# Patient Record
Sex: MALE | Race: WHITE | NOT HISPANIC OR LATINO | Employment: OTHER | URBAN - METROPOLITAN AREA
[De-identification: names, ages, dates, MRNs, and addresses within clinical notes are randomized per-mention and may not be internally consistent; named-entity substitution may affect disease eponyms.]

---

## 2019-03-29 ENCOUNTER — LAB REQUISITION (OUTPATIENT)
Dept: LAB | Facility: HOSPITAL | Age: 74
End: 2019-03-29

## 2019-03-29 DIAGNOSIS — Z00.00 ENCOUNTER FOR GENERAL ADULT MEDICAL EXAMINATION WITHOUT ABNORMAL FINDINGS: ICD-10-CM

## 2019-03-29 PROCEDURE — 88305 TISSUE EXAM BY PATHOLOGIST: CPT | Performed by: OTOLARYNGOLOGY

## 2019-04-01 LAB
CYTO UR: NORMAL
LAB AP CASE REPORT: NORMAL
LAB AP CLINICAL INFORMATION: NORMAL
PATH REPORT.FINAL DX SPEC: NORMAL
PATH REPORT.GROSS SPEC: NORMAL

## 2019-04-09 ENCOUNTER — TELEPHONE (OUTPATIENT)
Dept: NEUROSURGERY | Facility: CLINIC | Age: 74
End: 2019-04-09

## 2019-04-09 NOTE — TELEPHONE ENCOUNTER
Patient was last seen in 2016 by Dr Washington, he had neck surgery.  He saw his PCP today who told him that he has a hole in his lumbar spine where he had prior surgery in the 1990'st by Dr Penaloza. He states that he had a large hematoma after surgery and this is where this hole has opened up, he states that you can stick a qtip in it.  He is wanting to know if Dr Washington will see him for this.    He would like a return call on his cell.

## 2019-04-09 NOTE — TELEPHONE ENCOUNTER
Patient was advised of the message from Dr. Washington. He will contact his PCP office about some place to go. He does not want to waste our time or his own.

## 2019-04-09 NOTE — TELEPHONE ENCOUNTER
This would not be something I would normally take care of.  I will be glad to see him but will likely send him onto either a general surgeon or a plastic surgeon.  As long as he knows that you can make him an appointment if he still wants to come in.

## 2019-04-17 ENCOUNTER — LAB REQUISITION (OUTPATIENT)
Dept: LAB | Facility: HOSPITAL | Age: 74
End: 2019-04-17

## 2019-04-17 DIAGNOSIS — D14.0 BENIGN NEOPLASM OF MIDDLE EAR, NASAL CAVITY AND ACCESSORY SINUSES: ICD-10-CM

## 2019-04-17 PROCEDURE — 88305 TISSUE EXAM BY PATHOLOGIST: CPT | Performed by: OTOLARYNGOLOGY

## 2019-04-18 LAB
CYTO UR: NORMAL
LAB AP CASE REPORT: NORMAL
LAB AP CLINICAL INFORMATION: NORMAL
LAB AP DIAGNOSIS COMMENT: NORMAL
PATH REPORT.FINAL DX SPEC: NORMAL
PATH REPORT.GROSS SPEC: NORMAL

## 2019-07-29 ENCOUNTER — OFFICE VISIT (OUTPATIENT)
Dept: FAMILY MEDICINE CLINIC | Facility: CLINIC | Age: 74
End: 2019-07-29

## 2019-07-29 VITALS
SYSTOLIC BLOOD PRESSURE: 140 MMHG | HEART RATE: 69 BPM | TEMPERATURE: 97.3 F | OXYGEN SATURATION: 97 % | WEIGHT: 257.5 LBS | HEIGHT: 73 IN | BODY MASS INDEX: 34.13 KG/M2 | DIASTOLIC BLOOD PRESSURE: 88 MMHG

## 2019-07-29 DIAGNOSIS — R73.03 PREDIABETES: ICD-10-CM

## 2019-07-29 DIAGNOSIS — I10 ESSENTIAL HYPERTENSION: Primary | ICD-10-CM

## 2019-07-29 DIAGNOSIS — R60.9 EDEMA, UNSPECIFIED TYPE: ICD-10-CM

## 2019-07-29 DIAGNOSIS — K62.89 RECTAL PAIN: ICD-10-CM

## 2019-07-29 DIAGNOSIS — J42 CHRONIC BRONCHITIS, UNSPECIFIED CHRONIC BRONCHITIS TYPE (HCC): ICD-10-CM

## 2019-07-29 DIAGNOSIS — E78.5 HYPERLIPIDEMIA, UNSPECIFIED HYPERLIPIDEMIA TYPE: ICD-10-CM

## 2019-07-29 DIAGNOSIS — I25.118 CORONARY ARTERY DISEASE INVOLVING NATIVE HEART WITH OTHER FORM OF ANGINA PECTORIS, UNSPECIFIED VESSEL OR LESION TYPE (HCC): ICD-10-CM

## 2019-07-29 PROBLEM — I25.10 CORONARY ARTERY DISEASE INVOLVING NATIVE HEART: Status: ACTIVE | Noted: 2019-07-29

## 2019-07-29 PROCEDURE — 99214 OFFICE O/P EST MOD 30 MIN: CPT | Performed by: FAMILY MEDICINE

## 2019-07-29 RX ORDER — ASPIRIN 325 MG
325 TABLET, DELAYED RELEASE (ENTERIC COATED) ORAL EVERY 6 HOURS PRN
COMMUNITY
End: 2019-10-02

## 2019-07-29 RX ORDER — BUDESONIDE AND FORMOTEROL FUMARATE DIHYDRATE 160; 4.5 UG/1; UG/1
2 AEROSOL RESPIRATORY (INHALATION)
COMMUNITY
End: 2019-08-28

## 2019-07-29 RX ORDER — FUROSEMIDE 40 MG/1
40 TABLET ORAL 2 TIMES DAILY
Qty: 60 TABLET | Refills: 6 | Status: SHIPPED | OUTPATIENT
Start: 2019-07-29 | End: 2020-04-10 | Stop reason: HOSPADM

## 2019-07-29 RX ORDER — POTASSIUM CHLORIDE 1500 MG/1
20 TABLET, FILM COATED, EXTENDED RELEASE ORAL 2 TIMES DAILY
COMMUNITY
End: 2019-07-29 | Stop reason: SDUPTHER

## 2019-07-29 RX ORDER — ALLOPURINOL 300 MG/1
300 TABLET ORAL DAILY
COMMUNITY
End: 2019-09-22 | Stop reason: SDUPTHER

## 2019-07-29 RX ORDER — POTASSIUM CHLORIDE 1500 MG/1
20 TABLET, FILM COATED, EXTENDED RELEASE ORAL 2 TIMES DAILY
Qty: 60 TABLET | Refills: 6 | Status: SHIPPED | OUTPATIENT
Start: 2019-07-29 | End: 2020-04-10 | Stop reason: HOSPADM

## 2019-07-29 RX ORDER — ISOSORBIDE MONONITRATE 30 MG/1
30 TABLET, EXTENDED RELEASE ORAL DAILY
COMMUNITY
End: 2020-04-10 | Stop reason: HOSPADM

## 2019-07-29 NOTE — PROGRESS NOTES
Subjective   Juan James is a 73 y.o. male.     Chief Complaint   Patient presents with   • Hypertension   • Pain     colonoscopy ( pain when having a BM)   • Foot Swelling     ankle   • Coronary Artery Disease   • COPD   • Hyperlipidemia   • Rectal Pain       Hypertension   This is a chronic problem. The current episode started more than 1 year ago. The problem is unchanged. The problem is controlled. Pertinent negatives include no anxiety, blurred vision, chest pain, headaches, palpitations or shortness of breath. There are no associated agents to hypertension. Risk factors for coronary artery disease include dyslipidemia, diabetes mellitus, obesity and male gender. Past treatments include diuretics and ACE inhibitors. Current antihypertension treatment includes diuretics and ACE inhibitors. The current treatment provides moderate improvement. There are no compliance problems.  Hypertensive end-organ damage includes CAD/MI. There is no history of angina or kidney disease. There is no history of chronic renal disease, coarctation of the aorta, hyperaldosteronism, hypercortisolism or hyperparathyroidism.   Pain   Pertinent negatives include no abdominal pain, anorexia, arthralgias, change in bowel habit, chest pain, chills, congestion, coughing, fatigue or headaches.   Coronary Artery Disease   Presents for follow-up (stable) visit. Symptoms include leg swelling. Pertinent negatives include no chest pain, chest tightness, dizziness, palpitations or shortness of breath. Risk factors include hyperlipidemia.   COPD   This is a chronic (stable on meds) problem. The current episode started more than 1 year ago. The problem occurs constantly. Pertinent negatives include no abdominal pain, anorexia, arthralgias, change in bowel habit, chest pain, chills, congestion, coughing, fatigue or headaches.   Hyperlipidemia   This is a chronic (stable on meds) problem. The problem is controlled. He has no history of chronic renal  disease. Pertinent negatives include no chest pain or shortness of breath.   Rectal Pain   This is a new (during passing stool only) problem. The current episode started 1 to 4 weeks ago. The problem occurs constantly. The problem has been gradually worsening. Pertinent negatives include no abdominal pain, anorexia, arthralgias, change in bowel habit, chest pain, chills, congestion, coughing, fatigue or headaches. Exacerbated by: passing stool. He has tried nothing for the symptoms.   Leg Swelling   This is a chronic problem. The current episode started more than 1 year ago. The problem occurs constantly. The problem has been gradually worsening. Pertinent negatives include no abdominal pain, anorexia, arthralgias, change in bowel habit, chest pain, chills, congestion, coughing, fatigue or headaches. The symptoms are aggravated by walking. Treatments tried: lasix. The treatment provided mild relief.          The following portions of the patient's history were reviewed and updated as appropriate: allergies, current medications, past family history, past medical history, past social history, past surgical history and problem list.    Past Medical History:   Diagnosis Date   • Arthritis    • Atrial fibrillation (CMS/HCC)    • BPH (benign prostatic hyperplasia)    • Coronary artery disease     STENT X 3   • Hypertension    • Lower back pain    • Neck pain    • Tinnitus        Past Surgical History:   Procedure Laterality Date   • ANTERIOR CERVICAL DISCECTOMY W/ FUSION N/A 3/25/2016    Procedure: C3- C5 CERVICAL DISCECTOMY ANTERIOR FUSION WITH INSTRUMENTATION;  Surgeon: Bill Washington MD;  Location: [facility];  Service:    • BACK SURGERY      X4   • CAROTID STENT     • CORONARY ANGIOPLASTY WITH STENT PLACEMENT  2009    X 3   • KNEE ARTHROSCOPY Left        Family History   Family history unknown: Yes       Social History     Socioeconomic History   • Marital status:      Spouse name: Not on file   • Number  of children: Not on file   • Years of education: Not on file   • Highest education level: Not on file   Tobacco Use   • Smoking status: Former Smoker   • Smokeless tobacco: Never Used   Substance and Sexual Activity   • Alcohol use: Yes     Alcohol/week: 1.8 oz     Types: 3 Shots of liquor per week     Comment: Daily   • Drug use: No       Review of Systems   Constitutional: Negative for chills and fatigue.   HENT: Negative for congestion.    Eyes: Negative for blurred vision.   Respiratory: Negative for cough, chest tightness and shortness of breath.    Cardiovascular: Positive for leg swelling. Negative for chest pain and palpitations.   Gastrointestinal: Positive for rectal pain. Negative for abdominal pain, anorexia and change in bowel habit.   Musculoskeletal: Negative for arthralgias.   Neurological: Negative for dizziness, light-headedness and headache.       Objective   Vitals:    07/29/19 1015   BP: 140/88   Pulse: 69   Temp: 97.3 °F (36.3 °C)   SpO2: 97%     Body mass index is 33.97 kg/m².  Physical Exam   Constitutional: He appears well-developed and well-nourished. No distress.   Cardiovascular: Normal rate and regular rhythm. Exam reveals no friction rub.   No murmur heard.  Stable a.fib  Lt LE edema>RT lE   Pulmonary/Chest: Effort normal and breath sounds normal. No stridor. No respiratory distress.   Skin: He is not diaphoretic.   Vitals reviewed.        Assessment/Plan   Juan was seen today for hypertension, pain, foot swelling, coronary artery disease, copd, hyperlipidemia and rectal pain.    Diagnoses and all orders for this visit:    Essential hypertension  -     Comprehensive Metabolic Panel  -     Lipid Panel With LDL / HDL Ratio    Coronary artery disease involving native heart with other form of angina pectoris, unspecified vessel or lesion type (CMS/HCC)    Chronic bronchitis, unspecified chronic bronchitis type (CMS/HCC)    Hyperlipidemia, unspecified hyperlipidemia type  -      Comprehensive Metabolic Panel  -     Lipid Panel With LDL / HDL Ratio    Prediabetes  -     Comprehensive Metabolic Panel  -     Lipid Panel With LDL / HDL Ratio  -     Hemoglobin A1c  -     Microalbumin / Creatinine Urine Ratio - Urine, Clean Catch    Edema, unspecified type  -     furosemide (LASIX) 40 MG tablet; Take 1 tablet by mouth 2 (Two) Times a Day.  -     potassium chloride ER (K-TAB) 20 MEQ tablet controlled-release ER tablet; Take 1 tablet by mouth 2 (Two) Times a Day.  -     Ambulatory Referral to Vascular Surgery    Rectal pain  -     Ambulatory Referral to Colorectal Surgery

## 2019-07-30 LAB
ALBUMIN SERPL-MCNC: 3.7 G/DL (ref 3.5–5.2)
ALBUMIN/CREAT UR: 1101.3 MG/G CREAT (ref 0–30)
ALBUMIN/GLOB SERPL: 1.2 G/DL
ALP SERPL-CCNC: 99 U/L (ref 39–117)
ALT SERPL-CCNC: 79 U/L (ref 1–41)
AST SERPL-CCNC: 115 U/L (ref 1–40)
BILIRUB SERPL-MCNC: 1 MG/DL (ref 0.2–1.2)
BUN SERPL-MCNC: 10 MG/DL (ref 8–23)
BUN/CREAT SERPL: 12 (ref 7–25)
CALCIUM SERPL-MCNC: 9.3 MG/DL (ref 8.6–10.5)
CHLORIDE SERPL-SCNC: 100 MMOL/L (ref 98–107)
CHOLEST SERPL-MCNC: 182 MG/DL (ref 0–200)
CO2 SERPL-SCNC: 31.2 MMOL/L (ref 22–29)
CREAT SERPL-MCNC: 0.83 MG/DL (ref 0.76–1.27)
CREAT UR-MCNC: 23.8 MG/DL
GLOBULIN SER CALC-MCNC: 3.2 GM/DL
GLUCOSE SERPL-MCNC: 144 MG/DL (ref 65–99)
HBA1C MFR BLD: 7.2 % (ref 4.8–5.6)
HDLC SERPL-MCNC: 37 MG/DL (ref 40–60)
LDLC SERPL CALC-MCNC: 117 MG/DL (ref 0–100)
LDLC/HDLC SERPL: 3.16 {RATIO}
MICROALBUMIN UR-MCNC: 262.1 UG/ML
POTASSIUM SERPL-SCNC: 4.1 MMOL/L (ref 3.5–5.2)
PROT SERPL-MCNC: 6.9 G/DL (ref 6–8.5)
SODIUM SERPL-SCNC: 143 MMOL/L (ref 136–145)
TRIGL SERPL-MCNC: 140 MG/DL (ref 0–150)
VLDLC SERPL CALC-MCNC: 28 MG/DL

## 2019-08-26 RX ORDER — ATORVASTATIN CALCIUM 80 MG/1
80 TABLET, FILM COATED ORAL DAILY
COMMUNITY
End: 2019-08-28

## 2019-08-26 RX ORDER — MOMETASONE FUROATE AND FORMOTEROL FUMARATE DIHYDRATE 200; 5 UG/1; UG/1
2 AEROSOL RESPIRATORY (INHALATION) 2 TIMES DAILY
Refills: 5 | COMMUNITY
Start: 2019-06-13 | End: 2019-11-26

## 2019-08-26 RX ORDER — TRAZODONE HYDROCHLORIDE 100 MG/1
TABLET ORAL
Refills: 3 | COMMUNITY
Start: 2019-07-19 | End: 2019-08-28

## 2019-08-26 RX ORDER — ALBUTEROL SULFATE 90 UG/1
AEROSOL, METERED RESPIRATORY (INHALATION)
Refills: 6 | COMMUNITY
Start: 2019-06-12 | End: 2019-08-28

## 2019-08-26 RX ORDER — METOPROLOL SUCCINATE 100 MG/1
100 TABLET, EXTENDED RELEASE ORAL DAILY
Refills: 1 | COMMUNITY
Start: 2019-07-08 | End: 2019-08-28

## 2019-08-26 RX ORDER — AZATHIOPRINE 50 MG/1
125 TABLET ORAL DAILY
COMMUNITY
Start: 2015-02-18 | End: 2019-08-28

## 2019-08-26 RX ORDER — RANITIDINE 150 MG/1
150 TABLET ORAL
COMMUNITY
End: 2019-08-28

## 2019-08-26 RX ORDER — MAGNESIUM CHLORIDE 64 MG
TABLET, DELAYED RELEASE (ENTERIC COATED) ORAL
COMMUNITY
End: 2019-08-28

## 2019-08-26 RX ORDER — EZETIMIBE 10 MG/1
10 TABLET ORAL
Refills: 6 | COMMUNITY
Start: 2019-07-14 | End: 2019-10-21 | Stop reason: SDUPTHER

## 2019-08-26 RX ORDER — CLOPIDOGREL BISULFATE 75 MG/1
75 TABLET ORAL DAILY
COMMUNITY
End: 2019-08-28

## 2019-08-26 RX ORDER — OMEPRAZOLE 40 MG/1
40 CAPSULE, DELAYED RELEASE ORAL DAILY
Refills: 2 | COMMUNITY
Start: 2019-05-28 | End: 2019-08-28

## 2019-08-28 ENCOUNTER — OFFICE VISIT (OUTPATIENT)
Dept: SURGERY | Facility: CLINIC | Age: 74
End: 2019-08-28

## 2019-08-28 VITALS
TEMPERATURE: 98.1 F | WEIGHT: 254.3 LBS | DIASTOLIC BLOOD PRESSURE: 76 MMHG | OXYGEN SATURATION: 96 % | HEIGHT: 73 IN | HEART RATE: 84 BPM | SYSTOLIC BLOOD PRESSURE: 138 MMHG | BODY MASS INDEX: 33.7 KG/M2

## 2019-08-28 DIAGNOSIS — K62.89 ANAL OR RECTAL PAIN: Primary | ICD-10-CM

## 2019-08-28 DIAGNOSIS — Z86.010 HISTORY OF COLON POLYPS: ICD-10-CM

## 2019-08-28 PROBLEM — Z86.0100 HISTORY OF COLON POLYPS: Status: ACTIVE | Noted: 2019-08-28

## 2019-08-28 PROCEDURE — 46600 DIAGNOSTIC ANOSCOPY SPX: CPT | Performed by: COLON & RECTAL SURGERY

## 2019-08-28 PROCEDURE — 99204 OFFICE O/P NEW MOD 45 MIN: CPT | Performed by: COLON & RECTAL SURGERY

## 2019-08-28 RX ORDER — SODIUM PHOSPHATE,MONO-DIBASIC 19G-7G/118
ENEMA (ML) RECTAL 2 TIMES DAILY WITH MEALS
COMMUNITY
End: 2020-04-10 | Stop reason: HOSPADM

## 2019-08-28 RX ORDER — SODIUM CHLORIDE, SODIUM LACTATE, POTASSIUM CHLORIDE, CALCIUM CHLORIDE 600; 310; 30; 20 MG/100ML; MG/100ML; MG/100ML; MG/100ML
30 INJECTION, SOLUTION INTRAVENOUS CONTINUOUS
Status: CANCELLED | OUTPATIENT
Start: 2019-10-03

## 2019-08-28 NOTE — PROGRESS NOTES
Juan James is a 73 y.o. male who is seen as a consult at the request of Luda Kidd MD for anorectal pain    HPI:    Pt c/o anorectal pain only with BMs beginning about 3 months ago.  Feels like a hot poker.  As soon as he finishes BM, the pain quits  No blood per rectum    He has 1-2 BMs per day.  No change in stool size or shape    He is not applying any creams to his bottom  No prior history of anal pain like this    Last colonoscopy 2007:  +polyps    If he feels like he is getting stopped up, he takes MOM.  Less than once per month  He tries to eat a high-fiber diet.  He does not take a fiber supplement    No anticoags  Seeing vascular surgery for AAA surveillance    Past Medical History:   Diagnosis Date   • AAA (abdominal aortic aneurysm) (CMS/HCC)    • Arthritis    • Atrial fibrillation (CMS/HCC)    • BPH (benign prostatic hyperplasia)    • Cataract    • Cervical disc disorder    • Cervical radiculitis 09/2016   • Cervical spondylosis 11/2012   • CHF (congestive heart failure) (CMS/HCC)    • Chronic bronchitis (CMS/HCC)    • Colon polyps    • COPD (chronic obstructive pulmonary disease) (CMS/HCC)    • Coronary artery disease     STENT X 3   • DDD (degenerative disc disease), cervical    • DDD (degenerative disc disease), lumbar    • Edema    • GERD (gastroesophageal reflux disease)    • Hearing loss    • History of blood transfusion    • Hyperglycemia    • Hyperglycemia    • Hyperlipidemia    • Hypertension    • Lower back pain    • Lumbar radiculopathy 05/2016   • Neck pain    • OA (osteoarthritis)    • 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[facility])    • Peptic ulceration     PUD   • Prediabetes    • Pulmonary embolism (CMS/[facility])    • Rectal pain 07/2019   • Shortness of breath    • Tinnitus        Past Surgical History:   Procedure Laterality Date   • ANTERIOR CERVICAL DISCECTOMY W/ FUSION N/A 3/25/2016    Procedure: C3- C5 CERVICAL DISCECTOMY ANTERIOR FUSION WITH INSTRUMENTATION;  Surgeon: Bill Washington,  MD;  Location: [facility] OR;  Service:    • ARTHRODESIS N/A 07/1983    LUMBAR   • ARTHRODESIS N/A 1987    LUMBAR   • CARDIAC CATHETERIZATION  07/2009   • CARDIAC CATHETERIZATION  03/18/2003    STENT TO RCA AND PCA, DR. PRIYA LUGO   • CATARACT EXTRACTION Bilateral    • CERVICAL ARTHRODESIS N/A 07/1990   • CERVICAL ARTHRODESIS N/A 1988   • CORONARY ANGIOPLASTY WITH STENT PLACEMENT  02/2009   • ENDOSCOPY N/A 06/24/2012    NON BLEEDING EROSIVE GASTROPATHY, 1 DUODENAL ULCER WITH CLEAN BASE, DR. BRANDY WREN AT [facility]   • ENDOSCOPY AND COLONOSCOPY N/A 11/20/2007    EGD WNL, 8 ADENOMATOUS POLYPS IN RECTO-SIGMOID, RESCOPE IN 3 YRS, DR. CRISTINA RODRIGUEZ AT [facility]   • FOOT SURGERY Left    • HAND SURGERY Left    • HAND SURGERY Right    • KNEE ARTHROSCOPY Left    • VASECTOMY Bilateral        Social History:   reports that he has quit smoking. He has never used smokeless tobacco. He reports that he drinks about 1.8 oz of alcohol per week. He reports that he does not use drugs.      Marriage status:     Family History   Problem Relation Age of Onset   • Heart attack Mother          Current Outpatient Medications:   •  allopurinol (ZYLOPRIM) 300 MG tablet, Take 300 mg by mouth Daily., Disp: , Rfl:   •  aspirin  MG tablet, Take 325 mg by mouth Every 6 (Six) Hours As Needed., Disp: , Rfl:   •  digoxin (LANOXIN) 125 MCG tablet, Take 125 mcg by mouth every morning., Disp: , Rfl:   •  DULERA 200-5 MCG/ACT inhaler, Inhale 2 puffs 2 (Two) Times a Day., Disp: , Rfl: 5  •  ezetimibe (ZETIA) 10 MG tablet, Take 10 mg by mouth every night at bedtime., Disp: , Rfl: 6  •  furosemide (LASIX) 40 MG tablet, Take 1 tablet by mouth 2 (Two) Times a Day., Disp: 60 tablet, Rfl: 6  •  glucosamine-chondroitin 500-400 MG capsule capsule, Take  by mouth 3 (Three) Times a Day With Meals., Disp: , Rfl:   •  isosorbide mononitrate (IMDUR) 30 MG 24 hr tablet, Take 30 mg by mouth Daily., Disp: , Rfl:   •  metoprolol succinate XL (TOPROL-XL) 50 MG 24 hr  tablet, Take 50 mg by mouth every morning., Disp: , Rfl:   •  Multiple Vitamins-Minerals (MULTIVITAMIN PO), Take 1 tablet by mouth daily., Disp: , Rfl:   •  potassium chloride ER (K-TAB) 20 MEQ tablet controlled-release ER tablet, Take 1 tablet by mouth 2 (Two) Times a Day., Disp: 60 tablet, Rfl: 6    Allergy  Ranexa [ranolazine er]    Review of Systems   Constitution: Negative for decreased appetite, weakness and weight gain.   HENT: Negative for congestion, hearing loss and hoarse voice.    Eyes: Negative for blurred vision, discharge and visual disturbance.   Cardiovascular: Positive for leg swelling. Negative for chest pain and cyanosis.   Respiratory: Positive for shortness of breath. Negative for cough, sleep disturbances due to breathing and snoring.    Endocrine: Negative for cold intolerance and heat intolerance.   Hematologic/Lymphatic: Does not bruise/bleed easily.   Skin: Negative for itching, poor wound healing and skin cancer.   Musculoskeletal: Positive for arthritis, back pain, joint pain and joint swelling.   Gastrointestinal: Negative for abdominal pain, change in bowel habit, bowel incontinence and constipation.   Genitourinary: Negative for bladder incontinence, dysuria and hematuria.   Neurological: Negative for brief paralysis, excessive daytime sleepiness, dizziness, focal weakness, headaches and light-headedness.   Psychiatric/Behavioral: Negative for altered mental status and hallucinations. The patient does not have insomnia.    Allergic/Immunologic: Negative for HIV exposure and persistent infections.   All other systems reviewed and are negative.      Vitals:    08/28/19 1349   BP: 138/76   Pulse: 84   Temp: 98.1 °F (36.7 °C)   SpO2: 96%     Body mass index is 33.55 kg/m².    Physical Exam   Constitutional: He is oriented to person, place, and time. He appears well-developed and well-nourished. No distress.   HENT:   Head: Normocephalic and atraumatic.   Nose: Nose normal.   Mouth/Throat:  Oropharynx is clear and moist.   Eyes: Conjunctivae and EOM are normal. Pupils are equal, round, and reactive to light.   Neck: Normal range of motion. No tracheal deviation present.   Pulmonary/Chest: Effort normal and breath sounds normal. No respiratory distress.   Abdominal: Soft. Bowel sounds are normal. He exhibits no distension.   Genitourinary:   Genitourinary Comments: Perianal exam: external hem - wnl.  No fissure visualized  CARO- good tone, no masses  Anoscopy performed:  Grade wnl internal hem   Musculoskeletal: Normal range of motion. He exhibits no edema or deformity.   Neurological: He is alert and oriented to person, place, and time. No cranial nerve deficit. Coordination and gait normal.   Skin: Skin is warm and dry.   Psychiatric: He has a normal mood and affect. His behavior is normal. Judgment normal.       Review of Medical Record:  I reviewed PCP Dr. Dasilva records: pt with anorectal pain    I reviewed records colonoscopy 2007:  +polyps    Assessment:  1. Anal or rectal pain    2. History of colon polyps        Plan:    Discussion with patient that while fissure not seen on exam, his symptoms are consistent with fissure, and will treat as such.  I wrote for diltiazem and lidocaine cream to be placed on the anus 3 times a day.      For the history of colon polyps, I recommend doing a colonoscopy.  I described the patient risks, benefits, and alternatives, and he wishes to proceed.      Scribed for Naldo Alves MD by Ita Arroyo PA-C  8/28/2019   This patient was evaluated by me, recommendations made, documentation reviewed, edited, and revised by me, Naldo Alves MD

## 2019-09-23 RX ORDER — ALLOPURINOL 300 MG/1
TABLET ORAL
Qty: 30 TABLET | Refills: 0 | Status: SHIPPED | OUTPATIENT
Start: 2019-09-23 | End: 2019-11-26

## 2019-10-02 RX ORDER — ASPIRIN 81 MG/1
81 TABLET, CHEWABLE ORAL DAILY
COMMUNITY
End: 2020-04-10 | Stop reason: HOSPADM

## 2019-10-03 ENCOUNTER — ANESTHESIA EVENT (OUTPATIENT)
Dept: GASTROENTEROLOGY | Facility: HOSPITAL | Age: 74
End: 2019-10-03

## 2019-10-03 ENCOUNTER — HOSPITAL ENCOUNTER (OUTPATIENT)
Facility: HOSPITAL | Age: 74
Setting detail: HOSPITAL OUTPATIENT SURGERY
Discharge: HOME | End: 2019-10-03
Attending: COLON & RECTAL SURGERY | Admitting: COLON & RECTAL SURGERY

## 2019-10-03 ENCOUNTER — ANESTHESIA (OUTPATIENT)
Dept: GASTROENTEROLOGY | Facility: HOSPITAL | Age: 74
End: 2019-10-03

## 2019-10-03 VITALS
BODY MASS INDEX: 33.03 KG/M2 | HEIGHT: 73 IN | SYSTOLIC BLOOD PRESSURE: 127 MMHG | HEART RATE: 73 BPM | TEMPERATURE: 98.1 F | DIASTOLIC BLOOD PRESSURE: 85 MMHG | OXYGEN SATURATION: 96 % | RESPIRATION RATE: 18 BRPM | WEIGHT: 249.25 LBS

## 2019-10-03 DIAGNOSIS — Z86.010 HISTORY OF COLON POLYPS: ICD-10-CM

## 2019-10-03 PROCEDURE — 45380 COLONOSCOPY AND BIOPSY: CPT | Performed by: COLON & RECTAL SURGERY

## 2019-10-03 PROCEDURE — 88305 TISSUE EXAM BY PATHOLOGIST: CPT | Performed by: COLON & RECTAL SURGERY

## 2019-10-03 PROCEDURE — 45385 COLONOSCOPY W/LESION REMOVAL: CPT | Performed by: COLON & RECTAL SURGERY

## 2019-10-03 PROCEDURE — 45381 COLONOSCOPY SUBMUCOUS NJX: CPT | Performed by: COLON & RECTAL SURGERY

## 2019-10-03 PROCEDURE — 25010000002 PROPOFOL 10 MG/ML EMULSION: Performed by: NURSE ANESTHETIST, CERTIFIED REGISTERED

## 2019-10-03 RX ORDER — LIDOCAINE HYDROCHLORIDE 20 MG/ML
INJECTION, SOLUTION INFILTRATION; PERINEURAL AS NEEDED
Status: DISCONTINUED | OUTPATIENT
Start: 2019-10-03 | End: 2019-10-03 | Stop reason: SURG

## 2019-10-03 RX ORDER — PROPOFOL 10 MG/ML
VIAL (ML) INTRAVENOUS CONTINUOUS PRN
Status: DISCONTINUED | OUTPATIENT
Start: 2019-10-03 | End: 2019-10-03 | Stop reason: SURG

## 2019-10-03 RX ORDER — SODIUM CHLORIDE, SODIUM LACTATE, POTASSIUM CHLORIDE, CALCIUM CHLORIDE 600; 310; 30; 20 MG/100ML; MG/100ML; MG/100ML; MG/100ML
30 INJECTION, SOLUTION INTRAVENOUS CONTINUOUS
Status: DISCONTINUED | OUTPATIENT
Start: 2019-10-03 | End: 2019-10-03 | Stop reason: HOSPADM

## 2019-10-03 RX ORDER — PROPOFOL 10 MG/ML
VIAL (ML) INTRAVENOUS AS NEEDED
Status: DISCONTINUED | OUTPATIENT
Start: 2019-10-03 | End: 2019-10-03 | Stop reason: SURG

## 2019-10-03 RX ADMIN — PROPOFOL 200 MCG/KG/MIN: 10 INJECTION, EMULSION INTRAVENOUS at 13:45

## 2019-10-03 RX ADMIN — PROPOFOL 50 MG: 10 INJECTION, EMULSION INTRAVENOUS at 13:45

## 2019-10-03 RX ADMIN — LIDOCAINE HYDROCHLORIDE 60 MG: 20 INJECTION, SOLUTION INFILTRATION; PERINEURAL at 13:45

## 2019-10-03 RX ADMIN — SODIUM CHLORIDE, POTASSIUM CHLORIDE, SODIUM LACTATE AND CALCIUM CHLORIDE 30 ML/HR: 600; 310; 30; 20 INJECTION, SOLUTION INTRAVENOUS at 13:13

## 2019-10-03 RX ADMIN — PROPOFOL 30 MG: 10 INJECTION, EMULSION INTRAVENOUS at 13:47

## 2019-10-03 NOTE — H&P
Pt c/o anorectal pain only with BMs beginning about 3 months ago. Feels like a hot poker. As soon as he finishes BM, the pain quits   No blood per rectum   He has 1-2 BMs per day. No change in stool size or shape   He is not applying any creams to his bottom   No prior history of anal pain like this   Last colonoscopy 2007: +polyps   If he feels like he is getting stopped up, he takes MOM. Less than once per month   He tries to eat a high-fiber diet. He does not take a fiber supplement   No anticoags   Seeing vascular surgery for AAA surveillance   Medical History                                                                                                                                                                                                                                          Surgical History                                                                                                                                                                    Social History:   reports that he has quit smoking. He has never used smokeless tobacco. He reports that he drinks about 1.8 oz of alcohol per week. He reports that he does not use drugs.   Marriage status:          Family History   Problem Relation Age of Onset   • Heart attack Mother        Current Outpatient Medications:   • allopurinol (ZYLOPRIM) 300 MG tablet, Take 300 mg by mouth Daily., Disp: , Rfl:   • aspirin  MG tablet, Take 325 mg by mouth Every 6 (Six) Hours As Needed., Disp: , Rfl:   • digoxin (LANOXIN) 125 MCG tablet, Take 125 mcg by mouth every morning., Disp: , Rfl:   • DULERA 200-5 MCG/ACT inhaler, Inhale 2 puffs 2 (Two) Times a Day., Disp: , Rfl: 5   • ezetimibe (ZETIA) 10 MG tablet, Take 10 mg by mouth every night at bedtime., Disp: , Rfl: 6   • furosemide (LASIX) 40 MG tablet, Take 1 tablet by mouth 2 (Two) Times a Day., Disp: 60 tablet, Rfl: 6   • glucosamine-chondroitin 500-400 MG capsule capsule, Take by mouth 3  "(Three) Times a Day With Meals., Disp: , Rfl:   • isosorbide mononitrate (IMDUR) 30 MG 24 hr tablet, Take 30 mg by mouth Daily., Disp: , Rfl:   • metoprolol succinate XL (TOPROL-XL) 50 MG 24 hr tablet, Take 50 mg by mouth every morning., Disp: , Rfl:   • Multiple Vitamins-Minerals (MULTIVITAMIN PO), Take 1 tablet by mouth daily., Disp: , Rfl:   • potassium chloride ER (K-TAB) 20 MEQ tablet controlled-release ER tablet, Take 1 tablet by mouth 2 (Two) Times a Day., Disp: 60 tablet, Rfl: 6   Allergy   Ranexa [ranolazine er]   Review of Systems   Constitution: Negative for decreased appetite, weakness and weight gain.   HENT: Negative for congestion, hearing loss and hoarse voice.   Eyes: Negative for blurred vision, discharge and visual disturbance.   Cardiovascular: Positive for leg swelling. Negative for chest pain and cyanosis.   Respiratory: Positive for shortness of breath. Negative for cough, sleep disturbances due to breathing and snoring.   Endocrine: Negative for cold intolerance and heat intolerance.   Hematologic/Lymphatic: Does not bruise/bleed easily.   Skin: Negative for itching, poor wound healing and skin cancer.   Musculoskeletal: Positive for arthritis, back pain, joint pain and joint swelling.   Gastrointestinal: Negative for abdominal pain, change in bowel habit, bowel incontinence and constipation.   Genitourinary: Negative for bladder incontinence, dysuria and hematuria.   Neurological: Negative for brief paralysis, excessive daytime sleepiness, dizziness, focal weakness, headaches and light-headedness.   Psychiatric/Behavioral: Negative for altered mental status and hallucinations. The patient does not have insomnia.   Allergic/Immunologic: Negative for HIV exposure and persistent infections.   All other systems reviewed and are negative.     /84 (BP Location: Left arm, Patient Position: Sitting)   Pulse 75   Temp 98.1 °F (36.7 °C) (Oral)   Resp 19   Ht 185.4 cm (73\")   Wt 113 kg (249 " lb 4 oz)   SpO2 96%   BMI 32.88 kg/m²     Physical Exam   Constitutional: He is oriented to person, place, and time. He appears well-developed and well-nourished. No distress.   HENT:   Head: Normocephalic and atraumatic.   Nose: Nose normal.   Mouth/Throat: Oropharynx is clear and moist.   Eyes: Conjunctivae and EOM are normal. Pupils are equal, round, and reactive to light.   Neck: Normal range of motion. No tracheal deviation present.   Pulmonary/Chest: Effort normal and breath sounds normal. No respiratory distress.   Abdominal: Soft. Bowel sounds are normal. He exhibits no distension.   Genitourinary:   Genitourinary Comments: Perianal exam: external hem - wnl. No fissure visualized  CARO- good tone, no masses  Anoscopy performed: Grade wnl internal hem   Musculoskeletal: Normal range of motion. He exhibits no edema or deformity.   Neurological: He is alert and oriented to person, place, and time. No cranial nerve deficit. Coordination and gait normal.   Skin: Skin is warm and dry.   Psychiatric: He has a normal mood and affect. His behavior is normal. Judgment normal.     Review of Medical Record: I reviewed PCP Dr. Dasilva records: pt with anorectal pain   I reviewed records colonoscopy 2007: +polyps   Assessment:   1. Anal or rectal pain    2. History of colon polyps      Plan:   Discussion with patient that while fissure not seen on exam, his symptoms are consistent with fissure, and will treat as such. I wrote for diltiazem and lidocaine cream to be placed on the anus 3 times a day.   For the history of colon polyps, I recommend doing a colonoscopy. I described the patient risks, benefits, and alternatives, and he wishes to proceed.

## 2019-10-03 NOTE — ANESTHESIA PREPROCEDURE EVALUATION
Anesthesia Evaluation     Patient summary reviewed and Nursing notes reviewed                Airway   Mallampati: I  TM distance: >3 FB  Neck ROM: limited  No difficulty expected and Large neck circumference  Dental - normal exam     Pulmonary - normal exam   (+) pulmonary embolism, a smoker Former, COPD, shortness of breath,   Cardiovascular     ECG reviewed  Rhythm: irregular    (+) hypertension, CAD, cardiac stents dysrhythmias Atrial Fib, CHF, hyperlipidemia,       Neuro/Psych  (+) numbness,     GI/Hepatic/Renal/Endo    (+) obesity,  GERD, PUD,      Musculoskeletal     (+) neck pain,   Abdominal  - normal exam    Bowel sounds: normal.   Substance History - negative use     OB/GYN negative ob/gyn ROS         Other   (+) arthritis                   Anesthesia Plan    ASA 3     MAC     Anesthetic plan, all risks, benefits, and alternatives have been provided, discussed and informed consent has been obtained with: patient.

## 2019-10-03 NOTE — DISCHARGE INSTRUCTIONS

## 2019-10-04 LAB
CYTO UR: NORMAL
LAB AP CASE REPORT: NORMAL
PATH REPORT.FINAL DX SPEC: NORMAL
PATH REPORT.GROSS SPEC: NORMAL

## 2019-10-21 RX ORDER — EZETIMIBE 10 MG/1
10 TABLET ORAL
Qty: 30 TABLET | Refills: 6 | Status: SHIPPED | OUTPATIENT
Start: 2019-10-21 | End: 2020-04-10 | Stop reason: HOSPADM

## 2019-11-26 ENCOUNTER — OFFICE VISIT (OUTPATIENT)
Dept: FAMILY MEDICINE CLINIC | Facility: CLINIC | Age: 74
End: 2019-11-26

## 2019-11-26 VITALS
BODY MASS INDEX: 32.93 KG/M2 | TEMPERATURE: 97.7 F | HEIGHT: 73 IN | HEART RATE: 77 BPM | OXYGEN SATURATION: 96 % | WEIGHT: 248.5 LBS | DIASTOLIC BLOOD PRESSURE: 84 MMHG | SYSTOLIC BLOOD PRESSURE: 136 MMHG

## 2019-11-26 DIAGNOSIS — R10.13 EPIGASTRIC PAIN: ICD-10-CM

## 2019-11-26 DIAGNOSIS — Z00.00 MEDICARE ANNUAL WELLNESS VISIT, SUBSEQUENT: Primary | ICD-10-CM

## 2019-11-26 DIAGNOSIS — I10 ESSENTIAL HYPERTENSION: ICD-10-CM

## 2019-11-26 DIAGNOSIS — E11.69 TYPE 2 DIABETES MELLITUS WITH OTHER SPECIFIED COMPLICATION, WITHOUT LONG-TERM CURRENT USE OF INSULIN (HCC): ICD-10-CM

## 2019-11-26 PROCEDURE — 99214 OFFICE O/P EST MOD 30 MIN: CPT | Performed by: FAMILY MEDICINE

## 2019-11-26 PROCEDURE — G0439 PPPS, SUBSEQ VISIT: HCPCS | Performed by: FAMILY MEDICINE

## 2019-11-26 RX ORDER — LISINOPRIL 10 MG/1
10 TABLET ORAL DAILY
Qty: 90 TABLET | Refills: 3 | Status: SHIPPED | OUTPATIENT
Start: 2019-11-26 | End: 2019-12-02

## 2019-11-26 NOTE — PROGRESS NOTES
The ABCs of the Annual Wellness Visit  Subsequent Medicare Wellness Visit    Chief Complaint   Patient presents with   • Medicare Wellness-subsequent   • Diabetes   • Abdominal Pain   • Hypertension       Subjective   History of Present Illness:  Juan James is a 73 y.o. male who presents for a Subsequent Medicare Wellness Visit.    HEALTH RISK ASSESSMENT    Recent Hospitalizations:  No hospitalization(s) within the last year.    Current Medical Providers:  Patient Care Team:  Luda Kidd MD as PCP - General (Family Medicine)  Luda Kidd MD as PCP - Family Medicine  Saul Fang MD as Consulting Physician (Cardiology)  Bill Washington MD as Surgeon (Neurosurgery)  Mohan, Zafar Fraire MD as Consulting Physician (Vascular Surgery)    Smoking Status:  Social History     Tobacco Use   Smoking Status Former Smoker   • Types: Cigarettes   Smokeless Tobacco Never Used       Alcohol Consumption:  Social History     Substance and Sexual Activity   Alcohol Use Yes    Comment: 3 bourbons daily       Depression Screen:   PHQ-2/PHQ-9 Depression Screening 11/26/2019   Little interest or pleasure in doing things 0   Feeling down, depressed, or hopeless 0   Trouble falling or staying asleep, or sleeping too much 0   Feeling tired or having little energy 0   Poor appetite or overeating 0   Feeling bad about yourself - or that you are a failure or have let yourself or your family down 0   Trouble concentrating on things, such as reading the newspaper or watching television 0   Moving or speaking so slowly that other people could have noticed. Or the opposite - being so fidgety or restless that you have been moving around a lot more than usual 0   Thoughts that you would be better off dead, or of hurting yourself in some way 0   Total Score 0   If you checked off any problems, how difficult have these problems made it for you to do your work, take care of things at home, or get along with other people? Not  difficult at all       Fall Risk Screen:  IRIS Fall Risk Assessment was completed, and patient is at LOW risk for falls.Assessment completed on:11/26/2019    Health Habits and Functional and Cognitive Screening:  Functional & Cognitive Status 11/26/2019   Do you have difficulty preparing food and eating? No   Do you have difficulty bathing yourself, getting dressed or grooming yourself? No   Do you have difficulty using the toilet? No   Do you have difficulty moving around from place to place? No   Do you have trouble with steps or getting out of a bed or a chair? No   Current Diet Well Balanced Diet   Dental Exam Not up to date   Eye Exam Up to date   Exercise (times per week) 0 times per week   Current Exercise Activities Include None   Do you need help using the phone?  No   Are you deaf or do you have serious difficulty hearing?  Yes   Do you need help with transportation? No   Do you need help shopping? No   Do you need help preparing meals?  No   Do you need help with housework?  No   Do you need help with laundry? No   Do you need help taking your medications? No   Do you need help managing money? No   Do you ever drive or ride in a car without wearing a seat belt? No   Have you felt unusual stress, anger or loneliness in the last month? No   Who do you live with? Spouse   If you need help, do you have trouble finding someone available to you? No   Have you been bothered in the last four weeks by sexual problems? No   Do you have difficulty concentrating, remembering or making decisions? No         Does the patient have evidence of cognitive impairment? No    Asprin use counseling:Taking ASA appropriately as indicated    Age-appropriate Screening Schedule:  Refer to the list below for future screening recommendations based on patient's age, sex and/or medical conditions. Orders for these recommended tests are listed in the plan section. The patient has been provided with a written plan.    Health  Maintenance   Topic Date Due   • TDAP/TD VACCINES (1 - Tdap) 12/10/1966   • ZOSTER VACCINE (1 of 2) 12/10/1997   • DIABETIC FOOT EXAM  04/09/2019   • DIABETIC EYE EXAM  04/09/2019   • HEMOGLOBIN A1C  01/29/2020   • LIPID PANEL  07/29/2020   • URINE MICROALBUMIN  07/29/2020   • COLONOSCOPY  10/03/2029   • INFLUENZA VACCINE  Completed   • PNEUMOCOCCAL VACCINES (65+ LOW/MEDIUM RISK)  Completed          The following portions of the patient's history were reviewed and updated as appropriate: allergies, current medications, past family history, past medical history, past social history, past surgical history and problem list.    Outpatient Medications Prior to Visit   Medication Sig Dispense Refill   • aspirin 81 MG chewable tablet Chew 81 mg Daily.     • digoxin (LANOXIN) 125 MCG tablet Take 125 mcg by mouth every morning.     • ezetimibe (ZETIA) 10 MG tablet Take 1 tablet by mouth every night at bedtime. 30 tablet 6   • furosemide (LASIX) 40 MG tablet Take 1 tablet by mouth 2 (Two) Times a Day. 60 tablet 6   • glucosamine-chondroitin 500-400 MG capsule capsule Take  by mouth 2 (Two) Times a Day With Meals.     • isosorbide mononitrate (IMDUR) 30 MG 24 hr tablet Take 30 mg by mouth Daily.     • metoprolol succinate XL (TOPROL-XL) 100 MG 24 hr tablet Take 100 mg by mouth Every Morning.     • Multiple Vitamins-Minerals (MULTIVITAMIN PO) Take 1 tablet by mouth daily.     • potassium chloride ER (K-TAB) 20 MEQ tablet controlled-release ER tablet Take 1 tablet by mouth 2 (Two) Times a Day. 60 tablet 6   • allopurinol (ZYLOPRIM) 300 MG tablet TAKE 1 TABLET BY MOUTH ONCE DAILY AS DIRECTED, PLEASE SCHEDULE AN APPOINTMENT FOR REFILLS 30 tablet 0   • Aluminum Acetate solution Apply  topically.     • DULERA 200-5 MCG/ACT inhaler Inhale 2 puffs 2 (Two) Times a Day.  5     No facility-administered medications prior to visit.        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)   • Cervical spondylosis with radiculopathy   • History of colon polyps   • Tinnitus   • Shortness of breath   • Pulmonary embolism (CMS/HCC)   • Peptic ulceration   • OA (osteoarthritis)   • Neck pain   • Lower back pain   • Hypertension   • History of blood transfusion   • Hearing loss   • GERD (gastroesophageal reflux disease)   • DDD (degenerative disc disease), lumbar   • DDD (degenerative disc disease), cervical   • Coronary artery disease   • COPD (chronic obstructive pulmonary disease) (CMS/HCC)   • Colon polyps   • CHF (congestive heart failure) (CMS/[facility])   • Cervical spondylosis   • Cervical disc disorder   • Cataract   • BPH (benign prostatic hyperplasia)   • Atrial fibrillation (CMS/[facility])   • Arthritis   • AAA (abdominal aortic aneurysm) (CMS/[facility])   • Medicare annual wellness visit, subsequent       Advanced Care Planning:  Patient does not have an advance directive - information provided to the patient today    Review of Systems    Compared to one year ago, the patient feels his physical health is the same.  Compared to one year ago, the patient feels his mental health is the same.    Reviewed chart for potential of high risk medication in the elderly: yes  Reviewed chart for potential of harmful drug interactions in the elderly:yes    Objective         Vitals:    11/26/19 0804   BP: 136/84   Pulse: 77   Temp: 97.7 °F (36.5 °C)   SpO2: 96%   Weight: 113 kg (248 lb 8 oz)   Height: 185.4 cm (72.99\")       Body mass index is 32.79 kg/m².  Discussed the patient's BMI with him. The BMI is above average; BMI management plan is completed.    Physical Exam   Constitutional: He appears well-developed and well-nourished. No distress.   Skin: He is not diaphoretic. "   Nursing note and vitals reviewed.            Assessment/Plan   Medicare Risks and Personalized Health Plan  CMS Preventative Services Quick Reference  Fall Risk    The above risks/problems have been discussed with the patient.  Pertinent information has been shared with the patient in the After Visit Summary.  Follow up plans and orders are seen below in the Assessment/Plan Section.    Diagnoses and all orders for this visit:    1. Medicare annual wellness visit, subsequent (Primary)    2. Type 2 diabetes mellitus with other specified complication, without long-term current use of insulin (CMS/[facility])  -     Hemoglobin A1c  -     Comprehensive Metabolic Panel  -     Lipid Panel With LDL / HDL Ratio  -     metFORMIN (GLUCOPHAGE) 500 MG tablet; Take 1 tablet by mouth 2 (Two) Times a Day With Meals.  Dispense: 60 tablet; Refill: 11  -     lisinopril (PRINIVIL,ZESTRIL) 10 MG tablet; Take 1 tablet by mouth Daily.  Dispense: 90 tablet; Refill: 3    3. Essential hypertension  -     Hemoglobin A1c  -     Comprehensive Metabolic Panel  -     Lipid Panel With LDL / HDL Ratio  -     metFORMIN (GLUCOPHAGE) 500 MG tablet; Take 1 tablet by mouth 2 (Two) Times a Day With Meals.  Dispense: 60 tablet; Refill: 11  -     lisinopril (PRINIVIL,ZESTRIL) 10 MG tablet; Take 1 tablet by mouth Daily.  Dispense: 90 tablet; Refill: 3    4. Epigastric pain  -     NM Gastric Emptying; Future      Follow Up:  Return in about 2 months (around 1/26/2020) for DIABETES.     An After Visit Summary and PPPS were given to the patient.

## 2019-11-26 NOTE — PROGRESS NOTES
Subjective   Juan James is a 73 y.o. male.     Chief Complaint   Patient presents with   • Medicare Wellness-subsequent   • Diabetes   • Abdominal Pain   • Hypertension       Diabetes   He presents for his initial diabetic visit. He has type 2 diabetes mellitus. His disease course has been worsening. There are no hypoglycemic associated symptoms. Pertinent negatives for hypoglycemia include no dizziness. There are no diabetic associated symptoms. Pertinent negatives for diabetes include no blurred vision, no chest pain, no fatigue, no polydipsia and no polyuria. He is following a diabetic diet. An ACE inhibitor/angiotensin II receptor blocker is not being taken. He does not see a podiatrist.Eye exam is current.   Abdominal Pain   This is a new problem. The current episode started more than 1 month ago. The onset quality is gradual. The pain is located in the generalized abdominal region. The pain is moderate. Associated symptoms include constipation. Pertinent negatives include no frequency, nausea or vomiting. He has tried nothing for the symptoms.   Hypertension   This is a chronic problem. The current episode started today. The problem is unchanged. The problem is controlled. Pertinent negatives include no blurred vision, chest pain, palpitations or shortness of breath.          The following portions of the patient's history were reviewed and updated as appropriate: allergies, current medications, past family history, past medical history, past social history, past surgical history and problem list.    Past Medical History:   Diagnosis Date   • AAA (abdominal aortic aneurysm) (CMS/[facility])    • Arthritis    • Atrial fibrillation (CMS/[facility])    • BPH (benign prostatic hyperplasia)    • Cataract    • Cervical disc disorder    • Cervical radiculitis 09/2016   • Cervical spondylosis 11/2012   • CHF (congestive heart failure) (CMS/[facility])    • Colon polyps     FOLLOWED BY DR. TAYA CRUZ   • COPD (chronic obstructive  pulmonary disease) (CMS/HCC)    • Coronary artery disease     STENT X 3   • DDD (degenerative disc disease), cervical    • DDD (degenerative disc disease), lumbar    • Edema    • GERD (gastroesophageal reflux disease)    • Hearing loss    • History of blood transfusion    • Hyperlipidemia    • Hypertension    • Lower back pain    • Lumbar radiculopathy 05/2016   • Neck pain    • OA (osteoarthritis)    • 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HCC)    • Peptic ulceration     PUD   • Prediabetes    • Pulmonary embolism (CMS/HCC)     PT DENIES HAVING THIS   • Rectal pain 07/2019   • Shortness of breath    • Tinnitus        Past Surgical History:   Procedure Laterality Date   • ANTERIOR CERVICAL DISCECTOMY W/ FUSION N/A 3/25/2016    Procedure: C3- C5 CERVICAL DISCECTOMY ANTERIOR FUSION WITH INSTRUMENTATION;  Surgeon: Bill Washington MD;  Location: [facility];  Service:    • ARTHRODESIS N/A 07/1983    LUMBAR   • ARTHRODESIS N/A 1987    LUMBAR   • CARDIAC CATHETERIZATION  07/2009   • CARDIAC CATHETERIZATION  03/18/2003    STENT TO RCA AND PCA, DR. PRIYA LUGO   • CATARACT EXTRACTION Bilateral    • CERVICAL ARTHRODESIS N/A 07/1990   • CERVICAL ARTHRODESIS N/A 1988   • COLONOSCOPY N/A 10/3/2019    4 MM HYPERPLASTIC POLYP IN ILEOCECAL VALVE, 4 MM SESSILE SERRATED ADENOMA POLYP IN DESCENDING, 5 TUBULOVILLOUS ADENOMA POLYPS IN RECTUM, 3 TUBULAR ADENOMA POLYPS IN DISTAL RECTUM, 26 MM TUBULAR ADENOMA POLYP IN RECTUM, PIECEMEAL POLYPECTOMY, AREA TATTOOED, RESCOPE IN 6 MONTHS, DR. TAYA CRUZ AT [facility]   • CORONARY ANGIOPLASTY WITH STENT PLACEMENT  02/2009   • ENDOSCOPY N/A 06/24/2012    NON BLEEDING EROSIVE GASTROPATHY, 1 DUODENAL ULCER WITH CLEAN BASE, DR. BRANDY WREN AT [facility]   • ENDOSCOPY AND COLONOSCOPY N/A 11/20/2007    EGD WNL, 8 ADENOMATOUS POLYPS IN RECTO-SIGMOID, RESCOPE IN 3 YRS, DR. CRISTINA RODRIGUEZ AT [facility]   • FOOT SURGERY Left    • HAND SURGERY Left    • HAND SURGERY Right    • KNEE ARTHROSCOPY Left    • VASECTOMY Bilateral         Family History   Adopted: Yes   Problem Relation Age of Onset   • Heart attack Mother        Social History     Socioeconomic History   • Marital status:      Spouse name: Not on file   • Number of children: Not on file   • Years of education: Not on file   • Highest education level: Not on file   Tobacco Use   • Smoking status: Former Smoker     Types: Cigarettes   • Smokeless tobacco: Never Used   Substance and Sexual Activity   • Alcohol use: Yes     Comment: 3 bourbons daily   • Drug use: No   • Sexual activity: Defer       Current Outpatient Medications on File Prior to Visit   Medication Sig Dispense Refill   • aspirin 81 MG chewable tablet Chew 81 mg Daily.     • digoxin (LANOXIN) 125 MCG tablet Take 125 mcg by mouth every morning.     • ezetimibe (ZETIA) 10 MG tablet Take 1 tablet by mouth every night at bedtime. 30 tablet 6   • furosemide (LASIX) 40 MG tablet Take 1 tablet by mouth 2 (Two) Times a Day. 60 tablet 6   • glucosamine-chondroitin 500-400 MG capsule capsule Take  by mouth 2 (Two) Times a Day With Meals.     • isosorbide mononitrate (IMDUR) 30 MG 24 hr tablet Take 30 mg by mouth Daily.     • metoprolol succinate XL (TOPROL-XL) 100 MG 24 hr tablet Take 100 mg by mouth Every Morning.     • Multiple Vitamins-Minerals (MULTIVITAMIN PO) Take 1 tablet by mouth daily.     • potassium chloride ER (K-TAB) 20 MEQ tablet controlled-release ER tablet Take 1 tablet by mouth 2 (Two) Times a Day. 60 tablet 6   • [DISCONTINUED] allopurinol (ZYLOPRIM) 300 MG tablet TAKE 1 TABLET BY MOUTH ONCE DAILY AS DIRECTED, PLEASE SCHEDULE AN APPOINTMENT FOR REFILLS 30 tablet 0   • [DISCONTINUED] Aluminum Acetate solution Apply  topically.     • [DISCONTINUED] DULERA 200-5 MCG/ACT inhaler Inhale 2 puffs 2 (Two) Times a Day.  5     No current facility-administered medications on file prior to visit.        Review of Systems   Constitutional: Negative for fatigue and unexpected weight gain.   Eyes: Negative for  blurred vision.   Respiratory: Negative for cough, chest tightness and shortness of breath.    Cardiovascular: Negative for chest pain, palpitations and leg swelling.   Gastrointestinal: Positive for abdominal pain and constipation. Negative for nausea and vomiting.   Endocrine: Negative for polydipsia and polyuria.   Genitourinary: Negative for frequency.   Skin: Negative for skin lesions.   Neurological: Negative for dizziness, light-headedness, numbness and headache.       Recent Results (from the past 4704 hour(s))   Comprehensive Metabolic Panel    Collection Time: 07/29/19 11:08 AM   Result Value Ref Range    Glucose 144 (H) 65 - 99 mg/dL    BUN 10 8 - 23 mg/dL    Creatinine 0.83 0.76 - 1.27 mg/dL    eGFR Non African Am 91 >60 mL/min/1.73    eGFR African Am 111 >60 mL/min/1.73    BUN/Creatinine Ratio 12.0 7.0 - 25.0    Sodium 143 136 - 145 mmol/L    Potassium 4.1 3.5 - 5.2 mmol/L    Chloride 100 98 - 107 mmol/L    Total CO2 31.2 (H) 22.0 - 29.0 mmol/L    Calcium 9.3 8.6 - 10.5 mg/dL    Total Protein 6.9 6.0 - 8.5 g/dL    Albumin 3.70 3.50 - 5.20 g/dL    Globulin 3.2 gm/dL    A/G Ratio 1.2 g/dL    Total Bilirubin 1.0 0.2 - 1.2 mg/dL    Alkaline Phosphatase 99 39 - 117 U/L    AST (SGOT) 115 (H) 1 - 40 U/L    ALT (SGPT) 79 (H) 1 - 41 U/L   Lipid Panel With LDL / HDL Ratio    Collection Time: 07/29/19 11:08 AM   Result Value Ref Range    Total Cholesterol 182 0 - 200 mg/dL    Triglycerides 140 0 - 150 mg/dL    HDL Cholesterol 37 (L) 40 - 60 mg/dL    VLDL Cholesterol 28 mg/dL    LDL Cholesterol  117 (H) 0 - 100 mg/dL    LDL/HDL Ratio 3.16    Hemoglobin A1c    Collection Time: 07/29/19 11:08 AM   Result Value Ref Range    Hemoglobin A1C 7.20 (H) 4.80 - 5.60 %   Microalbumin / Creatinine Urine Ratio - Urine, Clean Catch    Collection Time: 07/29/19 11:08 AM   Result Value Ref Range    Creatinine, Urine 23.8 Not Estab. mg/dL    Microalbumin, Urine 262.1 Not Estab. ug/mL    Microalbumin/Creatinine Ratio 1,101.3 (H) 0.0  - 30.0 mg/g creat   Tissue Pathology Exam    Collection Time: 10/03/19  1:53 PM   Result Value Ref Range    Case Report       Surgical Pathology Report                         Case: [id number]                                  Authorizing Provider:  Naldo Alves MD        Collected:           10/03/2019 01:53 PM          Ordering Location:     [facility]  Received:            10/03/2019 03:11 PM                                 ENDO SUITES                                                                  Pathologist:           Neftaly Kumari MD                                                          Specimens:   1) - Large Intestine, ileocecal valve polyp                                                         2) - Large Intestine, Left / Descending Colon, descending colon polyp                               3) - Large Intestine, Rectum, rectal polyps                                                         4) - Large Intestine, distal rectum polyp                                                           5) - Large Intestine, Rectum, proximal rectal polyp                                        Final Diagnosis       1. Colon, Ileocecal Valve, Biopsy: Colonic mucosa with   A. Mild superficial hyperplastic epithelial change.   B. No tubulovillous change or malignancy.    2. Colon, Descending, Biopsy: Colonic mucosa with   A. Sessile serrated adenoma.    3. Colon, Rectum, Biopsy: Colonic mucosa with   A. Fragments of tubular and tubulovillous adenomas.    4. Colon, Distal Rectum, Biopsy: Colonic mucosa with   A. Tubular adenoma.    5. Colon, Proximal Rectum, Biopsy: Colonic mucosa with   A. Fragments of tubulovillous adenoma.    [facility]/pk       Gross Description       1. The specimen is received in formalin labeled with the patient's name and further designated 'large intestine ileocecal valve biopsy' are multiple small fragments of gray-tan tissue. The specimen is submitted for embedding as  "received.    2. The specimen is received in formalin labeled with the patient's name and further designated 'Large Intestine Left Descending Colon biopsy' are multiple small fragments of gray-tan tissue. The specimen is submitted for embedding as received.    3. The specimen is received in formalin labeled with the patient's name and further designated 'Large Intestine Rectum biopsy' are multiple small fragments of gray-tan tissue. The specimen is submitted for embedding as received.    4. The specimen is received in formalin labeled with the patient's name and further designated \"distal rectum polyp\".  The specimen consists of a smooth pink-tan polypoid mass measuring 1.3 x 1.0 x 0.7 cm. No obvious stalk is identified.  The margin of excision is inked in black.  The polyp is trisected and submitted en toto in cassette 4.    5. The specimen is received in formalin labeled with the patient's name and further designated \"proximal rectal polyp\".  The specimen consists of three irregular lobulated pink-tan polypoid soft tissue pieces measuring from 0.9 cm up to 1.3 x 1.3 x 0.9 cm.  The two largest pieces are bisected.  All tissue is filtered and submitted en toto in cassette 5.    hbt/uso/alphonse/kds      Microscopic Description       Performed, incorporated in diagnosis.       Objective   Vitals:    11/26/19 0804   BP: 136/84   Pulse: 77   Temp: 97.7 °F (36.5 °C)   SpO2: 96%   Weight: 113 kg (248 lb 8 oz)   Height: 185.4 cm (72.99\")     Body mass index is 32.79 kg/m².  Physical Exam   Constitutional: He appears well-developed and well-nourished.   Cardiovascular:   STABLE   Pulmonary/Chest: Effort normal. No respiratory distress.   Abdominal: Soft. He exhibits distension.   DIFFUSE DISCOMFORT.   Skin: He is not diaphoretic.   Nursing note and vitals reviewed.        Assessment/Plan   Juan was seen today for medicare wellness-subsequent, diabetes, abdominal pain and hypertension.    Diagnoses and all orders for this " visit:    Medicare annual wellness visit, subsequent    Type 2 diabetes mellitus with other specified complication, without long-term current use of insulin (CMS/[facility])  -     Hemoglobin A1c  -     Comprehensive Metabolic Panel  -     Lipid Panel With LDL / HDL Ratio  -     metFORMIN (GLUCOPHAGE) 500 MG tablet; Take 1 tablet by mouth 2 (Two) Times a Day With Meals.  -     lisinopril (PRINIVIL,ZESTRIL) 10 MG tablet; Take 1 tablet by mouth Daily.    Essential hypertension  -     lisinopril (PRINIVIL,ZESTRIL) 10 MG tablet; Take 1 tablet by mouth Daily.    Epigastric pain  -     NM Gastric Emptying; Future    Return in about 2 months (around 1/26/2020) for DIABETES.

## 2019-11-26 NOTE — PATIENT INSTRUCTIONS
Medicare Wellness  Personal Prevention Plan of Service     Date of Office Visit:  2019  Encounter Provider:  Luda Kidd MD  Place of Service:  [facility]  Patient Name: Juan James  :  1945    As part of the Medicare Wellness portion of your visit today, we are providing you with this personalized preventive plan of services (PPPS). This plan is based upon recommendations of the [place] Preventive Services Task Force (USPSTF) and the Advisory Committee on Immunization Practices (ACIP).    This lists the preventive care services that should be considered, and provides dates of when you are due. Items listed as completed are up-to-date and do not require any further intervention.    Health Maintenance   Topic Date Due   • TDAP/TD VACCINES (1 - Tdap) 12/10/1966   • ZOSTER VACCINE (1 of 2) 12/10/1997   • HEPATITIS C SCREENING  2019   • MEDICARE ANNUAL WELLNESS  2019   • DIABETIC FOOT EXAM  2019   • DIABETIC EYE EXAM  2019   • HEMOGLOBIN A1C  2020   • LIPID PANEL  2020   • URINE MICROALBUMIN  2020   • COLONOSCOPY  10/03/2029   • INFLUENZA VACCINE  Completed   • PNEUMOCOCCAL VACCINES (65+ LOW/MEDIUM RISK)  Completed   • AAA SCREEN (ONE-TIME)  Completed       Orders Placed This Encounter   Procedures   • NM Gastric Emptying     Standing Status:   Future     Standing Expiration Date:   2020     Order Specific Question:   Reason for Exam:     Answer:   abdominal pain   • Hemoglobin A1c   • Comprehensive Metabolic Panel   • Lipid Panel With LDL / HDL Ratio       Return in about 2 months (around 2020) for DIABETES.

## 2019-11-27 LAB
ALBUMIN SERPL-MCNC: 3.8 G/DL (ref 3.5–5.2)
ALBUMIN/GLOB SERPL: 1.1 G/DL
ALP SERPL-CCNC: 98 U/L (ref 39–117)
ALT SERPL-CCNC: 100 U/L (ref 1–41)
AST SERPL-CCNC: 121 U/L (ref 1–40)
BILIRUB SERPL-MCNC: 1.9 MG/DL (ref 0.2–1.2)
BUN SERPL-MCNC: 23 MG/DL (ref 8–23)
BUN/CREAT SERPL: 21.7 (ref 7–25)
CALCIUM SERPL-MCNC: 9.1 MG/DL (ref 8.6–10.5)
CHLORIDE SERPL-SCNC: 93 MMOL/L (ref 98–107)
CHOLEST SERPL-MCNC: 174 MG/DL (ref 0–200)
CO2 SERPL-SCNC: 32.6 MMOL/L (ref 22–29)
CREAT SERPL-MCNC: 1.06 MG/DL (ref 0.76–1.27)
GLOBULIN SER CALC-MCNC: 3.5 GM/DL
GLUCOSE SERPL-MCNC: 162 MG/DL (ref 65–99)
HBA1C MFR BLD: 8.1 % (ref 4.8–5.6)
HDLC SERPL-MCNC: 38 MG/DL (ref 40–60)
LDLC SERPL CALC-MCNC: 101 MG/DL (ref 0–100)
LDLC/HDLC SERPL: 2.66 {RATIO}
POTASSIUM SERPL-SCNC: 3.5 MMOL/L (ref 3.5–5.2)
PROT SERPL-MCNC: 7.3 G/DL (ref 6–8.5)
SODIUM SERPL-SCNC: 139 MMOL/L (ref 136–145)
TRIGL SERPL-MCNC: 175 MG/DL (ref 0–150)
VLDLC SERPL CALC-MCNC: 35 MG/DL

## 2019-12-02 ENCOUNTER — TELEPHONE (OUTPATIENT)
Dept: FAMILY MEDICINE CLINIC | Facility: CLINIC | Age: 74
End: 2019-12-02

## 2019-12-02 DIAGNOSIS — R79.89 ELEVATED LIVER FUNCTION TESTS: ICD-10-CM

## 2019-12-02 DIAGNOSIS — I10 ESSENTIAL HYPERTENSION: Primary | ICD-10-CM

## 2019-12-02 RX ORDER — VALSARTAN 40 MG/1
40 TABLET ORAL DAILY
Qty: 30 TABLET | Refills: 6 | Status: SHIPPED | OUTPATIENT
Start: 2019-12-02 | End: 2020-04-10 | Stop reason: HOSPADM

## 2019-12-16 ENCOUNTER — HOSPITAL ENCOUNTER (OUTPATIENT)
Dept: NUCLEAR MEDICINE | Facility: HOSPITAL | Age: 74
Discharge: HOME | End: 2019-12-16

## 2019-12-16 ENCOUNTER — HOSPITAL ENCOUNTER (OUTPATIENT)
Dept: ULTRASOUND IMAGING | Facility: HOSPITAL | Age: 74
Discharge: HOME | End: 2019-12-16
Admitting: FAMILY MEDICINE

## 2019-12-16 DIAGNOSIS — R10.13 EPIGASTRIC PAIN: ICD-10-CM

## 2019-12-16 DIAGNOSIS — R79.89 ELEVATED LIVER FUNCTION TESTS: ICD-10-CM

## 2019-12-16 PROCEDURE — 76700 US EXAM ABDOM COMPLETE: CPT

## 2019-12-17 DIAGNOSIS — I71.40 ABDOMINAL AORTIC ANEURYSM (AAA) WITHOUT RUPTURE (HCC): Primary | ICD-10-CM

## 2019-12-26 ENCOUNTER — OFFICE VISIT (OUTPATIENT)
Dept: FAMILY MEDICINE CLINIC | Facility: CLINIC | Age: 74
End: 2019-12-26

## 2019-12-26 VITALS
WEIGHT: 245.2 LBS | RESPIRATION RATE: 18 BRPM | HEART RATE: 70 BPM | HEIGHT: 73 IN | SYSTOLIC BLOOD PRESSURE: 128 MMHG | BODY MASS INDEX: 32.5 KG/M2 | DIASTOLIC BLOOD PRESSURE: 72 MMHG | TEMPERATURE: 97.8 F | OXYGEN SATURATION: 94 %

## 2019-12-26 DIAGNOSIS — E11.69 TYPE 2 DIABETES MELLITUS WITH OTHER SPECIFIED COMPLICATION, WITHOUT LONG-TERM CURRENT USE OF INSULIN (HCC): Primary | ICD-10-CM

## 2019-12-26 DIAGNOSIS — Z87.891 SMOKING HISTORY: ICD-10-CM

## 2019-12-26 PROCEDURE — 99213 OFFICE O/P EST LOW 20 MIN: CPT | Performed by: FAMILY MEDICINE

## 2019-12-26 NOTE — PROGRESS NOTES
Subjective   Juan James is a 74 y.o. male.     Chief Complaint   Patient presents with   • Diabetes       Diabetes   He presents for his follow-up diabetic visit. He has type 2 diabetes mellitus. His disease course has been improving. Pertinent negatives for hypoglycemia include no dizziness. There are no diabetic associated symptoms. Pertinent negatives for diabetes include no blurred vision, no chest pain, no fatigue, no polydipsia and no polyuria. Current diabetic treatment includes oral agent (monotherapy). He is compliant with treatment all of the time.          The following portions of the patient's history were reviewed and updated as appropriate: allergies, current medications, past family history, past medical history, past social history, past surgical history and problem list.    Past Medical History:   Diagnosis Date   • AAA (abdominal aortic aneurysm) (CMS/HCC)    • Ankle edema    • Aortic aneurysm (CMS/HCC)    • Arthritis    • Atherosclerotic heart disease of native coronary artery without angina pectoris    • Atrial fibrillation (CMS/HCC)    • Benign essential hypertension    • BPH (benign prostatic hyperplasia)    • Cataract    • Cervical disc disorder    • Cervical radiculitis 09/2016   • Cervical spondylosis 11/2012   • CHF (congestive heart failure) (CMS/HCC)    • Colon polyps     FOLLOWED BY DR. TAYA CRUZ   • COPD (chronic obstructive pulmonary disease) (CMS/HCC)    • Coronary artery disease     STENT X 3   • DDD (degenerative disc disease), cervical    • DDD (degenerative disc disease), lumbar    • Decreased pedal pulses    • Dyspnea    • Edema    • Edema of extremities    • Elevated transaminase level    • Emphysema/COPD (CMS/HCC)    • Enlarged prostate without lower urinary tract symptoms (luts)    • Essential hypertriglyceridemia    • GERD (gastroesophageal reflux disease)    • GERD without esophagitis    • Gout    • Hearing loss    • Hepatic fibrosis    • High blood pressure    •  High risk medication use    • History of blood transfusion    • History of cardiac disorder    • History of cataract    • History of COPD    • History of esophagitis    • History of gastrointestinal hemorrhage    • History of hypertension    • History of nicotine dependence    • History of peptic ulcer    • History of sleep apnea    • Hoarseness, persistent    • Hyperglycemia    • Hyperlipidemia    • Hypertension    • Iliac artery aneurysm (CMS/HCC)    • Insomnia    • Lower back pain    • Lumbar radiculopathy 05/2016   • Mastodynia of left breast    • Myasthenia gravis (CMS/HCC)    • Neck pain    • Nonalcoholic hepatosteatosis    • OA (osteoarthritis)    • 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HCC)    • PAF (paroxysmal atrial fibrillation) (CMS/HCC)    • Peptic ulceration     PUD   • Prediabetes    • Prediabetes    • Prostate disease    • Pulmonary embolism (CMS/HCC)     PT DENIES HAVING THIS   • Rectal pain 07/2019   • Shortness of breath    • Status post angioplasty    • Tinnitus        Past Surgical History:   Procedure Laterality Date   • ANTERIOR CERVICAL DISCECTOMY W/ FUSION N/A 3/25/2016    Procedure: C3- C5 CERVICAL DISCECTOMY ANTERIOR FUSION WITH INSTRUMENTATION;  Surgeon: Bill Washington MD;  Location: [facility];  Service:    • ARTHRODESIS N/A 07/1983    LUMBAR   • ARTHRODESIS N/A 1987    LUMBAR   • ARTHRODESIS      Spinal Arthrodesis-lower spine-7/1983, 1987--upper spine-1988, 7/1990-Abstracted from [employer]   • BACK SURGERY      Lower Back Surgery   • CARDIAC CATHETERIZATION  07/2009   • CARDIAC CATHETERIZATION  03/18/2003    STENT TO RCA AND PCA, DR. PRIYA LUGO   • CATARACT EXTRACTION Bilateral    • CERVICAL ARTHRODESIS N/A 07/1990   • CERVICAL ARTHRODESIS N/A 1988   • COLONOSCOPY N/A 10/3/2019    4 MM HYPERPLASTIC POLYP IN ILEOCECAL VALVE, 4 MM SESSILE SERRATED ADENOMA POLYP IN DESCENDING, 5 TUBULOVILLOUS ADENOMA POLYPS IN RECTUM, 3 TUBULAR ADENOMA POLYPS IN DISTAL RECTUM, 26 MM TUBULAR ADENOMA POLYP  IN RECTUM, PIECEMEAL POLYPECTOMY, AREA TATTOOED, RESCOPE IN 6 MONTHS, DR. TAYA CRUZ AT [facility]   • CORONARY ANGIOPLASTY WITH STENT PLACEMENT  02/2009    and 7/2009   • ENDOSCOPY N/A 06/24/2012    NON BLEEDING EROSIVE GASTROPATHY, 1 DUODENAL ULCER WITH CLEAN BASE, DR. BRANDY WREN AT [facility]   • ENDOSCOPY AND COLONOSCOPY N/A 11/20/2007    EGD WNL, 8 ADENOMATOUS POLYPS IN RECTO-SIGMOID, RESCOPE IN 3 YRS, DR. CRISTINA RODRIGUEZ AT [facility]   • FOOT SURGERY Left    • HAND SURGERY Left    • HAND SURGERY Right    • KNEE ARTHROSCOPY Left    • LAPAROSCOPIC CHOLECYSTECTOMY     • NECK SURGERY     • VASECTOMY Bilateral        Family History   Adopted: Yes   Problem Relation Age of Onset   • Heart attack Mother    • Alcohol abuse Mother    • Heart disease Mother    • No Known Problems Father    • Heart disease Other         FH in females before the age of 65       Social History     Socioeconomic History   • Marital status:      Spouse name: Not on file   • Number of children: Not on file   • Years of education: Not on file   • Highest education level: Not on file   Tobacco Use   • Smoking status: Former Smoker     Types: Cigarettes   • Smokeless tobacco: Never Used   • Tobacco comment: smoked 1 to 2 packs per day for 40 or more years   Substance and Sexual Activity   • Alcohol use: Yes     Comment: 3 bourbons daily--Caffeine use-3 cups of coffee daily   • Drug use: No   • Sexual activity: Defer       Current Outpatient Medications on File Prior to Visit   Medication Sig Dispense Refill   • aspirin 81 MG chewable tablet Chew 81 mg Daily.     • digoxin (LANOXIN) 125 MCG tablet Take 125 mcg by mouth every morning.     • ezetimibe (ZETIA) 10 MG tablet Take 1 tablet by mouth every night at bedtime. 30 tablet 6   • furosemide (LASIX) 40 MG tablet Take 1 tablet by mouth 2 (Two) Times a Day. 60 tablet 6   • glucosamine-chondroitin 500-400 MG capsule capsule Take  by mouth 2 (Two) Times a Day With Meals.     • isosorbide mononitrate (IMDUR)  30 MG 24 hr tablet Take 30 mg by mouth Daily.     • metoprolol succinate XL (TOPROL-XL) 100 MG 24 hr tablet Take 100 mg by mouth Every Morning.     • Multiple Vitamins-Minerals (MULTIVITAMIN PO) Take 1 tablet by mouth daily.     • potassium chloride ER (K-TAB) 20 MEQ tablet controlled-release ER tablet Take 1 tablet by mouth 2 (Two) Times a Day. 60 tablet 6   • valsartan (DIOVAN) 40 MG tablet Take 1 tablet by mouth Daily. 30 tablet 6   • [DISCONTINUED] metFORMIN (GLUCOPHAGE) 500 MG tablet Take 1 tablet by mouth 2 (Two) Times a Day With Meals. 60 tablet 11     No current facility-administered medications on file prior to visit.        Review of Systems   Constitutional: Negative for fatigue and unexpected weight gain.   Eyes: Negative for blurred vision.   Respiratory: Negative for cough, chest tightness and shortness of breath.    Cardiovascular: Negative for chest pain, palpitations and leg swelling.   Gastrointestinal: Negative for nausea and vomiting.   Endocrine: Negative for polydipsia and polyuria.   Genitourinary: Negative for frequency.   Skin: Negative for skin lesions.   Neurological: Negative for dizziness, light-headedness, numbness and headache.       Recent Results (from the past 4704 hour(s))   Comprehensive Metabolic Panel    Collection Time: 07/29/19 11:08 AM   Result Value Ref Range    Glucose 144 (H) 65 - 99 mg/dL    BUN 10 8 - 23 mg/dL    Creatinine 0.83 0.76 - 1.27 mg/dL    eGFR Non African Am 91 >60 mL/min/1.73    eGFR African Am 111 >60 mL/min/1.73    BUN/Creatinine Ratio 12.0 7.0 - 25.0    Sodium 143 136 - 145 mmol/L    Potassium 4.1 3.5 - 5.2 mmol/L    Chloride 100 98 - 107 mmol/L    Total CO2 31.2 (H) 22.0 - 29.0 mmol/L    Calcium 9.3 8.6 - 10.5 mg/dL    Total Protein 6.9 6.0 - 8.5 g/dL    Albumin 3.70 3.50 - 5.20 g/dL    Globulin 3.2 gm/dL    A/G Ratio 1.2 g/dL    Total Bilirubin 1.0 0.2 - 1.2 mg/dL    Alkaline Phosphatase 99 39 - 117 U/L    AST (SGOT) 115 (H) 1 - 40 U/L    ALT (SGPT) 79  (H) 1 - 41 U/L   Lipid Panel With LDL / HDL Ratio    Collection Time: 07/29/19 11:08 AM   Result Value Ref Range    Total Cholesterol 182 0 - 200 mg/dL    Triglycerides 140 0 - 150 mg/dL    HDL Cholesterol 37 (L) 40 - 60 mg/dL    VLDL Cholesterol 28 mg/dL    LDL Cholesterol  117 (H) 0 - 100 mg/dL    LDL/HDL Ratio 3.16    Hemoglobin A1c    Collection Time: 07/29/19 11:08 AM   Result Value Ref Range    Hemoglobin A1C 7.20 (H) 4.80 - 5.60 %   Microalbumin / Creatinine Urine Ratio - Urine, Clean Catch    Collection Time: 07/29/19 11:08 AM   Result Value Ref Range    Creatinine, Urine 23.8 Not Estab. mg/dL    Microalbumin, Urine 262.1 Not Estab. ug/mL    Microalbumin/Creatinine Ratio 1,101.3 (H) 0.0 - 30.0 mg/g creat   Tissue Pathology Exam    Collection Time: 10/03/19  1:53 PM   Result Value Ref Range    Case Report       Surgical Pathology Report                         Case: [id number]                                  Authorizing Provider:  Naldo Alves MD        Collected:           10/03/2019 01:53 PM          Ordering Location:     [facility]  Received:            10/03/2019 03:11 PM                                 ENDO SUITES                                                                  Pathologist:           Neftaly Kumari MD                                                          Specimens:   1) - Large Intestine, ileocecal valve polyp                                                         2) - Large Intestine, Left / Descending Colon, descending colon polyp                               3) - Large Intestine, Rectum, rectal polyps                                                         4) - Large Intestine, distal rectum polyp                                                           5) - Large Intestine, Rectum, proximal rectal polyp                                        Final Diagnosis       1. Colon, Ileocecal Valve, Biopsy: Colonic mucosa with   A. Mild superficial hyperplastic  "epithelial change.   B. No tubulovillous change or malignancy.    2. Colon, Descending, Biopsy: Colonic mucosa with   A. Sessile serrated adenoma.    3. Colon, Rectum, Biopsy: Colonic mucosa with   A. Fragments of tubular and tubulovillous adenomas.    4. Colon, Distal Rectum, Biopsy: Colonic mucosa with   A. Tubular adenoma.    5. Colon, Proximal Rectum, Biopsy: Colonic mucosa with   A. Fragments of tubulovillous adenoma.    [facility]       Gross Description       1. The specimen is received in formalin labeled with the patient's name and further designated 'large intestine ileocecal valve biopsy' are multiple small fragments of gray-tan tissue. The specimen is submitted for embedding as received.    2. The specimen is received in formalin labeled with the patient's name and further designated 'Large Intestine Left Descending Colon biopsy' are multiple small fragments of gray-tan tissue. The specimen is submitted for embedding as received.    3. The specimen is received in formalin labeled with the patient's name and further designated 'Large Intestine Rectum biopsy' are multiple small fragments of gray-tan tissue. The specimen is submitted for embedding as received.    4. The specimen is received in formalin labeled with the patient's name and further designated \"distal rectum polyp\".  The specimen consists of a smooth pink-tan polypoid mass measuring 1.3 x 1.0 x 0.7 cm. No obvious stalk is identified.  The margin of excision is inked in black.  The polyp is trisected and submitted en toto in cassette 4.    5. The specimen is received in formalin labeled with the patient's name and further designated \"proximal rectal polyp\".  The specimen consists of three irregular lobulated pink-tan polypoid soft tissue pieces measuring from 0.9 cm up to 1.3 x 1.3 x 0.9 cm.  The two largest pieces are bisected.  All tissue is filtered and submitted en toto in cassette 5.    [id number]/[id number]/[id number]      Microscopic Description       " "Performed, incorporated in diagnosis.     Hemoglobin A1c    Collection Time: 11/26/19  9:18 AM   Result Value Ref Range    Hemoglobin A1C 8.10 (H) 4.80 - 5.60 %   Comprehensive Metabolic Panel    Collection Time: 11/26/19  9:18 AM   Result Value Ref Range    Glucose 162 (H) 65 - 99 mg/dL    BUN 23 8 - 23 mg/dL    Creatinine 1.06 0.76 - 1.27 mg/dL    eGFR Non African Am 69 >60 mL/min/1.73    eGFR African Am 83 >60 mL/min/1.73    BUN/Creatinine Ratio 21.7 7.0 - 25.0    Sodium 139 136 - 145 mmol/L    Potassium 3.5 3.5 - 5.2 mmol/L    Chloride 93 (L) 98 - 107 mmol/L    Total CO2 32.6 (H) 22.0 - 29.0 mmol/L    Calcium 9.1 8.6 - 10.5 mg/dL    Total Protein 7.3 6.0 - 8.5 g/dL    Albumin 3.80 3.50 - 5.20 g/dL    Globulin 3.5 gm/dL    A/G Ratio 1.1 g/dL    Total Bilirubin 1.9 (H) 0.2 - 1.2 mg/dL    Alkaline Phosphatase 98 39 - 117 U/L    AST (SGOT) 121 (H) 1 - 40 U/L    ALT (SGPT) 100 (H) 1 - 41 U/L   Lipid Panel With LDL / HDL Ratio    Collection Time: 11/26/19  9:18 AM   Result Value Ref Range    Total Cholesterol 174 0 - 200 mg/dL    Triglycerides 175 (H) 0 - 150 mg/dL    HDL Cholesterol 38 (L) 40 - 60 mg/dL    VLDL Cholesterol 35 mg/dL    LDL Cholesterol  101 (H) 0 - 100 mg/dL    LDL/HDL Ratio 2.66      Objective   Vitals:    12/26/19 0825   BP: 128/72   BP Location: Left arm   Patient Position: Sitting   Cuff Size: Adult   Pulse: 70   Resp: 18   Temp: 97.8 °F (36.6 °C)   TempSrc: Oral   SpO2: 94%   Weight: 111 kg (245 lb 3.2 oz)   Height: 185.4 cm (72.99\")     Body mass index is 32.36 kg/m².  Physical Exam   Constitutional: He appears well-developed and well-nourished. No distress.   Cardiovascular: Normal rate and regular rhythm.   Pulmonary/Chest: Effort normal and breath sounds normal. No respiratory distress. He has no wheezes.   Skin: He is not diaphoretic.   Nursing note and vitals reviewed.        Assessment/Plan   Juan was seen today for diabetes.    Diagnoses and all orders for this visit:    Type 2 diabetes " mellitus with other specified complication, without long-term current use of insulin (CMS/HCC)  -     metFORMIN (GLUCOPHAGE) 1000 MG tablet; Take 1 and 1/2 po qam and 1 po qhs    Smoking history  -     CT Chest Low Dose Wo; Future    Return in about 4 weeks (around 1/23/2020) for DIABETES.      Hold Zetia until next visit.

## 2019-12-31 ENCOUNTER — HOSPITAL ENCOUNTER (OUTPATIENT)
Dept: NUCLEAR MEDICINE | Facility: HOSPITAL | Age: 74
Discharge: HOME | End: 2019-12-31

## 2019-12-31 PROCEDURE — 0 TECHNETIUM SULFUR COLLOID: Performed by: FAMILY MEDICINE

## 2019-12-31 PROCEDURE — 78264 GASTRIC EMPTYING IMG STUDY: CPT

## 2019-12-31 PROCEDURE — A9541 TC99M SULFUR COLLOID: HCPCS | Performed by: FAMILY MEDICINE

## 2019-12-31 RX ADMIN — TECHNETIUM TC 99M SULFUR COLLOID 1 DOSE: KIT at 06:52

## 2020-01-09 ENCOUNTER — HOSPITAL ENCOUNTER (OUTPATIENT)
Dept: CT IMAGING | Facility: HOSPITAL | Age: 75
Discharge: HOME | End: 2020-01-09
Admitting: FAMILY MEDICINE

## 2020-01-09 DIAGNOSIS — Z87.891 SMOKING HISTORY: ICD-10-CM

## 2020-01-09 PROCEDURE — G0297 LDCT FOR LUNG CA SCREEN: HCPCS

## 2020-01-20 ENCOUNTER — TRANSCRIBE ORDERS (OUTPATIENT)
Dept: WOUND CARE | Facility: HOSPITAL | Age: 75
End: 2020-01-20

## 2020-01-20 DIAGNOSIS — I73.9 PAD (PERIPHERAL ARTERY DISEASE) (HCC): Primary | ICD-10-CM

## 2020-01-22 ENCOUNTER — TELEPHONE (OUTPATIENT)
Dept: FAMILY MEDICINE CLINIC | Facility: CLINIC | Age: 75
End: 2020-01-22

## 2020-01-22 NOTE — TELEPHONE ENCOUNTER
Pharmacy called and stated everything was on the script sent in for pt test strips was good the only thing need is a actual signature medicare D will not except an electronic signature.

## 2020-01-27 ENCOUNTER — HOSPITAL ENCOUNTER (OUTPATIENT)
Dept: CARDIOLOGY | Facility: HOSPITAL | Age: 75
Discharge: HOME | End: 2020-01-27
Admitting: SURGERY

## 2020-01-27 DIAGNOSIS — I73.9 PAD (PERIPHERAL ARTERY DISEASE) (HCC): ICD-10-CM

## 2020-01-27 LAB
BH CV LOWER ARTERIAL LEFT ABI RATIO: 0.83
BH CV LOWER ARTERIAL LEFT DORSALIS PEDIS SYS MAX: 95 MMHG
BH CV LOWER ARTERIAL LEFT GREAT TOE SYS MAX: 66 MMHG
BH CV LOWER ARTERIAL LEFT HIGH THIGH SYS MAX: 109 MMHG
BH CV LOWER ARTERIAL LEFT LOW THIGH SYS MAX: 114 MMHG
BH CV LOWER ARTERIAL LEFT POPLITEAL SYS MAX: 95 MMHG
BH CV LOWER ARTERIAL LEFT POST EX ABI RATIO: 0.67
BH CV LOWER ARTERIAL LEFT POST TIBIAL SYS MAX: 110 MMHG
BH CV LOWER ARTERIAL LEFT TBI RATIO: 0.5
BH CV LOWER ARTERIAL RIGHT ABI RATIO: 0.75
BH CV LOWER ARTERIAL RIGHT DORSALIS PEDIS SYS MAX: 99 MMHG
BH CV LOWER ARTERIAL RIGHT GREAT TOE SYS MAX: 55 MMHG
BH CV LOWER ARTERIAL RIGHT HIGH THIGH SYS MAX: 147 MMHG
BH CV LOWER ARTERIAL RIGHT LOW THIGH SYS MAX: 109 MMHG
BH CV LOWER ARTERIAL RIGHT POPLITEAL SYS MAX: 100 MMHG
BH CV LOWER ARTERIAL RIGHT POST EX ABI RATIO: 0.63
BH CV LOWER ARTERIAL RIGHT POST TIBIAL SYS MAX: 92 MMHG
BH CV LOWER ARTERIAL RIGHT TBI RATIO: 0.42
UPPER ARTERIAL LEFT ARM BRACHIAL SYS MAX: 126 MMHG
UPPER ARTERIAL RIGHT ARM BRACHIAL SYS MAX: 132 MMHG

## 2020-01-27 PROCEDURE — 93924 LWR XTR VASC STDY BILAT: CPT

## 2020-03-30 DIAGNOSIS — M10.9 GOUT, UNSPECIFIED CAUSE, UNSPECIFIED CHRONICITY, UNSPECIFIED SITE: Primary | ICD-10-CM

## 2020-03-30 RX ORDER — INDOMETHACIN 75 MG/1
75 CAPSULE, EXTENDED RELEASE ORAL 2 TIMES DAILY WITH MEALS
Qty: 60 CAPSULE | Refills: 3 | Status: SHIPPED | OUTPATIENT
Start: 2020-03-30 | End: 2020-04-10 | Stop reason: HOSPADM

## 2020-03-30 RX ORDER — ALLOPURINOL 300 MG/1
300 TABLET ORAL DAILY
Qty: 30 TABLET | Refills: 6 | Status: SHIPPED | OUTPATIENT
Start: 2020-03-30 | End: 2020-04-10 | Stop reason: HOSPADM

## 2020-04-04 ENCOUNTER — APPOINTMENT (OUTPATIENT)
Dept: GENERAL RADIOLOGY | Facility: HOSPITAL | Age: 75
End: 2020-04-04

## 2020-04-04 ENCOUNTER — HOSPITAL ENCOUNTER (INPATIENT)
Facility: HOSPITAL | Age: 75
LOS: 6 days | Discharge: HOME HEALTH | End: 2020-04-10
Attending: EMERGENCY MEDICINE | Admitting: INTERNAL MEDICINE

## 2020-04-04 DIAGNOSIS — N17.9 AKI (ACUTE KIDNEY INJURY) (HCC): ICD-10-CM

## 2020-04-04 DIAGNOSIS — I48.0 PAROXYSMAL ATRIAL FIBRILLATION (HCC): ICD-10-CM

## 2020-04-04 DIAGNOSIS — D64.9 ANEMIA, UNSPECIFIED TYPE: ICD-10-CM

## 2020-04-04 DIAGNOSIS — E87.5 HYPERKALEMIA: ICD-10-CM

## 2020-04-04 DIAGNOSIS — I44.2 HEART BLOCK AV COMPLETE (HCC): Primary | ICD-10-CM

## 2020-04-04 DIAGNOSIS — G47.34 NOCTURNAL HYPOXIA: ICD-10-CM

## 2020-04-04 DIAGNOSIS — R77.8 ELEVATED TROPONIN: ICD-10-CM

## 2020-04-04 DIAGNOSIS — E83.41 HYPERMAGNESEMIA: ICD-10-CM

## 2020-04-04 PROBLEM — I73.9 PVD (PERIPHERAL VASCULAR DISEASE) WITH CLAUDICATION (HCC): Status: ACTIVE | Noted: 2020-04-04

## 2020-04-04 LAB
ALBUMIN SERPL-MCNC: 4.6 G/DL (ref 3.5–5.2)
ALBUMIN/GLOB SERPL: 1.2 G/DL
ALP SERPL-CCNC: 64 U/L (ref 39–117)
ALT SERPL W P-5'-P-CCNC: 39 U/L (ref 1–41)
ANION GAP SERPL CALCULATED.3IONS-SCNC: 15.3 MMOL/L (ref 5–15)
ANION GAP SERPL CALCULATED.3IONS-SCNC: 16.1 MMOL/L (ref 5–15)
APTT PPP: 30.7 SECONDS (ref 22.7–35.4)
AST SERPL-CCNC: 24 U/L (ref 1–40)
ATMOSPHERIC PRESS: 747.5 MMHG
BASE EXCESS BLDV CALC-SCNC: -2.2 MMOL/L
BASOPHILS # BLD AUTO: 0.04 10*3/MM3 (ref 0–0.2)
BASOPHILS NFR BLD AUTO: 0.4 % (ref 0–1.5)
BDY SITE: ABNORMAL
BILIRUB SERPL-MCNC: 0.5 MG/DL (ref 0.2–1.2)
BUN BLD-MCNC: 79 MG/DL (ref 8–23)
BUN BLD-MCNC: 80 MG/DL (ref 8–23)
BUN/CREAT SERPL: 15.6 (ref 7–25)
BUN/CREAT SERPL: 16 (ref 7–25)
CALCIUM SPEC-SCNC: 9.6 MG/DL (ref 8.6–10.5)
CALCIUM SPEC-SCNC: 9.7 MG/DL (ref 8.6–10.5)
CHLORIDE SERPL-SCNC: 96 MMOL/L (ref 98–107)
CHLORIDE SERPL-SCNC: 99 MMOL/L (ref 98–107)
CO2 SERPL-SCNC: 19.9 MMOL/L (ref 22–29)
CO2 SERPL-SCNC: 20.7 MMOL/L (ref 22–29)
CREAT BLD-MCNC: 4.95 MG/DL (ref 0.76–1.27)
CREAT BLD-MCNC: 5.14 MG/DL (ref 0.76–1.27)
DEPRECATED RDW RBC AUTO: 45.7 FL (ref 37–54)
DIGOXIN SERPL-MCNC: 1 NG/ML (ref 0.6–1.2)
EOSINOPHIL # BLD AUTO: 0.01 10*3/MM3 (ref 0–0.4)
EOSINOPHIL NFR BLD AUTO: 0.1 % (ref 0.3–6.2)
ERYTHROCYTE [DISTWIDTH] IN BLOOD BY AUTOMATED COUNT: 11.7 % (ref 12.3–15.4)
GFR SERPL CREATININE-BSD FRML MDRD: 11 ML/MIN/1.73
GFR SERPL CREATININE-BSD FRML MDRD: 12 ML/MIN/1.73
GFR SERPL CREATININE-BSD FRML MDRD: ABNORMAL ML/MIN/{1.73_M2}
GFR SERPL CREATININE-BSD FRML MDRD: ABNORMAL ML/MIN/{1.73_M2}
GLOBULIN UR ELPH-MCNC: 3.8 GM/DL
GLUCOSE BLD-MCNC: 123 MG/DL (ref 65–99)
GLUCOSE BLD-MCNC: 146 MG/DL (ref 65–99)
GLUCOSE BLDC GLUCOMTR-MCNC: 106 MG/DL (ref 70–130)
GLUCOSE BLDC GLUCOMTR-MCNC: 110 MG/DL (ref 70–130)
GLUCOSE BLDC GLUCOMTR-MCNC: 181 MG/DL (ref 70–130)
GLUCOSE BLDC GLUCOMTR-MCNC: 214 MG/DL (ref 70–130)
HBV SURFACE AG SERPL QL IA: NORMAL
HCO3 BLDV-SCNC: 23.8 MMOL/L (ref 22–28)
HCT VFR BLD AUTO: 37 % (ref 37.5–51)
HGB BLD-MCNC: 12.1 G/DL (ref 13–17.7)
HOROWITZ INDEX BLD+IHG-RTO: 21 %
IMM GRANULOCYTES # BLD AUTO: 0.06 10*3/MM3 (ref 0–0.05)
IMM GRANULOCYTES NFR BLD AUTO: 0.6 % (ref 0–0.5)
INR PPP: 1.23 (ref 0.9–1.1)
LYMPHOCYTES # BLD AUTO: 1.16 10*3/MM3 (ref 0.7–3.1)
LYMPHOCYTES NFR BLD AUTO: 11.8 % (ref 19.6–45.3)
MAGNESIUM SERPL-MCNC: 3.7 MG/DL (ref 1.6–2.4)
MCH RBC QN AUTO: 34.3 PG (ref 26.6–33)
MCHC RBC AUTO-ENTMCNC: 32.7 G/DL (ref 31.5–35.7)
MCV RBC AUTO: 104.8 FL (ref 79–97)
MODALITY: ABNORMAL
MONOCYTES # BLD AUTO: 0.86 10*3/MM3 (ref 0.1–0.9)
MONOCYTES NFR BLD AUTO: 8.8 % (ref 5–12)
NEUTROPHILS # BLD AUTO: 7.69 10*3/MM3 (ref 1.7–7)
NEUTROPHILS NFR BLD AUTO: 78.3 % (ref 42.7–76)
NRBC BLD AUTO-RTO: 0 /100 WBC (ref 0–0.2)
NT-PROBNP SERPL-MCNC: 4422 PG/ML (ref 5–900)
PCO2 BLDV: 43.7 MM HG (ref 41–51)
PH BLDV: 7.34 PH UNITS (ref 7.31–7.41)
PLATELET # BLD AUTO: 227 10*3/MM3 (ref 140–450)
PMV BLD AUTO: 9.2 FL (ref 6–12)
PO2 BLDV: 29.5 MM HG (ref 35–45)
POTASSIUM BLD-SCNC: 7.8 MMOL/L (ref 3.5–5.2)
POTASSIUM BLD-SCNC: 8.3 MMOL/L (ref 3.5–5.2)
PROT SERPL-MCNC: 8.4 G/DL (ref 6–8.5)
PROTHROMBIN TIME: 15.2 SECONDS (ref 11.7–14.2)
RBC # BLD AUTO: 3.53 10*6/MM3 (ref 4.14–5.8)
SAO2 % BLDCOA: 52.5 % (ref 92–99)
SODIUM BLD-SCNC: 132 MMOL/L (ref 136–145)
SODIUM BLD-SCNC: 135 MMOL/L (ref 136–145)
T4 FREE SERPL-MCNC: 1.31 NG/DL (ref 0.93–1.7)
TOTAL RATE: 16 BREATHS/MINUTE
TROPONIN T SERPL-MCNC: 0.06 NG/ML (ref 0–0.03)
TSH SERPL DL<=0.05 MIU/L-ACNC: 3.61 UIU/ML (ref 0.27–4.2)
WBC NRBC COR # BLD: 9.82 10*3/MM3 (ref 3.4–10.8)

## 2020-04-04 PROCEDURE — 99284 EMERGENCY DEPT VISIT MOD MDM: CPT

## 2020-04-04 PROCEDURE — 25010000002 CALCIUM GLUCONATE PER 10 ML: Performed by: EMERGENCY MEDICINE

## 2020-04-04 PROCEDURE — 84484 ASSAY OF TROPONIN QUANT: CPT | Performed by: EMERGENCY MEDICINE

## 2020-04-04 PROCEDURE — 25010000002 FUROSEMIDE PER 20 MG: Performed by: EMERGENCY MEDICINE

## 2020-04-04 PROCEDURE — 25010000002 HEPARIN (PORCINE) PER 1000 UNITS: Performed by: SURGERY

## 2020-04-04 PROCEDURE — 63710000001 INSULIN REGULAR HUMAN PER 5 UNITS: Performed by: EMERGENCY MEDICINE

## 2020-04-04 PROCEDURE — 25010000002 FENTANYL CITRATE (PF) 100 MCG/2ML SOLUTION: Performed by: INTERNAL MEDICINE

## 2020-04-04 PROCEDURE — 06HY33Z INSERTION OF INFUSION DEVICE INTO LOWER VEIN, PERCUTANEOUS APPROACH: ICD-10-PCS | Performed by: SURGERY

## 2020-04-04 PROCEDURE — 25010000002 CALCIUM GLUCONATE PER 10 ML: Performed by: INTERNAL MEDICINE

## 2020-04-04 PROCEDURE — 82962 GLUCOSE BLOOD TEST: CPT

## 2020-04-04 PROCEDURE — 84443 ASSAY THYROID STIM HORMONE: CPT | Performed by: EMERGENCY MEDICINE

## 2020-04-04 PROCEDURE — 87340 HEPATITIS B SURFACE AG IA: CPT | Performed by: INTERNAL MEDICINE

## 2020-04-04 PROCEDURE — 93005 ELECTROCARDIOGRAM TRACING: CPT | Performed by: EMERGENCY MEDICINE

## 2020-04-04 PROCEDURE — 85610 PROTHROMBIN TIME: CPT | Performed by: EMERGENCY MEDICINE

## 2020-04-04 PROCEDURE — 83735 ASSAY OF MAGNESIUM: CPT | Performed by: EMERGENCY MEDICINE

## 2020-04-04 PROCEDURE — 85025 COMPLETE CBC W/AUTO DIFF WBC: CPT | Performed by: EMERGENCY MEDICINE

## 2020-04-04 PROCEDURE — 85730 THROMBOPLASTIN TIME PARTIAL: CPT | Performed by: EMERGENCY MEDICINE

## 2020-04-04 PROCEDURE — 94640 AIRWAY INHALATION TREATMENT: CPT

## 2020-04-04 PROCEDURE — 93005 ELECTROCARDIOGRAM TRACING: CPT | Performed by: INTERNAL MEDICINE

## 2020-04-04 PROCEDURE — 93010 ELECTROCARDIOGRAM REPORT: CPT | Performed by: INTERNAL MEDICINE

## 2020-04-04 PROCEDURE — 71045 X-RAY EXAM CHEST 1 VIEW: CPT

## 2020-04-04 PROCEDURE — 82803 BLOOD GASES ANY COMBINATION: CPT

## 2020-04-04 PROCEDURE — 63710000001 INSULIN REGULAR HUMAN PER 5 UNITS: Performed by: INTERNAL MEDICINE

## 2020-04-04 PROCEDURE — 80162 ASSAY OF DIGOXIN TOTAL: CPT | Performed by: EMERGENCY MEDICINE

## 2020-04-04 PROCEDURE — 84439 ASSAY OF FREE THYROXINE: CPT | Performed by: EMERGENCY MEDICINE

## 2020-04-04 PROCEDURE — 83880 ASSAY OF NATRIURETIC PEPTIDE: CPT | Performed by: EMERGENCY MEDICINE

## 2020-04-04 PROCEDURE — 80053 COMPREHEN METABOLIC PANEL: CPT | Performed by: EMERGENCY MEDICINE

## 2020-04-04 RX ORDER — HEPARIN SODIUM 1000 [USP'U]/ML
1000 INJECTION, SOLUTION INTRAVENOUS; SUBCUTANEOUS AS NEEDED
Status: DISCONTINUED | OUTPATIENT
Start: 2020-04-04 | End: 2020-04-10 | Stop reason: HOSPADM

## 2020-04-04 RX ORDER — SODIUM CHLORIDE 0.9 % (FLUSH) 0.9 %
10 SYRINGE (ML) INJECTION AS NEEDED
Status: DISCONTINUED | OUTPATIENT
Start: 2020-04-04 | End: 2020-04-10 | Stop reason: HOSPADM

## 2020-04-04 RX ORDER — ALPRAZOLAM 0.5 MG/1
0.5 TABLET ORAL ONCE
Status: COMPLETED | OUTPATIENT
Start: 2020-04-04 | End: 2020-04-04

## 2020-04-04 RX ORDER — FUROSEMIDE 10 MG/ML
40 INJECTION INTRAMUSCULAR; INTRAVENOUS ONCE
Status: COMPLETED | OUTPATIENT
Start: 2020-04-04 | End: 2020-04-04

## 2020-04-04 RX ORDER — CHLORDIAZEPOXIDE HYDROCHLORIDE 25 MG/1
25 CAPSULE, GELATIN COATED ORAL EVERY 6 HOURS SCHEDULED
Status: DISCONTINUED | OUTPATIENT
Start: 2020-04-04 | End: 2020-04-06

## 2020-04-04 RX ORDER — DEXTROSE MONOHYDRATE 25 G/50ML
100 INJECTION, SOLUTION INTRAVENOUS ONCE
Status: COMPLETED | OUTPATIENT
Start: 2020-04-04 | End: 2020-04-04

## 2020-04-04 RX ORDER — METOLAZONE 2.5 MG/1
2.5 TABLET ORAL DAILY
COMMUNITY
End: 2020-04-10 | Stop reason: HOSPADM

## 2020-04-04 RX ORDER — CALCIUM GLUCONATE 94 MG/ML
1 INJECTION, SOLUTION INTRAVENOUS ONCE
Status: COMPLETED | OUTPATIENT
Start: 2020-04-04 | End: 2020-04-04

## 2020-04-04 RX ORDER — SODIUM POLYSTYRENE SULFONATE 15 G/60ML
15 SUSPENSION ORAL; RECTAL ONCE
Status: COMPLETED | OUTPATIENT
Start: 2020-04-04 | End: 2020-04-04

## 2020-04-04 RX ORDER — HEPARIN SODIUM 1000 [USP'U]/ML
5000 INJECTION, SOLUTION INTRAVENOUS; SUBCUTANEOUS ONCE
Status: DISCONTINUED | OUTPATIENT
Start: 2020-04-04 | End: 2020-04-04

## 2020-04-04 RX ORDER — FENTANYL CITRATE 50 UG/ML
25 INJECTION, SOLUTION INTRAMUSCULAR; INTRAVENOUS
Status: DISCONTINUED | OUTPATIENT
Start: 2020-04-04 | End: 2020-04-10 | Stop reason: HOSPADM

## 2020-04-04 RX ORDER — HEPARIN SODIUM 5000 [USP'U]/ML
5000 INJECTION, SOLUTION INTRAVENOUS; SUBCUTANEOUS EVERY 8 HOURS SCHEDULED
Status: DISCONTINUED | OUTPATIENT
Start: 2020-04-04 | End: 2020-04-09

## 2020-04-04 RX ORDER — DEXTROSE MONOHYDRATE 25 G/50ML
50 INJECTION, SOLUTION INTRAVENOUS
Status: DISCONTINUED | OUTPATIENT
Start: 2020-04-04 | End: 2020-04-10 | Stop reason: HOSPADM

## 2020-04-04 RX ADMIN — DEXTROSE MONOHYDRATE 50 ML: 25 INJECTION, SOLUTION INTRAVENOUS at 16:43

## 2020-04-04 RX ADMIN — SODIUM POLYSTYRENE SULFONATE 15 G: 15 SUSPENSION ORAL; RECTAL at 18:00

## 2020-04-04 RX ADMIN — SODIUM CHLORIDE 1000 ML: 9 INJECTION, SOLUTION INTRAVENOUS at 12:17

## 2020-04-04 RX ADMIN — INSULIN HUMAN 10 UNITS: 100 INJECTION, SOLUTION PARENTERAL at 16:43

## 2020-04-04 RX ADMIN — CALCIUM GLUCONATE 2 G: 98 INJECTION, SOLUTION INTRAVENOUS at 12:27

## 2020-04-04 RX ADMIN — FUROSEMIDE 40 MG: 20 INJECTION, SOLUTION INTRAMUSCULAR; INTRAVENOUS at 12:22

## 2020-04-04 RX ADMIN — HEPARIN SODIUM 1000 UNITS: 1000 INJECTION INTRAVENOUS; SUBCUTANEOUS at 15:35

## 2020-04-04 RX ADMIN — DEXTROSE MONOHYDRATE 100 ML: 25 INJECTION, SOLUTION INTRAVENOUS at 12:20

## 2020-04-04 RX ADMIN — ALPRAZOLAM 0.5 MG: 0.5 TABLET ORAL at 16:42

## 2020-04-04 RX ADMIN — SODIUM BICARBONATE 50 MEQ: 84 INJECTION, SOLUTION INTRAVENOUS at 12:19

## 2020-04-04 RX ADMIN — ALBUTEROL SULFATE 10 MG: 2.5 SOLUTION RESPIRATORY (INHALATION) at 12:28

## 2020-04-04 RX ADMIN — CALCIUM GLUCONATE 1 G: 98 INJECTION, SOLUTION INTRAVENOUS at 16:43

## 2020-04-04 RX ADMIN — INSULIN HUMAN 10 UNITS: 100 INJECTION, SOLUTION PARENTERAL at 12:28

## 2020-04-04 RX ADMIN — FENTANYL CITRATE 25 MCG: 0.05 INJECTION, SOLUTION INTRAMUSCULAR; INTRAVENOUS at 21:33

## 2020-04-04 RX ADMIN — CHLORDIAZEPOXIDE HYDROCHLORIDE 25 MG: 25 CAPSULE ORAL at 21:14

## 2020-04-04 NOTE — ED PROVIDER NOTES
EMERGENCY DEPARTMENT ENCOUNTER    Room Number:  38/38  Date of encounter:  2020  PCP: Luda Kidd MD    HPI:  Context: Juan James is a 74 y.o. male who presents to the ED c/o chief complaint of dizziness and generalized weakness.  History provided by patient.  Patient states his wife  in this hospital approximately a week ago.  Patient states he was subsequently started on gout medicine which review of records shows to be allopurinol and indomethacin.  Patient began to have issues with dizziness and so since ceased both medications but continues to have dizziness.  Patient states dizziness is worse with standing but does occur when sitting or even laying.  Patient also complains of generalized weakness, no focal weakness.  Patient complains of dyspnea on exertion, no dyspnea at rest.  Patient denies any chest pain.  Patient did have nausea the other night, no recent vomiting or diarrhea.    Patient denies any recent fever shakes chills or night sweats.  Patient has had no loss of taste or smell.  No cough. No recent travel.  No exposure to known or suspected CoVID-19 infections.  Patient reports he is followed by Dr. Liang, [facility] cardiology.  Patient reports he has a history of atrial fibrillation in the past, not on blood thinner.    MEDICAL HISTORY REVIEW  Reviewed in EPIC    PAST MEDICAL HISTORY  Active Ambulatory Problems     Diagnosis Date Noted   • Follow-up examination, following other surgery 2016   • Lumbar radiculopathy 05/10/2016   • Cervical radiculitis 2016   • Essential hypertension 2019   • Coronary artery disease involving native heart 2019   • Chronic bronchitis (CMS/HCC) 2019   • Hyperlipidemia 2019   • Prediabetes 2019   • Edema 2019   • Rectal pain 2019   • 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HCC) 2012   • Cervical spondylosis with radiculopathy 2012   • History of colon polyps 2019   • Tinnitus    •  Shortness of breath    • Pulmonary embolism (CMS/HCC)    • Peptic ulceration    • OA (osteoarthritis)    • Neck pain    • Lower back pain    • Hypertension    • History of blood transfusion    • Hearing loss    • GERD (gastroesophageal reflux disease)    • DDD (degenerative disc disease), lumbar    • DDD (degenerative disc disease), cervical    • Coronary artery disease    • COPD (chronic obstructive pulmonary disease) (CMS/HCC)    • Colon polyps    • CHF (congestive heart failure) (CMS/HCC)    • Cervical spondylosis 11/01/2012   • Cervical disc disorder    • Cataract    • BPH (benign prostatic hyperplasia)    • Atrial fibrillation (CMS/HCC)    • Arthritis    • AAA (abdominal aortic aneurysm) (CMS/HCC)    • Medicare annual wellness visit, subsequent 11/26/2019     Resolved Ambulatory Problems     Diagnosis Date Noted   • Cervical disc disorder with radiculopathy of mid-cervical region 02/18/2016     Past Medical History:   Diagnosis Date   • Ankle edema    • Aortic aneurysm (CMS/HCC)    • Atherosclerotic heart disease of native coronary artery without angina pectoris    • Benign essential hypertension    • Decreased pedal pulses    • Dyspnea    • Edema of extremities    • Elevated transaminase level    • Emphysema/COPD (CMS/HCC)    • Enlarged prostate without lower urinary tract symptoms (luts)    • Essential hypertriglyceridemia    • GERD without esophagitis    • Gout    • Hepatic fibrosis    • High blood pressure    • High risk medication use    • History of cardiac disorder    • History of cataract    • History of COPD    • History of esophagitis    • History of gastrointestinal hemorrhage    • History of hypertension    • History of nicotine dependence    • History of peptic ulcer    • History of sleep apnea    • Hoarseness, persistent    • Hyperglycemia    • Iliac artery aneurysm (CMS/HCC)    • Insomnia    • Mastodynia of left breast    • Myasthenia gravis (CMS/HCC)    • Nonalcoholic hepatosteatosis    • PAF  (paroxysmal atrial fibrillation) (CMS/HCC)    • Prostate disease    • Status post angioplasty        PAST SURGICAL HISTORY  Past Surgical History:   Procedure Laterality Date   • ANTERIOR CERVICAL DISCECTOMY W/ FUSION N/A 3/25/2016    Procedure: C3- C5 CERVICAL DISCECTOMY ANTERIOR FUSION WITH INSTRUMENTATION;  Surgeon: Bill Washington MD;  Location: [facility];  Service:    • ARTHRODESIS N/A 07/1983    LUMBAR   • ARTHRODESIS N/A 1987    LUMBAR   • ARTHRODESIS      Spinal Arthrodesis-lower spine-7/1983, 1987--upper spine-1988, 7/1990-Abstracted from [facility]   • BACK SURGERY      Lower Back Surgery   • CARDIAC CATHETERIZATION  07/2009   • CARDIAC CATHETERIZATION  03/18/2003    STENT TO RCA AND PCA, DR. PRIYA LUGO   • CATARACT EXTRACTION Bilateral    • CERVICAL ARTHRODESIS N/A 07/1990   • CERVICAL ARTHRODESIS N/A 1988   • COLONOSCOPY N/A 10/3/2019    4 MM HYPERPLASTIC POLYP IN ILEOCECAL VALVE, 4 MM SESSILE SERRATED ADENOMA POLYP IN DESCENDING, 5 TUBULOVILLOUS ADENOMA POLYPS IN RECTUM, 3 TUBULAR ADENOMA POLYPS IN DISTAL RECTUM, 26 MM TUBULAR ADENOMA POLYP IN RECTUM, PIECEMEAL POLYPECTOMY, AREA TATTOOED, RESCOPE IN 6 MONTHS, DR. TAYA CRUZ AT [facility]   • CORONARY ANGIOPLASTY WITH STENT PLACEMENT  02/2009    and 7/2009   • ENDOSCOPY N/A 06/24/2012    NON BLEEDING EROSIVE GASTROPATHY, 1 DUODENAL ULCER WITH CLEAN BASE, DR. BRANDY WREN AT [facility]   • ENDOSCOPY AND COLONOSCOPY N/A 11/20/2007    EGD WNL, 8 ADENOMATOUS POLYPS IN RECTO-SIGMOID, RESCOPE IN 3 YRS, DR. CRISTINA RODRIGUEZ AT [facility]   • FOOT SURGERY Left    • HAND SURGERY Left    • HAND SURGERY Right    • KNEE ARTHROSCOPY Left    • LAPAROSCOPIC CHOLECYSTECTOMY     • NECK SURGERY     • VASECTOMY Bilateral        FAMILY HISTORY  Family History   Adopted: Yes   Problem Relation Age of Onset   • Heart attack Mother    • Alcohol abuse Mother    • Heart disease Mother    • No Known Problems Father    • Heart disease Other         FH in females before the age of 65        SOCIAL HISTORY  Social History     Socioeconomic History   • Marital status:      Spouse name: Not on file   • Number of children: Not on file   • Years of education: Not on file   • Highest education level: Not on file   Tobacco Use   • Smoking status: Former Smoker     Types: Cigarettes   • Smokeless tobacco: Never Used   • Tobacco comment: smoked 1 to 2 packs per day for 40 or more years   Substance and Sexual Activity   • Alcohol use: Yes     Comment: 3 bourbons daily--Caffeine use-3 cups of coffee daily   • Drug use: No   • Sexual activity: Defer       ALLERGIES  Ranexa [ranolazine er]    The patient's allergies have been reviewed    REVIEW OF SYSTEMS  All systems reviewed and negative except for those discussed in HPI.     PHYSICAL EXAM  I have reviewed the triage vital signs and nursing notes.  ED Triage Vitals [04/04/20 1011]   Temp Heart Rate Resp BP SpO2   -- (!) 37 16 -- 100 %      Temp src Heart Rate Source Patient Position BP Location FiO2 (%)   -- Monitor -- -- --     GENERAL: No acute distress  HENT: NCAT, PERRL, Nares patent  EYES: no scleral icterus  NECK: trachea midline, no ROM limitations  CV: regular rhythm, bradycardic rate  RESPIRATORY: normal effort  ABDOMEN: soft  : deferred  MUSCULOSKELETAL: no deformity  NEURO: Alert and oriented x3, extraocular motion intact, pupils are equal and round reactive to light, cranial nerves II through XII are grossly intact, normal speech, moves all extremities well, 5 out of 5 strength all 4 extremities, sensation intact light touch all 4 extremities, no ataxia  SKIN: warm, dry    LAB RESULTS  Recent Results (from the past 24 hour(s))   Protime-INR    Collection Time: 04/04/20 10:27 AM   Result Value Ref Range    Protime 15.2 (H) 11.7 - 14.2 Seconds    INR 1.23 (H) 0.90 - 1.10   aPTT    Collection Time: 04/04/20 10:27 AM   Result Value Ref Range    PTT 30.7 22.7 - 35.4 seconds   BNP    Collection Time: 04/04/20 10:27 AM   Result Value Ref Range     proBNP 4,422.0 (H) 5.0 - 900.0 pg/mL   T4, Free    Collection Time: 04/04/20 10:27 AM   Result Value Ref Range    Free T4 1.31 0.93 - 1.70 ng/dL   TSH    Collection Time: 04/04/20 10:27 AM   Result Value Ref Range    TSH 3.610 0.270 - 4.200 uIU/mL   Magnesium    Collection Time: 04/04/20 10:27 AM   Result Value Ref Range    Magnesium 3.7 (H) 1.6 - 2.4 mg/dL   CBC Auto Differential    Collection Time: 04/04/20 10:27 AM   Result Value Ref Range    WBC 9.82 3.40 - 10.80 10*3/mm3    RBC 3.53 (L) 4.14 - 5.80 10*6/mm3    Hemoglobin 12.1 (L) 13.0 - 17.7 g/dL    Hematocrit 37.0 (L) 37.5 - 51.0 %    .8 (H) 79.0 - 97.0 fL    MCH 34.3 (H) 26.6 - 33.0 pg    MCHC 32.7 31.5 - 35.7 g/dL    RDW 11.7 (L) 12.3 - 15.4 %    RDW-SD 45.7 37.0 - 54.0 fl    MPV 9.2 6.0 - 12.0 fL    Platelets 227 140 - 450 10*3/mm3    Neutrophil % 78.3 (H) 42.7 - 76.0 %    Lymphocyte % 11.8 (L) 19.6 - 45.3 %    Monocyte % 8.8 5.0 - 12.0 %    Eosinophil % 0.1 (L) 0.3 - 6.2 %    Basophil % 0.4 0.0 - 1.5 %    Immature Grans % 0.6 (H) 0.0 - 0.5 %    Neutrophils, Absolute 7.69 (H) 1.70 - 7.00 10*3/mm3    Lymphocytes, Absolute 1.16 0.70 - 3.10 10*3/mm3    Monocytes, Absolute 0.86 0.10 - 0.90 10*3/mm3    Eosinophils, Absolute 0.01 0.00 - 0.40 10*3/mm3    Basophils, Absolute 0.04 0.00 - 0.20 10*3/mm3    Immature Grans, Absolute 0.06 (H) 0.00 - 0.05 10*3/mm3    nRBC 0.0 0.0 - 0.2 /100 WBC   Digoxin Level    Collection Time: 04/04/20 10:27 AM   Result Value Ref Range    Digoxin 1.00 0.60 - 1.20 ng/mL   Comprehensive Metabolic Panel    Collection Time: 04/04/20 11:24 AM   Result Value Ref Range    Glucose 146 (H) 65 - 99 mg/dL    BUN 80 (H) 8 - 23 mg/dL    Creatinine 5.14 (H) 0.76 - 1.27 mg/dL    Sodium 132 (L) 136 - 145 mmol/L    Potassium 8.3 (C) 3.5 - 5.2 mmol/L    Chloride 96 (L) 98 - 107 mmol/L    CO2 20.7 (L) 22.0 - 29.0 mmol/L    Calcium 9.6 8.6 - 10.5 mg/dL    Total Protein 8.4 6.0 - 8.5 g/dL    Albumin 4.60 3.50 - 5.20 g/dL    ALT (SGPT) 39 1 - 41  U/L    AST (SGOT) 24 1 - 40 U/L    Alkaline Phosphatase 64 39 - 117 U/L    Total Bilirubin 0.5 0.2 - 1.2 mg/dL    eGFR Non African Amer 11 (L) >60 mL/min/1.73    eGFR  African Amer      Globulin 3.8 gm/dL    A/G Ratio 1.2 g/dL    BUN/Creatinine Ratio 15.6 7.0 - 25.0    Anion Gap 15.3 (H) 5.0 - 15.0 mmol/L   Troponin    Collection Time: 04/04/20 11:24 AM   Result Value Ref Range    Troponin T 0.059 (C) 0.000 - 0.030 ng/mL       I ordered the above labs and reviewed the results.    RADIOLOGY  Xr Chest 1 View    Result Date: 4/4/2020  ONE VIEW PORTABLE CHEST  HISTORY: Generalized weakness. Bradycardia.  FINDINGS: The lungs are well-expanded and grossly clear. There is mild to moderate cardiomegaly unchanged from the CT scan dated 01/09/2020. No acute abnormality is seen.        I ordered the above noted radiological studies. I reviewed the images and results. I agree with the radiologist interpretation.    PROCEDURES  Procedures    MEDICATIONS GIVEN IN ER  Medications   sodium chloride 0.9 % flush 10 mL (has no administration in time range)   sodium chloride 0.9 % bolus 1,000 mL (1,000 mL Intravenous New Bag 4/4/20 1217)   dextrose (D50W) 25 g/ 50mL Intravenous Solution 100 mL (100 mL Intravenous Given 4/4/20 1220)   insulin regular (humuLIN R,novoLIN R) injection 10 Units (10 Units Intravenous Given 4/4/20 1228)   sodium bicarbonate injection 8.4% 50 mEq (50 mEq Intravenous Given 4/4/20 1219)   calcium gluconate 2 g in sodium chloride 0.9 % 100 mL IVPB (2 g Intravenous New Bag 4/4/20 1227)   albuterol (PROVENTIL) nebulizer solution 0.5% 2.5 mg/0.5mL (10 mg Nebulization Given 4/4/20 1228)   furosemide (LASIX) injection 40 mg (40 mg Intravenous Given 4/4/20 1222)       PROGRESS, DATA ANALYSIS, CONSULTS, AND MEDICAL DECISION MAKING  A complete history and physical exam have been performed.  All available laboratory and imaging results have been reviewed by myself prior to disposition.  Face mask and gloves were worn  throughout the patient encounter, unless additional PPE was worn and specified below. Hand hygiene was performed before entering and after leaving the patient room.  [facility]    ED Course as of Apr 04 1304   Sat Apr 04, 2020   1019 Discussed pertinent information from history and physical exam with patient.  Discussed differential diagnosis.  Discussed plan for ED evaluation/work-up/treatment.  All questions answered.  Patient is agreeable with plan.        [JG]   1028 EKG showed complete heart block.  Informed nursing staff, pads placed on patient, 2 IVs obtained.  Patient being closely monitored.    [JG]   1028 EKG independently viewed and contemporaneously interpreted by ED physician. Time: 10:20 AM.  Rate 34.  Interpretation: Junctional rhythm.  Left axis deviation.  Right bundle branch block morphology, nonspecific ST changes.    [JG]   1202 Severe hyperkalemia.  Treating with hyperkalemia cocktail.    [JG]   1232 Phone call with Dr Bruno.  Discussed the patient, relevant history, exam, diagnostics, ED findings/progress, and concerns. They agree to admit the patient to CCU. Care assumed by the admitting physician at this time.        [JG]   1304 EKG independently viewed and contemporaneously interpreted by ED physician. Time: 1302.  Rate 39.  Interpretation: Junctional rhythm, left axis deviation, nonspecific intraventricular conduction delay, nonspecific ST changes    [JG]      ED Course User Index  [JG] Sky Kern MD       AS OF 13:04 VITALS:    BP - 123/78  HR - (!) 34  TEMP - 97.8 °F (36.6 °C) (Oral)  O2 SATS - 98%    DIAGNOSIS  Final diagnoses:   Heart block AV complete (CMS/HCC)   Hyperkalemia   SHANNAN (acute kidney injury) (CMS/HCC)   Elevated troponin   Hypermagnesemia   Anemia, unspecified type     Critical care:  Total critical care time of 35 minutes is exclusive of any other billable procedures and includes time spent with direct patient care and observation, retrospective chart review, management  of acute condition, and consultation with other physicians.    DISPOSITION  ADMISSION    Discussed treatment plan and reason for admission with pt/family and admitting physician.  Pt/family voiced understanding of the plan for admission for further testing/treatment as needed.          Sky Kern MD  04/04/20 [id number]

## 2020-04-04 NOTE — OP NOTE
Operative Note  Date of Admission:  4/4/2020  OR Date: 4/4/2020    Pre-op Diagnosis:   Acute renal failure with hyperkalemia and junctional bradycardia    Post-Op Diagnosis Codes:  Same    Procedure:   1) duplex directed access with the right femoral venous non-tunneled (Shiley) hemodialysis catheter placement    Surgeon: Johan Domingo MD    Assistant: None    Anesthesia: Local    Staff:   Nursing    Estimated Blood Loss: Minimal    Specimens: None    Complications: None    Findings: See dictation    Indications:    The patient is an 74 y.o. male seen for evaluation acute renal failure with malignant heart rhythm due to hyperkalemia.  Patient is in need of emergent catheter placement and dialysis.  Femoral access will be used to avoid irritation of the heart with a wire.  Patient understands and agrees with plan and the risks thereof.       Procedure:    The patient was prepped and draped sterilely.  Using access to the right common femoral vein with duplex direction photodocumentation we passed a wire centrally and incised the tract and placed a 24 cm Shiley non-tunneled hemodialysis catheter to its full length in the groin.  We then  sewed it in place with 3-0 nylon.  Procedure was performed with local anesthetic.  Patient tolerated well and no complications were evident at completion.    Radiographic Findings:  Duplex direction shows access to the femoral vein without complication in good position within the vein itself of the wire.  Femoral arteries have plaquing but no aneurysms or stenoses      Active Hospital Problems    Diagnosis  POA   • Heart block AV complete (CMS/HCC) [I44.2]  Yes   • PVD (peripheral vascular disease) with claudication (CMS/HCC) [I73.9]  Unknown   • Acute renal failure (ARF) (CMS/HCC) [N17.9]  Unknown   • AAA (abdominal aortic aneurysm) (CMS/HCC) [I71.4]  Yes      Resolved Hospital Problems   No resolved problems to display.      John Domingo MD     Date: 4/4/2020  Time:  16:09

## 2020-04-04 NOTE — CONSULTS
Patient Name: Juan James Account #: [id number]    MRN: [id number] Admission Date: 4/4/2020      Consulting Service: Vascular Surgery Date of Evaluation: April 4, 2020    Requesting Provider: Dr. Juan Arroyo MD    CHIEF COMPLAINT: Acute renal failure with severe hyperkalemia  HPI: Juan James is a 74 y.o. male is being seen for a consultation and evaluation/management of acute renal failure with severe hyperkalemia and junctional bradycardia.  Patient has a potassium of 8 and is in emergent need of hemodialysis to prevent cardiac arrest.  The patient understands the need for hemodialysis ASAP.  He is not really had prior documented renal problems and this may be medication related.  He has known history of aortic aneurysm disease that will be assessed separately from this current event.  His most recent studies show aneurysmal disease remaining at under 4 cm.  He has known lower extremity occlusive disease moderate in nature as well.    PAST MEDICAL HISTORY:   Past Medical History:   Diagnosis Date   • AAA (abdominal aortic aneurysm) (CMS/[facility])    • Ankle edema    • Aortic aneurysm (CMS/HCC)    • Arthritis    • Atherosclerotic heart disease of native coronary artery without angina pectoris    • Atrial fibrillation (CMS/HCC)    • Benign essential hypertension    • BPH (benign prostatic hyperplasia)    • Cataract    • Cervical disc disorder    • Cervical radiculitis 09/2016   • Cervical spondylosis 11/2012   • CHF (congestive heart failure) (CMS/HCC)    • Colon polyps     FOLLOWED BY DR. TAYA CRUZ   • COPD (chronic obstructive pulmonary disease) (CMS/HCC)    • Coronary artery disease     STENT X 3   • DDD (degenerative disc disease), cervical    • DDD (degenerative disc disease), lumbar    • Decreased pedal pulses    • Dyspnea    • Edema    • Edema of extremities    • Elevated transaminase level    • Emphysema/COPD (CMS/HCC)    • Enlarged prostate without lower urinary tract symptoms (luts)    •  Essential hypertriglyceridemia    • GERD (gastroesophageal reflux disease)    • GERD without esophagitis    • Gout    • Hearing loss    • Hepatic fibrosis    • High blood pressure    • High risk medication use    • History of blood transfusion    • History of cardiac disorder    • History of cataract    • History of COPD    • History of esophagitis    • History of gastrointestinal hemorrhage    • History of hypertension    • History of nicotine dependence    • History of peptic ulcer    • History of sleep apnea    • Hoarseness, persistent    • Hyperglycemia    • Hyperlipidemia    • Hypertension    • Iliac artery aneurysm (CMS/HCC)    • Insomnia    • Lower back pain    • Lumbar radiculopathy 05/2016   • Mastodynia of left breast    • Myasthenia gravis (CMS/HCC)    • Neck pain    • Nonalcoholic hepatosteatosis    • OA (osteoarthritis)    • 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HCC)    • PAF (paroxysmal atrial fibrillation) (CMS/HCC)    • Peptic ulceration     PUD   • Prediabetes    • Prediabetes    • Prostate disease    • Pulmonary embolism (CMS/HCC)     PT DENIES HAVING THIS   • Rectal pain 07/2019   • Shortness of breath    • Status post angioplasty    • Tinnitus       PAST SURGICAL HISTORY:   Past Surgical History:   Procedure Laterality Date   • ANTERIOR CERVICAL DISCECTOMY W/ FUSION N/A 3/25/2016    Procedure: C3- C5 CERVICAL DISCECTOMY ANTERIOR FUSION WITH INSTRUMENTATION;  Surgeon: Bill Washington MD;  Location: [facility];  Service:    • ARTHRODESIS N/A 07/1983    LUMBAR   • ARTHRODESIS N/A 1987    LUMBAR   • ARTHRODESIS      Spinal Arthrodesis-lower spine-7/1983, 1987--upper spine-1988, 7/1990-Abstracted from [employer]   • BACK SURGERY      Lower Back Surgery   • CARDIAC CATHETERIZATION  07/2009   • CARDIAC CATHETERIZATION  03/18/2003    STENT TO RCA AND PCA, DR. PRIYA LUGO   • CATARACT EXTRACTION Bilateral    • CERVICAL ARTHRODESIS N/A 07/1990   • CERVICAL ARTHRODESIS N/A 1988   • COLONOSCOPY N/A 10/3/2019     4 MM HYPERPLASTIC POLYP IN ILEOCECAL VALVE, 4 MM SESSILE SERRATED ADENOMA POLYP IN DESCENDING, 5 TUBULOVILLOUS ADENOMA POLYPS IN RECTUM, 3 TUBULAR ADENOMA POLYPS IN DISTAL RECTUM, 26 MM TUBULAR ADENOMA POLYP IN RECTUM, PIECEMEAL POLYPECTOMY, AREA TATTOOED, RESCOPE IN 6 MONTHS, DR. TAYA CRUZ AT [facility]   • CORONARY ANGIOPLASTY WITH STENT PLACEMENT  02/2009    and 7/2009   • ENDOSCOPY N/A 06/24/2012    NON BLEEDING EROSIVE GASTROPATHY, 1 DUODENAL ULCER WITH CLEAN BASE, DR. BRANDY WREN AT [facility]   • ENDOSCOPY AND COLONOSCOPY N/A 11/20/2007    EGD WNL, 8 ADENOMATOUS POLYPS IN RECTO-SIGMOID, RESCOPE IN 3 YRS, DR. CRISTINA RODRIGUEZ AT [facility]   • FOOT SURGERY Left    • HAND SURGERY Left    • HAND SURGERY Right    • KNEE ARTHROSCOPY Left    • LAPAROSCOPIC CHOLECYSTECTOMY     • NECK SURGERY     • VASECTOMY Bilateral       FAMILY HISTORY:   Family History   Adopted: Yes   Problem Relation Age of Onset   • Heart attack Mother    • Alcohol abuse Mother    • Heart disease Mother    • No Known Problems Father    • Heart disease Other         FH in females before the age of 65      SOCIAL HISTORY:   Social History     Tobacco Use   • Smoking status: Former Smoker     Types: Cigarettes   • Smokeless tobacco: Never Used   • Tobacco comment: smoked 1 to 2 packs per day for 40 or more years   Substance Use Topics   • Alcohol use: Yes     Comment: 3 bourbons daily--Caffeine use-3 cups of coffee daily   • Drug use: No      MEDICATIONS:   No current facility-administered medications on file prior to encounter.      Current Outpatient Medications on File Prior to Encounter   Medication Sig Dispense Refill   • aspirin 81 MG chewable tablet Chew 81 mg Daily.     • digoxin (LANOXIN) 125 MCG tablet Take 125 mcg by mouth every morning.     • furosemide (LASIX) 40 MG tablet Take 1 tablet by mouth 2 (Two) Times a Day. 60 tablet 6   • glucosamine-chondroitin 500-400 MG capsule capsule Take  by mouth 2 (Two) Times a Day With Meals.     • glucose blood  "test strip USE 1 STRIP TO CHECK GLUCOSE ONCE DAILY 100 each 0   • isosorbide mononitrate (IMDUR) 30 MG 24 hr tablet Take 30 mg by mouth Daily.     • metFORMIN (GLUCOPHAGE) 1000 MG tablet Take 1 and 1/2 po qam and 1 po qhs 225 tablet 3   • metOLazone (ZAROXOLYN) 2.5 MG tablet Take 2.5 mg by mouth Daily.     • metoprolol succinate XL (TOPROL-XL) 100 MG 24 hr tablet Take 100 mg by mouth Every Morning.     • Multiple Vitamins-Minerals (MULTIVITAMIN PO) Take 1 tablet by mouth daily.     • potassium chloride ER (K-TAB) 20 MEQ tablet controlled-release ER tablet Take 1 tablet by mouth 2 (Two) Times a Day. 60 tablet 6   • valsartan (DIOVAN) 40 MG tablet Take 1 tablet by mouth Daily. 30 tablet 6   • allopurinol (ZYLOPRIM) 300 MG tablet Take 1 tablet by mouth Daily. 30 tablet 6   • ezetimibe (ZETIA) 10 MG tablet Take 1 tablet by mouth every night at bedtime. 30 tablet 6   • indomethacin SR (INDOCIN SR) 75 MG CR capsule Take 1 capsule by mouth 2 (Two) Times a Day With Meals. 60 capsule 3             ALLERGIES: Ranexa [ranolazine er]   COMPLETE REVIEW OF SYSTEMS:     ENT ROS: negative  Cardiovascular ROS: no chest pain or dyspnea on exertion  Respiratory ROS: no cough, positive for shortness of breath  Gastrointestinal ROS: no abdominal pain, change in bowel habits, or black or bloody stools  Neurological ROS: no TIA or stroke symptoms  Genito-Urinary ROS: no dysuria, trouble voiding, or hematuria  Musculoskeletal ROS: Positive for lower extremity claudication  Dermatological ROS: negative  Psychological ROS: negative      PHYSICAL EXAM:   Patient Vitals for the past 24 hrs:   BP Temp Temp src Pulse Resp SpO2 Height Weight   04/04/20 1255 111/99 -- -- (!) 42 -- 96 % -- --   04/04/20 1222 123/78 -- -- (!) 34 -- -- -- --   04/04/20 1124 138/90 -- -- (!) 33 -- 98 % -- --   04/04/20 1043 -- 97.8 °F (36.6 °C) Oral -- -- -- -- --   04/04/20 1026 160/89 -- -- -- -- 97 % 188 cm (74\") 109 kg (240 lb)   04/04/20 1011 -- -- -- (!) 37 16 " 100 % -- --        General appearance: oriented to person, place, and time, in mild to moderate distress and ill-appearing.  Neurological exam reveals alert, oriented, normal speech, no focal findings or movement disorder noted.  ENT exam reveals - ENT exam normal, no neck nodes or sinus tenderness.  CVS exam: normal rate, regular rhythm, normal S1, S2, no murmurs, rubs, clicks or gallops.  Chest: Mild tachypnea, no retractions or cyanosis.  Abdominal exam: soft, nontender, nondistended, no masses or organomegaly.  Examination of the feet reveals warm, good capillary refill.        LABS:      Results Review:       I reviewed the patient's new clinical results.  Results from last 7 days   Lab Units 04/04/20  1027   WBC 10*3/mm3 9.82   HEMOGLOBIN g/dL 12.1*   PLATELETS 10*3/mm3 227     Results from last 7 days   Lab Units 04/04/20  1359 04/04/20  1124   SODIUM mmol/L 135* 132*   POTASSIUM mmol/L 7.8* 8.3*   CHLORIDE mmol/L 99 96*   CO2 mmol/L 19.9* 20.7*   BUN mg/dL 79* 80*   CREATININE mg/dL 4.95* 5.14*   GLUCOSE mg/dL 123* 146*   Estimated Creatinine Clearance: 17.7 mL/min (A) (by C-G formula based on SCr of 4.95 mg/dL (H)).  Results from last 7 days   Lab Units 04/04/20  1359 04/04/20  1124 04/04/20  1027   CALCIUM mg/dL 9.7 9.6  --    ALBUMIN g/dL  --  4.60  --    MAGNESIUM mg/dL  --   --  3.7*     Results from last 7 days   Lab Units 04/04/20  1027   PROTIME Seconds 15.2*   INR  1.23*       The following radiologic or non-invasive studies have been reviewed by me: Prior lower extremity arterial testing and abdominal duplex scan.    Active Hospital Problems    Diagnosis  POA   • Heart block AV complete (CMS/HCC) [I44.2]  Yes   • PVD (peripheral vascular disease) with claudication (CMS/HCC) [I73.9]  Unknown   • Acute renal failure (ARF) (CMS/HCC) [N17.9]  Unknown   • AAA (abdominal aortic aneurysm) (CMS/HCC) [I71.4]  Yes      Resolved Hospital Problems   No resolved problems to display.         ASSESSMENT/PLAN: 74  y.o. male with acute renal failure causing heart block.  He has hyperkalemia and needs emergent treatment and we will place a femoral Shiley to avoid causing a malignant arrhythmia during passage of wire near the heart.  He understands risk benefits complications and agreed to the procedure.  We will discuss his aneurysmal disease and arterial occlusive disease of the legs once he is recovered from his kidney dysfunction or once his bili stabilized.  He is aware that this is a temporary measure and he may need a tunneled catheter or even permanent dialysis depending on how things turn out for him.      I discussed the plan with the patient and he is agreeable to the plan of care at this point. Thank you for this consult.     John Domingo MD   04/04/20

## 2020-04-04 NOTE — CONSULTS
Referring Provider: Dr Bruno   Reason for Consultation: hyperkalemia, SHANNAN     Subjective     Chief complaint   Chief Complaint   Patient presents with   • Tingling   • Numbness       History of present illness:  75 yo WM with h/o HTN, CAD, dCHF, AAA, AFIB w/o AC, gout who presented to ER c/o dizziness & gen weakness.  Found to have SHANNAN, severe hyperkalemia with assoc junctional bradycardia, HR in 30s.  Not hypotensive.  BUN/Cr elevated to 80/5.1, with K 8.3, HCo3 20, AG 16.  Denies prior kidney problems, had slightly elevated Cr 1.3 in Jan 2018, but 0.8 in July 2019 and 1.06 in Nov 2019.  He takes valsartan, KCL, toprol XL, as well as lasix & metolazone.  Recently given indomethacin for gout flair.  Reports nausea last night, no emesis, no diarrhea (was constipated and took some miralax).  C/o dyspnea but denies swelling.  Urine output has been diminished.  Random bladder scan just now showed only 60cc.  He received calcium, dextrose, insulin, kayexalate, amp of bicarb, and repeat K not much better (7/8).  Cardiologist is Dr Aviles.  Echocardiogram from May 2017 showed EF 50 - 60%.  CXR shows clear lung fields.      Past Medical History:   Diagnosis Date   • AAA (abdominal aortic aneurysm) (CMS/Formerly [facility])    • Ankle edema    • Aortic aneurysm (CMS/HCC)    • Arthritis    • Atherosclerotic heart disease of native coronary artery without angina pectoris    • Atrial fibrillation (CMS/Formerly [facility])    • Benign essential hypertension    • BPH (benign prostatic hyperplasia)    • Cataract    • Cervical disc disorder    • Cervical radiculitis 09/2016   • Cervical spondylosis 11/2012   • CHF (congestive heart failure) (CMS/HCC)    • Colon polyps     FOLLOWED BY DR. TAYA CRUZ   • COPD (chronic obstructive pulmonary disease) (CMS/Formerly [facility])    • Coronary artery disease     STENT X 3   • DDD (degenerative disc disease), cervical    • DDD (degenerative disc disease), lumbar    • Decreased pedal pulses    • Dyspnea    • Edema    • Edema of  extremities    • Elevated transaminase level    • Emphysema/COPD (CMS/HCC)    • Enlarged prostate without lower urinary tract symptoms (luts)    • Essential hypertriglyceridemia    • GERD (gastroesophageal reflux disease)    • GERD without esophagitis    • Gout    • Hearing loss    • Hepatic fibrosis    • High blood pressure    • High risk medication use    • History of blood transfusion    • History of cardiac disorder    • History of cataract    • History of COPD    • History of esophagitis    • History of gastrointestinal hemorrhage    • History of hypertension    • History of nicotine dependence    • History of peptic ulcer    • History of sleep apnea    • Hoarseness, persistent    • Hyperglycemia    • Hyperlipidemia    • Hypertension    • Iliac artery aneurysm (CMS/HCC)    • Insomnia    • Lower back pain    • Lumbar radiculopathy 05/2016   • Mastodynia of left breast    • Myasthenia gravis (CMS/HCC)    • Neck pain    • Nonalcoholic hepatosteatosis    • OA (osteoarthritis)    • 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HCC)    • PAF (paroxysmal atrial fibrillation) (CMS/HCC)    • Peptic ulceration     PUD   • Prediabetes    • Prediabetes    • Prostate disease    • Pulmonary embolism (CMS/HCC)     PT DENIES HAVING THIS   • Rectal pain 07/2019   • Shortness of breath    • Status post angioplasty    • Tinnitus      Past Surgical History:   Procedure Laterality Date   • ANTERIOR CERVICAL DISCECTOMY W/ FUSION N/A 3/25/2016    Procedure: C3- C5 CERVICAL DISCECTOMY ANTERIOR FUSION WITH INSTRUMENTATION;  Surgeon: Bill Washington MD;  Location: [facility];  Service:    • ARTHRODESIS N/A 07/1983    LUMBAR   • ARTHRODESIS N/A 1987    LUMBAR   • ARTHRODESIS      Spinal Arthrodesis-lower spine-7/1983, 1987--upper spine-1988, 7/1990-Abstracted from [employer]   • BACK SURGERY      Lower Back Surgery   • CARDIAC CATHETERIZATION  07/2009   • CARDIAC CATHETERIZATION  03/18/2003    STENT TO RCA AND PCA, DR. PRIYA LUGO   • CATARACT  EXTRACTION Bilateral    • CERVICAL ARTHRODESIS N/A 07/1990   • CERVICAL ARTHRODESIS N/A 1988   • COLONOSCOPY N/A 10/3/2019    4 MM HYPERPLASTIC POLYP IN ILEOCECAL VALVE, 4 MM SESSILE SERRATED ADENOMA POLYP IN DESCENDING, 5 TUBULOVILLOUS ADENOMA POLYPS IN RECTUM, 3 TUBULAR ADENOMA POLYPS IN DISTAL RECTUM, 26 MM TUBULAR ADENOMA POLYP IN RECTUM, PIECEMEAL POLYPECTOMY, AREA TATTOOED, RESCOPE IN 6 MONTHS, DR. TAYA CRUZ AT [facility]   • CORONARY ANGIOPLASTY WITH STENT PLACEMENT  02/2009    and 7/2009   • ENDOSCOPY N/A 06/24/2012    NON BLEEDING EROSIVE GASTROPATHY, 1 DUODENAL ULCER WITH CLEAN BASE, DR. BRANDY WREN AT [facility]   • ENDOSCOPY AND COLONOSCOPY N/A 11/20/2007    EGD WNL, 8 ADENOMATOUS POLYPS IN RECTO-SIGMOID, RESCOPE IN 3 YRS, DR. CRISTINA RODRIGUEZ AT [facility]   • FOOT SURGERY Left    • HAND SURGERY Left    • HAND SURGERY Right    • KNEE ARTHROSCOPY Left    • LAPAROSCOPIC CHOLECYSTECTOMY     • NECK SURGERY     • VASECTOMY Bilateral      Family History   Adopted: Yes   Problem Relation Age of Onset   • Heart attack Mother    • Alcohol abuse Mother    • Heart disease Mother    • No Known Problems Father    • Heart disease Other         FH in females before the age of 65       Social History     Tobacco Use   • Smoking status: Former Smoker     Types: Cigarettes   • Smokeless tobacco: Never Used   • Tobacco comment: smoked 1 to 2 packs per day for 40 or more years   Substance Use Topics   • Alcohol use: Yes     Comment: 3 bourbons daily--Caffeine use-3 cups of coffee daily   • Drug use: No     Medications Prior to Admission   Medication Sig Dispense Refill Last Dose   • aspirin 81 MG chewable tablet Chew 81 mg Daily.   Taking   • digoxin (LANOXIN) 125 MCG tablet Take 125 mcg by mouth every morning.   Taking   • furosemide (LASIX) 40 MG tablet Take 1 tablet by mouth 2 (Two) Times a Day. 60 tablet 6 Taking   • glucosamine-chondroitin 500-400 MG capsule capsule Take  by mouth 2 (Two) Times a Day With Meals.   Taking   • glucose  "blood test strip USE 1 STRIP TO CHECK GLUCOSE ONCE DAILY 100 each 0    • isosorbide mononitrate (IMDUR) 30 MG 24 hr tablet Take 30 mg by mouth Daily.   Taking   • metFORMIN (GLUCOPHAGE) 1000 MG tablet Take 1 and 1/2 po qam and 1 po qhs 225 tablet 3    • metOLazone (ZAROXOLYN) 2.5 MG tablet Take 2.5 mg by mouth Daily.      • metoprolol succinate XL (TOPROL-XL) 100 MG 24 hr tablet Take 100 mg by mouth Every Morning.   Patient Taking Differently   • Multiple Vitamins-Minerals (MULTIVITAMIN PO) Take 1 tablet by mouth daily.   Taking   • potassium chloride ER (K-TAB) 20 MEQ tablet controlled-release ER tablet Take 1 tablet by mouth 2 (Two) Times a Day. 60 tablet 6 Taking   • valsartan (DIOVAN) 40 MG tablet Take 1 tablet by mouth Daily. 30 tablet 6    • allopurinol (ZYLOPRIM) 300 MG tablet Take 1 tablet by mouth Daily. 30 tablet 6    • ezetimibe (ZETIA) 10 MG tablet Take 1 tablet by mouth every night at bedtime. 30 tablet 6 Taking   • indomethacin SR (INDOCIN SR) 75 MG CR capsule Take 1 capsule by mouth 2 (Two) Times a Day With Meals. 60 capsule 3      Allergies:  Ranexa [ranolazine er]    Review of Systems  Pertinent items are noted in HPI.    Objective     Vital Signs  Temp:  [97.8 °F (36.6 °C)] 97.8 °F (36.6 °C)  Heart Rate:  [33-42] 42  Resp:  [16] 16  BP: (111-160)/(78-99) 111/99    Flowsheet Rows      First Filed Value   Admission Height  188 cm (74\") Documented at 04/04/2020 1026   Admission Weight  109 kg (240 lb) Documented at 04/04/2020 1026           I/O this shift:  In: 100 [IV Piggyback:100]  Out: -   No intake/output data recorded.    Intake/Output Summary (Last 24 hours) at 4/4/2020 1513  Last data filed at 4/4/2020 1305  Gross per 24 hour   Intake 100 ml   Output --   Net 100 ml       Physical Exam:  GEN obese pleasant wm who is uncomfortable but not in acute distress, conversant, setting upright  HEENT NC/AT OP clear  Neck supple no JVD  Lungs CTA bilat no rales  CV bradycardic, no M/G  abd soft NT/ND " +BS  vasc no pedal/ankle edema, 2+ radial pulses   MS no joint warmth or erythema  Derm normal turgor, no rash  Neuro oriented x3, speech intact       Results Review:  Results for orders placed or performed during the hospital encounter of 04/04/20   Comprehensive Metabolic Panel   Result Value Ref Range    Glucose 146 (H) 65 - 99 mg/dL    BUN 80 (H) 8 - 23 mg/dL    Creatinine 5.14 (H) 0.76 - 1.27 mg/dL    Sodium 132 (L) 136 - 145 mmol/L    Potassium 8.3 (C) 3.5 - 5.2 mmol/L    Chloride 96 (L) 98 - 107 mmol/L    CO2 20.7 (L) 22.0 - 29.0 mmol/L    Calcium 9.6 8.6 - 10.5 mg/dL    Total Protein 8.4 6.0 - 8.5 g/dL    Albumin 4.60 3.50 - 5.20 g/dL    ALT (SGPT) 39 1 - 41 U/L    AST (SGOT) 24 1 - 40 U/L    Alkaline Phosphatase 64 39 - 117 U/L    Total Bilirubin 0.5 0.2 - 1.2 mg/dL    eGFR Non African Amer 11 (L) >60 mL/min/1.73    eGFR  African Amer      Globulin 3.8 gm/dL    A/G Ratio 1.2 g/dL    BUN/Creatinine Ratio 15.6 7.0 - 25.0    Anion Gap 15.3 (H) 5.0 - 15.0 mmol/L   Protime-INR   Result Value Ref Range    Protime 15.2 (H) 11.7 - 14.2 Seconds    INR 1.23 (H) 0.90 - 1.10   aPTT   Result Value Ref Range    PTT 30.7 22.7 - 35.4 seconds   BNP   Result Value Ref Range    proBNP 4,422.0 (H) 5.0 - 900.0 pg/mL   Troponin   Result Value Ref Range    Troponin T 0.059 (C) 0.000 - 0.030 ng/mL   T4, Free   Result Value Ref Range    Free T4 1.31 0.93 - 1.70 ng/dL   TSH   Result Value Ref Range    TSH 3.610 0.270 - 4.200 uIU/mL   Magnesium   Result Value Ref Range    Magnesium 3.7 (H) 1.6 - 2.4 mg/dL   CBC Auto Differential   Result Value Ref Range    WBC 9.82 3.40 - 10.80 10*3/mm3    RBC 3.53 (L) 4.14 - 5.80 10*6/mm3    Hemoglobin 12.1 (L) 13.0 - 17.7 g/dL    Hematocrit 37.0 (L) 37.5 - 51.0 %    .8 (H) 79.0 - 97.0 fL    MCH 34.3 (H) 26.6 - 33.0 pg    MCHC 32.7 31.5 - 35.7 g/dL    RDW 11.7 (L) 12.3 - 15.4 %    RDW-SD 45.7 37.0 - 54.0 fl    MPV 9.2 6.0 - 12.0 fL    Platelets 227 140 - 450 10*3/mm3    Neutrophil % 78.3 (H)  42.7 - 76.0 %    Lymphocyte % 11.8 (L) 19.6 - 45.3 %    Monocyte % 8.8 5.0 - 12.0 %    Eosinophil % 0.1 (L) 0.3 - 6.2 %    Basophil % 0.4 0.0 - 1.5 %    Immature Grans % 0.6 (H) 0.0 - 0.5 %    Neutrophils, Absolute 7.69 (H) 1.70 - 7.00 10*3/mm3    Lymphocytes, Absolute 1.16 0.70 - 3.10 10*3/mm3    Monocytes, Absolute 0.86 0.10 - 0.90 10*3/mm3    Eosinophils, Absolute 0.01 0.00 - 0.40 10*3/mm3    Basophils, Absolute 0.04 0.00 - 0.20 10*3/mm3    Immature Grans, Absolute 0.06 (H) 0.00 - 0.05 10*3/mm3    nRBC 0.0 0.0 - 0.2 /100 WBC   Digoxin Level   Result Value Ref Range    Digoxin 1.00 0.60 - 1.20 ng/mL   Basic Metabolic Panel   Result Value Ref Range    Glucose 123 (H) 65 - 99 mg/dL    BUN 79 (H) 8 - 23 mg/dL    Creatinine 4.95 (H) 0.76 - 1.27 mg/dL    Sodium 135 (L) 136 - 145 mmol/L    Potassium 7.8 (C) 3.5 - 5.2 mmol/L    Chloride 99 98 - 107 mmol/L    CO2 19.9 (L) 22.0 - 29.0 mmol/L    Calcium 9.7 8.6 - 10.5 mg/dL    eGFR  African Amer      eGFR Non African Amer 12 (L) >60 mL/min/1.73    BUN/Creatinine Ratio 16.0 7.0 - 25.0    Anion Gap 16.1 (H) 5.0 - 15.0 mmol/L   POC Glucose Once   Result Value Ref Range    Glucose 181 (H) 70 - 130 mg/dL     Imaging Results (Last 72 Hours)     Procedure Component Value Units Date/Time    XR Chest 1 View [[id number]] Collected:  04/04/20 1102     Updated:  04/04/20 1313    Narrative:       ONE VIEW PORTABLE CHEST     HISTORY: Generalized weakness. Bradycardia.     FINDINGS: The lungs are well-expanded and grossly clear. There is mild  to moderate cardiomegaly unchanged from the CT scan dated 01/09/2020. No  acute abnormality is seen.     This report was finalized on 4/4/2020 1:10 PM by Dr. Cheikh Torrez M.D.                 heparin (porcine) 5,000 Units Intracatheter Once   sodium polystyrene 15 g Oral Once          Assessment/Plan   Olig SHANNAN - prerenal azotemia vs ATN, in setting of severe bradycardia, combination diuretic use, impaired hemodynamic compensation from ARB +  NSAIDs.  Not hypotensive but suspect latter scenario given severity, Cr 5.0 (vs 1.0 in Nov 2019).  No e/o retention, bladder scan only 60 cc.  Need UA to assess sediment when able, r/o GN given nsaid use.  Needs emergent HD for hyperkalemia.  Volume stable but h/o CHF and anuric so will remove some fluid on dialysis     Hyperkalemia - due to SHANNAN + meds (ARB, KCL, b-blocker, nsaids); life-threatening; K 8.2 -> 7.8 after Tx  AG metabolic acidosis, mild, due to SHANNAN/uremia; HCo3 20, AG 16  Junctional bradycardia, due to hyperkalemia, HR 30s  HTN - BP initially high 160/89, now 130s/70s; certainly will hold b-blocker, ARB  H/o diastolic CHF - EF 50-60% on scanned echo report from 2017; takes lasix + metolazone  H/o CAD, PVD/AAA  Gout with recent acute flair treated with indomethacin    Plan  - consult vascular surg for kaylah to allow for stat HD, appreciate Dr Domingo assistance  - HD x4h, 2K bath, remove 2L   - hold ARB, b-blocker, nsaids, diuretics   - send UA when able  - expect HR to improve with correction of hyperkalemia, may need HD again tomorrow    Thank you Dr Bruno for involving me in pt's care.         Heart block AV complete (CMS/HCC)        I discussed the patients findings and my recommendations with patient & RN     Juan Arroyo MD  04/04/20  15:13      Much of this encounter note is an electronic transcription/translation of spoken language to printed text. The electronic translation of spoken language may permit erroneous, or at times, nonsensical words or phrases to be inadvertently transcribed; Although I have reviewed the note for such errors, some may still exist

## 2020-04-04 NOTE — PLAN OF CARE
Problem: Patient Care Overview  Goal: Plan of Care Review  Outcome: Ongoing (interventions implemented as appropriate)  Flowsheets (Taken 4/4/2020 1805)  Plan of Care Reviewed With: patient  Outcome Summary: Pt admitted to CCU with Hyperkalemia and bradycardia, HR 30-50, Junctional. Pt AAO X 4, potassium trending down after IV medication treatment. Temporary HD catheter placed and dialysis treatment being started now (1800). BP stable, pt denies pain. C/O anxiety and occasional SOA. Pt's son Juan updated over the phone and given access code. No urine output this shift, bladder scan 66mL this afternoon.  Meaghan CHERY

## 2020-04-04 NOTE — H&P
Patient Care Team:  Luda Kidd MD as PCP - General (Family Medicine)  Luda Kidd MD as PCP - Family Medicine  Saul Fang MD as Consulting Physician (Cardiology)  Bill Washington MD as Surgeon (Neurosurgery)  Mohan, Zafar Fraire MD as Consulting Physician (Vascular Surgery)    Chief complaint:  Dizziness and weakness    History of present illness:  Subjective   This is a 74-year-old male patient with no prior history of kidney disease.    He has history of CAD, A. fib, gout and CHF and takes digoxin, Diovan, Lasix, metolazone.    He reported left mid distal foot pain last Saturday, the same day his wife passed away in this hospital.  The pain was similar to prior gout attack.  He was prescribed allopurinol and indomethacin per his primary care physician.  He took the indomethacin first for 3 days but then started to get dizzy the next day after he started the indomethacin and his symptoms got worse gradually until he started 3 days later.  He even was not able to stand up during his wife  last Wednesday.    Associated symptoms include nausea but no vomiting.  He had dry heaves.  He did not have decreased p.o. intake or changes in bowel movements or abdominal pain.  He did have generalized weakness though.     Today, the dizziness became worse and he also started having trouble breathing and generalized weakness.  In ED, he was found to be bradycardic and labs were mostly striking for significant renal failure and hyperkalemia.      He has already received albuterol 10 mg, calcium gluconate 2 g, regular insulin 10 units, 1 amp of sodium bicarb, 1 amp of D50 and 1 L IV fluid in addition to 40 mg of Lasix.  He has not had urine yet.    Labs:  Troponin 0.05; glucose 146; sodium 132; potassium 8.3; bicarb 20; anion gap 15; creatinine 5; BUN 80; hemoglobin 12;    Review of Systems:  Constitutional: No fever or chills.  Generalized weakness.  ENMT: No sinus congestion or  postnasal drip  Cardiovascular: No chest pain, palpitation or legs swelling.    Respiratory: Shortness of breath worse with activities.  Gastrointestinal: No constipation, diarrhea or abdominal pain.  Nausea but no vomiting.  Neurology: No headache, numbness but reported generalized weakness and dizziness  Musculoskeletal: No joints pain, stiffness or swelling.   Psychiatry: No depression.  Genitourinary: No dysuria or frequent urination  Endo: No weight changes. No cold or warm intolerance.  Lymphatic: No swollen glands.  Integumentary: No rash.    History  Past Medical History:   Diagnosis Date   • AAA (abdominal aortic aneurysm) (CMS/HCC)    • Ankle edema    • Aortic aneurysm (CMS/HCC)    • Arthritis    • Atherosclerotic heart disease of native coronary artery without angina pectoris    • Atrial fibrillation (CMS/HCC)    • Benign essential hypertension    • BPH (benign prostatic hyperplasia)    • Cataract    • Cervical disc disorder    • Cervical radiculitis 09/2016   • Cervical spondylosis 11/2012   • CHF (congestive heart failure) (CMS/HCC)    • Colon polyps     FOLLOWED BY DR. TAYA CRUZ   • COPD (chronic obstructive pulmonary disease) (CMS/[facility])    • Coronary artery disease     STENT X 3   • DDD (degenerative disc disease), cervical    • DDD (degenerative disc disease), lumbar    • Decreased pedal pulses    • Dyspnea    • Edema    • Edema of extremities    • Elevated transaminase level    • Emphysema/COPD (CMS/HCC)    • Enlarged prostate without lower urinary tract symptoms (luts)    • Essential hypertriglyceridemia    • GERD (gastroesophageal reflux disease)    • GERD without esophagitis    • Gout    • Hearing loss    • Hepatic fibrosis    • High blood pressure    • High risk medication use    • History of blood transfusion    • History of cardiac disorder    • History of cataract    • History of COPD    • History of esophagitis    • History of gastrointestinal hemorrhage    • History of hypertension    •  History of nicotine dependence    • History of peptic ulcer    • History of sleep apnea    • Hoarseness, persistent    • Hyperglycemia    • Hyperlipidemia    • Hypertension    • Iliac artery aneurysm (CMS/HCC)    • Insomnia    • Lower back pain    • Lumbar radiculopathy 05/2016   • Mastodynia of left breast    • Myasthenia gravis (CMS/HCC)    • Neck pain    • Nonalcoholic hepatosteatosis    • OA (osteoarthritis)    • 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HCC)    • PAF (paroxysmal atrial fibrillation) (CMS/HCC)    • Peptic ulceration     PUD   • Prediabetes    • Prediabetes    • Prostate disease    • Pulmonary embolism (CMS/HCC)     PT DENIES HAVING THIS   • Rectal pain 07/2019   • Shortness of breath    • Status post angioplasty    • Tinnitus      Past Surgical History:   Procedure Laterality Date   • ANTERIOR CERVICAL DISCECTOMY W/ FUSION N/A 3/25/2016    Procedure: C3- C5 CERVICAL DISCECTOMY ANTERIOR FUSION WITH INSTRUMENTATION;  Surgeon: Bill Washington MD;  Location: [facility];  Service:    • ARTHRODESIS N/A 07/1983    LUMBAR   • ARTHRODESIS N/A 1987    LUMBAR   • ARTHRODESIS      Spinal Arthrodesis-lower spine-7/1983, 1987--upper spine-1988, 7/1990-Abstracted from [employer]   • BACK SURGERY      Lower Back Surgery   • CARDIAC CATHETERIZATION  07/2009   • CARDIAC CATHETERIZATION  03/18/2003    STENT TO RCA AND PCA, DR. PRIYA LUGO   • CATARACT EXTRACTION Bilateral    • CERVICAL ARTHRODESIS N/A 07/1990   • CERVICAL ARTHRODESIS N/A 1988   • COLONOSCOPY N/A 10/3/2019    4 MM HYPERPLASTIC POLYP IN ILEOCECAL VALVE, 4 MM SESSILE SERRATED ADENOMA POLYP IN DESCENDING, 5 TUBULOVILLOUS ADENOMA POLYPS IN RECTUM, 3 TUBULAR ADENOMA POLYPS IN DISTAL RECTUM, 26 MM TUBULAR ADENOMA POLYP IN RECTUM, PIECEMEAL POLYPECTOMY, AREA TATTOOED, RESCOPE IN 6 MONTHS, DR. TAYA CRUZ AT [facility]   • CORONARY ANGIOPLASTY WITH STENT PLACEMENT  02/2009    and 7/2009   • ENDOSCOPY N/A 06/24/2012    NON BLEEDING EROSIVE GASTROPATHY, 1 DUODENAL ULCER  WITH CLEAN BASE, DR. BRANDY WREN AT [facility]   • ENDOSCOPY AND COLONOSCOPY N/A 11/20/2007    EGD WNL, 8 ADENOMATOUS POLYPS IN RECTO-SIGMOID, RESCOPE IN 3 YRS, DR. CRISTINA RODRIGUEZ AT [facility]   • FOOT SURGERY Left    • HAND SURGERY Left    • HAND SURGERY Right    • KNEE ARTHROSCOPY Left    • LAPAROSCOPIC CHOLECYSTECTOMY     • NECK SURGERY     • VASECTOMY Bilateral      Family History   Adopted: Yes   Problem Relation Age of Onset   • Heart attack Mother    • Alcohol abuse Mother    • Heart disease Mother    • No Known Problems Father    • Heart disease Other         FH in females before the age of 65     Social History     Tobacco Use   • Smoking status: Former Smoker     Types: Cigarettes   • Smokeless tobacco: Never Used   • Tobacco comment: smoked 1 to 2 packs per day for 40 or more years   Substance Use Topics   • Alcohol use: Yes     Comment: 3 bourbons daily--Caffeine use-3 cups of coffee daily   • Drug use: No     Medications Prior to Admission   Medication Sig Dispense Refill Last Dose   • aspirin 81 MG chewable tablet Chew 81 mg Daily.   Taking   • digoxin (LANOXIN) 125 MCG tablet Take 125 mcg by mouth every morning.   Taking   • furosemide (LASIX) 40 MG tablet Take 1 tablet by mouth 2 (Two) Times a Day. 60 tablet 6 Taking   • glucosamine-chondroitin 500-400 MG capsule capsule Take  by mouth 2 (Two) Times a Day With Meals.   Taking   • glucose blood test strip USE 1 STRIP TO CHECK GLUCOSE ONCE DAILY 100 each 0    • isosorbide mononitrate (IMDUR) 30 MG 24 hr tablet Take 30 mg by mouth Daily.   Taking   • metFORMIN (GLUCOPHAGE) 1000 MG tablet Take 1 and 1/2 po qam and 1 po qhs 225 tablet 3    • metOLazone (ZAROXOLYN) 2.5 MG tablet Take 2.5 mg by mouth Daily.      • metoprolol succinate XL (TOPROL-XL) 100 MG 24 hr tablet Take 100 mg by mouth Every Morning.   Patient Taking Differently   • Multiple Vitamins-Minerals (MULTIVITAMIN PO) Take 1 tablet by mouth daily.   Taking   • potassium chloride ER (K-TAB) 20 MEQ tablet  controlled-release ER tablet Take 1 tablet by mouth 2 (Two) Times a Day. 60 tablet 6 Taking   • valsartan (DIOVAN) 40 MG tablet Take 1 tablet by mouth Daily. 30 tablet 6    • allopurinol (ZYLOPRIM) 300 MG tablet Take 1 tablet by mouth Daily. 30 tablet 6    • ezetimibe (ZETIA) 10 MG tablet Take 1 tablet by mouth every night at bedtime. 30 tablet 6 Taking   • indomethacin SR (INDOCIN SR) 75 MG CR capsule Take 1 capsule by mouth 2 (Two) Times a Day With Meals. 60 capsule 3      Allergies:  Ranexa [ranolazine er]    Objective   Vital Signs  Temp:  [97.8 °F (36.6 °C)] 97.8 °F (36.6 °C)  Heart Rate:  [33-42] 42  Resp:  [16] 16  BP: (111-160)/(78-99) 111/99    Physical Exam:  Constitutional: Not in acute distress.  Eyes: Injected conjunctivae, EOMI. Pupils equal and reactive to light.   ENMT: [place] and Mallampati 2 . No oral thrush. Tonsils grade 1.  Large tongue.  Neck: No thyromegaly.  Trachea midline.  Large neck  Heart: Bradycardia but regular rhythm.  No audible murmur.  Lungs: Diminished but equal air entry bilaterally.  Nonlabored breathing.  No wheezing.  Bilateral basal crackles.  Abdomen: Obese. Soft. No tenderness or dullness.  Positive bowel sounds  Extremities: No cyanosis, clubbing. Moves all extremities.  Warm extremities and well-perfused  Neuro: Conscious, alert, oriented x3.  Strength 5/5 overall.  No sensory deficit  Psych: Appropriate mood and affect.    Integumentary: No rash or ecchymosis  Lymphatic: No palpable cervical or supraclavicular lymph nodes.      Diagnostic imaging:  I personally and independently reviewed the following images:   CXR 4/4/2020: Clear lung parenchyma.        Assessment     1. Junctional bradycardia: Secondary to hyperkalemia in addition to beta-blocker and digoxin  2. Dizziness, secondary to above  3. Acute severe hyperkalemia  4. Metabolic acidosis, secondary to SHANNAN  5. Acute renal failure: Secondary to NSAID on top of his blood pressure medications and perhaps lack of  intake  6. Chronic diastolic CHF  7. Essential hypertension  8. Mild hyponatremia  9. Mild anemia  10. Daily drinker: 2 ounces of alcohol  11. COPD/chronic bronchitis, no exacerbation      Plan:  · Repeated potassium stat and it was 7.4.  Administer 1 more round of calcium gluconate, insulin 10 units, 1 amp of D50 and 1 amp of bicarb.  · Discussed with nephrology (Dr. Arroyo) and will proceed with stat dialysis.  Line inserted by vascular surgery.  · Keep external pacing pads in case patient needs pacing.  Atropine on board.  If no improvement in heart rate after dialysis and correction of potassium, will consider administration of glucagon to reverse the beta-blocker and may be Dig DOT and cardiology consult for temporary pacemaker  · Hold beta-blocker, dig, Diovan  · Gave 1 tablets of Xanax 0.5 due to symptoms of anxiety.  May require more benzo and may need to be placed on alcohol withdrawal protocol          Jayy Bruno MD  04/04/20  14:06    Time: Critical care 49 min      This note was dictated utilizing [employer]* dictation

## 2020-04-05 ENCOUNTER — APPOINTMENT (OUTPATIENT)
Dept: CARDIOLOGY | Facility: HOSPITAL | Age: 75
End: 2020-04-05

## 2020-04-05 LAB
ALBUMIN SERPL-MCNC: 3.8 G/DL (ref 3.5–5.2)
ANION GAP SERPL CALCULATED.3IONS-SCNC: 14.3 MMOL/L (ref 5–15)
BACTERIA UR QL AUTO: ABNORMAL /HPF
BASOPHILS # BLD AUTO: 0.04 10*3/MM3 (ref 0–0.2)
BASOPHILS NFR BLD AUTO: 0.4 % (ref 0–1.5)
BILIRUB UR QL STRIP: NEGATIVE
BUN BLD-MCNC: 42 MG/DL (ref 8–23)
BUN/CREAT SERPL: 11.7 (ref 7–25)
CALCIUM SPEC-SCNC: 8.8 MG/DL (ref 8.6–10.5)
CHLORIDE SERPL-SCNC: 93 MMOL/L (ref 98–107)
CLARITY UR: CLEAR
CO2 SERPL-SCNC: 25.7 MMOL/L (ref 22–29)
COLOR UR: YELLOW
CREAT BLD-MCNC: 3.58 MG/DL (ref 0.76–1.27)
DEPRECATED RDW RBC AUTO: 45.7 FL (ref 37–54)
EOSINOPHIL # BLD AUTO: 0.1 10*3/MM3 (ref 0–0.4)
EOSINOPHIL NFR BLD AUTO: 1.1 % (ref 0.3–6.2)
ERYTHROCYTE [DISTWIDTH] IN BLOOD BY AUTOMATED COUNT: 11.9 % (ref 12.3–15.4)
GFR SERPL CREATININE-BSD FRML MDRD: 17 ML/MIN/1.73
GLUCOSE BLD-MCNC: 116 MG/DL (ref 65–99)
GLUCOSE BLDC GLUCOMTR-MCNC: 120 MG/DL (ref 70–130)
GLUCOSE BLDC GLUCOMTR-MCNC: 128 MG/DL (ref 70–130)
GLUCOSE BLDC GLUCOMTR-MCNC: 138 MG/DL (ref 70–130)
GLUCOSE BLDC GLUCOMTR-MCNC: 140 MG/DL (ref 70–130)
GLUCOSE BLDC GLUCOMTR-MCNC: 265 MG/DL (ref 70–130)
GLUCOSE BLDC GLUCOMTR-MCNC: 98 MG/DL (ref 70–130)
GLUCOSE UR STRIP-MCNC: NEGATIVE MG/DL
HCT VFR BLD AUTO: 30.5 % (ref 37.5–51)
HGB BLD-MCNC: 10.4 G/DL (ref 13–17.7)
HGB UR QL STRIP.AUTO: ABNORMAL
HOLD SPECIMEN: NORMAL
HYALINE CASTS UR QL AUTO: ABNORMAL /LPF
IMM GRANULOCYTES # BLD AUTO: 0.04 10*3/MM3 (ref 0–0.05)
IMM GRANULOCYTES NFR BLD AUTO: 0.4 % (ref 0–0.5)
KETONES UR QL STRIP: ABNORMAL
LEUKOCYTE ESTERASE UR QL STRIP.AUTO: ABNORMAL
LYMPHOCYTES # BLD AUTO: 1.62 10*3/MM3 (ref 0.7–3.1)
LYMPHOCYTES NFR BLD AUTO: 17.6 % (ref 19.6–45.3)
MCH RBC QN AUTO: 35.5 PG (ref 26.6–33)
MCHC RBC AUTO-ENTMCNC: 34.1 G/DL (ref 31.5–35.7)
MCV RBC AUTO: 104.1 FL (ref 79–97)
MONOCYTES # BLD AUTO: 1.22 10*3/MM3 (ref 0.1–0.9)
MONOCYTES NFR BLD AUTO: 13.2 % (ref 5–12)
NEUTROPHILS # BLD AUTO: 6.19 10*3/MM3 (ref 1.7–7)
NEUTROPHILS NFR BLD AUTO: 67.3 % (ref 42.7–76)
NITRITE UR QL STRIP: NEGATIVE
NRBC BLD AUTO-RTO: 0 /100 WBC (ref 0–0.2)
PH UR STRIP.AUTO: <=5 [PH] (ref 5–8)
PHOSPHATE SERPL-MCNC: 3.5 MG/DL (ref 2.5–4.5)
PLATELET # BLD AUTO: 159 10*3/MM3 (ref 140–450)
PMV BLD AUTO: 9.4 FL (ref 6–12)
POTASSIUM BLD-SCNC: 5.3 MMOL/L (ref 3.5–5.2)
PROT UR QL STRIP: ABNORMAL
RBC # BLD AUTO: 2.93 10*6/MM3 (ref 4.14–5.8)
RBC # UR: ABNORMAL /HPF
REF LAB TEST METHOD: ABNORMAL
SODIUM BLD-SCNC: 133 MMOL/L (ref 136–145)
SP GR UR STRIP: 1.02 (ref 1–1.03)
SQUAMOUS #/AREA URNS HPF: ABNORMAL /HPF
URATE SERPL-MCNC: 6.8 MG/DL (ref 3.4–7)
UROBILINOGEN UR QL STRIP: ABNORMAL
WBC NRBC COR # BLD: 9.21 10*3/MM3 (ref 3.4–10.8)
WBC UR QL AUTO: ABNORMAL /HPF

## 2020-04-05 PROCEDURE — 93010 ELECTROCARDIOGRAM REPORT: CPT | Performed by: INTERNAL MEDICINE

## 2020-04-05 PROCEDURE — P9047 ALBUMIN (HUMAN), 25%, 50ML: HCPCS | Performed by: INTERNAL MEDICINE

## 2020-04-05 PROCEDURE — 80069 RENAL FUNCTION PANEL: CPT | Performed by: INTERNAL MEDICINE

## 2020-04-05 PROCEDURE — 93005 ELECTROCARDIOGRAM TRACING: CPT | Performed by: INTERNAL MEDICINE

## 2020-04-05 PROCEDURE — 25010000002 HEPARIN (PORCINE) PER 1000 UNITS: Performed by: SURGERY

## 2020-04-05 PROCEDURE — 5A1D70Z PERFORMANCE OF URINARY FILTRATION, INTERMITTENT, LESS THAN 6 HOURS PER DAY: ICD-10-PCS | Performed by: INTERNAL MEDICINE

## 2020-04-05 PROCEDURE — 25010000002 ALBUMIN HUMAN 25% PER 50 ML: Performed by: INTERNAL MEDICINE

## 2020-04-05 PROCEDURE — 25010000002 DIGOXIN IMMUNE FAB 40 MG RECONSTITUTED SOLUTION 1 EACH VIAL: Performed by: INTERNAL MEDICINE

## 2020-04-05 PROCEDURE — 82962 GLUCOSE BLOOD TEST: CPT

## 2020-04-05 PROCEDURE — 25010000002 HEPARIN (PORCINE) PER 1000 UNITS: Performed by: INTERNAL MEDICINE

## 2020-04-05 PROCEDURE — 84550 ASSAY OF BLOOD/URIC ACID: CPT | Performed by: INTERNAL MEDICINE

## 2020-04-05 PROCEDURE — 85025 COMPLETE CBC W/AUTO DIFF WBC: CPT | Performed by: INTERNAL MEDICINE

## 2020-04-05 PROCEDURE — 63710000001 INSULIN REGULAR HUMAN PER 5 UNITS: Performed by: INTERNAL MEDICINE

## 2020-04-05 PROCEDURE — 81001 URINALYSIS AUTO W/SCOPE: CPT | Performed by: INTERNAL MEDICINE

## 2020-04-05 RX ORDER — DEXTROSE MONOHYDRATE 25 G/50ML
50 INJECTION, SOLUTION INTRAVENOUS ONCE
Status: DISCONTINUED | OUTPATIENT
Start: 2020-04-05 | End: 2020-04-10 | Stop reason: HOSPADM

## 2020-04-05 RX ORDER — ALBUMIN (HUMAN) 12.5 G/50ML
25 SOLUTION INTRAVENOUS ONCE
Status: COMPLETED | OUTPATIENT
Start: 2020-04-05 | End: 2020-04-05

## 2020-04-05 RX ORDER — ALBUMIN (HUMAN) 12.5 G/50ML
25 SOLUTION INTRAVENOUS
Status: COMPLETED | OUTPATIENT
Start: 2020-04-05 | End: 2020-04-05

## 2020-04-05 RX ORDER — NOREPINEPHRINE BIT/0.9 % NACL 8 MG/250ML
.02-.3 INFUSION BOTTLE (ML) INTRAVENOUS
Status: DISCONTINUED | OUTPATIENT
Start: 2020-04-05 | End: 2020-04-06

## 2020-04-05 RX ADMIN — HEPARIN SODIUM 5000 UNITS: 5000 INJECTION INTRAVENOUS; SUBCUTANEOUS at 17:26

## 2020-04-05 RX ADMIN — HEPARIN SODIUM 4000 UNITS: 1000 INJECTION INTRAVENOUS; SUBCUTANEOUS at 14:36

## 2020-04-05 RX ADMIN — ALBUMIN HUMAN 25 G: 0.25 SOLUTION INTRAVENOUS at 11:59

## 2020-04-05 RX ADMIN — CHLORDIAZEPOXIDE HYDROCHLORIDE 25 MG: 25 CAPSULE ORAL at 17:26

## 2020-04-05 RX ADMIN — CHLORDIAZEPOXIDE HYDROCHLORIDE 25 MG: 25 CAPSULE ORAL at 12:27

## 2020-04-05 RX ADMIN — CHLORDIAZEPOXIDE HYDROCHLORIDE 25 MG: 25 CAPSULE ORAL at 05:50

## 2020-04-05 RX ADMIN — ALBUMIN HUMAN 25 G: 0.25 SOLUTION INTRAVENOUS at 13:42

## 2020-04-05 RX ADMIN — INSULIN HUMAN 10 UNITS: 100 INJECTION, SOLUTION PARENTERAL at 10:21

## 2020-04-05 RX ADMIN — CHLORDIAZEPOXIDE HYDROCHLORIDE 25 MG: 25 CAPSULE ORAL at 23:29

## 2020-04-05 RX ADMIN — OVINE DIGOXIN IMMUNE FAB 200 MG: 40 INJECTION, POWDER, FOR SOLUTION INTRAVENOUS at 12:27

## 2020-04-05 RX ADMIN — HEPARIN SODIUM 5000 UNITS: 5000 INJECTION INTRAVENOUS; SUBCUTANEOUS at 02:02

## 2020-04-05 RX ADMIN — DEXTROSE MONOHYDRATE 50 ML: 25 INJECTION, SOLUTION INTRAVENOUS at 10:20

## 2020-04-05 NOTE — NURSING NOTE
Awaken for assessment pt attempts to use the urinal no success very tender to  Palpation bladder scan shows 171 cc of urine. Pt very uncomfortable straight cath obtained 200 cc or urine UA sent previous order. Pt still reports urge to void,

## 2020-04-05 NOTE — NURSING NOTE
Labs now noted pt had low rate of 27 with  Transient  Drop in blood pressure 44/24  Pacer pads placed atropine at bedside

## 2020-04-05 NOTE — NURSING NOTE
"Call to lab concerning no renal function results report \"had to transfer blood to another machine because a machine went down. Look for results in 10-15 minutes.   "

## 2020-04-05 NOTE — NURSING NOTE
Dialysis completed as ordered.  2 liters removed per md order.  Max bfr 400 achieve.  No complications.  131/55.  Hr 44.

## 2020-04-05 NOTE — PROGRESS NOTES
"                                              LOS: 1 day   Patient Care Team:  Luda Kidd MD as PCP - General (Family Medicine)  Luda Kidd MD as PCP - Family Medicine  Saul Fang MD as Consulting Physician (Cardiology)  Bill Washington MD as Surgeon (Neurosurgery)  Self, Zafar Fraire MD as Consulting Physician (Vascular Surgery)    Chief Complaint:  F/up acute hyperkalemia, SHANNAN, arrhythmia, bradycardia, new hypertension    Subjective   Interval History  Had episodes of hypotension overnight.  Heart rate improved.  It is now around 40-50 instead of 30.  However, he goes into episodes of arrhythmia events with very short tachycardia then followed by bradycardia..  he is not dizzy in bed but he has not got out of the bed.  He denies shortness of breath but he is needing couple of liters oxygen.  He became very anxious last night.    REVIEW OF SYSTEMS:   CARDIOVASCULAR: No chest pain, chest pressure or chest discomfort. No palpitations or edema.   RESPIRATORY: No shortness of breath, cough or sputum.   GASTROINTESTINAL: No anorexia, nausea, vomiting or diarrhea. No abdominal pain or blood.   HEMATOLOGIC: No bleeding or bruising.     Ventilator/Non-Invasive Ventilation Settings (From admission, onward)    None                Physical Exam:     Vital Signs  Temp:  [97.4 °F (36.3 °C)-98.2 °F (36.8 °C)] 97.5 °F (36.4 °C)  Heart Rate:  [33-95] 54  Resp:  [16] 16  BP: ()/(24-99) 112/63    Intake/Output Summary (Last 24 hours) at 4/5/2020 0833  Last data filed at 4/5/2020 0000  Gross per 24 hour   Intake 870 ml   Output 202 ml   Net 668 ml     Flowsheet Rows      First Filed Value   Admission Height  188 cm (74\") Documented at 04/04/2020 1026   Admission Weight  109 kg (240 lb) Documented at 04/04/2020 1026          General Appearance:   Alert, cooperative, in no acute distress   ENMT:  Mallampati score 4, moist mucous membrane   Eyes:  Pupils equals and reactive to light. EOMI   Neck:   " Trachea midline. No thyromegaly.   Lungs:    Bibasilar crackles.  No wheezing.  Nonlabored breathing    Heart:   Regular rhythm and normal rate, normal S1 and S2, no         murmur   Skin:   No rash   Abdomen:    Obese. Soft. No tenderness. No HSM.  Positive bowel sounds   Neuro:  Conscious, alert, oriented x3. Strength 5/5 in upper an lower  ext   Extremities:  Moves all extremities well, no edema, no cyanosis, no       redness          Results Review:        Results from last 7 days   Lab Units 04/05/20  0159 04/04/20  1359 04/04/20  1124   SODIUM mmol/L 133* 135* 132*   POTASSIUM mmol/L 5.3* 7.8* 8.3*   CHLORIDE mmol/L 93* 99 96*   CO2 mmol/L 25.7 19.9* 20.7*   BUN mg/dL 42* 79* 80*   CREATININE mg/dL 3.58* 4.95* 5.14*   GLUCOSE mg/dL 116* 123* 146*   CALCIUM mg/dL 8.8 9.7 9.6     Results from last 7 days   Lab Units 04/04/20  1124   TROPONIN T ng/mL 0.059*     Results from last 7 days   Lab Units 04/05/20  0159 04/04/20  1027   WBC 10*3/mm3 9.21 9.82   HEMOGLOBIN g/dL 10.4* 12.1*   HEMATOCRIT % 30.5* 37.0*   PLATELETS 10*3/mm3 159 227     Results from last 7 days   Lab Units 04/04/20  1027   INR  1.23*   APTT seconds 30.7     Results from last 7 days   Lab Units 04/04/20  1027   PROBNP pg/mL 4,422.0*       I reviewed the patient's new clinical results.  I personally viewed and interpreted the patient's CXR        Medication Review:     chlordiazePOXIDE 25 mg Oral Q6H   heparin (porcine) 5,000 Units Subcutaneous Q8H           Assessment   1. Junctional bradycardia: Secondary to hyperkalemia in addition to beta-blocker and digoxin  2. A flutter, intermittent  3. Hypotension, intermittent  4. Dizziness, secondary to above  5. Acute severe hyperkalemia  6. Metabolic acidosis, secondary to SHANNAN  7. Acute renal failure: Secondary to NSAID on top of his blood pressure medications and perhaps lack of intake  8. Chronic diastolic CHF  9. Essential hypertension  10. Mild hyponatremia  11. Mild anemia, worse  12. Alcohol  withdrawal/mild  13. Daily drinker: 2 ounces of alcohol  14. COPD/chronic bronchitis, no exacerbation        Plan     · Librium 25 mg every 6 h.  Watch for alcohol withdrawal  · HD again today.  · Regular insulin 10 units and 1 amp D50 due to persistent hyperkalemia with arrhythmia.  · EKG obtained today and reviewed.  Consistent with junctional bradycardia and episodes of a flutter with 5:1 conduction on the EKG performed earlier this morning  · Repeat potassium  · PRN bronchodilators  · Levophed as needed for hypotension  · Can consider glucagon for beta-blocker reversal but will hold off for now since it may worsen hyperkalemia  · Consult cardiology      Jayy Bruno MD  04/05/20  08:33      Time: Critical care 35 min      This note was dictated utilizing Dragon dictation

## 2020-04-05 NOTE — CONSULTS
Juan James   74 y.o.  male    LOS: 1 day   Patient Care Team:  Luda Kidd MD as PCP - General (Family Medicine)  Luda Kidd MD as PCP - Family Medicine  Saul Fang MD as Consulting Physician (Cardiology)  Bill Washington MD as Surgeon (Neurosurgery)  Self, Zafar Fraire MD as Consulting Physician (Vascular Surgery)      Subjective     Patient Complaints: Dizziness and dyspnea.    History of Present Illness:   Patient has a history of coronary artery disease, status post stenting times 3/2009, atrial fibrillation, on appropriate meds.  Somewhat disoriented today during the interview, likely multifactorial.  History of hypertension, dyslipidemia, chronic diastolic congestive heart failure, abdominal aortic aneurysm, COPD.    Wife  recently.  Gout afterwards, indomethacin taken, now in acute renal failure.  While I saw him this morning, was about to be started on a second dialysis in 2 days.    We are consulted because of significant bradycardia, down into the 26 range overnight, into the 30 range yesterday, now 30-40.  Patient does recall dizziness, and nurse states that there has been on and off shortness of breath.      Patient denies any chest pain, syncope or presyncope, edema.  History of significant peripheral vascular disease, with bilateral digital ischemia, femoropopliteal disease on the right, aorta iliac and femoropopliteal disease on the left.    Troponin increased yesterday 0.059, likely related to the renal failure.    ECG showed  ms, atrial flutter, with slow ventricular response, in the 30s.  ST depression consistent with LVH and strain.     Review of Systems:   All systems were reviewed and negative except for:  Respiratory: positive for  shortness of air    Medication Review: done      Family History   Adopted: Yes   Problem Relation Age of Onset   • Heart attack Mother    • Alcohol abuse Mother    • Heart disease Mother    • No Known Problems Father    •  Heart disease Other         FH in females before the age of 65     Social History     Socioeconomic History   • Marital status:      Spouse name: Not on file   • Number of children: Not on file   • Years of education: Not on file   • Highest education level: Not on file   Tobacco Use   • Smoking status: Former Smoker     Types: Cigarettes   • Smokeless tobacco: Never Used   • Tobacco comment: smoked 1 to 2 packs per day for 40 or more years   Substance and Sexual Activity   • Alcohol use: Yes     Comment: 3 bourbons daily--Caffeine use-3 cups of coffee daily   • Drug use: No   • Sexual activity: Defer     Objective   Past Surgical History:   Procedure Laterality Date   • ANTERIOR CERVICAL DISCECTOMY W/ FUSION N/A 3/25/2016    Procedure: C3- C5 CERVICAL DISCECTOMY ANTERIOR FUSION WITH INSTRUMENTATION;  Surgeon: Bill Washington MD;  Location: [facility];  Service:    • ARTHRODESIS N/A 07/1983    LUMBAR   • ARTHRODESIS N/A 1987    LUMBAR   • ARTHRODESIS      Spinal Arthrodesis-lower spine-7/1983, 1987--upper spine-1988, 7/1990-Abstracted from [facility]   • BACK SURGERY      Lower Back Surgery   • CARDIAC CATHETERIZATION  07/2009   • CARDIAC CATHETERIZATION  03/18/2003    STENT TO RCA AND PCA, DR. PRIYA LUGO   • CATARACT EXTRACTION Bilateral    • CERVICAL ARTHRODESIS N/A 07/1990   • CERVICAL ARTHRODESIS N/A 1988   • COLONOSCOPY N/A 10/3/2019    4 MM HYPERPLASTIC POLYP IN ILEOCECAL VALVE, 4 MM SESSILE SERRATED ADENOMA POLYP IN DESCENDING, 5 TUBULOVILLOUS ADENOMA POLYPS IN RECTUM, 3 TUBULAR ADENOMA POLYPS IN DISTAL RECTUM, 26 MM TUBULAR ADENOMA POLYP IN RECTUM, PIECEMEAL POLYPECTOMY, AREA TATTOOED, RESCOPE IN 6 MONTHS, DR. TAYA CRUZ AT [facility]   • CORONARY ANGIOPLASTY WITH STENT PLACEMENT  02/2009    and 7/2009   • ENDOSCOPY N/A 06/24/2012    NON BLEEDING EROSIVE GASTROPATHY, 1 DUODENAL ULCER WITH CLEAN BASE, DR. BRANDY WREN AT [facility]   • ENDOSCOPY AND COLONOSCOPY N/A 11/20/2007    EGD WNL, 8 ADENOMATOUS  POLYPS IN RECTO-SIGMOID, RESCOPE IN 3 YRS, DR. CRISTINA RODRIGUEZ AT [facility]   • FOOT SURGERY Left    • HAND SURGERY Left    • HAND SURGERY Right    • KNEE ARTHROSCOPY Left    • LAPAROSCOPIC CHOLECYSTECTOMY     • NECK SURGERY     • VASECTOMY Bilateral      Past Medical History:   Diagnosis Date   • AAA (abdominal aortic aneurysm) (CMS/HCC)    • Ankle edema    • Aortic aneurysm (CMS/HCC)    • Arthritis    • Atherosclerotic heart disease of native coronary artery without angina pectoris    • Atrial fibrillation (CMS/HCC)    • Benign essential hypertension    • BPH (benign prostatic hyperplasia)    • Cataract    • Cervical disc disorder    • Cervical radiculitis 09/2016   • Cervical spondylosis 11/2012   • CHF (congestive heart failure) (CMS/HCC)    • Colon polyps     FOLLOWED BY DR. TAYA CRUZ   • COPD (chronic obstructive pulmonary disease) (CMS/HCC)    • Coronary artery disease     STENT X 3   • DDD (degenerative disc disease), cervical    • DDD (degenerative disc disease), lumbar    • Decreased pedal pulses    • Dyspnea    • Edema    • Edema of extremities    • Elevated transaminase level    • Emphysema/COPD (CMS/HCC)    • Enlarged prostate without lower urinary tract symptoms (luts)    • Essential hypertriglyceridemia    • GERD (gastroesophageal reflux disease)    • GERD without esophagitis    • Gout    • Hearing loss    • Hepatic fibrosis    • High blood pressure    • High risk medication use    • History of blood transfusion    • History of cardiac disorder    • History of cataract    • History of COPD    • History of esophagitis    • History of gastrointestinal hemorrhage    • History of hypertension    • History of nicotine dependence    • History of peptic ulcer    • History of sleep apnea    • Hoarseness, persistent    • Hyperglycemia    • Hyperlipidemia    • Hypertension    • Iliac artery aneurysm (CMS/HCC)    • Insomnia    • Lower back pain    • Lumbar radiculopathy 05/2016   • Mastodynia of left breast    •  Myasthenia gravis (CMS/HCC)    • Neck pain    • Nonalcoholic hepatosteatosis    • OA (osteoarthritis)    • 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HCC)    • PAF (paroxysmal atrial fibrillation) (CMS/HCC)    • Peptic ulceration     PUD   • Prediabetes    • Prediabetes    • Prostate disease    • Pulmonary embolism (CMS/HCC)     PT DENIES HAVING THIS   • Rectal pain 07/2019   • Shortness of breath    • Status post angioplasty    • Tinnitus        Vital Sign Min/Max for last 24 hours  Temp  Min: 97.4 °F (36.3 °C)  Max: 98.2 °F (36.8 °C)   BP  Min: 44/24  Max: 160/89    Pulse  Min: 33  Max: 95     Wt Readings from Last 3 Encounters:   04/05/20 107 kg (234 lb 12.6 oz)   12/26/19 111 kg (245 lb 3.2 oz)   11/26/19 113 kg (248 lb 8 oz)        Physical Exam:      General Appearance:    Alert, cooperative, in no acute distress   Head:    Normocephalic, without obvious abnormality, atraumatic   Eyes:            Conjunctivae normal, no   icterus   Neck:   No adenopathy, supple, trachea midline, no thyromegaly, no   carotid bruit, no JVD   Lungs:     Clear to auscultation,respirations regular, even and                  unlabored    Heart:    irregular rhythm and bradycardic rate, normal S1 and S2,            2/6 murmur, no gallop, no rub, no click   Chest Wall:    No abnormalities observed   Abdomen:     Normal bowel sounds, no masses, no organomegaly, soft        non-tender, non-distended, no guarding, no rebound                tenderness   Rectal:     Deferred   Extremities:   No edema. Moves all extremities well, no cyanosis, no erythema   Pulses:   Pulses palpable and equal bilaterally   Skin:   No bleeding, bruising or rash   Neurologic:   Cranial nerves 2 - 12 grossly intact, sensation intact, DTR       present and equal bilaterally        Results Review:     I reviewed the patient's new clinical results.  Potassium   Date Value Ref Range Status   04/05/2020 5.3 (H) 3.5 - 5.2 mmol/L Final     Creatinine   Date Value Ref Range Status    04/05/2020 3.58 (H) 0.76 - 1.27 mg/dL Final     Troponin T   Date Value Ref Range Status   04/04/2020 0.059 (C) 0.000 - 0.030 ng/mL Final        Echo EF Estimated  No results found for: ECHOEFEST      Assessment/Plan     1. Atrial flutter, with slow ventricular response, likely related to digoxin.  2.  Acute renal failure with hyperkalemia -dialysis #2 today  3. Coronary artery disease status post stenting x3  4. History of alcohol use  5. COPD  6. Chronic diastolic CHF  7. History of abdominal aortic aneurysm, hypertension, dyslipidemia  8.  Peripheral vascular disease, with bilateral significant distal digital ischemia, left aortic iliac and femoropopliteal disease, right femoropopliteal disease both moderate.      Acute renal failure likely related to nonsteroidal anti-inflammatory use related to the gout, as well as dehydration, after significant social situation with wife dying recently.  Undergoing second dialysis today.  Reviewed medications from office: Aspirin 325, digoxin 125, Lasix 40, Imdur 30, metoprolol 100, potassium 40, Symbicort, Ventolin.    Likely digoxin toxic, despite level of 1.0.  Discussed with Dr. Arroyo.  Okay to give Digibind.  Dialysis will not take out digoxin, and apparently will tolerate the bound digoxin.  Digoxin level will still measure bound digoxin, even after Digibind, therefore not rechecking tomorrow.      Atropine at bedside, discussed with nurse.  Patient does not need temporary pacemaker at this time.    Patient states prior sleep apnea, but lost 20 pounds and states it is now not a problem.  However heart rates decreased into the 20s last evening, which is frequent with sleep disordered breathing.    ECG consistent with atrial flutter, slow ventricular response, and ST changes of LVH.  Will check echo.    Thank you for the courtesy of the consult and for allowing us to participate in this patient's care.     Oli Miles MD  04/05/20  10:12    Time: 55  min    EMR Dragon/Transcription disclaimer:   Much of this encounter note is an electronic transcription/translation of spoken language to printed text. The electronic translation of spoken language may permit erroneous, or at times, nonsensical words or phrases to be inadvertently transcribed.  Although I have reviewed the note for such errors, some may still exist. Please contact me if needed for clarification or correction.

## 2020-04-05 NOTE — PROGRESS NOTES
LOS: 1 day   Patient Care Team:  Luda Kidd MD as PCP - General (Family Medicine)  Luda Kidd MD as PCP - Family Medicine  Salu Fang MD as Consulting Physician (Cardiology)  Bill Washington MD as Surgeon (Neurosurgery)  Self, Zafar Fraire MD as Consulting Physician (Vascular Surgery)    Chief Complaint: Acute renal failure     Subjective     74 y.o. male in acute renal failure from undefined source.  His cardiac status is still very concerning.  His femoral Shiley catheter worked well.  Will await decisions on whether a tunneled catheter will be needed.  He is getting dialyzed again today.  He does have significant peripheral vascular disease and aneurysmal disease.  He is unclear on who is really following this and I discussed it with him and he wishes to follow-up in our office.    Review of Systems  Review of Systems - Negative except shortness of air now.  He does have a history of  half block claudication      Objective     Vital Signs  Temp:  [97.4 °F (36.3 °C)-98.2 °F (36.8 °C)] 97.5 °F (36.4 °C)  Heart Rate:  [33-95] 37  BP: ()/(24-99) 93/49    Physical Exam  General: No acute distress. Alert and oriented x 4  HEENT: No jugular venous distension, trachea is midline  CV: RRR, S1S2  Resp: Clear unlabored breathing on both sides  Abd: Abdomen is soft, nontender, nondistended  Extremities: Viable with mild edema.  Nonpalpable pedal pulses noted.    Results Review:       Recent Results (from the past 24 hour(s))   Protime-INR    Collection Time: 04/04/20 10:27 AM   Result Value Ref Range    Protime 15.2 (H) 11.7 - 14.2 Seconds    INR 1.23 (H) 0.90 - 1.10   aPTT    Collection Time: 04/04/20 10:27 AM   Result Value Ref Range    PTT 30.7 22.7 - 35.4 seconds   BNP    Collection Time: 04/04/20 10:27 AM   Result Value Ref Range    proBNP 4,422.0 (H) 5.0 - 900.0 pg/mL   T4, Free    Collection Time: 04/04/20 10:27 AM   Result Value Ref Range    Free T4 1.31 0.93 - 1.70 ng/dL   TSH     Collection Time: 04/04/20 10:27 AM   Result Value Ref Range    TSH 3.610 0.270 - 4.200 uIU/mL   Magnesium    Collection Time: 04/04/20 10:27 AM   Result Value Ref Range    Magnesium 3.7 (H) 1.6 - 2.4 mg/dL   CBC Auto Differential    Collection Time: 04/04/20 10:27 AM   Result Value Ref Range    WBC 9.82 3.40 - 10.80 10*3/mm3    RBC 3.53 (L) 4.14 - 5.80 10*6/mm3    Hemoglobin 12.1 (L) 13.0 - 17.7 g/dL    Hematocrit 37.0 (L) 37.5 - 51.0 %    .8 (H) 79.0 - 97.0 fL    MCH 34.3 (H) 26.6 - 33.0 pg    MCHC 32.7 31.5 - 35.7 g/dL    RDW 11.7 (L) 12.3 - 15.4 %    RDW-SD 45.7 37.0 - 54.0 fl    MPV 9.2 6.0 - 12.0 fL    Platelets 227 140 - 450 10*3/mm3    Neutrophil % 78.3 (H) 42.7 - 76.0 %    Lymphocyte % 11.8 (L) 19.6 - 45.3 %    Monocyte % 8.8 5.0 - 12.0 %    Eosinophil % 0.1 (L) 0.3 - 6.2 %    Basophil % 0.4 0.0 - 1.5 %    Immature Grans % 0.6 (H) 0.0 - 0.5 %    Neutrophils, Absolute 7.69 (H) 1.70 - 7.00 10*3/mm3    Lymphocytes, Absolute 1.16 0.70 - 3.10 10*3/mm3    Monocytes, Absolute 0.86 0.10 - 0.90 10*3/mm3    Eosinophils, Absolute 0.01 0.00 - 0.40 10*3/mm3    Basophils, Absolute 0.04 0.00 - 0.20 10*3/mm3    Immature Grans, Absolute 0.06 (H) 0.00 - 0.05 10*3/mm3    nRBC 0.0 0.0 - 0.2 /100 WBC   Digoxin Level    Collection Time: 04/04/20 10:27 AM   Result Value Ref Range    Digoxin 1.00 0.60 - 1.20 ng/mL   Comprehensive Metabolic Panel    Collection Time: 04/04/20 11:24 AM   Result Value Ref Range    Glucose 146 (H) 65 - 99 mg/dL    BUN 80 (H) 8 - 23 mg/dL    Creatinine 5.14 (H) 0.76 - 1.27 mg/dL    Sodium 132 (L) 136 - 145 mmol/L    Potassium 8.3 (C) 3.5 - 5.2 mmol/L    Chloride 96 (L) 98 - 107 mmol/L    CO2 20.7 (L) 22.0 - 29.0 mmol/L    Calcium 9.6 8.6 - 10.5 mg/dL    Total Protein 8.4 6.0 - 8.5 g/dL    Albumin 4.60 3.50 - 5.20 g/dL    ALT (SGPT) 39 1 - 41 U/L    AST (SGOT) 24 1 - 40 U/L    Alkaline Phosphatase 64 39 - 117 U/L    Total Bilirubin 0.5 0.2 - 1.2 mg/dL    eGFR Non  Amer 11 (L) >60  mL/min/1.73    eGFR  African Amer      Globulin 3.8 gm/dL    A/G Ratio 1.2 g/dL    BUN/Creatinine Ratio 15.6 7.0 - 25.0    Anion Gap 15.3 (H) 5.0 - 15.0 mmol/L   Troponin    Collection Time: 04/04/20 11:24 AM   Result Value Ref Range    Troponin T 0.059 (C) 0.000 - 0.030 ng/mL   POC Glucose Once    Collection Time: 04/04/20  1:29 PM   Result Value Ref Range    Glucose 181 (H) 70 - 130 mg/dL   Basic Metabolic Panel    Collection Time: 04/04/20  1:59 PM   Result Value Ref Range    Glucose 123 (H) 65 - 99 mg/dL    BUN 79 (H) 8 - 23 mg/dL    Creatinine 4.95 (H) 0.76 - 1.27 mg/dL    Sodium 135 (L) 136 - 145 mmol/L    Potassium 7.8 (C) 3.5 - 5.2 mmol/L    Chloride 99 98 - 107 mmol/L    CO2 19.9 (L) 22.0 - 29.0 mmol/L    Calcium 9.7 8.6 - 10.5 mg/dL    eGFR  African Amer      eGFR Non African Amer 12 (L) >60 mL/min/1.73    BUN/Creatinine Ratio 16.0 7.0 - 25.0    Anion Gap 16.1 (H) 5.0 - 15.0 mmol/L   Blood Gas, Venous    Collection Time: 04/04/20  3:55 PM   Result Value Ref Range    Site N/A     pH, Venous 7.344 7.310 - 7.410 pH Units    pCO2, Venous 43.7 41.0 - 51.0 mm Hg    pO2, Venous 29.5 (L) 35.0 - 45.0 mm Hg    HCO3, Venous 23.8 22.0 - 28.0 mmol/L    Base Excess, Venous -2.2 mmol/L    O2 Saturation Calculated 52.5 (L) 92.0 - 99.0 %    Barometric Pressure for Blood Gas 747.5 mmHg    Modality Room Air     FIO2 21 %    Rate 16 Breaths/minute   POC Glucose Once    Collection Time: 04/04/20  4:55 PM   Result Value Ref Range    Glucose 214 (H) 70 - 130 mg/dL   Hepatitis B Surface Antigen    Collection Time: 04/04/20  7:42 PM   Result Value Ref Range    Hepatitis B Surface Ag Non-Reactive Non-Reactive   Hep B Confirmation Tube    Collection Time: 04/04/20  7:42 PM   Result Value Ref Range    Extra Tube Hold for add-ons.    POC Glucose Once    Collection Time: 04/04/20  8:02 PM   Result Value Ref Range    Glucose 110 70 - 130 mg/dL   POC Glucose Once    Collection Time: 04/04/20 11:26 PM   Result Value Ref Range    Glucose  106 70 - 130 mg/dL   Urinalysis With Microscopic If Indicated (No Culture) - Urine, Clean Catch    Collection Time: 04/05/20 12:59 AM   Result Value Ref Range    Color, UA Yellow Yellow, Straw    Appearance, UA Clear Clear    pH, UA <=5.0 5.0 - 8.0    Specific Gravity, UA 1.022 1.005 - 1.030    Glucose, UA Negative Negative    Ketones, UA Trace (A) Negative    Bilirubin, UA Negative Negative    Blood, UA Small (1+) (A) Negative    Protein,  mg/dL (2+) (A) Negative    Leuk Esterase, UA Small (1+) (A) Negative    Nitrite, UA Negative Negative    Urobilinogen, UA 0.2 E.U./dL 0.2 - 1.0 E.U./dL   Urinalysis, Microscopic Only - Urine, Clean Catch    Collection Time: 04/05/20 12:59 AM   Result Value Ref Range    RBC, UA 6-12 (A) None Seen, 0-2 /HPF    WBC, UA 3-5 (A) None Seen, 0-2 /HPF    Bacteria, UA None Seen None Seen /HPF    Squamous Epithelial Cells, UA 3-6 (A) None Seen, 0-2 /HPF    Hyaline Casts, UA 3-6 None Seen /LPF    Methodology Manual Light Microscopy    Uric Acid    Collection Time: 04/05/20  1:59 AM   Result Value Ref Range    Uric Acid 6.8 3.4 - 7.0 mg/dL   Renal Function Panel    Collection Time: 04/05/20  1:59 AM   Result Value Ref Range    Glucose 116 (H) 65 - 99 mg/dL    BUN 42 (H) 8 - 23 mg/dL    Creatinine 3.58 (H) 0.76 - 1.27 mg/dL    Sodium 133 (L) 136 - 145 mmol/L    Potassium 5.3 (H) 3.5 - 5.2 mmol/L    Chloride 93 (L) 98 - 107 mmol/L    CO2 25.7 22.0 - 29.0 mmol/L    Calcium 8.8 8.6 - 10.5 mg/dL    Albumin 3.80 3.50 - 5.20 g/dL    Phosphorus 3.5 2.5 - 4.5 mg/dL    Anion Gap 14.3 5.0 - 15.0 mmol/L    BUN/Creatinine Ratio 11.7 7.0 - 25.0    eGFR Non African Amer 17 (L) >60 mL/min/1.73   CBC Auto Differential    Collection Time: 04/05/20  1:59 AM   Result Value Ref Range    WBC 9.21 3.40 - 10.80 10*3/mm3    RBC 2.93 (L) 4.14 - 5.80 10*6/mm3    Hemoglobin 10.4 (L) 13.0 - 17.7 g/dL    Hematocrit 30.5 (L) 37.5 - 51.0 %    .1 (H) 79.0 - 97.0 fL    MCH 35.5 (H) 26.6 - 33.0 pg    MCHC 34.1  31.5 - 35.7 g/dL    RDW 11.9 (L) 12.3 - 15.4 %    RDW-SD 45.7 37.0 - 54.0 fl    MPV 9.4 6.0 - 12.0 fL    Platelets 159 140 - 450 10*3/mm3    Neutrophil % 67.3 42.7 - 76.0 %    Lymphocyte % 17.6 (L) 19.6 - 45.3 %    Monocyte % 13.2 (H) 5.0 - 12.0 %    Eosinophil % 1.1 0.3 - 6.2 %    Basophil % 0.4 0.0 - 1.5 %    Immature Grans % 0.4 0.0 - 0.5 %    Neutrophils, Absolute 6.19 1.70 - 7.00 10*3/mm3    Lymphocytes, Absolute 1.62 0.70 - 3.10 10*3/mm3    Monocytes, Absolute 1.22 (H) 0.10 - 0.90 10*3/mm3    Eosinophils, Absolute 0.10 0.00 - 0.40 10*3/mm3    Basophils, Absolute 0.04 0.00 - 0.20 10*3/mm3    Immature Grans, Absolute 0.04 0.00 - 0.05 10*3/mm3    nRBC 0.0 0.0 - 0.2 /100 WBC   POC Glucose Once    Collection Time: 04/05/20  3:43 AM   Result Value Ref Range    Glucose 138 (H) 70 - 130 mg/dL   POC Glucose Once    Collection Time: 04/05/20  7:51 AM   Result Value Ref Range    Glucose 128 70 - 130 mg/dL   ]      Assessment/Plan             AAA (abdominal aortic aneurysm) (CMS/[facility])    Heart block AV complete (CMS/[facility])    PVD (peripheral vascular disease) with claudication (CMS/[facility])    Acute renal failure (ARF) (CMS/[facility])      Assessment & Plan  74 y.o. male with peripheral vascular disease, a known abdominal aortic aneurysm measuring 3.8 cm last check, and now acute renal failure due to hopefully reversible source.  Tolerating dialysis through femoral Shiley.  Awaiting decision from renal to see if tunneled dialysis catheter will be needed.  We will await renal decision.  Please call if tunneled dialysis catheter will be needed.  We will also follow-up his aneurysm and peripheral vascular disease as an outpatient.    We will follow peripherally at this time.  Please call if tunneled dialysis catheter will be needed.  2-month follow-up scans will be arranged prior to discharge for his PVD and aneurysm.    John Domingo MD  04/05/20  10:23

## 2020-04-05 NOTE — NURSING NOTE
Dr Patino called concerning heart rhythm changes orders collect am lab now. Tech reports pt having up to 3 second pause. Pt awaken denies chest pain soa bp good atropine placed at the bedside . Blood collected am labs results pending

## 2020-04-05 NOTE — PROGRESS NOTES
"   LOS: 1 day    Patient Care Team:  Luda Kidd MD as PCP - General (Family Medicine)  Luda Kidd MD as PCP - Family Medicine  Saul Fang MD as Consulting Physician (Cardiology)  Bill Washington MD as Surgeon (Neurosurgery)  Self, Zafar Fraire MD as Consulting Physician (Vascular Surgery)    Chief Complaint:    Chief Complaint   Patient presents with   • Tingling   • Numbness     Follow UP SHANNAN hyperkalemia  Subjective     Interval History:   I/O 870/2200 (UF 2L, UOP 200cc via straight cath).  S/p emergent HD yesterday.  K 5.3 post dialysis and HR still ~ 40 now.  BP marginal 88/50s; pt feels terrible, c/o vague pain, no dyspnea; given librium for poss withdrawal    Objective     Vital Signs  Temp:  [97.4 °F (36.3 °C)-98.2 °F (36.8 °C)] 97.5 °F (36.4 °C)  Heart Rate:  [33-95] 54  Resp:  [16] 16  BP: ()/(24-99) 112/63    Flowsheet Rows      First Filed Value   Admission Height  188 cm (74\") Documented at 04/04/2020 1026   Admission Weight  109 kg (240 lb) Documented at 04/04/2020 1026          No intake/output data recorded.  I/O last 3 completed shifts:  In: 870 [P.O.:720; I.V.:50; IV Piggyback:100]  Out: 202 [Urine:200; Stool:2]    Intake/Output Summary (Last 24 hours) at 4/5/2020 0825  Last data filed at 4/5/2020 0000  Gross per 24 hour   Intake 870 ml   Output 202 ml   Net 668 ml       Physical Exam:  GEN uncomfortable/agitated but no acute distress  Neck supple no JVD  Lungs CTA bilat no rales  CV bradycardic, no m/g  abd soft NT/ND  vasc no pedal/ankle edema, right femoral shiley      Results Review:    Results from last 7 days   Lab Units 04/05/20  0159 04/04/20  1359 04/04/20  1124   SODIUM mmol/L 133* 135* 132*   POTASSIUM mmol/L 5.3* 7.8* 8.3*   CHLORIDE mmol/L 93* 99 96*   CO2 mmol/L 25.7 19.9* 20.7*   BUN mg/dL 42* 79* 80*   CREATININE mg/dL 3.58* 4.95* 5.14*   CALCIUM mg/dL 8.8 9.7 9.6   BILIRUBIN mg/dL  --   --  0.5   ALK PHOS U/L  --   --  64   ALT (SGPT) U/L  --   -- "  39   AST (SGOT) U/L  --   --  24   GLUCOSE mg/dL 116* 123* 146*       Estimated Creatinine Clearance: 23.9 mL/min (A) (by C-G formula based on SCr of 3.58 mg/dL (H)).    Results from last 7 days   Lab Units 04/05/20  0159 04/04/20  1027   MAGNESIUM mg/dL  --  3.7*   PHOSPHORUS mg/dL 3.5  --        Results from last 7 days   Lab Units 04/05/20  0159   URIC ACID mg/dL 6.8       Results from last 7 days   Lab Units 04/05/20  0159 04/04/20  1027   WBC 10*3/mm3 9.21 9.82   HEMOGLOBIN g/dL 10.4* 12.1*   PLATELETS 10*3/mm3 159 227       Results from last 7 days   Lab Units 04/04/20  1027   INR  1.23*         Imaging Results (Last 24 Hours)     Procedure Component Value Units Date/Time    XR Chest 1 View [[id number]] Collected:  04/04/20 1102     Updated:  04/04/20 1313    Narrative:       ONE VIEW PORTABLE CHEST     HISTORY: Generalized weakness. Bradycardia.     FINDINGS: The lungs are well-expanded and grossly clear. There is mild  to moderate cardiomegaly unchanged from the CT scan dated 01/09/2020. No  acute abnormality is seen.     This report was finalized on 4/4/2020 1:10 PM by Dr. Cheikh Torrez M.D.             chlordiazePOXIDE 25 mg Oral Q6H   heparin (porcine) 5,000 Units Subcutaneous Q8H          Medication Review:   Current Facility-Administered Medications   Medication Dose Route Frequency Provider Last Rate Last Dose   • chlordiazePOXIDE (LIBRIUM) capsule 25 mg  25 mg Oral Q6H Donnie Patino MD   25 mg at 04/05/20 0550   • dextrose (D50W) 25 g/ 50mL Intravenous Solution 50 mL  50 mL Intravenous Q1H PRN Jayy Bruno MD   50 mL at 04/04/20 1643   • fentaNYL citrate (PF) (SUBLIMAZE) injection 25 mcg  25 mcg Intravenous Q2H PRN Donnie Patino MD   25 mcg at 04/04/20 2133   • heparin (porcine) 5000 UNIT/ML injection 5,000 Units  5,000 Units Subcutaneous Q8H Jayy Bruno MD   5,000 Units at 04/05/20 0202   • heparin (porcine) injection 1,000 Units  1,000 Units Intracatheter PRN John Domingo MD   1,000 Units at  04/04/20 1535   • sodium chloride 0.9 % flush 10 mL  10 mL Intravenous PRN Sky Kern MD           Assessment/Plan   Olig SHANNAN - prerenal azotemia vs ATN, likely latter, in setting of severe bradycardia, combination diuretic use, impaired hemodynamic compensation from ARB + NSAIDs.  Not hypotensive initially but now BP marginal.  Initial Cr 5.0 (vs 1.0 in Nov 2019).  No e/o retention, bladder scan only 60 cc.  UA via straight cath: small blood, 100mg/dL protein, doubt acute GN, hematuria could be explained by cath.   -- s/p emergent HD yesterday via right fem shiley for hyperkalemia  -- needs HD again today as K 5.3 and still bradycardic     Vol overload - removed 2L on HD yest, now on room air, no dyspnea or periph edema   Hyperkalemia - due to SHANNAN + meds (ARB, KCL, b-blocker, nsaids); life-threatening: K 8.2 -> 5.3 post HD  AG metabolic acidosis, mild, due to SHANNAN/uremia; corrected via HD  Junctional bradycardia, due to hyperkalemia (& on b-blocker, DIG) HR still 40s  Hypotension - initial BP elevated ~ 160/90, now down to 88 systolic   H/o diastolic CHF - EF 50-60% on scanned echo report from 2017; takes lasix + metolazone @ home  H/o CAD, PVD/AAA  Gout with recent acute flair treated with indomethacin    Plan  - HD again this AM, 4h, 2K bath, no UF, albumin at start to support BP  - may need pressor should BP drop further   - d/w Dr Bruno, RN      AAA (abdominal aortic aneurysm) (CMS/[facility])    Heart block AV complete (CMS/[facility])    PVD (peripheral vascular disease) with claudication (CMS/[facility])    Acute renal failure (ARF) (CMS/[facility])              uJan Arroyo MD  04/05/20  08:25

## 2020-04-05 NOTE — PLAN OF CARE
Problem: Patient Care Overview  Goal: Plan of Care Review  Outcome: Ongoing (interventions implemented as appropriate)  Flowsheets (Taken 4/5/2020 5891)  Progress: improving  Plan of Care Reviewed With: patient  Outcome Summary: Pt remains in CCU, second treatment of dialysis completed. Cardiology consulted today and digifab given. HR now 60-70 Afib. Vitals stable. Pt denies pain but c/o mild headache and tremors. Librium continued and diet advanced to regular. Son updated over the phone

## 2020-04-05 NOTE — PLAN OF CARE
Pt electrolytes improving but continues to have significant arrythmias and pauses zoll pads replaced and monitor in the room along with Atropine. Had to straight cath pt due to persistent urge to void 200 cc obtain ua sent for previous order . Had CIWA score of 20 at start of shift started on scheduled librium . Last check ciwa was a 10 pt feels less anxious no co chest pain O2 at 2 liters for comfort. Is frustrated with sleep disturbances due to arrthymias but is cooperative . 25 mc of IV fentanyl given once for co severe leg cramps seems to be resolved at present. Rt groin dialysis site looks good and HD was ran without difficulty . WCM

## 2020-04-06 ENCOUNTER — APPOINTMENT (OUTPATIENT)
Dept: CARDIOLOGY | Facility: HOSPITAL | Age: 75
End: 2020-04-06

## 2020-04-06 LAB
ALBUMIN SERPL-MCNC: 3.9 G/DL (ref 3.5–5.2)
ANION GAP SERPL CALCULATED.3IONS-SCNC: 11.5 MMOL/L (ref 5–15)
BASOPHILS # BLD AUTO: 0.02 10*3/MM3 (ref 0–0.2)
BASOPHILS NFR BLD AUTO: 0.2 % (ref 0–1.5)
BH CV ECHO MEAS - AO MAX PG (FULL): 0.11 MMHG
BH CV ECHO MEAS - AO MAX PG: 8.2 MMHG
BH CV ECHO MEAS - AO MEAN PG (FULL): 0 MMHG
BH CV ECHO MEAS - AO MEAN PG: 5 MMHG
BH CV ECHO MEAS - AO V2 MAX: 143 CM/SEC
BH CV ECHO MEAS - AO V2 MEAN: 100 CM/SEC
BH CV ECHO MEAS - AO V2 VTI: 25.3 CM
BH CV ECHO MEAS - ASC AORTA: 4.2 CM
BH CV ECHO MEAS - AVA(I,A): 4.6 CM^2
BH CV ECHO MEAS - AVA(I,D): 4.6 CM^2
BH CV ECHO MEAS - AVA(V,A): 4.1 CM^2
BH CV ECHO MEAS - AVA(V,D): 4.1 CM^2
BH CV ECHO MEAS - BSA(HAYCOCK): 2.4 M^2
BH CV ECHO MEAS - BSA: 2.3 M^2
BH CV ECHO MEAS - BZI_BMI: 30.9 KILOGRAMS/M^2
BH CV ECHO MEAS - BZI_METRIC_HEIGHT: 185.4 CM
BH CV ECHO MEAS - BZI_METRIC_WEIGHT: 106.1 KG
BH CV ECHO MEAS - EDV(CUBED): 132.7 ML
BH CV ECHO MEAS - EDV(MOD-SP2): 105 ML
BH CV ECHO MEAS - EDV(MOD-SP4): 93 ML
BH CV ECHO MEAS - EDV(TEICH): 123.8 ML
BH CV ECHO MEAS - EF(CUBED): 72.9 %
BH CV ECHO MEAS - EF(MOD-BP): 75 %
BH CV ECHO MEAS - EF(MOD-SP2): 75.2 %
BH CV ECHO MEAS - EF(MOD-SP4): 73.1 %
BH CV ECHO MEAS - EF(TEICH): 64.4 %
BH CV ECHO MEAS - ESV(CUBED): 35.9 ML
BH CV ECHO MEAS - ESV(MOD-SP2): 26 ML
BH CV ECHO MEAS - ESV(MOD-SP4): 25 ML
BH CV ECHO MEAS - ESV(TEICH): 44.1 ML
BH CV ECHO MEAS - FS: 35.3 %
BH CV ECHO MEAS - IVS/LVPW: 0.87
BH CV ECHO MEAS - IVSD: 1.3 CM
BH CV ECHO MEAS - LAT PEAK E' VEL: 14 CM/SEC
BH CV ECHO MEAS - LV DIASTOLIC VOL/BSA (35-75): 40.4 ML/M^2
BH CV ECHO MEAS - LV MASS(C)D: 300.4 GRAMS
BH CV ECHO MEAS - LV MASS(C)DI: 130.6 GRAMS/M^2
BH CV ECHO MEAS - LV MAX PG: 8.1 MMHG
BH CV ECHO MEAS - LV MEAN PG: 5 MMHG
BH CV ECHO MEAS - LV SYSTOLIC VOL/BSA (12-30): 10.9 ML/M^2
BH CV ECHO MEAS - LV V1 MAX: 142 CM/SEC
BH CV ECHO MEAS - LV V1 MEAN: 98.8 CM/SEC
BH CV ECHO MEAS - LV V1 VTI: 27.8 CM
BH CV ECHO MEAS - LVIDD: 5.1 CM
BH CV ECHO MEAS - LVIDS: 3.3 CM
BH CV ECHO MEAS - LVLD AP2: 8.4 CM
BH CV ECHO MEAS - LVLD AP4: 7.6 CM
BH CV ECHO MEAS - LVLS AP2: 6.4 CM
BH CV ECHO MEAS - LVLS AP4: 6.2 CM
BH CV ECHO MEAS - LVOT AREA (M): 4.2 CM^2
BH CV ECHO MEAS - LVOT AREA: 4.2 CM^2
BH CV ECHO MEAS - LVOT DIAM: 2.3 CM
BH CV ECHO MEAS - LVPWD: 1.5 CM
BH CV ECHO MEAS - MED PEAK E' VEL: 12.1 CM/SEC
BH CV ECHO MEAS - MV A DUR: 0.16 SEC
BH CV ECHO MEAS - MV A MAX VEL: 22.8 CM/SEC
BH CV ECHO MEAS - MV DEC SLOPE: 636 CM/SEC^2
BH CV ECHO MEAS - MV DEC TIME: 0.2 SEC
BH CV ECHO MEAS - MV E MAX VEL: 131 CM/SEC
BH CV ECHO MEAS - MV E/A: 5.7
BH CV ECHO MEAS - MV MAX PG: 14 MMHG
BH CV ECHO MEAS - MV MEAN PG: 4 MMHG
BH CV ECHO MEAS - MV P1/2T MAX VEL: 183 CM/SEC
BH CV ECHO MEAS - MV P1/2T: 84.3 MSEC
BH CV ECHO MEAS - MV V2 MAX: 187 CM/SEC
BH CV ECHO MEAS - MV V2 MEAN: 85.4 CM/SEC
BH CV ECHO MEAS - MV V2 VTI: 43.4 CM
BH CV ECHO MEAS - MVA P1/2T LCG: 1.2 CM^2
BH CV ECHO MEAS - MVA(P1/2T): 2.6 CM^2
BH CV ECHO MEAS - MVA(VTI): 2.7 CM^2
BH CV ECHO MEAS - PA ACC TIME: 0.09 SEC
BH CV ECHO MEAS - PA MAX PG (FULL): 5 MMHG
BH CV ECHO MEAS - PA MAX PG: 7.8 MMHG
BH CV ECHO MEAS - PA PR(ACCEL): 39.4 MMHG
BH CV ECHO MEAS - PA V2 MAX: 140 CM/SEC
BH CV ECHO MEAS - PULM A REVS DUR: 0.19 SEC
BH CV ECHO MEAS - PULM A REVS VEL: 19.3 CM/SEC
BH CV ECHO MEAS - PULM DIAS VEL: 73.7 CM/SEC
BH CV ECHO MEAS - PULM S/D: 0.5
BH CV ECHO MEAS - PULM SYS VEL: 36.7 CM/SEC
BH CV ECHO MEAS - PVA(V,A): 3 CM^2
BH CV ECHO MEAS - PVA(V,D): 3 CM^2
BH CV ECHO MEAS - QP/QS: 0.74
BH CV ECHO MEAS - RAP SYSTOLE: 15 MMHG
BH CV ECHO MEAS - RV MAX PG: 2.9 MMHG
BH CV ECHO MEAS - RV MEAN PG: 2 MMHG
BH CV ECHO MEAS - RV V1 MAX: 85 CM/SEC
BH CV ECHO MEAS - RV V1 MEAN: 58.1 CM/SEC
BH CV ECHO MEAS - RV V1 VTI: 17.5 CM
BH CV ECHO MEAS - RVOT AREA: 4.9 CM^2
BH CV ECHO MEAS - RVOT DIAM: 2.5 CM
BH CV ECHO MEAS - RVSP: 93.1 MMHG
BH CV ECHO MEAS - SI(CUBED): 42 ML/M^2
BH CV ECHO MEAS - SI(LVOT): 50.2 ML/M^2
BH CV ECHO MEAS - SI(MOD-SP2): 34.3 ML/M^2
BH CV ECHO MEAS - SI(MOD-SP4): 29.6 ML/M^2
BH CV ECHO MEAS - SI(TEICH): 34.6 ML/M^2
BH CV ECHO MEAS - SV(CUBED): 96.7 ML
BH CV ECHO MEAS - SV(LVOT): 115.5 ML
BH CV ECHO MEAS - SV(MOD-SP2): 79 ML
BH CV ECHO MEAS - SV(MOD-SP4): 68 ML
BH CV ECHO MEAS - SV(RVOT): 85.9 ML
BH CV ECHO MEAS - SV(TEICH): 79.7 ML
BH CV ECHO MEAS - TR MAX VEL: 442 CM/SEC
BH CV ECHO MEASUREMENTS AVERAGE E/E' RATIO: 10.04
BH CV XLRA - RV BASE: 4.2 CM
BH CV XLRA - RV LENGTH: 9.1 CM
BH CV XLRA - RV MID: 4.4 CM
BH CV XLRA - TDI S': 15.1 CM/SEC
BUN BLD-MCNC: 24 MG/DL (ref 8–23)
BUN/CREAT SERPL: 8.7 (ref 7–25)
CALCIUM SPEC-SCNC: 8.4 MG/DL (ref 8.6–10.5)
CHLORIDE SERPL-SCNC: 98 MMOL/L (ref 98–107)
CO2 SERPL-SCNC: 25.5 MMOL/L (ref 22–29)
CREAT BLD-MCNC: 2.76 MG/DL (ref 0.76–1.27)
DEPRECATED RDW RBC AUTO: 45.5 FL (ref 37–54)
EOSINOPHIL # BLD AUTO: 0.1 10*3/MM3 (ref 0–0.4)
EOSINOPHIL NFR BLD AUTO: 1.2 % (ref 0.3–6.2)
ERYTHROCYTE [DISTWIDTH] IN BLOOD BY AUTOMATED COUNT: 12 % (ref 12.3–15.4)
GFR SERPL CREATININE-BSD FRML MDRD: 23 ML/MIN/1.73
GLUCOSE BLD-MCNC: 106 MG/DL (ref 65–99)
GLUCOSE BLDC GLUCOMTR-MCNC: 109 MG/DL (ref 70–130)
GLUCOSE BLDC GLUCOMTR-MCNC: 116 MG/DL (ref 70–130)
GLUCOSE BLDC GLUCOMTR-MCNC: 117 MG/DL (ref 70–130)
GLUCOSE BLDC GLUCOMTR-MCNC: 141 MG/DL (ref 70–130)
GLUCOSE BLDC GLUCOMTR-MCNC: 97 MG/DL (ref 70–130)
HCT VFR BLD AUTO: 29.1 % (ref 37.5–51)
HGB BLD-MCNC: 10.1 G/DL (ref 13–17.7)
IMM GRANULOCYTES # BLD AUTO: 0.02 10*3/MM3 (ref 0–0.05)
IMM GRANULOCYTES NFR BLD AUTO: 0.2 % (ref 0–0.5)
LEFT ATRIUM VOLUME INDEX: 38 ML/M2
LYMPHOCYTES # BLD AUTO: 1.26 10*3/MM3 (ref 0.7–3.1)
LYMPHOCYTES NFR BLD AUTO: 15.4 % (ref 19.6–45.3)
MAXIMAL PREDICTED HEART RATE: 148 BPM
MCH RBC QN AUTO: 35.7 PG (ref 26.6–33)
MCHC RBC AUTO-ENTMCNC: 34.7 G/DL (ref 31.5–35.7)
MCV RBC AUTO: 102.8 FL (ref 79–97)
MONOCYTES # BLD AUTO: 1.07 10*3/MM3 (ref 0.1–0.9)
MONOCYTES NFR BLD AUTO: 13.1 % (ref 5–12)
NEUTROPHILS # BLD AUTO: 5.69 10*3/MM3 (ref 1.7–7)
NEUTROPHILS NFR BLD AUTO: 69.9 % (ref 42.7–76)
NRBC BLD AUTO-RTO: 0.1 /100 WBC (ref 0–0.2)
PHOSPHATE SERPL-MCNC: 3.2 MG/DL (ref 2.5–4.5)
PLATELET # BLD AUTO: 131 10*3/MM3 (ref 140–450)
PMV BLD AUTO: 9.2 FL (ref 6–12)
POTASSIUM BLD-SCNC: 4.5 MMOL/L (ref 3.5–5.2)
RBC # BLD AUTO: 2.83 10*6/MM3 (ref 4.14–5.8)
SODIUM BLD-SCNC: 135 MMOL/L (ref 136–145)
STRESS TARGET HR: 126 BPM
WBC NRBC COR # BLD: 8.16 10*3/MM3 (ref 3.4–10.8)

## 2020-04-06 PROCEDURE — 25010000002 FENTANYL CITRATE (PF) 100 MCG/2ML SOLUTION: Performed by: INTERNAL MEDICINE

## 2020-04-06 PROCEDURE — 82962 GLUCOSE BLOOD TEST: CPT

## 2020-04-06 PROCEDURE — 25010000002 HEPARIN (PORCINE) PER 1000 UNITS: Performed by: INTERNAL MEDICINE

## 2020-04-06 PROCEDURE — 80069 RENAL FUNCTION PANEL: CPT | Performed by: INTERNAL MEDICINE

## 2020-04-06 PROCEDURE — 93306 TTE W/DOPPLER COMPLETE: CPT

## 2020-04-06 PROCEDURE — 85025 COMPLETE CBC W/AUTO DIFF WBC: CPT | Performed by: INTERNAL MEDICINE

## 2020-04-06 RX ORDER — ALBUMIN (HUMAN) 12.5 G/50ML
12.5 SOLUTION INTRAVENOUS AS NEEDED
Status: ACTIVE | OUTPATIENT
Start: 2020-04-06 | End: 2020-04-07

## 2020-04-06 RX ORDER — COLCHICINE 0.6 MG/1
0.6 TABLET ORAL ONCE
Status: COMPLETED | OUTPATIENT
Start: 2020-04-06 | End: 2020-04-06

## 2020-04-06 RX ORDER — THIAMINE MONONITRATE (VIT B1) 100 MG
100 TABLET ORAL DAILY
Status: DISCONTINUED | OUTPATIENT
Start: 2020-04-06 | End: 2020-04-10 | Stop reason: HOSPADM

## 2020-04-06 RX ORDER — HYDROCODONE BITARTRATE AND ACETAMINOPHEN 5; 325 MG/1; MG/1
1 TABLET ORAL EVERY 6 HOURS PRN
Status: DISCONTINUED | OUTPATIENT
Start: 2020-04-06 | End: 2020-04-10 | Stop reason: HOSPADM

## 2020-04-06 RX ORDER — CHLORDIAZEPOXIDE HYDROCHLORIDE 25 MG/1
25 CAPSULE, GELATIN COATED ORAL EVERY 8 HOURS SCHEDULED
Status: DISCONTINUED | OUTPATIENT
Start: 2020-04-06 | End: 2020-04-09

## 2020-04-06 RX ORDER — COLCHICINE 0.6 MG/1
0.3 TABLET ORAL EVERY 4 HOURS
Status: COMPLETED | OUTPATIENT
Start: 2020-04-07 | End: 2020-04-07

## 2020-04-06 RX ADMIN — FENTANYL CITRATE 25 MCG: 0.05 INJECTION, SOLUTION INTRAMUSCULAR; INTRAVENOUS at 06:59

## 2020-04-06 RX ADMIN — CHLORDIAZEPOXIDE HYDROCHLORIDE 25 MG: 25 CAPSULE ORAL at 06:36

## 2020-04-06 RX ADMIN — Medication 100 MG: at 15:48

## 2020-04-06 RX ADMIN — CHLORDIAZEPOXIDE HYDROCHLORIDE 25 MG: 25 CAPSULE ORAL at 12:02

## 2020-04-06 RX ADMIN — HEPARIN SODIUM 5000 UNITS: 5000 INJECTION INTRAVENOUS; SUBCUTANEOUS at 03:45

## 2020-04-06 RX ADMIN — COLCHICINE 0.6 MG: 0.6 TABLET, FILM COATED ORAL at 21:53

## 2020-04-06 RX ADMIN — HEPARIN SODIUM 5000 UNITS: 5000 INJECTION INTRAVENOUS; SUBCUTANEOUS at 17:57

## 2020-04-06 RX ADMIN — HEPARIN SODIUM 5000 UNITS: 5000 INJECTION INTRAVENOUS; SUBCUTANEOUS at 09:20

## 2020-04-06 RX ADMIN — HYDROCODONE BITARTRATE AND ACETAMINOPHEN 1 TABLET: 5; 325 TABLET ORAL at 22:34

## 2020-04-06 RX ADMIN — CHLORDIAZEPOXIDE HYDROCHLORIDE 25 MG: 25 CAPSULE ORAL at 22:34

## 2020-04-06 RX ADMIN — HYDROCODONE BITARTRATE AND ACETAMINOPHEN 1 TABLET: 5; 325 TABLET ORAL at 15:48

## 2020-04-06 NOTE — PROGRESS NOTES
Juan James   74 y.o.  male    LOS: 2 days   Patient Care Team:  Luda Kidd MD as PCP - General (Family Medicine)  Luda Kidd MD as PCP - Family Medicine  Saul Fang MD as Consulting Physician (Cardiology)  Bill Washington MD as Surgeon (Neurosurgery)  Self, Zafar Fraire MD as Consulting Physician (Vascular Surgery)      Subjective     Review of Systems:   No shortness of breath.  Complaint of joint pain.  Patient wanting to get out of bed.    Medication Review:   Current Facility-Administered Medications:   •  chlordiazePOXIDE (LIBRIUM) capsule 25 mg, 25 mg, Oral, Q6H, Donnie Patino MD, 25 mg at 04/06/20 0636  •  dextrose (D50W) 25 g/ 50mL Intravenous Solution 50 mL, 50 mL, Intravenous, Q1H PRN, Jayy Bruno MD, 50 mL at 04/05/20 1020  •  dextrose (D50W) 25 g/ 50mL Intravenous Solution 50 mL, 50 mL, Intravenous, Once, Jayy Bruno MD  •  fentaNYL citrate (PF) (SUBLIMAZE) injection 25 mcg, 25 mcg, Intravenous, Q2H PRN, Donnie Patino MD, 25 mcg at 04/06/20 0659  •  heparin (porcine) 5000 UNIT/ML injection 5,000 Units, 5,000 Units, Subcutaneous, Q8H, Jayy Bruno MD, 5,000 Units at 04/06/20 0345  •  heparin (porcine) injection 1,000 Units, 1,000 Units, Intracatheter, PRN, John Domingo MD, 4,000 Units at 04/05/20 1436  •  norepinephrine (LEVOPHED) 8 mg/250 mL (32 mcg/mL) in sodium chloride 0.9% infusion (premix), 0.02-0.3 mcg/kg/min, Intravenous, Titrated, Jayy Bruno MD  •  [COMPLETED] Insert peripheral IV, , , Once **AND** sodium chloride 0.9 % flush 10 mL, 10 mL, Intravenous, PRN, Sky Kern MD      Objective     Vital Sign Min/Max for last 24 hours  Temp  Min: 97.4 °F (36.3 °C)  Max: 99.2 °F (37.3 °C)   BP  Min: 90/61  Max: 129/108    Pulse  Min: 37  Max: 79     Wt Readings from Last 3 Encounters:   04/05/20 107 kg (234 lb 12.6 oz)   12/26/19 111 kg (245 lb 3.2 oz)   11/26/19 113 kg (248 lb 8 oz)        Intake/Output Summary (Last 24 hours) at 4/6/2020  0708  Last data filed at 4/5/2020 2300  Gross per 24 hour   Intake 1150 ml   Output 300 ml   Net 850 ml     Physical Exam:      General Appearance:    Well developed and well nourished in no acute distress   Neck:   no JVD   Lungs:     Clear to auscultation bilaterally. No wheezes, rhonchi or rales. No accessory muscle use.     Heart:    Irregular rate and rhythm.  Normal S1 and S2. No murmur, no gallop, rub or lift.    Abdomen:     Normal bowel sounds, , soft , round      non-tender, non-distended   Extremities:   No edema.     Skin:  Warm and dry   Neurologic:   awake alert and oriented, speech clear and approp, no facial drooping      Monitor: A. fib  Results Review:     I reviewed the patient's new clinical results.    Sodium Sodium   Date Value Ref Range Status   04/06/2020 135 (L) 136 - 145 mmol/L Final   04/05/2020 133 (L) 136 - 145 mmol/L Final   04/04/2020 135 (L) 136 - 145 mmol/L Final   04/04/2020 132 (L) 136 - 145 mmol/L Final      Potassium Potassium   Date Value Ref Range Status   04/06/2020 4.5 3.5 - 5.2 mmol/L Final   04/05/2020 5.3 (H) 3.5 - 5.2 mmol/L Final   04/04/2020 7.8 (C) 3.5 - 5.2 mmol/L Final   04/04/2020 8.3 (C) 3.5 - 5.2 mmol/L Final      Chloride Chloride   Date Value Ref Range Status   04/06/2020 98 98 - 107 mmol/L Final   04/05/2020 93 (L) 98 - 107 mmol/L Final   04/04/2020 99 98 - 107 mmol/L Final   04/04/2020 96 (L) 98 - 107 mmol/L Final      Bicarbonate No results found for: PLASMABICARB   BUN BUN   Date Value Ref Range Status   04/06/2020 24 (H) 8 - 23 mg/dL Final   04/05/2020 42 (H) 8 - 23 mg/dL Final   04/04/2020 79 (H) 8 - 23 mg/dL Final   04/04/2020 80 (H) 8 - 23 mg/dL Final      Creatinine Creatinine   Date Value Ref Range Status   04/06/2020 2.76 (H) 0.76 - 1.27 mg/dL Final   04/05/2020 3.58 (H) 0.76 - 1.27 mg/dL Final   04/04/2020 4.95 (H) 0.76 - 1.27 mg/dL Final   04/04/2020 5.14 (H) 0.76 - 1.27 mg/dL Final      Calcium Calcium   Date Value Ref Range Status   04/06/2020 8.4  (L) 8.6 - 10.5 mg/dL Final   04/05/2020 8.8 8.6 - 10.5 mg/dL Final   04/04/2020 9.7 8.6 - 10.5 mg/dL Final   04/04/2020 9.6 8.6 - 10.5 mg/dL Final      Magnesium Magnesium   Date Value Ref Range Status   04/04/2020 3.7 (H) 1.6 - 2.4 mg/dL Final        Results from last 7 days   Lab Units 04/06/20  0404   WBC 10*3/mm3 8.16   HEMOGLOBIN g/dL 10.1*   HEMATOCRIT % 29.1*   PLATELETS 10*3/mm3 131*     Lab Results   Lab Value Date/Time    TROPONINT 0.059 (C) 04/04/2020 1124     No results found for: CHOL  Lab Results   Component Value Date    HDL 38 (L) 11/26/2019    HDL 37 (L) 07/29/2019     Lab Results   Component Value Date     (H) 11/26/2019     (H) 07/29/2019     Lab Results   Component Value Date    TRIG 175 (H) 11/26/2019    TRIG 140 07/29/2019     Results from last 7 days   Lab Units 04/04/20  1027   INR  1.23*     Assessment/Plan   Assessment/ Plan  1. Atrial flutter, with slow ventricular response, likely related to digoxin.          -Digibind given 4/5/20          -Per Dr. Mercado's office note patient not on anticoagulation due to large GI bleed  2.  Acute renal failure with hyperkalemia           -s/p emergent HD  3. Coronary artery disease status post stenting x3  4. History of alcohol use  5. COPD  6. Chronic diastolic CHF  7. History of abdominal aortic aneurysm, hypertension, dyslipidemia  8.  Peripheral vascular disease, with bilateral significant distal digital ischemia, left aortic iliac and femoropopliteal disease, right femoropopliteal disease both moderate  9.  Severe pulmonary hypertension - ? STEFANO    Plan:  HR now in the 60-70s, BP stable. K and Cr improved.  2D echo pending.  Will have to determine when safe for patient to resume his beta-blocker.  Discussed patient with RN.       AARON Dobbins  04/06/20  07:08      Time: 17 mins    Oli Miles M.D.   Reviewed our cardiology group Nurse Practitioner's note.    Pt interviewed and examined.   Findings verified.   Reviewed and  agree with corrections or modifications of history, physical, and plans as indicated:     Troponin elevation on 4/4/2020 not rechecked, as it is likely related to the renal failure.  Heart rates improved.  Will document with ECG tomorrow to document rhythm and QT.    Digoxin level not rechecked, since he will read digoxin bound to Digibind, thus not effective.    Echo shows EF 60%, with grade 2 diastolic dysfunction, and severe pulmonary hypertension, RV systolic pressure approximately 90 mmHg..  No dyspnea, thus unlikely acute pulmonary embolus.  There is only mild to moderate TR, and trivial MR.  Pulmonary hypertension and atrial flutter often seen with untreated sleep apnea.  Could be worked up as an outpatient.        EMR Dragon/Transcription disclaimer:   Much of this encounter note is an electronic transcription/translation of spoken language to printed text. The electronic translation of spoken language may permit erroneous, or at times, nonsensical words or phrases to be inadvertently transcribed.  Although I have reviewed the note for such errors, some may still exist. Please contact me if needed for clarification or correction.  Oli Miles M.D.

## 2020-04-06 NOTE — PROGRESS NOTES
"                                              LOS: 2 days   Patient Care Team:  Luda Kidd MD as PCP - General (Family Medicine)  Luda Kidd MD as PCP - Family Medicine  Saul Fang MD as Consulting Physician (Cardiology)  Bill Washington MD as Surgeon (Neurosurgery)  Self, Zafar Fraire MD as Consulting Physician (Vascular Surgery)    Chief Complaint:  F/up acute hyperkalemia, SHANNAN, arrhythmia, bradycardia, new hypertension    Subjective   Interval History  Heart rate much better.  Up to 80 bpm.  No symptoms of dizziness, chest pain or shortness of breath.  On 2 L oxygen but does not use oxygen at home.  Reported diffuse pain in his joints and his back.  This is chronic but it is worse than usual.  It is attributed to arthritis.  No associated symptoms other than mild stiffness    REVIEW OF SYSTEMS:   CARDIOVASCULAR: No chest pain, chest pressure or chest discomfort. No palpitations or edema.   RESPIRATORY: No shortness of breath, cough or sputum.   GASTROINTESTINAL: No anorexia, nausea, vomiting or diarrhea. No abdominal pain or blood.   HEMATOLOGIC: No bleeding or bruising.     Ventilator/Non-Invasive Ventilation Settings (From admission, onward)    None                Physical Exam:     Vital Signs  Temp:  [97.4 °F (36.3 °C)-99.2 °F (37.3 °C)] 99.2 °F (37.3 °C)  Heart Rate:  [60-79] 66  BP: ()/() 130/66    Intake/Output Summary (Last 24 hours) at 4/6/2020 1130  Last data filed at 4/6/2020 0926  Gross per 24 hour   Intake 1060 ml   Output 600 ml   Net 460 ml     Flowsheet Rows      First Filed Value   Admission Height  188 cm (74\") Documented at 04/04/2020 1026   Admission Weight  109 kg (240 lb) Documented at 04/04/2020 1026          General Appearance:   Alert, cooperative, in no acute distress   ENMT:  Mallampati score 4, moist mucous membrane   Eyes:  Pupils equals and reactive to light. EOMI   Neck:   Trachea midline. No thyromegaly.   Lungs:    Very faint bibasilar " crackles.  No wheezing.  Nonlabored breathing    Heart:   Regular rhythm and normal rate, normal S1 and S2, no         murmur   Skin:   No rash   Abdomen:    Obese. Soft. No tenderness. No HSM.  Positive bowel sounds   Neuro:  Conscious, alert, oriented x3. Strength 5/5 in upper an lower  ext   Extremities:  Moves all extremities well, no edema, no cyanosis, no       redness          Results Review:        Results from last 7 days   Lab Units 04/06/20  0404 04/05/20  0159 04/04/20  1359   SODIUM mmol/L 135* 133* 135*   POTASSIUM mmol/L 4.5 5.3* 7.8*   CHLORIDE mmol/L 98 93* 99   CO2 mmol/L 25.5 25.7 19.9*   BUN mg/dL 24* 42* 79*   CREATININE mg/dL 2.76* 3.58* 4.95*   GLUCOSE mg/dL 106* 116* 123*   CALCIUM mg/dL 8.4* 8.8 9.7     Results from last 7 days   Lab Units 04/04/20  1124   TROPONIN T ng/mL 0.059*     Results from last 7 days   Lab Units 04/06/20  0404 04/05/20  0159 04/04/20  1027   WBC 10*3/mm3 8.16 9.21 9.82   HEMOGLOBIN g/dL 10.1* 10.4* 12.1*   HEMATOCRIT % 29.1* 30.5* 37.0*   PLATELETS 10*3/mm3 131* 159 227     Results from last 7 days   Lab Units 04/04/20  1027   INR  1.23*   APTT seconds 30.7     Results from last 7 days   Lab Units 04/04/20  1027   PROBNP pg/mL 4,422.0*       I reviewed the patient's new clinical results.  I personally viewed and interpreted the patient's CXR        Medication Review:     chlordiazePOXIDE 25 mg Oral Q6H   dextrose 50 mL Intravenous Once   heparin (porcine) 5,000 Units Subcutaneous Q8H           Assessment   1. Atrial flutter with slow ventricular response related to hyperkalemia, digoxin and beta-blocker  2. Hypotension, intermittent, resolved  3. Dizziness, secondary to above  4. Acute severe hyperkalemia, corrected  5. Metabolic acidosis, secondary to SHANNAN  6. Acute renal failure: Secondary to NSAID on top of his blood pressure medications and perhaps lack of intake  7. Chronic diastolic CHF  8. Severe pulmonary hypertension, RVSP 93: Likely combination of CHF and  probable sleep apnea.  9. Essential hypertension  10. Mild hyponatremia  11. Mild anemia, stable  12. Alcohol withdrawal/mild  13. Daily drinker: 2 ounces of alcohol  14. COPD/chronic bronchitis, no exacerbation  15. Snoring/probable sleep apnea  16. Joints pain and stiffness        Plan     · Decrease Librium 25 mg every 8 h.  Watch for alcohol withdrawal  · Check RA due to joints pain  · Repeat renal panel in AM and decide regarding HD  · PRN bronchodilators  · DC Fentanyl. Start Lortab 6 Q 6 PRN  · PO thiamine  · Outpatient home sleep study for suspected sleep apnea.  We will arrange for the    Dispo: transfer to floor  Discussed with CCU team    Jayy Bruno MD  04/06/20  11:30          This note was dictated utilizing Dragon dictation

## 2020-04-06 NOTE — PLAN OF CARE
Problem: Patient Care Overview  Goal: Plan of Care Review  Outcome: Ongoing (interventions implemented as appropriate)  Flowsheets (Taken 4/6/2020 0434)  Progress: improving  Plan of Care Reviewed With: patient     Problem: Patient Care Overview  Goal: Plan of Care Review  Outcome: Ongoing (interventions implemented as appropriate)  Flowsheets (Taken 4/6/2020 0434)  Progress: improving  Plan of Care Reviewed With: patient     Problem: Fall Risk (Adult)  Goal: Identify Related Risk Factors and Signs and Symptoms  Outcome: Ongoing (interventions implemented as appropriate)  Flowsheets (Taken 4/5/2020 0347)  Related Risk Factors (Fall Risk): homeostatic imbalance;sleep pattern alteration;slippery/uneven surfaces;environment unfamiliar     Problem: Skin Injury Risk (Adult)  Goal: Identify Related Risk Factors and Signs and Symptoms  Outcome: Ongoing (interventions implemented as appropriate)  Flowsheets (Taken 4/5/2020 0347)  Related Risk Factors (Skin Injury Risk): critical care admission;medical devices;mobility impaired;tissue perfusion altered     Problem: Renal Failure/Kidney Injury, Acute (Adult)  Goal: Signs and Symptoms of Listed Potential Problems Will be Absent, Minimized or Managed (Renal Failure/Kidney Injury, Acute)  Outcome: Ongoing (interventions implemented as appropriate)  Flowsheets (Taken 4/5/2020 0347)  Problems Assessed (Acute Renal Failure/Kidney Injury): all  Problems Present (ARF/Kidney Injury): acid-base imbalance;electrolyte imbalance;uremic syndrome     Problem: Arrhythmia/Dysrhythmia (Symptomatic) (Adult)  Goal: Signs and Symptoms of Listed Potential Problems Will be Absent, Minimized or Managed (Arrhythmia/Dysrhythmia)  Flowsheets (Taken 4/5/2020 0667)  Problems Assessed (Arrhythmia/Dysrhythmia): electrophysiologic conduction defect;hemodynamic instability  Problems Present (Dysrhythmia): electrophysiologic conduction defect;hemodynamic instability;situational response     Pt reports  feeling much better . Education information concerning SHANNAN and HD . No co chest pain reports able to take deep breaths than he was unable to do before . Able to void 175 cc of dark urine. Heart rate better with less ectopy slow afib. Will need to get oob today if HD access will allow . Will ask doctor to evaluate. AM labs pending . BP good HIghest CIWA was 14 this shift Librium po tolerated well.

## 2020-04-06 NOTE — PROGRESS NOTES
"   LOS: 2 days    Patient Care Team:  Luda Kidd MD as PCP - General (Family Medicine)  Luda Kidd MD as PCP - Family Medicine  Saul Fang MD as Consulting Physician (Cardiology)  Bill Washington MD as Surgeon (Neurosurgery)  Self, Zafar Fraire MD as Consulting Physician (Vascular Surgery)    Chief Complaint:    Chief Complaint   Patient presents with   • Tingling   • Numbness     Follow-up acute kidney injury and hyperkalemia  Subjective     Interval History:   The patient had dialysis yesterday without any difficulties, is feeling better, he has temporary dialysis catheter in the right femoral vein.  Denies any chest pain or shortness of air, no nausea or vomiting, no abdominal pain.  Urine output in the past 24 hours was 300 cc.    Objective     Vital Signs  Temp:  [97.4 °F (36.3 °C)-99.2 °F (37.3 °C)] 99.2 °F (37.3 °C)  Heart Rate:  [37-79] 77  BP: ()/() 140/73    Flowsheet Rows      First Filed Value   Admission Height  188 cm (74\") Documented at 04/04/2020 1026   Admission Weight  109 kg (240 lb) Documented at 04/04/2020 1026          No intake/output data recorded.  I/O last 3 completed shifts:  In: 1150 [P.O.:960; I.V.:140; IV Piggyback:50]  Out: 502 [Urine:500; Stool:2]    Intake/Output Summary (Last 24 hours) at 4/6/2020 0856  Last data filed at 4/5/2020 2300  Gross per 24 hour   Intake 1150 ml   Output 300 ml   Net 850 ml       Physical Exam:  General Appearance: alert, oriented x 3, no acute distress, obese, appears to be chronically ill  Skin: warm and dry  HEENT: Nonicteric sclerae, oral mucosa is dry  Neck: supple, no JVD, trachea midline  Lungs: CTA, unlabored breathing effort  Heart: RRR, normal S1 and S2, no S3, no rub  Abdomen: soft, non-tender,  present bowel sounds to auscultation  : no palpable bladder,  Extremities: no edema, cyanosis or clubbing, he has temporary dialysis catheter in the right femoral vein.  Neuro: normal speech and mental " status          Results Review:    Results from last 7 days   Lab Units 04/06/20  0404 04/05/20  0159 04/04/20  1359 04/04/20  1124   SODIUM mmol/L 135* 133* 135* 132*   POTASSIUM mmol/L 4.5 5.3* 7.8* 8.3*   CHLORIDE mmol/L 98 93* 99 96*   CO2 mmol/L 25.5 25.7 19.9* 20.7*   BUN mg/dL 24* 42* 79* 80*   CREATININE mg/dL 2.76* 3.58* 4.95* 5.14*   CALCIUM mg/dL 8.4* 8.8 9.7 9.6   BILIRUBIN mg/dL  --   --   --  0.5   ALK PHOS U/L  --   --   --  64   ALT (SGPT) U/L  --   --   --  39   AST (SGOT) U/L  --   --   --  24   GLUCOSE mg/dL 106* 116* 123* 146*       Estimated Creatinine Clearance: 30.9 mL/min (A) (by C-G formula based on SCr of 2.76 mg/dL (H)).    Results from last 7 days   Lab Units 04/06/20  0404 04/05/20  0159 04/04/20  1027   MAGNESIUM mg/dL  --   --  3.7*   PHOSPHORUS mg/dL 3.2 3.5  --        Results from last 7 days   Lab Units 04/05/20  0159   URIC ACID mg/dL 6.8       Results from last 7 days   Lab Units 04/06/20  0404 04/05/20  0159 04/04/20  1027   WBC 10*3/mm3 8.16 9.21 9.82   HEMOGLOBIN g/dL 10.1* 10.4* 12.1*   PLATELETS 10*3/mm3 131* 159 227       Results from last 7 days   Lab Units 04/04/20  1027   INR  1.23*         Imaging Results (Last 24 Hours)     ** No results found for the last 24 hours. **          chlordiazePOXIDE 25 mg Oral Q6H   dextrose 50 mL Intravenous Once   heparin (porcine) 5,000 Units Subcutaneous Q8H       norepinephrine 0.02-0.3 mcg/kg/min       Medication Review:   Current Facility-Administered Medications   Medication Dose Route Frequency Provider Last Rate Last Dose   • chlordiazePOXIDE (LIBRIUM) capsule 25 mg  25 mg Oral Q6H Donnie Patino MD   25 mg at 04/06/20 0636   • dextrose (D50W) 25 g/ 50mL Intravenous Solution 50 mL  50 mL Intravenous Q1H PRN Jayy Bruno MD   50 mL at 04/05/20 1020   • dextrose (D50W) 25 g/ 50mL Intravenous Solution 50 mL  50 mL Intravenous Once Jayy Bruno MD       • fentaNYL citrate (PF) (SUBLIMAZE) injection 25 mcg  25 mcg Intravenous Q2H PRN  Donnie Patino MD   25 mcg at 04/06/20 0659   • heparin (porcine) 5000 UNIT/ML injection 5,000 Units  5,000 Units Subcutaneous Q8H Jayy Bruno MD   5,000 Units at 04/06/20 0345   • heparin (porcine) injection 1,000 Units  1,000 Units Intracatheter PRN John Domingo MD   4,000 Units at 04/05/20 1436   • norepinephrine (LEVOPHED) 8 mg/250 mL (32 mcg/mL) in sodium chloride 0.9% infusion (premix)  0.02-0.3 mcg/kg/min Intravenous Titrated Jayy Bruno MD       • sodium chloride 0.9 % flush 10 mL  10 mL Intravenous PRN Sky Kern MD           Assessment/Plan    1.  Uric acute kidney injury probable ATN and setting of severe bradycardia with the diuretics and impaired hemodynamic on consultation from [facility] and NSAIDs.  Patient was dialyzed yesterday without any difficulties potassium down to 4.5, creatinine is 2.76, phosphorus 3.2.  He has no evidence of urinary retention  2.  Volume excess, improved  3.  Hyperkalemia, resolved  4.  Bolick acidosis, resolved  5.  Hypotension, resolved  6.  3 of diastolic congestive heart failure ejection fraction 50 to 60% on reported echo in 2017.  7.  History of coronary artery disease  8.  3 of peripheral vascular disease and AAA  9.  History of gout with recent flare was treated with indomethacin prior to admission.      Plan:  1.  No dialysis planned for today  2.  Check labs tomorrow and decide if dialysis is needed  3.  She has femoral dialysis catheter he needs to have complete bedrest  4.  Surveillance labs      I discussed the case with the patient and with the patient's nurse        Salvatore Grigsby MD  04/06/20  08:56

## 2020-04-06 NOTE — PROGRESS NOTES
Discharge Planning Assessment  [facility]     Patient Name: Juan James  MRN: [id number]  Today's Date: 4/6/2020    Admit Date: 4/4/2020    Discharge Needs Assessment     Row Name 04/06/20 1113       Living Environment    Lives With  alone    Current Living Arrangements  home/apartment/condo    Primary Care Provided by  self    Provides Primary Care For  no one    Family Caregiver if Needed  child(taylor), adult    Quality of Family Relationships  unable to assess    Able to Return to Prior Arrangements  yes       Resource/Environmental Concerns    Resource/Environmental Concerns  none    Transportation Concerns  car, none       Transition Planning    Patient/Family Anticipates Transition to  home    Patient/Family Anticipated Services at Transition  none    Transportation Anticipated  car, drives self       Discharge Needs Assessment    Concerns to be Addressed  discharge planning    Equipment Currently Used at Home  none    Equipment Needed After Discharge  cane, straight;walker, standard        Discharge Plan     Row Name 04/06/20 1128       Plan    Plan  Home  He may agree to Home Health  Would like a follow up.      Plan Comments  IMM noted.  CCP spoke to patient to discuss discharge planning. Face sheet verified. CCP role explained.  Pt' s PCP is Dr. Luda Kidd.  Pt emergency contact is his son Juan Aleman [phone].   Pt lives in a house alone.  His wwife passed away last week.  He uses a cane or a walker to ambulate.  Pt is independent with ADL's.  He has no past history with Home Health.    Pt has no prior rehab stays.  Pt obtains his medications from [facility] in [facility].   Pt plan is Home.  He would like follow up for possible [facility].  CCP following           Destination      Coordination has not been started for this encounter.      Durable Medical Equipment      Coordination has not been started for this encounter.      Dialysis/Infusion      Coordination has not been  started for this encounter.      Home Medical Care      Coordination has not been started for this encounter.      Therapy      Coordination has not been started for this encounter.      Community Resources      Coordination has not been started for this encounter.          Demographic Summary    No documentation.       Functional Status    No documentation.       Psychosocial    No documentation.       Abuse/Neglect    No documentation.       Legal    No documentation.       Substance Abuse    No documentation.       Patient Forms    No documentation.           Charity Zepeda RN

## 2020-04-06 NOTE — PLAN OF CARE
Problem: Patient Care Overview  Goal: Plan of Care Review  Outcome: Ongoing (interventions implemented as appropriate)  Flowsheets  Taken 4/6/2020 3470  Progress: improving  Outcome Summary: Patient remains in CCU most of day. VSS. Afib/flutter on monitor. C/O joint pain-see MAR.  Right groin shiley remains in place, dressing changed today.  Evaluation for HD tomorrow.  Patient to move to tele unit when bed available.  Will continue to monitor.  Taken 4/6/2020 1245  Plan of Care Reviewed With: patient

## 2020-04-07 ENCOUNTER — APPOINTMENT (OUTPATIENT)
Dept: GENERAL RADIOLOGY | Facility: HOSPITAL | Age: 75
End: 2020-04-07

## 2020-04-07 LAB
ALBUMIN SERPL-MCNC: 4 G/DL (ref 3.5–5.2)
ANION GAP SERPL CALCULATED.3IONS-SCNC: 13.2 MMOL/L (ref 5–15)
APPEARANCE FLD: ABNORMAL
BASOPHILS # BLD AUTO: 0.03 10*3/MM3 (ref 0–0.2)
BASOPHILS NFR BLD AUTO: 0.3 % (ref 0–1.5)
BUN BLD-MCNC: 30 MG/DL (ref 8–23)
BUN/CREAT SERPL: 11.1 (ref 7–25)
CALCIUM SPEC-SCNC: 8.5 MG/DL (ref 8.6–10.5)
CHLORIDE SERPL-SCNC: 93 MMOL/L (ref 98–107)
CHROMATIN AB SERPL-ACNC: 14.1 IU/ML (ref 0–14)
CO2 SERPL-SCNC: 23.8 MMOL/L (ref 22–29)
COLOR FLD: YELLOW
CREAT BLD-MCNC: 2.71 MG/DL (ref 0.76–1.27)
CRYSTALS FLD MICRO: NORMAL
DEPRECATED RDW RBC AUTO: 43.7 FL (ref 37–54)
EOSINOPHIL # BLD AUTO: 0.03 10*3/MM3 (ref 0–0.4)
EOSINOPHIL NFR BLD AUTO: 0.3 % (ref 0.3–6.2)
ERYTHROCYTE [DISTWIDTH] IN BLOOD BY AUTOMATED COUNT: 11.9 % (ref 12.3–15.4)
GFR SERPL CREATININE-BSD FRML MDRD: 23 ML/MIN/1.73
GLUCOSE BLD-MCNC: 139 MG/DL (ref 65–99)
GLUCOSE BLDC GLUCOMTR-MCNC: 193 MG/DL (ref 70–130)
HCT VFR BLD AUTO: 31.2 % (ref 37.5–51)
HGB BLD-MCNC: 10.8 G/DL (ref 13–17.7)
IMM GRANULOCYTES # BLD AUTO: 0.05 10*3/MM3 (ref 0–0.05)
IMM GRANULOCYTES NFR BLD AUTO: 0.5 % (ref 0–0.5)
LYMPHOCYTES # BLD AUTO: 1.01 10*3/MM3 (ref 0.7–3.1)
LYMPHOCYTES NFR BLD AUTO: 9.7 % (ref 19.6–45.3)
LYMPHOCYTES NFR FLD MANUAL: 2 %
MAGNESIUM SERPL-MCNC: 2.1 MG/DL (ref 1.6–2.4)
MCH RBC QN AUTO: 34.8 PG (ref 26.6–33)
MCHC RBC AUTO-ENTMCNC: 34.6 G/DL (ref 31.5–35.7)
MCV RBC AUTO: 100.6 FL (ref 79–97)
MONOCYTES # BLD AUTO: 1.4 10*3/MM3 (ref 0.1–0.9)
MONOCYTES NFR BLD AUTO: 13.4 % (ref 5–12)
NEUTROPHILS # BLD AUTO: 7.94 10*3/MM3 (ref 1.7–7)
NEUTROPHILS NFR BLD AUTO: 75.8 % (ref 42.7–76)
NEUTROPHILS NFR FLD MANUAL: 98 %
NRBC BLD AUTO-RTO: 0.1 /100 WBC (ref 0–0.2)
PHOSPHATE SERPL-MCNC: 4 MG/DL (ref 2.5–4.5)
PLATELET # BLD AUTO: 109 10*3/MM3 (ref 140–450)
PLATELET # BLD AUTO: 160 10*3/MM3 (ref 140–450)
PLATELETS.RETICULATED NFR BLD AUTO: 1.5 % (ref 0.9–6.5)
PMV BLD AUTO: 9.2 FL (ref 6–12)
POTASSIUM BLD-SCNC: 4.8 MMOL/L (ref 3.5–5.2)
RBC # BLD AUTO: 3.1 10*6/MM3 (ref 4.14–5.8)
RBC # FLD AUTO: 55 /MM3
SODIUM BLD-SCNC: 130 MMOL/L (ref 136–145)
WBC # FLD AUTO: ABNORMAL /MM3
WBC NRBC COR # BLD: 10.46 10*3/MM3 (ref 3.4–10.8)

## 2020-04-07 PROCEDURE — 89051 BODY FLUID CELL COUNT: CPT | Performed by: PHYSICIAN ASSISTANT

## 2020-04-07 PROCEDURE — 86022 PLATELET ANTIBODIES: CPT | Performed by: INTERNAL MEDICINE

## 2020-04-07 PROCEDURE — 86431 RHEUMATOID FACTOR QUANT: CPT | Performed by: INTERNAL MEDICINE

## 2020-04-07 PROCEDURE — 82962 GLUCOSE BLOOD TEST: CPT

## 2020-04-07 PROCEDURE — 87205 SMEAR GRAM STAIN: CPT | Performed by: PHYSICIAN ASSISTANT

## 2020-04-07 PROCEDURE — 87070 CULTURE OTHR SPECIMN AEROBIC: CPT | Performed by: PHYSICIAN ASSISTANT

## 2020-04-07 PROCEDURE — 85055 RETICULATED PLATELET ASSAY: CPT | Performed by: INTERNAL MEDICINE

## 2020-04-07 PROCEDURE — 89060 EXAM SYNOVIAL FLUID CRYSTALS: CPT | Performed by: PHYSICIAN ASSISTANT

## 2020-04-07 PROCEDURE — 25010000002 HEPARIN (PORCINE) PER 1000 UNITS: Performed by: INTERNAL MEDICINE

## 2020-04-07 PROCEDURE — 73562 X-RAY EXAM OF KNEE 3: CPT

## 2020-04-07 PROCEDURE — 0S9C3ZX DRAINAGE OF RIGHT KNEE JOINT, PERCUTANEOUS APPROACH, DIAGNOSTIC: ICD-10-PCS | Performed by: ORTHOPAEDIC SURGERY

## 2020-04-07 PROCEDURE — 93010 ELECTROCARDIOGRAM REPORT: CPT | Performed by: INTERNAL MEDICINE

## 2020-04-07 PROCEDURE — 85025 COMPLETE CBC W/AUTO DIFF WBC: CPT | Performed by: INTERNAL MEDICINE

## 2020-04-07 PROCEDURE — 83735 ASSAY OF MAGNESIUM: CPT | Performed by: INTERNAL MEDICINE

## 2020-04-07 PROCEDURE — 80069 RENAL FUNCTION PANEL: CPT | Performed by: INTERNAL MEDICINE

## 2020-04-07 PROCEDURE — 87075 CULTR BACTERIA EXCEPT BLOOD: CPT | Performed by: PHYSICIAN ASSISTANT

## 2020-04-07 PROCEDURE — 93005 ELECTROCARDIOGRAM TRACING: CPT | Performed by: INTERNAL MEDICINE

## 2020-04-07 RX ORDER — LACTULOSE 10 G/15ML
10 SOLUTION ORAL DAILY
Status: DISCONTINUED | OUTPATIENT
Start: 2020-04-07 | End: 2020-04-10 | Stop reason: HOSPADM

## 2020-04-07 RX ORDER — LIDOCAINE HYDROCHLORIDE 10 MG/ML
20 INJECTION, SOLUTION INFILTRATION; PERINEURAL ONCE
Status: DISCONTINUED | OUTPATIENT
Start: 2020-04-07 | End: 2020-04-10 | Stop reason: HOSPADM

## 2020-04-07 RX ORDER — LIDOCAINE HYDROCHLORIDE 10 MG/ML
2 INJECTION, SOLUTION EPIDURAL; INFILTRATION; INTRACAUDAL; PERINEURAL ONCE
Status: DISCONTINUED | OUTPATIENT
Start: 2020-04-07 | End: 2020-04-10 | Stop reason: HOSPADM

## 2020-04-07 RX ORDER — FUROSEMIDE 80 MG
80 TABLET ORAL
Status: DISCONTINUED | OUTPATIENT
Start: 2020-04-07 | End: 2020-04-08

## 2020-04-07 RX ADMIN — FUROSEMIDE 80 MG: 80 TABLET ORAL at 20:41

## 2020-04-07 RX ADMIN — HYDROCODONE BITARTRATE AND ACETAMINOPHEN 1 TABLET: 5; 325 TABLET ORAL at 09:43

## 2020-04-07 RX ADMIN — HEPARIN SODIUM 5000 UNITS: 5000 INJECTION INTRAVENOUS; SUBCUTANEOUS at 20:41

## 2020-04-07 RX ADMIN — LACTULOSE 10 G: 10 SOLUTION ORAL at 12:57

## 2020-04-07 RX ADMIN — CHLORDIAZEPOXIDE HYDROCHLORIDE 25 MG: 25 CAPSULE ORAL at 22:04

## 2020-04-07 RX ADMIN — FUROSEMIDE 80 MG: 80 TABLET ORAL at 12:56

## 2020-04-07 RX ADMIN — CHLORDIAZEPOXIDE HYDROCHLORIDE 25 MG: 25 CAPSULE ORAL at 14:26

## 2020-04-07 RX ADMIN — HEPARIN SODIUM 5000 UNITS: 5000 INJECTION INTRAVENOUS; SUBCUTANEOUS at 01:01

## 2020-04-07 RX ADMIN — HYDROCODONE BITARTRATE AND ACETAMINOPHEN 1 TABLET: 5; 325 TABLET ORAL at 04:49

## 2020-04-07 RX ADMIN — HEPARIN SODIUM 5000 UNITS: 5000 INJECTION INTRAVENOUS; SUBCUTANEOUS at 12:56

## 2020-04-07 RX ADMIN — Medication 100 MG: at 12:56

## 2020-04-07 RX ADMIN — CHLORDIAZEPOXIDE HYDROCHLORIDE 25 MG: 25 CAPSULE ORAL at 06:09

## 2020-04-07 RX ADMIN — COLCHICINE 0.3 MG: 0.6 TABLET, FILM COATED ORAL at 01:00

## 2020-04-07 NOTE — CONSULTS
Orthopaedic Consultation      Patient: Juan James    Date of Admission: 4/4/2020 10:11 AM    YOB: 1945    Medical Record Number: [id number]    Attending Physician:  Jayy Bruno MD    Consulting Physician:  Cali Stevens PA-C        Chief Complaints: Right knee pain    History of Present Illness:   The patient is a pleasant 74-year-old gentleman.  He is currently admitted to [facility] with atrial flutter with slow ventricular response likely related to digoxin, acute renal failure with hyperkalemia who underwent dialysis, coronary artery disease, history of alcohol use, COPD, history abdominal aortic aneurysm, peripheral vascular disease, chronic diastolic congestive heart failure, and a previous history of gout.  However the patient is not able to take any anti-inflammatories.  He was previously on allopurinol.  However was not able to take these due to his kidneys.  They did give him 1 dose of colchicine in the hospital.  He has complaints of right knee pain.  He has been getting some pain medications.  The patient states that since getting the pain medications he feels like the knee is feeling a little bit better.  However he still complains of right knee pain and swelling.  Orthopedics has been consulted for the evaluation of this.        Allergies:   Allergies   Allergen Reactions   • Ranexa [Ranolazine Er] Unknown (See Comments)     Headaches and falling asleep.       Medications:   Home Medications:  Medications Prior to Admission   Medication Sig Dispense Refill Last Dose   • aspirin 81 MG chewable tablet Chew 81 mg Daily.   Taking   • digoxin (LANOXIN) 125 MCG tablet Take 125 mcg by mouth every morning.   Taking   • furosemide (LASIX) 40 MG tablet Take 1 tablet by mouth 2 (Two) Times a Day. 60 tablet 6 Taking   • glucosamine-chondroitin 500-400 MG capsule capsule Take  by mouth 2 (Two) Times a Day With Meals.   Taking   • glucose blood test strip USE 1 STRIP  TO CHECK GLUCOSE ONCE DAILY 100 each 0    • isosorbide mononitrate (IMDUR) 30 MG 24 hr tablet Take 30 mg by mouth Daily.   Taking   • metFORMIN (GLUCOPHAGE) 1000 MG tablet Take 1 and 1/2 po qam and 1 po qhs 225 tablet 3    • metOLazone (ZAROXOLYN) 2.5 MG tablet Take 2.5 mg by mouth Daily.      • metoprolol succinate XL (TOPROL-XL) 100 MG 24 hr tablet Take 100 mg by mouth Every Morning.   Patient Taking Differently   • Multiple Vitamins-Minerals (MULTIVITAMIN PO) Take 1 tablet by mouth daily.   Taking   • potassium chloride ER (K-TAB) 20 MEQ tablet controlled-release ER tablet Take 1 tablet by mouth 2 (Two) Times a Day. 60 tablet 6 Taking   • valsartan (DIOVAN) 40 MG tablet Take 1 tablet by mouth Daily. 30 tablet 6    • allopurinol (ZYLOPRIM) 300 MG tablet Take 1 tablet by mouth Daily. 30 tablet 6    • ezetimibe (ZETIA) 10 MG tablet Take 1 tablet by mouth every night at bedtime. 30 tablet 6 Taking   • indomethacin SR (INDOCIN SR) 75 MG CR capsule Take 1 capsule by mouth 2 (Two) Times a Day With Meals. 60 capsule 3        Current Medications:  Scheduled Meds:  chlordiazePOXIDE 25 mg Oral Q8H   dextrose 50 mL Intravenous Once   furosemide 80 mg Oral BID   heparin (porcine) 5,000 Units Subcutaneous Q8H   lactulose 10 g Oral Daily   lidocaine 20 mL Infiltration Once   thiamine 100 mg Oral Daily     Continuous Infusions:   PRN Meds:.•  albumin human  •  dextrose  •  fentaNYL citrate (PF)  •  heparin (porcine)  •  HYDROcodone-acetaminophen  •  [COMPLETED] Insert peripheral IV **AND** sodium chloride    Past Medical History:   Diagnosis Date   • AAA (abdominal aortic aneurysm) (CMS/HCC)    • Ankle edema    • Aortic aneurysm (CMS/HCC)    • Arthritis    • Atherosclerotic heart disease of native coronary artery without angina pectoris    • Atrial fibrillation (CMS/HCC)    • Benign essential hypertension    • BPH (benign prostatic hyperplasia)    • Cataract    • Cervical disc disorder    • Cervical radiculitis 09/2016   •  Cervical spondylosis 11/2012   • CHF (congestive heart failure) (CMS/HCC)    • Colon polyps     FOLLOWED BY DR. TAYA CRUZ   • COPD (chronic obstructive pulmonary disease) (CMS/HCC)    • Coronary artery disease     STENT X 3   • DDD (degenerative disc disease), cervical    • DDD (degenerative disc disease), lumbar    • Decreased pedal pulses    • Dyspnea    • Edema    • Edema of extremities    • Elevated transaminase level    • Emphysema/COPD (CMS/HCC)    • Enlarged prostate without lower urinary tract symptoms (luts)    • Essential hypertriglyceridemia    • GERD (gastroesophageal reflux disease)    • GERD without esophagitis    • Gout    • Hearing loss    • Hepatic fibrosis    • High blood pressure    • High risk medication use    • History of blood transfusion    • History of cardiac disorder    • History of cataract    • History of COPD    • History of esophagitis    • History of gastrointestinal hemorrhage    • History of hypertension    • History of nicotine dependence    • History of peptic ulcer    • History of sleep apnea    • Hoarseness, persistent    • Hyperglycemia    • Hyperlipidemia    • Hypertension    • Iliac artery aneurysm (CMS/HCC)    • Insomnia    • Lower back pain    • Lumbar radiculopathy 05/2016   • Mastodynia of left breast    • Myasthenia gravis (CMS/HCC)    • Neck pain    • Nonalcoholic hepatosteatosis    • OA (osteoarthritis)    • 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HCC)    • PAF (paroxysmal atrial fibrillation) (CMS/HCC)    • Peptic ulceration     PUD   • Prediabetes    • Prediabetes    • Prostate disease    • Pulmonary embolism (CMS/HCC)     PT DENIES HAVING THIS   • Rectal pain 07/2019   • Shortness of breath    • Status post angioplasty    • Tinnitus      Past Surgical History:   Procedure Laterality Date   • ANTERIOR CERVICAL DISCECTOMY W/ FUSION N/A 3/25/2016    Procedure: C3- C5 CERVICAL DISCECTOMY ANTERIOR FUSION WITH INSTRUMENTATION;  Surgeon: Bill Washington MD;  Location: [facility]  MAIN OR;  Service:    • ARTHRODESIS N/A 07/1983    LUMBAR   • ARTHRODESIS N/A 1987    LUMBAR   • ARTHRODESIS      Spinal Arthrodesis-lower spine-7/1983, 1987--upper spine-1988, 7/1990-Abstracted from [employer]   • BACK SURGERY      Lower Back Surgery   • CARDIAC CATHETERIZATION  07/2009   • CARDIAC CATHETERIZATION  03/18/2003    STENT TO RCA AND PCA, DR. PRIYA LUGO   • CATARACT EXTRACTION Bilateral    • CERVICAL ARTHRODESIS N/A 07/1990   • CERVICAL ARTHRODESIS N/A 1988   • COLONOSCOPY N/A 10/3/2019    4 MM HYPERPLASTIC POLYP IN ILEOCECAL VALVE, 4 MM SESSILE SERRATED ADENOMA POLYP IN DESCENDING, 5 TUBULOVILLOUS ADENOMA POLYPS IN RECTUM, 3 TUBULAR ADENOMA POLYPS IN DISTAL RECTUM, 26 MM TUBULAR ADENOMA POLYP IN RECTUM, PIECEMEAL POLYPECTOMY, AREA TATTOOED, RESCOPE IN 6 MONTHS, DR. TAYA CRUZ AT [facility]   • CORONARY ANGIOPLASTY WITH STENT PLACEMENT  02/2009    and 7/2009   • ENDOSCOPY N/A 06/24/2012    NON BLEEDING EROSIVE GASTROPATHY, 1 DUODENAL ULCER WITH CLEAN BASE, DR. BRANDY WREN AT [facility]   • ENDOSCOPY AND COLONOSCOPY N/A 11/20/2007    EGD WNL, 8 ADENOMATOUS POLYPS IN RECTO-SIGMOID, RESCOPE IN 3 YRS, DR. CRISTINA RODRIGUEZ AT [facility]   • FOOT SURGERY Left    • HAND SURGERY Left    • HAND SURGERY Right    • KNEE ARTHROSCOPY Left    • LAPAROSCOPIC CHOLECYSTECTOMY     • NECK SURGERY     • VASECTOMY Bilateral      Social History     Occupational History   • Not on file   Tobacco Use   • Smoking status: Former Smoker     Types: Cigarettes   • Smokeless tobacco: Never Used   • Tobacco comment: smoked 1 to 2 packs per day for 40 or more years   Substance and Sexual Activity   • Alcohol use: Yes     Comment: 3 bourbons daily--Caffeine use-3 cups of coffee daily   • Drug use: No   • Sexual activity: Defer    Social History     Social History Narrative   • Not on file     Family History   Adopted: Yes   Problem Relation Age of Onset   • Heart attack Mother    • Alcohol abuse Mother    • Heart disease Mother    • No Known Problems  Father    • Heart disease Other         FH in females before the age of 65         Review of Systems:   No other pertinent positives or negatives other than what is mentioned in the HPI and below.  Constitutional: Negative for fatigue, fever, or weight loss  HEENT: No active headache.  Pulmonary: Patient denies SOA.  Cardiovascular: Patient denies any chest pain.  Gastrointestinal:  Patient denies active vomiting or diarrhea.  Musculoskeletal: Positive for right knee pain and swelling.  Neurological: Patient denies active dizziness or loss of consciousness.  Skin: Patient denies any active bleeding.    Vital signs in last 24 hours:  Temp:  [97.3 °F (36.3 °C)-99 °F (37.2 °C)] 97.3 °F (36.3 °C)  Heart Rate:  [66-78] 77  Resp:  [16-18] 18  BP: (109-168)/(65-94) 168/89  Vitals:    04/07/20 0357 04/07/20 0508 04/07/20 0736 04/07/20 1040   BP: 160/94  139/82 168/89   BP Location: Right arm  Left arm Left arm   Patient Position: Lying  Lying Lying   Pulse: 71  78 77   Resp: 16  18    Temp: 98.4 °F (36.9 °C)  98.2 °F (36.8 °C) 97.3 °F (36.3 °C)   TempSrc: Oral  Oral Oral   SpO2: 98%  94% 91%   Weight:  107 kg (236 lb 5.3 oz)     Height:              Physical Exam: 74 y.o. male         General Appearance:  Alert, cooperative, in no acute distress    HEENT:    Atraumatic, Pupils are equal   Neck:   Cervical spine midline, no appreciable JVD   Lungs:     Breathing non-labored and chest rise symmetric    Heart:   Abdomen:     Rectal:       Extremities:   Pulses  Neurovascular:   Skin:   Musculoskeletal:      Pulse regular    Soft, Non-tender or distended    Deferred        No clubbing, cyanosis, or edema    Intact    Cranial nerves 2 - 12 grossly intact, sensation intact    No skin lesions  Focused physical examination of the right knee was performed in the hospital today.  There is a moderate knee effusion.  No erythema.  No induration.  No fluctuance noted.  He does tolerate some range of motion of the knee.  However it is  with discomfort.  Gets about 90 degrees of flexion active assisted.  10 degrees of extension.  Foot is warm and well-perfused.  Ligamentous examination is stable.  Pulses intact distally.  Compartments are soft.  Tender to palpation diffusely about the knee.     Diagnostic Tests:    Results from last 7 days   Lab Units 04/07/20  1023 04/07/20  0318   WBC 10*3/mm3  --  10.46   HEMOGLOBIN g/dL  --  10.8*   HEMATOCRIT %  --  31.2*   PLATELETS 10*3/mm3 160 109*     Results from last 7 days   Lab Units 04/07/20  0318   SODIUM mmol/L 130*   POTASSIUM mmol/L 4.8   CHLORIDE mmol/L 93*   CO2 mmol/L 23.8   BUN mg/dL 30*   CREATININE mg/dL 2.71*   GLUCOSE mg/dL 139*   CALCIUM mg/dL 8.5*     Results from last 7 days   Lab Units 04/04/20  1027   INR  1.23*   APTT seconds 30.7         Assessment:  Patient Active Problem List   Diagnosis   • Follow-up examination, following other surgery   • Lumbar radiculopathy   • Cervical radiculitis   • Essential hypertension   • Coronary artery disease involving native heart   • Chronic bronchitis (CMS/HCC)   • Hyperlipidemia   • Prediabetes   • Edema   • Rectal pain   • 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[facility])   • Cervical spondylosis with radiculopathy   • History of colon polyps   • Tinnitus   • Shortness of breath   • Pulmonary embolism (CMS/HCC)   • Peptic ulceration   • OA (osteoarthritis)   • Neck pain   • Lower back pain   • Hypertension   • History of blood transfusion   • Hearing loss   • GERD (gastroesophageal reflux disease)   • DDD (degenerative disc disease), lumbar   • DDD (degenerative disc disease), cervical   • Coronary artery disease   • COPD (chronic obstructive pulmonary disease) (CMS/HCC)   • Colon polyps   • CHF (congestive heart failure) (CMS/HCC)   • Cervical spondylosis   • Cervical disc disorder   • Cataract   • BPH (benign prostatic hyperplasia)   • Atrial fibrillation (CMS/HCC)   • Arthritis   • AAA (abdominal aortic aneurysm) (CMS/HCC)   • Medicare annual wellness  visit, subsequent   • Heart block AV complete (CMS/HCC)   • PVD (peripheral vascular disease) with claudication (CMS/HCC)   • Acute renal failure (ARF) (CMS/HCC)         Plan:     I did have an extensive discussion with the patient in regards to his situation in the hospital today.  Orthopedics has been consulted for right knee pain and swelling.  We will go ahead and order 3 view x-ray of the right knee.  He does have a previous history of gout.  However due to his acute kidney injury they have been holding his allopurinol.  He did get 1 dose of colchicine.  He has noted some improvement in the pain since starting the narcotic medication.  However he still continues with pain and swelling.  Therefore options were discussed.  We will go ahead and aspirate the knee.  I will send this off to the lab to confirm that it is gout.  If findings are consistent with gout in the knee.  We could possibly consider corticosteroid injection at that time.  However I would hold off on the cortisone injection until we get the results of the aspiration to make sure that there are no signs of infection.  All questions answered.  He tolerated the procedure well.  I will discuss the above findings with my supervising physician Dr. Jona Pink.  Further recommendations to follow throughout his hospital course.  Thank you very much for this consultation.    Date: 4/7/2020  Cali Stevens PA-C

## 2020-04-07 NOTE — PROGRESS NOTES
Adult Nutrition  Assessment/PES    Patient Name:  Juan James  YOB: 1945  MRN: [id number]  Admit Date:  4/4/2020    Assessment Date:  4/7/2020    Comments:  Nutrition assessment triggered by MST 2 score, indication of reduced oral intake and weight loss on nurse admission screen.     Pt out of CCU now, eating HH/renal diet. Intake % x 4 meals. No significant weight loss based on wt history. BMI 31. Multiple medical issues -heart/lungs/kidneys. Any weight fluctuation likely relative to this. Will continue to monitor.     Reason for Assessment     Row Name 04/07/20 1137          Reason for Assessment    Reason For Assessment  identified at risk by screening criteria     Diagnosis  cardiac disease;renal disease;metabolic state Heart block, hyperkalemic, ARF, ETOH use     Identified At Risk by Screening Criteria  MST SCORE 2+         Nutrition/Diet History     Row Name 04/07/20 1138          Nutrition/Diet History    Typical Food/Fluid Intake  Pt is eating fine, %. Moved out of CCU.     Factors Affecting Nutritional Intake  pain;altered gastrointestinal function gouty knee; constipation (no BM since 4/4)         Anthropometrics     Row Name 04/07/20 1138 04/07/20 0508       Anthropometrics    Weight  --  107 kg (236 lb 5.3 oz)       Admit Weight    Admit Weight Method  stated  --    Admit Weight  109 kg (240 lb)  --       Body Mass Index (BMI)    BMI Assessment  BMI 30-34.9: obesity grade I  --        Labs/Tests/Procedures/Meds     Row Name 04/07/20 1139          Labs/Procedures/Meds    Lab Results Reviewed  reviewed     Lab Results Comments  Na 130, Cl 93, Glu 139, BUN/Cr ^^        Diagnostic Tests/Procedures    Diagnostic Test/Procedure Reviewed  reviewed     Diagnostic Test/Procedures Comments   emergent Shiley catheter placement for dialysis 4/4        Medications    Pertinent Medications Reviewed  reviewed     Pertinent Medications Comments  lactulose, thiamine, diuretic, librium          Physical Findings     Row Name 04/07/20 1140          Physical Findings    Overall Physical Appearance  other (see comments) tremors     Gastrointestinal  constipation     Skin  other (see comments) no skin impairment           Nutrition Prescription Ordered     Row Name 04/07/20 1147          Nutrition Prescription PO    Common Modifiers  Cardiac;Renal         Evaluation of Received Nutrient/Fluid Intake     Row Name 04/07/20 1148          PO Evaluation    Number of Meals  4     % PO Intake  %               Problem/Interventions:  Problem 1     Row Name 04/07/20 1153          Nutrition Diagnoses Problem 1    Problem 1  Nutrition Appropriate for Condition at this Time     Etiology (related to)  Medical Diagnosis     Cardiac  CAD;CHF     Renal  ARF               Intervention Goal     Row Name 04/07/20 1153          Intervention Goal    General  Disease management/therapy     PO  Maintain intake;PO intake (%)     PO Intake %  75 %         Nutrition Intervention     Row Name 04/07/20 1154          Nutrition Intervention    RD/Tech Action  Follow Tx progress;Care plan reviewd;Encourage intake;Interview for preference           Education/Evaluation     Row Name 04/07/20 1154          Education    Education  Will Instruct as appropriate        Monitor/Evaluation    Monitor  Per protocol           Electronically signed by:  Hyacinth Newsome RD  04/07/20 11:54

## 2020-04-07 NOTE — PROGRESS NOTES
"                                              LOS: 3 days   Patient Care Team:  Luda Kidd MD as PCP - General (Family Medicine)  Luda Kidd MD as PCP - Family Medicine  Saul Fang MD as Consulting Physician (Cardiology)  Bill Washington MD as Surgeon (Neurosurgery)  Self, Zafar Fraire MD as Consulting Physician (Vascular Surgery)    Chief Complaint:  F/up acute hyperkalemia, SHANNAN, arrhythmia, bradycardia, new hypertension    Subjective   Interval History  Diffuse joint pain mainly in the right knee.  Eager to go home.  Stated that cardiology and nephrology has cleared him but it does not appear to be the case.  Has not been able to ambulate on his own.    REVIEW OF SYSTEMS:   CARDIOVASCULAR: No chest pain, chest pressure or chest discomfort. No palpitations or edema.   RESPIRATORY: No shortness of breath, cough or sputum.   GASTROINTESTINAL: No anorexia, nausea, vomiting or diarrhea. No abdominal pain or blood.   HEMATOLOGIC: No bleeding or bruising.     Ventilator/Non-Invasive Ventilation Settings (From admission, onward)    None                Physical Exam:     Vital Signs  Temp:  [97.3 °F (36.3 °C)-99 °F (37.2 °C)] 97.3 °F (36.3 °C)  Heart Rate:  [66-78] 77  Resp:  [16-18] 18  BP: (119-168)/(66-94) 168/89    Intake/Output Summary (Last 24 hours) at 4/7/2020 1453  Last data filed at 4/7/2020 0904  Gross per 24 hour   Intake 240 ml   Output 825 ml   Net -585 ml     Flowsheet Rows      First Filed Value   Admission Height  188 cm (74\") Documented at 04/04/2020 1026   Admission Weight  109 kg (240 lb) Documented at 04/04/2020 1026          General Appearance:   Alert, cooperative, in no acute distress   ENMT:  Mallampati score 4, moist mucous membrane   Eyes:  Pupils equals and reactive to light. EOMI   Neck:   Trachea midline. No thyromegaly.   Lungs:    Very faint bibasilar crackles.  No wheezing.  Nonlabored breathing.    Heart:   Regular rhythm and normal rate, normal S1 and S2, no  "        murmur   Skin:   No rash   Abdomen:    Obese. Soft. No tenderness. No HSM.  Positive bowel sounds   Neuro:  Conscious, alert, oriented x3. Strength 5/5 in upper an lower  ext   Extremities:  Moves all extremities well, trace edema in legs, no cyanosis, no redness          Results Review:        Results from last 7 days   Lab Units 04/07/20  0318 04/06/20  0404 04/05/20  0159   SODIUM mmol/L 130* 135* 133*   POTASSIUM mmol/L 4.8 4.5 5.3*   CHLORIDE mmol/L 93* 98 93*   CO2 mmol/L 23.8 25.5 25.7   BUN mg/dL 30* 24* 42*   CREATININE mg/dL 2.71* 2.76* 3.58*   GLUCOSE mg/dL 139* 106* 116*   CALCIUM mg/dL 8.5* 8.4* 8.8     Results from last 7 days   Lab Units 04/04/20  1124   TROPONIN T ng/mL 0.059*     Results from last 7 days   Lab Units 04/07/20  1023 04/07/20  0318 04/06/20  0404 04/05/20  0159   WBC 10*3/mm3  --  10.46 8.16 9.21   HEMOGLOBIN g/dL  --  10.8* 10.1* 10.4*   HEMATOCRIT %  --  31.2* 29.1* 30.5*   PLATELETS 10*3/mm3 160 109* 131* 159     Results from last 7 days   Lab Units 04/04/20  1027   INR  1.23*   APTT seconds 30.7     Results from last 7 days   Lab Units 04/04/20  1027   PROBNP pg/mL 4,422.0*       I reviewed the patient's new clinical results.  I personally viewed and interpreted the patient's CXR        Medication Review:     chlordiazePOXIDE 25 mg Oral Q8H   dextrose 50 mL Intravenous Once   furosemide 80 mg Oral BID   heparin (porcine) 5,000 Units Subcutaneous Q8H   lactulose 10 g Oral Daily   lidocaine 20 mL Infiltration Once   lidocaine PF 1% 2 mL Injection Once   thiamine 100 mg Oral Daily       Diagnostic imaging:  I personally and Independently reviewed and interpreted the following images:  Knee x-ray 4/7/2020: Agree with radiologist interpretation.    Assessment   1. Atrial flutter with slow ventricular response related to hyperkalemia, digoxin and beta-blocker  2. Hypotension, intermittent, resolved  3. Dizziness, secondary to above  4. Acute severe hyperkalemia,  corrected  5. Metabolic acidosis, secondary to SHANNAN  6. Acute renal failure: Secondary to NSAID on top of his blood pressure medications and perhaps lack of intake  7. Chronic diastolic CHF  8. Severe pulmonary hypertension, RVSP 93: Likely combination of CHF and probable sleep apnea.  9. Essential hypertension  10. Mild hyponatremia  11. Mild anemia, stable  12. Alcohol withdrawal/mild  13. Daily drinker: 2 ounces of alcohol  14. COPD/chronic bronchitis, no exacerbation  15. Snoring/probable sleep apnea  16. Joints pain and stiffness  17. Elevated rheumatoid factor, very mild.  Doubt represent active disease  18. Right knee effusion, s/p aspiration 4/7/2020        Plan     · Decrease Librium to 10 mg every 8 h.  Watch for alcohol withdrawal  · Consult PT  · Consulted Ortho for joints pain and aspiration of the right knee performed.  Studies consistent with gout.  · Colchicine started.  Will dose cautiously due to renal failure.  · Diuretics.  Agree with nephrology.  · PRN bronchodilators  · Lortab 6 Q 6 PRN  · PO thiamine  · Outpatient home sleep study for suspected sleep apnea.  We will arrange for the      Jayy Bruno MD  04/07/20  14:53          This note was dictated utilizing Dragon dictation

## 2020-04-07 NOTE — PROCEDURES
Orthopedic Procedure Note      Patient: Juan James    Date of Admission: 4/4/2020 10:11 AM    YOB: 1945    Medical Record Number: [id number]    Attending Physician: Jayy Bruno MD  Surgeon: Dr. Jona Pink    Pre-procedure Diagnosis: right Knee pain/effusion  Post-procedure Diagnosis: same  Procedure: right Knee Aspiration       Informed Consent: 74 y.o. male presents with knee pain/effusion. The risks, benefits, and alternative forms of treatment were discussed with the patient. Patient voiced understanding of the information given and consents to undergo a knee aspiration.    Time Out was taken to identify the correct patient, extremity, and procedure. The knee was then prepped with betadine and alcohol in a sterile manner and allowed to dry.    Aspiration: The knee was prepped in the usual sterile fashion.  First with alcohol pads.  Followed by Betadine swabs.  The knee was anesthetized using a 25-1/2 gauge needle with 2 cc of 1% lidocaine.  The knee was then once again sterilized. The knee was then aspirated using a lateral retinacular approach utilizing an 18g 1 1/2 inch needle. Approximately 60 cc of cloudy yellow synovial fluid was obtained and sent to the lab in a sterile container for analysis.  The patient tolerated the procedure well and a sterile band aid was applied.      Date:4/7/2020    Cali Stevens PA-C

## 2020-04-07 NOTE — PROGRESS NOTES
Juan James   74 y.o.  male    LOS: 3 days   Patient Care Team:  Luda Kidd MD as PCP - General (Family Medicine)  Luda Kidd MD as PCP - Family Medicine  Saul Fang MD as Consulting Physician (Cardiology)  Bill Washington MD as Surgeon (Neurosurgery)  Self, Zafar Fraire MD as Consulting Physician (Vascular Surgery)      Subjective       Review of Systems:   No SOA. c/o right knee pain     Medication Review:   Current Facility-Administered Medications:   •  albumin human 25 % IV SOLN 12.5 g, 12.5 g, Intravenous, PRN, Juan Arroyo MD  •  chlordiazePOXIDE (LIBRIUM) capsule 25 mg, 25 mg, Oral, Q8H, Jayy Bruno MD, 25 mg at 04/07/20 0609  •  dextrose (D50W) 25 g/ 50mL Intravenous Solution 50 mL, 50 mL, Intravenous, Q1H PRN, Jayy Bruno MD, 50 mL at 04/05/20 1020  •  dextrose (D50W) 25 g/ 50mL Intravenous Solution 50 mL, 50 mL, Intravenous, Once, Jayy Bruno MD  •  fentaNYL citrate (PF) (SUBLIMAZE) injection 25 mcg, 25 mcg, Intravenous, Q2H PRN, Jayy Bruno MD, 25 mcg at 04/06/20 0659  •  furosemide (LASIX) tablet 80 mg, 80 mg, Oral, BID, Kristen Bruce MD  •  heparin (porcine) 5000 UNIT/ML injection 5,000 Units, 5,000 Units, Subcutaneous, Q8H, Jayy Bruno MD, 5,000 Units at 04/07/20 0101  •  heparin (porcine) injection 1,000 Units, 1,000 Units, Intracatheter, PRN, Jayy Bruno MD, 4,000 Units at 04/05/20 1436  •  HYDROcodone-acetaminophen (NORCO) 5-325 MG per tablet 1 tablet, 1 tablet, Oral, Q6H PRN, Jayy Bruno MD, 1 tablet at 04/07/20 0449  •  lactulose (CHRONULAC) 10 GM/15ML solution 10 g, 10 g, Oral, Daily, Kristen Bruce MD  •  [COMPLETED] Insert peripheral IV, , , Once **AND** sodium chloride 0.9 % flush 10 mL, 10 mL, Intravenous, PRN, Jayy Bruno MD  •  thiamine (VITAMIN B-1) tablet 100 mg, 100 mg, Oral, Daily, Jayy Bruno MD, 100 mg at 04/06/20 1548      Objective     Vital Sign Min/Max for last 24 hours  Temp  Min: 98 °F (36.7  °C)  Max: 99 °F (37.2 °C)   BP  Min: 109/65  Max: 160/94    Pulse  Min: 63  Max: 80     Wt Readings from Last 3 Encounters:   04/07/20 107 kg (236 lb 5.3 oz)   12/26/19 111 kg (245 lb 3.2 oz)   11/26/19 113 kg (248 lb 8 oz)        Intake/Output Summary (Last 24 hours) at 4/7/2020 0941  Last data filed at 4/7/2020 0904  Gross per 24 hour   Intake 480 ml   Output 825 ml   Net -345 ml     Physical Exam:      General Appearance:    Well developed and well nourished in no acute distress   Neck:    no JVD   Lungs:     Clear to auscultation bilaterally. No wheezes, rhonchi or rales. No accessory muscle use.     Heart:    Regular rate and rhythm.  Normal S1 and S2. No murmur, no gallop, rub or lift.    Abdomen:     Normal bowel sounds, , soft        non-tender, non-distended,    Extremities:   No edema.     Skin:   Warm and dry   Neurologic:   awake alert and oriented x3, speech clear and approp, no facial drooping      Monitor:  nsr  Results Review:     I reviewed the patient's new clinical results.    Sodium Sodium   Date Value Ref Range Status   04/07/2020 130 (L) 136 - 145 mmol/L Final   04/06/2020 135 (L) 136 - 145 mmol/L Final   04/05/2020 133 (L) 136 - 145 mmol/L Final   04/04/2020 135 (L) 136 - 145 mmol/L Final   04/04/2020 132 (L) 136 - 145 mmol/L Final      Potassium Potassium   Date Value Ref Range Status   04/07/2020 4.8 3.5 - 5.2 mmol/L Final   04/06/2020 4.5 3.5 - 5.2 mmol/L Final   04/05/2020 5.3 (H) 3.5 - 5.2 mmol/L Final   04/04/2020 7.8 (C) 3.5 - 5.2 mmol/L Final   04/04/2020 8.3 (C) 3.5 - 5.2 mmol/L Final      Chloride Chloride   Date Value Ref Range Status   04/07/2020 93 (L) 98 - 107 mmol/L Final   04/06/2020 98 98 - 107 mmol/L Final   04/05/2020 93 (L) 98 - 107 mmol/L Final   04/04/2020 99 98 - 107 mmol/L Final   04/04/2020 96 (L) 98 - 107 mmol/L Final      Bicarbonate No results found for: PLASMABICARB   BUN BUN   Date Value Ref Range Status   04/07/2020 30 (H) 8 - 23 mg/dL Final   04/06/2020 24 (H) 8  - 23 mg/dL Final   04/05/2020 42 (H) 8 - 23 mg/dL Final   04/04/2020 79 (H) 8 - 23 mg/dL Final   04/04/2020 80 (H) 8 - 23 mg/dL Final      Creatinine Creatinine   Date Value Ref Range Status   04/07/2020 2.71 (H) 0.76 - 1.27 mg/dL Final   04/06/2020 2.76 (H) 0.76 - 1.27 mg/dL Final   04/05/2020 3.58 (H) 0.76 - 1.27 mg/dL Final   04/04/2020 4.95 (H) 0.76 - 1.27 mg/dL Final   04/04/2020 5.14 (H) 0.76 - 1.27 mg/dL Final      Calcium Calcium   Date Value Ref Range Status   04/07/2020 8.5 (L) 8.6 - 10.5 mg/dL Final   04/06/2020 8.4 (L) 8.6 - 10.5 mg/dL Final   04/05/2020 8.8 8.6 - 10.5 mg/dL Final   04/04/2020 9.7 8.6 - 10.5 mg/dL Final   04/04/2020 9.6 8.6 - 10.5 mg/dL Final      Magnesium Magnesium   Date Value Ref Range Status   04/07/2020 2.1 1.6 - 2.4 mg/dL Final   04/04/2020 3.7 (H) 1.6 - 2.4 mg/dL Final        Results from last 7 days   Lab Units 04/07/20  0318   WBC 10*3/mm3 10.46   HEMOGLOBIN g/dL 10.8*   HEMATOCRIT % 31.2*   PLATELETS 10*3/mm3 109*     Lab Results   Lab Value Date/Time    TROPONINT 0.059 (C) 04/04/2020 1124     No results found for: CHOL  Lab Results   Component Value Date    HDL 38 (L) 11/26/2019    HDL 37 (L) 07/29/2019     Lab Results   Component Value Date     (H) 11/26/2019     (H) 07/29/2019     Lab Results   Component Value Date    TRIG 175 (H) 11/26/2019    TRIG 140 07/29/2019     No components found for: CHOLHDL  [unfilled]  Results from last 7 days   Lab Units 04/04/20  1027   INR  1.23*       Echo 4/6/20  Left Ventricle Calculated EF = 75%. Estimated EF was in agreement with the calculated EF. Estimated EF appears to be in the range of 61 - 65%. Normal left ventricular cavity size noted. All left ventricular wall segments contract normally. Left ventricular wall thickness is consistent with mild-to-moderate concentric hypertrophy. Left ventricular diastolic dysfunction is noted (grade II w/high LAP) consistent with pseudonormalization.   Right Ventricle Normal  right ventricular systolic function noted. Right ventricular cavity is mildly dilated.   Left Atrium Left atrial cavity size is moderately dilated.   Right Atrium Right atrial cavity size is moderately dilated.   Aortic Valve The aortic valve is structurally normal. The valve appears trileaflet. No aortic valve regurgitation is present. No aortic valve stenosis is present.   Mitral Valve The mitral valve is normal in structure. Trace mitral valve regurgitation is present with a centrally-directed jet noted. No significant mitral valve stenosis is present.   Tricuspid Valve The tricuspid valve is normal. No evidence of tricuspid valve stenosis is present. Mild to moderate tricuspid valve regurgitation is present. Estimated right ventricular systolic pressure from tricuspid regurgitation is markedly elevated (>55 mmHg). Calculated right ventricular systolic pressure from tricuspid regurgitation is 93.1 mmHg. Moderate to severe pulmonary hypertension is present.   Pulmonic Valve The pulmonic valve is structurally normal. There is no significant pulmonic valve stenosis present. There is no pulmonic valve regurgitation present.   Greater Vessels No dilation of the aortic root is present. No dilation of the sinuses of Valsalva is present.   Pericardium The pericardium is normal. There is no evidence of pericardial effusion.           Assessment/Plan   Assessment/ Plan  1. Atrial flutter, with slow ventricular response, likely related to digoxin.          -Digibind given 4/5/20          -Per Dr. Aviles's office note patient not on anticoagulation due to large GI bleed  2.  Acute renal failure with hyperkalemia           -s/p emergent HD  3. Coronary artery disease status post stenting x3  4. History of alcohol use  5. COPD  6. Chronic diastolic CHF         -2D echo 4/6/20: EF 61 to 65% with grade 2 diastolic dysfunction.  7. History of abdominal aortic aneurysm, hypertension, dyslipidemia  8.  Peripheral vascular disease,  with bilateral significant distal digital ischemia, left aortic iliac and femoropopliteal disease, right femoropopliteal disease both moderate  9.  Severe pulmonary hypertension - ? STEFANO           -RVSP 93 mmHg per 2D echo 4/6/2020    Plan:  Blood pressure and heart rate stable today.  Patient has moved out of CCU.  No plans for dialysis today.  Nephrology recommending not to resume beta-blocker due to his hyperkalemia.  Discussed with Dr. Bruce. Diuretics started today.       AARON Dobbins  04/07/20  09:41      Time: 17 mins    Oli Miles M.D.   Reviewed our cardiology group Nurse Practitioner's note.    Pt interviewed and examined.   Findings verified.   Reviewed and agree with corrections or modifications of history, physical, and plans as indicated:    Appreciate Dr. Bruce's comments regarding no beta-blockade, because of pharmacodynamics, preventing potassium from moving into the cells affecting normal depolarization.  Very good response to discontinuation of nonsteroidals.  Because the renal response has been good, potassium has improved, and heart rates have improved.    Orthopedics saw him today, with through right knee fluid, may place steroids in the future.  Colchicine cautiously given.  We will continue to watch blood pressure.  Has varied between 119/66 and 168/89.  Options limited at this point.  Await renal consideration in the future regarding calcium channel blockers or ACE inhibitors.    Advised evaluation for sleep disordered breathing as an outpatient.  Advised total discontinuation of alcohol.    EMR Dragon/Transcription disclaimer:   Much of this encounter note is an electronic transcription/translation of spoken language to printed text. The electronic translation of spoken language may permit erroneous, or at times, nonsensical words or phrases to be inadvertently transcribed.  Although I have reviewed the note for such errors, some may still exist. Please contact me if needed  for clarification or correction.  Oli Miles M.D.

## 2020-04-07 NOTE — PROGRESS NOTES
"   LOS: 3 days    Patient Care Team:  Luda Kidd MD as PCP - General (Family Medicine)  Luda Kidd MD as PCP - Family Medicine  Saul Fang MD as Consulting Physician (Cardiology)  Bill Washington MD as Surgeon (Neurosurgery)  Self, Zafar Fraire MD as Consulting Physician (Vascular Surgery)    Chief Complaint:    Chief Complaint   Patient presents with   • Tingling   • Numbness     Follow UP Lyndsey and hyperkalemia .  Subjective     Interval History:     Complains of severe right knee pain gout.  Breathing better. Urinating. No bm since 4/4.  Eating. Shaky due to pain in knee.      Review of Systems:   See above .    Objective     Vital Signs  Temp:  [98 °F (36.7 °C)-99 °F (37.2 °C)] 98.2 °F (36.8 °C)  Heart Rate:  [63-80] 78  Resp:  [16-18] 18  BP: (109-160)/(65-94) 139/82    Flowsheet Rows      First Filed Value   Admission Height  188 cm (74\") Documented at 04/04/2020 1026   Admission Weight  109 kg (240 lb) Documented at 04/04/2020 1026          I/O this shift:  In: 120 [P.O.:120]  Out: 175 [Urine:175]  I/O last 3 completed shifts:  In: 840 [P.O.:600; I.V.:240]  Out: 1100 [Urine:1100]    Intake/Output Summary (Last 24 hours) at 4/7/2020 0909  Last data filed at 4/7/2020 0904  Gross per 24 hour   Intake 480 ml   Output 1125 ml   Net -645 ml       Physical Exam:  Chronically ill.  Looks stated age.  Oral mucosa dry.  A little tremulous.   Conversant. Oriented.  Heart RRR no s3 or rub  Lungs clear to auscultation. No wheezing  Abd + bs, soft, nontender  Ext no edema. Right femoral shiley with some oozing at site. Right knee edema, tender, warm.    Skin no rash.       Results Review:    Results from last 7 days   Lab Units 04/07/20  0318 04/06/20  0404 04/05/20  0159  04/04/20  1124   SODIUM mmol/L 130* 135* 133*   < > 132*   POTASSIUM mmol/L 4.8 4.5 5.3*   < > 8.3*   CHLORIDE mmol/L 93* 98 93*   < > 96*   CO2 mmol/L 23.8 25.5 25.7   < > 20.7*   BUN mg/dL 30* 24* 42*   < > 80*   CREATININE " mg/dL 2.71* 2.76* 3.58*   < > 5.14*   CALCIUM mg/dL 8.5* 8.4* 8.8   < > 9.6   BILIRUBIN mg/dL  --   --   --   --  0.5   ALK PHOS U/L  --   --   --   --  64   ALT (SGPT) U/L  --   --   --   --  39   AST (SGOT) U/L  --   --   --   --  24   GLUCOSE mg/dL 139* 106* 116*   < > 146*    < > = values in this interval not displayed.       Estimated Creatinine Clearance: 31.6 mL/min (A) (by C-G formula based on SCr of 2.71 mg/dL (H)).    Results from last 7 days   Lab Units 04/07/20 0318 04/06/20  0404 04/05/20 0159 04/04/20  1027   MAGNESIUM mg/dL 2.1  --   --  3.7*   PHOSPHORUS mg/dL 4.0 3.2 3.5  --        Results from last 7 days   Lab Units 04/05/20  0159   URIC ACID mg/dL 6.8       Results from last 7 days   Lab Units 04/07/20  0318 04/06/20  0404 04/05/20  0159 04/04/20  1027   WBC 10*3/mm3 10.46 8.16 9.21 9.82   HEMOGLOBIN g/dL 10.8* 10.1* 10.4* 12.1*   PLATELETS 10*3/mm3 109* 131* 159 227       Results from last 7 days   Lab Units 04/04/20  1027   INR  1.23*         Imaging Results (Last 24 Hours)     ** No results found for the last 24 hours. **          chlordiazePOXIDE 25 mg Oral Q8H   dextrose 50 mL Intravenous Once   heparin (porcine) 5,000 Units Subcutaneous Q8H   thiamine 100 mg Oral Daily          Medication Review:   Current Facility-Administered Medications   Medication Dose Route Frequency Provider Last Rate Last Dose   • albumin human 25 % IV SOLN 12.5 g  12.5 g Intravenous PRN Juan Arroyo MD       • chlordiazePOXIDE (LIBRIUM) capsule 25 mg  25 mg Oral Q8H Jayy Bruno MD   25 mg at 04/07/20 0609   • dextrose (D50W) 25 g/ 50mL Intravenous Solution 50 mL  50 mL Intravenous Q1H PRN Jayy Bruno MD   50 mL at 04/05/20 1020   • dextrose (D50W) 25 g/ 50mL Intravenous Solution 50 mL  50 mL Intravenous Once Jayy Bruno MD       • fentaNYL citrate (PF) (SUBLIMAZE) injection 25 mcg  25 mcg Intravenous Q2H PRN Jayy Bruno MD   25 mcg at 04/06/20 0659   • heparin (porcine) 5000 UNIT/ML  injection 5,000 Units  5,000 Units Subcutaneous Q8H Jayy Bruno MD   5,000 Units at 04/07/20 0101   • heparin (porcine) injection 1,000 Units  1,000 Units Intracatheter PRN Jayy Bruno MD   4,000 Units at 04/05/20 1436   • HYDROcodone-acetaminophen (NORCO) 5-325 MG per tablet 1 tablet  1 tablet Oral Q6H PRN Jayy Bruno MD   1 tablet at 04/07/20 0449   • sodium chloride 0.9 % flush 10 mL  10 mL Intravenous PRN Jayy Bruno MD       • thiamine (VITAMIN B-1) tablet 100 mg  100 mg Oral Daily Jayy Bruno MD   100 mg at 04/06/20 1548       Assessment/Plan   1. SHANNAN, likely ATN due to bradycardia, ARB + NSAID.  SP HD for hyperkalemia 4/4  And 4/5.  Non-oliguric.  Creatinine unchanged , K 4.8.  No dialysis today. Would not resume beta blocker with his propensity to hyperkalemia as BB keeps K from moving inside cells.   Diuretic today.   2. Atrial flutter with slow response due to Digoxin.  SP digibind 4/5.    3. CAD sp stent x 3.  Chronic diastolic dysfunction .  4. ETOH abuse. On scheduled Librium.   5. AAA  6.PAD  7. Severe Pulm HTN, TR mild to moderate.   Suspected STEFANO.  Sleep study as outpt.   8. Gout right knee.  Colchicine cautiously.  Po norco.  Ask ortho if they can do steroid injection.  He cannot take NSAIDS.   9. TCP.  Platelets falling. On subq heparin.  Check IPF and Heparin antibody .    Kristen Brcue MD  04/07/20  09:09

## 2020-04-07 NOTE — PLAN OF CARE
Problem: Patient Care Overview  Goal: Plan of Care Review  Outcome: Ongoing (interventions implemented as appropriate)  Flowsheets (Taken 4/7/2020 0510)  Progress: improving  Plan of Care Reviewed With: patient  Outcome Summary: Afib rate controlled. 2L HS. Rt femoral shiley still in place. Pt complaining of rt knee pain. Knee is swollen and tender to touch. Dr. Bean made aware and colchicine given along with Norco. Will continue to monitor.  Goal: Individualization and Mutuality  Outcome: Ongoing (interventions implemented as appropriate)  Goal: Discharge Needs Assessment  Outcome: Ongoing (interventions implemented as appropriate)  Goal: Interprofessional Rounds/Family Conf  Outcome: Ongoing (interventions implemented as appropriate)     Problem: Fall Risk (Adult)  Goal: Identify Related Risk Factors and Signs and Symptoms  Outcome: Ongoing (interventions implemented as appropriate)  Flowsheets (Taken 4/7/2020 0510)  Related Risk Factors (Fall Risk): gait/mobility problems  Signs and Symptoms (Fall Risk): presence of risk factors  Goal: Absence of Fall  Outcome: Ongoing (interventions implemented as appropriate)  Flowsheets (Taken 4/7/2020 0510)  Absence of Fall: making progress toward outcome     Problem: Skin Injury Risk (Adult)  Goal: Identify Related Risk Factors and Signs and Symptoms  Outcome: Ongoing (interventions implemented as appropriate)  Flowsheets (Taken 4/7/2020 0510)  Related Risk Factors (Skin Injury Risk): edema; mobility impaired  Goal: Skin Health and Integrity  Outcome: Ongoing (interventions implemented as appropriate)  Flowsheets (Taken 4/7/2020 0510)  Skin Health and Integrity: making progress toward outcome     Problem: Renal Failure/Kidney Injury, Acute (Adult)  Goal: Signs and Symptoms of Listed Potential Problems Will be Absent, Minimized or Managed (Renal Failure/Kidney Injury, Acute)  Outcome: Ongoing (interventions implemented as appropriate)  Flowsheets (Taken 4/7/2020  0510)  Problems Assessed (Acute Renal Failure/Kidney Injury): all  Problems Present (ARF/Kidney Injury): hypertension; situational response     Problem: Arrhythmia/Dysrhythmia (Symptomatic) (Adult)  Goal: Signs and Symptoms of Listed Potential Problems Will be Absent, Minimized or Managed (Arrhythmia/Dysrhythmia)  Outcome: Ongoing (interventions implemented as appropriate)  Flowsheets (Taken 4/7/2020 0510)  Problems Assessed (Arrhythmia/Dysrhythmia): all  Problems Present (Dysrhythmia): electrophysiologic conduction defect; situational response     Problem: Pain, Acute (Adult)  Goal: Identify Related Risk Factors and Signs and Symptoms  Outcome: Ongoing (interventions implemented as appropriate)  Flowsheets (Taken 4/7/2020 0510)  Related Risk Factors (Acute Pain): disease process  Signs and Symptoms (Acute Pain): verbalization of pain descriptors  Goal: Acceptable Pain Control/Comfort Level  Outcome: Ongoing (interventions implemented as appropriate)  Flowsheets (Taken 4/7/2020 0510)  Acceptable Pain Control/Comfort Level: making progress toward outcome     Problem: Cardiac: Heart Failure (Adult)  Goal: Signs and Symptoms of Listed Potential Problems Will be Absent, Minimized or Managed (Cardiac: Heart Failure)  Outcome: Ongoing (interventions implemented as appropriate)  Flowsheets (Taken 4/7/2020 0510)  Problems Assessed (Heart Failure): all  Problems Present (Heart Failure): dysrhythmia/arrhythmia; fluid/electrolyte imbalance; situational response; sleep-disordered breathing

## 2020-04-07 NOTE — PROGRESS NOTES
Aspiration results reviewed, consistent with gouty arthropathy.  No concern for septic arthritis.  Continue treatment with colchicine.  If his improvement ceases, could consider cortisone injection for symptomatic relief.    Jona Pink MD

## 2020-04-08 LAB
ALBUMIN SERPL-MCNC: 3.9 G/DL (ref 3.5–5.2)
ALBUMIN/GLOB SERPL: 1.2 G/DL
ALP SERPL-CCNC: 53 U/L (ref 39–117)
ALT SERPL W P-5'-P-CCNC: 20 U/L (ref 1–41)
ANION GAP SERPL CALCULATED.3IONS-SCNC: 11.5 MMOL/L (ref 5–15)
AST SERPL-CCNC: 18 U/L (ref 1–40)
BILIRUB SERPL-MCNC: 1.2 MG/DL (ref 0.2–1.2)
BUN BLD-MCNC: 31 MG/DL (ref 8–23)
BUN/CREAT SERPL: 12.8 (ref 7–25)
CALCIUM SPEC-SCNC: 8.8 MG/DL (ref 8.6–10.5)
CHLORIDE SERPL-SCNC: 94 MMOL/L (ref 98–107)
CO2 SERPL-SCNC: 25.5 MMOL/L (ref 22–29)
CREAT BLD-MCNC: 2.43 MG/DL (ref 0.76–1.27)
DEPRECATED RDW RBC AUTO: 42.3 FL (ref 37–54)
ERYTHROCYTE [DISTWIDTH] IN BLOOD BY AUTOMATED COUNT: 11.3 % (ref 12.3–15.4)
GFR SERPL CREATININE-BSD FRML MDRD: 26 ML/MIN/1.73
GLOBULIN UR ELPH-MCNC: 3.2 GM/DL
GLUCOSE BLD-MCNC: 124 MG/DL (ref 65–99)
HCT VFR BLD AUTO: 31.8 % (ref 37.5–51)
HGB BLD-MCNC: 11.3 G/DL (ref 13–17.7)
MCH RBC QN AUTO: 35.8 PG (ref 26.6–33)
MCHC RBC AUTO-ENTMCNC: 35.5 G/DL (ref 31.5–35.7)
MCV RBC AUTO: 100.6 FL (ref 79–97)
PF4 HEPARIN CMPLX AB SER-ACNC: 0.1 OD (ref 0–0.4)
PHOSPHATE SERPL-MCNC: 4.4 MG/DL (ref 2.5–4.5)
PLATELET # BLD AUTO: 120 10*3/MM3 (ref 140–450)
PMV BLD AUTO: 8.7 FL (ref 6–12)
POTASSIUM BLD-SCNC: 3.6 MMOL/L (ref 3.5–5.2)
PROT SERPL-MCNC: 7.1 G/DL (ref 6–8.5)
RBC # BLD AUTO: 3.16 10*6/MM3 (ref 4.14–5.8)
SODIUM BLD-SCNC: 131 MMOL/L (ref 136–145)
URATE SERPL-MCNC: 7.7 MG/DL (ref 3.4–7)
WBC NRBC COR # BLD: 9.54 10*3/MM3 (ref 3.4–10.8)

## 2020-04-08 PROCEDURE — 85027 COMPLETE CBC AUTOMATED: CPT | Performed by: INTERNAL MEDICINE

## 2020-04-08 PROCEDURE — 97162 PT EVAL MOD COMPLEX 30 MIN: CPT

## 2020-04-08 PROCEDURE — 80053 COMPREHEN METABOLIC PANEL: CPT | Performed by: INTERNAL MEDICINE

## 2020-04-08 PROCEDURE — 97110 THERAPEUTIC EXERCISES: CPT

## 2020-04-08 PROCEDURE — 84550 ASSAY OF BLOOD/URIC ACID: CPT | Performed by: INTERNAL MEDICINE

## 2020-04-08 PROCEDURE — 84100 ASSAY OF PHOSPHORUS: CPT | Performed by: INTERNAL MEDICINE

## 2020-04-08 PROCEDURE — 25010000002 HEPARIN (PORCINE) PER 1000 UNITS: Performed by: INTERNAL MEDICINE

## 2020-04-08 PROCEDURE — 3E0U3BZ INTRODUCTION OF ANESTHETIC AGENT INTO JOINTS, PERCUTANEOUS APPROACH: ICD-10-PCS | Performed by: ORTHOPAEDIC SURGERY

## 2020-04-08 PROCEDURE — 0S9C3ZZ DRAINAGE OF RIGHT KNEE JOINT, PERCUTANEOUS APPROACH: ICD-10-PCS | Performed by: ORTHOPAEDIC SURGERY

## 2020-04-08 PROCEDURE — 3E0U33Z INTRODUCTION OF ANTI-INFLAMMATORY INTO JOINTS, PERCUTANEOUS APPROACH: ICD-10-PCS | Performed by: ORTHOPAEDIC SURGERY

## 2020-04-08 RX ORDER — AMLODIPINE BESYLATE 10 MG/1
10 TABLET ORAL
Status: DISCONTINUED | OUTPATIENT
Start: 2020-04-08 | End: 2020-04-10 | Stop reason: HOSPADM

## 2020-04-08 RX ORDER — NEBIVOLOL 10 MG/1
10 TABLET ORAL
Status: DISCONTINUED | OUTPATIENT
Start: 2020-04-08 | End: 2020-04-09

## 2020-04-08 RX ADMIN — HYDROCODONE BITARTRATE AND ACETAMINOPHEN 1 TABLET: 5; 325 TABLET ORAL at 02:53

## 2020-04-08 RX ADMIN — HYDROCODONE BITARTRATE AND ACETAMINOPHEN 1 TABLET: 5; 325 TABLET ORAL at 08:52

## 2020-04-08 RX ADMIN — NEBIVOLOL HYDROCHLORIDE 10 MG: 10 TABLET ORAL at 12:21

## 2020-04-08 RX ADMIN — CHLORDIAZEPOXIDE HYDROCHLORIDE 25 MG: 25 CAPSULE ORAL at 16:01

## 2020-04-08 RX ADMIN — HEPARIN SODIUM 5000 UNITS: 5000 INJECTION INTRAVENOUS; SUBCUTANEOUS at 21:06

## 2020-04-08 RX ADMIN — Medication 100 MG: at 08:52

## 2020-04-08 RX ADMIN — HEPARIN SODIUM 5000 UNITS: 5000 INJECTION INTRAVENOUS; SUBCUTANEOUS at 05:54

## 2020-04-08 RX ADMIN — CHLORDIAZEPOXIDE HYDROCHLORIDE 25 MG: 25 CAPSULE ORAL at 05:54

## 2020-04-08 RX ADMIN — HYDROCODONE BITARTRATE AND ACETAMINOPHEN 1 TABLET: 5; 325 TABLET ORAL at 21:07

## 2020-04-08 RX ADMIN — LACTULOSE 10 G: 10 SOLUTION ORAL at 08:53

## 2020-04-08 RX ADMIN — HEPARIN SODIUM 5000 UNITS: 5000 INJECTION INTRAVENOUS; SUBCUTANEOUS at 16:01

## 2020-04-08 NOTE — PROGRESS NOTES
"                                              LOS: 4 days   Patient Care Team:  Luda Kidd MD as PCP - General (Family Medicine)  Luda Kidd MD as PCP - Family Medicine  Saul Fang MD as Consulting Physician (Cardiology)  Bill Washington MD as Surgeon (Neurosurgery)  Self, Zafar Fraire MD as Consulting Physician (Vascular Surgery)    Chief Complaint:  F/up acute hyperkalemia, SHANNAN, arrhythmia, bradycardia, new hypertension    Subjective   Interval History  Reported right knee pain.  Worked with physical therapy today.  He feels like his exercise capacity is limited due to pain.  Blood pressure intermittently elevated.    REVIEW OF SYSTEMS:   CARDIOVASCULAR: No chest pain, chest pressure or chest discomfort. No palpitations or edema.   RESPIRATORY: No shortness of breath, cough or sputum.       Ventilator/Non-Invasive Ventilation Settings (From admission, onward)    None                Physical Exam:     Vital Signs  Temp:  [97.6 °F (36.4 °C)-98.5 °F (36.9 °C)] 98 °F (36.7 °C)  Heart Rate:  [68-84] 70  Resp:  [16-18] 18  BP: (111-178)/(64-96) 111/64    Intake/Output Summary (Last 24 hours) at 4/8/2020 1835  Last data filed at 4/8/2020 1700  Gross per 24 hour   Intake 650 ml   Output 3400 ml   Net -2750 ml     Flowsheet Rows      First Filed Value   Admission Height  188 cm (74\") Documented at 04/04/2020 1026   Admission Weight  109 kg (240 lb) Documented at 04/04/2020 1026          General Appearance:   Alert, cooperative, in no acute distress   ENMT:  Mallampati score 4, moist mucous membrane   Eyes:  Pupils equals and reactive to light. EOMI   Neck:   Trachea midline. No thyromegaly.   Lungs:    diminished breath sounds overall but no crackles or wheezing.  Nonlabored breathing    Heart:   Regular rhythm and normal rate, normal S1 and S2, no         murmur   Skin:   No rash   Abdomen:    Obese. Soft. No tenderness. No HSM.  Positive bowel sounds   Neuro:  Conscious, alert, oriented x3. " Strength 5/5 in upper an lower  ext   Extremities:  Moves all extremities well, trace edema in legs, no cyanosis, no redness          Results Review:        Results from last 7 days   Lab Units 04/08/20  0339 04/07/20  0318 04/06/20  0404   SODIUM mmol/L 131* 130* 135*   POTASSIUM mmol/L 3.6 4.8 4.5   CHLORIDE mmol/L 94* 93* 98   CO2 mmol/L 25.5 23.8 25.5   BUN mg/dL 31* 30* 24*   CREATININE mg/dL 2.43* 2.71* 2.76*   GLUCOSE mg/dL 124* 139* 106*   CALCIUM mg/dL 8.8 8.5* 8.4*     Results from last 7 days   Lab Units 04/04/20  1124   TROPONIN T ng/mL 0.059*     Results from last 7 days   Lab Units 04/08/20  0339 04/07/20  1023 04/07/20  0318 04/06/20  0404   WBC 10*3/mm3 9.54  --  10.46 8.16   HEMOGLOBIN g/dL 11.3*  --  10.8* 10.1*   HEMATOCRIT % 31.8*  --  31.2* 29.1*   PLATELETS 10*3/mm3 120* 160 109* 131*     Results from last 7 days   Lab Units 04/04/20  1027   INR  1.23*   APTT seconds 30.7     Results from last 7 days   Lab Units 04/04/20  1027   PROBNP pg/mL 4,422.0*       I reviewed the patient's new clinical results.  I personally viewed and interpreted the patient's CXR        Medication Review:     amLODIPine 10 mg Oral Q24H   chlordiazePOXIDE 25 mg Oral Q8H   dextrose 50 mL Intravenous Once   heparin (porcine) 5,000 Units Subcutaneous Q8H   lactulose 10 g Oral Daily   lidocaine 20 mL Infiltration Once   lidocaine PF 1% 2 mL Injection Once   nebivolol 10 mg Oral Q24H   thiamine 100 mg Oral Daily       Diagnostic imaging:  I personally and Independently reviewed and interpreted the following images:  Knee x-ray 4/7/2020: Agree with radiologist interpretation.    Assessment   1. Atrial flutter with slow ventricular response related to hyperkalemia, digoxin and beta-blocker  2. Hypotension, intermittent, resolved  3. Dizziness, secondary to above  4. Acute severe hyperkalemia, corrected  5. Metabolic acidosis, secondary to SHANNAN  6. Acute renal failure: Secondary to NSAID on top of his blood pressure medications  and perhaps lack of intake  7. Chronic diastolic CHF  8. Severe pulmonary hypertension, RVSP 93: Likely combination of CHF and probable sleep apnea.  9. Essential hypertension  10. Mild hyponatremia  11. Mild anemia, stable  12. Alcohol withdrawal/mild  13. Daily drinker: 2 ounces of alcohol  14. COPD/chronic bronchitis, no exacerbation  15. Snoring/probable sleep apnea  16. Joints pain and stiffness  17. Elevated rheumatoid factor, very mild.  Doubt represent active disease  18. Right knee effusion, s/p aspiration 4/7/2020        Plan     · Stop Librium.  · PT OT  · BMP in a.m.  Avoid nephrotoxic drugs, definitely no NSAID.  Caution with colchicine if needed.  · Appreciate Ortho assistance with steroid injection the right knee.  · Hold off diuretics today.  · PRN bronchodilators  · Consider ARB per cardiology/nephrology  · Lortab 6 Q 6 PRN  · PO thiamine  · Outpatient home sleep study for suspected sleep apnea.  We will arrange for the    Disposition: Skilled nursing facility when okay with all    Jayy Bruno MD  04/08/20  18:35          This note was dictated utilizing Dragon dictation

## 2020-04-08 NOTE — PLAN OF CARE
Problem: Patient Care Overview  Goal: Plan of Care Review  Outcome: Ongoing (interventions implemented as appropriate)  Flowsheets (Taken 4/8/2020 0415)  Progress: improving  Plan of Care Reviewed With: patient  Outcome Summary: Afib 60-70. BP rising to the 150-160/90s. 3L HS. Pain treated with Norco. Will continue to monitor.  Goal: Individualization and Mutuality  Outcome: Ongoing (interventions implemented as appropriate)  Goal: Discharge Needs Assessment  Outcome: Ongoing (interventions implemented as appropriate)  Goal: Interprofessional Rounds/Family Conf  Outcome: Ongoing (interventions implemented as appropriate)     Problem: Fall Risk (Adult)  Goal: Identify Related Risk Factors and Signs and Symptoms  Outcome: Ongoing (interventions implemented as appropriate)  Flowsheets (Taken 4/8/2020 0419)  Related Risk Factors (Fall Risk): gait/mobility problems; fear of falling; polypharmacy; environment unfamiliar  Signs and Symptoms (Fall Risk): presence of risk factors  Goal: Absence of Fall  Outcome: Ongoing (interventions implemented as appropriate)  Flowsheets (Taken 4/8/2020 2852)  Absence of Fall: making progress toward outcome     Problem: Skin Injury Risk (Adult)  Goal: Identify Related Risk Factors and Signs and Symptoms  Outcome: Ongoing (interventions implemented as appropriate)  Flowsheets (Taken 4/8/2020 4820)  Related Risk Factors (Skin Injury Risk): edema; mobility impaired  Goal: Skin Health and Integrity  Outcome: Ongoing (interventions implemented as appropriate)  Flowsheets (Taken 4/8/2020 4043)  Skin Health and Integrity: making progress toward outcome     Problem: Renal Failure/Kidney Injury, Acute (Adult)  Goal: Signs and Symptoms of Listed Potential Problems Will be Absent, Minimized or Managed (Renal Failure/Kidney Injury, Acute)  Outcome: Ongoing (interventions implemented as appropriate)  Flowsheets (Taken 4/8/2020 5238)  Problems Assessed (Acute Renal Failure/Kidney Injury):  all  Problems Present (ARF/Kidney Injury): situational response     Problem: Arrhythmia/Dysrhythmia (Symptomatic) (Adult)  Goal: Signs and Symptoms of Listed Potential Problems Will be Absent, Minimized or Managed (Arrhythmia/Dysrhythmia)  Outcome: Ongoing (interventions implemented as appropriate)  Flowsheets (Taken 4/8/2020 9484)  Problems Assessed (Arrhythmia/Dysrhythmia): all  Problems Present (Dysrhythmia): electrophysiologic conduction defect; situational response     Problem: Pain, Acute (Adult)  Goal: Identify Related Risk Factors and Signs and Symptoms  Outcome: Ongoing (interventions implemented as appropriate)  Flowsheets (Taken 4/8/2020 6104)  Related Risk Factors (Acute Pain): disease process  Signs and Symptoms (Acute Pain): verbalization of pain descriptors  Goal: Acceptable Pain Control/Comfort Level  Outcome: Ongoing (interventions implemented as appropriate)  Flowsheets (Taken 4/8/2020 5821)  Acceptable Pain Control/Comfort Level: making progress toward outcome     Problem: Cardiac: Heart Failure (Adult)  Goal: Signs and Symptoms of Listed Potential Problems Will be Absent, Minimized or Managed (Cardiac: Heart Failure)  Outcome: Ongoing (interventions implemented as appropriate)  Flowsheets (Taken 4/8/2020 3226)  Problems Assessed (Heart Failure): all  Problems Present (Heart Failure): dysrhythmia/arrhythmia; situational response; sleep-disordered breathing

## 2020-04-08 NOTE — PROGRESS NOTES
Jona Pink MD     Orthopedic Progress Note    Subjective :   Patient with some improvement in knee pain since aspiration yesterday however still has considerable swelling and pain in the knee.  Does have a history of gouty arthropathy though usually involves his right foot.  Aspiration was performed yesterday where 60 cc of fluid was withdrawn.  Aspiration results consistent with crystalline arthropathy.  No evidence of septic arthritis.  Patient is currently in renal failure and thus we are somewhat limited with oral and IV medications for treatment of the crystalline arthropathy    Objective :    Vital signs in last 24 hours:  Temp:  [97.3 °F (36.3 °C)-98.5 °F (36.9 °C)] 98 °F (36.7 °C)  Heart Rate:  [68-90] 82  Resp:  [16-18] 18  BP: (141-178)/() 144/95  Vitals:    04/07/20 2041 04/08/20 0257 04/08/20 0605 04/08/20 0730   BP: 161/85 159/92  144/95   BP Location:  Right arm  Right arm   Patient Position:  Lying  Lying   Pulse: 75 68  82   Resp:  18  18   Temp:  98.3 °F (36.8 °C)  98 °F (36.7 °C)   TempSrc:  Oral  Oral   SpO2:  91%  95%   Weight:   102 kg (225 lb 1.6 oz)    Height:           PHYSICAL EXAM:  Patient is calm, in no acute distress, awake and oriented x 3.  There is tenderness about the right knee.  There is a large effusion.  The patella is a ballotable.  His knee is resting in about 20 degrees of flexion.  He actively tolerates about 80 degrees of flexion.  No skin lesions are appreciated.  Distally extremities neurovascular intact    LABS:  Results from last 7 days   Lab Units 04/08/20  0339   WBC 10*3/mm3 9.54   HEMOGLOBIN g/dL 11.3*   HEMATOCRIT % 31.8*   PLATELETS 10*3/mm3 120*     Results from last 7 days   Lab Units 04/08/20  0339   SODIUM mmol/L 131*   POTASSIUM mmol/L 3.6   CHLORIDE mmol/L 94*   CO2 mmol/L 25.5   BUN mg/dL 31*   CREATININE mg/dL 2.43*   GLUCOSE mg/dL 124*   CALCIUM mg/dL 8.8     Results from last 7 days   Lab Units 04/04/20  1027   INR  1.23*   APTT seconds 30.7        ASSESSMENT:  Right knee crystalline arthropathy, gout    Plan:  Continue Physical Therapy, weightbearing as tolerated and range of motion as tolerated to the right knee.  Continue colchicine as able per renal recommendations  I discussed with the patient that we could perform a repeat aspiration to withdraw more fluid for symptomatic relief and at the same time perform a cortisone injection.  This would likely be our best option at providing some relief of his pain given her limitation secondary to his renal function.  We will return later today to perform the aspiration and injection.    Jona Pink MD    Date: 4/8/2020  Time: 08:05    Jona Pink MD  Orthopaedic Surgeon    [facility]  [phone]

## 2020-04-08 NOTE — THERAPY EVALUATION
Patient Name: Juan James  : 1945    MRN: [id number]                              Today's Date: 2020       Admit Date: 2020    Visit Dx:     ICD-10-CM ICD-9-CM   1. Heart block AV complete (CMS/HCC) I44.2 426.0   2. Hyperkalemia E87.5 276.7   3. SHANNAN (acute kidney injury) (CMS/HCC) N17.9 584.9   4. Elevated troponin R79.89 790.6   5. Hypermagnesemia E83.41 275.2   6. Anemia, unspecified type D64.9 285.9     Patient Active Problem List   Diagnosis   • Follow-up examination, following other surgery   • Lumbar radiculopathy   • Cervical radiculitis   • Essential hypertension   • Coronary artery disease involving native heart   • Chronic bronchitis (CMS/HCC)   • Hyperlipidemia   • Prediabetes   • Edema   • Rectal pain   • 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HCC)   • Cervical spondylosis with radiculopathy   • History of colon polyps   • Tinnitus   • Shortness of breath   • Pulmonary embolism (CMS/HCC)   • Peptic ulceration   • OA (osteoarthritis)   • Neck pain   • Lower back pain   • Hypertension   • History of blood transfusion   • Hearing loss   • GERD (gastroesophageal reflux disease)   • DDD (degenerative disc disease), lumbar   • DDD (degenerative disc disease), cervical   • Coronary artery disease   • COPD (chronic obstructive pulmonary disease) (CMS/HCC)   • Colon polyps   • CHF (congestive heart failure) (CMS/HCC)   • Cervical spondylosis   • Cervical disc disorder   • Cataract   • BPH (benign prostatic hyperplasia)   • Atrial fibrillation (CMS/HCC)   • Arthritis   • AAA (abdominal aortic aneurysm) (CMS/HCC)   • Medicare annual wellness visit, subsequent   • Heart block AV complete (CMS/HCC)   • PVD (peripheral vascular disease) with claudication (CMS/HCC)   • Acute renal failure (ARF) (CMS/HCC)     Past Medical History:   Diagnosis Date   • AAA (abdominal aortic aneurysm) (CMS/HCC)    • Ankle edema    • Aortic aneurysm (CMS/HCC)    • Arthritis    • Atherosclerotic heart disease of native coronary  artery without angina pectoris    • Atrial fibrillation (CMS/HCC)    • Benign essential hypertension    • BPH (benign prostatic hyperplasia)    • Cataract    • Cervical disc disorder    • Cervical radiculitis 09/2016   • Cervical spondylosis 11/2012   • CHF (congestive heart failure) (CMS/HCC)    • Colon polyps     FOLLOWED BY DR. TAYA CRUZ   • COPD (chronic obstructive pulmonary disease) (CMS/HCC)    • Coronary artery disease     STENT X 3   • DDD (degenerative disc disease), cervical    • DDD (degenerative disc disease), lumbar    • Decreased pedal pulses    • Dyspnea    • Edema    • Edema of extremities    • Elevated transaminase level    • Emphysema/COPD (CMS/HCC)    • Enlarged prostate without lower urinary tract symptoms (luts)    • Essential hypertriglyceridemia    • GERD (gastroesophageal reflux disease)    • GERD without esophagitis    • Gout    • Hearing loss    • Hepatic fibrosis    • High blood pressure    • High risk medication use    • History of blood transfusion    • History of cardiac disorder    • History of cataract    • History of COPD    • History of esophagitis    • History of gastrointestinal hemorrhage    • History of hypertension    • History of nicotine dependence    • History of peptic ulcer    • History of sleep apnea    • Hoarseness, persistent    • Hyperglycemia    • Hyperlipidemia    • Hypertension    • Iliac artery aneurysm (CMS/HCC)    • Insomnia    • Lower back pain    • Lumbar radiculopathy 05/2016   • Mastodynia of left breast    • Myasthenia gravis (CMS/HCC)    • Neck pain    • Nonalcoholic hepatosteatosis    • OA (osteoarthritis)    • 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HCC)    • PAF (paroxysmal atrial fibrillation) (CMS/HCC)    • Peptic ulceration     PUD   • Prediabetes    • Prediabetes    • Prostate disease    • Pulmonary embolism (CMS/HCC)     PT DENIES HAVING THIS   • Rectal pain 07/2019   • Shortness of breath    • Status post angioplasty    • Tinnitus      Past Surgical  History:   Procedure Laterality Date   • ANTERIOR CERVICAL DISCECTOMY W/ FUSION N/A 3/25/2016    Procedure: C3- C5 CERVICAL DISCECTOMY ANTERIOR FUSION WITH INSTRUMENTATION;  Surgeon: Bill Washington MD;  Location: [facility];  Service:    • ARTHRODESIS N/A 07/1983    LUMBAR   • ARTHRODESIS N/A 1987    LUMBAR   • ARTHRODESIS      Spinal Arthrodesis-lower spine-7/1983, 1987--upper spine-1988, 7/1990-Abstracted from [employer]   • BACK SURGERY      Lower Back Surgery   • CARDIAC CATHETERIZATION  07/2009   • CARDIAC CATHETERIZATION  03/18/2003    STENT TO RCA AND PCA, DR. PRIYA LUGO   • CATARACT EXTRACTION Bilateral    • CERVICAL ARTHRODESIS N/A 07/1990   • CERVICAL ARTHRODESIS N/A 1988   • COLONOSCOPY N/A 10/3/2019    4 MM HYPERPLASTIC POLYP IN ILEOCECAL VALVE, 4 MM SESSILE SERRATED ADENOMA POLYP IN DESCENDING, 5 TUBULOVILLOUS ADENOMA POLYPS IN RECTUM, 3 TUBULAR ADENOMA POLYPS IN DISTAL RECTUM, 26 MM TUBULAR ADENOMA POLYP IN RECTUM, PIECEMEAL POLYPECTOMY, AREA TATTOOED, RESCOPE IN 6 MONTHS, DR. TAYA CRUZ AT [facility]   • CORONARY ANGIOPLASTY WITH STENT PLACEMENT  02/2009    and 7/2009   • ENDOSCOPY N/A 06/24/2012    NON BLEEDING EROSIVE GASTROPATHY, 1 DUODENAL ULCER WITH CLEAN BASE, DR. BRANDY WREN AT [facility]   • ENDOSCOPY AND COLONOSCOPY N/A 11/20/2007    EGD WNL, 8 ADENOMATOUS POLYPS IN RECTO-SIGMOID, RESCOPE IN 3 YRS, DR. CRISTINA RODRIGUEZ AT [facility]   • FOOT SURGERY Left    • HAND SURGERY Left    • HAND SURGERY Right    • KNEE ARTHROSCOPY Left    • LAPAROSCOPIC CHOLECYSTECTOMY     • NECK SURGERY     • VASECTOMY Bilateral      General Information     Row Name 04/08/20 1002          PT Evaluation Time/Intention    Document Type  evaluation  -     Mode of Treatment  individual therapy;physical therapy  -     Row Name 04/08/20 1002          General Information    Prior Level of Function  independent:;gait;transfer;bed mobility no AD at baseline  -     Existing Precautions/Restrictions  fall  -     Barriers to Rehab   medically complex  -     Row Name 04/08/20 1002          Relationship/Environment    Lives With  alone wife passed away recently  -[facility] Name 04/08/20 1002          Resource/Environmental Concerns    Current Living Arrangements  home/apartment/condo  -[facility] Name 04/08/20 1002          Cognitive Assessment/Intervention- PT/OT    Orientation Status (Cognition)  oriented x 3  -     Row Name 04/08/20 1002          Safety Issues, Functional Mobility    Impairments Affecting Function (Mobility)  balance;coordination;endurance/activity tolerance;pain;range of motion (ROM);strength  -       User Key  (r) = Recorded By, (t) = Taken By, (c) = Cosigned By    Initials Name Provider Type     Swetha Oviedo, PT Physical Therapist        Mobility     Row Name 04/08/20 1003          Bed Mobility Assessment/Treatment    Bed Mobility Assessment/Treatment  supine-sit;sit-supine  -     Supine-Sit [place] (Bed Mobility)  verbal cues;nonverbal cues (demo/gesture);moderate assist (50% patient effort);2 person assist  -     Sit-Supine [place] (Bed Mobility)  not tested sitting in chair  -[facility] Name 04/08/20 1003          Transfer Assessment/Treatment    Comment (Transfers)  Pt able to take a few steps bed to chair, pt required cuing for sequencing and weight shifting, B LE shaking and pt having some difficulty weight bearing on R LE  -[facility] Name 04/08/20 1003          Bed-Chair Transfer    Bed-Chair [place] (Transfers)  moderate assist (50% patient effort);2 person assist;verbal cues;nonverbal cues (demo/gesture)  -     Assistive Device (Bed-Chair Transfers)  walker, front-wheeled  -[facility] Name 04/08/20 1003          Sit-Stand Transfer    Sit-Stand [place] (Transfers)  verbal cues;nonverbal cues (demo/gesture);moderate assist (50% patient effort);2 person assist  -     Assistive Device (Sit-Stand Transfers)  walker, front-wheeled  -       User Key  (r) = Recorded By, (t) =  Taken By, (c) = Cosigned By    Initials Name Provider Type    Swetha Combs PT Physical Therapist        Obj/Interventions     [facility] Name 04/08/20 1012          General ROM    GENERAL ROM COMMENTS  R knee ROM limited secondary to pain and swelling, all other ROM WFL for age  -CH     Row Name 04/08/20 1012          MMT (Manual Muscle Testing)    General MMT Comments  generalized weakness noted with functional mobility  -CH     Row Name 04/08/20 1012          Static Standing Balance    Level of [place] (Supported Standing, Static Balance)  minimal assist, 75% patient effort;2 person assist  -     Assistive Device Utilized (Supported Standing, Static Balance)  walker, rolling  -CH     Row Name 04/08/20 1012          Dynamic Standing Balance    Level of [place], Reaches Outside Midline (Standing, Dynamic Balance)  moderate assist, 50 to 74% patient effort;2 person assist  -     Assistive Device Utilized (Supported Standing, Dynamic Balance)  walker, rolling  -       User Key  (r) = Recorded By, (t) = Taken By, (c) = Cosigned By    Initials Name Provider Type    Swetha Combs PT Physical Therapist        Goals/Plan     Row Name 04/08/20 1016          Bed Mobility Goal 1 (PT)    Activity/Assistive Device (Bed Mobility Goal 1, PT)  bed mobility activities, all  -     [place] Level/Cues Needed (Bed Mobility Goal 1, PT)  supervision required  -     Time Frame (Bed Mobility Goal 1, PT)  1 week  -CH     Row Name 04/08/20 1016          Transfer Goal 1 (PT)    Activity/Assistive Device (Transfer Goal 1, PT)  transfers, all;walker, rolling  -     [place] Level/Cues Needed (Transfer Goal 1, PT)  supervision required  -     Time Frame (Transfer Goal 1, PT)  1 week  -CH     Row Name 04/08/20 1016          Gait Training Goal 1 (PT)    Activity/Assistive Device (Gait Training Goal 1, PT)  gait (walking locomotion);walker, rolling  -     [place] Level (Gait Training Goal 1, PT)   supervision required  -CH     Distance (Gait Goal 1, PT)  150  -CH     Time Frame (Gait Training Goal 1, PT)  1 week  -       User Key  (r) = Recorded By, (t) = Taken By, (c) = Cosigned By    Initials Name Provider Type     Swetha Oviedo, PT Physical Therapist        Clinical Impression     Row Name 04/08/20 1013          Pain Assessment    Additional Documentation  Pain Scale: FACES Pre/Post-Treatment (Group)  -     Row Name 04/08/20 1013          Pain Scale: Numbers Pre/Post-Treatment    Pain Location - Side  Right  -     Pain Location  knee  -     Pain Intervention(s)  Repositioned  -     Row Name 04/08/20 1013          Pain Scale: FACES Pre/Post-Treatment    Pain: FACES Scale, Pretreatment  4-->hurts little more  -CH     Pain: FACES Scale, Post-Treatment  6-->hurts even more  -     Row Name 04/08/20 1013          Plan of Care Review    Plan of Care Reviewed With  patient  -CH     Outcome Summary  Pt is a 75 yo M who was admitted with weakness and dizziness. Pt is post-op dialysis catheter placement and R knee aspiration. Pt presents to PT with impaired functional mobility and gait secondary to generalized weakness, impaired balance, pain, and decreased activity tolerance. Pt may benefit from skilled PT to address strength, mobility, and gait.  -     Row Name 04/08/20 1013          Physical Therapy Clinical Impression    Patient/Family Goals Statement (PT Clinical Impression)  to return to [facility]  -     Criteria for Skilled Interventions Met (PT Clinical Impression)  treatment indicated  -     Rehab Potential (PT Clinical Summary)  good, to achieve stated therapy goals  -     Row Name 04/08/20 1013          Positioning and Restraints    Pre-Treatment Position  in bed  -CH     Post Treatment Position  chair  -     In Chair  reclined;call light within reach;encouraged to call for assist;notified nsg  -       User Key  (r) = Recorded By, (t) = Taken By, (c) = Cosigned By    Initials Name  Provider Type    Swetha Combs, PT Physical Therapist        Outcome Measures     Row Name 04/08/20 1017          How much help from another person do you currently need...    Turning from your back to your side while in flat bed without using bedrails?  2  -CH     Moving from lying on back to sitting on the side of a flat bed without bedrails?  2  -CH     Moving to and from a bed to a chair (including a wheelchair)?  2  -CH     Standing up from a chair using your arms (e.g., wheelchair, bedside chair)?  2  -CH     Climbing 3-5 steps with a railing?  1  -CH     To walk in hospital room?  1  -CH     AM-PAC 6 Clicks Score (PT)  10  -     Row Name 04/08/20 1017          Functional Assessment    Outcome Measure Options  AM-PAC 6 Clicks Basic Mobility (PT)  -       User Key  (r) = Recorded By, (t) = Taken By, (c) = Cosigned By    Initials Name Provider Type    Swetha Combs, PT Physical Therapist          PT Recommendation and Plan  Planned Therapy Interventions (PT Eval): balance training, bed mobility training, gait training, home exercise program, patient/family education, strengthening, transfer training  Outcome Summary/Treatment Plan (PT)  Anticipated Discharge Disposition (PT): skilled nursing facility  Plan of Care Reviewed With: patient  Outcome Summary: Pt is a 73 yo M who was admitted with weakness and dizziness. Pt is post-op dialysis catheter placement and R knee aspiration. Pt presents to PT with impaired functional mobility and gait secondary to generalized weakness, impaired balance, pain, and decreased activity tolerance. Pt may benefit from skilled PT to address strength, mobility, and gait.     Time Calculation:   PT Charges     Row Name 04/08/20 1020             Time Calculation    Start Time  0930  -      Stop Time  0942  -      Time Calculation (min)  12 min  -      PT Received On  04/08/20  -      PT - Next Appointment  04/09/20  -      PT Goal Re-Cert Due Date   04/15/20  -         Time Calculation- PT    Total Timed Code Minutes- PT  8 minute(s)  -        User Key  (r) = Recorded By, (t) = Taken By, (c) = Cosigned By    Initials Name Provider Type     Swetha Oviedo, PT Physical Therapist        Therapy Charges for Today     Code Description Service Date Service Provider Modifiers Qty    82108991651  PT EVAL MOD COMPLEXITY 2 4/8/2020 Swetha Oviedo, PT GP 1    04090830711 HC PT THER PROC EA 15 MIN 4/8/2020 Swetha Oviedo, PT GP 1    [id number]  PT THER SUPP EA 15 MIN 4/8/2020 Swetha Oviedo, PT GP 1          PT G-Codes  Outcome Measure Options: AM-PAC 6 Clicks Basic Mobility (PT)  AM-PAC 6 Clicks Score (PT): 10    Swetha Oviedo, PT  4/8/2020

## 2020-04-08 NOTE — PROGRESS NOTES
"   LOS: 4 days    Patient Care Team:  Luda Kidd MD as PCP - General (Family Medicine)  Luda Kidd MD as PCP - Family Medicine  Saul Fang MD as Consulting Physician (Cardiology)  Bill Washington MD as Surgeon (Neurosurgery)  Self, Zafar Fraire MD as Consulting Physician (Vascular Surgery)    Chief Complaint:    Chief Complaint   Patient presents with   • Tingling   • Numbness     Follow UP Lyndsey and hyperkalemia .  Subjective     Interval History:   Right knee aspirate consistent with gout. Ortho plans to return to inject with steroid today.  No bm yet.  Eating well.  Weight down.  Shiley removed . Excellent uop . Not soa.  Weak standing .    Review of Systems:   See above .    Objective     Vital Signs  Temp:  [97.3 °F (36.3 °C)-98.5 °F (36.9 °C)] 98 °F (36.7 °C)  Heart Rate:  [68-90] 82  Resp:  [16-18] 18  BP: (141-178)/() 144/95    Flowsheet Rows      First Filed Value   Admission Height  188 cm (74\") Documented at 04/04/2020 1026   Admission Weight  109 kg (240 lb) Documented at 04/04/2020 1026          I/O this shift:  In: 240 [P.O.:240]  Out: -   I/O last 3 completed shifts:  In: 410 [P.O.:410]  Out: 4425 [Urine:4425]    Intake/Output Summary (Last 24 hours) at 4/8/2020 0917  Last data filed at 4/8/2020 0800  Gross per 24 hour   Intake 530 ml   Output 3800 ml   Net -3270 ml       Physical Exam:  Chronically ill.  Looks stated age.  Oral mucosa dry.   Conversant. Oriented.  Heart RRR no s3 or rub  Lungs clear to auscultation. No wheezing  Abd + bs, soft, nontender, slightly distended.   Ext no edema.  Right knee edema, tender, warm.    Skin no rash.       Results Review:    Results from last 7 days   Lab Units 04/08/20  0339 04/07/20  0318 04/06/20  0404  04/04/20  1124   SODIUM mmol/L 131* 130* 135*   < > 132*   POTASSIUM mmol/L 3.6 4.8 4.5   < > 8.3*   CHLORIDE mmol/L 94* 93* 98   < > 96*   CO2 mmol/L 25.5 23.8 25.5   < > 20.7*   BUN mg/dL 31* 30* 24*   < > 80*   CREATININE " mg/dL 2.43* 2.71* 2.76*   < > 5.14*   CALCIUM mg/dL 8.8 8.5* 8.4*   < > 9.6   BILIRUBIN mg/dL 1.2  --   --   --  0.5   ALK PHOS U/L 53  --   --   --  64   ALT (SGPT) U/L 20  --   --   --  39   AST (SGOT) U/L 18  --   --   --  24   GLUCOSE mg/dL 124* 139* 106*   < > 146*    < > = values in this interval not displayed.       Estimated Creatinine Clearance: 34.5 mL/min (A) (by C-G formula based on SCr of 2.43 mg/dL (H)).    Results from last 7 days   Lab Units 04/08/20  0339 04/07/20 0318 04/06/20  0404 04/04/20  1027   MAGNESIUM mg/dL  --  2.1  --   --  3.7*   PHOSPHORUS mg/dL 4.4 4.0 3.2   < >  --     < > = values in this interval not displayed.       Results from last 7 days   Lab Units 04/08/20  0339 04/05/20  0159   URIC ACID mg/dL 7.7* 6.8       Results from last 7 days   Lab Units 04/08/20  0339 04/07/20  1023 04/07/20  0318 04/06/20  0404 04/05/20  0159 04/04/20  1027   WBC 10*3/mm3 9.54  --  10.46 8.16 9.21 9.82   HEMOGLOBIN g/dL 11.3*  --  10.8* 10.1* 10.4* 12.1*   PLATELETS 10*3/mm3 120* 160 109* 131* 159 227       Results from last 7 days   Lab Units 04/04/20  1027   INR  1.23*         Imaging Results (Last 24 Hours)     Procedure Component Value Units Date/Time    XR Knee 3 View Right [[id number]] Collected:  04/07/20 1344     Updated:  04/07/20 1350    Narrative:       XR KNEE 3 VW RIGHT-     INDICATIONS: Right knee pain and swelling     TECHNIQUE: 3 views of the right knee     COMPARISON: None available     FINDINGS:     Moderate to large right knee effusion is present. Arterial  calcifications are conspicuous. No acute fracture, erosion, dislocation  is noted. Minimal patellar spurring is seen. Mild medial tibiofemoral  joint space narrowing is apparent. Follow-up/further evaluation can be  obtained as indicated.       Impression:          As described.     This report was finalized on 4/7/2020 1:47 PM by Dr. Branden Bond M.D.             amLODIPine 10 mg Oral Q24H   chlordiazePOXIDE 25 mg Oral  Q8H   dextrose 50 mL Intravenous Once   heparin (porcine) 5,000 Units Subcutaneous Q8H   lactulose 10 g Oral Daily   lidocaine 20 mL Infiltration Once   lidocaine PF 1% 2 mL Injection Once   thiamine 100 mg Oral Daily          Medication Review:   Current Facility-Administered Medications   Medication Dose Route Frequency Provider Last Rate Last Dose   • amLODIPine (NORVASC) tablet 10 mg  10 mg Oral Q24H Kristen Bruce MD       • chlordiazePOXIDE (LIBRIUM) capsule 25 mg  25 mg Oral Q8H Jayy Bruno MD   25 mg at 04/08/20 0554   • dextrose (D50W) 25 g/ 50mL Intravenous Solution 50 mL  50 mL Intravenous Q1H PRN Jayy Bruno MD   50 mL at 04/05/20 1020   • dextrose (D50W) 25 g/ 50mL Intravenous Solution 50 mL  50 mL Intravenous Once Jayy Bruno MD       • fentaNYL citrate (PF) (SUBLIMAZE) injection 25 mcg  25 mcg Intravenous Q2H PRN Jayy Bruno MD   25 mcg at 04/06/20 0659   • heparin (porcine) 5000 UNIT/ML injection 5,000 Units  5,000 Units Subcutaneous Q8H Jayy Bruno MD   5,000 Units at 04/08/20 0554   • heparin (porcine) injection 1,000 Units  1,000 Units Intracatheter PRN Jayy Bruno MD   4,000 Units at 04/05/20 1436   • HYDROcodone-acetaminophen (NORCO) 5-325 MG per tablet 1 tablet  1 tablet Oral Q6H PRN Jayy Bruno MD   1 tablet at 04/08/20 0852   • lactulose (CHRONULAC) 10 GM/15ML solution 10 g  10 g Oral Daily Kristen Bruce MD   10 g at 04/08/20 0853   • lidocaine (XYLOCAINE) 1 % injection 20 mL  20 mL Infiltration Once Jona Pink MD       • lidocaine PF 1% (XYLOCAINE) injection 2 mL  2 mL Injection Once Cali Stevens PA-C       • sodium chloride 0.9 % flush 10 mL  10 mL Intravenous PRN Jayy Bruno MD       • thiamine (VITAMIN B-1) tablet 100 mg  100 mg Oral Daily Jayy Bruno MD   100 mg at 04/08/20 0852       Assessment/Plan   1. SHANNAN, likely ATN due to bradycardia, ARB + NSAID.  SP HD for hyperkalemia 4/4  And 4/5.  Non-oliguric.  Creatinine improved. Volume status  better. K better. No diuretic today .  2. Atrial flutter with slow response due to Digoxin.  SP digibind 4/5.    3. CAD sp stent x 3.  Chronic diastolic dysfunction .  4. ETOH abuse. On scheduled Librium.   5. AAA  6.PAD  7. Severe Pulm HTN, TR mild to moderate.   Suspected STEFANO.  Sleep study as outpt.   8. Gout right knee.  Colchicine cautiously.  Po norco.  Appreciate ortho eval.  Steroid injection today . He cannot take NSAIDS. No allopurinol until platelets normal.   9. TCP.  Platelets better.  On subq heparin. IPF normal. Not sure platelet count 160 yesterday was accurate when IPF done .  Today 120.  Heparin antibody pending from 4/7.   10. HTN.  Add norvasc .    Kristen Bruce MD  04/08/20  09:17

## 2020-04-08 NOTE — PROGRESS NOTES
Juan James   74 y.o.  male    LOS: 4 days   Patient Care Team:  Luda Kidd MD as PCP - General (Family Medicine)  Luda Kidd MD as PCP - Family Medicine  Saul Fang MD as Consulting Physician (Cardiology)  Bill Washington MD as Surgeon (Neurosurgery)  Self, Zafar Fraire MD as Consulting Physician (Vascular Surgery)      Subjective     Interval History:     Patient Complaints: No chest pain, shortness of breath, or lightheadedness.  Still weak.    Review of Systems:   The following systems were reviewed and negative;  respiratory and cardiovascular    Objective     Vital Sign Min/Max for last 24 hours  Temp  Min: 97.4 °F (36.3 °C)  Max: 98.5 °F (36.9 °C)   BP  Min: 141/104  Max: 178/96    Pulse  Min: 68  Max: 90       Intake/Output Summary (Last 24 hours) at 4/8/2020 1056  Last data filed at 4/8/2020 0930  Gross per 24 hour   Intake 530 ml   Output 4500 ml   Net -3970 ml       Daily Weights:  Wt Readings from Last 4 Encounters:   04/08/20 0605 102 kg (225 lb 1.6 oz)   04/07/20 0508 107 kg (236 lb 5.3 oz)   04/06/20 0738 106 kg (234 lb)   04/05/20 0204 107 kg (234 lb 12.6 oz)   04/04/20 1737 108 kg (238 lb 15.7 oz)   04/04/20 1026 109 kg (240 lb)   12/26/19 0825 111 kg (245 lb 3.2 oz)   11/26/19 0804 113 kg (248 lb 8 oz)   10/03/19 1238 113 kg (249 lb 4 oz)       Physical Exam:      General Appearance:    Well developed and well nourished in no acute distress   Head:    Normocephalic, atraumatic   Neck:   supple, trachea midline, no thyromegaly, no   carotid bruit, no JVD   Lungs:    Clear to auscultation,respirations regular, even and                  unlabored    Heart:    Regular rhythm and normal rate, normal S1 and S2,                         1/6 murmur, no gallop, no rub, no click   Chest Wall:    No abnormalities observed   Abdomen:     Normal bowel sounds, no masses, no organomegaly, soft        non-tender, non-distended, no guarding, no rebound                tenderness    Rectal:     Deferred   Extremities:   No edema. Moves all extremities well, no cyanosis, no erythema   Pulses:   Pulses palpable and equal bilaterally   Skin:   No bleeding, bruising or rash   Neurologic:   awake alert and oriented x3, speech clear and approp, no facial drooping      Results Review:     I reviewed the patient's new clinical results.  Potassium Potassium   Date Value Ref Range Status   04/08/2020 3.6 3.5 - 5.2 mmol/L Final   04/07/2020 4.8 3.5 - 5.2 mmol/L Final   04/06/2020 4.5 3.5 - 5.2 mmol/L Final          Creatinine Creatinine   Date Value Ref Range Status   04/08/2020 2.43 (H) 0.76 - 1.27 mg/dL Final   04/07/2020 2.71 (H) 0.76 - 1.27 mg/dL Final   04/06/2020 2.76 (H) 0.76 - 1.27 mg/dL Final      Magnesium Magnesium   Date Value Ref Range Status   04/07/2020 2.1 1.6 - 2.4 mg/dL Final        Results from last 7 days   Lab Units 04/08/20  0339 04/07/20  0318 04/06/20  0404  04/04/20  1124 04/04/20  1027   POTASSIUM mmol/L 3.6 4.8 4.5   < > 8.3*  --    CREATININE mg/dL 2.43* 2.71* 2.76*   < > 5.14*  --    BILIRUBIN mg/dL 1.2  --   --   --  0.5  --    MAGNESIUM mg/dL  --  2.1  --   --   --  3.7*    < > = values in this interval not displayed.       Results from last 7 days   Lab Units 04/08/20  0339   WBC 10*3/mm3 9.54   HEMOGLOBIN g/dL 11.3*   HEMATOCRIT % 31.8*   PLATELETS 10*3/mm3 120*     Lab Results   Lab Value Date/Time    TROPONINT 0.059 (C) 04/04/2020 1124     No results found for: CHOL  Lab Results   Component Value Date    HDL 38 (L) 11/26/2019    HDL 37 (L) 07/29/2019     Lab Results   Component Value Date     (H) 11/26/2019     (H) 07/29/2019     Lab Results   Component Value Date    TRIG 175 (H) 11/26/2019    TRIG 140 07/29/2019     No components found for: CHOLHDL  Lab Results   Component Value Date    TSH 3.610 04/04/2020     Lab Results   Component Value Date    INR 1.23 (H) 04/04/2020    PTT 30.7 04/04/2020        Echo EF Estimated  No results found for:  ECHOEFEST        Assessment/Plan     1. Atrial flutter, with slow ventricular response, likely related to digoxin and hyperkalemia.          -Digibind given 4/5/20; heart rates show controlled ventricular response since 4/6/2020          -Per Dr. Aviles's office note patient not on anticoagulation due to large GI bleed 2012  2.  Acute renal failure with hyperkalemia           -s/p emergent HD, last dialysis 4/5/20.  3. Coronary artery disease status post stenting x3  4. History of alcohol use  5. COPD  6. Chronic diastolic CHF         -2D echo 4/6/20: EF 61 to 65% with grade 2 diastolic dysfunction.  7. History of abdominal aortic aneurysm, hypertension, dyslipidemia  8.  Peripheral vascular disease, with bilateral significant distal digital ischemia, left aortic iliac and femoropopliteal disease, right femoropopliteal disease both moderate  9.  Severe pulmonary hypertension - ? STEFANO           -RVSP 93 mmHg per 2D echo 4/6/2020    Renal function has improved off nonsteroidal anti-inflammatories.  Discussed with Dr. Bruce.      Blood pressures have been ranging from 139/82 up to 178/96.  Creatinines have decreased after dialysis, and now off dialysis for 2 days 2.43.  Will wait with initiation of losartan, based on creatinine improvement.  Will start by systolic.  Concerned regarding the abdominal aortic aneurysm, with a higher blood pressures.    Also concerned regarding persistent atrial fibrillation, with no anticoagulation.  Apparently large GI bleed in approximately 2012.  Encouraged him to obtain GI consultation as an outpatient, for reevaluation of GI bleeding risk, so that we may restart anticoagulation.  From a social perspective, patient has been through quite a bit, with wife dying recently, severe gout, renal failure, hospital admission.  Would like to avoid CVA.  We would be okay with work-up here, if appropriate.      Oli Miles MD  04/08/20  10:56      Time: 24    EMR Dragon/Transcription  disclaimer:   Much of this encounter note is an electronic transcription/translation of spoken language to printed text. The electronic translation of spoken language may permit erroneous, or at times, nonsensical words or phrases to be inadvertently transcribed.  Although I have reviewed the note for such errors, some may still exist. Please contact me if needed for clarification or correction.

## 2020-04-08 NOTE — PROCEDURES
Orthopedic Procedure Note      Patient: Juan James    Date of Admission: 4/4/2020 10:11 AM    YOB: 1945    Medical Record Number: [id number]    Attending Physician: Jayy Bruno MD  Surgeon: Dr. Jona Pink    Pre-procedure Diagnosis: right Knee pain/effusion, crystalline arthropathy  Post-procedure Diagnosis: same  Procedure:   right Knee Aspiration CPT 76950  Right knee injection with cortisone      Informed Consent: 74 y.o. male presents with knee pain/effusion.  The patient did receive an arthrocentesis yesterday results of the aspiration were consistent with crystalline arthropathy no concern for septic arthritis.  Patient has a baseline history of gout.  He has been unable to take his oral medication secondary to acute renal failure.  We did want to rule out septic arthritis prior to cortisone injection.  His effusion has reaccumulated today and as such the recommendation for repeat aspiration for therapeutic purposes as well as cortisone injection was recommended    Time Out was taken to identify the correct patient, extremity, and procedure. The knee was then prepped with betadine and alcohol in a sterile manner and allowed to dry.    Local anesthetic was provided in the form of a subcutaneous wheal of 1% lidocaine plain without epinephrine injected via a 25-gauge needle    Aspiration: The knee was then aspirated using a lateral peripatellar approach utilizing an 18g 1 1/2 inch needle. Approximately 90 cc of cloudy yellow synovial fluid was obtained.  At this point the syringe was exchanged and a syringe with 1 mL of 40 mg/mL of Kenalog was introduced into the knee via the lateral approach.  The patient tolerated the procedure well and a sterile band aid was applied.      Date:4/8/2020    Jona Pink MD

## 2020-04-08 NOTE — PLAN OF CARE
Problem: Patient Care Overview  Goal: Plan of Care Review  Flowsheets (Taken 4/8/2020 1013)  Plan of Care Reviewed With: patient  Outcome Summary: Pt is a 75 yo M who was admitted with weakness and dizziness. Pt is post-op dialysis catheter placement and R knee aspiration. Pt presents to PT with impaired functional mobility and gait secondary to generalized weakness, impaired balance, pain, and decreased activity tolerance. Pt may benefit from skilled PT to address strength, mobility, and gait.

## 2020-04-09 LAB
ALBUMIN SERPL-MCNC: 4 G/DL (ref 3.5–5.2)
ANION GAP SERPL CALCULATED.3IONS-SCNC: 11.7 MMOL/L (ref 5–15)
BUN BLD-MCNC: 41 MG/DL (ref 8–23)
BUN/CREAT SERPL: 18.5 (ref 7–25)
CALCIUM SPEC-SCNC: 9 MG/DL (ref 8.6–10.5)
CHLORIDE SERPL-SCNC: 92 MMOL/L (ref 98–107)
CO2 SERPL-SCNC: 27.3 MMOL/L (ref 22–29)
CREAT BLD-MCNC: 2.22 MG/DL (ref 0.76–1.27)
DEPRECATED RDW RBC AUTO: 42.3 FL (ref 37–54)
ERYTHROCYTE [DISTWIDTH] IN BLOOD BY AUTOMATED COUNT: 11.6 % (ref 12.3–15.4)
GFR SERPL CREATININE-BSD FRML MDRD: 29 ML/MIN/1.73
GLUCOSE BLD-MCNC: 175 MG/DL (ref 65–99)
HCT VFR BLD AUTO: 33.4 % (ref 37.5–51)
HGB BLD-MCNC: 11.6 G/DL (ref 13–17.7)
MCH RBC QN AUTO: 34.7 PG (ref 26.6–33)
MCHC RBC AUTO-ENTMCNC: 34.7 G/DL (ref 31.5–35.7)
MCV RBC AUTO: 100 FL (ref 79–97)
PHOSPHATE SERPL-MCNC: 5.2 MG/DL (ref 2.5–4.5)
PLATELET # BLD AUTO: 161 10*3/MM3 (ref 140–450)
PMV BLD AUTO: 9.1 FL (ref 6–12)
POTASSIUM BLD-SCNC: 4.1 MMOL/L (ref 3.5–5.2)
RBC # BLD AUTO: 3.34 10*6/MM3 (ref 4.14–5.8)
SODIUM BLD-SCNC: 131 MMOL/L (ref 136–145)
URATE SERPL-MCNC: 9.2 MG/DL (ref 3.4–7)
WBC NRBC COR # BLD: 7.27 10*3/MM3 (ref 3.4–10.8)

## 2020-04-09 PROCEDURE — 99222 1ST HOSP IP/OBS MODERATE 55: CPT | Performed by: INTERNAL MEDICINE

## 2020-04-09 PROCEDURE — 93010 ELECTROCARDIOGRAM REPORT: CPT | Performed by: INTERNAL MEDICINE

## 2020-04-09 PROCEDURE — 80069 RENAL FUNCTION PANEL: CPT | Performed by: INTERNAL MEDICINE

## 2020-04-09 PROCEDURE — 87338 HPYLORI STOOL AG IA: CPT | Performed by: INTERNAL MEDICINE

## 2020-04-09 PROCEDURE — 85027 COMPLETE CBC AUTOMATED: CPT | Performed by: INTERNAL MEDICINE

## 2020-04-09 PROCEDURE — 25010000002 HEPARIN (PORCINE) PER 1000 UNITS: Performed by: INTERNAL MEDICINE

## 2020-04-09 PROCEDURE — 84550 ASSAY OF BLOOD/URIC ACID: CPT | Performed by: INTERNAL MEDICINE

## 2020-04-09 PROCEDURE — 93005 ELECTROCARDIOGRAM TRACING: CPT | Performed by: NURSE PRACTITIONER

## 2020-04-09 RX ORDER — AMLODIPINE BESYLATE 10 MG/1
10 TABLET ORAL
Qty: 30 TABLET | Refills: 2 | Status: SHIPPED | OUTPATIENT
Start: 2020-04-10 | End: 2020-06-08

## 2020-04-09 RX ADMIN — HYDROCODONE BITARTRATE AND ACETAMINOPHEN 1 TABLET: 5; 325 TABLET ORAL at 18:47

## 2020-04-09 RX ADMIN — CHLORDIAZEPOXIDE HYDROCHLORIDE 25 MG: 25 CAPSULE ORAL at 13:11

## 2020-04-09 RX ADMIN — HEPARIN SODIUM 5000 UNITS: 5000 INJECTION INTRAVENOUS; SUBCUTANEOUS at 05:04

## 2020-04-09 RX ADMIN — HYDROCODONE BITARTRATE AND ACETAMINOPHEN 1 TABLET: 5; 325 TABLET ORAL at 05:04

## 2020-04-09 RX ADMIN — Medication 100 MG: at 08:45

## 2020-04-09 RX ADMIN — LACTULOSE 10 G: 10 SOLUTION ORAL at 08:45

## 2020-04-09 RX ADMIN — AMLODIPINE BESYLATE 10 MG: 10 TABLET ORAL at 08:45

## 2020-04-09 RX ADMIN — APIXABAN 5 MG: 5 TABLET, FILM COATED ORAL at 18:43

## 2020-04-09 NOTE — PLAN OF CARE
PT vss, stopped librium today per MD Damion notes. PT still very weak when standing, x2 assist. Complains of right knee pain due to gout. Knee swollen +2 edema. Right femoral sight has gauze/tegaderm no drainage noted. Cont ongoing care.

## 2020-04-09 NOTE — PROGRESS NOTES
"                                              LOS: 5 days   Patient Care Team:  Luda Kidd MD as PCP - General (Family Medicine)  Luda Kidd MD as PCP - Family Medicine  Saul Fang MD as Consulting Physician (Cardiology)  Bill Washington MD as Surgeon (Neurosurgery)  Self, Zafar Fraire MD as Consulting Physician (Vascular Surgery)    Chief Complaint:  F/up acute hyperkalemia, SHANNAN, arrhythmia, bradycardia, new hypertension    Subjective   Interval History  Complaining of joints pain.  Weak.  Cannot ambulate.  Needs assistance to walk to the bathroom.  Otherwise feeling well    REVIEW OF SYSTEMS:   CARDIOVASCULAR: No chest pain, chest pressure or chest discomfort. No palpitations or edema.   RESPIRATORY: No shortness of breath, cough or sputum.       Ventilator/Non-Invasive Ventilation Settings (From admission, onward)    None                Physical Exam:     Vital Signs  Temp:  [97.3 °F (36.3 °C)-98.6 °F (37 °C)] 97.3 °F (36.3 °C)  Heart Rate:  [57-70] 57  Resp:  [18] 18  BP: ()/(63-90) 102/90    Intake/Output Summary (Last 24 hours) at 4/9/2020 1816  Last data filed at 4/9/2020 1700  Gross per 24 hour   Intake 1410 ml   Output 600 ml   Net 810 ml     Flowsheet Rows      First Filed Value   Admission Height  188 cm (74\") Documented at 04/04/2020 1026   Admission Weight  109 kg (240 lb) Documented at 04/04/2020 1026          General Appearance:   Alert, cooperative, in no acute distress   ENMT:  Mallampati score 4, moist mucous membrane   Eyes:  Pupils equals and reactive to light. EOMI   Neck:   Trachea midline. No thyromegaly.   Lungs:    diminished breath sounds overall but no crackles or wheezing.  Nonlabored breathing    Heart:   Regular rhythm and normal rate, normal S1 and S2, no         murmur   Skin:   No rash   Abdomen:    Obese. Soft. No tenderness. No HSM.  Positive bowel sounds   Neuro:  Conscious, alert, oriented x3. Strength 5/5 in upper an lower  ext   Extremities:  " Moves all extremities well, trace edema in legs, no cyanosis, no redness          Results Review:        Results from last 7 days   Lab Units 04/09/20  0342 04/08/20  0339 04/07/20  0318   SODIUM mmol/L 131* 131* 130*   POTASSIUM mmol/L 4.1 3.6 4.8   CHLORIDE mmol/L 92* 94* 93*   CO2 mmol/L 27.3 25.5 23.8   BUN mg/dL 41* 31* 30*   CREATININE mg/dL 2.22* 2.43* 2.71*   GLUCOSE mg/dL 175* 124* 139*   CALCIUM mg/dL 9.0 8.8 8.5*     Results from last 7 days   Lab Units 04/04/20  1124   TROPONIN T ng/mL 0.059*     Results from last 7 days   Lab Units 04/09/20  0342 04/08/20  0339 04/07/20  1023 04/07/20  0318   WBC 10*3/mm3 7.27 9.54  --  10.46   HEMOGLOBIN g/dL 11.6* 11.3*  --  10.8*   HEMATOCRIT % 33.4* 31.8*  --  31.2*   PLATELETS 10*3/mm3 161 120* 160 109*     Results from last 7 days   Lab Units 04/04/20  1027   INR  1.23*   APTT seconds 30.7     Results from last 7 days   Lab Units 04/04/20  1027   PROBNP pg/mL 4,422.0*       I reviewed the patient's new clinical results.  I personally viewed and interpreted the patient's CXR        Medication Review:     amLODIPine 10 mg Oral Q24H   apixaban 5 mg Oral Q12H   chlordiazePOXIDE 25 mg Oral Q8H   dextrose 50 mL Intravenous Once   lactulose 10 g Oral Daily   lidocaine 20 mL Infiltration Once   lidocaine PF 1% 2 mL Injection Once   thiamine 100 mg Oral Daily       Diagnostic imaging:  I personally and Independently reviewed and interpreted the following images:  Knee x-ray 4/7/2020: Agree with radiologist interpretation.    Assessment   1. Atrial flutter with slow ventricular response related to hyperkalemia, digoxin and beta-blocker  2. Hypotension, intermittent, resolved  3. Dizziness, secondary to above  4. Acute severe hyperkalemia, corrected  5. Metabolic acidosis, secondary to SHANNAN  6. Acute renal failure: Secondary to NSAID on top of his blood pressure medications and perhaps lack of intake  7. Chronic diastolic CHF  8. Severe pulmonary hypertension, RVSP 93: Likely  combination of CHF and probable sleep apnea.  9. Essential hypertension  10. Mild hyponatremia  11. Mild anemia, stable  12. Alcohol withdrawal/mild  13. Daily drinker: 2 ounces of alcohol  14. COPD/chronic bronchitis, no exacerbation  15. Snoring/probable sleep apnea  16. Joints pain and stiffness  17. Elevated rheumatoid factor, very mild.  Doubt represent active disease  18. Right knee effusion, s/p aspiration 4/7/2020  19.  History of multiple adenomatous polyps-plans for repeat colonoscopy with Dr. Alves at some point to revisit large rectal polyp that was removed piecemeal in October 2019  20.  History of PUD with prior GI bleed-no current symptoms  21. Deconditioning        Plan     · Stop Librium.  · PT OT  · BMP in a.m.  Avoid nephrotoxic drugs, definitely no NSAID.  Caution with colchicine if needed.  · Tylenol for joint pain  · Hold off diuretics today.  · PRN bronchodilators  · Eliquis twice daily for A. fib.  · Lortab 6 Q 6 PRN  · PO thiamine  · Outpatient home sleep study for suspected sleep apnea.  We will arrange for that    Disposition: We will arrange for skilled nursing tomorrow.  Patient is weak and unable to ambulate without assistance.    Jayy Bruno MD  04/09/20  18:16          This note was dictated utilizing Dragon dictation

## 2020-04-09 NOTE — DISCHARGE PLACEMENT REQUEST
"Juan Hung (74 y.o. Male)     Date of Birth Social Security Number Address Home Phone MRN    1945  [id number] [place] [id number] [phone] [id number]    [employer] Marital Status          None        Admission Date Admission Type Admitting Provider Attending Provider Department, Room/Bed    4/4/20 Emergency Jayy Bruno MD Jambeih, Rami, MD [facility] CARDIOVASC UNIT, 2227/1    Discharge Date Discharge Disposition Discharge Destination                       Attending Provider:  Jayy Bruno MD    Allergies:  Ranexa [Ranolazine Er]    Isolation:  None   Infection:  None   Code Status:  CPR    Ht:  185.4 cm (73\")   Wt:  102 kg (224 lb 6.4 oz)    Admission Cmt:  None   Principal Problem:  None                Active Insurance as of 4/4/2020     Primary Coverage     Payor Plan Insurance Group Employer/Plan Group    MEDICARE MEDICARE A & B      Payor Plan Address Payor Plan Phone Number Payor Plan Fax Number Effective Dates    [address] [phone]  6/1/2006 - None Entered    [facility] [address]       Subscriber Name Subscriber Birth Date Member ID       JUAN HUNG 1945 [id number]           Secondary Coverage     Payor Plan Insurance Group Employer/Plan Group    [facility] SUP [employer] PLAN F     Payor Plan Address Payor Plan Phone Number Payor Plan Fax Number Effective Dates    [facility] [phone]  12/1/2012 - None Entered    [address]       [facility] [address]       Subscriber Name Subscriber Birth Date Member ID       JUAN HUNG 1945 [id number]                 Emergency Contacts      (Rel.) Home Phone Work Phone Mobile Phone    LambertotabithaJuan lujan Jr -- -- [phone]    Erika Ganga (Son) -- -- [phone]    Mel Hung (Spouse) [phone] -- [phone]              "

## 2020-04-09 NOTE — NURSING NOTE
Patient 2012 report obtained and on chart,  to be contacted regarding upload to epic, pt/ot eod on all patients related to covid, patient up to chair, asks to be cleaned up, aid notified, will continue to monitor.

## 2020-04-09 NOTE — PROGRESS NOTES
"   LOS: 5 days    Patient Care Team:  Luda Kidd MD as PCP - General (Family Medicine)  Luda Kidd MD as PCP - Family Medicine  Saul Fang MD as Consulting Physician (Cardiology)  Bill Washington MD as Surgeon (Neurosurgery)  Self, Zafar Fraire MD as Consulting Physician (Vascular Surgery)    Chief Complaint:    Chief Complaint   Patient presents with   • Tingling   • Numbness     Follow UP Lyndsey and hyperkalemia .  Subjective     Interval History:   Right knee aspirated and injected with steroid yesterday afternoon. Feels better. Knee still swollen, but able to stand to weigh easier this am.  Eating. Small BM, passing a lot of gas. /63.  Not soa.   Very talkative.  Not tremulous .    Review of Systems:   See above .    Objective     Vital Signs  Temp:  [97.6 °F (36.4 °C)-98.1 °F (36.7 °C)] 97.6 °F (36.4 °C)  Heart Rate:  [66-82] 70  Resp:  [18] 18  BP: (111-144)/(63-95) 113/63    Flowsheet Rows      First Filed Value   Admission Height  188 cm (74\") Documented at 04/04/2020 1026   Admission Weight  109 kg (240 lb) Documented at 04/04/2020 1026          I/O this shift:  In: 720 [P.O.:720]  Out: 600 [Urine:600]  I/O last 3 completed shifts:  In: 770 [P.O.:770]  Out: 4675 [Urine:4675]    Intake/Output Summary (Last 24 hours) at 4/9/2020 0644  Last data filed at 4/9/2020 0515  Gross per 24 hour   Intake 1080 ml   Output 1300 ml   Net -220 ml       Physical Exam:  Chronically ill.  Looks stated age.  Oral mucosa dry.   Conversant. Oriented.  Heart RRR no s3 or rub  Lungs clear to auscultation. No wheezing  Abd + active bs, soft, nontender, less distended.   Ext no pre-tibial edema.  Right knee edema, much less tender, less warmth.    Skin no rash.       Results Review:    Results from last 7 days   Lab Units 04/09/20  0342 04/08/20  0339 04/07/20  0318  04/04/20  1124   SODIUM mmol/L 131* 131* 130*   < > 132*   POTASSIUM mmol/L 4.1 3.6 4.8   < > 8.3*   CHLORIDE mmol/L 92* 94* 93*   < > " 96*   CO2 mmol/L 27.3 25.5 23.8   < > 20.7*   BUN mg/dL 41* 31* 30*   < > 80*   CREATININE mg/dL 2.22* 2.43* 2.71*   < > 5.14*   CALCIUM mg/dL 9.0 8.8 8.5*   < > 9.6   BILIRUBIN mg/dL  --  1.2  --   --  0.5   ALK PHOS U/L  --  53  --   --  64   ALT (SGPT) U/L  --  20  --   --  39   AST (SGOT) U/L  --  18  --   --  24   GLUCOSE mg/dL 175* 124* 139*   < > 146*    < > = values in this interval not displayed.       Estimated Creatinine Clearance: 37.7 mL/min (A) (by C-G formula based on SCr of 2.22 mg/dL (H)).    Results from last 7 days   Lab Units 04/09/20 0342 04/08/20  0339 04/07/20 0318  04/04/20  1027   MAGNESIUM mg/dL  --   --  2.1  --  3.7*   PHOSPHORUS mg/dL 5.2* 4.4 4.0   < >  --     < > = values in this interval not displayed.       Results from last 7 days   Lab Units 04/09/20 0342 04/08/20  0339 04/05/20  0159   URIC ACID mg/dL 9.2* 7.7* 6.8       Results from last 7 days   Lab Units 04/09/20  0342 04/08/20  0339 04/07/20  1023 04/07/20  0318 04/06/20  0404 04/05/20  0159   WBC 10*3/mm3 7.27 9.54  --  10.46 8.16 9.21   HEMOGLOBIN g/dL 11.6* 11.3*  --  10.8* 10.1* 10.4*   PLATELETS 10*3/mm3 161 120* 160 109* 131* 159       Results from last 7 days   Lab Units 04/04/20  1027   INR  1.23*         Imaging Results (Last 24 Hours)     ** No results found for the last 24 hours. **          amLODIPine 10 mg Oral Q24H   chlordiazePOXIDE 25 mg Oral Q8H   dextrose 50 mL Intravenous Once   heparin (porcine) 5,000 Units Subcutaneous Q8H   lactulose 10 g Oral Daily   lidocaine 20 mL Infiltration Once   lidocaine PF 1% 2 mL Injection Once   nebivolol 10 mg Oral Q24H   thiamine 100 mg Oral Daily          Medication Review:   Current Facility-Administered Medications   Medication Dose Route Frequency Provider Last Rate Last Dose   • amLODIPine (NORVASC) tablet 10 mg  10 mg Oral Q24H Kristen Bruce MD       • chlordiazePOXIDE (LIBRIUM) capsule 25 mg  25 mg Oral Q8H Jayy Bruno MD   25 mg at 04/08/20 1601   •  dextrose (D50W) 25 g/ 50mL Intravenous Solution 50 mL  50 mL Intravenous Q1H PRN Jayy Bruno MD   50 mL at 04/05/20 1020   • dextrose (D50W) 25 g/ 50mL Intravenous Solution 50 mL  50 mL Intravenous Once Jayy Bruno MD       • fentaNYL citrate (PF) (SUBLIMAZE) injection 25 mcg  25 mcg Intravenous Q2H PRN Jayy Bruno MD   25 mcg at 04/06/20 0659   • heparin (porcine) 5000 UNIT/ML injection 5,000 Units  5,000 Units Subcutaneous Q8H Jayy Bruno MD   5,000 Units at 04/09/20 0504   • heparin (porcine) injection 1,000 Units  1,000 Units Intracatheter PRN Jayy Bruno MD   4,000 Units at 04/05/20 1436   • HYDROcodone-acetaminophen (NORCO) 5-325 MG per tablet 1 tablet  1 tablet Oral Q6H PRN Jayy rBuno MD   1 tablet at 04/09/20 0504   • lactulose (CHRONULAC) 10 GM/15ML solution 10 g  10 g Oral Daily Kristen Bruce MD   10 g at 04/08/20 0853   • lidocaine (XYLOCAINE) 1 % injection 20 mL  20 mL Infiltration Once Jona Pink MD       • lidocaine PF 1% (XYLOCAINE) injection 2 mL  2 mL Injection Once Cali Stevens PA-C       • nebivolol (BYSTOLIC) tablet 10 mg  10 mg Oral Q24H Oli Miles MD   10 mg at 04/08/20 1221   • sodium chloride 0.9 % flush 10 mL  10 mL Intravenous PRN Jayy Bruno MD       • thiamine (VITAMIN B-1) tablet 100 mg  100 mg Oral Daily Jayy Bruno MD   100 mg at 04/08/20 0852       Assessment/Plan   1. SHANNAN, likely ATN due to bradycardia, ARB + NSAID.  SP HD for hyperkalemia 4/4  And 4/5.  Non-oliguric.  Creatinine improved.  K controlled.   2. Atrial flutter with slow response due to Digoxin.  SP digibind 4/5.    3. CAD sp stent x 3.  Chronic diastolic dysfunction .  4. ETOH abuse. On scheduled Librium.   5. AAA  6.PAD  7. Severe Pulm HTN, TR mild to moderate.   Suspected STEFANO.  Sleep study as outpt.   8. Gout right knee.  Colchicine cautiously.  Po norco.  Appreciate ortho eval.  Steroid injection yesterday.  He cannot take NSAIDS.   9. TCP.  Platelets improved to 160K  today .  10. HTN.  Added norvasc yesterday.  DW Dr. Miles regarding holding ARB until renal function further improves.  He added bystolic yesterday .  BP much better.  Watch for over-control .    OK with renal for dc.  Has follow up with DR. Arroyo May 29 at 11:45 am. Needs lab Monday April 13 and April 20 Renal panel.  Fax result to [phone] ATTN DR. Arroyo.   Kristen Bruce MD  04/09/20  06:44

## 2020-04-09 NOTE — PROGRESS NOTES
Adult Nutrition  Assessment/PES    Patient Name:  Juan James  YOB: 1945  MRN: [id number]  Admit Date:  4/4/2020    Assessment Date:  4/9/2020    Comments:  Eating 100%. +BM. Labs reviewed, still abnormal r/t renal issues. Following.     Reason for Assessment     Row Name 04/09/20 1050          Reason for Assessment    Reason For Assessment  follow-up protocol         Nutrition/Diet History     Row Name 04/09/20 1050          Nutrition/Diet History    Typical Food/Fluid Intake  Intake good. May be ready for DC today or soon thereafter.          Anthropometrics     Row Name 04/09/20 0533          Anthropometrics    Weight  102 kg (224 lb 6.4 oz)         Labs/Tests/Procedures/Meds     Row Name 04/09/20 1051          Labs/Procedures/Meds    Lab Results Reviewed  reviewed     Lab Results Comments  Na, Cl, Glu, BUN/Cr, PHos        Diagnostic Tests/Procedures    Diagnostic Test/Procedure Reviewed  reviewed     Diagnostic Test/Procedures Comments  4/8: cortisone injection into knee        Medications    Pertinent Medications Reviewed  reviewed         Physical Findings     Row Name 04/09/20 1051          Physical Findings    Skin  edema           Nutrition Prescription Ordered     Row Name 04/09/20 1053          Nutrition Prescription PO    Common Modifiers  Cardiac;Low Potassium         Evaluation of Received Nutrient/Fluid Intake     Row Name 04/09/20 1053          PO Evaluation    % PO Intake  100% + snacks               Problem/Interventions:          Intervention Goal     Row Name 04/09/20 1053          Intervention Goal    General  Disease management/therapy     PO  Maintain intake         Nutrition Intervention     Row Name 04/09/20 1053          Nutrition Intervention    RD/Tech Action  Follow Tx progress           Education/Evaluation     Row Name 04/09/20 1053          Education    Education  Will Instruct as appropriate        Monitor/Evaluation    Monitor  Per protocol            Electronically signed by:  Hyacinth Newsome RD  04/09/20 10:54

## 2020-04-09 NOTE — DISCHARGE SUMMARY
DISCHARGE SUMMARY    Patient Name: Juan James  Age/Sex: 74 y.o. male  : 1945  MRN: [id number]  Patient Care Team:  Luda Kidd MD as PCP - General (Family Medicine)  Luda Kidd MD as PCP - Family Medicine  Saul Fang MD as Consulting Physician (Cardiology)  Bill Washington MD as Surgeon (Neurosurgery)  Self, Zafar Fraire MD as Consulting Physician (Vascular Surgery)       Date of Admit: 2020  Date of Discharge:  20  Discharge Condition: Good    Discharge Diagnoses:  1. Atrial flutter with slow ventricular response related to hyperkalemia, digoxin and beta-blocker  2. Hypotension, intermittent, resolved  3. Dizziness, secondary to above  4. Acute severe hyperkalemia, corrected  5. Metabolic acidosis, secondary to SHANNAN  6. Acute renal failure: Secondary to NSAID on top of his blood pressure medications and perhaps lack of intake  7. Chronic diastolic CHF  8. Severe pulmonary hypertension, RVSP 93: Likely combination of CHF and probable sleep apnea.  9. Essential hypertension  10. Mild hyponatremia  11. Mild anemia, stable  12. Alcohol withdrawal/mild  13. Daily drinker: 2 ounces of alcohol  14. COPD/chronic bronchitis, no exacerbation  15. Snoring/probable sleep apnea  16. Joints pain and stiffness  17. Elevated rheumatoid factor, very mild.  Doubt represent active disease  18. Right knee effusion, s/p aspiration 2020  19. Nocturnal hypoxemia    History of present illness from H&P from 2020:   This is a 74-year-old male patient with no prior history of kidney disease.     He has history of CAD, A. fib, gout and CHF and takes digoxin, Diovan, Lasix, metolazone.     He reported left mid distal foot pain last Saturday, the same day his wife passed away in this hospital.  The pain was similar to prior gout attack.  He was prescribed allopurinol and indomethacin per his primary care physician.  He took the indomethacin first for 3 days but then started to get  dizzy the next day after he started the indomethacin and his symptoms got worse gradually until he started 3 days later.  He even was not able to stand up during his wife  last Wednesday.     Associated symptoms include nausea but no vomiting.  He had dry heaves.  He did not have decreased p.o. intake or changes in bowel movements or abdominal pain.  He did have generalized weakness though.      Today, the dizziness became worse and he also started having trouble breathing and generalized weakness.  In ED, he was found to be bradycardic and labs were mostly striking for significant renal failure and hyperkalemia.        He has already received albuterol 10 mg, calcium gluconate 2 g, regular insulin 10 units, 1 amp of sodium bicarb, 1 amp of D50 and 1 L IV fluid in addition to 40 mg of Lasix.  He has not had urine yet.     Labs:  Troponin 0.05; glucose 146; sodium 132; potassium 8.3; bicarb 20; anion gap 15; creatinine 5; BUN 80; hemoglobin 12;    Hospital Course:   Patient was admitted to the intensive care unit.  He received hyperkalemia treatment but his potassium remained elevated and he was very bradycardic and therefore it was decided to proceed with dialysis.  He had several sessions of dialysis which corrected his potassium.  Ultimately his creatinine improved.  He was diuresed as well.      He experienced significant pain in his right knee and had aspiration.  Fluid was consistent with gout.  He then underwent injection of steroids in his right knee which alleviated his pain.    He was counseled against taking NSAID, colchicine and allopurinol due to renal failure.  Will have follow-up BMP on 2020 and 2020 and the results will be called to Dr. Arroyo.     He was also seen by cardiology, Dr. Miles.  He was started on Eliquis for A. fib.  He will have a CBC on 2020 to make sure his hemoglobin is stable.  He had a prior history of GI bleeding and underwent an colonoscopy with Dr. Alves  and had polypectomy.  He would require another colonoscopy down the road which will be addressed later on.    In addition his work-up revealed severe pulmonary hypertension.  He is at risk for sleep apnea.  He has snoring and obesity and upper airway anatomy very suggestive of the disease.  He will have a home sleep study as outpatient.    I will schedule him for a transitional care visit in 1 week to ensure his laboratory tests are in normal range and make sure he is taking his medications appropriately.  We will also arrange a sleep study for him.    Oxygen 2 L/min prescribed at night.  Overnight oximetry was not performed but he was witnessed to drop his SPO2 to the 60%.  Again we will arrange for outpatient sleep study.    Consults:   IP CONSULT TO PULMONOLOGY  IP CONSULT TO NEPHROLOGY  IP CONSULT TO VASCULAR SURGERY  IP CONSULT TO CARDIOLOGY  IP CONSULT TO CARDIOLOGY  IP CONSULT TO SLEEP MEDICINE  IP CONSULT TO ORTHOPEDIC SURGERY  IP CONSULT TO GASTROENTEROLOGY    Significant Discharge Diagnostics   Procedures Performed:    04/08 1325 Note By: Jona Pink MD  04/07 1217 JOINT ASPIRATION/INJECTION    Pertinent Lab Results:  Results from last 7 days   Lab Units 04/09/20  0342 04/08/20  0339 04/07/20  0318 04/06/20  0404 04/05/20  0159 04/04/20  1359 04/04/20  1124   SODIUM mmol/L 131* 131* 130* 135* 133* 135* 132*   POTASSIUM mmol/L 4.1 3.6 4.8 4.5 5.3* 7.8* 8.3*   CHLORIDE mmol/L 92* 94* 93* 98 93* 99 96*   CO2 mmol/L 27.3 25.5 23.8 25.5 25.7 19.9* 20.7*   BUN mg/dL 41* 31* 30* 24* 42* 79* 80*   CREATININE mg/dL 2.22* 2.43* 2.71* 2.76* 3.58* 4.95* 5.14*   GLUCOSE mg/dL 175* 124* 139* 106* 116* 123* 146*   CALCIUM mg/dL 9.0 8.8 8.5* 8.4* 8.8 9.7 9.6   AST (SGOT) U/L  --  18  --   --   --   --  24   ALT (SGPT) U/L  --  20  --   --   --   --  39     Results from last 7 days   Lab Units 04/04/20  1124   TROPONIN T ng/mL 0.059*     Results from last 7 days   Lab Units 04/09/20  0342 04/08/20  0339 04/07/20  1023  04/07/20  0318 04/06/20  0404 04/05/20  0159 04/04/20  1027   WBC 10*3/mm3 7.27 9.54  --  10.46 8.16 9.21 9.82   HEMOGLOBIN g/dL 11.6* 11.3*  --  10.8* 10.1* 10.4* 12.1*   HEMATOCRIT % 33.4* 31.8*  --  31.2* 29.1* 30.5* 37.0*   PLATELETS 10*3/mm3 161 120* 160 109* 131* 159 227   MCV fL 100.0* 100.6*  --  100.6* 102.8* 104.1* 104.8*   MCH pg 34.7* 35.8*  --  34.8* 35.7* 35.5* 34.3*   MCHC g/dL 34.7 35.5  --  34.6 34.7 34.1 32.7   RDW % 11.6* 11.3*  --  11.9* 12.0* 11.9* 11.7*   RDW-SD fl 42.3 42.3  --  43.7 45.5 45.7 45.7   MPV fL 9.1 8.7  --  9.2 9.2 9.4 9.2   NEUTROPHIL % %  --   --   --  75.8 69.9 67.3 78.3*   LYMPHOCYTE % %  --   --   --  9.7* 15.4* 17.6* 11.8*   MONOCYTES % %  --   --   --  13.4* 13.1* 13.2* 8.8   EOSINOPHIL % %  --   --   --  0.3 1.2 1.1 0.1*   BASOPHIL % %  --   --   --  0.3 0.2 0.4 0.4   IMM GRAN % %  --   --   --  0.5 0.2 0.4 0.6*   NEUTROS ABS 10*3/mm3  --   --   --  7.94* 5.69 6.19 7.69*   LYMPHS ABS 10*3/mm3  --   --   --  1.01 1.26 1.62 1.16   MONOS ABS 10*3/mm3  --   --   --  1.40* 1.07* 1.22* 0.86   EOS ABS 10*3/mm3  --   --   --  0.03 0.10 0.10 0.01   BASOS ABS 10*3/mm3  --   --   --  0.03 0.02 0.04 0.04   IMMATURE GRANS (ABS) 10*3/mm3  --   --   --  0.05 0.02 0.04 0.06*   NRBC /100 WBC  --   --   --  0.1 0.1 0.0 0.0     Results from last 7 days   Lab Units 04/04/20  1027   INR  1.23*   APTT seconds 30.7     Results from last 7 days   Lab Units 04/07/20  0318 04/04/20  1027   MAGNESIUM mg/dL 2.1 3.7*         Results from last 7 days   Lab Units 04/04/20  1027   PROBNP pg/mL 4,422.0*     Results from last 7 days   Lab Units 04/04/20  1027   TSH uIU/mL 3.610                     Results from last 7 days   Lab Units 04/07/20  1250   BODYFLDCX  No growth at 2 days     Results from last 7 days   Lab Units 04/05/20  0059   NITRITE UA  Negative   WBC UA /HPF 3-5*   BACTERIA UA /HPF None Seen   SQUAM EPITHEL UA /HPF 3-6*         Results from last 7 days   Lab Units 04/09/20  0342   URIC ACID  mg/dL 9.2*       Imaging Results:  Imaging Results (All)     Procedure Component Value Units Date/Time    XR Knee 3 View Right [[id number]] Collected:  04/07/20 1344     Updated:  04/07/20 1350    Narrative:       XR KNEE 3 VW RIGHT-     INDICATIONS: Right knee pain and swelling     TECHNIQUE: 3 views of the right knee     COMPARISON: None available     FINDINGS:     Moderate to large right knee effusion is present. Arterial  calcifications are conspicuous. No acute fracture, erosion, dislocation  is noted. Minimal patellar spurring is seen. Mild medial tibiofemoral  joint space narrowing is apparent. Follow-up/further evaluation can be  obtained as indicated.       Impression:          As described.     This report was finalized on 4/7/2020 1:47 PM by Dr. Branden Bond M.D.       XR Chest 1 View [[id number]] Collected:  04/04/20 1102     Updated:  04/04/20 1313    Narrative:       ONE VIEW PORTABLE CHEST     HISTORY: Generalized weakness. Bradycardia.     FINDINGS: The lungs are well-expanded and grossly clear. There is mild  to moderate cardiomegaly unchanged from the CT scan dated 01/09/2020. No  acute abnormality is seen.     This report was finalized on 4/4/2020 1:10 PM by Dr. Cheikh Torrez M.D.             Objective:   Temp:  [97.6 °F (36.4 °C)-98.6 °F (37 °C)] 97.7 °F (36.5 °C)  Heart Rate:  [57-70] 57  Resp:  [18] 18  BP: ()/(63-90) 102/90   SpO2:  [88 %-98 %] 96 %  on  Flow (L/min):  [2-5] 5 Device (Oxygen Therapy): room air    Intake/Output Summary (Last 24 hours) at 4/9/2020 1634  Last data filed at 4/9/2020 0822  Gross per 24 hour   Intake 1290 ml   Output 600 ml   Net 690 ml     Body mass index is 29.61 kg/m².      04/07/20  0508 04/08/20  0605 04/09/20  0533   Weight: 107 kg (236 lb 5.3 oz) 102 kg (225 lb 1.6 oz) 102 kg (224 lb 6.4 oz)     Weight change: -0.318 kg (-11.2 oz)    Physical Exam:  General Appearance:   Alert, cooperative, in no acute distress   ENMT:  Mallampati score 4, moist  mucous membrane   Eyes:  Pupils equals and reactive to light. EOMI   Neck:   Trachea midline. No thyromegaly.   Lungs:    diminished breath sounds overall but no crackles or wheezing.  Nonlabored breathing    Heart:   Regular rhythm and normal rate, normal S1 and S2, no         murmur   Skin:   No rash   Abdomen:    Obese. Soft. No tenderness. No HSM.  Positive bowel sounds   Neuro:  Conscious, alert, oriented x3. Strength 5/5 in upper an lower  ext   Extremities:  Moves all extremities well, trace edema in legs, no cyanosis, no redness         Discharge Medications and Instructions:     Discharge Medications     Discharge Medications      New Medications      Instructions Start Date   amLODIPine 10 MG tablet  Commonly known as:  NORVASC   10 mg, Oral, Every 24 Hours Scheduled   Start Date:  April 10, 2020     apixaban 5 MG tablet tablet  Commonly known as:  ELIQUIS   5 mg, Oral, Every 12 Hours Scheduled         Stop These Medications    allopurinol 300 MG tablet  Commonly known as:  ZYLOPRIM     aspirin 81 MG chewable tablet     digoxin 125 MCG tablet  Commonly known as:  LANOXIN     ezetimibe 10 MG tablet  Commonly known as:  ZETIA     furosemide 40 MG tablet  Commonly known as:  Lasix     glucosamine-chondroitin 500-400 MG capsule capsule     glucose blood test strip     indomethacin SR 75 MG CR capsule  Commonly known as:  INDOCIN SR     isosorbide mononitrate 30 MG 24 hr tablet  Commonly known as:  IMDUR     metFORMIN 1000 MG tablet  Commonly known as:  Glucophage     metOLazone 2.5 MG tablet  Commonly known as:  ZAROXOLYN     metoprolol succinate  MG 24 hr tablet  Commonly known as:  TOPROL-XL     MULTIVITAMIN PO     potassium chloride ER 20 MEQ tablet controlled-release ER tablet  Commonly known as:  K-TAB     valsartan 40 MG tablet  Commonly known as:  Diovan            Discharge Diet:    Dietary Orders (From admission, onward)     Start     Ordered    04/07/20 0910  Diet Regular; Cardiac, Low  Potassium; 2 gm (50 mEq)  Diet Effective Now     Question Answer Comment   Diet Texture / Consistency Regular    Common Modifiers Cardiac    Common Modifiers Low Potassium    Potassium Restriction: 2 gm (50 mEq)        04/07/20 0909                Activity at Discharge:   as tolerated    Discharge disposition: Home    Discharge Instructions and Follow ups:    Additional Instructions for the Follow-ups that You Need to Schedule     CBC & Differential    Apr 13, 2020 (Approximate)      Manual Differential:  No         Renal Function Panel    Apr 13, 2020 (Approximate)      Basic Metabolic Panel    Apr 20, 2020 (Approximate)      Fax result to [phone] ATTN DR. Arroyo.    Order Comments:  Fax result to [phone] ATTN DR. Arroyo.            Follow-up Information     John Domingo MD Follow up in 2 month(s).    Specialty:  Vascular Surgery  Why:  ABIs and abdominal aortic aneurysm duplex in office on follow-up  Contact information:  [address] [facility] [address]  [place] [id number]  [phone]             Juan Arroyo MD. Go in 50 day(s).    Specialty:  Nephrology  Why:  Has follow up May 29 at 11:45 am.   Contact information:  [address] [facility] [address]  [place] [address]  [phone]             Jayy Bruno MD. Schedule an appointment as soon as possible for a visit in 1 week(s).    Specialties:  Pulmonary Disease, Sleep Medicine  Why:  Telehealth  Contact information:  [address] [facility] [address]  [facility] [id number] [phone]             Luda Kidd MD .    Specialty:  Family Medicine  Contact information:  [address] [facility] [address]  [facility] [address]  [phone]                 No future appointments.     Medication Reconciliation: Please see electronically completed Med Rec.    Total time spent discharging patient including evaluation, medication reconciliation, arranging follow up, and post hospitalization instructions and education total time exceeds 30 minutes.      Jayy Bruno MD  04/09/20  16:34      Dictated utilizing Dragon dictation

## 2020-04-09 NOTE — PROGRESS NOTES
Juan James   74 y.o.  male    LOS: 5 days   Patient Care Team:  Luda Kidd MD as PCP - General (Family Medicine)  Luda Kidd MD as PCP - Family Medicine  Saul Fang MD as Consulting Physician (Cardiology)  Bill Washington MD as Surgeon (Neurosurgery)  Self, Zafar Fraire MD as Consulting Physician (Vascular Surgery)      Subjective       Review of Systems:   No SOA. Still some right knee pain but much improved    Medication Review:   Current Facility-Administered Medications:   •  amLODIPine (NORVASC) tablet 10 mg, 10 mg, Oral, Q24H, Kristen Bruce MD  •  chlordiazePOXIDE (LIBRIUM) capsule 25 mg, 25 mg, Oral, Q8H, Jayy Bruno MD, 25 mg at 04/08/20 1601  •  dextrose (D50W) 25 g/ 50mL Intravenous Solution 50 mL, 50 mL, Intravenous, Q1H PRN, Jayy Bruno MD, 50 mL at 04/05/20 1020  •  dextrose (D50W) 25 g/ 50mL Intravenous Solution 50 mL, 50 mL, Intravenous, Once, Jayy Bruno MD  •  fentaNYL citrate (PF) (SUBLIMAZE) injection 25 mcg, 25 mcg, Intravenous, Q2H PRN, Jayy Bruno MD, 25 mcg at 04/06/20 0659  •  heparin (porcine) 5000 UNIT/ML injection 5,000 Units, 5,000 Units, Subcutaneous, Q8H, Jayy Bruno MD, 5,000 Units at 04/09/20 0504  •  heparin (porcine) injection 1,000 Units, 1,000 Units, Intracatheter, PRN, Jayy Bruno MD, 4,000 Units at 04/05/20 1436  •  HYDROcodone-acetaminophen (NORCO) 5-325 MG per tablet 1 tablet, 1 tablet, Oral, Q6H PRN, Jayy Bruno MD, 1 tablet at 04/09/20 0504  •  lactulose (CHRONULAC) 10 GM/15ML solution 10 g, 10 g, Oral, Daily, Kristen Bruce MD, 10 g at 04/08/20 0853  •  lidocaine (XYLOCAINE) 1 % injection 20 mL, 20 mL, Infiltration, Once, Jona Pink MD  •  lidocaine PF 1% (XYLOCAINE) injection 2 mL, 2 mL, Injection, Once, Cali Stevens PA-C  •  nebivolol (BYSTOLIC) tablet 10 mg, 10 mg, Oral, Q24H, Oli Miles MD, 10 mg at 04/08/20 1221  •  [COMPLETED] Insert peripheral IV, , , Once **AND** sodium chloride 0.9  % flush 10 mL, 10 mL, Intravenous, PRN, Jayy Bruno MD  •  thiamine (VITAMIN B-1) tablet 100 mg, 100 mg, Oral, Daily, Jayy Bruno MD, 100 mg at 04/08/20 0852      Objective     Vital Sign Min/Max for last 24 hours  Temp  Min: 97.6 °F (36.4 °C)  Max: 98.1 °F (36.7 °C)   BP  Min: 111/64  Max: 144/95    Pulse  Min: 66  Max: 82     Wt Readings from Last 3 Encounters:   04/09/20 102 kg (224 lb 6.4 oz)   12/26/19 111 kg (245 lb 3.2 oz)   11/26/19 113 kg (248 lb 8 oz)        Intake/Output Summary (Last 24 hours) at 4/9/2020 0707  Last data filed at 4/9/2020 0515  Gross per 24 hour   Intake 1080 ml   Output 1300 ml   Net -220 ml     Physical Exam:      General Appearance:    Well developed and well nourished in no acute distress   Neck:  no JVD   Lungs:     Clear to auscultation bilaterally. No wheezes, rhonchi or rales. No accessory muscle use.     Heart:    Regular rate and rhythm.  Normal S1 and S2. No murmur, no gallop, rub or lift.    Abdomen:     Normal bowel sounds, soft        non-tender, non-distended,    Extremities:   No edema.     Skin:   Warm and dry    Neurologic:   awake alert and oriented x3, speech clear and approp, no facial drooping      Monitor:  A fib   Results Review:     I reviewed the patient's new clinical results.    Sodium Sodium   Date Value Ref Range Status   04/09/2020 131 (L) 136 - 145 mmol/L Final   04/08/2020 131 (L) 136 - 145 mmol/L Final   04/07/2020 130 (L) 136 - 145 mmol/L Final      Potassium Potassium   Date Value Ref Range Status   04/09/2020 4.1 3.5 - 5.2 mmol/L Final   04/08/2020 3.6 3.5 - 5.2 mmol/L Final   04/07/2020 4.8 3.5 - 5.2 mmol/L Final      Chloride Chloride   Date Value Ref Range Status   04/09/2020 92 (L) 98 - 107 mmol/L Final   04/08/2020 94 (L) 98 - 107 mmol/L Final   04/07/2020 93 (L) 98 - 107 mmol/L Final      Bicarbonate No results found for: PLASMABICARB   BUN BUN   Date Value Ref Range Status   04/09/2020 41 (H) 8 - 23 mg/dL Final   04/08/2020 31 (H) 8 - 23  mg/dL Final   04/07/2020 30 (H) 8 - 23 mg/dL Final      Creatinine Creatinine   Date Value Ref Range Status   04/09/2020 2.22 (H) 0.76 - 1.27 mg/dL Final   04/08/2020 2.43 (H) 0.76 - 1.27 mg/dL Final   04/07/2020 2.71 (H) 0.76 - 1.27 mg/dL Final      Calcium Calcium   Date Value Ref Range Status   04/09/2020 9.0 8.6 - 10.5 mg/dL Final   04/08/2020 8.8 8.6 - 10.5 mg/dL Final   04/07/2020 8.5 (L) 8.6 - 10.5 mg/dL Final      Magnesium Magnesium   Date Value Ref Range Status   04/07/2020 2.1 1.6 - 2.4 mg/dL Final        Results from last 7 days   Lab Units 04/09/20  0342   WBC 10*3/mm3 7.27   HEMOGLOBIN g/dL 11.6*   HEMATOCRIT % 33.4*   PLATELETS 10*3/mm3 161     Lab Results   Lab Value Date/Time    TROPONINT 0.059 (C) 04/04/2020 1124     No results found for: CHOL  Lab Results   Component Value Date    HDL 38 (L) 11/26/2019    HDL 37 (L) 07/29/2019     Lab Results   Component Value Date     (H) 11/26/2019     (H) 07/29/2019     Lab Results   Component Value Date    TRIG 175 (H) 11/26/2019    TRIG 140 07/29/2019     No components found for: CHOLHDL  [unfilled]  Results from last 7 days   Lab Units 04/04/20  1027   INR  1.23*     Assessment/Plan   Assessment/ Plan  1. Atrial flutter, with slow ventricular response, likely related to digoxin and hyperkalemia.          -Digibind given 4/5/20; heart rates show controlled ventricular response since 4/6/2020          -Per Dr. Aviles's office note patient not on anticoagulation due to large GI bleed 2012  2.  Acute renal failure with hyperkalemia           -s/p emergent HD, last dialysis 4/5/20.  3. Coronary artery disease status post stenting x3  4. History of alcohol use  5. COPD  6. Chronic diastolic CHF         -2D echo 4/6/20: EF 61 to 65% with grade 2 diastolic dysfunction.  7. History of abdominal aortic aneurysm, hypertension, dyslipidemia  8.  Peripheral vascular disease, with bilateral significant distal digital ischemia, left aortic iliac and  femoropopliteal disease, right femoropopliteal disease both moderate  9.  Severe pulmonary hypertension - ? STEFANO           -RVSP 93 mmHg per 2D echo 4/6/2020  10. Right knee gout s/p aspiration x 2    Plan:  Norvasc and Bystolic started yesterday, he was only given the bystolic.BP much improved but was also improved prior to the dose of bystolic. BP prior to dose was 125/78. BP now controlled. Possible that BP was running high due to his right knee pain. HR in the 50-60, will stop bystolic and continue norvasc. Diuretic being dose per nephrology. Needs outpatient sleep study    Kate CATHY Vargas, APRN  04/09/20  07:07      Time:  17 mins    Oli Miles M.D.   Reviewed our cardiology group Nurse Practitioner's note.    Pt interviewed and examined.   Findings verified.   Reviewed and agree with corrections or modifications of history, physical, and plans as indicated:     Bystolic only one dose given.  Heart rates through the evening in the 40s occasionally, likely related to sleep apnea, discussed previously.  Needs outpatient work-up.    Hemoglobin has stayed stable.  GI consult appreciated.  We will start Eliquis 5 mg twice daily.  Advised regarding blood pressures, with ideal blood pressure being in the 120s, high blood pressure being 140s systolic.    Okay for discharge from cardiac standpoint.  Patient will need help from a physical therapy standpoint.  Want to keep him as active as possible.    Would check CBC 5 days after initiation of Eliquis.  Discussed fluids, decreasing apple juice and orange juice, but keeping fluid intake adequate.    EMR Dragon/Transcription disclaimer:   Much of this encounter note is an electronic transcription/translation of spoken language to printed text. The electronic translation of spoken language may permit erroneous, or at times, nonsensical words or phrases to be inadvertently transcribed.  Although I have reviewed the note for such errors, some may still exist. Please  contact me if needed for clarification or correction.  Oli Miles M.D.

## 2020-04-09 NOTE — DISCHARGE INSTRUCTIONS
[facility] to draw Renal panel April 13 and April 20 .  Fax result to [phone] ATTN Dr. Arroyo.

## 2020-04-09 NOTE — CONSULTS
[facility]  Initial Inpatient Consult Note    Referring Provider: Dr. Oli Miles    Reason for Consultation: History of GI bleed, consideration of risk of anticoagulation    Subjective     History of present illness:    74 y.o. male, previously known to our service, that we are asked to see for opinion regarding the risk of anticoagulation due to a history of prior GI bleeding.    Patient reports that he had a significant GI bleed in the past when he was on Xarelto from a reported gastric ulcer.  He underwent an EGD and colonoscopy at that time.  He cannot recall where this was done.  He reports he was directly admitted from his primary care physician's office.  He reports that this occurred in 2013.  Chart reports he had a duodenal ulcer evaluated with EGD by Dr. Camilo Falcon in 2012.  The report from this procedure is unavailable.  He does not recall being on nonsteroidal anti-inflammatories prior to this procedure.  He cannot recall a specific cause.  He has had no further GI issues since.  He denies any current issues.  Specifically he denies nausea, vomiting, abdominal pain.  He has no heartburn or dysphasia.  He has seen no black or tarry stools.  He seen no visible bright red blood per rectum.  He tends to remain slightly constipated but is asymptomatic.  He has a history of adenomatous colon polyps and has had a fairly recent colonoscopy performed by Dr. Poonam Alves.  Plans for repeat colonoscopy to reevaluate a large rectal polyp were recommended in 6 months from last exam which would be this month.    C/s 10/19 (Dr PATRICIA Alves) - multiple adenomas, 2.6 cm rectal polyp removed piecemeal and path consistent with tubulovillous adenoma    EGD/c/s 2007 (Dr Rothman)  Past Medical History:  Past Medical History:   Diagnosis Date   • AAA (abdominal aortic aneurysm) (CMS/HCC)    • Ankle edema    • Aortic aneurysm (CMS/HCC)    • Arthritis    • Atherosclerotic heart disease of native coronary  artery without angina pectoris    • Atrial fibrillation (CMS/HCC)    • Benign essential hypertension    • BPH (benign prostatic hyperplasia)    • Cataract    • Cervical disc disorder    • Cervical radiculitis 09/2016   • Cervical spondylosis 11/2012   • CHF (congestive heart failure) (CMS/HCC)    • Colon polyps     FOLLOWED BY DR. TAYA CRUZ   • COPD (chronic obstructive pulmonary disease) (CMS/HCC)    • Coronary artery disease     STENT X 3   • DDD (degenerative disc disease), cervical    • DDD (degenerative disc disease), lumbar    • Decreased pedal pulses    • Dyspnea    • Edema    • Edema of extremities    • Elevated transaminase level    • Emphysema/COPD (CMS/HCC)    • Enlarged prostate without lower urinary tract symptoms (luts)    • Essential hypertriglyceridemia    • GERD (gastroesophageal reflux disease)    • GERD without esophagitis    • Gout    • Hearing loss    • Hepatic fibrosis    • High blood pressure    • High risk medication use    • History of blood transfusion    • History of cardiac disorder    • History of cataract    • History of COPD    • History of esophagitis    • History of gastrointestinal hemorrhage    • History of hypertension    • History of nicotine dependence    • History of peptic ulcer    • History of sleep apnea    • Hoarseness, persistent    • Hyperglycemia    • Hyperlipidemia    • Hypertension    • Iliac artery aneurysm (CMS/HCC)    • Insomnia    • Lower back pain    • Lumbar radiculopathy 05/2016   • Mastodynia of left breast    • Myasthenia gravis (CMS/HCC)    • Neck pain    • Nonalcoholic hepatosteatosis    • OA (osteoarthritis)    • 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HCC)    • PAF (paroxysmal atrial fibrillation) (CMS/HCC)    • Peptic ulceration     PUD   • Prediabetes    • Prediabetes    • Prostate disease    • Pulmonary embolism (CMS/HCC)     PT DENIES HAVING THIS   • Rectal pain 07/2019   • Shortness of breath    • Status post angioplasty    • Tinnitus      Past Surgical  History:  Past Surgical History:   Procedure Laterality Date   • ANTERIOR CERVICAL DISCECTOMY W/ FUSION N/A 3/25/2016    Procedure: C3- C5 CERVICAL DISCECTOMY ANTERIOR FUSION WITH INSTRUMENTATION;  Surgeon: Bill Washington MD;  Location: [facility];  Service:    • ARTHRODESIS N/A 07/1983    LUMBAR   • ARTHRODESIS N/A 1987    LUMBAR   • ARTHRODESIS      Spinal Arthrodesis-lower spine-7/1983, 1987--upper spine-1988, 7/1990-Abstracted from [employer]   • BACK SURGERY      Lower Back Surgery   • CARDIAC CATHETERIZATION  07/2009   • CARDIAC CATHETERIZATION  03/18/2003    STENT TO RCA AND PCA, DR. PRIYA LUGO   • CATARACT EXTRACTION Bilateral    • CERVICAL ARTHRODESIS N/A 07/1990   • CERVICAL ARTHRODESIS N/A 1988   • COLONOSCOPY N/A 10/3/2019    4 MM HYPERPLASTIC POLYP IN ILEOCECAL VALVE, 4 MM SESSILE SERRATED ADENOMA POLYP IN DESCENDING, 5 TUBULOVILLOUS ADENOMA POLYPS IN RECTUM, 3 TUBULAR ADENOMA POLYPS IN DISTAL RECTUM, 26 MM TUBULAR ADENOMA POLYP IN RECTUM, PIECEMEAL POLYPECTOMY, AREA TATTOOED, RESCOPE IN 6 MONTHS, DR. TAYA CRUZ AT [facility]   • CORONARY ANGIOPLASTY WITH STENT PLACEMENT  02/2009    and 7/2009   • ENDOSCOPY N/A 06/24/2012    NON BLEEDING EROSIVE GASTROPATHY, 1 DUODENAL ULCER WITH CLEAN BASE, DR. BRANDY WREN AT [facility]   • ENDOSCOPY AND COLONOSCOPY N/A 11/20/2007    EGD WNL, 8 ADENOMATOUS POLYPS IN RECTO-SIGMOID, RESCOPE IN 3 YRS, DR. CRISTINA RODRIGUEZ AT [facility]   • FOOT SURGERY Left    • HAND SURGERY Left    • HAND SURGERY Right    • KNEE ARTHROSCOPY Left    • LAPAROSCOPIC CHOLECYSTECTOMY     • NECK SURGERY     • VASECTOMY Bilateral       Social History:   Social History     Tobacco Use   • Smoking status: Former Smoker     Types: Cigarettes   • Smokeless tobacco: Never Used   • Tobacco comment: smoked 1 to 2 packs per day for 40 or more years   Substance Use Topics   • Alcohol use: Yes     Comment: 3 bourbons daily--Caffeine use-3 cups of coffee daily      Family History:  Family History   Adopted: Yes    Problem Relation Age of Onset   • Heart attack Mother    • Alcohol abuse Mother    • Heart disease Mother    • No Known Problems Father    • Heart disease Other         FH in females before the age of 65       Home Meds:  Medications Prior to Admission   Medication Sig Dispense Refill Last Dose   • aspirin 81 MG chewable tablet Chew 81 mg Daily.   Taking   • digoxin (LANOXIN) 125 MCG tablet Take 125 mcg by mouth every morning.   Taking   • furosemide (LASIX) 40 MG tablet Take 1 tablet by mouth 2 (Two) Times a Day. 60 tablet 6 Taking   • glucosamine-chondroitin 500-400 MG capsule capsule Take  by mouth 2 (Two) Times a Day With Meals.   Taking   • glucose blood test strip USE 1 STRIP TO CHECK GLUCOSE ONCE DAILY 100 each 0    • isosorbide mononitrate (IMDUR) 30 MG 24 hr tablet Take 30 mg by mouth Daily.   Taking   • metFORMIN (GLUCOPHAGE) 1000 MG tablet Take 1 and 1/2 po qam and 1 po qhs 225 tablet 3    • metOLazone (ZAROXOLYN) 2.5 MG tablet Take 2.5 mg by mouth Daily.      • metoprolol succinate XL (TOPROL-XL) 100 MG 24 hr tablet Take 100 mg by mouth Every Morning.   Patient Taking Differently   • Multiple Vitamins-Minerals (MULTIVITAMIN PO) Take 1 tablet by mouth daily.   Taking   • potassium chloride ER (K-TAB) 20 MEQ tablet controlled-release ER tablet Take 1 tablet by mouth 2 (Two) Times a Day. 60 tablet 6 Taking   • valsartan (DIOVAN) 40 MG tablet Take 1 tablet by mouth Daily. 30 tablet 6    • allopurinol (ZYLOPRIM) 300 MG tablet Take 1 tablet by mouth Daily. 30 tablet 6    • ezetimibe (ZETIA) 10 MG tablet Take 1 tablet by mouth every night at bedtime. 30 tablet 6 Taking   • indomethacin SR (INDOCIN SR) 75 MG CR capsule Take 1 capsule by mouth 2 (Two) Times a Day With Meals. 60 capsule 3      Current Meds:     amLODIPine 10 mg Oral Q24H   chlordiazePOXIDE 25 mg Oral Q8H   dextrose 50 mL Intravenous Once   heparin (porcine) 5,000 Units Subcutaneous Q8H   lactulose 10 g Oral Daily   lidocaine 20 mL Infiltration  Once   lidocaine PF 1% 2 mL Injection Once   thiamine 100 mg Oral Daily     Allergies:  Allergies   Allergen Reactions   • Ranexa [Ranolazine Er] Unknown (See Comments)     Headaches and falling asleep.     Review of Systems  Pertinent items are noted in HPI, all other systems reviewed and negative     Objective     Vital Signs  Temp:  [97.6 °F (36.4 °C)-98.6 °F (37 °C)] 98.6 °F (37 °C)  Heart Rate:  [57-79] 57  Resp:  [18] 18  BP: ()/(63-86) 91/63  Physical Exam:  General Appearance:    Alert, cooperative, in no acute distress   Head:    Normocephalic, without obvious abnormality, atraumatic   Eyes:          conjunctivae and sclerae normal, no   icterus   Throat:   no thrush, oral mucosa moist   Neck:   Supple, no adenopathy   Lungs:     Clear to auscultation bilaterally    Heart:    Regular rhythm and normal rate    Chest Wall:    No abnormalities observed   Abdomen:     Soft, nondistended, nontender; normal bowel sounds   Extremities:   no edema, no redness   Skin:   No bruising or rash   Psychiatric:  normal mood and insight     Results Review:   I reviewed the patient's new clinical results.    Results from last 7 days   Lab Units 04/09/20  0342 04/08/20  0339 04/07/20  1023 04/07/20 0318   WBC 10*3/mm3 7.27 9.54  --  10.46   HEMOGLOBIN g/dL 11.6* 11.3*  --  10.8*   HEMATOCRIT % 33.4* 31.8*  --  31.2*   PLATELETS 10*3/mm3 161 120* 160 109*     Results from last 7 days   Lab Units 04/09/20  0342 04/08/20  0339 04/07/20 0318  04/04/20  1124   SODIUM mmol/L 131* 131* 130*   < > 132*   POTASSIUM mmol/L 4.1 3.6 4.8   < > 8.3*   CHLORIDE mmol/L 92* 94* 93*   < > 96*   CO2 mmol/L 27.3 25.5 23.8   < > 20.7*   BUN mg/dL 41* 31* 30*   < > 80*   CREATININE mg/dL 2.22* 2.43* 2.71*   < > 5.14*   CALCIUM mg/dL 9.0 8.8 8.5*   < > 9.6   BILIRUBIN mg/dL  --  1.2  --   --  0.5   ALK PHOS U/L  --  53  --   --  64   ALT (SGPT) U/L  --  20  --   --  39   AST (SGOT) U/L  --  18  --   --  24   GLUCOSE mg/dL 175* 124* 139*   <  > 146*    < > = values in this interval not displayed.     Results from last 7 days   Lab Units 04/04/20  1027   INR  1.23*     No results found for: LIPASE    Radiology:  XR Knee 3 View Right   Final Result       As described.       This report was finalized on 4/7/2020 1:47 PM by Dr. Branden Bond M.D.          XR Chest 1 View   Final Result          Assessment/Plan   Patient Active Problem List   Diagnosis   • Follow-up examination, following other surgery   • Lumbar radiculopathy   • Cervical radiculitis   • Essential hypertension   • Coronary artery disease involving native heart   • Chronic bronchitis (CMS/HCC)   • Hyperlipidemia   • Prediabetes   • Edema   • Rectal pain   • 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HCC)   • Cervical spondylosis with radiculopathy   • History of colon polyps   • Tinnitus   • Shortness of breath   • Pulmonary embolism (CMS/HCC)   • Peptic ulceration   • OA (osteoarthritis)   • Neck pain   • Lower back pain   • Hypertension   • History of blood transfusion   • Hearing loss   • GERD (gastroesophageal reflux disease)   • DDD (degenerative disc disease), lumbar   • DDD (degenerative disc disease), cervical   • Coronary artery disease   • COPD (chronic obstructive pulmonary disease) (CMS/HCC)   • Colon polyps   • CHF (congestive heart failure) (CMS/HCC)   • Cervical spondylosis   • Cervical disc disorder   • Cataract   • BPH (benign prostatic hyperplasia)   • Atrial fibrillation (CMS/HCC)   • Arthritis   • AAA (abdominal aortic aneurysm) (CMS/HCC)   • Medicare annual wellness visit, subsequent   • Heart block AV complete (CMS/HCC)   • PVD (peripheral vascular disease) with claudication (CMS/HCC)   • Acute renal failure (ARF) (CMS/HCC)       Assessment:  1. History of PUD with prior GI bleed-no current symptoms  2. A flutter  3. History of multiple adenomatous polyps-plans for repeat colonoscopy with Dr. Alves at some point to revisit large rectal polyp that was removed piecemeal in  October 2019    Plan:  · He has no risk factors for recurrent ulceration based on his interview today.  I will check an H. pylori stool antigen-I would treat this if it is positive as it is a risk factor for recurrent peptic ulcer disease.  · He is completely asymptomatic at this point and has no contraindication to anticoagulation from a GI standpoint.  · He will need a repeat colonoscopy with Dr. Alves at some point to revisit the large polyp that was removed from his rectum.  His anticoagulation would likely need to be interrupted for this.  Could be done as an outpatient once he is recovered from his acute illness.  · Will request his procedure report from the EGD in 2012-I am unable to retrieve this from the portal.    I discussed the patients findings and my recommendations with patient.    Ria Cordon MD

## 2020-04-10 ENCOUNTER — READMISSION MANAGEMENT (OUTPATIENT)
Dept: CALL CENTER | Facility: HOSPITAL | Age: 75
End: 2020-04-10

## 2020-04-10 VITALS
RESPIRATION RATE: 18 BRPM | WEIGHT: 226.1 LBS | SYSTOLIC BLOOD PRESSURE: 107 MMHG | OXYGEN SATURATION: 96 % | HEIGHT: 73 IN | DIASTOLIC BLOOD PRESSURE: 75 MMHG | HEART RATE: 53 BPM | TEMPERATURE: 97.8 F | BODY MASS INDEX: 29.97 KG/M2

## 2020-04-10 LAB
ALBUMIN SERPL-MCNC: 3.6 G/DL (ref 3.5–5.2)
ANION GAP SERPL CALCULATED.3IONS-SCNC: 14.9 MMOL/L (ref 5–15)
BUN BLD-MCNC: 49 MG/DL (ref 8–23)
BUN/CREAT SERPL: 27.1 (ref 7–25)
CALCIUM SPEC-SCNC: 8.5 MG/DL (ref 8.6–10.5)
CHLORIDE SERPL-SCNC: 89 MMOL/L (ref 98–107)
CO2 SERPL-SCNC: 26.1 MMOL/L (ref 22–29)
CREAT BLD-MCNC: 1.81 MG/DL (ref 0.76–1.27)
GFR SERPL CREATININE-BSD FRML MDRD: 37 ML/MIN/1.73
GLUCOSE BLD-MCNC: 137 MG/DL (ref 65–99)
GLUCOSE BLDC GLUCOMTR-MCNC: 133 MG/DL (ref 70–130)
PHOSPHATE SERPL-MCNC: 4.6 MG/DL (ref 2.5–4.5)
POTASSIUM BLD-SCNC: 4 MMOL/L (ref 3.5–5.2)
SODIUM BLD-SCNC: 130 MMOL/L (ref 136–145)

## 2020-04-10 PROCEDURE — 82962 GLUCOSE BLOOD TEST: CPT

## 2020-04-10 PROCEDURE — 97110 THERAPEUTIC EXERCISES: CPT

## 2020-04-10 PROCEDURE — 80069 RENAL FUNCTION PANEL: CPT | Performed by: INTERNAL MEDICINE

## 2020-04-10 RX ADMIN — LACTULOSE 10 G: 10 SOLUTION ORAL at 08:53

## 2020-04-10 RX ADMIN — HYDROCODONE BITARTRATE AND ACETAMINOPHEN 1 TABLET: 5; 325 TABLET ORAL at 09:09

## 2020-04-10 RX ADMIN — Medication 100 MG: at 08:53

## 2020-04-10 RX ADMIN — AMLODIPINE BESYLATE 10 MG: 10 TABLET ORAL at 08:53

## 2020-04-10 RX ADMIN — APIXABAN 5 MG: 5 TABLET, FILM COATED ORAL at 08:53

## 2020-04-10 NOTE — PROGRESS NOTES
Juan James   74 y.o.  male    LOS: 6 days   Patient Care Team:  Luda Kidd MD as PCP - General (Family Medicine)  Luda Kidd MD as PCP - Family Medicine  Saul Fang MD as Consulting Physician (Cardiology)  Bill Washington MD as Surgeon (Neurosurgery)  Self, Zafar Fraire MD as Consulting Physician (Vascular Surgery)      Subjective       Review of Systems:   No SOA patient wanting to go home     Medication Review:   Current Facility-Administered Medications:   •  amLODIPine (NORVASC) tablet 10 mg, 10 mg, Oral, Q24H, Kristen Bruce MD, 10 mg at 04/09/20 0845  •  apixaban (ELIQUIS) tablet 5 mg, 5 mg, Oral, Q12H, Oli Miles MD, 5 mg at 04/09/20 1843  •  dextrose (D50W) 25 g/ 50mL Intravenous Solution 50 mL, 50 mL, Intravenous, Q1H PRN, Jayy Bruno MD, 50 mL at 04/05/20 1020  •  dextrose (D50W) 25 g/ 50mL Intravenous Solution 50 mL, 50 mL, Intravenous, Once, Jayy Bruno MD  •  fentaNYL citrate (PF) (SUBLIMAZE) injection 25 mcg, 25 mcg, Intravenous, Q2H PRN, Jayy Bruno MD, 25 mcg at 04/06/20 0659  •  heparin (porcine) injection 1,000 Units, 1,000 Units, Intracatheter, PRN, Jayy Bruno MD, 4,000 Units at 04/05/20 1436  •  HYDROcodone-acetaminophen (NORCO) 5-325 MG per tablet 1 tablet, 1 tablet, Oral, Q6H PRN, Jayy Bruno MD, 1 tablet at 04/09/20 1847  •  lactulose (CHRONULAC) 10 GM/15ML solution 10 g, 10 g, Oral, Daily, Kristen Bruce MD, 10 g at 04/09/20 0845  •  lidocaine (XYLOCAINE) 1 % injection 20 mL, 20 mL, Infiltration, Once, Jona Pink MD  •  lidocaine PF 1% (XYLOCAINE) injection 2 mL, 2 mL, Injection, Once, Cali Stevens PA-C  •  [COMPLETED] Insert peripheral IV, , , Once **AND** sodium chloride 0.9 % flush 10 mL, 10 mL, Intravenous, PRN, Jayy Bruno MD  •  thiamine (VITAMIN B-1) tablet 100 mg, 100 mg, Oral, Daily, Jayy Bruno MD, 100 mg at 04/09/20 0845      Objective     Vital Sign Min/Max for last 24 hours  Temp  Min: 97.3 °F  (36.3 °C)  Max: 97.8 °F (36.6 °C)   BP  Min: 93/63  Max: 126/73    Pulse  Min: 57  Max: 63     Wt Readings from Last 3 Encounters:   04/10/20 103 kg (226 lb 1.6 oz)   12/26/19 111 kg (245 lb 3.2 oz)   11/26/19 113 kg (248 lb 8 oz)        Intake/Output Summary (Last 24 hours) at 4/10/2020 0815  Last data filed at 4/10/2020 0705  Gross per 24 hour   Intake 1170 ml   Output 1450 ml   Net -280 ml     Physical Exam:      General Appearance:    Well developed and well nourished in no acute distress sitting up in chair   Neck:   no JVD   Lungs:     Clear to auscultation bilaterally. No wheezes, rhonchi or rales. No accessory muscle use.     Heart:    Irregular rate and rhythm.  Normal S1 and S2. No murmur, no gallop, rub or lift.    Abdomen:     Normal bowel sounds, soft        non-tender, non-distended,    Extremities:   No edema.     Skin:  Warm and dry   Neurologic:   awake alert and oriented x3, speech clear and approp, no facial drooping      Monitor: A. fib  Results Review:     I reviewed the patient's new clinical results.    Sodium Sodium   Date Value Ref Range Status   04/10/2020 130 (L) 136 - 145 mmol/L Final   04/09/2020 131 (L) 136 - 145 mmol/L Final   04/08/2020 131 (L) 136 - 145 mmol/L Final      Potassium Potassium   Date Value Ref Range Status   04/10/2020 4.0 3.5 - 5.2 mmol/L Final   04/09/2020 4.1 3.5 - 5.2 mmol/L Final   04/08/2020 3.6 3.5 - 5.2 mmol/L Final      Chloride Chloride   Date Value Ref Range Status   04/10/2020 89 (L) 98 - 107 mmol/L Final   04/09/2020 92 (L) 98 - 107 mmol/L Final   04/08/2020 94 (L) 98 - 107 mmol/L Final      Bicarbonate No results found for: PLASMABICARB   BUN BUN   Date Value Ref Range Status   04/10/2020 49 (H) 8 - 23 mg/dL Final   04/09/2020 41 (H) 8 - 23 mg/dL Final   04/08/2020 31 (H) 8 - 23 mg/dL Final      Creatinine Creatinine   Date Value Ref Range Status   04/10/2020 1.81 (H) 0.76 - 1.27 mg/dL Final   04/09/2020 2.22 (H) 0.76 - 1.27 mg/dL Final   04/08/2020 2.43  (H) 0.76 - 1.27 mg/dL Final      Calcium Calcium   Date Value Ref Range Status   04/10/2020 8.5 (L) 8.6 - 10.5 mg/dL Final   04/09/2020 9.0 8.6 - 10.5 mg/dL Final   04/08/2020 8.8 8.6 - 10.5 mg/dL Final      Magnesium No results found for: MG     Results from last 7 days   Lab Units 04/09/20  0342   WBC 10*3/mm3 7.27   HEMOGLOBIN g/dL 11.6*   HEMATOCRIT % 33.4*   PLATELETS 10*3/mm3 161     Lab Results   Lab Value Date/Time    TROPONINT 0.059 (C) 04/04/2020 1124     No results found for: CHOL  Lab Results   Component Value Date    HDL 38 (L) 11/26/2019    HDL 37 (L) 07/29/2019     Lab Results   Component Value Date     (H) 11/26/2019     (H) 07/29/2019     Lab Results   Component Value Date    TRIG 175 (H) 11/26/2019    TRIG 140 07/29/2019     No components found for: CHOLHDL  [unfilled]  Results from last 7 days   Lab Units 04/04/20  1027   INR  1.23*     Assessment/Plan   Assessment/ Plan  1. Atrial flutter, with slow ventricular response, likely related to digoxin and hyperkalemia.          -Digibind given 4/5/20; heart rates show controlled ventricular response since 4/6/2020          -Per Dr. Aviles's office note patient not on anticoagulation due to large GI bleed 2012  2.  Acute renal failure with hyperkalemia           -s/p emergent HD, last dialysis 4/5/20.  3. Coronary artery disease status post stenting x3  4. History of alcohol use  5. COPD  6. Chronic diastolic CHF         -2D echo 4/6/20: EF 61 to 65% with grade 2 diastolic dysfunction.  7. History of abdominal aortic aneurysm, hypertension, dyslipidemia  8.  Peripheral vascular disease, with bilateral significant distal digital ischemia, left aortic iliac and femoropopliteal disease, right femoropopliteal disease both moderate  9.  Severe pulmonary hypertension - ? STEFANO           -RVSP 93 mmHg per 2D echo 4/6/2020  10. Right knee gout s/p aspiration x 2    Plan:  Bystolic stopped yesterday due to bradycardia. GI saw patient  yesterday, cleared to start anticoagulation for his atrial fibrillation.  Eliquis 5 mg started yesterday.  On Norvasc for blood pressure control, no ACE/ARB due to renal failure.  Nephrology dosing diuretics.  O2 sat dropped in the 60s last night, pulmonary will arrange outpatient sleep study.  Check CBC 4/15/20, hopefully home health can perform this.  Follow-up with Dr. Aviles in 3 weeks.  Discharge was canceled yesterday as he now needs PT at home.    Long discussion with patient about anticoagulation.  Explained to patient the benefit of anticoagulation is preventing a stroke due to his atrial fibrillation.  Risk of anticoagulation is bleeding (GI bleed, head bleed, bleeding from injury)  and bleeding post procedure.  Discussed with patient about monitoring stool for dark tarry stool or bright red bleeding and to report this to our office if it occurs.  Discussed with patient about alcohol cessation as drinking alcohol and being on anticoagulation is a risk for increased bleeding.  Patient verbalized understanding and is states he is no longer going to drink alcohol.  Patient agreeable to start anticoagulation.      AARON Dobbins  04/10/20  08:15      Time: 20 mins    Oli Miles M.D.   Reviewed our cardiology group Nurse Practitioner's note.    Pt interviewed and examined.   Findings verified.   Reviewed and agree with corrections or modifications of history, physical, and plans as indicated:     Would continue anticoagulation.  Heart rate and oxygenation drop during sleep, during apnea.    Discussed anticoagulation risks and benefits.  Check CBC on 4/15/2020.  Okay for discharge when okay with other services.     EMR Dragon/Transcription disclaimer:   Much of this encounter note is an electronic transcription/translation of spoken language to printed text. The electronic translation of spoken language may permit erroneous, or at times, nonsensical words or phrases to be inadvertently transcribed.   Although I have reviewed the note for such errors, some may still exist. Please contact me if needed for clarification or correction.  Oli Miles M.D.

## 2020-04-10 NOTE — DISCHARGE INSTR - ACTIVITY
Increase activity as tolerated. Call Eduardo at [phone] when you get home so oxygen can be delivered

## 2020-04-10 NOTE — THERAPY TREATMENT NOTE
Patient Name: Juan James  : 1945    MRN: [id number]                              Today's Date: 4/10/2020       Admit Date: 2020    Visit Dx:     ICD-10-CM ICD-9-CM   1. Heart block AV complete (CMS/HCC) I44.2 426.0   2. Hyperkalemia E87.5 276.7   3. SHANNAN (acute kidney injury) (CMS/HCC) N17.9 584.9   4. Elevated troponin R79.89 790.6   5. Hypermagnesemia E83.41 275.2   6. Anemia, unspecified type D64.9 285.9   7. Paroxysmal atrial fibrillation (CMS/HCC) I48.0 427.31   8. Nocturnal hypoxia G47.34 327.24     Patient Active Problem List   Diagnosis   • Follow-up examination, following other surgery   • Lumbar radiculopathy   • Cervical radiculitis   • Essential hypertension   • Coronary artery disease involving native heart   • Chronic bronchitis (CMS/HCC)   • Hyperlipidemia   • Prediabetes   • Edema   • Rectal pain   • 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HCC)   • Cervical spondylosis with radiculopathy   • History of colon polyps   • Tinnitus   • Shortness of breath   • Pulmonary embolism (CMS/HCC)   • Peptic ulceration   • OA (osteoarthritis)   • Neck pain   • Lower back pain   • Hypertension   • History of blood transfusion   • Hearing loss   • GERD (gastroesophageal reflux disease)   • DDD (degenerative disc disease), lumbar   • DDD (degenerative disc disease), cervical   • Coronary artery disease   • COPD (chronic obstructive pulmonary disease) (CMS/HCC)   • Colon polyps   • CHF (congestive heart failure) (CMS/HCC)   • Cervical spondylosis   • Cervical disc disorder   • Cataract   • BPH (benign prostatic hyperplasia)   • Atrial fibrillation (CMS/HCC)   • Arthritis   • AAA (abdominal aortic aneurysm) (CMS/HCC)   • Medicare annual wellness visit, subsequent   • Heart block AV complete (CMS/HCC)   • PVD (peripheral vascular disease) with claudication (CMS/HCC)   • Acute renal failure (ARF) (CMS/HCC)     Past Medical History:   Diagnosis Date   • AAA (abdominal aortic aneurysm) (CMS/HCC)    • Ankle edema     • Aortic aneurysm (CMS/HCC)    • Arthritis    • Atherosclerotic heart disease of native coronary artery without angina pectoris    • Atrial fibrillation (CMS/HCC)    • Benign essential hypertension    • BPH (benign prostatic hyperplasia)    • Cataract    • Cervical disc disorder    • Cervical radiculitis 09/2016   • Cervical spondylosis 11/2012   • CHF (congestive heart failure) (CMS/HCC)    • Colon polyps     FOLLOWED BY DR. TAYA CRUZ   • COPD (chronic obstructive pulmonary disease) (CMS/HCC)    • Coronary artery disease     STENT X 3   • DDD (degenerative disc disease), cervical    • DDD (degenerative disc disease), lumbar    • Decreased pedal pulses    • Dyspnea    • Edema    • Edema of extremities    • Elevated transaminase level    • Emphysema/COPD (CMS/HCC)    • Enlarged prostate without lower urinary tract symptoms (luts)    • Essential hypertriglyceridemia    • GERD (gastroesophageal reflux disease)    • GERD without esophagitis    • Gout    • Hearing loss    • Hepatic fibrosis    • High blood pressure    • High risk medication use    • History of blood transfusion    • History of cardiac disorder    • History of cataract    • History of COPD    • History of esophagitis    • History of gastrointestinal hemorrhage    • History of hypertension    • History of nicotine dependence    • History of peptic ulcer    • History of sleep apnea    • Hoarseness, persistent    • Hyperglycemia    • Hyperlipidemia    • Hypertension    • Iliac artery aneurysm (CMS/HCC)    • Insomnia    • Lower back pain    • Lumbar radiculopathy 05/2016   • Mastodynia of left breast    • Myasthenia gravis (CMS/HCC)    • Neck pain    • Nonalcoholic hepatosteatosis    • OA (osteoarthritis)    • 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HCC)    • PAF (paroxysmal atrial fibrillation) (CMS/HCC)    • Peptic ulceration     PUD   • Prediabetes    • Prediabetes    • Prostate disease    • Pulmonary embolism (CMS/HCC)     PT DENIES HAVING THIS   • Rectal pain  07/2019   • Shortness of breath    • Status post angioplasty    • Tinnitus      Past Surgical History:   Procedure Laterality Date   • ANTERIOR CERVICAL DISCECTOMY W/ FUSION N/A 3/25/2016    Procedure: C3- C5 CERVICAL DISCECTOMY ANTERIOR FUSION WITH INSTRUMENTATION;  Surgeon: Bill Washington MD;  Location: [facility];  Service:    • ARTHRODESIS N/A 07/1983    LUMBAR   • ARTHRODESIS N/A 1987    LUMBAR   • ARTHRODESIS      Spinal Arthrodesis-lower spine-7/1983, 1987--upper spine-1988, 7/1990-Abstracted from [facility]   • BACK SURGERY      Lower Back Surgery   • CARDIAC CATHETERIZATION  07/2009   • CARDIAC CATHETERIZATION  03/18/2003    STENT TO RCA AND PCA, DR. PRIYA LUGO   • CATARACT EXTRACTION Bilateral    • CERVICAL ARTHRODESIS N/A 07/1990   • CERVICAL ARTHRODESIS N/A 1988   • COLONOSCOPY N/A 10/3/2019    4 MM HYPERPLASTIC POLYP IN ILEOCECAL VALVE, 4 MM SESSILE SERRATED ADENOMA POLYP IN DESCENDING, 5 TUBULOVILLOUS ADENOMA POLYPS IN RECTUM, 3 TUBULAR ADENOMA POLYPS IN DISTAL RECTUM, 26 MM TUBULAR ADENOMA POLYP IN RECTUM, PIECEMEAL POLYPECTOMY, AREA TATTOOED, RESCOPE IN 6 MONTHS, DR. TAYA CRUZ AT [facility]   • CORONARY ANGIOPLASTY WITH STENT PLACEMENT  02/2009    and 7/2009   • ENDOSCOPY N/A 06/24/2012    NON BLEEDING EROSIVE GASTROPATHY, 1 DUODENAL ULCER WITH CLEAN BASE, DR. BRANDY WREN AT [facility]   • ENDOSCOPY AND COLONOSCOPY N/A 11/20/2007    EGD WNL, 8 ADENOMATOUS POLYPS IN RECTO-SIGMOID, RESCOPE IN 3 YRS, DR. CRISTINA RODRIGUEZ AT [facility]   • FOOT SURGERY Left    • HAND SURGERY Left    • HAND SURGERY Right    • KNEE ARTHROSCOPY Left    • LAPAROSCOPIC CHOLECYSTECTOMY     • NECK SURGERY     • VASECTOMY Bilateral      General Information     Row Name 04/10/20 1341          PT Evaluation Time/Intention    Document Type  therapy note (daily note)  -SM     Mode of Treatment  physical therapy  -     Row Name 04/10/20 1341          General Information    Existing Precautions/Restrictions  fall  -     Row Name 04/10/20  1341          Cognitive Assessment/Intervention- PT/OT    Orientation Status (Cognition)  oriented x 4  -SM       User Key  (r) = Recorded By, (t) = Taken By, (c) = Cosigned By    Initials Name Provider Type    Shawna Griffin PTA Physical Therapy Assistant        Mobility     Row Name 04/10/20 1341          Bed Mobility Assessment/Treatment    Bed Mobility Assessment/Treatment  supine-sit  -SM     Supine-Sit [place] (Bed Mobility)  supervision  -SM     Sit-Supine [place] (Bed Mobility)  not tested  -SM     Assistive Device (Bed Mobility)  bed rails;head of bed elevated  -     Row Name 04/10/20 1341          Sit-Stand Transfer    Sit-Stand [place] (Transfers)  stand by assist  -     Row Name 04/10/20 1341          Gait/Stairs Assessment/Training    [place] Level (Gait)  contact guard;stand by assist  -     Distance in Feet (Gait)  80  -SM     Pattern (Gait)  step-through  -SM     Deviations/Abnormal Patterns (Gait)  shakila decreased;stride length decreased  -SM     Comment (Gait/Stairs)  no LOBs, though pt does have a rollator at home to use if needed  -SM       User Key  (r) = Recorded By, (t) = Taken By, (c) = Cosigned By    Initials Name Provider Type    Shawna Griffin PTA Physical Therapy Assistant        Obj/Interventions     Row Name 04/10/20 1342          Therapeutic Exercise    Lower Extremity (Therapeutic Exercise)  LAQ (long arc quad), bilateral;marching while seated  -     Lower Extremity Range of Motion (Therapeutic Exercise)  ankle dorsiflexion/plantar flexion, bilateral;hip abduction/adduction, bilateral  -     Exercise Type (Therapeutic Exercise)  AROM (active range of motion)  -     Position (Therapeutic Exercise)  seated  -SM     Sets/Reps (Therapeutic Exercise)  10 reps  -       User Key  (r) = Recorded By, (t) = Taken By, (c) = Cosigned By    Initials Name Provider Type    Shawna Griffin PTA Physical Therapy Assistant        Goals/Plan     No documentation.       Clinical Impression     Row Name 04/10/20 1343          Pain Assessment    Additional Documentation  Pain Scale: Numbers Pre/Post-Treatment (Group)  -     Row Name 04/10/20 1343          Pain Scale: Numbers Pre/Post-Treatment    Pain Scale: Numbers, Pretreatment  1/10  -SM     Pain Scale: Numbers, Post-Treatment  3/10  -SM     Pain Location - Side  Right  -SM     Pain Location  knee  -SM     Pain Intervention(s)  Repositioned;Ambulation/increased activity;Rest  -     Row Name 04/10/20 1343          Positioning and Restraints    Pre-Treatment Position  in bed  -SM     Post Treatment Position  chair  -SM     In Chair  sitting;call light within reach;encouraged to call for assist;notified nsg  -       User Key  (r) = Recorded By, (t) = Taken By, (c) = Cosigned By    Initials Name Provider Type    Shawna Griffin PTA Physical Therapy Assistant        Outcome Measures     Row Name 04/10/20 0958          How much help from another person do you currently need...    Turning from your back to your side while in flat bed without using bedrails?  4  -SM     Moving from lying on back to sitting on the side of a flat bed without bedrails?  4  -SM     Moving to and from a bed to a chair (including a wheelchair)?  3  -SM     Standing up from a chair using your arms (e.g., wheelchair, bedside chair)?  3  -SM     Climbing 3-5 steps with a railing?  3  -SM     To walk in hospital room?  3  -SM     AM-PAC 6 Clicks Score (PT)  20  -[facility] Name 04/10/20 7974          Functional Assessment    Outcome Measure Options  AM-PAC 6 Clicks Basic Mobility (PT)  -       User Key  (r) = Recorded By, (t) = Taken By, (c) = Cosigned By    Initials Name Provider Type    Shawna Griffin PTA Physical Therapy Assistant          PT Recommendation and Plan     Outcome Summary/Treatment Plan (PT)  Anticipated Discharge Disposition (PT): home with home health, home with assist  Plan of Care Reviewed  With: patient  Progress: improving  Outcome Summary: Pt tolerated treatment well this date. Increased gait distance to 80ft w/ CGA-SBA, no AD. Pt states he does own a rollator if needed at home. Instructed pt on a few LE exercises, and encouraged pt to ambulate throughout the day. Plan to d/c home today w/ assist from son and HH.     Time Calculation:   PT Charges     Row Name 04/10/20 1346             Time Calculation    Start Time  1300  -      Stop Time  1317  -      Time Calculation (min)  17 min  -      PT Received On  04/10/20  -      PT - Next Appointment  04/12/20  -        User Key  (r) = Recorded By, (t) = Taken By, (c) = Cosigned By    Initials Name Provider Type    Shawna Griffin PTA Physical Therapy Assistant        Therapy Charges for Today     Code Description Service Date Service Provider Modifiers Qty    08004218597 HC PT THER PROC EA 15 MIN 4/10/2020 Shawna Abdul PTA GP 1          PT G-Codes  Outcome Measure Options: AM-PAC 6 Clicks Basic Mobility (PT)  AM-PAC 6 Clicks Score (PT): 20    Shawna Abdul PTA  4/10/2020

## 2020-04-10 NOTE — PROGRESS NOTES
Continued Stay Note  [facility]     Patient Name: Juan James  MRN: [id number]  Today's Date: 4/10/2020    Admit Date: 4/4/2020    Discharge Plan     Row Name 04/10/20 1050       Plan    Plan  Home with [facility] and [facility] for nocturnal oxygen    Plan Comments  Pt reports he will d/c home with assistance of son Juan Blackman and [facility] to follow.  Pt chose [facility] to provide nocturnal oxygen.  Referral called to Clar[facility] at 10:20 AM.  Awaiting Md order for oxygen.  CCP following.  MIRI GUZMAN/CCP        Discharge Codes    No documentation.       Expected Discharge Date and Time     Expected Discharge Date Expected Discharge Time    Apr 9, 2020             Shannan Crews RN

## 2020-04-10 NOTE — PROGRESS NOTES
Case Management Discharge Note      Final Note: Home with [facility] and O2 via [facility]    Provided Post Acute Provider List?: Yes  Post Acute Provider List: Home Health  Provided Post Acute Provider Quality & Resource List?: Yes  Post Acute Provider Quality and Resource List: Home Health  Delivered To: Patient  Method of Delivery: In person    Destination      No service has been selected for the patient.      Durable Medical Equipment - Selection Complete      Service Provider Request Status Selected Services Address Phone Number Fax Number    [facility] Selected Durable Medical Equipment [address], [facility] [address] [phone]      Dialysis/Infusion      No service has been selected for the patient.      Home Medical Care - Selection Complete      Service Provider Request Status Selected Services Address Phone Number Fax Number    [facility] [place] Selected Home Health Services [address] [place] [address] [id number] [phone]      Therapy      No service has been selected for the patient.      Community Resources      No service has been selected for the patient.        Transportation Services  Private: Car    Final Discharge Disposition Code: 06 - home with home health care

## 2020-04-10 NOTE — PLAN OF CARE
Problem: Patient Care Overview  Goal: Plan of Care Review  Outcome: Ongoing (interventions implemented as appropriate)  Flowsheets (Taken 4/10/2020 8022)  Progress: improving  Plan of Care Reviewed With: patient  Outcome Summary: Pt tolerated treatment well this date. Increased gait distance to 80ft w/ CGA-SBA, no AD. Pt states he does own a rollator if needed at home. Instructed pt on a few LE exercises, and encouraged pt to ambulate throughout the day. Plan to d/c home today w/ assist from son and HH.

## 2020-04-10 NOTE — NURSING NOTE
Patient discharge discontinued, to be seen by physical therapy in am, will re-evaluate discharge in the morning.

## 2020-04-10 NOTE — PROGRESS NOTES
"   LOS: 6 days    Patient Care Team:  Luda Kidd MD as PCP - General (Family Medicine)  Luda Kidd MD as PCP - Family Medicine  Saul Fang MD as Consulting Physician (Cardiology)  Bill Washington MD as Surgeon (Neurosurgery)  Self, Zafar Fraire MD as Consulting Physician (Vascular Surgery)    Chief Complaint:    Chief Complaint   Patient presents with   • Tingling   • Numbness     Follow UP SHANNAN  Subjective     Interval History:   Eager to go home; awaits setting up of home o2; denies dyspnea or n/v    Objective     Vital Signs  Temp:  [97.3 °F (36.3 °C)-97.8 °F (36.6 °C)] 97.8 °F (36.6 °C)  Heart Rate:  [57-63] 60  Resp:  [18] 18  BP: ()/(63-90) 126/73    Flowsheet Rows      First Filed Value   Admission Height  188 cm (74\") Documented at 04/04/2020 1026   Admission Weight  109 kg (240 lb) Documented at 04/04/2020 1026          I/O this shift:  In: -   Out: 600 [Urine:600]  I/O last 3 completed shifts:  In: 1890 [P.O.:1890]  Out: 1450 [Urine:1450]    Intake/Output Summary (Last 24 hours) at 4/10/2020 0848  Last data filed at 4/10/2020 0705  Gross per 24 hour   Intake 720 ml   Output 1450 ml   Net -730 ml       Physical Exam:  GEN comfortable, alert  Neck supple no JVD  Lungs CTA bilat no rales  CV RRR no m/g  abd soft NT/ND  vasc no pedal/ankle edema 2+ radial pulses         Results Review:    Results from last 7 days   Lab Units 04/10/20  0338 04/09/20  0342 04/08/20  0339  04/04/20  1124   SODIUM mmol/L 130* 131* 131*   < > 132*   POTASSIUM mmol/L 4.0 4.1 3.6   < > 8.3*   CHLORIDE mmol/L 89* 92* 94*   < > 96*   CO2 mmol/L 26.1 27.3 25.5   < > 20.7*   BUN mg/dL 49* 41* 31*   < > 80*   CREATININE mg/dL 1.81* 2.22* 2.43*   < > 5.14*   CALCIUM mg/dL 8.5* 9.0 8.8   < > 9.6   BILIRUBIN mg/dL  --   --  1.2  --  0.5   ALK PHOS U/L  --   --  53  --  64   ALT (SGPT) U/L  --   --  20  --  39   AST (SGOT) U/L  --   --  18  --  24   GLUCOSE mg/dL 137* 175* 124*   < > 146*    < > = values in " this interval not displayed.       Estimated Creatinine Clearance: 46.5 mL/min (A) (by C-G formula based on SCr of 1.81 mg/dL (H)).    Results from last 7 days   Lab Units 04/10/20  0338 04/09/20 0342 04/08/20  0339 04/07/20  0318  04/04/20  1027   MAGNESIUM mg/dL  --   --   --  2.1  --  3.7*   PHOSPHORUS mg/dL 4.6* 5.2* 4.4 4.0   < >  --     < > = values in this interval not displayed.       Results from last 7 days   Lab Units 04/09/20 0342 04/08/20  0339 04/05/20  0159   URIC ACID mg/dL 9.2* 7.7* 6.8       Results from last 7 days   Lab Units 04/09/20 0342 04/08/20  0339 04/07/20  1023 04/07/20  0318 04/06/20  0404 04/05/20  0159   WBC 10*3/mm3 7.27 9.54  --  10.46 8.16 9.21   HEMOGLOBIN g/dL 11.6* 11.3*  --  10.8* 10.1* 10.4*   PLATELETS 10*3/mm3 161 120* 160 109* 131* 159       Results from last 7 days   Lab Units 04/04/20  1027   INR  1.23*         Imaging Results (Last 24 Hours)     ** No results found for the last 24 hours. **          amLODIPine 10 mg Oral Q24H   apixaban 5 mg Oral Q12H   dextrose 50 mL Intravenous Once   lactulose 10 g Oral Daily   lidocaine 20 mL Infiltration Once   lidocaine PF 1% 2 mL Injection Once   thiamine 100 mg Oral Daily          Medication Review:   Current Facility-Administered Medications   Medication Dose Route Frequency Provider Last Rate Last Dose   • amLODIPine (NORVASC) tablet 10 mg  10 mg Oral Q24H Kristen Bruce MD   10 mg at 04/09/20 0845   • apixaban (ELIQUIS) tablet 5 mg  5 mg Oral Q12H Oli Miles MD   5 mg at 04/09/20 1843   • dextrose (D50W) 25 g/ 50mL Intravenous Solution 50 mL  50 mL Intravenous Q1H PRN Jayy Bruno MD   50 mL at 04/05/20 1020   • dextrose (D50W) 25 g/ 50mL Intravenous Solution 50 mL  50 mL Intravenous Once Jayy Bruno MD       • fentaNYL citrate (PF) (SUBLIMAZE) injection 25 mcg  25 mcg Intravenous Q2H PRN Jayy Bruno MD   25 mcg at 04/06/20 0659   • heparin (porcine) injection 1,000 Units  1,000 Units Intracatheter  PRN Jayy Bruno MD   4,000 Units at 04/05/20 1436   • HYDROcodone-acetaminophen (NORCO) 5-325 MG per tablet 1 tablet  1 tablet Oral Q6H PRN Jayy Bruno MD   1 tablet at 04/09/20 1847   • lactulose (CHRONULAC) 10 GM/15ML solution 10 g  10 g Oral Daily Kristen Bruce MD   10 g at 04/09/20 0845   • lidocaine (XYLOCAINE) 1 % injection 20 mL  20 mL Infiltration Once Jona Pink MD       • lidocaine PF 1% (XYLOCAINE) injection 2 mL  2 mL Injection Once Cali Stevens PA-C       • sodium chloride 0.9 % flush 10 mL  10 mL Intravenous PRN Jayy Bruno MD       • thiamine (VITAMIN B-1) tablet 100 mg  100 mg Oral Daily Jayy Bruno MD   100 mg at 04/09/20 0845       Assessment/Plan   1. SHANNAN, likely ATN due to bradycardia, ARB + NSAID.  SP HD for hyperkalemia 4/4  And 4/5.  Improving steadily, Cr 1.8 today.  Renal fcn normal at baseline.  Euvolemic.  2. Atrial flutter with slow response due to Digoxin.  SP digibind 4/5.  On eliquis    3. CAD sp stent x 3.  Chronic diastolic dysfunction, compensated w/o diuretic .  4. ETOH abuse. On scheduled Librium.   5. HTN - BP improved, bystolic stopped due to bradycardia; BP adequate on norvasc 10mg  6. PVD, AAA  7. Severe Pulm HTN, TR mild to moderate.   Suspected STEFANO.  Sleep study as outpt.   8. Gout right knee.  Colchicine cautiously.  Po norco. S/p steroid injection.  He cannot take NSAIDS.   9. Hyponatremia, mild, Na stable 130    Plan  - ok with discharge today  - avoid ACE/ARB moving forward  - has follow up with me 5/29 and Dr Bruce arranged [facility] 4/13 & 4/20      AAA (abdominal aortic aneurysm) (CMS/HCC)    Heart block AV complete (CMS/HCC)    PVD (peripheral vascular disease) with claudication (CMS/HCC)    Acute renal failure (ARF) (CMS/HCC)              Juan Arroyo MD  04/10/20  08:48

## 2020-04-10 NOTE — PLAN OF CARE
PT vss, O2 kept dropping to 70s then back up to 90s, 2L Nasal cannula was placed. PT has not complained of any pain, besides right knee pain. Swelling to the right knee has gone down slightly. PT still an assist times 2. Physical therapy to see PT this AM per notes. Will cont to russell.

## 2020-04-10 NOTE — NURSING NOTE
Patient tolerating treatments with no adverse side effects noted, blood sugar rechecked prior to discharge bs less than 150, metformin was noted to be discontinued, follow up visit with primary dr. Kidd scheduled, see avs for other follow-up dr. Visits including dr roach for cardiology, dr. Joesph obregon dr. Called to verify follow-up visits, no new orders from ortho, no need to follow up at this time. Sleep study referal appointment scheduled for June 22 at 10:30 am, Spoke with mily brandt related to home oxygen, patient is aware to call [phone] when he gets home. Spoke with St[facility] regarding [facility], starr stated she would facilitate the laboratory visits, pt, and home health care as ordered, Spoke with dr. irene to verify the discharge home meds ordered for patient, patient is discharged patient verbalizes understanding of instructions.

## 2020-04-11 LAB — H PYLORI AG STL QL IA: NEGATIVE

## 2020-04-11 NOTE — OUTREACH NOTE
Prep Survey      Responses   [facility] patient discharged from?  [place]   Is LACE score < 7 ?  No   Eligibility  [facility]   Date of Admission  04/04/20   Date of Discharge  04/10/20   Discharge Disposition  Home or Self Care   Discharge diagnosis  Atrial flutter with slow ventricular response  hyperkalemia Acute renal failure   COVID-19 Test Status  Not tested   Does the patient have one of the following disease processes/diagnoses(primary or secondary)?  Other   Does the patient have Home health ordered?  Yes   What is the Home health agency?    [facility]    Is there a DME ordered?  Yes   What DME was ordered?  [facility] for nocturnal oxygen   Prep survey completed?  Yes          Clarita Hernandez RN

## 2020-04-12 LAB
BACTERIA FLD CULT: NORMAL
BACTERIA SPEC ANAEROBE CULT: NORMAL
GRAM STN SPEC: NORMAL
GRAM STN SPEC: NORMAL

## 2020-04-13 ENCOUNTER — LAB REQUISITION (OUTPATIENT)
Dept: LAB | Facility: HOSPITAL | Age: 75
End: 2020-04-13

## 2020-04-13 ENCOUNTER — TRANSITIONAL CARE MANAGEMENT TELEPHONE ENCOUNTER (OUTPATIENT)
Dept: CALL CENTER | Facility: HOSPITAL | Age: 75
End: 2020-04-13

## 2020-04-13 ENCOUNTER — TELEPHONE (OUTPATIENT)
Dept: FAMILY MEDICINE CLINIC | Facility: CLINIC | Age: 75
End: 2020-04-13

## 2020-04-13 DIAGNOSIS — I10 ESSENTIAL (PRIMARY) HYPERTENSION: ICD-10-CM

## 2020-04-13 LAB
ANION GAP SERPL CALCULATED.3IONS-SCNC: 13.1 MMOL/L (ref 5–15)
BUN BLD-MCNC: 39 MG/DL (ref 8–23)
BUN/CREAT SERPL: 27.9 (ref 7–25)
CALCIUM SPEC-SCNC: 9.3 MG/DL (ref 8.6–10.5)
CHLORIDE SERPL-SCNC: 99 MMOL/L (ref 98–107)
CO2 SERPL-SCNC: 28.9 MMOL/L (ref 22–29)
CREAT BLD-MCNC: 1.4 MG/DL (ref 0.76–1.27)
GFR SERPL CREATININE-BSD FRML MDRD: 50 ML/MIN/1.73
GLUCOSE BLD-MCNC: 76 MG/DL (ref 65–99)
POTASSIUM BLD-SCNC: 3.5 MMOL/L (ref 3.5–5.2)
SODIUM BLD-SCNC: 141 MMOL/L (ref 136–145)

## 2020-04-13 PROCEDURE — 80048 BASIC METABOLIC PNL TOTAL CA: CPT | Performed by: FAMILY MEDICINE

## 2020-04-13 NOTE — TELEPHONE ENCOUNTER
RAJ WITH [facility] CALLED TO SAY THE PT WAS ADMITTED TO [facility] ON Saturday. SHE IS REQUESTING ORDERS TO SEE THE PT 2 TIMES A WEEK FOR 2 WEEKS FOR LABS. PLEASE CALL HER [phone] WITH ORDERS.

## 2020-04-13 NOTE — OUTREACH NOTE
Call Center TCM Note      Responses   [facility], [place], operated by [facility] patient discharged from?  [place]   COVID-19 Test Status  Not tested   Does the patient have one of the following disease processes/diagnoses(primary or secondary)?  Other   TCM attempt successful?  Yes   Call start time  1252   Call end time  1257   Discharge diagnosis  Atrial flutter with slow ventricular response  hyperkalemia Acute renal failure   Meds reviewed with patient/caregiver?  Yes   Is the patient having any side effects they believe may be caused by any medication additions or changes?  No   Does the patient have all medications ordered at discharge?  Yes   Is the patient taking all medications as directed (includes completed medication regime)?  Yes   Does the patient have a primary care provider?   Yes   Does the patient have an appointment with their PCP within 7 days of discharge?  Yes   Has the patient kept scheduled appointments due by today?  N/A   What is the Home health agency?    [facility]    Has home health visited the patient within 72 hours of discharge?  Call prior to 72 hours   Home health comments  HH has contacted patient and will see him today   What DME was ordered?  [facility] for nocturnal oxygen   Has all DME been delivered?  Yes   Psychosocial issues?  No   Did the patient receive a copy of their discharge instructions?  Yes   Nursing interventions  Reviewed instructions with patient   What is the patient's perception of their health status since discharge?  Improving   Is the patient/caregiver able to teach back signs and symptoms related to disease process for when to call PCP?  Yes   Is the patient/caregiver able to teach back signs and symptoms related to disease process for when to call 911?  Yes   Is the patient/caregiver able to teach back the hierarchy of who to call/visit for symptoms/problems? PCP, Specialist, Home health nurse, Urgent Care, ED, 911  Yes   Additional teach back comments  patient's son is  assisting in care.  He is still having pain in his right knee from gout but it is improving.  He is still weak but that is improving too.   TCM call completed?  Yes          Rosy Baird LPN    4/13/2020, 12:57

## 2020-04-15 ENCOUNTER — LAB REQUISITION (OUTPATIENT)
Dept: LAB | Facility: HOSPITAL | Age: 75
End: 2020-04-15

## 2020-04-15 DIAGNOSIS — Z00.00 ENCOUNTER FOR GENERAL ADULT MEDICAL EXAMINATION WITHOUT ABNORMAL FINDINGS: ICD-10-CM

## 2020-04-15 LAB
DEPRECATED RDW RBC AUTO: 44.6 FL (ref 37–54)
ERYTHROCYTE [DISTWIDTH] IN BLOOD BY AUTOMATED COUNT: 11.9 % (ref 12.3–15.4)
HCT VFR BLD AUTO: 35.2 % (ref 37.5–51)
HGB BLD-MCNC: 12.1 G/DL (ref 13–17.7)
MCH RBC QN AUTO: 34.9 PG (ref 26.6–33)
MCHC RBC AUTO-ENTMCNC: 34.4 G/DL (ref 31.5–35.7)
MCV RBC AUTO: 101.4 FL (ref 79–97)
PLATELET # BLD AUTO: 233 10*3/MM3 (ref 140–450)
PMV BLD AUTO: 8.8 FL (ref 6–12)
RBC # BLD AUTO: 3.47 10*6/MM3 (ref 4.14–5.8)
WBC NRBC COR # BLD: 8.96 10*3/MM3 (ref 3.4–10.8)

## 2020-04-15 PROCEDURE — 85027 COMPLETE CBC AUTOMATED: CPT | Performed by: FAMILY MEDICINE

## 2020-04-17 ENCOUNTER — TELEMEDICINE (OUTPATIENT)
Dept: FAMILY MEDICINE CLINIC | Facility: CLINIC | Age: 75
End: 2020-04-17

## 2020-04-17 DIAGNOSIS — I48.11 LONGSTANDING PERSISTENT ATRIAL FIBRILLATION (HCC): ICD-10-CM

## 2020-04-17 DIAGNOSIS — Z09 HOSPITAL DISCHARGE FOLLOW-UP: Primary | ICD-10-CM

## 2020-04-17 PROCEDURE — 99496 TRANSJ CARE MGMT HIGH F2F 7D: CPT | Performed by: FAMILY MEDICINE

## 2020-04-17 NOTE — PROGRESS NOTES
Transitional Care Follow Up Visit  Subjective   Consent given  Unable to complete visit using a video connection to the patient. A phone visit was used to complete this visits. Total time of discussion was 15  minutes.    Juan James is a 74 y.o. male who presents for a transitional care management visit.    Within 48 business hours after discharge our office contacted him via telephone to coordinate his care and needs.    Date of d/c: 4/4/20  Date of discharge 4/9/20  Dx: Milady Davis  I reviewed and discussed the details of that call along with the discharge summary, hospital problems, inpatient lab results, inpatient diagnostic studies, and consultation reports with Juan.     Current outpatient and discharge medications have been reconciled for the patient.  Reviewed by: Luda Kidd MD      Date of TCM Phone Call 4/10/2020   [facility]   Date of Admission 4/4/2020   Date of Discharge 4/10/2020   Discharge Disposition Home or Self Care     Risk for Readmission (LACE) Score: 13 (4/10/2020  6:00 AM)      History of Present Illness   Course During Hospital Stay:  See records         Review of Systems  Fatigue  Objective   Physical Exam    Assessment/Plan     Hospital follow up for Milady.       Visit 15 min

## 2020-04-21 ENCOUNTER — LAB REQUISITION (OUTPATIENT)
Dept: LAB | Facility: HOSPITAL | Age: 75
End: 2020-04-21

## 2020-04-21 DIAGNOSIS — I48.91 UNSPECIFIED ATRIAL FIBRILLATION (HCC): ICD-10-CM

## 2020-04-21 DIAGNOSIS — I27.20 PULMONARY HYPERTENSION, UNSPECIFIED (HCC): ICD-10-CM

## 2020-04-21 LAB
ANION GAP SERPL CALCULATED.3IONS-SCNC: 12.9 MMOL/L (ref 5–15)
BUN BLD-MCNC: 20 MG/DL (ref 8–23)
BUN/CREAT SERPL: 15.6 (ref 7–25)
CALCIUM SPEC-SCNC: 9.1 MG/DL (ref 8.6–10.5)
CHLORIDE SERPL-SCNC: 104 MMOL/L (ref 98–107)
CO2 SERPL-SCNC: 24.1 MMOL/L (ref 22–29)
CREAT BLD-MCNC: 1.28 MG/DL (ref 0.76–1.27)
GFR SERPL CREATININE-BSD FRML MDRD: 55 ML/MIN/1.73
GLUCOSE BLD-MCNC: 121 MG/DL (ref 65–99)
POTASSIUM BLD-SCNC: 3.9 MMOL/L (ref 3.5–5.2)
SODIUM BLD-SCNC: 141 MMOL/L (ref 136–145)

## 2020-04-21 PROCEDURE — 80048 BASIC METABOLIC PNL TOTAL CA: CPT | Performed by: FAMILY MEDICINE

## 2020-04-23 ENCOUNTER — READMISSION MANAGEMENT (OUTPATIENT)
Dept: CALL CENTER | Facility: HOSPITAL | Age: 75
End: 2020-04-23

## 2020-04-23 NOTE — OUTREACH NOTE
Medical Week 2 Survey      Responses   [facility]   COVID-19 Test Status  Not tested   Does the patient have one of the following disease processes/diagnoses(primary or secondary)?  Other   Week 2 attempt successful?  Yes   Call start time  0922   Discharge diagnosis  Atrial flutter with slow ventricular response  hyperkalemia Acute renal failure   Call end time  0928   Is patient permission given to speak with other caregiver?  No   Meds reviewed with patient/caregiver?  Yes   Is the patient having any side effects they believe may be caused by any medication additions or changes?  No   Does the patient have all medications ordered at discharge?  Yes   Is the patient taking all medications as directed (includes completed medication regime)?  Yes   Medication comments  Patient reports that he is having increased trouble with gout in his feet not taking the gout medications.    Does the patient have a primary care provider?   Yes   Comments regarding PCP  Luda Kidd MD PCP. phone appt 4/17/20   Has the patient kept scheduled appointments due by today?  Yes   Comments  Patient reports that he has a cardiology appt today.    What is the Home health agency?    [facility]    Has home health visited the patient within 72 hours of discharge?  Yes   What DME was ordered?  [facility] for nocturnal oxygen   Psychosocial issues?  No   Did the patient receive a copy of their discharge instructions?  Yes   Nursing interventions  Reviewed instructions with patient   What is the patient's perception of their health status since discharge?  Improving   Is the patient/caregiver able to teach back signs and symptoms related to disease process for when to call PCP?  Yes   Is the patient/caregiver able to teach back signs and symptoms related to disease process for when to call 911?  Yes   Is the patient/caregiver able to teach back the hierarchy of who to call/visit for  symptoms/problems? PCP, Specialist, Home health nurse, Urgent Care, ED, 911  Yes   Week 2 Call Completed?  Yes          Clarita Ruano RN

## 2020-04-30 ENCOUNTER — READMISSION MANAGEMENT (OUTPATIENT)
Dept: CALL CENTER | Facility: HOSPITAL | Age: 75
End: 2020-04-30

## 2020-04-30 NOTE — OUTREACH NOTE
Medical Week 3 Survey      Responses   [facility] patient discharged from?  [place]   COVID-19 Test Status  Not tested   Does the patient have one of the following disease processes/diagnoses(primary or secondary)?  Other   Week 3 attempt successful?  Yes   Call start time  1348   Call end time  1351   Discharge diagnosis  Atrial flutter with slow ventricular response  hyperkalemia Acute renal failure   Meds reviewed with patient/caregiver?  Yes   Is the patient taking all medications as directed (includes completed medication regime)?  Yes   Has the patient kept scheduled appointments due by today?  Yes   What is the Home health agency?   D/C from home health   DME comments  Pt is wearing O2 at night   Psychosocial issues?  Yes   Psychosocial comments  Patient reported his wife  last month   What is the patient's perception of their health status since discharge?  Improving [pt reports swelling has improved in his feet.]   Week 3 Call Completed?  Yes          Erica Arboleda RN

## 2020-05-07 ENCOUNTER — READMISSION MANAGEMENT (OUTPATIENT)
Dept: CALL CENTER | Facility: HOSPITAL | Age: 75
End: 2020-05-07

## 2020-05-07 NOTE — OUTREACH NOTE
Medical Week 4 Survey      Responses   [facility] patient discharged from?  [place]   COVID-19 Test Status  Not tested   Does the patient have one of the following disease processes/diagnoses(primary or secondary)?  Other   Week 4 attempt successful?  Yes   Call start time  1727   Call end time  1728   Discharge diagnosis  Atrial flutter with slow ventricular response  hyperkalemia Acute renal failure   Is patient permission given to speak with other caregiver?  No   Meds reviewed with patient/caregiver?  Yes   Is the patient having any side effects they believe may be caused by any medication additions or changes?  No   Is the patient taking all medications as directed (includes completed medication regime)?  Yes   Has the patient kept scheduled appointments due by today?  Yes   Is the patient still receiving Home Health Services?  N/A   Pulse Ox monitoring  None   Psychosocial issues?  Yes   What is the patient's perception of their health status since discharge?  Improving   Is the patient/caregiver able to teach back signs and symptoms related to disease process for when to call PCP?  Yes   Is the patient/caregiver able to teach back signs and symptoms related to disease process for when to call 911?  Yes   Is the patient/caregiver able to teach back the hierarchy of who to call/visit for symptoms/problems? PCP, Specialist, Home health nurse, Urgent Care, ED, 911  Yes   Week 4 Call Completed?  Yes   Would the patient like one additional call?  No   Graduated  Yes   Did the patient feel the follow up calls were helpful during their recovery period?  Yes   Was the number of calls appropriate?  Yes          Yareli Gutierrez RN

## 2020-05-11 DIAGNOSIS — M15.9 OSTEOARTHRITIS OF MULTIPLE JOINTS, UNSPECIFIED OSTEOARTHRITIS TYPE: Primary | ICD-10-CM

## 2020-05-29 DIAGNOSIS — M15.9 OSTEOARTHRITIS OF MULTIPLE JOINTS, UNSPECIFIED OSTEOARTHRITIS TYPE: Primary | ICD-10-CM

## 2020-05-29 DIAGNOSIS — I10 ESSENTIAL HYPERTENSION: ICD-10-CM

## 2020-05-29 RX ORDER — VALSARTAN 40 MG/1
TABLET ORAL
Qty: 90 TABLET | Refills: 0 | Status: SHIPPED | OUTPATIENT
Start: 2020-05-29 | End: 2020-06-08

## 2020-06-08 ENCOUNTER — OFFICE VISIT (OUTPATIENT)
Dept: FAMILY MEDICINE CLINIC | Facility: CLINIC | Age: 75
End: 2020-06-08

## 2020-06-08 VITALS
SYSTOLIC BLOOD PRESSURE: 130 MMHG | HEIGHT: 73 IN | OXYGEN SATURATION: 98 % | HEART RATE: 57 BPM | BODY MASS INDEX: 29.69 KG/M2 | WEIGHT: 224 LBS | TEMPERATURE: 99.3 F | DIASTOLIC BLOOD PRESSURE: 82 MMHG

## 2020-06-08 DIAGNOSIS — E11.9 TYPE 2 DIABETES MELLITUS WITHOUT COMPLICATION, WITHOUT LONG-TERM CURRENT USE OF INSULIN (HCC): ICD-10-CM

## 2020-06-08 DIAGNOSIS — M79.641 PAIN OF RIGHT HAND: Primary | ICD-10-CM

## 2020-06-08 PROCEDURE — 99214 OFFICE O/P EST MOD 30 MIN: CPT | Performed by: FAMILY MEDICINE

## 2020-06-08 RX ORDER — ALLOPURINOL 300 MG/1
300 TABLET ORAL 2 TIMES DAILY
Qty: 60 TABLET | Refills: 6 | Status: SHIPPED | OUTPATIENT
Start: 2020-06-08 | End: 2020-06-09

## 2020-06-08 RX ORDER — FUROSEMIDE 40 MG/1
TABLET ORAL
Qty: 60 TABLET | Refills: 4 | Status: SHIPPED | OUTPATIENT
Start: 2020-06-08 | End: 2020-12-14 | Stop reason: SDUPTHER

## 2020-06-08 RX ORDER — METHYLPREDNISOLONE 4 MG/1
TABLET ORAL
Qty: 21 EACH | Refills: 0 | Status: SHIPPED | OUTPATIENT
Start: 2020-06-08 | End: 2020-07-10 | Stop reason: SDUPTHER

## 2020-06-08 RX ORDER — ALLOPURINOL 300 MG/1
TABLET ORAL
COMMUNITY
Start: 2020-05-06 | End: 2020-06-08 | Stop reason: SDUPTHER

## 2020-06-08 NOTE — PROGRESS NOTES
Subjective   Juan James is a 74 y.o. male.     Chief Complaint   Patient presents with   • right hand swelling.   • Diabetes       Hand Pain    The incident occurred 6 to 12 hours ago. There was no injury mechanism. The pain is present in the right fingers. The quality of the pain is described as burning and stabbing. The pain does not radiate. Pertinent negatives include no chest pain, muscle weakness, numbness or tingling. The symptoms are aggravated by movement. He has tried nothing for the symptoms.   Diabetes   He presents for his follow-up diabetic visit. He has type 2 diabetes mellitus. His disease course has been improving. Pertinent negatives for hypoglycemia include no dizziness. Pertinent negatives for diabetes include no blurred vision, no chest pain and no fatigue.   pt had a history of gout.       The following portions of the patient's history were reviewed and updated as appropriate: allergies, current medications, past family history, past medical history, past social history, past surgical history and problem list.    Past Medical History:   Diagnosis Date   • AAA (abdominal aortic aneurysm) (CMS/[facility])    • Ankle edema    • Aortic aneurysm (CMS/HCC)    • Arthritis    • Atherosclerotic heart disease of native coronary artery without angina pectoris    • Atrial fibrillation (CMS/HCC)    • Benign essential hypertension    • BPH (benign prostatic hyperplasia)    • Cataract    • Cervical disc disorder    • Cervical radiculitis 09/2016   • Cervical spondylosis 11/2012   • CHF (congestive heart failure) (CMS/HCC)    • Colon polyps     FOLLOWED BY DR. TAYA CRUZ   • COPD (chronic obstructive pulmonary disease) (CMS/HCC)    • Coronary artery disease     STENT X 3   • DDD (degenerative disc disease), cervical    • DDD (degenerative disc disease), lumbar    • Decreased pedal pulses    • Dyspnea    • Edema    • Edema of extremities    • Elevated transaminase level    • Emphysema/COPD (CMS/HCC)    •  Enlarged prostate without lower urinary tract symptoms (luts)    • Essential hypertriglyceridemia    • GERD (gastroesophageal reflux disease)    • GERD without esophagitis    • Gout    • Hearing loss    • Hepatic fibrosis    • High blood pressure    • High risk medication use    • History of blood transfusion    • History of cardiac disorder    • History of cataract    • History of COPD    • History of esophagitis    • History of gastrointestinal hemorrhage    • History of hypertension    • History of nicotine dependence    • History of peptic ulcer    • History of sleep apnea    • Hoarseness, persistent    • Hyperglycemia    • Hyperlipidemia    • Hypertension    • Iliac artery aneurysm (CMS/HCC)    • Insomnia    • Lower back pain    • Lumbar radiculopathy 05/2016   • Mastodynia of left breast    • Myasthenia gravis (CMS/HCC)    • Neck pain    • Nonalcoholic hepatosteatosis    • OA (osteoarthritis)    • 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HCC)    • PAF (paroxysmal atrial fibrillation) (CMS/HCC)    • Peptic ulceration     PUD   • Prediabetes    • Prediabetes    • Prostate disease    • Pulmonary embolism (CMS/HCC)     PT DENIES HAVING THIS   • Rectal pain 07/2019   • Shortness of breath    • Status post angioplasty    • Tinnitus        Past Surgical History:   Procedure Laterality Date   • ANTERIOR CERVICAL DISCECTOMY W/ FUSION N/A 3/25/2016    Procedure: C3- C5 CERVICAL DISCECTOMY ANTERIOR FUSION WITH INSTRUMENTATION;  Surgeon: Bill Washington MD;  Location: [facility];  Service:    • ARTHRODESIS N/A 07/1983    LUMBAR   • ARTHRODESIS N/A 1987    LUMBAR   • ARTHRODESIS      Spinal Arthrodesis-lower spine-7/1983, 1987--upper spine-1988, 7/1990-Abstracted from [employer]   • BACK SURGERY      Lower Back Surgery   • CARDIAC CATHETERIZATION  07/2009   • CARDIAC CATHETERIZATION  03/18/2003    STENT TO RCA AND PCA, DR. PRIYA LUGO   • CATARACT EXTRACTION Bilateral    • CERVICAL ARTHRODESIS N/A 07/1990   • CERVICAL  ARTHRODESIS N/A 1988   • COLONOSCOPY N/A 10/3/2019    4 MM HYPERPLASTIC POLYP IN ILEOCECAL VALVE, 4 MM SESSILE SERRATED ADENOMA POLYP IN DESCENDING, 5 TUBULOVILLOUS ADENOMA POLYPS IN RECTUM, 3 TUBULAR ADENOMA POLYPS IN DISTAL RECTUM, 26 MM TUBULAR ADENOMA POLYP IN RECTUM, PIECEMEAL POLYPECTOMY, AREA TATTOOED, RESCOPE IN 6 MONTHS, DR. TAYA CRUZ AT [facility]   • CORONARY ANGIOPLASTY WITH STENT PLACEMENT  02/2009    and 7/2009   • ENDOSCOPY N/A 06/24/2012    NON BLEEDING EROSIVE GASTROPATHY, 1 DUODENAL ULCER WITH CLEAN BASE, DR. BRANDY WREN AT [facility]   • ENDOSCOPY AND COLONOSCOPY N/A 11/20/2007    EGD WNL, 8 ADENOMATOUS POLYPS IN RECTO-SIGMOID, RESCOPE IN 3 YRS, DR. CRISTINA RODRIGUEZ AT [facility]   • FOOT SURGERY Left    • HAND SURGERY Left    • HAND SURGERY Right    • KNEE ARTHROSCOPY Left    • LAPAROSCOPIC CHOLECYSTECTOMY     • NECK SURGERY     • VASECTOMY Bilateral        Family History   Adopted: Yes   Problem Relation Age of Onset   • Heart attack Mother    • Alcohol abuse Mother    • Heart disease Mother    • No Known Problems Father    • Heart disease Other         FH in females before the age of 65       Social History     Socioeconomic History   • Marital status:      Spouse name: Not on file   • Number of children: Not on file   • Years of education: Not on file   • Highest education level: Not on file   Tobacco Use   • Smoking status: Former Smoker     Types: Cigarettes   • Smokeless tobacco: Never Used   • Tobacco comment: smoked 1 to 2 packs per day for 40 or more years   Substance and Sexual Activity   • Alcohol use: Yes     Comment: 3 bourbons daily--Caffeine use-3 cups of coffee daily   • Drug use: No   • Sexual activity: Defer       Current Outpatient Medications on File Prior to Visit   Medication Sig Dispense Refill   • diclofenac (VOLTAREN) 1 % gel gel Apply 4 g topically to the appropriate area as directed 3 (Three) Times a Day. 5 tube 6   • [DISCONTINUED] allopurinol (ZYLOPRIM) 300 MG tablet      •  [DISCONTINUED] amLODIPine (NORVASC) 10 MG tablet Take 1 tablet by mouth daily. 30 tablet 2   • [DISCONTINUED] valsartan (DIOVAN) 40 MG tablet Take 1 tablet by mouth once daily 90 tablet 0   • [DISCONTINUED] apixaban (ELIQUIS) 5 MG tablet tablet Take 1 tablet by mouth every 12 hours. 60 tablet 2     No current facility-administered medications on file prior to visit.        Review of Systems   Constitutional: Negative for fatigue.   Eyes: Negative for blurred vision.   Respiratory: Negative for cough, chest tightness and shortness of breath.    Cardiovascular: Negative for chest pain, palpitations and leg swelling.   Musculoskeletal:        Rt hand acute pain and erythema   Neurological: Negative for dizziness, tingling, light-headedness, numbness and headache.       Recent Results (from the past 4704 hour(s))   Hemoglobin A1c    Collection Time: 11/26/19  9:18 AM   Result Value Ref Range    Hemoglobin A1C 8.10 (H) 4.80 - 5.60 %   Comprehensive Metabolic Panel    Collection Time: 11/26/19  9:18 AM   Result Value Ref Range    Glucose 162 (H) 65 - 99 mg/dL    BUN 23 8 - 23 mg/dL    Creatinine 1.06 0.76 - 1.27 mg/dL    eGFR Non African Am 69 >60 mL/min/1.73    eGFR African Am 83 >60 mL/min/1.73    BUN/Creatinine Ratio 21.7 7.0 - 25.0    Sodium 139 136 - 145 mmol/L    Potassium 3.5 3.5 - 5.2 mmol/L    Chloride 93 (L) 98 - 107 mmol/L    Total CO2 32.6 (H) 22.0 - 29.0 mmol/L    Calcium 9.1 8.6 - 10.5 mg/dL    Total Protein 7.3 6.0 - 8.5 g/dL    Albumin 3.80 3.50 - 5.20 g/dL    Globulin 3.5 gm/dL    A/G Ratio 1.1 g/dL    Total Bilirubin 1.9 (H) 0.2 - 1.2 mg/dL    Alkaline Phosphatase 98 39 - 117 U/L    AST (SGOT) 121 (H) 1 - 40 U/L    ALT (SGPT) 100 (H) 1 - 41 U/L   Lipid Panel With LDL / HDL Ratio    Collection Time: 11/26/19  9:18 AM   Result Value Ref Range    Total Cholesterol 174 0 - 200 mg/dL    Triglycerides 175 (H) 0 - 150 mg/dL    HDL Cholesterol 38 (L) 40 - 60 mg/dL    VLDL Cholesterol 35 mg/dL    LDL Cholesterol   101 (H) 0 - 100 mg/dL    LDL/HDL Ratio 2.66    Doppler arterial lower extremity stress CAR    Collection Time: 01/27/20 11:53 AM   Result Value Ref Range    RIGHT HIGH THIGH SYS  mmHg    RIGHT LOW THIGH SYS  mmHg    RIGHT POPLITEAL SYS  mmHg    RIGHT DORSALIS PEDIS SYS MAX 99 mmHg    RIGHT POST TIBIAL SYS MAX 92 mmHg    RIGHT GREAT TOE SYS MAX 55 mmHg    RIGHT ALEJANDRO RATIO 0.75     RIGHT TBI RATIO 0.42     RIGHT POST EX ALEJANDRO RATIO 0.63     LEFT HIGH THIGH SYS  mmHg    LEFT LOW THIGH SYS  mmHg    LEFT POPLITEAL SYS MAX 95 mmHg    LEFT DORSALIS PEDIS SYS MAX 95 mmHg    LEFT POST TIBIAL SYS  mmHg    LEFT GREAT TOE SYS MAX 66 mmHg    LEFT ALEJANDRO RATIO 0.83     LEFT TBI RATIO 0.5     LEFT POST EX ALEJANDRO RATIO 0.67     Upper arterial right arm brachial sys max 132 mmHg    Upper arterial left arm brachial sys max 126 mmHg   Protime-INR    Collection Time: 04/04/20 10:27 AM   Result Value Ref Range    Protime 15.2 (H) 11.7 - 14.2 Seconds    INR 1.23 (H) 0.90 - 1.10   aPTT    Collection Time: 04/04/20 10:27 AM   Result Value Ref Range    PTT 30.7 22.7 - 35.4 seconds   BNP    Collection Time: 04/04/20 10:27 AM   Result Value Ref Range    proBNP 4,422.0 (H) 5.0 - 900.0 pg/mL   T4, Free    Collection Time: 04/04/20 10:27 AM   Result Value Ref Range    Free T4 1.31 0.93 - 1.70 ng/dL   TSH    Collection Time: 04/04/20 10:27 AM   Result Value Ref Range    TSH 3.610 0.270 - 4.200 uIU/mL   Magnesium    Collection Time: 04/04/20 10:27 AM   Result Value Ref Range    Magnesium 3.7 (H) 1.6 - 2.4 mg/dL   CBC Auto Differential    Collection Time: 04/04/20 10:27 AM   Result Value Ref Range    WBC 9.82 3.40 - 10.80 10*3/mm3    RBC 3.53 (L) 4.14 - 5.80 10*6/mm3    Hemoglobin 12.1 (L) 13.0 - 17.7 g/dL    Hematocrit 37.0 (L) 37.5 - 51.0 %    .8 (H) 79.0 - 97.0 fL    MCH 34.3 (H) 26.6 - 33.0 pg    MCHC 32.7 31.5 - 35.7 g/dL    RDW 11.7 (L) 12.3 - 15.4 %    RDW-SD 45.7 37.0 - 54.0 fl    MPV 9.2 6.0 - 12.0  fL    Platelets 227 140 - 450 10*3/mm3    Neutrophil % 78.3 (H) 42.7 - 76.0 %    Lymphocyte % 11.8 (L) 19.6 - 45.3 %    Monocyte % 8.8 5.0 - 12.0 %    Eosinophil % 0.1 (L) 0.3 - 6.2 %    Basophil % 0.4 0.0 - 1.5 %    Immature Grans % 0.6 (H) 0.0 - 0.5 %    Neutrophils, Absolute 7.69 (H) 1.70 - 7.00 10*3/mm3    Lymphocytes, Absolute 1.16 0.70 - 3.10 10*3/mm3    Monocytes, Absolute 0.86 0.10 - 0.90 10*3/mm3    Eosinophils, Absolute 0.01 0.00 - 0.40 10*3/mm3    Basophils, Absolute 0.04 0.00 - 0.20 10*3/mm3    Immature Grans, Absolute 0.06 (H) 0.00 - 0.05 10*3/mm3    nRBC 0.0 0.0 - 0.2 /100 WBC   Digoxin Level    Collection Time: 04/04/20 10:27 AM   Result Value Ref Range    Digoxin 1.00 0.60 - 1.20 ng/mL   Comprehensive Metabolic Panel    Collection Time: 04/04/20 11:24 AM   Result Value Ref Range    Glucose 146 (H) 65 - 99 mg/dL    BUN 80 (H) 8 - 23 mg/dL    Creatinine 5.14 (H) 0.76 - 1.27 mg/dL    Sodium 132 (L) 136 - 145 mmol/L    Potassium 8.3 (C) 3.5 - 5.2 mmol/L    Chloride 96 (L) 98 - 107 mmol/L    CO2 20.7 (L) 22.0 - 29.0 mmol/L    Calcium 9.6 8.6 - 10.5 mg/dL    Total Protein 8.4 6.0 - 8.5 g/dL    Albumin 4.60 3.50 - 5.20 g/dL    ALT (SGPT) 39 1 - 41 U/L    AST (SGOT) 24 1 - 40 U/L    Alkaline Phosphatase 64 39 - 117 U/L    Total Bilirubin 0.5 0.2 - 1.2 mg/dL    eGFR Non African Amer 11 (L) >60 mL/min/1.73    eGFR  African Amer      Globulin 3.8 gm/dL    A/G Ratio 1.2 g/dL    BUN/Creatinine Ratio 15.6 7.0 - 25.0    Anion Gap 15.3 (H) 5.0 - 15.0 mmol/L   Troponin    Collection Time: 04/04/20 11:24 AM   Result Value Ref Range    Troponin T 0.059 (C) 0.000 - 0.030 ng/mL   POC Glucose Once    Collection Time: 04/04/20  1:29 PM   Result Value Ref Range    Glucose 181 (H) 70 - 130 mg/dL   Basic Metabolic Panel    Collection Time: 04/04/20  1:59 PM   Result Value Ref Range    Glucose 123 (H) 65 - 99 mg/dL    BUN 79 (H) 8 - 23 mg/dL    Creatinine 4.95 (H) 0.76 - 1.27 mg/dL    Sodium 135 (L) 136 - 145 mmol/L     Potassium 7.8 (C) 3.5 - 5.2 mmol/L    Chloride 99 98 - 107 mmol/L    CO2 19.9 (L) 22.0 - 29.0 mmol/L    Calcium 9.7 8.6 - 10.5 mg/dL    eGFR  African Amer      eGFR Non African Amer 12 (L) >60 mL/min/1.73    BUN/Creatinine Ratio 16.0 7.0 - 25.0    Anion Gap 16.1 (H) 5.0 - 15.0 mmol/L   Blood Gas, Venous    Collection Time: 04/04/20  3:55 PM   Result Value Ref Range    Site N/A     pH, Venous 7.344 7.310 - 7.410 pH Units    pCO2, Venous 43.7 41.0 - 51.0 mm Hg    pO2, Venous 29.5 (L) 35.0 - 45.0 mm Hg    HCO3, Venous 23.8 22.0 - 28.0 mmol/L    Base Excess, Venous -2.2 mmol/L    O2 Saturation Calculated 52.5 (L) 92.0 - 99.0 %    Barometric Pressure for Blood Gas 747.5 mmHg    Modality Room Air     FIO2 21 %    Rate 16 Breaths/minute   POC Glucose Once    Collection Time: 04/04/20  4:55 PM   Result Value Ref Range    Glucose 214 (H) 70 - 130 mg/dL   Hepatitis B Surface Antigen    Collection Time: 04/04/20  7:42 PM   Result Value Ref Range    Hepatitis B Surface Ag Non-Reactive Non-Reactive   Hep B Confirmation Tube    Collection Time: 04/04/20  7:42 PM   Result Value Ref Range    Extra Tube Hold for add-ons.    POC Glucose Once    Collection Time: 04/04/20  8:02 PM   Result Value Ref Range    Glucose 110 70 - 130 mg/dL   POC Glucose Once    Collection Time: 04/04/20 11:26 PM   Result Value Ref Range    Glucose 106 70 - 130 mg/dL   Urinalysis With Microscopic If Indicated (No Culture) - Urine, Clean Catch    Collection Time: 04/05/20 12:59 AM   Result Value Ref Range    Color, UA Yellow Yellow, Straw    Appearance, UA Clear Clear    pH, UA <=5.0 5.0 - 8.0    Specific Gravity, UA 1.022 1.005 - 1.030    Glucose, UA Negative Negative    Ketones, UA Trace (A) Negative    Bilirubin, UA Negative Negative    Blood, UA Small (1+) (A) Negative    Protein,  mg/dL (2+) (A) Negative    Leuk Esterase, UA Small (1+) (A) Negative    Nitrite, UA Negative Negative    Urobilinogen, UA 0.2 E.U./dL 0.2 - 1.0 E.U./dL   Urinalysis,  Microscopic Only - Urine, Clean Catch    Collection Time: 04/05/20 12:59 AM   Result Value Ref Range    RBC, UA 6-12 (A) None Seen, 0-2 /HPF    WBC, UA 3-5 (A) None Seen, 0-2 /HPF    Bacteria, UA None Seen None Seen /HPF    Squamous Epithelial Cells, UA 3-6 (A) None Seen, 0-2 /HPF    Hyaline Casts, UA 3-6 None Seen /LPF    Methodology Manual Light Microscopy    Uric Acid    Collection Time: 04/05/20  1:59 AM   Result Value Ref Range    Uric Acid 6.8 3.4 - 7.0 mg/dL   Renal Function Panel    Collection Time: 04/05/20  1:59 AM   Result Value Ref Range    Glucose 116 (H) 65 - 99 mg/dL    BUN 42 (H) 8 - 23 mg/dL    Creatinine 3.58 (H) 0.76 - 1.27 mg/dL    Sodium 133 (L) 136 - 145 mmol/L    Potassium 5.3 (H) 3.5 - 5.2 mmol/L    Chloride 93 (L) 98 - 107 mmol/L    CO2 25.7 22.0 - 29.0 mmol/L    Calcium 8.8 8.6 - 10.5 mg/dL    Albumin 3.80 3.50 - 5.20 g/dL    Phosphorus 3.5 2.5 - 4.5 mg/dL    Anion Gap 14.3 5.0 - 15.0 mmol/L    BUN/Creatinine Ratio 11.7 7.0 - 25.0    eGFR Non African Amer 17 (L) >60 mL/min/1.73   CBC Auto Differential    Collection Time: 04/05/20  1:59 AM   Result Value Ref Range    WBC 9.21 3.40 - 10.80 10*3/mm3    RBC 2.93 (L) 4.14 - 5.80 10*6/mm3    Hemoglobin 10.4 (L) 13.0 - 17.7 g/dL    Hematocrit 30.5 (L) 37.5 - 51.0 %    .1 (H) 79.0 - 97.0 fL    MCH 35.5 (H) 26.6 - 33.0 pg    MCHC 34.1 31.5 - 35.7 g/dL    RDW 11.9 (L) 12.3 - 15.4 %    RDW-SD 45.7 37.0 - 54.0 fl    MPV 9.4 6.0 - 12.0 fL    Platelets 159 140 - 450 10*3/mm3    Neutrophil % 67.3 42.7 - 76.0 %    Lymphocyte % 17.6 (L) 19.6 - 45.3 %    Monocyte % 13.2 (H) 5.0 - 12.0 %    Eosinophil % 1.1 0.3 - 6.2 %    Basophil % 0.4 0.0 - 1.5 %    Immature Grans % 0.4 0.0 - 0.5 %    Neutrophils, Absolute 6.19 1.70 - 7.00 10*3/mm3    Lymphocytes, Absolute 1.62 0.70 - 3.10 10*3/mm3    Monocytes, Absolute 1.22 (H) 0.10 - 0.90 10*3/mm3    Eosinophils, Absolute 0.10 0.00 - 0.40 10*3/mm3    Basophils, Absolute 0.04 0.00 - 0.20 10*3/mm3    Immature Grans,  Absolute 0.04 0.00 - 0.05 10*3/mm3    nRBC 0.0 0.0 - 0.2 /100 WBC   POC Glucose Once    Collection Time: 04/05/20  3:43 AM   Result Value Ref Range    Glucose 138 (H) 70 - 130 mg/dL   POC Glucose Once    Collection Time: 04/05/20  7:51 AM   Result Value Ref Range    Glucose 128 70 - 130 mg/dL   POC Glucose Once    Collection Time: 04/05/20 10:39 AM   Result Value Ref Range    Glucose 265 (H) 70 - 130 mg/dL   POC Glucose Once    Collection Time: 04/05/20 12:13 PM   Result Value Ref Range    Glucose 140 (H) 70 - 130 mg/dL   POC Glucose Once    Collection Time: 04/05/20  3:58 PM   Result Value Ref Range    Glucose 98 70 - 130 mg/dL   POC Glucose Once    Collection Time: 04/05/20  8:04 PM   Result Value Ref Range    Glucose 120 70 - 130 mg/dL   POC Glucose Once    Collection Time: 04/06/20 12:28 AM   Result Value Ref Range    Glucose 97 70 - 130 mg/dL   Renal Function Panel    Collection Time: 04/06/20  4:04 AM   Result Value Ref Range    Glucose 106 (H) 65 - 99 mg/dL    BUN 24 (H) 8 - 23 mg/dL    Creatinine 2.76 (H) 0.76 - 1.27 mg/dL    Sodium 135 (L) 136 - 145 mmol/L    Potassium 4.5 3.5 - 5.2 mmol/L    Chloride 98 98 - 107 mmol/L    CO2 25.5 22.0 - 29.0 mmol/L    Calcium 8.4 (L) 8.6 - 10.5 mg/dL    Albumin 3.90 3.50 - 5.20 g/dL    Phosphorus 3.2 2.5 - 4.5 mg/dL    Anion Gap 11.5 5.0 - 15.0 mmol/L    BUN/Creatinine Ratio 8.7 7.0 - 25.0    eGFR Non African Amer 23 (L) >60 mL/min/1.73   CBC Auto Differential    Collection Time: 04/06/20  4:04 AM   Result Value Ref Range    WBC 8.16 3.40 - 10.80 10*3/mm3    RBC 2.83 (L) 4.14 - 5.80 10*6/mm3    Hemoglobin 10.1 (L) 13.0 - 17.7 g/dL    Hematocrit 29.1 (L) 37.5 - 51.0 %    .8 (H) 79.0 - 97.0 fL    MCH 35.7 (H) 26.6 - 33.0 pg    MCHC 34.7 31.5 - 35.7 g/dL    RDW 12.0 (L) 12.3 - 15.4 %    RDW-SD 45.5 37.0 - 54.0 fl    MPV 9.2 6.0 - 12.0 fL    Platelets 131 (L) 140 - 450 10*3/mm3    Neutrophil % 69.9 42.7 - 76.0 %    Lymphocyte % 15.4 (L) 19.6 - 45.3 %    Monocyte % 13.1  (H) 5.0 - 12.0 %    Eosinophil % 1.2 0.3 - 6.2 %    Basophil % 0.2 0.0 - 1.5 %    Immature Grans % 0.2 0.0 - 0.5 %    Neutrophils, Absolute 5.69 1.70 - 7.00 10*3/mm3    Lymphocytes, Absolute 1.26 0.70 - 3.10 10*3/mm3    Monocytes, Absolute 1.07 (H) 0.10 - 0.90 10*3/mm3    Eosinophils, Absolute 0.10 0.00 - 0.40 10*3/mm3    Basophils, Absolute 0.02 0.00 - 0.20 10*3/mm3    Immature Grans, Absolute 0.02 0.00 - 0.05 10*3/mm3    nRBC 0.1 0.0 - 0.2 /100 WBC   POC Glucose Once    Collection Time: 04/06/20  4:30 AM   Result Value Ref Range    Glucose 109 70 - 130 mg/dL   Adult Transthoracic Echo Complete W/ Cont if Necessary Per Protocol    Collection Time: 04/06/20  7:41 AM   Result Value Ref Range    BSA 2.3 m^2    IVSd 1.3 cm    LVIDd 5.1 cm    LVIDs 3.3 cm    LVPWd 1.5 cm    IVS/LVPW 0.87     FS 35.3 %    EDV(Teich) 123.8 ml    ESV(Teich) 44.1 ml    EF(Teich) 64.4 %    EDV(cubed) 132.7 ml    ESV(cubed) 35.9 ml    EF(cubed) 72.9 %    LV mass(C)d 300.4 grams    LV mass(C)dI 130.6 grams/m^2    SV(Teich) 79.7 ml    SI(Teich) 34.6 ml/m^2    SV(cubed) 96.7 ml    SI(cubed) 42.0 ml/m^2    asc Aorta Diam 4.2 cm    LVOT diam 2.3 cm    LVOT area 4.2 cm^2    LVOT area(traced) 4.2 cm^2    RVOT diam 2.5 cm    RVOT area 4.9 cm^2    LVLd ap4 7.6 cm    EDV(MOD-sp4) 93.0 ml    LVLs ap4 6.2 cm    ESV(MOD-sp4) 25.0 ml    EF(MOD-sp4) 73.1 %    LVLd ap2 8.4 cm    EDV(MOD-sp2) 105.0 ml    LVLs ap2 6.4 cm    ESV(MOD-sp2) 26.0 ml    EF(MOD-sp2) 75.2 %    SV(MOD-sp4) 68.0 ml    SI(MOD-sp4) 29.6 ml/m^2    SV(MOD-sp2) 79.0 ml    SI(MOD-sp2) 34.3 ml/m^2    LV Mendoza Vol (BSA corrected) 40.4 ml/m^2    LV Sys Vol (BSA corrected) 10.9 ml/m^2    MV A dur 0.16 sec    MV E max su 131.0 cm/sec    MV A max su 22.8 cm/sec    MV E/A 5.7     MV V2 max 187.0 cm/sec    MV max PG 14.0 mmHg    MV V2 mean 85.4 cm/sec    MV mean PG 4.0 mmHg    MV V2 VTI 43.4 cm    MVA(VTI) 2.7 cm^2    MV P1/2t max su 183.0 cm/sec    MV P1/2t 84.3 msec    MVA(P1/2t) 2.6 cm^2    MV  dec slope 636.0 cm/sec^2    MV dec time 0.2 sec    Ao pk carmelo 143.0 cm/sec    Ao max PG 8.2 mmHg    Ao max PG (full) 0.11 mmHg    Ao V2 mean 100.0 cm/sec    Ao mean PG 5.0 mmHg    Ao mean PG (full) 0 mmHg    Ao V2 VTI 25.3 cm    BENJA(I,A) 4.6 cm^2    BENJA(I,D) 4.6 cm^2    BENJA(V,A) 4.1 cm^2    BENJA(V,D) 4.1 cm^2    LV V1 max PG 8.1 mmHg    LV V1 mean PG 5.0 mmHg    LV V1 max 142.0 cm/sec    LV V1 mean 98.8 cm/sec    LV V1 VTI 27.8 cm    SV(LVOT) 115.5 ml    SV(RVOT) 85.9 ml    SI(LVOT) 50.2 ml/m^2    PA V2 max 140.0 cm/sec    PA max PG 7.8 mmHg    PA max PG (full) 5.0 mmHg     CV ECHO AMADA - PVA(V,A) 3.0 cm^2     CV ECHO AMADA - PVA(V,D) 3.0 cm^2    PA acc time 0.09 sec    RV V1 max PG 2.9 mmHg    RV V1 mean PG 2.0 mmHg    RV V1 max 85.0 cm/sec    RV V1 mean 58.1 cm/sec    RV V1 VTI 17.5 cm    TR max carmelo 442.0 cm/sec    RVSP(TR) 93.1 mmHg    RAP systole 15.0 mmHg    PA pr(Accel) 39.4 mmHg    Pulm Sys Carmelo 36.7 cm/sec    Pulm Mendoza Carmelo 73.7 cm/sec    Pulm S/D 0.5     Qp/Qs 0.74     Pulm A Revs Dur 0.19 sec    Pulm A Revs Carmelo 19.3 cm/sec    MVA P1/2T LCG 1.2 cm^2     CV ECHO AMADA - BZI_BMI 30.9 kilograms/m^2     CV ECHO AMADA - BSA([facility]) 2.4 m^2     CV ECHO AMADA - BZI_METRIC_WEIGHT 106.1 kg     CV ECHO AMADA - BZI_METRIC_HEIGHT 185.4 cm    Target HR (85%) 126 bpm    Max. Pred. HR (100%) 148 bpm    RV S' 15.10 cm/sec    RV Base 4.20 cm    RV Length 9.10 cm    RV Mid 4.40 cm    LA Volume Index 38.0 mL/m2    Avg E/e' ratio 10.04     EF(MOD-bp) 75 %    Lat Peak E' Carmelo 14.0 cm/sec    Med Peak E' Carmelo 12.10 cm/sec   POC Glucose Once    Collection Time: 04/06/20  8:33 AM   Result Value Ref Range    Glucose 116 70 - 130 mg/dL   POC Glucose Once    Collection Time: 04/06/20 12:10 PM   Result Value Ref Range    Glucose 141 (H) 70 - 130 mg/dL   POC Glucose Once    Collection Time: 04/06/20  4:18 PM   Result Value Ref Range    Glucose 117 70 - 130 mg/dL   Magnesium    Collection Time: 04/07/20  3:18 AM   Result Value Ref  Range    Magnesium 2.1 1.6 - 2.4 mg/dL   CBC Auto Differential    Collection Time: 04/07/20  3:18 AM   Result Value Ref Range    WBC 10.46 3.40 - 10.80 10*3/mm3    RBC 3.10 (L) 4.14 - 5.80 10*6/mm3    Hemoglobin 10.8 (L) 13.0 - 17.7 g/dL    Hematocrit 31.2 (L) 37.5 - 51.0 %    .6 (H) 79.0 - 97.0 fL    MCH 34.8 (H) 26.6 - 33.0 pg    MCHC 34.6 31.5 - 35.7 g/dL    RDW 11.9 (L) 12.3 - 15.4 %    RDW-SD 43.7 37.0 - 54.0 fl    MPV 9.2 6.0 - 12.0 fL    Platelets 109 (L) 140 - 450 10*3/mm3    Neutrophil % 75.8 42.7 - 76.0 %    Lymphocyte % 9.7 (L) 19.6 - 45.3 %    Monocyte % 13.4 (H) 5.0 - 12.0 %    Eosinophil % 0.3 0.3 - 6.2 %    Basophil % 0.3 0.0 - 1.5 %    Immature Grans % 0.5 0.0 - 0.5 %    Neutrophils, Absolute 7.94 (H) 1.70 - 7.00 10*3/mm3    Lymphocytes, Absolute 1.01 0.70 - 3.10 10*3/mm3    Monocytes, Absolute 1.40 (H) 0.10 - 0.90 10*3/mm3    Eosinophils, Absolute 0.03 0.00 - 0.40 10*3/mm3    Basophils, Absolute 0.03 0.00 - 0.20 10*3/mm3    Immature Grans, Absolute 0.05 0.00 - 0.05 10*3/mm3    nRBC 0.1 0.0 - 0.2 /100 WBC   Renal Function Panel    Collection Time: 04/07/20  3:18 AM   Result Value Ref Range    Glucose 139 (H) 65 - 99 mg/dL    BUN 30 (H) 8 - 23 mg/dL    Creatinine 2.71 (H) 0.76 - 1.27 mg/dL    Sodium 130 (L) 136 - 145 mmol/L    Potassium 4.8 3.5 - 5.2 mmol/L    Chloride 93 (L) 98 - 107 mmol/L    CO2 23.8 22.0 - 29.0 mmol/L    Calcium 8.5 (L) 8.6 - 10.5 mg/dL    Albumin 4.00 3.50 - 5.20 g/dL    Phosphorus 4.0 2.5 - 4.5 mg/dL    Anion Gap 13.2 5.0 - 15.0 mmol/L    BUN/Creatinine Ratio 11.1 7.0 - 25.0    eGFR Non African Amer 23 (L) >60 mL/min/1.73   Rheumatoid Factor    Collection Time: 04/07/20  3:18 AM   Result Value Ref Range    Rheumatoid Factor Quantitative 14.1 (H) 0.0 - 14.0 IU/mL   Heparin-induced Platelet Antibody    Collection Time: 04/07/20  9:28 AM   Result Value Ref Range    Heparin Induced Plt Ab 0.104 0.000 - 0.400 OD   Immature Platelet Fraction    Collection Time: 04/07/20 10:23  AM   Result Value Ref Range    IPF 1.50 0.90 - 6.50 %    Platelets 160 140 - 450 10*3/mm3   Crystal Exam, Fluid - Synovial Fluid,    Collection Time: 04/07/20 12:49 PM   Result Value Ref Range    Crystals, Fluid       Intra and Extracellular crystals observed exhibiting polarization characteristics of Uric Acid   Body Fluid Culture - Synovial Fluid, Knee, Right    Collection Time: 04/07/20 12:50 PM   Result Value Ref Range    Body Fluid Culture No growth at 5 days     Gram Stain Few (2+) WBCs seen     Gram Stain No organisms seen    Anaerobic Culture - Synovial Fluid, Knee, Right    Collection Time: 04/07/20 12:50 PM   Result Value Ref Range    Anaerobic Culture No anaerobes isolated at 5 days    Body fluid cell count - Synovial Fluid, Knee, Right    Collection Time: 04/07/20 12:50 PM   Result Value Ref Range    Color, Fluid Yellow     Appearance, Fluid Cloudy (A) Clear    WBC, Fluid 37,500 /mm3    RBC, Fluid 55 /mm3   Body fluid differential - Synovial Fluid, Knee, Right    Collection Time: 04/07/20 12:50 PM   Result Value Ref Range    Neutrophils, Fluid 98 %    Lymphocytes, Fluid 2 %   POC Glucose Once    Collection Time: 04/07/20  7:58 PM   Result Value Ref Range    Glucose 193 (H) 70 - 130 mg/dL   Comprehensive Metabolic Panel    Collection Time: 04/08/20  3:39 AM   Result Value Ref Range    Glucose 124 (H) 65 - 99 mg/dL    BUN 31 (H) 8 - 23 mg/dL    Creatinine 2.43 (H) 0.76 - 1.27 mg/dL    Sodium 131 (L) 136 - 145 mmol/L    Potassium 3.6 3.5 - 5.2 mmol/L    Chloride 94 (L) 98 - 107 mmol/L    CO2 25.5 22.0 - 29.0 mmol/L    Calcium 8.8 8.6 - 10.5 mg/dL    Total Protein 7.1 6.0 - 8.5 g/dL    Albumin 3.90 3.50 - 5.20 g/dL    ALT (SGPT) 20 1 - 41 U/L    AST (SGOT) 18 1 - 40 U/L    Alkaline Phosphatase 53 39 - 117 U/L    Total Bilirubin 1.2 0.2 - 1.2 mg/dL    eGFR Non African Amer 26 (L) >60 mL/min/1.73    Globulin 3.2 gm/dL    A/G Ratio 1.2 g/dL    BUN/Creatinine Ratio 12.8 7.0 - 25.0    Anion Gap 11.5 5.0 - 15.0  mmol/L   Uric Acid    Collection Time: 04/08/20  3:39 AM   Result Value Ref Range    Uric Acid 7.7 (H) 3.4 - 7.0 mg/dL   CBC (No Diff)    Collection Time: 04/08/20  3:39 AM   Result Value Ref Range    WBC 9.54 3.40 - 10.80 10*3/mm3    RBC 3.16 (L) 4.14 - 5.80 10*6/mm3    Hemoglobin 11.3 (L) 13.0 - 17.7 g/dL    Hematocrit 31.8 (L) 37.5 - 51.0 %    .6 (H) 79.0 - 97.0 fL    MCH 35.8 (H) 26.6 - 33.0 pg    MCHC 35.5 31.5 - 35.7 g/dL    RDW 11.3 (L) 12.3 - 15.4 %    RDW-SD 42.3 37.0 - 54.0 fl    MPV 8.7 6.0 - 12.0 fL    Platelets 120 (L) 140 - 450 10*3/mm3   Phosphorus    Collection Time: 04/08/20  3:39 AM   Result Value Ref Range    Phosphorus 4.4 2.5 - 4.5 mg/dL   Renal Function Panel    Collection Time: 04/09/20  3:42 AM   Result Value Ref Range    Glucose 175 (H) 65 - 99 mg/dL    BUN 41 (H) 8 - 23 mg/dL    Creatinine 2.22 (H) 0.76 - 1.27 mg/dL    Sodium 131 (L) 136 - 145 mmol/L    Potassium 4.1 3.5 - 5.2 mmol/L    Chloride 92 (L) 98 - 107 mmol/L    CO2 27.3 22.0 - 29.0 mmol/L    Calcium 9.0 8.6 - 10.5 mg/dL    Albumin 4.00 3.50 - 5.20 g/dL    Phosphorus 5.2 (H) 2.5 - 4.5 mg/dL    Anion Gap 11.7 5.0 - 15.0 mmol/L    BUN/Creatinine Ratio 18.5 7.0 - 25.0    eGFR Non African Amer 29 (L) >60 mL/min/1.73   Uric Acid    Collection Time: 04/09/20  3:42 AM   Result Value Ref Range    Uric Acid 9.2 (H) 3.4 - 7.0 mg/dL   CBC (No Diff)    Collection Time: 04/09/20  3:42 AM   Result Value Ref Range    WBC 7.27 3.40 - 10.80 10*3/mm3    RBC 3.34 (L) 4.14 - 5.80 10*6/mm3    Hemoglobin 11.6 (L) 13.0 - 17.7 g/dL    Hematocrit 33.4 (L) 37.5 - 51.0 %    .0 (H) 79.0 - 97.0 fL    MCH 34.7 (H) 26.6 - 33.0 pg    MCHC 34.7 31.5 - 35.7 g/dL    RDW 11.6 (L) 12.3 - 15.4 %    RDW-SD 42.3 37.0 - 54.0 fl    MPV 9.1 6.0 - 12.0 fL    Platelets 161 140 - 450 10*3/mm3   H. Pylori Antigen, Stool - Stool, Per Rectum    Collection Time: 04/09/20  1:10 PM   Result Value Ref Range    H pylori Ag, Stl Negative Negative   Renal Function Panel     Collection Time: 04/10/20  3:38 AM   Result Value Ref Range    Glucose 137 (H) 65 - 99 mg/dL    BUN 49 (H) 8 - 23 mg/dL    Creatinine 1.81 (H) 0.76 - 1.27 mg/dL    Sodium 130 (L) 136 - 145 mmol/L    Potassium 4.0 3.5 - 5.2 mmol/L    Chloride 89 (L) 98 - 107 mmol/L    CO2 26.1 22.0 - 29.0 mmol/L    Calcium 8.5 (L) 8.6 - 10.5 mg/dL    Albumin 3.60 3.50 - 5.20 g/dL    Phosphorus 4.6 (H) 2.5 - 4.5 mg/dL    Anion Gap 14.9 5.0 - 15.0 mmol/L    BUN/Creatinine Ratio 27.1 (H) 7.0 - 25.0    eGFR Non African Amer 37 (L) >60 mL/min/1.73   POC Glucose Once    Collection Time: 04/10/20  3:24 PM   Result Value Ref Range    Glucose 133 (H) 70 - 130 mg/dL   Basic Metabolic Panel    Collection Time: 04/13/20  1:00 PM   Result Value Ref Range    Glucose 76 65 - 99 mg/dL    BUN 39 (H) 8 - 23 mg/dL    Creatinine 1.40 (H) 0.76 - 1.27 mg/dL    Sodium 141 136 - 145 mmol/L    Potassium 3.5 3.5 - 5.2 mmol/L    Chloride 99 98 - 107 mmol/L    CO2 28.9 22.0 - 29.0 mmol/L    Calcium 9.3 8.6 - 10.5 mg/dL    eGFR Non African Amer 50 (L) >60 mL/min/1.73    BUN/Creatinine Ratio 27.9 (H) 7.0 - 25.0    Anion Gap 13.1 5.0 - 15.0 mmol/L   CBC (No Diff)    Collection Time: 04/15/20  1:00 PM   Result Value Ref Range    WBC 8.96 3.40 - 10.80 10*3/mm3    RBC 3.47 (L) 4.14 - 5.80 10*6/mm3    Hemoglobin 12.1 (L) 13.0 - 17.7 g/dL    Hematocrit 35.2 (L) 37.5 - 51.0 %    .4 (H) 79.0 - 97.0 fL    MCH 34.9 (H) 26.6 - 33.0 pg    MCHC 34.4 31.5 - 35.7 g/dL    RDW 11.9 (L) 12.3 - 15.4 %    RDW-SD 44.6 37.0 - 54.0 fl    MPV 8.8 6.0 - 12.0 fL    Platelets 233 140 - 450 10*3/mm3   Basic Metabolic Panel    Collection Time: 04/21/20 11:00 AM   Result Value Ref Range    Glucose 121 (H) 65 - 99 mg/dL    BUN 20 8 - 23 mg/dL    Creatinine 1.28 (H) 0.76 - 1.27 mg/dL    Sodium 141 136 - 145 mmol/L    Potassium 3.9 3.5 - 5.2 mmol/L    Chloride 104 98 - 107 mmol/L    CO2 24.1 22.0 - 29.0 mmol/L    Calcium 9.1 8.6 - 10.5 mg/dL    eGFR Non African Amer 55 (L) >60 mL/min/1.73  "   BUN/Creatinine Ratio 15.6 7.0 - 25.0    Anion Gap 12.9 5.0 - 15.0 mmol/L     Objective   Vitals:    06/08/20 1332   BP: 130/82   Pulse: 57   Temp: 99.3 °F (37.4 °C)   SpO2: 98%   Weight: 102 kg (224 lb)   Height: 185.4 cm (72.99\")     Body mass index is 29.56 kg/m².  Physical Exam   Constitutional: He appears well-developed and well-nourished. No distress.   Cardiovascular: Normal rate and regular rhythm.   Pulmonary/Chest: Effort normal and breath sounds normal. No respiratory distress. He has no wheezes.   Musculoskeletal:   Rt hand edema, erythema over 2nd and 3 rd MCP, severe tenderness   Skin: He is not diaphoretic.   Nursing note and vitals reviewed.        Assessment/Plan   Juan was seen today for right hand swelling. and diabetes.    Diagnoses and all orders for this visit:    Pain of right hand  -     XR Hand 3+ View Right  -     allopurinol (ZYLOPRIM) 300 MG tablet; Take 1 tablet by mouth 2 (Two) Times a Day.  -     methylPREDNISolone (MEDROL, JUAN RAMON,) 4 MG tablet; Take as directed on package instructions.  -     Uric Acid  -     Uric Acid    Type 2 diabetes mellitus without complication, without long-term current use of insulin (CMS/[facility])  -     Hemoglobin A1c      Continue all other current meds  Return in about 3 months (around 9/8/2020) for DIABETES.           "

## 2020-06-09 ENCOUNTER — HOSPITAL ENCOUNTER (OUTPATIENT)
Dept: GENERAL RADIOLOGY | Facility: HOSPITAL | Age: 75
Discharge: HOME | End: 2020-06-09
Admitting: FAMILY MEDICINE

## 2020-06-09 DIAGNOSIS — M79.641 PAIN OF RIGHT HAND: ICD-10-CM

## 2020-06-09 LAB
HBA1C MFR BLD: 5.4 % (ref 4.8–5.6)
URATE SERPL-MCNC: 5.1 MG/DL (ref 3.4–7)

## 2020-06-09 PROCEDURE — 73130 X-RAY EXAM OF HAND: CPT

## 2020-06-09 RX ORDER — DABIGATRAN ETEXILATE 150 MG/1
150 CAPSULE ORAL 2 TIMES DAILY
Qty: 60 CAPSULE
Start: 2020-06-09 | End: 2020-09-29

## 2020-06-09 RX ORDER — LANOLIN ALCOHOL/MO/W.PET/CERES
400 CREAM (GRAM) TOPICAL DAILY
Start: 2020-06-09

## 2020-06-09 RX ORDER — CYANOCOBALAMIN (VITAMIN B-12) 5000 MCG
1 TABLET,DISINTEGRATING ORAL DAILY
Start: 2020-06-09

## 2020-06-09 RX ORDER — ALLOPURINOL 300 MG/1
300 TABLET ORAL DAILY
Qty: 30 TABLET | Refills: 6 | Status: SHIPPED | OUTPATIENT
Start: 2020-06-09 | End: 2020-07-17 | Stop reason: SDUPTHER

## 2020-06-22 ENCOUNTER — APPOINTMENT (OUTPATIENT)
Dept: SLEEP MEDICINE | Facility: HOSPITAL | Age: 75
End: 2020-06-22

## 2020-06-25 DIAGNOSIS — M79.641 PAIN OF RIGHT HAND: ICD-10-CM

## 2020-06-25 RX ORDER — METHYLPREDNISOLONE 4 MG/1
TABLET ORAL
Qty: 21 EACH | Refills: 0 | OUTPATIENT
Start: 2020-06-25

## 2020-07-10 DIAGNOSIS — M79.641 PAIN OF RIGHT HAND: ICD-10-CM

## 2020-07-10 RX ORDER — METHYLPREDNISOLONE 4 MG/1
TABLET ORAL
Qty: 21 EACH | Refills: 0 | Status: SHIPPED | OUTPATIENT
Start: 2020-07-10 | End: 2020-09-29

## 2020-07-17 DIAGNOSIS — M79.641 PAIN OF RIGHT HAND: ICD-10-CM

## 2020-07-17 RX ORDER — ALLOPURINOL 300 MG/1
300 TABLET ORAL DAILY
Qty: 30 TABLET | Refills: 6 | Status: SHIPPED | OUTPATIENT
Start: 2020-07-17 | End: 2021-09-04

## 2020-09-24 ENCOUNTER — OFFICE VISIT (OUTPATIENT)
Dept: NEUROSURGERY | Facility: CLINIC | Age: 75
End: 2020-09-24

## 2020-09-24 DIAGNOSIS — M54.16 LUMBAR RADICULOPATHY: Primary | ICD-10-CM

## 2020-09-24 PROCEDURE — 99441 PR PHYS/QHP TELEPHONE EVALUATION 5-10 MIN: CPT | Performed by: NEUROLOGICAL SURGERY

## 2020-09-24 RX ORDER — TAMSULOSIN HYDROCHLORIDE 0.4 MG/1
1 CAPSULE ORAL EVERY EVENING
COMMUNITY
Start: 2020-09-10 | End: 2021-07-01 | Stop reason: SDUPTHER

## 2020-09-24 RX ORDER — APIXABAN 5 MG/1
5 TABLET, FILM COATED ORAL 2 TIMES DAILY
COMMUNITY
Start: 2020-09-16 | End: 2021-02-17 | Stop reason: HOSPADM

## 2020-09-24 RX ORDER — ALLOPURINOL 100 MG/1
100 TABLET ORAL DAILY
COMMUNITY
Start: 2020-09-08 | End: 2020-11-03 | Stop reason: SDUPTHER

## 2020-09-24 NOTE — PROGRESS NOTES
Subjective   History of Present Illness: Juan James is a 74 y.o. male is being seen via Telephone Visit for consultation today at the request of Self Referring for back pain, left hip and leg pain and numbness    Patient was last seen 9/20/2016 for surgical fup, he had C3-5 ACDF done on 3/25/2016.    You have chosen to receive care through a telephone visit. Do you consent to use a telephone visit for your medical care today? Yes    We had a telephone visit today.  The patient was at home and I was in the office.  We talked for 5 minutes.    History of Present Illness    This patient had been doing pretty well since we saw him last in 2016.  He is still doing okay with regard to his neck.  He has recently been having a lot of pain in his back with radiation into his left hip down his posterior lateral thigh posterior lateral calf and into his left foot.  He has a lot of numbness in the lower part of his left leg.  His right leg is okay and he has no difficulty with bowel bladder control or other associated symptoms.  He has been treated so far with just time in a few medications but no other treatment.    The following portions of the patient's history were reviewed and updated as appropriate: allergies, current medications, past family history, past medical history, past social history, past surgical history and problem list.    Review of Systems   Constitutional: Positive for appetite change.   HENT: Negative.    Eyes: Negative.    Respiratory: Negative for chest tightness and shortness of breath.    Cardiovascular: Negative for chest pain.   Gastrointestinal: Negative.    Endocrine: Negative.    Musculoskeletal: Positive for back pain and myalgias.        Leg and foot   Skin: Negative.    Allergic/Immunologic: Negative.    Neurological: Positive for weakness and numbness.   Hematological: Negative.    Psychiatric/Behavioral: Negative.        I have reviewed the review of systems as documented by my  MA.      Objective         Physical Exam  Neurological:      Mental Status: He is alert and oriented to person, place, and time.       Neurologic Exam     Mental Status   Oriented to person, place, and time.           Assessment/Plan   Independent Review of Radiographic Studies:      I personally reviewed the images from the following studies.    I reviewed an MRI of his lumbar spine done in May 2016.  This does show a grade 1 spondylolisthesis of L4 on L5.  There is some central stenosis there that I would call moderate and pretty severe foraminal stenosis.  The other levels show some degenerative change but for the most part are fairly open.    Medical Decision Making:      I told the patient we need to get some new imaging of his back at this point.  I would like him to come into the office to go over the results so I can actually examine him.    Juan was seen today for back pain and leg pain.    Diagnoses and all orders for this visit:    Lumbar radiculopathy  -     XR Spine Lumbar Complete With Flex & Ext; Future  -     MRI Lumbar Spine Without Contrast; Future      Return for After radiology test.

## 2020-09-29 ENCOUNTER — OFFICE VISIT (OUTPATIENT)
Dept: FAMILY MEDICINE CLINIC | Facility: CLINIC | Age: 75
End: 2020-09-29

## 2020-09-29 VITALS
BODY MASS INDEX: 29.21 KG/M2 | SYSTOLIC BLOOD PRESSURE: 169 MMHG | HEIGHT: 73 IN | HEART RATE: 83 BPM | OXYGEN SATURATION: 98 % | WEIGHT: 220.38 LBS | DIASTOLIC BLOOD PRESSURE: 102 MMHG | TEMPERATURE: 97.7 F

## 2020-09-29 DIAGNOSIS — M19.90 ARTHRITIS: ICD-10-CM

## 2020-09-29 DIAGNOSIS — M1A.49X0 OTHER SECONDARY CHRONIC GOUT OF MULTIPLE SITES WITHOUT TOPHUS: ICD-10-CM

## 2020-09-29 DIAGNOSIS — G89.29 CHRONIC BILATERAL LOW BACK PAIN WITH BILATERAL SCIATICA: Primary | ICD-10-CM

## 2020-09-29 DIAGNOSIS — I10 ESSENTIAL HYPERTENSION: ICD-10-CM

## 2020-09-29 DIAGNOSIS — M54.42 CHRONIC BILATERAL LOW BACK PAIN WITH BILATERAL SCIATICA: Primary | ICD-10-CM

## 2020-09-29 DIAGNOSIS — M54.41 CHRONIC BILATERAL LOW BACK PAIN WITH BILATERAL SCIATICA: Primary | ICD-10-CM

## 2020-09-29 DIAGNOSIS — E78.2 MIXED HYPERLIPIDEMIA: ICD-10-CM

## 2020-09-29 PROBLEM — R73.03 PREDIABETES: Status: RESOLVED | Noted: 2019-07-29 | Resolved: 2020-09-29

## 2020-09-29 PROCEDURE — 99214 OFFICE O/P EST MOD 30 MIN: CPT | Performed by: FAMILY MEDICINE

## 2020-09-29 RX ORDER — TRAMADOL HYDROCHLORIDE 50 MG/1
100 TABLET ORAL EVERY 6 HOURS PRN
Qty: 120 TABLET | Refills: 2 | Status: SHIPPED | OUTPATIENT
Start: 2020-09-29 | End: 2020-11-17 | Stop reason: HOSPADM

## 2020-09-29 RX ORDER — PREDNISONE 1 MG/1
5 TABLET ORAL DAILY
COMMUNITY
Start: 2020-09-26 | End: 2020-10-13

## 2020-09-29 NOTE — PROGRESS NOTES
Answers for HPI/ROS submitted by the patient on 9/27/2020   What is the primary reason for your visit?: Other  Please describe your symptoms.: Arthritis pain.  Have you had these symptoms before?: Yes  How long have you been having these symptoms?: Greater than 2 weeks  Please list any medications you are currently taking for this condition.: Prednisone 5mg. 1 daily.  Please describe any probable cause for these symptoms. : Arthritis Dr. Jamia Colvin wants me off this medication and to try Tramadol. , Dr Colvin does not prescribe narcotics, therefore can't prescribe a solution for my pain., She refered me to my Primary Care DrEamon, I also would like a Flue shot if possible.  Subjective   Juan James is a 74 y.o. male.     Chief Complaint   Patient presents with   • Follow-up   • medication change       History of Present Illness   Follow-up on arthritis pain which is diffuse.  Patient was diagnosed with gout.  He did see rheumatologist who is suggesting tramadol and patient was suggested to go back to his PCP to get tramadol prescription.    Hypertension.  Patient has been off old antihypertensive medications due to his dramatic weight loss several months ago and becoming hypotensive today however he is with high blood pressures.  He will be checking his blood pressure at home and come back in 2 to 3 weeks for recheck and we will reconsider may be put him back on antihypertensive therapy.    Hyperlipidemia stable on medication will check levels today.    Patient has been erroneously marked as diabetic. Based on the available clinical information, he does not have diabetes and should therefore be excluded from diabetic health maintenance and quality measures for the remainder of the reporting period.      The following portions of the patient's history were reviewed and updated as appropriate: allergies, current medications, past family history, past medical history, past social history, past surgical history  and problem list.    Past Medical History:   Diagnosis Date   • AAA (abdominal aortic aneurysm) (CMS/HCC)    • Ankle edema    • Aortic aneurysm (CMS/HCC)    • Arthritis    • Atherosclerotic heart disease of native coronary artery without angina pectoris    • Atrial fibrillation (CMS/HCC)    • Benign essential hypertension    • BPH (benign prostatic hyperplasia)    • Cataract    • Cervical disc disorder    • Cervical radiculitis 09/2016   • Cervical spondylosis 11/2012   • CHF (congestive heart failure) (CMS/HCC)    • Colon polyps     FOLLOWED BY DR. TAYA CRUZ   • COPD (chronic obstructive pulmonary disease) (CMS/HCC)    • Coronary artery disease     STENT X 3   • DDD (degenerative disc disease), cervical    • DDD (degenerative disc disease), lumbar    • Decreased pedal pulses    • Dyspnea    • Edema    • Edema of extremities    • Elevated transaminase level    • Emphysema/COPD (CMS/HCC)    • Enlarged prostate without lower urinary tract symptoms (luts)    • Essential hypertriglyceridemia    • GERD (gastroesophageal reflux disease)    • GERD without esophagitis    • Gout    • Hearing loss    • Hepatic fibrosis    • High blood pressure    • High risk medication use    • History of blood transfusion    • History of cardiac disorder    • History of cataract    • History of COPD    • History of esophagitis    • History of gastrointestinal hemorrhage    • History of hypertension    • History of nicotine dependence    • History of peptic ulcer    • History of sleep apnea    • Hoarseness, persistent    • Hyperglycemia    • Hyperlipidemia    • Hypertension    • Iliac artery aneurysm (CMS/HCC)    • Insomnia    • Lower back pain    • Lumbar radiculopathy 05/2016   • Mastodynia of left breast    • Myasthenia gravis (CMS/HCC)    • Neck pain    • Nonalcoholic hepatosteatosis    • OA (osteoarthritis)    • 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HCC)    • PAF (paroxysmal atrial fibrillation) (CMS/HCC)    • Peptic ulceration     PUD   •  Prediabetes    • Prediabetes    • Prostate disease    • Pulmonary embolism (CMS/HCC)     PT DENIES HAVING THIS   • Rectal pain 07/2019   • Shortness of breath    • Status post angioplasty    • Tinnitus        Past Surgical History:   Procedure Laterality Date   • ANTERIOR CERVICAL DISCECTOMY W/ FUSION N/A 3/25/2016    Procedure: C3- C5 CERVICAL DISCECTOMY ANTERIOR FUSION WITH INSTRUMENTATION;  Surgeon: Bill Washington MD;  Location: [facility];  Service:    • ARTHRODESIS N/A 07/1983    LUMBAR   • ARTHRODESIS N/A 1987    LUMBAR   • ARTHRODESIS      Spinal Arthrodesis-lower spine-7/1983, 1987--upper spine-1988, 7/1990-Abstracted from [facility]   • BACK SURGERY      Lower Back Surgery   • CARDIAC CATHETERIZATION  07/2009   • CARDIAC CATHETERIZATION  03/18/2003    STENT TO RCA AND PCA, DR. PRIYA LUGO   • CATARACT EXTRACTION Bilateral    • CERVICAL ARTHRODESIS N/A 07/1990   • CERVICAL ARTHRODESIS N/A 1988   • COLONOSCOPY N/A 10/3/2019    4 MM HYPERPLASTIC POLYP IN ILEOCECAL VALVE, 4 MM SESSILE SERRATED ADENOMA POLYP IN DESCENDING, 5 TUBULOVILLOUS ADENOMA POLYPS IN RECTUM, 3 TUBULAR ADENOMA POLYPS IN DISTAL RECTUM, 26 MM TUBULAR ADENOMA POLYP IN RECTUM, PIECEMEAL POLYPECTOMY, AREA TATTOOED, RESCOPE IN 6 MONTHS, DR. TAYA CRUZ AT [facility]   • CORONARY ANGIOPLASTY WITH STENT PLACEMENT  02/2009    and 7/2009   • ENDOSCOPY N/A 06/24/2012    NON BLEEDING EROSIVE GASTROPATHY, 1 DUODENAL ULCER WITH CLEAN BASE, DR. BRANDY WREN AT [facility]   • ENDOSCOPY AND COLONOSCOPY N/A 11/20/2007    EGD WNL, 8 ADENOMATOUS POLYPS IN RECTO-SIGMOID, RESCOPE IN 3 YRS, DR. CRISTINA RODRIGUEZ AT [facility]   • FOOT SURGERY Left    • HAND SURGERY Left    • HAND SURGERY Right    • KNEE ARTHROSCOPY Left    • LAPAROSCOPIC CHOLECYSTECTOMY     • NECK SURGERY     • VASECTOMY Bilateral        Family History   Adopted: Yes   Problem Relation Age of Onset   • Heart attack Mother    • Alcohol abuse Mother    • Heart disease Mother    • No Known Problems Father    •  Heart disease Other         FH in females before the age of 65       Social History     Socioeconomic History   • Marital status:      Spouse name: Not on file   • Number of children: Not on file   • Years of education: Not on file   • Highest education level: Not on file   Tobacco Use   • Smoking status: Former Smoker     Types: Cigarettes   • Smokeless tobacco: Never Used   • Tobacco comment: smoked 1 to 2 packs per day for 40 or more years   Substance and Sexual Activity   • Alcohol use: Yes     Comment: 3 bourbons daily--Caffeine use-3 cups of coffee daily   • Drug use: No   • Sexual activity: Defer       Current Outpatient Medications on File Prior to Visit   Medication Sig Dispense Refill   • allopurinol (ZYLOPRIM) 100 MG tablet Take 100 mg by mouth Daily.     • allopurinol (ZYLOPRIM) 300 MG tablet Take 1 tablet by mouth Daily. 30 tablet 6   • Cyanocobalamin (VITAMIN B-12) 5000 MCG tablet dispersible Place 1 tablet on the tongue Daily.     • diclofenac (VOLTAREN) 1 % gel gel Apply 4 g topically to the appropriate area as directed 3 (Three) Times a Day. 5 tube 6   • Eliquis 5 MG tablet tablet Take 5 mg by mouth 2 (Two) Times a Day.     • folic acid (FOLVITE) 400 MCG tablet Take 1 tablet by mouth Daily.     • furosemide (LASIX) 40 MG tablet TAKE 1 TABLET BY MOUTH TWICE A DAY 60 tablet 4   • predniSONE (DELTASONE) 5 MG tablet Take 5 mg by mouth Daily.     • tamsulosin (FLOMAX) 0.4 MG capsule 24 hr capsule TAKE 1 CAPSULE BY MOUTH ONCE DAILY AT NIGHT AT BEDTIME     • [DISCONTINUED] dabigatran etexilate (PRADAXA) 150 MG capsu Take 1 capsule by mouth 2 (Two) Times a Day. 60 capsule    • [DISCONTINUED] methylPREDNISolone (MEDROL, JUAN RAMON,) 4 MG tablet Take as directed on package instructions. 21 each 0     No current facility-administered medications on file prior to visit.        Review of Systems   Constitutional: Negative.    Respiratory: Negative.    Musculoskeletal: Positive for arthralgias and back pain.        Recent Results (from the past 4704 hour(s))   Protime-INR    Collection Time: 04/04/20 10:27 AM    Specimen: Blood   Result Value Ref Range    Protime 15.2 (H) 11.7 - 14.2 Seconds    INR 1.23 (H) 0.90 - 1.10   aPTT    Collection Time: 04/04/20 10:27 AM    Specimen: Blood   Result Value Ref Range    PTT 30.7 22.7 - 35.4 seconds   BNP    Collection Time: 04/04/20 10:27 AM    Specimen: Blood   Result Value Ref Range    proBNP 4,422.0 (H) 5.0 - 900.0 pg/mL   T4, Free    Collection Time: 04/04/20 10:27 AM    Specimen: Blood   Result Value Ref Range    Free T4 1.31 0.93 - 1.70 ng/dL   TSH    Collection Time: 04/04/20 10:27 AM    Specimen: Blood   Result Value Ref Range    TSH 3.610 0.270 - 4.200 uIU/mL   Magnesium    Collection Time: 04/04/20 10:27 AM    Specimen: Blood   Result Value Ref Range    Magnesium 3.7 (H) 1.6 - 2.4 mg/dL   CBC Auto Differential    Collection Time: 04/04/20 10:27 AM    Specimen: Blood   Result Value Ref Range    WBC 9.82 3.40 - 10.80 10*3/mm3    RBC 3.53 (L) 4.14 - 5.80 10*6/mm3    Hemoglobin 12.1 (L) 13.0 - 17.7 g/dL    Hematocrit 37.0 (L) 37.5 - 51.0 %    .8 (H) 79.0 - 97.0 fL    MCH 34.3 (H) 26.6 - 33.0 pg    MCHC 32.7 31.5 - 35.7 g/dL    RDW 11.7 (L) 12.3 - 15.4 %    RDW-SD 45.7 37.0 - 54.0 fl    MPV 9.2 6.0 - 12.0 fL    Platelets 227 140 - 450 10*3/mm3    Neutrophil % 78.3 (H) 42.7 - 76.0 %    Lymphocyte % 11.8 (L) 19.6 - 45.3 %    Monocyte % 8.8 5.0 - 12.0 %    Eosinophil % 0.1 (L) 0.3 - 6.2 %    Basophil % 0.4 0.0 - 1.5 %    Immature Grans % 0.6 (H) 0.0 - 0.5 %    Neutrophils, Absolute 7.69 (H) 1.70 - 7.00 10*3/mm3    Lymphocytes, Absolute 1.16 0.70 - 3.10 10*3/mm3    Monocytes, Absolute 0.86 0.10 - 0.90 10*3/mm3    Eosinophils, Absolute 0.01 0.00 - 0.40 10*3/mm3    Basophils, Absolute 0.04 0.00 - 0.20 10*3/mm3    Immature Grans, Absolute 0.06 (H) 0.00 - 0.05 10*3/mm3    nRBC 0.0 0.0 - 0.2 /100 WBC   Digoxin Level    Collection Time: 04/04/20 10:27 AM    Specimen: Blood    Result Value Ref Range    Digoxin 1.00 0.60 - 1.20 ng/mL   Comprehensive Metabolic Panel    Collection Time: 04/04/20 11:24 AM    Specimen: Blood   Result Value Ref Range    Glucose 146 (H) 65 - 99 mg/dL    BUN 80 (H) 8 - 23 mg/dL    Creatinine 5.14 (H) 0.76 - 1.27 mg/dL    Sodium 132 (L) 136 - 145 mmol/L    Potassium 8.3 (C) 3.5 - 5.2 mmol/L    Chloride 96 (L) 98 - 107 mmol/L    CO2 20.7 (L) 22.0 - 29.0 mmol/L    Calcium 9.6 8.6 - 10.5 mg/dL    Total Protein 8.4 6.0 - 8.5 g/dL    Albumin 4.60 3.50 - 5.20 g/dL    ALT (SGPT) 39 1 - 41 U/L    AST (SGOT) 24 1 - 40 U/L    Alkaline Phosphatase 64 39 - 117 U/L    Total Bilirubin 0.5 0.2 - 1.2 mg/dL    eGFR Non African Amer 11 (L) >60 mL/min/1.73    eGFR  African Amer      Globulin 3.8 gm/dL    A/G Ratio 1.2 g/dL    BUN/Creatinine Ratio 15.6 7.0 - 25.0    Anion Gap 15.3 (H) 5.0 - 15.0 mmol/L   Troponin    Collection Time: 04/04/20 11:24 AM    Specimen: Blood   Result Value Ref Range    Troponin T 0.059 (C) 0.000 - 0.030 ng/mL   POC Glucose Once    Collection Time: 04/04/20  1:29 PM    Specimen: Blood   Result Value Ref Range    Glucose 181 (H) 70 - 130 mg/dL   Basic Metabolic Panel    Collection Time: 04/04/20  1:59 PM    Specimen: Blood   Result Value Ref Range    Glucose 123 (H) 65 - 99 mg/dL    BUN 79 (H) 8 - 23 mg/dL    Creatinine 4.95 (H) 0.76 - 1.27 mg/dL    Sodium 135 (L) 136 - 145 mmol/L    Potassium 7.8 (C) 3.5 - 5.2 mmol/L    Chloride 99 98 - 107 mmol/L    CO2 19.9 (L) 22.0 - 29.0 mmol/L    Calcium 9.7 8.6 - 10.5 mg/dL    eGFR  African Amer      eGFR Non African Amer 12 (L) >60 mL/min/1.73    BUN/Creatinine Ratio 16.0 7.0 - 25.0    Anion Gap 16.1 (H) 5.0 - 15.0 mmol/L   Blood Gas, Venous    Collection Time: 04/04/20  3:55 PM    Specimen: Venous Blood   Result Value Ref Range    Site N/A     pH, Venous 7.344 7.310 - 7.410 pH Units    pCO2, Venous 43.7 41.0 - 51.0 mm Hg    pO2, Venous 29.5 (L) 35.0 - 45.0 mm Hg    HCO3, Venous 23.8 22.0 - 28.0 mmol/L    Base  Excess, Venous -2.2 mmol/L    O2 Saturation Calculated 52.5 (L) 92.0 - 99.0 %    Barometric Pressure for Blood Gas 747.5 mmHg    Modality Room Air     FIO2 21 %    Rate 16 Breaths/minute   POC Glucose Once    Collection Time: 04/04/20  4:55 PM    Specimen: Blood   Result Value Ref Range    Glucose 214 (H) 70 - 130 mg/dL   Hepatitis B Surface Antigen    Collection Time: 04/04/20  7:42 PM    Specimen: Blood   Result Value Ref Range    Hepatitis B Surface Ag Non-Reactive Non-Reactive   Hep B Confirmation Tube    Collection Time: 04/04/20  7:42 PM    Specimen: Blood   Result Value Ref Range    Extra Tube Hold for add-ons.    POC Glucose Once    Collection Time: 04/04/20  8:02 PM    Specimen: Blood   Result Value Ref Range    Glucose 110 70 - 130 mg/dL   POC Glucose Once    Collection Time: 04/04/20 11:26 PM    Specimen: Blood   Result Value Ref Range    Glucose 106 70 - 130 mg/dL   Urinalysis With Microscopic If Indicated (No Culture) - Urine, Clean Catch    Collection Time: 04/05/20 12:59 AM    Specimen: Urine, Clean Catch   Result Value Ref Range    Color, UA Yellow Yellow, Straw    Appearance, UA Clear Clear    pH, UA <=5.0 5.0 - 8.0    Specific Gravity, UA 1.022 1.005 - 1.030    Glucose, UA Negative Negative    Ketones, UA Trace (A) Negative    Bilirubin, UA Negative Negative    Blood, UA Small (1+) (A) Negative    Protein,  mg/dL (2+) (A) Negative    Leuk Esterase, UA Small (1+) (A) Negative    Nitrite, UA Negative Negative    Urobilinogen, UA 0.2 E.U./dL 0.2 - 1.0 E.U./dL   Urinalysis, Microscopic Only - Urine, Clean Catch    Collection Time: 04/05/20 12:59 AM    Specimen: Urine, Clean Catch   Result Value Ref Range    RBC, UA 6-12 (A) None Seen, 0-2 /HPF    WBC, UA 3-5 (A) None Seen, 0-2 /HPF    Bacteria, UA None Seen None Seen /HPF    Squamous Epithelial Cells, UA 3-6 (A) None Seen, 0-2 /HPF    Hyaline Casts, UA 3-6 None Seen /LPF    Methodology Manual Light Microscopy    Uric Acid    Collection Time:  04/05/20  1:59 AM    Specimen: Blood   Result Value Ref Range    Uric Acid 6.8 3.4 - 7.0 mg/dL   Renal Function Panel    Collection Time: 04/05/20  1:59 AM    Specimen: Blood   Result Value Ref Range    Glucose 116 (H) 65 - 99 mg/dL    BUN 42 (H) 8 - 23 mg/dL    Creatinine 3.58 (H) 0.76 - 1.27 mg/dL    Sodium 133 (L) 136 - 145 mmol/L    Potassium 5.3 (H) 3.5 - 5.2 mmol/L    Chloride 93 (L) 98 - 107 mmol/L    CO2 25.7 22.0 - 29.0 mmol/L    Calcium 8.8 8.6 - 10.5 mg/dL    Albumin 3.80 3.50 - 5.20 g/dL    Phosphorus 3.5 2.5 - 4.5 mg/dL    Anion Gap 14.3 5.0 - 15.0 mmol/L    BUN/Creatinine Ratio 11.7 7.0 - 25.0    eGFR Non African Amer 17 (L) >60 mL/min/1.73   CBC Auto Differential    Collection Time: 04/05/20  1:59 AM    Specimen: Blood   Result Value Ref Range    WBC 9.21 3.40 - 10.80 10*3/mm3    RBC 2.93 (L) 4.14 - 5.80 10*6/mm3    Hemoglobin 10.4 (L) 13.0 - 17.7 g/dL    Hematocrit 30.5 (L) 37.5 - 51.0 %    .1 (H) 79.0 - 97.0 fL    MCH 35.5 (H) 26.6 - 33.0 pg    MCHC 34.1 31.5 - 35.7 g/dL    RDW 11.9 (L) 12.3 - 15.4 %    RDW-SD 45.7 37.0 - 54.0 fl    MPV 9.4 6.0 - 12.0 fL    Platelets 159 140 - 450 10*3/mm3    Neutrophil % 67.3 42.7 - 76.0 %    Lymphocyte % 17.6 (L) 19.6 - 45.3 %    Monocyte % 13.2 (H) 5.0 - 12.0 %    Eosinophil % 1.1 0.3 - 6.2 %    Basophil % 0.4 0.0 - 1.5 %    Immature Grans % 0.4 0.0 - 0.5 %    Neutrophils, Absolute 6.19 1.70 - 7.00 10*3/mm3    Lymphocytes, Absolute 1.62 0.70 - 3.10 10*3/mm3    Monocytes, Absolute 1.22 (H) 0.10 - 0.90 10*3/mm3    Eosinophils, Absolute 0.10 0.00 - 0.40 10*3/mm3    Basophils, Absolute 0.04 0.00 - 0.20 10*3/mm3    Immature Grans, Absolute 0.04 0.00 - 0.05 10*3/mm3    nRBC 0.0 0.0 - 0.2 /100 WBC   POC Glucose Once    Collection Time: 04/05/20  3:43 AM    Specimen: Blood   Result Value Ref Range    Glucose 138 (H) 70 - 130 mg/dL   POC Glucose Once    Collection Time: 04/05/20  7:51 AM    Specimen: Blood   Result Value Ref Range    Glucose 128 70 - 130 mg/dL    POC Glucose Once    Collection Time: 04/05/20 10:39 AM    Specimen: Blood   Result Value Ref Range    Glucose 265 (H) 70 - 130 mg/dL   POC Glucose Once    Collection Time: 04/05/20 12:13 PM    Specimen: Blood   Result Value Ref Range    Glucose 140 (H) 70 - 130 mg/dL   POC Glucose Once    Collection Time: 04/05/20  3:58 PM    Specimen: Blood   Result Value Ref Range    Glucose 98 70 - 130 mg/dL   POC Glucose Once    Collection Time: 04/05/20  8:04 PM    Specimen: Blood   Result Value Ref Range    Glucose 120 70 - 130 mg/dL   POC Glucose Once    Collection Time: 04/06/20 12:28 AM    Specimen: Blood   Result Value Ref Range    Glucose 97 70 - 130 mg/dL   Renal Function Panel    Collection Time: 04/06/20  4:04 AM    Specimen: Blood   Result Value Ref Range    Glucose 106 (H) 65 - 99 mg/dL    BUN 24 (H) 8 - 23 mg/dL    Creatinine 2.76 (H) 0.76 - 1.27 mg/dL    Sodium 135 (L) 136 - 145 mmol/L    Potassium 4.5 3.5 - 5.2 mmol/L    Chloride 98 98 - 107 mmol/L    CO2 25.5 22.0 - 29.0 mmol/L    Calcium 8.4 (L) 8.6 - 10.5 mg/dL    Albumin 3.90 3.50 - 5.20 g/dL    Phosphorus 3.2 2.5 - 4.5 mg/dL    Anion Gap 11.5 5.0 - 15.0 mmol/L    BUN/Creatinine Ratio 8.7 7.0 - 25.0    eGFR Non African Amer 23 (L) >60 mL/min/1.73   CBC Auto Differential    Collection Time: 04/06/20  4:04 AM    Specimen: Blood   Result Value Ref Range    WBC 8.16 3.40 - 10.80 10*3/mm3    RBC 2.83 (L) 4.14 - 5.80 10*6/mm3    Hemoglobin 10.1 (L) 13.0 - 17.7 g/dL    Hematocrit 29.1 (L) 37.5 - 51.0 %    .8 (H) 79.0 - 97.0 fL    MCH 35.7 (H) 26.6 - 33.0 pg    MCHC 34.7 31.5 - 35.7 g/dL    RDW 12.0 (L) 12.3 - 15.4 %    RDW-SD 45.5 37.0 - 54.0 fl    MPV 9.2 6.0 - 12.0 fL    Platelets 131 (L) 140 - 450 10*3/mm3    Neutrophil % 69.9 42.7 - 76.0 %    Lymphocyte % 15.4 (L) 19.6 - 45.3 %    Monocyte % 13.1 (H) 5.0 - 12.0 %    Eosinophil % 1.2 0.3 - 6.2 %    Basophil % 0.2 0.0 - 1.5 %    Immature Grans % 0.2 0.0 - 0.5 %    Neutrophils, Absolute 5.69 1.70 - 7.00  10*3/mm3    Lymphocytes, Absolute 1.26 0.70 - 3.10 10*3/mm3    Monocytes, Absolute 1.07 (H) 0.10 - 0.90 10*3/mm3    Eosinophils, Absolute 0.10 0.00 - 0.40 10*3/mm3    Basophils, Absolute 0.02 0.00 - 0.20 10*3/mm3    Immature Grans, Absolute 0.02 0.00 - 0.05 10*3/mm3    nRBC 0.1 0.0 - 0.2 /100 WBC   POC Glucose Once    Collection Time: 04/06/20  4:30 AM    Specimen: Blood   Result Value Ref Range    Glucose 109 70 - 130 mg/dL   Adult Transthoracic Echo Complete W/ Cont if Necessary Per Protocol    Collection Time: 04/06/20  7:41 AM   Result Value Ref Range    BSA 2.3 m^2    IVSd 1.3 cm    LVIDd 5.1 cm    LVIDs 3.3 cm    LVPWd 1.5 cm    IVS/LVPW 0.87     FS 35.3 %    EDV(Teich) 123.8 ml    ESV(Teich) 44.1 ml    EF(Teich) 64.4 %    EDV(cubed) 132.7 ml    ESV(cubed) 35.9 ml    EF(cubed) 72.9 %    LV mass(C)d 300.4 grams    LV mass(C)dI 130.6 grams/m^2    SV(Teich) 79.7 ml    SI(Teich) 34.6 ml/m^2    SV(cubed) 96.7 ml    SI(cubed) 42.0 ml/m^2    asc Aorta Diam 4.2 cm    LVOT diam 2.3 cm    LVOT area 4.2 cm^2    LVOT area(traced) 4.2 cm^2    RVOT diam 2.5 cm    RVOT area 4.9 cm^2    LVLd ap4 7.6 cm    EDV(MOD-sp4) 93.0 ml    LVLs ap4 6.2 cm    ESV(MOD-sp4) 25.0 ml    EF(MOD-sp4) 73.1 %    LVLd ap2 8.4 cm    EDV(MOD-sp2) 105.0 ml    LVLs ap2 6.4 cm    ESV(MOD-sp2) 26.0 ml    EF(MOD-sp2) 75.2 %    SV(MOD-sp4) 68.0 ml    SI(MOD-sp4) 29.6 ml/m^2    SV(MOD-sp2) 79.0 ml    SI(MOD-sp2) 34.3 ml/m^2    LV Mendoza Vol (BSA corrected) 40.4 ml/m^2    LV Sys Vol (BSA corrected) 10.9 ml/m^2    MV A dur 0.16 sec    MV E max su 131.0 cm/sec    MV A max su 22.8 cm/sec    MV E/A 5.7     MV V2 max 187.0 cm/sec    MV max PG 14.0 mmHg    MV V2 mean 85.4 cm/sec    MV mean PG 4.0 mmHg    MV V2 VTI 43.4 cm    MVA(VTI) 2.7 cm^2    MV P1/2t max su 183.0 cm/sec    MV P1/2t 84.3 msec    MVA(P1/2t) 2.6 cm^2    MV dec slope 636.0 cm/sec^2    MV dec time 0.2 sec    Ao pk su 143.0 cm/sec    Ao max PG 8.2 mmHg    Ao max PG (full) 0.11 mmHg    Ao V2 mean  100.0 cm/sec    Ao mean PG 5.0 mmHg    Ao mean PG (full) 0 mmHg    Ao V2 VTI 25.3 cm    BENJA(I,A) 4.6 cm^2    BENJA(I,D) 4.6 cm^2    BENJA(V,A) 4.1 cm^2    BENJA(V,D) 4.1 cm^2    LV V1 max PG 8.1 mmHg    LV V1 mean PG 5.0 mmHg    LV V1 max 142.0 cm/sec    LV V1 mean 98.8 cm/sec    LV V1 VTI 27.8 cm    SV(LVOT) 115.5 ml    SV(RVOT) 85.9 ml    SI(LVOT) 50.2 ml/m^2    PA V2 max 140.0 cm/sec    PA max PG 7.8 mmHg    PA max PG (full) 5.0 mmHg     CV ECHO AMADA - PVA(V,A) 3.0 cm^2     CV ECHO AMADA - PVA(V,D) 3.0 cm^2    PA acc time 0.09 sec    RV V1 max PG 2.9 mmHg    RV V1 mean PG 2.0 mmHg    RV V1 max 85.0 cm/sec    RV V1 mean 58.1 cm/sec    RV V1 VTI 17.5 cm    TR max carmelo 442.0 cm/sec    RVSP(TR) 93.1 mmHg    RAP systole 15.0 mmHg    PA pr(Accel) 39.4 mmHg    Pulm Sys Carmelo 36.7 cm/sec    Pulm Mendoza Carmelo 73.7 cm/sec    Pulm S/D 0.5     Qp/Qs 0.74     Pulm A Revs Dur 0.19 sec    Pulm A Revs Carmelo 19.3 cm/sec    MVA P1/2T LCG 1.2 cm^2     CV ECHO AMADA - BZI_BMI 30.9 kilograms/m^2     CV ECHO AMADA - BSA([facility]) 2.4 m^2     CV ECHO AMADA - BZI_METRIC_WEIGHT 106.1 kg     CV ECHO AMADA - BZI_METRIC_HEIGHT 185.4 cm    Target HR (85%) 126 bpm    Max. Pred. HR (100%) 148 bpm    RV S' 15.10 cm/sec    RV Base 4.20 cm    RV Length 9.10 cm    RV Mid 4.40 cm    LA Volume Index 38.0 mL/m2    Avg E/e' ratio 10.04     EF(MOD-bp) 75 %    Lat Peak E' Carmelo 14.0 cm/sec    Med Peak E' Carmelo 12.10 cm/sec   POC Glucose Once    Collection Time: 04/06/20  8:33 AM    Specimen: Blood   Result Value Ref Range    Glucose 116 70 - 130 mg/dL   POC Glucose Once    Collection Time: 04/06/20 12:10 PM    Specimen: Blood   Result Value Ref Range    Glucose 141 (H) 70 - 130 mg/dL   POC Glucose Once    Collection Time: 04/06/20  4:18 PM    Specimen: Blood   Result Value Ref Range    Glucose 117 70 - 130 mg/dL   Magnesium    Collection Time: 04/07/20  3:18 AM    Specimen: Blood   Result Value Ref Range    Magnesium 2.1 1.6 - 2.4 mg/dL   CBC Auto Differential     Collection Time: 04/07/20  3:18 AM    Specimen: Blood   Result Value Ref Range    WBC 10.46 3.40 - 10.80 10*3/mm3    RBC 3.10 (L) 4.14 - 5.80 10*6/mm3    Hemoglobin 10.8 (L) 13.0 - 17.7 g/dL    Hematocrit 31.2 (L) 37.5 - 51.0 %    .6 (H) 79.0 - 97.0 fL    MCH 34.8 (H) 26.6 - 33.0 pg    MCHC 34.6 31.5 - 35.7 g/dL    RDW 11.9 (L) 12.3 - 15.4 %    RDW-SD 43.7 37.0 - 54.0 fl    MPV 9.2 6.0 - 12.0 fL    Platelets 109 (L) 140 - 450 10*3/mm3    Neutrophil % 75.8 42.7 - 76.0 %    Lymphocyte % 9.7 (L) 19.6 - 45.3 %    Monocyte % 13.4 (H) 5.0 - 12.0 %    Eosinophil % 0.3 0.3 - 6.2 %    Basophil % 0.3 0.0 - 1.5 %    Immature Grans % 0.5 0.0 - 0.5 %    Neutrophils, Absolute 7.94 (H) 1.70 - 7.00 10*3/mm3    Lymphocytes, Absolute 1.01 0.70 - 3.10 10*3/mm3    Monocytes, Absolute 1.40 (H) 0.10 - 0.90 10*3/mm3    Eosinophils, Absolute 0.03 0.00 - 0.40 10*3/mm3    Basophils, Absolute 0.03 0.00 - 0.20 10*3/mm3    Immature Grans, Absolute 0.05 0.00 - 0.05 10*3/mm3    nRBC 0.1 0.0 - 0.2 /100 WBC   Renal Function Panel    Collection Time: 04/07/20  3:18 AM    Specimen: Blood   Result Value Ref Range    Glucose 139 (H) 65 - 99 mg/dL    BUN 30 (H) 8 - 23 mg/dL    Creatinine 2.71 (H) 0.76 - 1.27 mg/dL    Sodium 130 (L) 136 - 145 mmol/L    Potassium 4.8 3.5 - 5.2 mmol/L    Chloride 93 (L) 98 - 107 mmol/L    CO2 23.8 22.0 - 29.0 mmol/L    Calcium 8.5 (L) 8.6 - 10.5 mg/dL    Albumin 4.00 3.50 - 5.20 g/dL    Phosphorus 4.0 2.5 - 4.5 mg/dL    Anion Gap 13.2 5.0 - 15.0 mmol/L    BUN/Creatinine Ratio 11.1 7.0 - 25.0    eGFR Non African Amer 23 (L) >60 mL/min/1.73   Rheumatoid Factor    Collection Time: 04/07/20  3:18 AM    Specimen: Blood   Result Value Ref Range    Rheumatoid Factor Quantitative 14.1 (H) 0.0 - 14.0 IU/mL   Heparin-induced Platelet Antibody    Collection Time: 04/07/20  9:28 AM    Specimen: Arm, Right; Blood   Result Value Ref Range    Heparin Induced Plt Ab 0.104 0.000 - 0.400 OD   Immature Platelet Fraction    Collection  Time: 04/07/20 10:23 AM    Specimen: Blood   Result Value Ref Range    IPF 1.50 0.90 - 6.50 %    Platelets 160 140 - 450 10*3/mm3   Crystal Exam, Fluid - Synovial Fluid,    Collection Time: 04/07/20 12:49 PM    Specimen: Synovial Fluid   Result Value Ref Range    Crystals, Fluid       Intra and Extracellular crystals observed exhibiting polarization characteristics of Uric Acid   Body Fluid Culture - Synovial Fluid, Knee, Right    Collection Time: 04/07/20 12:50 PM    Specimen: Knee, Right; Synovial Fluid   Result Value Ref Range    Body Fluid Culture No growth at 5 days     Gram Stain Few (2+) WBCs seen     Gram Stain No organisms seen    Anaerobic Culture - Synovial Fluid, Knee, Right    Collection Time: 04/07/20 12:50 PM    Specimen: Knee, Right; Synovial Fluid   Result Value Ref Range    Anaerobic Culture No anaerobes isolated at 5 days    Body fluid cell count - Synovial Fluid, Knee, Right    Collection Time: 04/07/20 12:50 PM    Specimen: Knee, Right; Synovial Fluid   Result Value Ref Range    Color, Fluid Yellow     Appearance, Fluid Cloudy (A) Clear    WBC, Fluid 37,500 /mm3    RBC, Fluid 55 /mm3   Body fluid differential - Synovial Fluid, Knee, Right    Collection Time: 04/07/20 12:50 PM    Specimen: Knee, Right; Synovial Fluid   Result Value Ref Range    Neutrophils, Fluid 98 %    Lymphocytes, Fluid 2 %   POC Glucose Once    Collection Time: 04/07/20  7:58 PM    Specimen: Blood   Result Value Ref Range    Glucose 193 (H) 70 - 130 mg/dL   Comprehensive Metabolic Panel    Collection Time: 04/08/20  3:39 AM    Specimen: Blood   Result Value Ref Range    Glucose 124 (H) 65 - 99 mg/dL    BUN 31 (H) 8 - 23 mg/dL    Creatinine 2.43 (H) 0.76 - 1.27 mg/dL    Sodium 131 (L) 136 - 145 mmol/L    Potassium 3.6 3.5 - 5.2 mmol/L    Chloride 94 (L) 98 - 107 mmol/L    CO2 25.5 22.0 - 29.0 mmol/L    Calcium 8.8 8.6 - 10.5 mg/dL    Total Protein 7.1 6.0 - 8.5 g/dL    Albumin 3.90 3.50 - 5.20 g/dL    ALT (SGPT) 20 1 - 41 U/L     AST (SGOT) 18 1 - 40 U/L    Alkaline Phosphatase 53 39 - 117 U/L    Total Bilirubin 1.2 0.2 - 1.2 mg/dL    eGFR Non African Amer 26 (L) >60 mL/min/1.73    Globulin 3.2 gm/dL    A/G Ratio 1.2 g/dL    BUN/Creatinine Ratio 12.8 7.0 - 25.0    Anion Gap 11.5 5.0 - 15.0 mmol/L   Uric Acid    Collection Time: 04/08/20  3:39 AM    Specimen: Blood   Result Value Ref Range    Uric Acid 7.7 (H) 3.4 - 7.0 mg/dL   CBC (No Diff)    Collection Time: 04/08/20  3:39 AM    Specimen: Blood   Result Value Ref Range    WBC 9.54 3.40 - 10.80 10*3/mm3    RBC 3.16 (L) 4.14 - 5.80 10*6/mm3    Hemoglobin 11.3 (L) 13.0 - 17.7 g/dL    Hematocrit 31.8 (L) 37.5 - 51.0 %    .6 (H) 79.0 - 97.0 fL    MCH 35.8 (H) 26.6 - 33.0 pg    MCHC 35.5 31.5 - 35.7 g/dL    RDW 11.3 (L) 12.3 - 15.4 %    RDW-SD 42.3 37.0 - 54.0 fl    MPV 8.7 6.0 - 12.0 fL    Platelets 120 (L) 140 - 450 10*3/mm3   Phosphorus    Collection Time: 04/08/20  3:39 AM    Specimen: Blood   Result Value Ref Range    Phosphorus 4.4 2.5 - 4.5 mg/dL   Renal Function Panel    Collection Time: 04/09/20  3:42 AM    Specimen: Blood   Result Value Ref Range    Glucose 175 (H) 65 - 99 mg/dL    BUN 41 (H) 8 - 23 mg/dL    Creatinine 2.22 (H) 0.76 - 1.27 mg/dL    Sodium 131 (L) 136 - 145 mmol/L    Potassium 4.1 3.5 - 5.2 mmol/L    Chloride 92 (L) 98 - 107 mmol/L    CO2 27.3 22.0 - 29.0 mmol/L    Calcium 9.0 8.6 - 10.5 mg/dL    Albumin 4.00 3.50 - 5.20 g/dL    Phosphorus 5.2 (H) 2.5 - 4.5 mg/dL    Anion Gap 11.7 5.0 - 15.0 mmol/L    BUN/Creatinine Ratio 18.5 7.0 - 25.0    eGFR Non African Amer 29 (L) >60 mL/min/1.73   Uric Acid    Collection Time: 04/09/20  3:42 AM    Specimen: Blood   Result Value Ref Range    Uric Acid 9.2 (H) 3.4 - 7.0 mg/dL   CBC (No Diff)    Collection Time: 04/09/20  3:42 AM    Specimen: Blood   Result Value Ref Range    WBC 7.27 3.40 - 10.80 10*3/mm3    RBC 3.34 (L) 4.14 - 5.80 10*6/mm3    Hemoglobin 11.6 (L) 13.0 - 17.7 g/dL    Hematocrit 33.4 (L) 37.5 - 51.0 %     .0 (H) 79.0 - 97.0 fL    MCH 34.7 (H) 26.6 - 33.0 pg    MCHC 34.7 31.5 - 35.7 g/dL    RDW 11.6 (L) 12.3 - 15.4 %    RDW-SD 42.3 37.0 - 54.0 fl    MPV 9.1 6.0 - 12.0 fL    Platelets 161 140 - 450 10*3/mm3   H. Pylori Antigen, Stool - Stool, Per Rectum    Collection Time: 04/09/20  1:10 PM    Specimen: Per Rectum; Stool   Result Value Ref Range    H pylori Ag, Stl Negative Negative   Renal Function Panel    Collection Time: 04/10/20  3:38 AM    Specimen: Blood   Result Value Ref Range    Glucose 137 (H) 65 - 99 mg/dL    BUN 49 (H) 8 - 23 mg/dL    Creatinine 1.81 (H) 0.76 - 1.27 mg/dL    Sodium 130 (L) 136 - 145 mmol/L    Potassium 4.0 3.5 - 5.2 mmol/L    Chloride 89 (L) 98 - 107 mmol/L    CO2 26.1 22.0 - 29.0 mmol/L    Calcium 8.5 (L) 8.6 - 10.5 mg/dL    Albumin 3.60 3.50 - 5.20 g/dL    Phosphorus 4.6 (H) 2.5 - 4.5 mg/dL    Anion Gap 14.9 5.0 - 15.0 mmol/L    BUN/Creatinine Ratio 27.1 (H) 7.0 - 25.0    eGFR Non African Amer 37 (L) >60 mL/min/1.73   POC Glucose Once    Collection Time: 04/10/20  3:24 PM    Specimen: Blood   Result Value Ref Range    Glucose 133 (H) 70 - 130 mg/dL   Basic Metabolic Panel    Collection Time: 04/13/20  1:00 PM    Specimen: Blood   Result Value Ref Range    Glucose 76 65 - 99 mg/dL    BUN 39 (H) 8 - 23 mg/dL    Creatinine 1.40 (H) 0.76 - 1.27 mg/dL    Sodium 141 136 - 145 mmol/L    Potassium 3.5 3.5 - 5.2 mmol/L    Chloride 99 98 - 107 mmol/L    CO2 28.9 22.0 - 29.0 mmol/L    Calcium 9.3 8.6 - 10.5 mg/dL    eGFR Non African Amer 50 (L) >60 mL/min/1.73    BUN/Creatinine Ratio 27.9 (H) 7.0 - 25.0    Anion Gap 13.1 5.0 - 15.0 mmol/L   CBC (No Diff)    Collection Time: 04/15/20  1:00 PM    Specimen: Blood   Result Value Ref Range    WBC 8.96 3.40 - 10.80 10*3/mm3    RBC 3.47 (L) 4.14 - 5.80 10*6/mm3    Hemoglobin 12.1 (L) 13.0 - 17.7 g/dL    Hematocrit 35.2 (L) 37.5 - 51.0 %    .4 (H) 79.0 - 97.0 fL    MCH 34.9 (H) 26.6 - 33.0 pg    MCHC 34.4 31.5 - 35.7 g/dL    RDW 11.9 (L) 12.3  "- 15.4 %    RDW-SD 44.6 37.0 - 54.0 fl    MPV 8.8 6.0 - 12.0 fL    Platelets 233 140 - 450 10*3/mm3   Basic Metabolic Panel    Collection Time: 04/21/20 11:00 AM    Specimen: Blood   Result Value Ref Range    Glucose 121 (H) 65 - 99 mg/dL    BUN 20 8 - 23 mg/dL    Creatinine 1.28 (H) 0.76 - 1.27 mg/dL    Sodium 141 136 - 145 mmol/L    Potassium 3.9 3.5 - 5.2 mmol/L    Chloride 104 98 - 107 mmol/L    CO2 24.1 22.0 - 29.0 mmol/L    Calcium 9.1 8.6 - 10.5 mg/dL    eGFR Non African Amer 55 (L) >60 mL/min/1.73    BUN/Creatinine Ratio 15.6 7.0 - 25.0    Anion Gap 12.9 5.0 - 15.0 mmol/L   Hemoglobin A1c    Collection Time: 06/08/20  2:05 PM    Specimen: Blood   Result Value Ref Range    Hemoglobin A1C 5.40 4.80 - 5.60 %   Uric Acid    Collection Time: 06/08/20  2:05 PM   Result Value Ref Range    Uric Acid 5.1 3.4 - 7.0 mg/dL     Objective   Vitals:    09/29/20 0900 09/29/20 0903   BP: (!) 165/107 (!) 169/102   Pulse: 83    Temp: 97.7 °F (36.5 °C)    SpO2: 98%    Weight: 100 kg (220 lb 6 oz)    Height: 185.4 cm (72.99\")      Body mass index is 29.08 kg/m².  Physical Exam  Vitals signs and nursing note reviewed.   Constitutional:       General: He is not in acute distress.     Appearance: He is well-developed. He is not diaphoretic.   Cardiovascular:      Rate and Rhythm: Normal rate and regular rhythm.   Pulmonary:      Effort: Pulmonary effort is normal. No respiratory distress.      Breath sounds: Normal breath sounds. No wheezing.           Juan was seen today for follow-up and medication change.    Diagnoses and all orders for this visit:    Chronic bilateral low back pain with bilateral sciatica  -     traMADol (ULTRAM) 50 MG tablet; Take 2 tablets by mouth Every 6 (Six) Hours As Needed for Moderate Pain .    Arthritis  -     traMADol (ULTRAM) 50 MG tablet; Take 2 tablets by mouth Every 6 (Six) Hours As Needed for Moderate Pain .    Essential hypertension  -     Comprehensive Metabolic Panel  -     Lipid Panel  -   "   CBC & Differential    Mixed hyperlipidemia  -     Lipid Panel  -     CBC & Differential    Other secondary chronic gout of multiple sites without tophus  -     Uric Acid      Return in about 2 weeks (around 10/13/2020) for HYPERTENSION.

## 2020-09-30 LAB
ALBUMIN SERPL-MCNC: 4.5 G/DL (ref 3.5–5.2)
ALBUMIN/GLOB SERPL: 1.7 G/DL
ALP SERPL-CCNC: 105 U/L (ref 39–117)
ALT SERPL-CCNC: 22 U/L (ref 1–41)
AST SERPL-CCNC: 21 U/L (ref 1–40)
BASOPHILS # BLD AUTO: 0.05 10*3/MM3 (ref 0–0.2)
BASOPHILS NFR BLD AUTO: 0.6 % (ref 0–1.5)
BILIRUB SERPL-MCNC: 0.3 MG/DL (ref 0–1.2)
BUN SERPL-MCNC: 24 MG/DL (ref 8–23)
BUN/CREAT SERPL: 20.7 (ref 7–25)
CALCIUM SERPL-MCNC: 9.4 MG/DL (ref 8.6–10.5)
CHLORIDE SERPL-SCNC: 102 MMOL/L (ref 98–107)
CHOLEST SERPL-MCNC: 254 MG/DL (ref 0–200)
CO2 SERPL-SCNC: 28.9 MMOL/L (ref 22–29)
CREAT SERPL-MCNC: 1.16 MG/DL (ref 0.76–1.27)
EOSINOPHIL # BLD AUTO: 0.11 10*3/MM3 (ref 0–0.4)
EOSINOPHIL NFR BLD AUTO: 1.3 % (ref 0.3–6.2)
ERYTHROCYTE [DISTWIDTH] IN BLOOD BY AUTOMATED COUNT: 13.6 % (ref 12.3–15.4)
GLOBULIN SER CALC-MCNC: 2.6 GM/DL
GLUCOSE SERPL-MCNC: 134 MG/DL (ref 65–99)
HCT VFR BLD AUTO: 40.7 % (ref 37.5–51)
HDLC SERPL-MCNC: 58 MG/DL (ref 40–60)
HGB BLD-MCNC: 14 G/DL (ref 13–17.7)
IMM GRANULOCYTES # BLD AUTO: 0.03 10*3/MM3 (ref 0–0.05)
IMM GRANULOCYTES NFR BLD AUTO: 0.4 % (ref 0–0.5)
LDLC SERPL CALC-MCNC: 138 MG/DL (ref 0–100)
LYMPHOCYTES # BLD AUTO: 1.13 10*3/MM3 (ref 0.7–3.1)
LYMPHOCYTES NFR BLD AUTO: 13.9 % (ref 19.6–45.3)
MCH RBC QN AUTO: 33 PG (ref 26.6–33)
MCHC RBC AUTO-ENTMCNC: 34.4 G/DL (ref 31.5–35.7)
MCV RBC AUTO: 96 FL (ref 79–97)
MONOCYTES # BLD AUTO: 0.46 10*3/MM3 (ref 0.1–0.9)
MONOCYTES NFR BLD AUTO: 5.6 % (ref 5–12)
NEUTROPHILS # BLD AUTO: 6.37 10*3/MM3 (ref 1.7–7)
NEUTROPHILS NFR BLD AUTO: 78.2 % (ref 42.7–76)
NRBC BLD AUTO-RTO: 0.1 /100 WBC (ref 0–0.2)
PLATELET # BLD AUTO: 170 10*3/MM3 (ref 140–450)
POTASSIUM SERPL-SCNC: 4.3 MMOL/L (ref 3.5–5.2)
PROT SERPL-MCNC: 7.1 G/DL (ref 6–8.5)
RBC # BLD AUTO: 4.24 10*6/MM3 (ref 4.14–5.8)
SODIUM SERPL-SCNC: 141 MMOL/L (ref 136–145)
TRIGL SERPL-MCNC: 288 MG/DL (ref 0–150)
URATE SERPL-MCNC: 5.9 MG/DL (ref 3.4–7)
VLDLC SERPL CALC-MCNC: 57.6 MG/DL
WBC # BLD AUTO: 8.15 10*3/MM3 (ref 3.4–10.8)

## 2020-10-08 ENCOUNTER — HOSPITAL ENCOUNTER (OUTPATIENT)
Dept: GENERAL RADIOLOGY | Facility: HOSPITAL | Age: 75
Discharge: HOME | End: 2020-10-08

## 2020-10-08 ENCOUNTER — HOSPITAL ENCOUNTER (OUTPATIENT)
Dept: MRI IMAGING | Facility: HOSPITAL | Age: 75
Discharge: HOME | End: 2020-10-08

## 2020-10-08 DIAGNOSIS — M54.16 LUMBAR RADICULOPATHY: ICD-10-CM

## 2020-10-08 PROCEDURE — 72114 X-RAY EXAM L-S SPINE BENDING: CPT

## 2020-10-08 PROCEDURE — 72148 MRI LUMBAR SPINE W/O DYE: CPT

## 2020-10-13 ENCOUNTER — OFFICE VISIT (OUTPATIENT)
Dept: FAMILY MEDICINE CLINIC | Facility: CLINIC | Age: 75
End: 2020-10-13

## 2020-10-13 VITALS
WEIGHT: 221.25 LBS | TEMPERATURE: 97.3 F | OXYGEN SATURATION: 98 % | HEART RATE: 67 BPM | SYSTOLIC BLOOD PRESSURE: 154 MMHG | BODY MASS INDEX: 29.32 KG/M2 | HEIGHT: 73 IN | DIASTOLIC BLOOD PRESSURE: 78 MMHG

## 2020-10-13 DIAGNOSIS — I25.118 CORONARY ARTERY DISEASE INVOLVING NATIVE HEART WITH OTHER FORM OF ANGINA PECTORIS, UNSPECIFIED VESSEL OR LESION TYPE (HCC): ICD-10-CM

## 2020-10-13 DIAGNOSIS — E78.2 MIXED HYPERLIPIDEMIA: ICD-10-CM

## 2020-10-13 DIAGNOSIS — I10 ESSENTIAL HYPERTENSION: Primary | ICD-10-CM

## 2020-10-13 DIAGNOSIS — M1A.09X0 IDIOPATHIC CHRONIC GOUT OF MULTIPLE SITES WITHOUT TOPHUS: ICD-10-CM

## 2020-10-13 PROCEDURE — 99214 OFFICE O/P EST MOD 30 MIN: CPT | Performed by: FAMILY MEDICINE

## 2020-10-13 RX ORDER — METOPROLOL SUCCINATE 25 MG/1
25 TABLET, EXTENDED RELEASE ORAL
Qty: 30 TABLET | Refills: 11 | Status: SHIPPED | OUTPATIENT
Start: 2020-10-13 | End: 2021-11-15

## 2020-10-13 NOTE — PROGRESS NOTES
Subjective   Juan James is a 74 y.o. male.     Chief Complaint   Patient presents with   • Hypertension   • Follow-up       Hypertension  This is a recurrent problem. The current episode started 1 to 4 weeks ago. The problem has been waxing and waning since onset. The problem is uncontrolled. Pertinent negatives include no blurred vision, chest pain, palpitations or shortness of breath. Past treatments include beta blockers and ACE inhibitors. Treatments tried: none. Hypertensive end-organ damage includes CAD/MI.   Hyperlipidemia  This is a chronic problem. The current episode started more than 1 year ago. The problem is uncontrolled. Recent lipid tests were reviewed and are high. Pertinent negatives include no chest pain or shortness of breath. He is currently on no antihyperlipidemic treatment.      Gout: stable on meds    The following portions of the patient's history were reviewed and updated as appropriate: allergies, current medications, past family history, past medical history, past social history, past surgical history and problem list.    Past Medical History:   Diagnosis Date   • AAA (abdominal aortic aneurysm) (CMS/HCC)    • Ankle edema    • Aortic aneurysm (CMS/HCC)    • Arthritis    • Atherosclerotic heart disease of native coronary artery without angina pectoris    • Atrial fibrillation (CMS/HCC)    • Benign essential hypertension    • BPH (benign prostatic hyperplasia)    • Cataract    • Cervical disc disorder    • Cervical radiculitis 09/2016   • Cervical spondylosis 11/2012   • CHF (congestive heart failure) (CMS/HCC)    • Colon polyps     FOLLOWED BY DR. TAYA CRUZ   • COPD (chronic obstructive pulmonary disease) (CMS/HCC)    • Coronary artery disease     STENT X 3   • DDD (degenerative disc disease), cervical    • DDD (degenerative disc disease), lumbar    • Decreased pedal pulses    • Dyspnea    • Edema    • Edema of extremities    • Elevated transaminase level    • Emphysema/COPD  (CMS/HCC)    • Enlarged prostate without lower urinary tract symptoms (luts)    • Essential hypertriglyceridemia    • GERD (gastroesophageal reflux disease)    • GERD without esophagitis    • Gout    • Hearing loss    • Hepatic fibrosis    • High blood pressure    • High risk medication use    • History of blood transfusion    • History of cardiac disorder    • History of cataract    • History of COPD    • History of esophagitis    • History of gastrointestinal hemorrhage    • History of hypertension    • History of nicotine dependence    • History of peptic ulcer    • History of sleep apnea    • Hoarseness, persistent    • Hyperglycemia    • Hyperlipidemia    • Hypertension    • Iliac artery aneurysm (CMS/HCC)    • Insomnia    • Lower back pain    • Lumbar radiculopathy 05/2016   • Mastodynia of left breast    • Myasthenia gravis (CMS/HCC)    • Neck pain    • Nonalcoholic hepatosteatosis    • OA (osteoarthritis)    • 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HCC)    • PAF (paroxysmal atrial fibrillation) (CMS/HCC)    • Peptic ulceration     PUD   • Prediabetes    • Prediabetes    • Prostate disease    • Pulmonary embolism (CMS/HCC)     PT DENIES HAVING THIS   • Rectal pain 07/2019   • Shortness of breath    • Status post angioplasty    • Tinnitus        Past Surgical History:   Procedure Laterality Date   • ANTERIOR CERVICAL DISCECTOMY W/ FUSION N/A 3/25/2016    Procedure: C3- C5 CERVICAL DISCECTOMY ANTERIOR FUSION WITH INSTRUMENTATION;  Surgeon: Bill Washington MD;  Location: [facility];  Service:    • ARTHRODESIS N/A 07/1983    LUMBAR   • ARTHRODESIS N/A 1987    LUMBAR   • ARTHRODESIS      Spinal Arthrodesis-lower spine-7/1983, 1987--upper spine-1988, 7/1990-Abstracted from [employer]   • BACK SURGERY      Lower Back Surgery   • CARDIAC CATHETERIZATION  07/2009   • CARDIAC CATHETERIZATION  03/18/2003    STENT TO RCA AND PCA, DR. PRIYA LUGO   • CATARACT EXTRACTION Bilateral    • CERVICAL ARTHRODESIS N/A 07/1990   •  CERVICAL ARTHRODESIS N/A 1988   • COLONOSCOPY N/A 10/3/2019    4 MM HYPERPLASTIC POLYP IN ILEOCECAL VALVE, 4 MM SESSILE SERRATED ADENOMA POLYP IN DESCENDING, 5 TUBULOVILLOUS ADENOMA POLYPS IN RECTUM, 3 TUBULAR ADENOMA POLYPS IN DISTAL RECTUM, 26 MM TUBULAR ADENOMA POLYP IN RECTUM, PIECEMEAL POLYPECTOMY, AREA TATTOOED, RESCOPE IN 6 MONTHS, DR. TAYA CRUZ AT [facility]   • CORONARY ANGIOPLASTY WITH STENT PLACEMENT  02/2009    and 7/2009   • ENDOSCOPY N/A 06/24/2012    NON BLEEDING EROSIVE GASTROPATHY, 1 DUODENAL ULCER WITH CLEAN BASE, DR. BRANDY WREN AT [facility]   • ENDOSCOPY AND COLONOSCOPY N/A 11/20/2007    EGD WNL, 8 ADENOMATOUS POLYPS IN RECTO-SIGMOID, RESCOPE IN 3 YRS, DR. CRISTINA RODRIGUEZ AT [facility]   • FOOT SURGERY Left    • HAND SURGERY Left    • HAND SURGERY Right    • KNEE ARTHROSCOPY Left    • LAPAROSCOPIC CHOLECYSTECTOMY     • NECK SURGERY     • VASECTOMY Bilateral        Family History   Adopted: Yes   Problem Relation Age of Onset   • Heart attack Mother    • Alcohol abuse Mother    • Heart disease Mother    • No Known Problems Father    • Heart disease Other         FH in females before the age of 65       Social History     Socioeconomic History   • Marital status:      Spouse name: Not on file   • Number of children: Not on file   • Years of education: Not on file   • Highest education level: Not on file   Tobacco Use   • Smoking status: Former Smoker     Types: Cigarettes   • Smokeless tobacco: Never Used   • Tobacco comment: smoked 1 to 2 packs per day for 40 or more years   Substance and Sexual Activity   • Alcohol use: Yes     Comment: 3 bourbons daily--Caffeine use-3 cups of coffee daily   • Drug use: No   • Sexual activity: Defer       Current Outpatient Medications on File Prior to Visit   Medication Sig Dispense Refill   • allopurinol (ZYLOPRIM) 100 MG tablet Take 100 mg by mouth Daily.     • allopurinol (ZYLOPRIM) 300 MG tablet Take 1 tablet by mouth Daily. 30 tablet 6   • Cyanocobalamin (VITAMIN  B-12) 5000 MCG tablet dispersible Place 1 tablet on the tongue Daily.     • Eliquis 5 MG tablet tablet Take 5 mg by mouth 2 (Two) Times a Day.     • folic acid (FOLVITE) 400 MCG tablet Take 1 tablet by mouth Daily.     • furosemide (LASIX) 40 MG tablet TAKE 1 TABLET BY MOUTH TWICE A DAY 60 tablet 4   • tamsulosin (FLOMAX) 0.4 MG capsule 24 hr capsule TAKE 1 CAPSULE BY MOUTH ONCE DAILY AT NIGHT AT BEDTIME     • traMADol (ULTRAM) 50 MG tablet Take 2 tablets by mouth Every 6 (Six) Hours As Needed for Moderate Pain . 120 tablet 2   • [DISCONTINUED] diclofenac (VOLTAREN) 1 % gel gel Apply 4 g topically to the appropriate area as directed 3 (Three) Times a Day. 5 tube 6   • [DISCONTINUED] predniSONE (DELTASONE) 5 MG tablet Take 5 mg by mouth Daily.       No current facility-administered medications on file prior to visit.        Review of Systems   Constitutional: Negative for fatigue.   Eyes: Negative for blurred vision.   Respiratory: Negative for cough, chest tightness and shortness of breath.    Cardiovascular: Negative for chest pain, palpitations and leg swelling.   Musculoskeletal: Positive for arthralgias and back pain.   Neurological: Negative for dizziness, light-headedness and headache.       Recent Results (from the past 4704 hour(s))   Protime-INR    Collection Time: 04/04/20 10:27 AM    Specimen: Blood   Result Value Ref Range    Protime 15.2 (H) 11.7 - 14.2 Seconds    INR 1.23 (H) 0.90 - 1.10   aPTT    Collection Time: 04/04/20 10:27 AM    Specimen: Blood   Result Value Ref Range    PTT 30.7 22.7 - 35.4 seconds   BNP    Collection Time: 04/04/20 10:27 AM    Specimen: Blood   Result Value Ref Range    proBNP 4,422.0 (H) 5.0 - 900.0 pg/mL   T4, Free    Collection Time: 04/04/20 10:27 AM    Specimen: Blood   Result Value Ref Range    Free T4 1.31 0.93 - 1.70 ng/dL   TSH    Collection Time: 04/04/20 10:27 AM    Specimen: Blood   Result Value Ref Range    TSH 3.610 0.270 - 4.200 uIU/mL   Magnesium    Collection  Time: 04/04/20 10:27 AM    Specimen: Blood   Result Value Ref Range    Magnesium 3.7 (H) 1.6 - 2.4 mg/dL   CBC Auto Differential    Collection Time: 04/04/20 10:27 AM    Specimen: Blood   Result Value Ref Range    WBC 9.82 3.40 - 10.80 10*3/mm3    RBC 3.53 (L) 4.14 - 5.80 10*6/mm3    Hemoglobin 12.1 (L) 13.0 - 17.7 g/dL    Hematocrit 37.0 (L) 37.5 - 51.0 %    .8 (H) 79.0 - 97.0 fL    MCH 34.3 (H) 26.6 - 33.0 pg    MCHC 32.7 31.5 - 35.7 g/dL    RDW 11.7 (L) 12.3 - 15.4 %    RDW-SD 45.7 37.0 - 54.0 fl    MPV 9.2 6.0 - 12.0 fL    Platelets 227 140 - 450 10*3/mm3    Neutrophil % 78.3 (H) 42.7 - 76.0 %    Lymphocyte % 11.8 (L) 19.6 - 45.3 %    Monocyte % 8.8 5.0 - 12.0 %    Eosinophil % 0.1 (L) 0.3 - 6.2 %    Basophil % 0.4 0.0 - 1.5 %    Immature Grans % 0.6 (H) 0.0 - 0.5 %    Neutrophils, Absolute 7.69 (H) 1.70 - 7.00 10*3/mm3    Lymphocytes, Absolute 1.16 0.70 - 3.10 10*3/mm3    Monocytes, Absolute 0.86 0.10 - 0.90 10*3/mm3    Eosinophils, Absolute 0.01 0.00 - 0.40 10*3/mm3    Basophils, Absolute 0.04 0.00 - 0.20 10*3/mm3    Immature Grans, Absolute 0.06 (H) 0.00 - 0.05 10*3/mm3    nRBC 0.0 0.0 - 0.2 /100 WBC   Digoxin Level    Collection Time: 04/04/20 10:27 AM    Specimen: Blood   Result Value Ref Range    Digoxin 1.00 0.60 - 1.20 ng/mL   Comprehensive Metabolic Panel    Collection Time: 04/04/20 11:24 AM    Specimen: Blood   Result Value Ref Range    Glucose 146 (H) 65 - 99 mg/dL    BUN 80 (H) 8 - 23 mg/dL    Creatinine 5.14 (H) 0.76 - 1.27 mg/dL    Sodium 132 (L) 136 - 145 mmol/L    Potassium 8.3 (C) 3.5 - 5.2 mmol/L    Chloride 96 (L) 98 - 107 mmol/L    CO2 20.7 (L) 22.0 - 29.0 mmol/L    Calcium 9.6 8.6 - 10.5 mg/dL    Total Protein 8.4 6.0 - 8.5 g/dL    Albumin 4.60 3.50 - 5.20 g/dL    ALT (SGPT) 39 1 - 41 U/L    AST (SGOT) 24 1 - 40 U/L    Alkaline Phosphatase 64 39 - 117 U/L    Total Bilirubin 0.5 0.2 - 1.2 mg/dL    eGFR Non African Amer 11 (L) >60 mL/min/1.73    eGFR  African Amer      Globulin 3.8  gm/dL    A/G Ratio 1.2 g/dL    BUN/Creatinine Ratio 15.6 7.0 - 25.0    Anion Gap 15.3 (H) 5.0 - 15.0 mmol/L   Troponin    Collection Time: 04/04/20 11:24 AM    Specimen: Blood   Result Value Ref Range    Troponin T 0.059 (C) 0.000 - 0.030 ng/mL   POC Glucose Once    Collection Time: 04/04/20  1:29 PM    Specimen: Blood   Result Value Ref Range    Glucose 181 (H) 70 - 130 mg/dL   Basic Metabolic Panel    Collection Time: 04/04/20  1:59 PM    Specimen: Blood   Result Value Ref Range    Glucose 123 (H) 65 - 99 mg/dL    BUN 79 (H) 8 - 23 mg/dL    Creatinine 4.95 (H) 0.76 - 1.27 mg/dL    Sodium 135 (L) 136 - 145 mmol/L    Potassium 7.8 (C) 3.5 - 5.2 mmol/L    Chloride 99 98 - 107 mmol/L    CO2 19.9 (L) 22.0 - 29.0 mmol/L    Calcium 9.7 8.6 - 10.5 mg/dL    eGFR  African Amer      eGFR Non African Amer 12 (L) >60 mL/min/1.73    BUN/Creatinine Ratio 16.0 7.0 - 25.0    Anion Gap 16.1 (H) 5.0 - 15.0 mmol/L   Blood Gas, Venous    Collection Time: 04/04/20  3:55 PM    Specimen: Venous Blood   Result Value Ref Range    Site N/A     pH, Venous 7.344 7.310 - 7.410 pH Units    pCO2, Venous 43.7 41.0 - 51.0 mm Hg    pO2, Venous 29.5 (L) 35.0 - 45.0 mm Hg    HCO3, Venous 23.8 22.0 - 28.0 mmol/L    Base Excess, Venous -2.2 mmol/L    O2 Saturation Calculated 52.5 (L) 92.0 - 99.0 %    Barometric Pressure for Blood Gas 747.5 mmHg    Modality Room Air     FIO2 21 %    Rate 16 Breaths/minute   POC Glucose Once    Collection Time: 04/04/20  4:55 PM    Specimen: Blood   Result Value Ref Range    Glucose 214 (H) 70 - 130 mg/dL   Hepatitis B Surface Antigen    Collection Time: 04/04/20  7:42 PM    Specimen: Blood   Result Value Ref Range    Hepatitis B Surface Ag Non-Reactive Non-Reactive   Hep B Confirmation Tube    Collection Time: 04/04/20  7:42 PM    Specimen: Blood   Result Value Ref Range    Extra Tube Hold for add-ons.    POC Glucose Once    Collection Time: 04/04/20  8:02 PM    Specimen: Blood   Result Value Ref Range    Glucose 110 70  - 130 mg/dL   POC Glucose Once    Collection Time: 04/04/20 11:26 PM    Specimen: Blood   Result Value Ref Range    Glucose 106 70 - 130 mg/dL   Urinalysis With Microscopic If Indicated (No Culture) - Urine, Clean Catch    Collection Time: 04/05/20 12:59 AM    Specimen: Urine, Clean Catch   Result Value Ref Range    Color, UA Yellow Yellow, Straw    Appearance, UA Clear Clear    pH, UA <=5.0 5.0 - 8.0    Specific Gravity, UA 1.022 1.005 - 1.030    Glucose, UA Negative Negative    Ketones, UA Trace (A) Negative    Bilirubin, UA Negative Negative    Blood, UA Small (1+) (A) Negative    Protein,  mg/dL (2+) (A) Negative    Leuk Esterase, UA Small (1+) (A) Negative    Nitrite, UA Negative Negative    Urobilinogen, UA 0.2 E.U./dL 0.2 - 1.0 E.U./dL   Urinalysis, Microscopic Only - Urine, Clean Catch    Collection Time: 04/05/20 12:59 AM    Specimen: Urine, Clean Catch   Result Value Ref Range    RBC, UA 6-12 (A) None Seen, 0-2 /HPF    WBC, UA 3-5 (A) None Seen, 0-2 /HPF    Bacteria, UA None Seen None Seen /HPF    Squamous Epithelial Cells, UA 3-6 (A) None Seen, 0-2 /HPF    Hyaline Casts, UA 3-6 None Seen /LPF    Methodology Manual Light Microscopy    Uric Acid    Collection Time: 04/05/20  1:59 AM    Specimen: Blood   Result Value Ref Range    Uric Acid 6.8 3.4 - 7.0 mg/dL   Renal Function Panel    Collection Time: 04/05/20  1:59 AM    Specimen: Blood   Result Value Ref Range    Glucose 116 (H) 65 - 99 mg/dL    BUN 42 (H) 8 - 23 mg/dL    Creatinine 3.58 (H) 0.76 - 1.27 mg/dL    Sodium 133 (L) 136 - 145 mmol/L    Potassium 5.3 (H) 3.5 - 5.2 mmol/L    Chloride 93 (L) 98 - 107 mmol/L    CO2 25.7 22.0 - 29.0 mmol/L    Calcium 8.8 8.6 - 10.5 mg/dL    Albumin 3.80 3.50 - 5.20 g/dL    Phosphorus 3.5 2.5 - 4.5 mg/dL    Anion Gap 14.3 5.0 - 15.0 mmol/L    BUN/Creatinine Ratio 11.7 7.0 - 25.0    eGFR Non African Amer 17 (L) >60 mL/min/1.73   CBC Auto Differential    Collection Time: 04/05/20  1:59 AM    Specimen: Blood    Result Value Ref Range    WBC 9.21 3.40 - 10.80 10*3/mm3    RBC 2.93 (L) 4.14 - 5.80 10*6/mm3    Hemoglobin 10.4 (L) 13.0 - 17.7 g/dL    Hematocrit 30.5 (L) 37.5 - 51.0 %    .1 (H) 79.0 - 97.0 fL    MCH 35.5 (H) 26.6 - 33.0 pg    MCHC 34.1 31.5 - 35.7 g/dL    RDW 11.9 (L) 12.3 - 15.4 %    RDW-SD 45.7 37.0 - 54.0 fl    MPV 9.4 6.0 - 12.0 fL    Platelets 159 140 - 450 10*3/mm3    Neutrophil % 67.3 42.7 - 76.0 %    Lymphocyte % 17.6 (L) 19.6 - 45.3 %    Monocyte % 13.2 (H) 5.0 - 12.0 %    Eosinophil % 1.1 0.3 - 6.2 %    Basophil % 0.4 0.0 - 1.5 %    Immature Grans % 0.4 0.0 - 0.5 %    Neutrophils, Absolute 6.19 1.70 - 7.00 10*3/mm3    Lymphocytes, Absolute 1.62 0.70 - 3.10 10*3/mm3    Monocytes, Absolute 1.22 (H) 0.10 - 0.90 10*3/mm3    Eosinophils, Absolute 0.10 0.00 - 0.40 10*3/mm3    Basophils, Absolute 0.04 0.00 - 0.20 10*3/mm3    Immature Grans, Absolute 0.04 0.00 - 0.05 10*3/mm3    nRBC 0.0 0.0 - 0.2 /100 WBC   POC Glucose Once    Collection Time: 04/05/20  3:43 AM    Specimen: Blood   Result Value Ref Range    Glucose 138 (H) 70 - 130 mg/dL   POC Glucose Once    Collection Time: 04/05/20  7:51 AM    Specimen: Blood   Result Value Ref Range    Glucose 128 70 - 130 mg/dL   POC Glucose Once    Collection Time: 04/05/20 10:39 AM    Specimen: Blood   Result Value Ref Range    Glucose 265 (H) 70 - 130 mg/dL   POC Glucose Once    Collection Time: 04/05/20 12:13 PM    Specimen: Blood   Result Value Ref Range    Glucose 140 (H) 70 - 130 mg/dL   POC Glucose Once    Collection Time: 04/05/20  3:58 PM    Specimen: Blood   Result Value Ref Range    Glucose 98 70 - 130 mg/dL   POC Glucose Once    Collection Time: 04/05/20  8:04 PM    Specimen: Blood   Result Value Ref Range    Glucose 120 70 - 130 mg/dL   POC Glucose Once    Collection Time: 04/06/20 12:28 AM    Specimen: Blood   Result Value Ref Range    Glucose 97 70 - 130 mg/dL   Renal Function Panel    Collection Time: 04/06/20  4:04 AM    Specimen: Blood    Result Value Ref Range    Glucose 106 (H) 65 - 99 mg/dL    BUN 24 (H) 8 - 23 mg/dL    Creatinine 2.76 (H) 0.76 - 1.27 mg/dL    Sodium 135 (L) 136 - 145 mmol/L    Potassium 4.5 3.5 - 5.2 mmol/L    Chloride 98 98 - 107 mmol/L    CO2 25.5 22.0 - 29.0 mmol/L    Calcium 8.4 (L) 8.6 - 10.5 mg/dL    Albumin 3.90 3.50 - 5.20 g/dL    Phosphorus 3.2 2.5 - 4.5 mg/dL    Anion Gap 11.5 5.0 - 15.0 mmol/L    BUN/Creatinine Ratio 8.7 7.0 - 25.0    eGFR Non African Amer 23 (L) >60 mL/min/1.73   CBC Auto Differential    Collection Time: 04/06/20  4:04 AM    Specimen: Blood   Result Value Ref Range    WBC 8.16 3.40 - 10.80 10*3/mm3    RBC 2.83 (L) 4.14 - 5.80 10*6/mm3    Hemoglobin 10.1 (L) 13.0 - 17.7 g/dL    Hematocrit 29.1 (L) 37.5 - 51.0 %    .8 (H) 79.0 - 97.0 fL    MCH 35.7 (H) 26.6 - 33.0 pg    MCHC 34.7 31.5 - 35.7 g/dL    RDW 12.0 (L) 12.3 - 15.4 %    RDW-SD 45.5 37.0 - 54.0 fl    MPV 9.2 6.0 - 12.0 fL    Platelets 131 (L) 140 - 450 10*3/mm3    Neutrophil % 69.9 42.7 - 76.0 %    Lymphocyte % 15.4 (L) 19.6 - 45.3 %    Monocyte % 13.1 (H) 5.0 - 12.0 %    Eosinophil % 1.2 0.3 - 6.2 %    Basophil % 0.2 0.0 - 1.5 %    Immature Grans % 0.2 0.0 - 0.5 %    Neutrophils, Absolute 5.69 1.70 - 7.00 10*3/mm3    Lymphocytes, Absolute 1.26 0.70 - 3.10 10*3/mm3    Monocytes, Absolute 1.07 (H) 0.10 - 0.90 10*3/mm3    Eosinophils, Absolute 0.10 0.00 - 0.40 10*3/mm3    Basophils, Absolute 0.02 0.00 - 0.20 10*3/mm3    Immature Grans, Absolute 0.02 0.00 - 0.05 10*3/mm3    nRBC 0.1 0.0 - 0.2 /100 WBC   POC Glucose Once    Collection Time: 04/06/20  4:30 AM    Specimen: Blood   Result Value Ref Range    Glucose 109 70 - 130 mg/dL   Adult Transthoracic Echo Complete W/ Cont if Necessary Per Protocol    Collection Time: 04/06/20  7:41 AM   Result Value Ref Range    BSA 2.3 m^2    IVSd 1.3 cm    LVIDd 5.1 cm    LVIDs 3.3 cm    LVPWd 1.5 cm    IVS/LVPW 0.87     FS 35.3 %    EDV(Teich) 123.8 ml    ESV(Teich) 44.1 ml    EF(Teich) 64.4 %     EDV(cubed) 132.7 ml    ESV(cubed) 35.9 ml    EF(cubed) 72.9 %    LV mass(C)d 300.4 grams    LV mass(C)dI 130.6 grams/m^2    SV(Teich) 79.7 ml    SI(Teich) 34.6 ml/m^2    SV(cubed) 96.7 ml    SI(cubed) 42.0 ml/m^2    asc Aorta Diam 4.2 cm    LVOT diam 2.3 cm    LVOT area 4.2 cm^2    LVOT area(traced) 4.2 cm^2    RVOT diam 2.5 cm    RVOT area 4.9 cm^2    LVLd ap4 7.6 cm    EDV(MOD-sp4) 93.0 ml    LVLs ap4 6.2 cm    ESV(MOD-sp4) 25.0 ml    EF(MOD-sp4) 73.1 %    LVLd ap2 8.4 cm    EDV(MOD-sp2) 105.0 ml    LVLs ap2 6.4 cm    ESV(MOD-sp2) 26.0 ml    EF(MOD-sp2) 75.2 %    SV(MOD-sp4) 68.0 ml    SI(MOD-sp4) 29.6 ml/m^2    SV(MOD-sp2) 79.0 ml    SI(MOD-sp2) 34.3 ml/m^2    LV Mendoza Vol (BSA corrected) 40.4 ml/m^2    LV Sys Vol (BSA corrected) 10.9 ml/m^2    MV A dur 0.16 sec    MV E max su 131.0 cm/sec    MV A max su 22.8 cm/sec    MV E/A 5.7     MV V2 max 187.0 cm/sec    MV max PG 14.0 mmHg    MV V2 mean 85.4 cm/sec    MV mean PG 4.0 mmHg    MV V2 VTI 43.4 cm    MVA(VTI) 2.7 cm^2    MV P1/2t max su 183.0 cm/sec    MV P1/2t 84.3 msec    MVA(P1/2t) 2.6 cm^2    MV dec slope 636.0 cm/sec^2    MV dec time 0.2 sec    Ao pk su 143.0 cm/sec    Ao max PG 8.2 mmHg    Ao max PG (full) 0.11 mmHg    Ao V2 mean 100.0 cm/sec    Ao mean PG 5.0 mmHg    Ao mean PG (full) 0 mmHg    Ao V2 VTI 25.3 cm    BENJA(I,A) 4.6 cm^2    BENJA(I,D) 4.6 cm^2    BENJA(V,A) 4.1 cm^2    BENJA(V,D) 4.1 cm^2    LV V1 max PG 8.1 mmHg    LV V1 mean PG 5.0 mmHg    LV V1 max 142.0 cm/sec    LV V1 mean 98.8 cm/sec    LV V1 VTI 27.8 cm    SV(LVOT) 115.5 ml    SV(RVOT) 85.9 ml    SI(LVOT) 50.2 ml/m^2    PA V2 max 140.0 cm/sec    PA max PG 7.8 mmHg    PA max PG (full) 5.0 mmHg    BH CV ECHO AMADA - PVA(V,A) 3.0 cm^2    BH CV ECHO AMADA - PVA(V,D) 3.0 cm^2    PA acc time 0.09 sec    RV V1 max PG 2.9 mmHg    RV V1 mean PG 2.0 mmHg    RV V1 max 85.0 cm/sec    RV V1 mean 58.1 cm/sec    RV V1 VTI 17.5 cm    TR max su 442.0 cm/sec    RVSP(TR) 93.1 mmHg    RAP systole 15.0 mmHg     PA pr(Accel) 39.4 mmHg    Pulm Sys Carmelo 36.7 cm/sec    Pulm Mendoza Carmelo 73.7 cm/sec    Pulm S/D 0.5     Qp/Qs 0.74     Pulm A Revs Dur 0.19 sec    Pulm A Revs Carmelo 19.3 cm/sec    MVA P1/2T LCG 1.2 cm^2     CV ECHO AMADA - BZI_BMI 30.9 kilograms/m^2     CV ECHO AMADA - BSA(HAYCOCK) 2.4 m^2     CV ECHO AMADA - BZI_METRIC_WEIGHT 106.1 kg     CV ECHO AMADA - BZI_METRIC_HEIGHT 185.4 cm    Target HR (85%) 126 bpm    Max. Pred. HR (100%) 148 bpm    RV S' 15.10 cm/sec    RV Base 4.20 cm    RV Length 9.10 cm    RV Mid 4.40 cm    LA Volume Index 38.0 mL/m2    Avg E/e' ratio 10.04     EF(MOD-bp) 75 %    Lat Peak E' Carmelo 14.0 cm/sec    Med Peak E' Carmelo 12.10 cm/sec   POC Glucose Once    Collection Time: 04/06/20  8:33 AM    Specimen: Blood   Result Value Ref Range    Glucose 116 70 - 130 mg/dL   POC Glucose Once    Collection Time: 04/06/20 12:10 PM    Specimen: Blood   Result Value Ref Range    Glucose 141 (H) 70 - 130 mg/dL   POC Glucose Once    Collection Time: 04/06/20  4:18 PM    Specimen: Blood   Result Value Ref Range    Glucose 117 70 - 130 mg/dL   Magnesium    Collection Time: 04/07/20  3:18 AM    Specimen: Blood   Result Value Ref Range    Magnesium 2.1 1.6 - 2.4 mg/dL   CBC Auto Differential    Collection Time: 04/07/20  3:18 AM    Specimen: Blood   Result Value Ref Range    WBC 10.46 3.40 - 10.80 10*3/mm3    RBC 3.10 (L) 4.14 - 5.80 10*6/mm3    Hemoglobin 10.8 (L) 13.0 - 17.7 g/dL    Hematocrit 31.2 (L) 37.5 - 51.0 %    .6 (H) 79.0 - 97.0 fL    MCH 34.8 (H) 26.6 - 33.0 pg    MCHC 34.6 31.5 - 35.7 g/dL    RDW 11.9 (L) 12.3 - 15.4 %    RDW-SD 43.7 37.0 - 54.0 fl    MPV 9.2 6.0 - 12.0 fL    Platelets 109 (L) 140 - 450 10*3/mm3    Neutrophil % 75.8 42.7 - 76.0 %    Lymphocyte % 9.7 (L) 19.6 - 45.3 %    Monocyte % 13.4 (H) 5.0 - 12.0 %    Eosinophil % 0.3 0.3 - 6.2 %    Basophil % 0.3 0.0 - 1.5 %    Immature Grans % 0.5 0.0 - 0.5 %    Neutrophils, Absolute 7.94 (H) 1.70 - 7.00 10*3/mm3    Lymphocytes, Absolute 1.01  0.70 - 3.10 10*3/mm3    Monocytes, Absolute 1.40 (H) 0.10 - 0.90 10*3/mm3    Eosinophils, Absolute 0.03 0.00 - 0.40 10*3/mm3    Basophils, Absolute 0.03 0.00 - 0.20 10*3/mm3    Immature Grans, Absolute 0.05 0.00 - 0.05 10*3/mm3    nRBC 0.1 0.0 - 0.2 /100 WBC   Renal Function Panel    Collection Time: 04/07/20  3:18 AM    Specimen: Blood   Result Value Ref Range    Glucose 139 (H) 65 - 99 mg/dL    BUN 30 (H) 8 - 23 mg/dL    Creatinine 2.71 (H) 0.76 - 1.27 mg/dL    Sodium 130 (L) 136 - 145 mmol/L    Potassium 4.8 3.5 - 5.2 mmol/L    Chloride 93 (L) 98 - 107 mmol/L    CO2 23.8 22.0 - 29.0 mmol/L    Calcium 8.5 (L) 8.6 - 10.5 mg/dL    Albumin 4.00 3.50 - 5.20 g/dL    Phosphorus 4.0 2.5 - 4.5 mg/dL    Anion Gap 13.2 5.0 - 15.0 mmol/L    BUN/Creatinine Ratio 11.1 7.0 - 25.0    eGFR Non African Amer 23 (L) >60 mL/min/1.73   Rheumatoid Factor    Collection Time: 04/07/20  3:18 AM    Specimen: Blood   Result Value Ref Range    Rheumatoid Factor Quantitative 14.1 (H) 0.0 - 14.0 IU/mL   Heparin-induced Platelet Antibody    Collection Time: 04/07/20  9:28 AM    Specimen: Arm, Right; Blood   Result Value Ref Range    Heparin Induced Plt Ab 0.104 0.000 - 0.400 OD   Immature Platelet Fraction    Collection Time: 04/07/20 10:23 AM    Specimen: Blood   Result Value Ref Range    IPF 1.50 0.90 - 6.50 %    Platelets 160 140 - 450 10*3/mm3   Crystal Exam, Fluid - Synovial Fluid,    Collection Time: 04/07/20 12:49 PM    Specimen: Synovial Fluid   Result Value Ref Range    Crystals, Fluid       Intra and Extracellular crystals observed exhibiting polarization characteristics of Uric Acid   Body Fluid Culture - Synovial Fluid, Knee, Right    Collection Time: 04/07/20 12:50 PM    Specimen: Knee, Right; Synovial Fluid   Result Value Ref Range    Body Fluid Culture No growth at 5 days     Gram Stain Few (2+) WBCs seen     Gram Stain No organisms seen    Anaerobic Culture - Synovial Fluid, Knee, Right    Collection Time: 04/07/20 12:50 PM     Specimen: Knee, Right; Synovial Fluid   Result Value Ref Range    Anaerobic Culture No anaerobes isolated at 5 days    Body fluid cell count - Synovial Fluid, Knee, Right    Collection Time: 04/07/20 12:50 PM    Specimen: Knee, Right; Synovial Fluid   Result Value Ref Range    Color, Fluid Yellow     Appearance, Fluid Cloudy (A) Clear    WBC, Fluid 37,500 /mm3    RBC, Fluid 55 /mm3   Body fluid differential - Synovial Fluid, Knee, Right    Collection Time: 04/07/20 12:50 PM    Specimen: Knee, Right; Synovial Fluid   Result Value Ref Range    Neutrophils, Fluid 98 %    Lymphocytes, Fluid 2 %   POC Glucose Once    Collection Time: 04/07/20  7:58 PM    Specimen: Blood   Result Value Ref Range    Glucose 193 (H) 70 - 130 mg/dL   Comprehensive Metabolic Panel    Collection Time: 04/08/20  3:39 AM    Specimen: Blood   Result Value Ref Range    Glucose 124 (H) 65 - 99 mg/dL    BUN 31 (H) 8 - 23 mg/dL    Creatinine 2.43 (H) 0.76 - 1.27 mg/dL    Sodium 131 (L) 136 - 145 mmol/L    Potassium 3.6 3.5 - 5.2 mmol/L    Chloride 94 (L) 98 - 107 mmol/L    CO2 25.5 22.0 - 29.0 mmol/L    Calcium 8.8 8.6 - 10.5 mg/dL    Total Protein 7.1 6.0 - 8.5 g/dL    Albumin 3.90 3.50 - 5.20 g/dL    ALT (SGPT) 20 1 - 41 U/L    AST (SGOT) 18 1 - 40 U/L    Alkaline Phosphatase 53 39 - 117 U/L    Total Bilirubin 1.2 0.2 - 1.2 mg/dL    eGFR Non African Amer 26 (L) >60 mL/min/1.73    Globulin 3.2 gm/dL    A/G Ratio 1.2 g/dL    BUN/Creatinine Ratio 12.8 7.0 - 25.0    Anion Gap 11.5 5.0 - 15.0 mmol/L   Uric Acid    Collection Time: 04/08/20  3:39 AM    Specimen: Blood   Result Value Ref Range    Uric Acid 7.7 (H) 3.4 - 7.0 mg/dL   CBC (No Diff)    Collection Time: 04/08/20  3:39 AM    Specimen: Blood   Result Value Ref Range    WBC 9.54 3.40 - 10.80 10*3/mm3    RBC 3.16 (L) 4.14 - 5.80 10*6/mm3    Hemoglobin 11.3 (L) 13.0 - 17.7 g/dL    Hematocrit 31.8 (L) 37.5 - 51.0 %    .6 (H) 79.0 - 97.0 fL    MCH 35.8 (H) 26.6 - 33.0 pg    MCHC 35.5 31.5 -  35.7 g/dL    RDW 11.3 (L) 12.3 - 15.4 %    RDW-SD 42.3 37.0 - 54.0 fl    MPV 8.7 6.0 - 12.0 fL    Platelets 120 (L) 140 - 450 10*3/mm3   Phosphorus    Collection Time: 04/08/20  3:39 AM    Specimen: Blood   Result Value Ref Range    Phosphorus 4.4 2.5 - 4.5 mg/dL   Renal Function Panel    Collection Time: 04/09/20  3:42 AM    Specimen: Blood   Result Value Ref Range    Glucose 175 (H) 65 - 99 mg/dL    BUN 41 (H) 8 - 23 mg/dL    Creatinine 2.22 (H) 0.76 - 1.27 mg/dL    Sodium 131 (L) 136 - 145 mmol/L    Potassium 4.1 3.5 - 5.2 mmol/L    Chloride 92 (L) 98 - 107 mmol/L    CO2 27.3 22.0 - 29.0 mmol/L    Calcium 9.0 8.6 - 10.5 mg/dL    Albumin 4.00 3.50 - 5.20 g/dL    Phosphorus 5.2 (H) 2.5 - 4.5 mg/dL    Anion Gap 11.7 5.0 - 15.0 mmol/L    BUN/Creatinine Ratio 18.5 7.0 - 25.0    eGFR Non African Amer 29 (L) >60 mL/min/1.73   Uric Acid    Collection Time: 04/09/20  3:42 AM    Specimen: Blood   Result Value Ref Range    Uric Acid 9.2 (H) 3.4 - 7.0 mg/dL   CBC (No Diff)    Collection Time: 04/09/20  3:42 AM    Specimen: Blood   Result Value Ref Range    WBC 7.27 3.40 - 10.80 10*3/mm3    RBC 3.34 (L) 4.14 - 5.80 10*6/mm3    Hemoglobin 11.6 (L) 13.0 - 17.7 g/dL    Hematocrit 33.4 (L) 37.5 - 51.0 %    .0 (H) 79.0 - 97.0 fL    MCH 34.7 (H) 26.6 - 33.0 pg    MCHC 34.7 31.5 - 35.7 g/dL    RDW 11.6 (L) 12.3 - 15.4 %    RDW-SD 42.3 37.0 - 54.0 fl    MPV 9.1 6.0 - 12.0 fL    Platelets 161 140 - 450 10*3/mm3   H. Pylori Antigen, Stool - Stool, Per Rectum    Collection Time: 04/09/20  1:10 PM    Specimen: Per Rectum; Stool   Result Value Ref Range    H pylori Ag, Stl Negative Negative   Renal Function Panel    Collection Time: 04/10/20  3:38 AM    Specimen: Blood   Result Value Ref Range    Glucose 137 (H) 65 - 99 mg/dL    BUN 49 (H) 8 - 23 mg/dL    Creatinine 1.81 (H) 0.76 - 1.27 mg/dL    Sodium 130 (L) 136 - 145 mmol/L    Potassium 4.0 3.5 - 5.2 mmol/L    Chloride 89 (L) 98 - 107 mmol/L    CO2 26.1 22.0 - 29.0 mmol/L     Calcium 8.5 (L) 8.6 - 10.5 mg/dL    Albumin 3.60 3.50 - 5.20 g/dL    Phosphorus 4.6 (H) 2.5 - 4.5 mg/dL    Anion Gap 14.9 5.0 - 15.0 mmol/L    BUN/Creatinine Ratio 27.1 (H) 7.0 - 25.0    eGFR Non African Amer 37 (L) >60 mL/min/1.73   POC Glucose Once    Collection Time: 04/10/20  3:24 PM    Specimen: Blood   Result Value Ref Range    Glucose 133 (H) 70 - 130 mg/dL   Basic Metabolic Panel    Collection Time: 04/13/20  1:00 PM    Specimen: Blood   Result Value Ref Range    Glucose 76 65 - 99 mg/dL    BUN 39 (H) 8 - 23 mg/dL    Creatinine 1.40 (H) 0.76 - 1.27 mg/dL    Sodium 141 136 - 145 mmol/L    Potassium 3.5 3.5 - 5.2 mmol/L    Chloride 99 98 - 107 mmol/L    CO2 28.9 22.0 - 29.0 mmol/L    Calcium 9.3 8.6 - 10.5 mg/dL    eGFR Non African Amer 50 (L) >60 mL/min/1.73    BUN/Creatinine Ratio 27.9 (H) 7.0 - 25.0    Anion Gap 13.1 5.0 - 15.0 mmol/L   CBC (No Diff)    Collection Time: 04/15/20  1:00 PM    Specimen: Blood   Result Value Ref Range    WBC 8.96 3.40 - 10.80 10*3/mm3    RBC 3.47 (L) 4.14 - 5.80 10*6/mm3    Hemoglobin 12.1 (L) 13.0 - 17.7 g/dL    Hematocrit 35.2 (L) 37.5 - 51.0 %    .4 (H) 79.0 - 97.0 fL    MCH 34.9 (H) 26.6 - 33.0 pg    MCHC 34.4 31.5 - 35.7 g/dL    RDW 11.9 (L) 12.3 - 15.4 %    RDW-SD 44.6 37.0 - 54.0 fl    MPV 8.8 6.0 - 12.0 fL    Platelets 233 140 - 450 10*3/mm3   Basic Metabolic Panel    Collection Time: 04/21/20 11:00 AM    Specimen: Blood   Result Value Ref Range    Glucose 121 (H) 65 - 99 mg/dL    BUN 20 8 - 23 mg/dL    Creatinine 1.28 (H) 0.76 - 1.27 mg/dL    Sodium 141 136 - 145 mmol/L    Potassium 3.9 3.5 - 5.2 mmol/L    Chloride 104 98 - 107 mmol/L    CO2 24.1 22.0 - 29.0 mmol/L    Calcium 9.1 8.6 - 10.5 mg/dL    eGFR Non African Amer 55 (L) >60 mL/min/1.73    BUN/Creatinine Ratio 15.6 7.0 - 25.0    Anion Gap 12.9 5.0 - 15.0 mmol/L   Hemoglobin A1c    Collection Time: 06/08/20  2:05 PM    Specimen: Blood   Result Value Ref Range    Hemoglobin A1C 5.40 4.80 - 5.60 %   Uric  Acid    Collection Time: 06/08/20  2:05 PM   Result Value Ref Range    Uric Acid 5.1 3.4 - 7.0 mg/dL   Comprehensive Metabolic Panel    Collection Time: 09/29/20  9:36 AM    Specimen: Blood   Result Value Ref Range    Glucose 134 (H) 65 - 99 mg/dL    BUN 24 (H) 8 - 23 mg/dL    Creatinine 1.16 0.76 - 1.27 mg/dL    eGFR Non African Am 62 >60 mL/min/1.73    eGFR African Am 75 >60 mL/min/1.73    BUN/Creatinine Ratio 20.7 7.0 - 25.0    Sodium 141 136 - 145 mmol/L    Potassium 4.3 3.5 - 5.2 mmol/L    Chloride 102 98 - 107 mmol/L    Total CO2 28.9 22.0 - 29.0 mmol/L    Calcium 9.4 8.6 - 10.5 mg/dL    Total Protein 7.1 6.0 - 8.5 g/dL    Albumin 4.50 3.50 - 5.20 g/dL    Globulin 2.6 gm/dL    A/G Ratio 1.7 g/dL    Total Bilirubin 0.3 0.0 - 1.2 mg/dL    Alkaline Phosphatase 105 39 - 117 U/L    AST (SGOT) 21 1 - 40 U/L    ALT (SGPT) 22 1 - 41 U/L   Lipid Panel    Collection Time: 09/29/20  9:36 AM    Specimen: Blood   Result Value Ref Range    Total Cholesterol 254 (H) 0 - 200 mg/dL    Triglycerides 288 (H) 0 - 150 mg/dL    HDL Cholesterol 58 40 - 60 mg/dL    VLDL Cholesterol Nathaniel 57.6 mg/dL    LDL Chol Calc (NIH) 138 (H) 0 - 100 mg/dL   CBC & Differential    Collection Time: 09/29/20  9:36 AM    Specimen: Blood   Result Value Ref Range    WBC 8.15 3.40 - 10.80 10*3/mm3    RBC 4.24 4.14 - 5.80 10*6/mm3    Hemoglobin 14.0 13.0 - 17.7 g/dL    Hematocrit 40.7 37.5 - 51.0 %    MCV 96.0 79.0 - 97.0 fL    MCH 33.0 26.6 - 33.0 pg    MCHC 34.4 31.5 - 35.7 g/dL    RDW 13.6 12.3 - 15.4 %    Platelets 170 140 - 450 10*3/mm3    Neutrophil Rel % 78.2 (H) 42.7 - 76.0 %    Lymphocyte Rel % 13.9 (L) 19.6 - 45.3 %    Monocyte Rel % 5.6 5.0 - 12.0 %    Eosinophil Rel % 1.3 0.3 - 6.2 %    Basophil Rel % 0.6 0.0 - 1.5 %    Neutrophils Absolute 6.37 1.70 - 7.00 10*3/mm3    Lymphocytes Absolute 1.13 0.70 - 3.10 10*3/mm3    Monocytes Absolute 0.46 0.10 - 0.90 10*3/mm3    Eosinophils Absolute 0.11 0.00 - 0.40 10*3/mm3    Basophils Absolute 0.05 0.00 -  "0.20 10*3/mm3    Immature Granulocyte Rel % 0.4 0.0 - 0.5 %    Immature Grans Absolute 0.03 0.00 - 0.05 10*3/mm3    nRBC 0.1 0.0 - 0.2 /100 WBC   Uric Acid    Collection Time: 09/29/20  9:36 AM    Specimen: Blood   Result Value Ref Range    Uric Acid 5.9 3.4 - 7.0 mg/dL     Objective   Vitals:    10/13/20 0946 10/13/20 0949   BP: 161/82 154/78   Pulse: 67    Temp: 97.3 °F (36.3 °C)    SpO2: 98%    Weight: 100 kg (221 lb 4 oz)    Height: 185.4 cm (72.99\")      Body mass index is 29.2 kg/m².  Physical Exam  Vitals signs and nursing note reviewed.   Constitutional:       General: He is not in acute distress.     Appearance: He is well-developed. He is not diaphoretic.   Cardiovascular:      Rate and Rhythm: Normal rate and regular rhythm.   Pulmonary:      Effort: Pulmonary effort is normal. No respiratory distress.      Breath sounds: Normal breath sounds. No wheezing.           Diagnoses and all orders for this visit:    1. Essential hypertension (Primary)  -     metoprolol succinate XL (Toprol XL) 25 MG 24 hr tablet; Take 1 tablet by mouth every night at bedtime.  Dispense: 30 tablet; Refill: 11    2. Coronary artery disease involving native heart with other form of angina pectoris, unspecified vessel or lesion type (CMS/HCC)  -     metoprolol succinate XL (Toprol XL) 25 MG 24 hr tablet; Take 1 tablet by mouth every night at bedtime.  Dispense: 30 tablet; Refill: 11  -     pitavastatin calcium (Livalo) 2 MG tablet tablet; Take 1 tablet by mouth Every Night.  Dispense: 30 tablet; Refill: 11  -     Ambulatory Referral to Cardiology    3. Mixed hyperlipidemia  -     pitavastatin calcium (Livalo) 2 MG tablet tablet; Take 1 tablet by mouth Every Night.  Dispense: 30 tablet; Refill: 11    4. Idiopathic chronic gout of multiple sites without tophus    Return in about 3 weeks (around 11/3/2020) for HYPERTENSION.            "

## 2020-10-19 NOTE — PROGRESS NOTES
Subjective   History of Present Illness: Juan James is a 74 y.o. male is here today via Telephone Visit for follow-up with a new Lumbar MRI and XR that was ordered at his last visit 9/24/2020 for back pain, left hip and leg pain with numbness.    You have chosen to receive care through a telephone visit. Do you consent to use a telephone visit for your medical care today? Yes    We had a telephone visit today.  The patient was at home and I was in the office.  We talked for 6 minutes.    History of Present Illness    This patient continues with pain in his back with radiation primarily into his left hip and down his posterior lateral thigh and posterior lateral calf and into his left foot.  His right leg is okay but he does have pain in his right knee.    The following portions of the patient's history were reviewed and updated as appropriate: allergies, current medications, past family history, past medical history, past social history, past surgical history and problem list.    Review of Systems   Respiratory: Negative for chest tightness and shortness of breath.    Cardiovascular: Negative for chest pain.   Musculoskeletal: Positive for arthralgias (Hip pain), back pain and myalgias.        Leg pain   Neurological: Positive for numbness.       I have reviewed the review of systems as documented by my MA.      Objective         Physical Exam  Neurological:      Mental Status: He is oriented to person, place, and time.       Neurologic Exam     Mental Status   Oriented to person, place, and time.           Assessment/Plan   Independent Review of Radiographic Studies:      I personally reviewed the images from the following studies.    I reviewed his plain films and his MRI myself.  These were done on 8 October.  The plain films show a grade 1 spondylolisthesis of L4 on L5.  There is no evidence of abnormal movement on flexion and extension.  On the MRI there is severe stenosis at that level and also at L3-4.   The other levels are fairly open.    Medical Decision Making:      I told the patient that he could either like to live with this or we could proceed with surgery.  I told the patient about the risks, complications and expected outcome of the lumbar surgery.  I explained that there was an 80% chance of getting rid of the pain in the leg.  I explained that there would still be back pain after the surgery.  Initially this will be quite severe but will improve over time.  There is a 2 or 3% chance of infection, bleeding, CSF leak, damage to the nerve as a result of surgery, paralysis, as well as anesthetic risk.  There is a 10% chance of recurrent problems.  There is a 10% chance of nonunion or failure of the instrumentation.  We discussed the postoperative hospital and home course.  The patient does ask proceed.    He will need to be scheduled for a: Lumbar 3 4 and lumbar 4 5 laminectomy and fusion with instrumentation  Diagnoses and all orders for this visit:    1. Lumbar radiculopathy (Primary)    2. Spondylolisthesis of lumbar region      Return for 2-3 week post op.

## 2020-10-20 ENCOUNTER — OFFICE VISIT (OUTPATIENT)
Dept: NEUROSURGERY | Facility: CLINIC | Age: 75
End: 2020-10-20

## 2020-10-20 ENCOUNTER — PREP FOR SURGERY (OUTPATIENT)
Dept: OTHER | Facility: HOSPITAL | Age: 75
End: 2020-10-20

## 2020-10-20 DIAGNOSIS — M43.16 SPONDYLOLISTHESIS OF LUMBAR REGION: ICD-10-CM

## 2020-10-20 DIAGNOSIS — M54.16 LUMBAR RADICULOPATHY: Primary | ICD-10-CM

## 2020-10-20 DIAGNOSIS — M43.16 SPONDYLOLISTHESIS OF LUMBAR REGION: Primary | ICD-10-CM

## 2020-10-20 PROCEDURE — 99441 PR PHYS/QHP TELEPHONE EVALUATION 5-10 MIN: CPT | Performed by: NEUROLOGICAL SURGERY

## 2020-10-20 RX ORDER — CEFAZOLIN SODIUM 2 G/100ML
2 INJECTION, SOLUTION INTRAVENOUS ONCE
Status: CANCELLED | OUTPATIENT
Start: 2020-11-13 | End: 2020-10-20

## 2020-10-22 ENCOUNTER — TELEPHONE (OUTPATIENT)
Dept: NEUROSURGERY | Facility: CLINIC | Age: 75
End: 2020-10-22

## 2020-11-03 ENCOUNTER — OFFICE VISIT (OUTPATIENT)
Dept: FAMILY MEDICINE CLINIC | Facility: CLINIC | Age: 75
End: 2020-11-03

## 2020-11-03 VITALS
SYSTOLIC BLOOD PRESSURE: 126 MMHG | TEMPERATURE: 97.3 F | WEIGHT: 220 LBS | OXYGEN SATURATION: 99 % | HEART RATE: 86 BPM | DIASTOLIC BLOOD PRESSURE: 82 MMHG | BODY MASS INDEX: 29.16 KG/M2 | HEIGHT: 73 IN

## 2020-11-03 DIAGNOSIS — E78.2 MIXED HYPERLIPIDEMIA: ICD-10-CM

## 2020-11-03 DIAGNOSIS — I10 ESSENTIAL HYPERTENSION: ICD-10-CM

## 2020-11-03 DIAGNOSIS — M1A.09X0 IDIOPATHIC CHRONIC GOUT OF MULTIPLE SITES WITHOUT TOPHUS: Primary | ICD-10-CM

## 2020-11-03 PROCEDURE — 99213 OFFICE O/P EST LOW 20 MIN: CPT | Performed by: FAMILY MEDICINE

## 2020-11-03 RX ORDER — ALLOPURINOL 100 MG/1
100 TABLET ORAL DAILY
Qty: 90 TABLET | Refills: 3 | Status: SHIPPED | OUTPATIENT
Start: 2020-11-03 | End: 2021-11-15

## 2020-11-03 RX ORDER — INFLUENZA A VIRUS A/MICHIGAN/45/2015 X-275 (H1N1) ANTIGEN (FORMALDEHYDE INACTIVATED), INFLUENZA A VIRUS A/SINGAPORE/INFIMH-16-0019/2016 IVR-186 (H3N2) ANTIGEN (FORMALDEHYDE INACTIVATED), INFLUENZA B VIRUS B/PHUKET/3073/2013 ANTIGEN (FORMALDEHYDE INACTIVATED), AND INFLUENZA B VIRUS B/MARYLAND/15/2016 BX-69A ANTIGEN (FORMALDEHYDE INACTIVATED) 60; 60; 60; 60 UG/.7ML; UG/.7ML; UG/.7ML; UG/.7ML
INJECTION, SUSPENSION INTRAMUSCULAR
COMMUNITY
Start: 2020-10-23 | End: 2020-11-05

## 2020-11-03 NOTE — PROGRESS NOTES
Subjective   Juan James is a 74 y.o. male.     Chief Complaint   Patient presents with   • Hypertension   • Med Refill       Hypertension  This is a chronic problem. The current episode started more than 1 year ago. The problem has been waxing and waning (LOG REVIEWED) since onset. The problem is controlled. Pertinent negatives include no blurred vision, chest pain, palpitations or shortness of breath.      Gout is stable on current meds.    Hyperlipidemia: just started on Livalo.    The following portions of the patient's history were reviewed and updated as appropriate: allergies, current medications, past family history, past medical history, past social history, past surgical history and problem list.    Past Medical History:   Diagnosis Date   • AAA (abdominal aortic aneurysm) (CMS/[facility])    • Ankle edema    • Aortic aneurysm (CMS/HCC)    • Arthritis    • Atherosclerotic heart disease of native coronary artery without angina pectoris    • Atrial fibrillation (CMS/HCC)    • Benign essential hypertension    • BPH (benign prostatic hyperplasia)    • Cataract    • Cervical disc disorder    • Cervical radiculitis 09/2016   • Cervical spondylosis 11/2012   • CHF (congestive heart failure) (CMS/[facility])    • Colon polyps     FOLLOWED BY DR. TAYA CRUZ   • COPD (chronic obstructive pulmonary disease) (CMS/HCC)    • Coronary artery disease     STENT X 3   • DDD (degenerative disc disease), cervical    • DDD (degenerative disc disease), lumbar    • Decreased pedal pulses    • Dyspnea    • Edema    • Edema of extremities    • Elevated transaminase level    • Emphysema/COPD (CMS/HCC)    • Enlarged prostate without lower urinary tract symptoms (luts)    • Essential hypertriglyceridemia    • GERD (gastroesophageal reflux disease)    • GERD without esophagitis    • Gout    • Hearing loss    • Hepatic fibrosis    • High blood pressure    • High risk medication use    • History of blood transfusion    • History of cardiac  disorder    • History of cataract    • History of COPD    • History of esophagitis    • History of gastrointestinal hemorrhage    • History of hypertension    • History of nicotine dependence    • History of peptic ulcer    • History of sleep apnea    • Hoarseness, persistent    • Hyperglycemia    • Hyperlipidemia    • Hypertension    • Iliac artery aneurysm (CMS/HCC)    • Insomnia    • Lower back pain    • Lumbar radiculopathy 05/2016   • Mastodynia of left breast    • Myasthenia gravis (CMS/HCC)    • Neck pain    • Nonalcoholic hepatosteatosis    • OA (osteoarthritis)    • 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HCC)    • PAF (paroxysmal atrial fibrillation) (CMS/HCC)    • Peptic ulceration     PUD   • Prediabetes    • Prediabetes    • Prostate disease    • Pulmonary embolism (CMS/HCC)     PT DENIES HAVING THIS   • Rectal pain 07/2019   • Shortness of breath    • Status post angioplasty    • Tinnitus        Past Surgical History:   Procedure Laterality Date   • ANTERIOR CERVICAL DISCECTOMY W/ FUSION N/A 3/25/2016    Procedure: C3- C5 CERVICAL DISCECTOMY ANTERIOR FUSION WITH INSTRUMENTATION;  Surgeon: Bill Washington MD;  Location: [facility];  Service:    • ARTHRODESIS N/A 07/1983    LUMBAR   • ARTHRODESIS N/A 1987    LUMBAR   • ARTHRODESIS      Spinal Arthrodesis-lower spine-7/1983, 1987--upper spine-1988, 7/1990-Abstracted from [employer]   • BACK SURGERY      Lower Back Surgery   • CARDIAC CATHETERIZATION  07/2009   • CARDIAC CATHETERIZATION  03/18/2003    STENT TO RCA AND PCA, DR. PRIYA LUGO   • CATARACT EXTRACTION Bilateral    • CERVICAL ARTHRODESIS N/A 07/1990   • CERVICAL ARTHRODESIS N/A 1988   • COLONOSCOPY N/A 10/3/2019    4 MM HYPERPLASTIC POLYP IN ILEOCECAL VALVE, 4 MM SESSILE SERRATED ADENOMA POLYP IN DESCENDING, 5 TUBULOVILLOUS ADENOMA POLYPS IN RECTUM, 3 TUBULAR ADENOMA POLYPS IN DISTAL RECTUM, 26 MM TUBULAR ADENOMA POLYP IN RECTUM, PIECEMEAL POLYPECTOMY, AREA TATTOOED, RESCOPE IN 6 MONTHS, DR. BAIN  ANTHONY AT [facility]   • CORONARY ANGIOPLASTY WITH STENT PLACEMENT  02/2009    and 7/2009   • ENDOSCOPY N/A 06/24/2012    NON BLEEDING EROSIVE GASTROPATHY, 1 DUODENAL ULCER WITH CLEAN BASE, DR. BRANDY WREN AT [facility]   • ENDOSCOPY AND COLONOSCOPY N/A 11/20/2007    EGD WNL, 8 ADENOMATOUS POLYPS IN RECTO-SIGMOID, RESCOPE IN 3 YRS, DR. CRISTINA RODRIGUEZ AT [facility]   • FOOT SURGERY Left    • HAND SURGERY Left    • HAND SURGERY Right    • KNEE ARTHROSCOPY Left    • LAPAROSCOPIC CHOLECYSTECTOMY     • NECK SURGERY     • VASECTOMY Bilateral        Family History   Adopted: Yes   Problem Relation Age of Onset   • Heart attack Mother    • Alcohol abuse Mother    • Heart disease Mother    • No Known Problems Father    • Heart disease Other         FH in females before the age of 65       Social History     Socioeconomic History   • Marital status:      Spouse name: Not on file   • Number of children: Not on file   • Years of education: Not on file   • Highest education level: Not on file   Tobacco Use   • Smoking status: Former Smoker     Types: Cigarettes   • Smokeless tobacco: Never Used   • Tobacco comment: smoked 1 to 2 packs per day for 40 or more years   Substance and Sexual Activity   • Alcohol use: Yes     Comment: 3 bourbons daily--Caffeine use-3 cups of coffee daily   • Drug use: No   • Sexual activity: Defer       Current Outpatient Medications on File Prior to Visit   Medication Sig Dispense Refill   • allopurinol (ZYLOPRIM) 300 MG tablet Take 1 tablet by mouth Daily. 30 tablet 6   • Cyanocobalamin (VITAMIN B-12) 5000 MCG tablet dispersible Place 1 tablet on the tongue Daily.     • Eliquis 5 MG tablet tablet Take 5 mg by mouth 2 (Two) Times a Day.     • folic acid (FOLVITE) 400 MCG tablet Take 1 tablet by mouth Daily.     • furosemide (LASIX) 40 MG tablet TAKE 1 TABLET BY MOUTH TWICE A DAY 60 tablet 4   • metoprolol succinate XL (Toprol XL) 25 MG 24 hr tablet Take 1 tablet by mouth every night at bedtime. 30 tablet 11   •  pitavastatin calcium (Livalo) 2 MG tablet tablet Take 1 tablet by mouth Every Night. 30 tablet 11   • tamsulosin (FLOMAX) 0.4 MG capsule 24 hr capsule TAKE 1 CAPSULE BY MOUTH ONCE DAILY AT NIGHT AT BEDTIME     • traMADol (ULTRAM) 50 MG tablet Take 2 tablets by mouth Every 6 (Six) Hours As Needed for Moderate Pain . 120 tablet 2   • [DISCONTINUED] allopurinol (ZYLOPRIM) 100 MG tablet Take 100 mg by mouth Daily.     • Fluzone High-Dose Quadrivalent 0.7 ML suspension prefilled syringe PHARMACIST ADMINISTERED IMMUNIZATION ADMINISTERED AT TIME OF DISPENSING       No current facility-administered medications on file prior to visit.        Review of Systems   Constitutional: Negative for fatigue.   Eyes: Negative for blurred vision.   Respiratory: Negative for cough, chest tightness and shortness of breath.    Cardiovascular: Negative for chest pain, palpitations and leg swelling.   Neurological: Negative for dizziness, light-headedness and headache.       Recent Results (from the past 4704 hour(s))   Basic Metabolic Panel    Collection Time: 04/21/20 11:00 AM    Specimen: Blood   Result Value Ref Range    Glucose 121 (H) 65 - 99 mg/dL    BUN 20 8 - 23 mg/dL    Creatinine 1.28 (H) 0.76 - 1.27 mg/dL    Sodium 141 136 - 145 mmol/L    Potassium 3.9 3.5 - 5.2 mmol/L    Chloride 104 98 - 107 mmol/L    CO2 24.1 22.0 - 29.0 mmol/L    Calcium 9.1 8.6 - 10.5 mg/dL    eGFR Non African Amer 55 (L) >60 mL/min/1.73    BUN/Creatinine Ratio 15.6 7.0 - 25.0    Anion Gap 12.9 5.0 - 15.0 mmol/L   Hemoglobin A1c    Collection Time: 06/08/20  2:05 PM    Specimen: Blood   Result Value Ref Range    Hemoglobin A1C 5.40 4.80 - 5.60 %   Uric Acid    Collection Time: 06/08/20  2:05 PM   Result Value Ref Range    Uric Acid 5.1 3.4 - 7.0 mg/dL   Comprehensive Metabolic Panel    Collection Time: 09/29/20  9:36 AM    Specimen: Blood   Result Value Ref Range    Glucose 134 (H) 65 - 99 mg/dL    BUN 24 (H) 8 - 23 mg/dL    Creatinine 1.16 0.76 - 1.27 mg/dL     eGFR Non African Am 62 >60 mL/min/1.73    eGFR African Am 75 >60 mL/min/1.73    BUN/Creatinine Ratio 20.7 7.0 - 25.0    Sodium 141 136 - 145 mmol/L    Potassium 4.3 3.5 - 5.2 mmol/L    Chloride 102 98 - 107 mmol/L    Total CO2 28.9 22.0 - 29.0 mmol/L    Calcium 9.4 8.6 - 10.5 mg/dL    Total Protein 7.1 6.0 - 8.5 g/dL    Albumin 4.50 3.50 - 5.20 g/dL    Globulin 2.6 gm/dL    A/G Ratio 1.7 g/dL    Total Bilirubin 0.3 0.0 - 1.2 mg/dL    Alkaline Phosphatase 105 39 - 117 U/L    AST (SGOT) 21 1 - 40 U/L    ALT (SGPT) 22 1 - 41 U/L   Lipid Panel    Collection Time: 09/29/20  9:36 AM    Specimen: Blood   Result Value Ref Range    Total Cholesterol 254 (H) 0 - 200 mg/dL    Triglycerides 288 (H) 0 - 150 mg/dL    HDL Cholesterol 58 40 - 60 mg/dL    VLDL Cholesterol Nathaniel 57.6 mg/dL    LDL Chol Calc (NIH) 138 (H) 0 - 100 mg/dL   CBC & Differential    Collection Time: 09/29/20  9:36 AM    Specimen: Blood   Result Value Ref Range    WBC 8.15 3.40 - 10.80 10*3/mm3    RBC 4.24 4.14 - 5.80 10*6/mm3    Hemoglobin 14.0 13.0 - 17.7 g/dL    Hematocrit 40.7 37.5 - 51.0 %    MCV 96.0 79.0 - 97.0 fL    MCH 33.0 26.6 - 33.0 pg    MCHC 34.4 31.5 - 35.7 g/dL    RDW 13.6 12.3 - 15.4 %    Platelets 170 140 - 450 10*3/mm3    Neutrophil Rel % 78.2 (H) 42.7 - 76.0 %    Lymphocyte Rel % 13.9 (L) 19.6 - 45.3 %    Monocyte Rel % 5.6 5.0 - 12.0 %    Eosinophil Rel % 1.3 0.3 - 6.2 %    Basophil Rel % 0.6 0.0 - 1.5 %    Neutrophils Absolute 6.37 1.70 - 7.00 10*3/mm3    Lymphocytes Absolute 1.13 0.70 - 3.10 10*3/mm3    Monocytes Absolute 0.46 0.10 - 0.90 10*3/mm3    Eosinophils Absolute 0.11 0.00 - 0.40 10*3/mm3    Basophils Absolute 0.05 0.00 - 0.20 10*3/mm3    Immature Granulocyte Rel % 0.4 0.0 - 0.5 %    Immature Grans Absolute 0.03 0.00 - 0.05 10*3/mm3    nRBC 0.1 0.0 - 0.2 /100 WBC   Uric Acid    Collection Time: 09/29/20  9:36 AM    Specimen: Blood   Result Value Ref Range    Uric Acid 5.9 3.4 - 7.0 mg/dL     Objective   Vitals:    11/03/20 0800  "  BP: 126/82   Pulse: 86   Temp: 97.3 °F (36.3 °C)   SpO2: 99%   Weight: 99.8 kg (220 lb)   Height: 185.4 cm (72.99\")     Body mass index is 29.03 kg/m².  Physical Exam  Vitals signs and nursing note reviewed.   Constitutional:       General: He is not in acute distress.     Appearance: He is well-developed. He is not diaphoretic.   Cardiovascular:      Rate and Rhythm: Normal rate and regular rhythm.   Pulmonary:      Effort: Pulmonary effort is normal. No respiratory distress.      Breath sounds: Normal breath sounds. No wheezing.           Diagnoses and all orders for this visit:    1. Idiopathic chronic gout of multiple sites without tophus (Primary)  -     allopurinol (ZYLOPRIM) 100 MG tablet; Take 1 tablet by mouth Daily. To take along with 300 mg pill to a daily dose 400 mg  Dispense: 90 tablet; Refill: 3    2. Essential hypertension    3. Mixed hyperlipidemia      Continue current meds.  Return in about 3 months (around 2/3/2021) for HYPERTENSION, HYPERLIPIDEMIA.        "

## 2020-11-05 ENCOUNTER — TRANSCRIBE ORDERS (OUTPATIENT)
Dept: PREADMISSION TESTING | Facility: HOSPITAL | Age: 75
End: 2020-11-05

## 2020-11-05 ENCOUNTER — APPOINTMENT (OUTPATIENT)
Dept: PREADMISSION TESTING | Facility: HOSPITAL | Age: 75
End: 2020-11-05

## 2020-11-05 VITALS
HEART RATE: 95 BPM | RESPIRATION RATE: 20 BRPM | DIASTOLIC BLOOD PRESSURE: 84 MMHG | TEMPERATURE: 97.7 F | OXYGEN SATURATION: 99 % | HEIGHT: 73 IN | WEIGHT: 220 LBS | BODY MASS INDEX: 29.16 KG/M2 | SYSTOLIC BLOOD PRESSURE: 153 MMHG

## 2020-11-05 DIAGNOSIS — Z01.818 OTHER SPECIFIED PRE-OPERATIVE EXAMINATION: Primary | ICD-10-CM

## 2020-11-05 LAB
ANION GAP SERPL CALCULATED.3IONS-SCNC: 7.3 MMOL/L (ref 5–15)
BUN SERPL-MCNC: 18 MG/DL (ref 8–23)
BUN/CREAT SERPL: 14.3 (ref 7–25)
CALCIUM SPEC-SCNC: 9.6 MG/DL (ref 8.6–10.5)
CHLORIDE SERPL-SCNC: 101 MMOL/L (ref 98–107)
CO2 SERPL-SCNC: 30.7 MMOL/L (ref 22–29)
CREAT SERPL-MCNC: 1.26 MG/DL (ref 0.76–1.27)
DEPRECATED RDW RBC AUTO: 47.6 FL (ref 37–54)
ERYTHROCYTE [DISTWIDTH] IN BLOOD BY AUTOMATED COUNT: 13 % (ref 12.3–15.4)
GFR SERPL CREATININE-BSD FRML MDRD: 56 ML/MIN/1.73
GLUCOSE SERPL-MCNC: 166 MG/DL (ref 65–99)
HCT VFR BLD AUTO: 38.9 % (ref 37.5–51)
HGB BLD-MCNC: 13 G/DL (ref 13–17.7)
MCH RBC QN AUTO: 32.9 PG (ref 26.6–33)
MCHC RBC AUTO-ENTMCNC: 33.4 G/DL (ref 31.5–35.7)
MCV RBC AUTO: 98.5 FL (ref 79–97)
PLATELET # BLD AUTO: 127 10*3/MM3 (ref 140–450)
PMV BLD AUTO: 8.7 FL (ref 6–12)
POTASSIUM SERPL-SCNC: 4 MMOL/L (ref 3.5–5.2)
QT INTERVAL: 440 MS
RBC # BLD AUTO: 3.95 10*6/MM3 (ref 4.14–5.8)
SODIUM SERPL-SCNC: 139 MMOL/L (ref 136–145)
WBC # BLD AUTO: 6.37 10*3/MM3 (ref 3.4–10.8)

## 2020-11-05 PROCEDURE — 93005 ELECTROCARDIOGRAM TRACING: CPT

## 2020-11-05 PROCEDURE — 93010 ELECTROCARDIOGRAM REPORT: CPT | Performed by: INTERNAL MEDICINE

## 2020-11-05 PROCEDURE — 80048 BASIC METABOLIC PNL TOTAL CA: CPT | Performed by: NEUROLOGICAL SURGERY

## 2020-11-05 PROCEDURE — 85027 COMPLETE CBC AUTOMATED: CPT | Performed by: NEUROLOGICAL SURGERY

## 2020-11-05 PROCEDURE — 36415 COLL VENOUS BLD VENIPUNCTURE: CPT

## 2020-11-05 RX ORDER — MULTIPLE VITAMINS W/ MINERALS TAB 9MG-400MCG
1 TAB ORAL DAILY
COMMUNITY

## 2020-11-05 NOTE — DISCHARGE INSTRUCTIONS
Take the following medications the morning of surgery:  NONE    ARRIVE TO [facility] OR [facility] 11- AT 7:30AM      If you are on prescription narcotic pain medication to control your pain you may also take that medication the morning of surgery.    General Instructions:  • Do not eat solid food after midnight the night before surgery.  • You may drink clear liquids day of surgery but must stop at least one hour before your hospital arrival time.(6:30AM)  • It is beneficial for you to have a clear drink that contains carbohydrates the day of surgery.  We suggest a 12 to 20 ounce bottle of Gatorade or Powerade for non-diabetic patients or a 12 to 20 ounce bottle of G2 or Powerade Zero for diabetic patients. (Pediatric patients, are not advised to drink a 12 to 20 ounce carbohydrate drink)    Clear liquids are liquids you can see through.  Nothing red in color.     Plain water                               Sports drinks  Sodas                                   Gelatin (Jell-O)  Fruit juices without pulp such as white grape juice and apple juice  Popsicles that contain no fruit or yogurt  Tea or coffee (no cream or milk added)  Gatorade / Powerade  G2 / Powerade Zero    • Infants may have breast milk up to four hours before surgery.  • Infants drinking formula may drink formula up to six hours before surgery.   • Patients who avoid smoking, chewing tobacco and alcohol for 4 weeks prior to surgery have a reduced risk of post-operative complications.  Quit smoking as many days before surgery as you can.  • Do not smoke, use chewing tobacco or drink alcohol the day of surgery.   • If applicable bring your C-PAP/ BI-PAP machine.  • Bring any papers given to you in the doctor’s office.  • Wear clean comfortable clothes.  • Do not wear contact lenses, false eyelashes or make-up.  Bring a case for your glasses.   • Bring crutches or walker if applicable.  • Remove all piercings.  Leave jewelry and any other valuables at  home.  • Hair extensions with metal clips must be removed prior to surgery.  • The Pre-Admission Testing nurse will instruct you to bring medications if unable to obtain an accurate list in Pre-Admission Testing.        Preventing a Surgical Site Infection:  • For 2 to 3 days before surgery, avoid shaving with a razor because the razor can irritate skin and make it easier to develop an infection.    • Any areas of open skin can increase the risk of a post-operative wound infection by allowing bacteria to enter and travel throughout the body.  Notify your surgeon if you have any skin wounds / rashes even if it is not near the expected surgical site.  The area will need assessed to determine if surgery should be delayed until it is healed.  • The night prior to surgery shower using a fresh bar of anti-bacterial soap (such as Dial) and clean washcloth.  Sleep in a clean bed with clean clothing.  Do not allow pets to sleep with you.  • Shower on the morning of surgery using a fresh bar of anti-bacterial soap (such as Dial) and clean washcloth.  Dry with a clean towel and dress in clean clothing.  • Ask your surgeon if you will be receiving antibiotics prior to surgery.  • Make sure you, your family, and all healthcare providers clean their hands with soap and water or an alcohol based hand  before caring for you or your wound.    Day of surgery:  Your arrival time is approximately two hours before your scheduled surgery time.  Upon arrival, a Pre-op nurse and Anesthesiologist will review your health history, obtain vital signs, and answer questions you may have.  The only belongings needed at this time will be a list of your home medications and if applicable your C-PAP/BI-PAP machine.  If you are staying overnight your family can leave the rest of your belongings in the car and bring them to your room later.  A Pre-op nurse will start an IV and you may receive medication in preparation for surgery, including  something to help you relax.  While you are in surgery your family should notify the waiting room  if they leave the waiting room area and provide a contact phone number.    Please be aware that surgery does come with discomfort.  We want to make every effort to control your discomfort so please discuss any uncontrolled symptoms with your nurse.   Your doctor will most likely have prescribed pain medications.      If you are going home after surgery you will receive individualized written care instructions before being discharged.  A responsible adult must drive you to and from the hospital on the day of your surgery and stay with you for 24 hours.    If you are staying overnight following surgery, you will be transported to your hospital room following the recovery period.  [facility] has all private rooms.    If you have any questions please call Pre-Admission Testing at [phone].  Deductibles and co-payments are collected on the day of service. Please be prepared to pay the required co-pay, deductible or deposit on the day of service as defined by your plan.    Patient Education for Self-Quarantine Process    Following your COVID testing, we strongly recommend that you do not leave your home after you have been tested for COVID except to get medical care. This includes not going to work, school or to public areas.  If this is not possible for you to do please limit your activities to only required outings.  Be sure to wear a mask when you are with other people, practice social distancing and wash your hands frequently.      The following items provide additional details to keep you safe.  • Wash your hands with soap and water frequently for at least 20 seconds.   • Avoid touching your eyes, nose and mouth with unwashed hands.  • Do not share anything - utensils, towels, food from the same bowl.   • Have your own utensils, drinking glass, dishes, towels and bedding.   • Do not have  visitors.   • Do use FaceTime to stay in touch with family and friends.  • You should stay in a specific room away from others if possible.   • Stay at least 6 feet away from others in the home if you cannot have a dedicated room to yourself.   • Do not snuggle with your pet. While the CDC says there is no evidence that pets can spread COVID-19 or be infected from humans, it is probably best to avoid “petting, snuggling, being kissed or licked and sharing food (during self-quarantine)”, according to the CDC.   • Sanitize household surfaces daily. Include all high touch areas (door handles, light switches, phones, countertops, etc.)  • Do not share a bathroom with others, if possible.   • Wear a mask around others in your home if you are unable to stay in a separate room or 6 feet apart. If  you are unable to wear a mask, have your family member wear a mask if they must be within 6 feet of you.   Call your surgeon immediately if you experience any of the following symptoms:  • Sore Throat  • Shortness of Breath or difficulty breathing  • Cough  • Chills  • Body soreness or muscle pain  • Headache  • Fever  • New loss of taste or smell  • Do not arrive for your surgery ill.  Your procedure will need to be rescheduled to another time.  You will need to call your physician before the day of surgery to avoid any unnecessary exposure to hospital staff as well as other patients.      CHLORHEXIDINE CLOTH INSTRUCTIONS  The morning of surgery follow these instructions using the Chlorhexidine cloths you've been given.  These steps reduce bacteria on the body.  Do not use the cloths near your eyes, ears mouth, genitalia or on open wounds.  Throw the cloths away after use but do not try to flush them down a toilet.      • Open and remove one cloth at a time from the package.    • Leave the cloth unfolded and begin the bathing.  • Massage the skin with the cloths using gentle pressure to remove bacteria.  Do not scrub harshly.    • Follow the steps below with one 2% CHG cloth per area (6 total cloths).  • One cloth for neck, shoulders and chest.  • One cloth for both arms, hands, fingers and underarms (do underarms last).  • One cloth for the abdomen followed by groin.  • One cloth for right leg and foot including between the toes.  • One cloth for left leg and foot including between the toes.  • The last cloth is to be used for the back of the neck, back and buttocks.    Allow the CHG to air dry 3 minutes on the skin which will give it time to work and decrease the chance of irritation.  The skin may feel sticky until it is dry.  Do not rinse with water or any other liquid or you will lose the beneficial effects of the CHG.  If mild skin irritation occurs, do rinse the skin to remove the CHG.  Report this to the nurse at time of admission.  Do not apply lotions, creams, ointments, deodorants or perfumes after using the clothes. Dress in clean clothes before coming to the hospital.

## 2020-11-11 ENCOUNTER — LAB (OUTPATIENT)
Dept: LAB | Facility: HOSPITAL | Age: 75
End: 2020-11-11

## 2020-11-11 DIAGNOSIS — Z01.818 OTHER SPECIFIED PRE-OPERATIVE EXAMINATION: ICD-10-CM

## 2020-11-11 PROCEDURE — U0004 COV-19 TEST NON-CDC HGH THRU: HCPCS

## 2020-11-11 PROCEDURE — C9803 HOPD COVID-19 SPEC COLLECT: HCPCS

## 2020-11-12 LAB — SARS-COV-2 RNA RESP QL NAA+PROBE: NOT DETECTED

## 2020-11-13 ENCOUNTER — ANESTHESIA (OUTPATIENT)
Dept: PERIOP | Facility: HOSPITAL | Age: 75
End: 2020-11-13

## 2020-11-13 ENCOUNTER — APPOINTMENT (OUTPATIENT)
Dept: GENERAL RADIOLOGY | Facility: HOSPITAL | Age: 75
End: 2020-11-13

## 2020-11-13 ENCOUNTER — HOSPITAL ENCOUNTER (INPATIENT)
Facility: HOSPITAL | Age: 75
LOS: 4 days | Discharge: HOME HEALTH | End: 2020-11-17
Attending: NEUROLOGICAL SURGERY | Admitting: NEUROLOGICAL SURGERY

## 2020-11-13 ENCOUNTER — ANESTHESIA EVENT (OUTPATIENT)
Dept: PERIOP | Facility: HOSPITAL | Age: 75
End: 2020-11-13

## 2020-11-13 DIAGNOSIS — M54.16 LUMBAR RADICULOPATHY: Primary | ICD-10-CM

## 2020-11-13 DIAGNOSIS — M70.22 OLECRANON BURSITIS OF LEFT ELBOW: ICD-10-CM

## 2020-11-13 DIAGNOSIS — M43.16 SPONDYLOLISTHESIS OF LUMBAR REGION: ICD-10-CM

## 2020-11-13 PROCEDURE — 0SB20ZZ EXCISION OF LUMBAR VERTEBRAL DISC, OPEN APPROACH: ICD-10-PCS | Performed by: NEUROLOGICAL SURGERY

## 2020-11-13 PROCEDURE — 25010000002 DEXAMETHASONE PER 1 MG: Performed by: NURSE ANESTHETIST, CERTIFIED REGISTERED

## 2020-11-13 PROCEDURE — 25010000002 FENTANYL CITRATE (PF) 100 MCG/2ML SOLUTION: Performed by: NURSE ANESTHETIST, CERTIFIED REGISTERED

## 2020-11-13 PROCEDURE — 25010000002 HYDROMORPHONE PER 4 MG: Performed by: NURSE ANESTHETIST, CERTIFIED REGISTERED

## 2020-11-13 PROCEDURE — 25010000002 ONDANSETRON PER 1 MG: Performed by: NEUROLOGICAL SURGERY

## 2020-11-13 PROCEDURE — 25010000002 PHENYLEPHRINE PER 1 ML: Performed by: NURSE ANESTHETIST, CERTIFIED REGISTERED

## 2020-11-13 PROCEDURE — 25010000003 CEFAZOLIN IN DEXTROSE 2-4 GM/100ML-% SOLUTION: Performed by: NEUROLOGICAL SURGERY

## 2020-11-13 PROCEDURE — 25010000002 MORPHINE PER 10 MG: Performed by: NEUROLOGICAL SURGERY

## 2020-11-13 PROCEDURE — 25010000002 SUCCINYLCHOLINE PER 20 MG: Performed by: NURSE ANESTHETIST, CERTIFIED REGISTERED

## 2020-11-13 PROCEDURE — 25010000002 PROPOFOL 10 MG/ML EMULSION: Performed by: NURSE ANESTHETIST, CERTIFIED REGISTERED

## 2020-11-13 PROCEDURE — 22632 ARTHRD PST TQ 1NTRSPC LM EA: CPT | Performed by: NEUROLOGICAL SURGERY

## 2020-11-13 PROCEDURE — C1713 ANCHOR/SCREW BN/BN,TIS/BN: HCPCS | Performed by: NEUROLOGICAL SURGERY

## 2020-11-13 PROCEDURE — 25010000002 VANCOMYCIN 1 G RECONSTITUTED SOLUTION 1 EACH VIAL: Performed by: NEUROLOGICAL SURGERY

## 2020-11-13 PROCEDURE — 0M943ZZ DRAINAGE OF LEFT ELBOW BURSA AND LIGAMENT, PERCUTANEOUS APPROACH: ICD-10-PCS | Performed by: ORTHOPAEDIC SURGERY

## 2020-11-13 PROCEDURE — 76000 FLUOROSCOPY <1 HR PHYS/QHP: CPT

## 2020-11-13 PROCEDURE — 25010000002 ONDANSETRON PER 1 MG: Performed by: NURSE ANESTHETIST, CERTIFIED REGISTERED

## 2020-11-13 PROCEDURE — 0SG10AJ FUSION OF 2 OR MORE LUMBAR VERTEBRAL JOINTS WITH INTERBODY FUSION DEVICE, POSTERIOR APPROACH, ANTERIOR COLUMN, OPEN APPROACH: ICD-10-PCS | Performed by: NEUROLOGICAL SURGERY

## 2020-11-13 PROCEDURE — 25010000002 FENTANYL CITRATE (PF) 100 MCG/2ML SOLUTION: Performed by: ANESTHESIOLOGY

## 2020-11-13 PROCEDURE — 72100 X-RAY EXAM L-S SPINE 2/3 VWS: CPT

## 2020-11-13 PROCEDURE — 22853 INSJ BIOMECHANICAL DEVICE: CPT | Performed by: NEUROLOGICAL SURGERY

## 2020-11-13 PROCEDURE — 25810000003 SODIUM CHLORIDE 0.9 % WITH KCL 20 MEQ 20-0.9 MEQ/L-% SOLUTION: Performed by: NEUROLOGICAL SURGERY

## 2020-11-13 PROCEDURE — 22842 INSERT SPINE FIXATION DEVICE: CPT | Performed by: NEUROLOGICAL SURGERY

## 2020-11-13 PROCEDURE — 22630 ARTHRD PST TQ 1NTRSPC LUM: CPT | Performed by: NEUROLOGICAL SURGERY

## 2020-11-13 DEVICE — ROD 1475501055 4.75 CCM NS CURV 55MM
Type: IMPLANTABLE DEVICE | Status: FUNCTIONAL
Brand: CD HORIZON® SPINAL SYSTEM

## 2020-11-13 DEVICE — SCREW 54840046555 4.75 ATS MAS 6.5X55
Type: IMPLANTABLE DEVICE | Status: FUNCTIONAL
Brand: CD HORIZON® SPINAL SYSTEM

## 2020-11-13 DEVICE — PUTTY DBM GRAFTON 6CC: Type: IMPLANTABLE DEVICE | Status: FUNCTIONAL

## 2020-11-13 DEVICE — SPACER 84332409 ADAPTIX 24MM X 9MM
Type: IMPLANTABLE DEVICE | Status: FUNCTIONAL
Brand: ADAPTIX™ INTERBODY SYSTEM WITH TITAN NANOLOCK™ SURFACE TECHNOLOGY

## 2020-11-13 DEVICE — SSC BONE WAX
Type: IMPLANTABLE DEVICE | Status: FUNCTIONAL
Brand: SSC BONE WAX

## 2020-11-13 DEVICE — FLOSEAL HEMOSTATIC MATRIX, 5ML
Type: IMPLANTABLE DEVICE | Status: FUNCTIONAL
Brand: FLOSEAL HEMOSTATIC MATRIX

## 2020-11-13 DEVICE — SET SCREW 5440030 4.75 TI NS BRK OFF
Type: IMPLANTABLE DEVICE | Status: FUNCTIONAL
Brand: CD HORIZON® SPINAL SYSTEM

## 2020-11-13 RX ORDER — ROCURONIUM BROMIDE 10 MG/ML
INJECTION, SOLUTION INTRAVENOUS AS NEEDED
Status: DISCONTINUED | OUTPATIENT
Start: 2020-11-13 | End: 2020-11-13 | Stop reason: SURG

## 2020-11-13 RX ORDER — FUROSEMIDE 40 MG/1
40 TABLET ORAL 2 TIMES DAILY
Status: DISCONTINUED | OUTPATIENT
Start: 2020-11-13 | End: 2020-11-17 | Stop reason: HOSPADM

## 2020-11-13 RX ORDER — ONDANSETRON 2 MG/ML
INJECTION INTRAMUSCULAR; INTRAVENOUS AS NEEDED
Status: DISCONTINUED | OUTPATIENT
Start: 2020-11-13 | End: 2020-11-13 | Stop reason: SURG

## 2020-11-13 RX ORDER — DIPHENHYDRAMINE HYDROCHLORIDE 50 MG/ML
12.5 INJECTION INTRAMUSCULAR; INTRAVENOUS
Status: DISCONTINUED | OUTPATIENT
Start: 2020-11-13 | End: 2020-11-13

## 2020-11-13 RX ORDER — METOPROLOL SUCCINATE 25 MG/1
25 TABLET, EXTENDED RELEASE ORAL
Status: DISCONTINUED | OUTPATIENT
Start: 2020-11-13 | End: 2020-11-17 | Stop reason: HOSPADM

## 2020-11-13 RX ORDER — PROPOFOL 10 MG/ML
VIAL (ML) INTRAVENOUS AS NEEDED
Status: DISCONTINUED | OUTPATIENT
Start: 2020-11-13 | End: 2020-11-13 | Stop reason: SURG

## 2020-11-13 RX ORDER — EPHEDRINE SULFATE 50 MG/ML
5 INJECTION, SOLUTION INTRAVENOUS ONCE AS NEEDED
Status: DISCONTINUED | OUTPATIENT
Start: 2020-11-13 | End: 2020-11-13

## 2020-11-13 RX ORDER — ONDANSETRON 4 MG/1
4 TABLET, FILM COATED ORAL EVERY 6 HOURS PRN
Status: DISCONTINUED | OUTPATIENT
Start: 2020-11-13 | End: 2020-11-17

## 2020-11-13 RX ORDER — ALLOPURINOL 100 MG/1
100 TABLET ORAL NIGHTLY
Status: DISCONTINUED | OUTPATIENT
Start: 2020-11-13 | End: 2020-11-17 | Stop reason: HOSPADM

## 2020-11-13 RX ORDER — OXYCODONE AND ACETAMINOPHEN 7.5; 325 MG/1; MG/1
1 TABLET ORAL ONCE AS NEEDED
Status: DISCONTINUED | OUTPATIENT
Start: 2020-11-13 | End: 2020-11-13

## 2020-11-13 RX ORDER — MORPHINE SULFATE 2 MG/ML
2 INJECTION, SOLUTION INTRAMUSCULAR; INTRAVENOUS EVERY 4 HOURS PRN
Status: DISCONTINUED | OUTPATIENT
Start: 2020-11-13 | End: 2020-11-17

## 2020-11-13 RX ORDER — ALLOPURINOL 300 MG/1
300 TABLET ORAL DAILY
Status: DISCONTINUED | OUTPATIENT
Start: 2020-11-14 | End: 2020-11-17 | Stop reason: HOSPADM

## 2020-11-13 RX ORDER — LIDOCAINE HYDROCHLORIDE 10 MG/ML
0.5 INJECTION, SOLUTION EPIDURAL; INFILTRATION; INTRACAUDAL; PERINEURAL ONCE AS NEEDED
Status: DISCONTINUED | OUTPATIENT
Start: 2020-11-13 | End: 2020-11-13 | Stop reason: HOSPADM

## 2020-11-13 RX ORDER — DEXAMETHASONE SODIUM PHOSPHATE 10 MG/ML
INJECTION INTRAMUSCULAR; INTRAVENOUS AS NEEDED
Status: DISCONTINUED | OUTPATIENT
Start: 2020-11-13 | End: 2020-11-13 | Stop reason: SURG

## 2020-11-13 RX ORDER — SODIUM CHLORIDE 0.9 % (FLUSH) 0.9 %
3-10 SYRINGE (ML) INJECTION AS NEEDED
Status: DISCONTINUED | OUTPATIENT
Start: 2020-11-13 | End: 2020-11-13 | Stop reason: HOSPADM

## 2020-11-13 RX ORDER — FENTANYL CITRATE 50 UG/ML
50 INJECTION, SOLUTION INTRAMUSCULAR; INTRAVENOUS
Status: DISCONTINUED | OUTPATIENT
Start: 2020-11-13 | End: 2020-11-13 | Stop reason: HOSPADM

## 2020-11-13 RX ORDER — HYDROMORPHONE HCL 110MG/55ML
PATIENT CONTROLLED ANALGESIA SYRINGE INTRAVENOUS AS NEEDED
Status: DISCONTINUED | OUTPATIENT
Start: 2020-11-13 | End: 2020-11-13 | Stop reason: SURG

## 2020-11-13 RX ORDER — ONDANSETRON 2 MG/ML
4 INJECTION INTRAMUSCULAR; INTRAVENOUS ONCE AS NEEDED
Status: DISCONTINUED | OUTPATIENT
Start: 2020-11-13 | End: 2020-11-13

## 2020-11-13 RX ORDER — NALOXONE HCL 0.4 MG/ML
0.2 VIAL (ML) INJECTION AS NEEDED
Status: DISCONTINUED | OUTPATIENT
Start: 2020-11-13 | End: 2020-11-13

## 2020-11-13 RX ORDER — SODIUM CHLORIDE 0.9 % (FLUSH) 0.9 %
3 SYRINGE (ML) INJECTION EVERY 12 HOURS SCHEDULED
Status: DISCONTINUED | OUTPATIENT
Start: 2020-11-13 | End: 2020-11-13 | Stop reason: HOSPADM

## 2020-11-13 RX ORDER — CEFAZOLIN SODIUM 2 G/100ML
2 INJECTION, SOLUTION INTRAVENOUS ONCE
Status: COMPLETED | OUTPATIENT
Start: 2020-11-13 | End: 2020-11-13

## 2020-11-13 RX ORDER — LIDOCAINE HYDROCHLORIDE 20 MG/ML
INJECTION, SOLUTION INFILTRATION; PERINEURAL AS NEEDED
Status: DISCONTINUED | OUTPATIENT
Start: 2020-11-13 | End: 2020-11-13 | Stop reason: SURG

## 2020-11-13 RX ORDER — NALOXONE HCL 0.4 MG/ML
0.4 VIAL (ML) INJECTION
Status: DISCONTINUED | OUTPATIENT
Start: 2020-11-13 | End: 2020-11-17

## 2020-11-13 RX ORDER — TAMSULOSIN HYDROCHLORIDE 0.4 MG/1
0.4 CAPSULE ORAL EVERY EVENING
Status: DISCONTINUED | OUTPATIENT
Start: 2020-11-13 | End: 2020-11-17 | Stop reason: HOSPADM

## 2020-11-13 RX ORDER — PROMETHAZINE HYDROCHLORIDE 25 MG/1
25 TABLET ORAL ONCE AS NEEDED
Status: DISCONTINUED | OUTPATIENT
Start: 2020-11-13 | End: 2020-11-13

## 2020-11-13 RX ORDER — GLYCOPYRROLATE 0.2 MG/ML
INJECTION INTRAMUSCULAR; INTRAVENOUS AS NEEDED
Status: DISCONTINUED | OUTPATIENT
Start: 2020-11-13 | End: 2020-11-13 | Stop reason: SURG

## 2020-11-13 RX ORDER — SODIUM CHLORIDE 0.9 % (FLUSH) 0.9 %
10 SYRINGE (ML) INJECTION AS NEEDED
Status: DISCONTINUED | OUTPATIENT
Start: 2020-11-13 | End: 2020-11-17

## 2020-11-13 RX ORDER — SUCCINYLCHOLINE CHLORIDE 20 MG/ML
INJECTION INTRAMUSCULAR; INTRAVENOUS AS NEEDED
Status: DISCONTINUED | OUTPATIENT
Start: 2020-11-13 | End: 2020-11-13 | Stop reason: SURG

## 2020-11-13 RX ORDER — SODIUM CHLORIDE 0.9 % (FLUSH) 0.9 %
3 SYRINGE (ML) INJECTION EVERY 12 HOURS SCHEDULED
Status: DISCONTINUED | OUTPATIENT
Start: 2020-11-13 | End: 2020-11-17

## 2020-11-13 RX ORDER — FENTANYL CITRATE 50 UG/ML
50 INJECTION, SOLUTION INTRAMUSCULAR; INTRAVENOUS
Status: DISCONTINUED | OUTPATIENT
Start: 2020-11-13 | End: 2020-11-13

## 2020-11-13 RX ORDER — HYDROCODONE BITARTRATE AND ACETAMINOPHEN 7.5; 325 MG/1; MG/1
1 TABLET ORAL ONCE AS NEEDED
Status: DISCONTINUED | OUTPATIENT
Start: 2020-11-13 | End: 2020-11-13

## 2020-11-13 RX ORDER — PROMETHAZINE HYDROCHLORIDE 25 MG/1
25 SUPPOSITORY RECTAL ONCE AS NEEDED
Status: DISCONTINUED | OUTPATIENT
Start: 2020-11-13 | End: 2020-11-13

## 2020-11-13 RX ORDER — SODIUM CHLORIDE, SODIUM LACTATE, POTASSIUM CHLORIDE, CALCIUM CHLORIDE 600; 310; 30; 20 MG/100ML; MG/100ML; MG/100ML; MG/100ML
9 INJECTION, SOLUTION INTRAVENOUS CONTINUOUS
Status: DISCONTINUED | OUTPATIENT
Start: 2020-11-13 | End: 2020-11-13

## 2020-11-13 RX ORDER — HYDROMORPHONE HYDROCHLORIDE 1 MG/ML
0.25 INJECTION, SOLUTION INTRAMUSCULAR; INTRAVENOUS; SUBCUTANEOUS
Status: DISCONTINUED | OUTPATIENT
Start: 2020-11-13 | End: 2020-11-13

## 2020-11-13 RX ORDER — LABETALOL HYDROCHLORIDE 5 MG/ML
5 INJECTION, SOLUTION INTRAVENOUS
Status: DISCONTINUED | OUTPATIENT
Start: 2020-11-13 | End: 2020-11-13

## 2020-11-13 RX ORDER — EPHEDRINE SULFATE 50 MG/ML
INJECTION, SOLUTION INTRAVENOUS AS NEEDED
Status: DISCONTINUED | OUTPATIENT
Start: 2020-11-13 | End: 2020-11-13 | Stop reason: SURG

## 2020-11-13 RX ORDER — ONDANSETRON 2 MG/ML
4 INJECTION INTRAMUSCULAR; INTRAVENOUS EVERY 6 HOURS PRN
Status: DISCONTINUED | OUTPATIENT
Start: 2020-11-13 | End: 2020-11-17

## 2020-11-13 RX ORDER — DIPHENHYDRAMINE HCL 25 MG
25 CAPSULE ORAL
Status: DISCONTINUED | OUTPATIENT
Start: 2020-11-13 | End: 2020-11-13

## 2020-11-13 RX ORDER — ALBUTEROL SULFATE 2.5 MG/3ML
2.5 SOLUTION RESPIRATORY (INHALATION) ONCE AS NEEDED
Status: DISCONTINUED | OUTPATIENT
Start: 2020-11-13 | End: 2020-11-13

## 2020-11-13 RX ORDER — FLUMAZENIL 0.1 MG/ML
0.2 INJECTION INTRAVENOUS AS NEEDED
Status: DISCONTINUED | OUTPATIENT
Start: 2020-11-13 | End: 2020-11-13

## 2020-11-13 RX ORDER — SODIUM CHLORIDE AND POTASSIUM CHLORIDE 150; 900 MG/100ML; MG/100ML
100 INJECTION, SOLUTION INTRAVENOUS CONTINUOUS
Status: DISCONTINUED | OUTPATIENT
Start: 2020-11-13 | End: 2020-11-14

## 2020-11-13 RX ORDER — FENTANYL CITRATE 50 UG/ML
INJECTION, SOLUTION INTRAMUSCULAR; INTRAVENOUS AS NEEDED
Status: DISCONTINUED | OUTPATIENT
Start: 2020-11-13 | End: 2020-11-13 | Stop reason: SURG

## 2020-11-13 RX ORDER — HYDROCODONE BITARTRATE AND ACETAMINOPHEN 5; 325 MG/1; MG/1
1 TABLET ORAL EVERY 4 HOURS PRN
Status: DISCONTINUED | OUTPATIENT
Start: 2020-11-13 | End: 2020-11-17 | Stop reason: HOSPADM

## 2020-11-13 RX ADMIN — FENTANYL CITRATE 50 MCG: 50 INJECTION INTRAMUSCULAR; INTRAVENOUS at 09:27

## 2020-11-13 RX ADMIN — CEFAZOLIN SODIUM 2 G: 2 INJECTION, SOLUTION INTRAVENOUS at 09:26

## 2020-11-13 RX ADMIN — PHENYLEPHRINE HYDROCHLORIDE 100 MCG: 10 INJECTION INTRAVENOUS at 10:08

## 2020-11-13 RX ADMIN — PHENYLEPHRINE HYDROCHLORIDE 100 MCG: 10 INJECTION INTRAVENOUS at 10:18

## 2020-11-13 RX ADMIN — GLYCOPYRROLATE 0.2 MG: 0.2 INJECTION INTRAMUSCULAR; INTRAVENOUS at 09:27

## 2020-11-13 RX ADMIN — HYDROCODONE BITARTRATE AND ACETAMINOPHEN 1 TABLET: 5; 325 TABLET ORAL at 21:53

## 2020-11-13 RX ADMIN — PHENYLEPHRINE HYDROCHLORIDE 100 MCG: 10 INJECTION INTRAVENOUS at 10:20

## 2020-11-13 RX ADMIN — LIDOCAINE HYDROCHLORIDE 80 MG: 20 INJECTION, SOLUTION INFILTRATION; PERINEURAL at 09:29

## 2020-11-13 RX ADMIN — TAMSULOSIN HYDROCHLORIDE 0.4 MG: 0.4 CAPSULE ORAL at 20:10

## 2020-11-13 RX ADMIN — HYDROMORPHONE HYDROCHLORIDE 0.25 MG: 1 INJECTION, SOLUTION INTRAMUSCULAR; INTRAVENOUS; SUBCUTANEOUS at 15:36

## 2020-11-13 RX ADMIN — FENTANYL CITRATE 50 MCG: 50 INJECTION, SOLUTION INTRAMUSCULAR; INTRAVENOUS at 15:05

## 2020-11-13 RX ADMIN — EPHEDRINE SULFATE 10 MG: 50 INJECTION INTRAVENOUS at 12:19

## 2020-11-13 RX ADMIN — HYDROMORPHONE HYDROCHLORIDE 0.25 MG: 1 INJECTION, SOLUTION INTRAMUSCULAR; INTRAVENOUS; SUBCUTANEOUS at 16:30

## 2020-11-13 RX ADMIN — EPHEDRINE SULFATE 10 MG: 50 INJECTION INTRAVENOUS at 10:43

## 2020-11-13 RX ADMIN — ROCURONIUM BROMIDE 15 MG: 10 INJECTION INTRAVENOUS at 12:23

## 2020-11-13 RX ADMIN — FENTANYL CITRATE 50 MCG: 50 INJECTION INTRAMUSCULAR; INTRAVENOUS at 09:55

## 2020-11-13 RX ADMIN — ROCURONIUM BROMIDE 20 MG: 10 INJECTION INTRAVENOUS at 13:24

## 2020-11-13 RX ADMIN — CEFAZOLIN SODIUM 2 G: 2 INJECTION, SOLUTION INTRAVENOUS at 13:29

## 2020-11-13 RX ADMIN — POTASSIUM CHLORIDE AND SODIUM CHLORIDE 100 ML/HR: 900; 150 INJECTION, SOLUTION INTRAVENOUS at 18:21

## 2020-11-13 RX ADMIN — EPHEDRINE SULFATE 10 MG: 50 INJECTION INTRAVENOUS at 11:21

## 2020-11-13 RX ADMIN — HYDROMORPHONE HYDROCHLORIDE 0.25 MG: 1 INJECTION, SOLUTION INTRAMUSCULAR; INTRAVENOUS; SUBCUTANEOUS at 16:25

## 2020-11-13 RX ADMIN — ROCURONIUM BROMIDE 15 MG: 10 INJECTION INTRAVENOUS at 11:04

## 2020-11-13 RX ADMIN — SODIUM CHLORIDE, POTASSIUM CHLORIDE, SODIUM LACTATE AND CALCIUM CHLORIDE: 600; 310; 30; 20 INJECTION, SOLUTION INTRAVENOUS at 11:02

## 2020-11-13 RX ADMIN — SODIUM CHLORIDE, POTASSIUM CHLORIDE, SODIUM LACTATE AND CALCIUM CHLORIDE 9 ML/HR: 600; 310; 30; 20 INJECTION, SOLUTION INTRAVENOUS at 08:39

## 2020-11-13 RX ADMIN — MORPHINE SULFATE 2 MG: 2 INJECTION, SOLUTION INTRAMUSCULAR; INTRAVENOUS at 17:42

## 2020-11-13 RX ADMIN — ONDANSETRON HYDROCHLORIDE 4 MG: 2 SOLUTION INTRAMUSCULAR; INTRAVENOUS at 14:00

## 2020-11-13 RX ADMIN — FENTANYL CITRATE 50 MCG: 50 INJECTION, SOLUTION INTRAMUSCULAR; INTRAVENOUS at 16:06

## 2020-11-13 RX ADMIN — PROPOFOL 160 MG: 10 INJECTION, EMULSION INTRAVENOUS at 09:29

## 2020-11-13 RX ADMIN — EPHEDRINE SULFATE 10 MG: 50 INJECTION INTRAVENOUS at 11:09

## 2020-11-13 RX ADMIN — FUROSEMIDE 40 MG: 40 TABLET ORAL at 20:08

## 2020-11-13 RX ADMIN — FENTANYL CITRATE 50 MCG: 50 INJECTION, SOLUTION INTRAMUSCULAR; INTRAVENOUS at 15:40

## 2020-11-13 RX ADMIN — FENTANYL CITRATE 50 MCG: 50 INJECTION, SOLUTION INTRAMUSCULAR; INTRAVENOUS at 08:39

## 2020-11-13 RX ADMIN — ALLOPURINOL 100 MG: 100 TABLET ORAL at 20:08

## 2020-11-13 RX ADMIN — ONDANSETRON 4 MG: 2 INJECTION INTRAMUSCULAR; INTRAVENOUS at 18:21

## 2020-11-13 RX ADMIN — PHENYLEPHRINE HYDROCHLORIDE 100 MCG: 10 INJECTION INTRAVENOUS at 10:14

## 2020-11-13 RX ADMIN — EPHEDRINE SULFATE 10 MG: 50 INJECTION INTRAVENOUS at 10:23

## 2020-11-13 RX ADMIN — ROCURONIUM BROMIDE 45 MG: 10 INJECTION INTRAVENOUS at 09:45

## 2020-11-13 RX ADMIN — HYDROMORPHONE HYDROCHLORIDE 0.25 MG: 1 INJECTION, SOLUTION INTRAMUSCULAR; INTRAVENOUS; SUBCUTANEOUS at 15:26

## 2020-11-13 RX ADMIN — FENTANYL CITRATE 50 MCG: 50 INJECTION, SOLUTION INTRAMUSCULAR; INTRAVENOUS at 14:55

## 2020-11-13 RX ADMIN — PHENYLEPHRINE HYDROCHLORIDE 100 MCG: 10 INJECTION INTRAVENOUS at 10:10

## 2020-11-13 RX ADMIN — HYDROMORPHONE HYDROCHLORIDE 0.5 MG: 2 INJECTION, SOLUTION INTRAMUSCULAR; INTRAVENOUS; SUBCUTANEOUS at 12:10

## 2020-11-13 RX ADMIN — SODIUM CHLORIDE, POTASSIUM CHLORIDE, SODIUM LACTATE AND CALCIUM CHLORIDE: 600; 310; 30; 20 INJECTION, SOLUTION INTRAVENOUS at 14:20

## 2020-11-13 RX ADMIN — METOPROLOL SUCCINATE 25 MG: 25 TABLET, EXTENDED RELEASE ORAL at 22:05

## 2020-11-13 RX ADMIN — SUCCINYLCHOLINE CHLORIDE 140 MG: 20 INJECTION, SOLUTION INTRAMUSCULAR; INTRAVENOUS; PARENTERAL at 09:29

## 2020-11-13 RX ADMIN — DEXAMETHASONE SODIUM PHOSPHATE 8 MG: 10 INJECTION INTRAMUSCULAR; INTRAVENOUS at 09:58

## 2020-11-13 RX ADMIN — HYDROCODONE BITARTRATE AND ACETAMINOPHEN 1 TABLET: 5; 325 TABLET ORAL at 18:21

## 2020-11-13 RX ADMIN — SODIUM CHLORIDE, PRESERVATIVE FREE 3 ML: 5 INJECTION INTRAVENOUS at 20:11

## 2020-11-13 RX ADMIN — ROCURONIUM BROMIDE 5 MG: 10 INJECTION INTRAVENOUS at 09:29

## 2020-11-13 NOTE — PLAN OF CARE
Goal Outcome Evaluation:  Plan of Care Reviewed With: patient  Progress: no change  Outcome Summary: Pt admitted to [facility] from PACU following a L3-5 Lami and fusion with instrumentation. Pt is A&Ox4, VSS. Pain was a 9/10 upon arrival - Morphine given. Gave the pt some ice cream and jello and if tolerated will give him PO Lortab. F/C in place - will D/C when pt more mobile. Will CTM.

## 2020-11-13 NOTE — ANESTHESIA PREPROCEDURE EVALUATION
Anesthesia Evaluation     Patient summary reviewed and Nursing notes reviewed   no history of anesthetic complications:  NPO Solid Status: > 8 hours  NPO Liquid Status: > 2 hours           Airway   Mallampati: II  TM distance: >3 FB  Neck ROM: full  no difficulty expected  Dental - normal exam   (+) poor dentition    Pulmonary - normal exam   (+) pulmonary embolism, a smoker Former, COPD mild, shortness of breath, sleep apnea on CPAP,   (-) lung cancer  Cardiovascular - normal exam  Exercise tolerance: good (4-7 METS)    ECG reviewed  PT is on anticoagulation therapy  Patient on routine beta blocker and Beta blocker given within 24 hours of surgery  Rhythm: regular  Rate: normal    (+) hypertension well controlled 2 medications or greater, CAD, dysrhythmias Atrial Fib, CHF Diastolic >=55%, PVD, hyperlipidemia,   (-) valvular problems/murmurs, past MI, angina, cardiac stents, CABG    ROS comment: Known hx/o mild medically treated CAD and chronic Afib.  Currently in Afib, off Elequis for 7d.    TTE 04/2020:  ·Calculated right ventricular systolic pressure from tricuspid regurgitation is 93.1 mmHg.  ·Left ventricular wall thickness is consistent with mild-to-moderate concentric hypertrophy.  ·Left ventricular diastolic dysfunction (grade II) consistent with pseudonormalization.  ·Right ventricular cavity is mildly dilated.  ·Left atrial cavity size is moderately dilated.  ·Mild to moderate tricuspid valve regurgitation is present.     RV is enlarged, RA enlarged, IVC enlarged, but function of the RV is good.  The degree of TR is not as significant as typically seen in this situation, with mild to moderate TR present.  Incomplete envelope is present.    Neuro/Psych  (+) numbness,     (-) seizures, TIA, CVA  GI/Hepatic/Renal/Endo    (+)  GERD well controlled, PUD,  renal disease,   (-) hiatal hernia, hepatitis, liver disease, diabetes, GI bleed, no thyroid disorder    Musculoskeletal     (+) neck pain,   Abdominal  -  normal exam   Substance History - negative use     OB/GYN          Other   arthritis,                      Anesthesia Plan    ASA 3     general     intravenous induction     Anesthetic plan, all risks, benefits, and alternatives have been provided, discussed and informed consent has been obtained with: patient.    Plan discussed with CRNA and attending.

## 2020-11-13 NOTE — OP NOTE
Preoperative diagnosis: Lumbar stenosis with lumbar instability L3-4 and L4-5    Postoperative diagnosis: Same    Procedure performed: Bilateral L3, L4 and L5 laminectomy with a facetectomy at L4-5 and a medial facetectomy at L3-4 with a transforaminal interbody fusion and instrumentation    Surgeon: Bill Washington M.D.    Asst.:  Abby Cedillo CFA who was instrumental in helping with hemostasis, visualization of neural structures and retraction of neural structures.  Her skilled assistance was necessary for the success of this case    Estimated blood loss, crystalloid, colloid, blood: Please see anesthesia record    Material to lab:   None    Drains: 1 10 Romanian drain in the epidural space    Complications: None    Indications for the procedure: This is a patient with severe pain in his back.  Radiation into his legs.  Previous imaging had shown a spondylolisthesis at L4-5 and severe stenosis at that level with fairly severe stenosis at L3-4 as well.    Operative summary:  The patient was brought into the operating room and placed under general endotracheal anesthesia using intravenous and inhalational agents.  The patient was then positioned on the operating table in the prone position.  All pressure points were padded including peripheral points of entrapment.  The back was prepped with ChloraPrep and then draped with Ioban, towels, half sheets and a split sheet.    An incision was then made excising the previous scar.  Dissection was carried down to the thoracolumbar fascia which was opened in the midline.  Muscles were stripped off the spinous processes and lamina arches at L3, L4, L5 and some at S1 as well.  Intraoperative x-ray confirmed placement of the marker at the L5 pedicle.  Following this the superior spinous process of L5 as well as the entire spinous process of L3 and L4 were removed with the Leksell rongeurs.  The facets were exposed on both sides and were quite hypertrophic and had to be  drilled down some.  Following this the respective laminar arches were then drilled down until they were paperthin and then cracked directly back off the dura.  The remainder of the operation was done under the high-power magnification of the operating microscope using microsurgical technique microsurgical instrumentation.  The nerve was mobilized off the L3, L4 and S1 pedicles on the right side.  Once this was done the laminar arch was removed out to the level of the medial pedicle using the Kerrison rongeurs as well as the [place] Pedro drill.  The lateral recess in between each of the pedicles was removed as well.  On the right side there was still some compression of the nerve due to the spondylolisthesis and I ended up removing the entire facet.  Attention was then turned to the left side where the same thing was done.  Ended up removing the entire facet at L4-5 on that side also.  Once all VI nerve roots were thoroughly decompressed 6.5 x 55 mm screws were placed into each of the pedicles at each level on each side.  Intraoperative x-ray confirmed good placement of the screws.  Following this the disc base at each level was incised and endplate scrapers were used to remove the cartilaginous endplates as well as down-biting cervical curettes at each of those levels.  Once this was done 9 mm Titan cages were placed into the disc spaces from posteriorly by mobilizing the nerve root medially and protecting it with a nerve root retractor.  This was done at both levels.  They were first filled with DBM alexander putty.  This also looked good on the intraoperative x-ray. following this the area was copiously irrigated with vancomycin solution and then the screws were connected using rods and compressed using normal torque specifications.  A drain was then placed into the posterior epidural space and the incision was closed in layers dressed and the patient was taken to the recovery room in stable condition.  There were no  apparent complications and the sponge, instrument and needle counts were correct at the end of the procedure.

## 2020-11-13 NOTE — BRIEF OP NOTE
LUMBAR DISCECTOMY POSTERIOR WITH FUSION INSTRUMENTATION  Progress Note    Juan James  11/13/2020    Pre-op Diagnosis:   Spondylolisthesis of lumbar region [M43.16]       Post-Op Diagnosis Codes:     * Spondylolisthesis of lumbar region [M43.16]    Procedure/CPT® Codes:        Procedure(s):  Lumbar 3 4 and lumbar 4 5 laminectomy and fusion with instrumentation    Surgeon(s):  Bill Washington MD    Anesthesia: General    Staff:   Cell Saver : Alexia Pantoja  Circulator: Tracie Patel RN; Jorge Rucker RN  Scrub Person: Mana Martinez; Mark Bee  Vendor Representative: Josh Florian  Assistant: Abby Cedillo CSA  Other: Ana Cox  Assistant: Abby Cedillo CSA      Estimated Blood Loss: 850 mL    Urine Voided: 600 mL    Specimens:                None          Drains:   Closed/Suction Drain Inferior;Midline Back (Active)   Site Description Unable to view 11/13/20 1442   Dressing Status Clean;Dry;Intact 11/13/20 1442   Drainage Appearance Serosanguineous 11/13/20 1442   Status To bulb suction 11/13/20 1442       Findings: Severe stenosis    Complications: None      Bill Washington MD     Date: 11/13/2020  Time: 15:37 EST

## 2020-11-13 NOTE — ANESTHESIA PROCEDURE NOTES
Airway  Urgency: elective    Date/Time: 11/13/2020 9:33 AM  Airway not difficult    General Information and Staff    Patient location during procedure: OR  Anesthesiologist: Juanito Hopkins MD  CRNA: Ganga Beltran CRNA    Indications and Patient Condition  Indications for airway management: airway protection    Preoxygenated: yes  MILS maintained throughout  Mask difficulty assessment: 1 - vent by mask    Final Airway Details  Final airway type: endotracheal airway      Successful airway: ETT  Cuffed: yes   Successful intubation technique: direct laryngoscopy  Endotracheal tube insertion site: oral  Blade: Nando  Blade size: 3  ETT size (mm): 8.0  Cormack-Lehane Classification: grade I - full view of glottis  Placement verified by: chest auscultation and capnometry   Inital cuff pressure (cm H2O): 22  Cuff volume (mL): 9  Measured from: lips  ETT/EBT  to lips (cm): 24  Number of attempts at approach: 1

## 2020-11-13 NOTE — ANESTHESIA POSTPROCEDURE EVALUATION
Patient: Juan James    Procedure Summary     Date: 11/13/20 Room / Location: [facility] OR [address] [place] [address] MAIN OR    Anesthesia Start: 0925 Anesthesia Stop: 1444    Procedure: Lumbar 3 4 and lumbar 4 5 laminectomy and fusion with instrumentation (N/A Spine Lumbar) Diagnosis:       Spondylolisthesis of lumbar region      (Spondylolisthesis of lumbar region [M43.16])    Surgeon: Bill Washington MD Provider: Juanito Hopkins MD    Anesthesia Type: general ASA Status: 3          Anesthesia Type: general    Vitals  Vitals Value Taken Time   /91 11/13/20 1710   Temp 36.5 °C (97.7 °F) 11/13/20 1710   Pulse 85 11/13/20 1717   Resp 16 11/13/20 1710   SpO2 98 % 11/13/20 1719   Vitals shown include unvalidated device data.        Post Anesthesia Care and Evaluation    Patient location during evaluation: floor  Level of consciousness: awake  Pain score: 1  Pain management: adequate  Airway patency: patent  Anesthetic complications: No anesthetic complications  PONV Status: none  Cardiovascular status: acceptable  Respiratory status: acceptable  Hydration status: acceptable

## 2020-11-14 ENCOUNTER — APPOINTMENT (OUTPATIENT)
Dept: GENERAL RADIOLOGY | Facility: HOSPITAL | Age: 75
End: 2020-11-14

## 2020-11-14 LAB
BASOPHILS # BLD AUTO: 0.04 10*3/MM3 (ref 0–0.2)
BASOPHILS NFR BLD AUTO: 0.4 % (ref 0–1.5)
DEPRECATED RDW RBC AUTO: 43.5 FL (ref 37–54)
EOSINOPHIL # BLD AUTO: 0.01 10*3/MM3 (ref 0–0.4)
EOSINOPHIL NFR BLD AUTO: 0.1 % (ref 0.3–6.2)
ERYTHROCYTE [DISTWIDTH] IN BLOOD BY AUTOMATED COUNT: 12.7 % (ref 12.3–15.4)
HCT VFR BLD AUTO: 30.5 % (ref 37.5–51)
HGB BLD-MCNC: 10.6 G/DL (ref 13–17.7)
IMM GRANULOCYTES # BLD AUTO: 0.04 10*3/MM3 (ref 0–0.05)
IMM GRANULOCYTES NFR BLD AUTO: 0.4 % (ref 0–0.5)
LYMPHOCYTES # BLD AUTO: 1.14 10*3/MM3 (ref 0.7–3.1)
LYMPHOCYTES NFR BLD AUTO: 11.4 % (ref 19.6–45.3)
MCH RBC QN AUTO: 33.1 PG (ref 26.6–33)
MCHC RBC AUTO-ENTMCNC: 34.8 G/DL (ref 31.5–35.7)
MCV RBC AUTO: 95.3 FL (ref 79–97)
MONOCYTES # BLD AUTO: 1.36 10*3/MM3 (ref 0.1–0.9)
MONOCYTES NFR BLD AUTO: 13.6 % (ref 5–12)
NEUTROPHILS NFR BLD AUTO: 7.38 10*3/MM3 (ref 1.7–7)
NEUTROPHILS NFR BLD AUTO: 74.1 % (ref 42.7–76)
NRBC BLD AUTO-RTO: 0 /100 WBC (ref 0–0.2)
PLATELET # BLD AUTO: 165 10*3/MM3 (ref 140–450)
PMV BLD AUTO: 9.4 FL (ref 6–12)
QT INTERVAL: 377 MS
RBC # BLD AUTO: 3.2 10*6/MM3 (ref 4.14–5.8)
TROPONIN T SERPL-MCNC: <0.01 NG/ML (ref 0–0.03)
WBC # BLD AUTO: 9.97 10*3/MM3 (ref 3.4–10.8)

## 2020-11-14 PROCEDURE — 72100 X-RAY EXAM L-S SPINE 2/3 VWS: CPT

## 2020-11-14 PROCEDURE — 93005 ELECTROCARDIOGRAM TRACING: CPT | Performed by: INTERNAL MEDICINE

## 2020-11-14 PROCEDURE — 97116 GAIT TRAINING THERAPY: CPT | Performed by: PHYSICAL THERAPIST

## 2020-11-14 PROCEDURE — 97162 PT EVAL MOD COMPLEX 30 MIN: CPT | Performed by: PHYSICAL THERAPIST

## 2020-11-14 PROCEDURE — 25810000003 SODIUM CHLORIDE 0.9 % WITH KCL 20 MEQ 20-0.9 MEQ/L-% SOLUTION: Performed by: NEUROLOGICAL SURGERY

## 2020-11-14 PROCEDURE — 97530 THERAPEUTIC ACTIVITIES: CPT | Performed by: PHYSICAL THERAPIST

## 2020-11-14 PROCEDURE — 99024 POSTOP FOLLOW-UP VISIT: CPT | Performed by: NURSE PRACTITIONER

## 2020-11-14 PROCEDURE — 25010000002 MORPHINE PER 10 MG: Performed by: NEUROLOGICAL SURGERY

## 2020-11-14 PROCEDURE — 85025 COMPLETE CBC W/AUTO DIFF WBC: CPT | Performed by: NEUROLOGICAL SURGERY

## 2020-11-14 PROCEDURE — 84484 ASSAY OF TROPONIN QUANT: CPT | Performed by: INTERNAL MEDICINE

## 2020-11-14 PROCEDURE — 93010 ELECTROCARDIOGRAM REPORT: CPT | Performed by: INTERNAL MEDICINE

## 2020-11-14 RX ORDER — CYCLOBENZAPRINE HCL 10 MG
10 TABLET ORAL EVERY 8 HOURS PRN
Status: DISCONTINUED | OUTPATIENT
Start: 2020-11-14 | End: 2020-11-17 | Stop reason: HOSPADM

## 2020-11-14 RX ORDER — SENNA PLUS 8.6 MG/1
2 TABLET ORAL NIGHTLY
Status: DISCONTINUED | OUTPATIENT
Start: 2020-11-14 | End: 2020-11-17 | Stop reason: HOSPADM

## 2020-11-14 RX ORDER — CYCLOBENZAPRINE HCL 10 MG
10 TABLET ORAL 3 TIMES DAILY PRN
Status: DISCONTINUED | OUTPATIENT
Start: 2020-11-14 | End: 2020-11-14

## 2020-11-14 RX ORDER — DOCUSATE SODIUM 100 MG/1
100 CAPSULE, LIQUID FILLED ORAL DAILY
Status: DISCONTINUED | OUTPATIENT
Start: 2020-11-14 | End: 2020-11-16

## 2020-11-14 RX ORDER — CYCLOBENZAPRINE HCL 10 MG
10 TABLET ORAL 3 TIMES DAILY
Status: DISCONTINUED | OUTPATIENT
Start: 2020-11-14 | End: 2020-11-14

## 2020-11-14 RX ADMIN — MORPHINE SULFATE 2 MG: 2 INJECTION, SOLUTION INTRAMUSCULAR; INTRAVENOUS at 10:32

## 2020-11-14 RX ADMIN — DOCUSATE SODIUM 100 MG: 100 CAPSULE, LIQUID FILLED ORAL at 11:25

## 2020-11-14 RX ADMIN — METOPROLOL SUCCINATE 25 MG: 25 TABLET, EXTENDED RELEASE ORAL at 08:17

## 2020-11-14 RX ADMIN — TAMSULOSIN HYDROCHLORIDE 0.4 MG: 0.4 CAPSULE ORAL at 16:21

## 2020-11-14 RX ADMIN — HYDROCODONE BITARTRATE AND ACETAMINOPHEN 1 TABLET: 5; 325 TABLET ORAL at 08:17

## 2020-11-14 RX ADMIN — ALLOPURINOL 100 MG: 100 TABLET ORAL at 20:36

## 2020-11-14 RX ADMIN — CYCLOBENZAPRINE 10 MG: 10 TABLET, FILM COATED ORAL at 11:25

## 2020-11-14 RX ADMIN — MORPHINE SULFATE 2 MG: 2 INJECTION, SOLUTION INTRAMUSCULAR; INTRAVENOUS at 00:27

## 2020-11-14 RX ADMIN — FUROSEMIDE 40 MG: 40 TABLET ORAL at 20:36

## 2020-11-14 RX ADMIN — MORPHINE SULFATE 2 MG: 2 INJECTION, SOLUTION INTRAMUSCULAR; INTRAVENOUS at 05:16

## 2020-11-14 RX ADMIN — FUROSEMIDE 40 MG: 40 TABLET ORAL at 08:17

## 2020-11-14 RX ADMIN — HYDROCODONE BITARTRATE AND ACETAMINOPHEN 1 TABLET: 5; 325 TABLET ORAL at 15:24

## 2020-11-14 RX ADMIN — POTASSIUM CHLORIDE AND SODIUM CHLORIDE 100 ML/HR: 900; 150 INJECTION, SOLUTION INTRAVENOUS at 06:12

## 2020-11-14 RX ADMIN — HYDROCODONE BITARTRATE AND ACETAMINOPHEN 1 TABLET: 5; 325 TABLET ORAL at 20:36

## 2020-11-14 RX ADMIN — ALLOPURINOL 300 MG: 300 TABLET ORAL at 08:17

## 2020-11-14 RX ADMIN — CYCLOBENZAPRINE 10 MG: 10 TABLET, FILM COATED ORAL at 20:35

## 2020-11-14 RX ADMIN — SODIUM CHLORIDE, PRESERVATIVE FREE 3 ML: 5 INJECTION INTRAVENOUS at 08:18

## 2020-11-14 RX ADMIN — SODIUM CHLORIDE, PRESERVATIVE FREE 3 ML: 5 INJECTION INTRAVENOUS at 20:36

## 2020-11-14 NOTE — NURSING NOTE
Called to pt room by PT. Dressing saturated and drainage on sheet. Dressing removed by RN and YULY drain has pulled out. Suture removed. 60ml left in drain, emptied. No drainage noted coming from the site at this time. Incision intact. Border dressing replaced and extra guaze for pressure over drain site. Neurosurgery call placed. Awaiting call back.

## 2020-11-14 NOTE — PLAN OF CARE
Patient A&O x4, VSS, denies chest pain or SOB. Pain management with PRN medication and ice pack. YULY in place and intact. F/C in place. Order for pt ambulation since 1900, up to bedside x 1, ambulation refused by pt. Cardiology consult request this morning for  Oli Landa. SCD on. Safety measures in place. Will continue to monitor    Problem: Adult Inpatient Plan of Care  Goal: Plan of Care Review  Outcome: Ongoing, Progressing  Flowsheets (Taken 11/14/2020 0527)  Plan of Care Reviewed With: patient  Goal: Patient-Specific Goal (Individualized)  Outcome: Ongoing, Progressing  Goal: Absence of Hospital-Acquired Illness or Injury  Outcome: Ongoing, Progressing  Intervention: Identify and Manage Fall Risk  Flowsheets  Taken 11/14/2020 0527  Safety Promotion/Fall Prevention:  • assistive device/personal items within reach  • clutter free environment maintained  Taken 11/14/2020 0455  Safety Promotion/Fall Prevention:  • assistive device/personal items within reach  • clutter free environment maintained  Taken 11/14/2020 0205  Safety Promotion/Fall Prevention:  • assistive device/personal items within reach  • clutter free environment maintained  Taken 11/14/2020 0027  Safety Promotion/Fall Prevention:  • assistive device/personal items within reach  • clutter free environment maintained  Taken 11/13/2020 2258  Safety Promotion/Fall Prevention:  • assistive device/personal items within reach  • clutter free environment maintained  Taken 11/13/2020 2008  Safety Promotion/Fall Prevention:  • assistive device/personal items within reach  • clutter free environment maintained  Intervention: Prevent Skin Injury  Flowsheets  Taken 11/14/2020 0527  Body Position: side-lying, right  Taken 11/14/2020 0455  Body Position: supine  Taken 11/14/2020 0205  Body Position: side-lying, right  Taken 11/14/2020 0027  Body Position: side-lying, right  Taken 11/13/2020 2258  Body Position: side-lying, left  Taken 11/13/2020  2008  Body Position: side-lying, right  Intervention: Prevent and Manage VTE (venous thromboembolism) Risk  Flowsheets  Taken 11/14/2020 0455  VTE Prevention/Management:  • bilateral  • sequential compression devices on  Taken 11/14/2020 0205  VTE Prevention/Management:  • bilateral  • sequential compression devices on  Taken 11/14/2020 0027  VTE Prevention/Management:  • bilateral  • sequential compression devices on  Taken 11/13/2020 2258  VTE Prevention/Management:  • bilateral  • sequential compression devices on  Taken 11/13/2020 2008  VTE Prevention/Management:  • bilateral  • sequential compression devices on  Intervention: Prevent Infection  Flowsheets  Taken 11/14/2020 0455  Infection Prevention: rest/sleep promoted  Taken 11/14/2020 0205  Infection Prevention: rest/sleep promoted  Taken 11/14/2020 0027  Infection Prevention: rest/sleep promoted  Taken 11/13/2020 2258  Infection Prevention: rest/sleep promoted  Taken 11/13/2020 2008  Infection Prevention: single patient room provided  Goal: Optimal Comfort and Wellbeing  Outcome: Ongoing, Progressing  Intervention: Provide Person-Centered Care  Flowsheets (Taken 11/13/2020 2008)  Trust Relationship/Rapport: care explained  Goal: Readiness for Transition of Care  Outcome: Ongoing, Progressing     Problem: Fall Injury Risk  Goal: Absence of Fall and Fall-Related Injury  Outcome: Ongoing, Progressing  Intervention: Identify and Manage Contributors to Fall Injury Risk  Flowsheets  Taken 11/14/2020 0455  Medication Review/Management: medications reviewed  Taken 11/14/2020 0205  Medication Review/Management: medications reviewed  Taken 11/14/2020 0027  Medication Review/Management: medications reviewed  Taken 11/13/2020 2258  Medication Review/Management: medications reviewed  Taken 11/13/2020 2008  Medication Review/Management: medications reviewed  Intervention: Promote Injury-Free Environment  Flowsheets  Taken 11/14/2020 0527  Safety Promotion/Fall  Prevention:  • assistive device/personal items within reach  • clutter free environment maintained  Taken 11/14/2020 0455  Safety Promotion/Fall Prevention:  • assistive device/personal items within reach  • clutter free environment maintained  Taken 11/14/2020 0205  Safety Promotion/Fall Prevention:  • assistive device/personal items within reach  • clutter free environment maintained  Taken 11/14/2020 0027  Safety Promotion/Fall Prevention:  • assistive device/personal items within reach  • clutter free environment maintained  Taken 11/13/2020 2258  Safety Promotion/Fall Prevention:  • assistive device/personal items within reach  • clutter free environment maintained  Taken 11/13/2020 2008  Safety Promotion/Fall Prevention:  • assistive device/personal items within reach  • clutter free environment maintained     Problem: Skin Injury Risk Increased  Goal: Skin Health and Integrity  Outcome: Ongoing, Progressing  Intervention: Optimize Skin Protection  Flowsheets  Taken 11/14/2020 0455  Head of Bed (HOB): HOB flat  Taken 11/14/2020 0205  Head of Bed (HOB): HOB flat  Taken 11/14/2020 0027  Head of Bed (HOB): HOB flat  Taken 11/13/2020 2258  Head of Bed (HOB): HOB flat  Taken 11/13/2020 2008  Pressure Reduction Techniques: frequent weight shift encouraged  Head of Bed (HOB): HOB flat  Pressure Reduction Devices: pressure-redistributing mattress utilized  Skin Protection: adhesive use limited

## 2020-11-14 NOTE — THERAPY EVALUATION
Patient Name: Juan James  : 1945    MRN: [id number]                              Today's Date: 2020       Admit Date: 2020    Visit Dx:     ICD-10-CM ICD-9-CM   1. 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[facility])   • Cervical spondylosis with radiculopathy   • History of colon polyps   • Tinnitus   • Shortness of breath   • Pulmonary embolism (CMS/[facility])   • Peptic ulceration   • OA (osteoarthritis)   • Neck pain   • Lower back pain   • Hypertension   • History of blood transfusion   • Hearing loss   • GERD (gastroesophageal reflux disease)   • DDD (degenerative disc disease), lumbar   • DDD (degenerative disc disease), cervical   • Coronary artery disease   • COPD (chronic obstructive pulmonary disease) (CMS/[facility])   • Colon polyps   • CHF (congestive heart failure) (CMS/[facility])   • Cervical spondylosis   • Cervical disc disorder   • Cataract   • BPH (benign prostatic hyperplasia)   • Atrial fibrillation (CMS/[facility])   • Arthritis   • AAA (abdominal aortic aneurysm) (CMS/[facility])   • Medicare annual wellness visit, subsequent   • Heart block AV complete (CMS/[facility])   • PVD (peripheral vascular disease) with claudication (CMS/[facility])   • Acute renal failure (ARF) (CMS/[facility])   • Hospital discharge follow-up   • Idiopathic chronic gout of multiple sites without tophus   • Spondylolisthesis of lumbar region     Past Medical History:   Diagnosis Date   • AAA (abdominal aortic aneurysm) (CMS/[facility])    • Arthritis    • Atherosclerotic heart disease of native coronary artery without angina pectoris    • Atrial fibrillation (CMS/[facility])    • Benign essential hypertension    • BPH (benign  prostatic hyperplasia)    • Cervical spondylosis 11/2012   • CHF (congestive heart failure) (CMS/[facility])    • COPD (chronic obstructive pulmonary disease) (CMS/[facility])    • Coronary artery disease     STENT X 3   • DDD (degenerative disc disease), cervical    • DDD (degenerative disc disease), lumbar    • Dry skin     LOWER LEGS BILAT   • Edema     LOWER LEGS BILAT   • Elevated cholesterol    • Emphysema/COPD (CMS/H[facility])    • Essential hypertriglyceridemia    • Hearing loss    • High blood pressure    • History of blood transfusion    • History of gastrointestinal hemorrhage    • History of gout    • History of myasthenia gravis     LAST EPISODE 5-6 YEARS AGO   • Hoarseness, persistent    • Hyperlipidemia    • Hypertension    • Lower back pain    • Lumbar radiculopathy 05/2016   • OA (osteoarthritis)    • Peptic ulceration     PUD   • Skin abnormalities     FLAKEY SKIN LOWER LEGS BILAT   • Sleep apnea     DOES NOT WEAR CPAP MACHINE    • Status post angioplasty with stent     STENT X3   • Tinnitus     BILAT   • Unsteady gait when walking     USES 3 PRONG CANE     Past Surgical History:   Procedure Laterality Date   • ANTERIOR CERVICAL DISCECTOMY W/ FUSION N/A 3/25/2016    Procedure: C3- C5 CERVICAL DISCECTOMY ANTERIOR FUSION WITH INSTRUMENTATION;  Surgeon: Bill Washington MD;  Location: [facility];  Service:    • ARTHRODESIS N/A 07/1983    LUMBAR   • ARTHRODESIS N/A 1987    LUMBAR   • ARTHRODESIS      Spinal Arthrodesis-lower spine-7/1983, 1987--upper spine-1988, 7/1990-Abstracted from [employer]   • BACK SURGERY      Lower Back Surgery   • CARDIAC CATHETERIZATION  07/2009   • CARDIAC CATHETERIZATION  03/18/2003    STENT TO RCA AND PCA, DR. PRIYA LUGO   • CATARACT EXTRACTION Bilateral     LENS IMPLANT   • CERVICAL ARTHRODESIS N/A 07/1990   • CERVICAL ARTHRODESIS N/A 1988   • COLONOSCOPY N/A 10/3/2019    4 MM HYPERPLASTIC POLYP IN ILEOCECAL VALVE, 4 MM SESSILE SERRATED ADENOMA POLYP IN DESCENDING, 5 TUBULOVILLOUS  ADENOMA POLYPS IN RECTUM, 3 TUBULAR ADENOMA POLYPS IN DISTAL RECTUM, 26 MM TUBULAR ADENOMA POLYP IN RECTUM, PIECEMEAL POLYPECTOMY, AREA TATTOOED, RESCOPE IN 6 MONTHS, DR. TAYA CRUZ AT [facility]   • CORONARY ANGIOPLASTY WITH STENT PLACEMENT  02/2009    and 7/2009-PT STATES HAS 3 STENTS   • ENDOSCOPY N/A 06/24/2012    NON BLEEDING EROSIVE GASTROPATHY, 1 DUODENAL ULCER WITH CLEAN BASE, DR. BRANDY WREN AT [facility]   • ENDOSCOPY AND COLONOSCOPY N/A 11/20/2007    EGD WNL, 8 ADENOMATOUS POLYPS IN RECTO-SIGMOID, RESCOPE IN 3 YRS, DR. CRISTINA RODRIGUEZ AT [facility]   • FOOT SURGERY Left     LEFT BIG TOE-JOINT REPLACED   • HAND SURGERY Left     THUMB-JOINT REPLACEMENT   • HAND SURGERY Right     JOINT REPLACEMENT RIGHT INDEX FINGER   • KNEE ARTHROSCOPY Left    • LAPAROSCOPIC CHOLECYSTECTOMY     • NECK SURGERY     • VASECTOMY Bilateral      General Information     Row Name 11/14/20 1514          Physical Therapy Time and Intention    Document Type  evaluation  -     Mode of Treatment  physical therapy;individual therapy  -     Row Name 11/14/20 1514          General Information    Patient Profile Reviewed  yes  -     Prior Level of Function  independent:;all household mobility;community mobility;gait;transfer;bed mobility  -     Existing Precautions/Restrictions  (S) brace worn when out of bed;fall LSO  -     Row Name 11/14/20 1514          Living Environment    Lives With  alone  -     Row Name 11/14/20 1514          Cognition    Orientation Status (Cognition)  oriented x 4  -     Row Name 11/14/20 1514          Safety Issues, Functional Mobility    Impairments Affecting Function (Mobility)  balance;endurance/activity tolerance;pain;strength;postural/trunk control  -       User Key  (r) = Recorded By, (t) = Taken By, (c) = Cosigned By    Initials Name Provider Type    Quinten Naqvi, PT DPT Physical Therapist        Mobility     Row Name 11/14/20 1515          Bed Mobility    Bed Mobility  bed mobility (all) activities   -     All Activities, [place] (Bed Mobility)  minimum assist (75% patient effort)  -     Assistive Device (Bed Mobility)  bed rails  -     Comment (Bed Mobility)  performed log roll correctly  -     Row Name 11/14/20 1515          Sit-Stand Transfer    Sit-Stand [place] (Transfers)  minimum assist (75% patient effort)  -     Assistive Device (Sit-Stand Transfers)  walker, front-wheeled  -     Row Name 11/14/20 1515          Gait/Stairs (Locomotion)    [place] Level (Gait)  minimum assist (75% patient effort)  -     Assistive Device (Gait)  walker, front-wheeled  -     Distance in Feet (Gait)  30  -     Deviations/Abnormal Patterns (Gait)  festinating/shuffling;gait speed decreased;shakila decreased;antalgic  -     Bilateral Gait Deviations  weight shift ability decreased  -       User Key  (r) = Recorded By, (t) = Taken By, (c) = Cosigned By    Initials Name Provider Type     Quinten Watts, PT DPT Physical Therapist        Obj/Interventions     Row Name 11/14/20 1515          Range of Motion Comprehensive    Comment, General Range of Motion  BLE AROM WNL  -     Row Name 11/14/20 1515          Strength Comprehensive (MMT)    Comment, General Manual Muscle Testing (MMT) Assessment  BLE MMT grossly 4/5  -     Row Name 11/14/20 1515          Balance    Balance Assessment  sitting static balance;standing static balance;standing dynamic balance;sitting dynamic balance  -     Static Sitting Balance  WFL  -LC     Dynamic Sitting Balance  WFL  -     Static Standing Balance  mild impairment  -     Dynamic Standing Balance  mild impairment  -       User Key  (r) = Recorded By, (t) = Taken By, (c) = Cosigned By    Initials Name Provider Type    Quinten Naqvi, PT DPT Physical Therapist        Goals/Plan     Row Name 11/14/20 1519          Bed Mobility Goal 1 (PT)    Activity/Assistive Device (Bed Mobility Goal 1, PT)  bed mobility activities, all  -     [place]  Level/Cues Needed (Bed Mobility Goal 1, PT)  supervision required  -LC     Time Frame (Bed Mobility Goal 1, PT)  1 week  -LC     Row Name 11/14/20 1519          Transfer Goal 1 (PT)    Activity/Assistive Device (Transfer Goal 1, PT)  transfers, all;walker, rolling  -LC     [place] Level/Cues Needed (Transfer Goal 1, PT)  standby assist  -LC     Time Frame (Transfer Goal 1, PT)  1 week  -LC     Row Name 11/14/20 1519          Gait Training Goal 1 (PT)    Activity/Assistive Device (Gait Training Goal 1, PT)  gait (walking locomotion);walker, rolling  -LC     [place] Level (Gait Training Goal 1, PT)  contact guard assist  -LC     Distance (Gait Training Goal 1, PT)  125  -LC     Time Frame (Gait Training Goal 1, PT)  1 week  -LC       User Key  (r) = Recorded By, (t) = Taken By, (c) = Cosigned By    Initials Name Provider Type    LC Quinten Watts, PT DPT Physical Therapist        Clinical Impression     Row Name 11/14/20 1516          Pain    Additional Documentation  Pain Scale: Numbers Pre/Post-Treatment (Group)  -LC     Row Name 11/14/20 1516          Pain Scale: Numbers Pre/Post-Treatment    Pretreatment Pain Rating  5/10  -LC     Posttreatment Pain Rating  6/10  -LC     Pain Location - Orientation  generalized  -LC     Pain Location  back  -LC     Pain Intervention(s)  Medication (See MAR);Repositioned  -LC     Row Name 11/14/20 1516          Plan of Care Review    Plan of Care Reviewed With  patient  -LC     Outcome Summary  PT EVAL completed. Pt AO x 4. Pt understands back precautions and use of LSO. Pt demonstrated correct donning/doffing of LSO. Pt transferred supine to sit through log roll with min x 1 assist and bed rail. Pt transferred sit to stand with min x 1 and RW. Pt ambulated 30 ft with min x 1 and RW. Pt returned to sit EOB and transfer sitting to supine with log roll and min x 1. Pt requires skilled therapy due to decreased strength, decreased bed mobility, decreased ambulation, and  decreased functional activity tolerance. Recommend DC home with  or inpatient rehab. During EVAL PT noticed patient had significant amount of serosanguineous drainage on bed. Contact RN and drain had been dislodged prior. RN addressed issue.  -LC     Row Name 11/14/20 1516          Therapy Assessment/Plan (PT)    Patient/Family Therapy Goals Statement (PT)  return home  -     Criteria for Skilled Interventions Met (PT)  yes;skilled treatment is necessary  -     Predicted Duration of Therapy Intervention (PT)  1 week  -LC     Row Name 11/14/20 1516          Vital Signs    O2 Delivery Pre Treatment  room air  -LC     O2 Delivery Intra Treatment  room air  -LC     O2 Delivery Post Treatment  room air  -LC     Pre Patient Position  Supine  -LC     Intra Patient Position  Standing  -LC     Post Patient Position  Supine  -LC     Row Name 11/14/20 1516          Positioning and Restraints    Pre-Treatment Position  in bed  -LC     Post Treatment Position  bed  -LC     In Bed  notified nsg;supine;call light within reach;encouraged to call for assist;exit alarm on  -LC       User Key  (r) = Recorded By, (t) = Taken By, (c) = Cosigned By    Initials Name Provider Type    Quinten Naqvi, PT DPT Physical Therapist        Outcome Measures     Row Name 11/14/20 1519          How much help from another person do you currently need...    Turning from your back to your side while in flat bed without using bedrails?  3  -LC     Moving from lying on back to sitting on the side of a flat bed without bedrails?  3  -LC     Moving to and from a bed to a chair (including a wheelchair)?  3  -LC     Standing up from a chair using your arms (e.g., wheelchair, bedside chair)?  3  -LC     Climbing 3-5 steps with a railing?  1  -LC     To walk in hospital room?  3  -LC     AM-PAC 6 Clicks Score (PT)  16  -LC     Row Name 11/14/20 1519          Functional Assessment    Outcome Measure Options  AM-PAC 6 Clicks Basic Mobility (PT)  -        User Key  (r) = Recorded By, (t) = Taken By, (c) = Cosigned By    Initials Name Provider Type     Quinten Watts, PT DPT Physical Therapist        Physical Therapy Education                 Title: PT OT SLP Therapies (Done)     Topic: Physical Therapy (Done)     Point: Mobility training (Done)     Learning Progress Summary           Patient Acceptance, E,D, DU,VU by  at 11/14/2020 1520    Comment: log roll, LSO donning/doffing, back protocol                   Point: Home exercise program (Done)     Learning Progress Summary           Patient Acceptance, E,D, DU,VU by  at 11/14/2020 1520    Comment: log roll, LSO donning/doffing, back protocol                   Point: Body mechanics (Done)     Learning Progress Summary           Patient Acceptance, E,D, DU,VU by  at 11/14/2020 1520    Comment: log roll, LSO donning/doffing, back protocol                   Point: Precautions (Done)     Learning Progress Summary           Patient Acceptance, E,D, DU,VU by  at 11/14/2020 1520    Comment: log roll, LSO donning/doffing, back protocol                               User Key     Initials Effective Dates Name Provider Type [facility] 07/02/20 -  Quinten Watts, PT DPT Physical Therapist PT              PT Recommendation and Plan     Plan of Care Reviewed With: patient  Outcome Summary: PT EVAL completed. Pt AO x 4. Pt understands back precautions and use of LSO. Pt demonstrated correct donning/doffing of LSO. Pt transferred supine to sit through log roll with min x 1 assist and bed rail. Pt transferred sit to stand with min x 1 and RW. Pt ambulated 30 ft with min x 1 and RW. Pt returned to sit EOB and transfer sitting to supine with log roll and min x 1. Pt requires skilled therapy due to decreased strength, decreased bed mobility, decreased ambulation, and decreased functional activity tolerance. Recommend DC home with HH or inpatient rehab. During EVAL PT noticed patient had significant amount of  serosanguineous drainage on bed. Contact RN and drain had been dislodged prior. RN addressed issue.     Time Calculation:   PT Charges     Row Name 11/14/20 1520             Time Calculation    Start Time  1435  -      Stop Time  1515  -      Time Calculation (min)  40 min  -      PT Received On  11/14/20  -      PT - Next Appointment  11/15/20  -      PT Goal Re-Cert Due Date  11/21/20  -         Time Calculation- PT    Total Timed Code Minutes- PT  40 minute(s)  -         Timed Charges    02429 - Gait Training Minutes   10  -      34395 - PT Therapeutic Activity Minutes  15  -        User Key  (r) = Recorded By, (t) = Taken By, (c) = Cosigned By    Initials Name Provider Type    Quinten Naqvi, PT DPT Physical Therapist        Therapy Charges for Today     Code Description Service Date Service Provider Modifiers Qty    45705239418 HC GAIT TRAINING EA 15 MIN 11/14/2020 Quinten Watts, PT DPT GP 1    57578936557 HC PT THERAPEUTIC ACT EA 15 MIN 11/14/2020 Quinten Watts, PT DPT GP 1    07319299045 HC PT EVAL MOD COMPLEXITY 1 11/14/2020 Quinten Watts, PT DPT GP 1          PT G-Codes  Outcome Measure Options: AM-PAC 6 Clicks Basic Mobility (PT)  AM-PAC 6 Clicks Score (PT): 16    Quinten Watts PT DPT  11/14/2020

## 2020-11-14 NOTE — PLAN OF CARE
PT EVAL completed. Pt AO x 4. Pt understands back precautions and use of LSO. Pt demonstrated correct donning/doffing of LSO. Pt transferred supine to sit through log roll with min x 1 assist and bed rail. Pt transferred sit to stand with min x 1 and RW. Pt ambulated 30 ft with min x 1 and RW. Pt returned to sit EOB and transfer sitting to supine with log roll and min x 1. Pt requires skilled therapy due to decreased strength, decreased bed mobility, decreased ambulation, and decreased functional activity tolerance. Recommend DC home with HH or inpatient rehab. During EVAL PT noticed patient had significant amount of serosanguineous drainage on bed. Contact RN and drain had been dislodged prior. RN addressed issue.  PPE: Patient was placed in face mask in first look. Patient was wearing facemask when I entered the room and throughout our encounter. I wore full protective equipment throughout this patient encounter including a face mask, and gloves. Hand hygiene was performed before donning protective equipment and after removal when leaving the room.

## 2020-11-14 NOTE — CONSULTS
Juan James   74 y.o.  male    LOS: 1 day   Patient Care Team:  Luda Kidd MD as PCP - General (Family Medicine)  Bill Washington MD as Surgeon (Neurosurgery)  Self, Zafar Fraire MD as Consulting Physician (Vascular Surgery)  Alesia Aviles MD as Consulting Physician (Cardiology)      Subjective     Patient Complaints: Postoperative back pain.    History of Present Illness:   Mr. Munoz is seen postoperatively, after bilateral L3, 4, and 5 laminectomies.    He has a history of dyslipidemia, hypertension, coronary artery disease, status post RCA stent, atrial fibrillation, on anticoagulation previously, discontinued due to large GI bleed.  Peripheral vascular disease, with vascular surgery regarding left foot cyanosis.  Patient of Dr. Aviles at [facility].  ECG 11/5/2020 atrial fibrillation with no acute ST changes.  Recent visit for edema decreased amlodipine.    Echo on 4/6/2020 showed RV enlargement, 1-2+ TR, ejection fraction 75%, grade 2 diastolic dysfunction, high LVEDP.    Currently doing well, with no chest pain.  Back pain postoperative.  Reports no PND, orthopnea, syncope or presyncope, and no edema.      Review of Systems:   The following systems were reviewed and negative;  respiratory, cardiovascular and integument    Medication Review: done      Family History   Adopted: Yes   Problem Relation Age of Onset   • Heart attack Mother    • Alcohol abuse Mother    • Heart disease Mother    • No Known Problems Father    • Heart disease Other         FH in females before the age of 65   • Malig Hyperthermia Neg Hx      Social History     Socioeconomic History   • Marital status:      Spouse name: Not on file   • Number of children: Not on file   • Years of education: Not on file   • Highest education level: Not on file   Tobacco Use   • Smoking status: Former Smoker     Packs/day: 2.00     Years: 40.00     Pack years: 80.00     Types: Cigarettes     Quit date:  2009     Years since quittin.8   • Smokeless tobacco: Former User     Types: Chew   • Tobacco comment: smoked 1 to 2 packs per day for 40 or more years   Substance and Sexual Activity   • Alcohol use: Not Currently     Comment: QUIT DRINKING 2020   • Drug use: No   • Sexual activity: Defer     Objective   Past Surgical History:   Procedure Laterality Date   • ANTERIOR CERVICAL DISCECTOMY W/ FUSION N/A 3/25/2016    Procedure: C3- C5 CERVICAL DISCECTOMY ANTERIOR FUSION WITH INSTRUMENTATION;  Surgeon: Bill Washington MD;  Location: [facility] OR;  Service:    • ARTHRODESIS N/A 1983    LUMBAR   • ARTHRODESIS N/A     LUMBAR   • ARTHRODESIS      Spinal Arthrodesis-lower spine-1983, --upper spine-, 1990-Abstracted from [employer]   • BACK SURGERY      Lower Back Surgery   • CARDIAC CATHETERIZATION  2009   • CARDIAC CATHETERIZATION  2003    STENT TO RCA AND PCA, DR. PRIYA LUGO   • CATARACT EXTRACTION Bilateral     LENS IMPLANT   • CERVICAL ARTHRODESIS N/A 1990   • CERVICAL ARTHRODESIS N/A    • COLONOSCOPY N/A 10/3/2019    4 MM HYPERPLASTIC POLYP IN ILEOCECAL VALVE, 4 MM SESSILE SERRATED ADENOMA POLYP IN DESCENDING, 5 TUBULOVILLOUS ADENOMA POLYPS IN RECTUM, 3 TUBULAR ADENOMA POLYPS IN DISTAL RECTUM, 26 MM TUBULAR ADENOMA POLYP IN RECTUM, PIECEMEAL POLYPECTOMY, AREA TATTOOED, RESCOPE IN 6 MONTHS, DR. TAYA CRUZ AT [facility]   • CORONARY ANGIOPLASTY WITH STENT PLACEMENT  2009    and 2009-PT STATES HAS 3 STENTS   • ENDOSCOPY N/A 2012    NON BLEEDING EROSIVE GASTROPATHY, 1 DUODENAL ULCER WITH CLEAN BASE, DR. BRANDY WREN AT [facility]   • ENDOSCOPY AND COLONOSCOPY N/A 2007    EGD WNL, 8 ADENOMATOUS POLYPS IN RECTO-SIGMOID, RESCOPE IN 3 YRS, DR. CIRSTINA RODRIGUEZ AT [facility]   • FOOT SURGERY Left     LEFT BIG TOE-JOINT REPLACED   • HAND SURGERY Left     THUMB-JOINT REPLACEMENT   • HAND SURGERY Right     JOINT REPLACEMENT RIGHT INDEX FINGER   • KNEE ARTHROSCOPY Left    • LAPAROSCOPIC  CHOLECYSTECTOMY     • NECK SURGERY     • VASECTOMY Bilateral      Past Medical History:   Diagnosis Date   • AAA (abdominal aortic aneurysm) (CMS/[facility])    • Arthritis    • Atherosclerotic heart disease of native coronary artery without angina pectoris    • Atrial fibrillation (CMS/[facility])    • Benign essential hypertension    • BPH (benign prostatic hyperplasia)    • Cervical spondylosis 11/2012   • CHF (congestive heart failure) (CMS/[facility])    • COPD (chronic obstructive pulmonary disease) (CMS/[facility])    • Coronary artery disease     STENT X 3   • DDD (degenerative disc disease), cervical    • DDD (degenerative disc disease), lumbar    • Dry skin     LOWER LEGS BILAT   • Edema     LOWER LEGS BILAT   • Elevated cholesterol    • Emphysema/COPD (CMS/[facility])    • Essential hypertriglyceridemia    • Hearing loss    • High blood pressure    • History of blood transfusion    • History of gastrointestinal hemorrhage    • History of gout    • History of myasthenia gravis     LAST EPISODE 5-6 YEARS AGO   • Hoarseness, persistent    • Hyperlipidemia    • Hypertension    • Lower back pain    • Lumbar radiculopathy 05/2016   • OA (osteoarthritis)    • Peptic ulceration     PUD   • Skin abnormalities     FLAKEY SKIN LOWER LEGS BILAT   • Sleep apnea     DOES NOT WEAR CPAP MACHINE    • Status post angioplasty with stent     STENT X3   • Tinnitus     BILAT   • Unsteady gait when walking     USES 3 PRONG CANE       Vital Sign Min/Max for last 24 hours  Temp  Min: 97.7 °F (36.5 °C)  Max: 99.1 °F (37.3 °C)   BP  Min: 103/66  Max: 165/94    Pulse  Min: 74  Max: 111     Wt Readings from Last 3 Encounters:   11/13/20 98.9 kg (218 lb)   11/05/20 99.8 kg (220 lb)   11/03/20 99.8 kg (220 lb)        Physical Exam:      General Appearance:    Alert, cooperative, in no acute distress   Head:    Normocephalic, without obvious abnormality, atraumatic   Eyes:            Conjunctivae normal, no   icterus   Neck:   No adenopathy, supple, trachea midline, no  "thyromegaly, no   carotid bruit, no JVD   Lungs:     Clear to auscultation,respirations regular, even and                  unlabored    Heart:    Irregular rhythm and normal rate, normal S1 and S2,            1/6 TR murmur, no gallop, no rub, no click   Chest Wall:    No abnormalities observed   Abdomen:     Normal bowel sounds, no masses, no organomegaly, soft     nontender, nondistended, no guarding, no rebound                tenderness   Rectal:     Deferred   Extremities:   No edema. Moves all extremities well, no cyanosis, no erythema   Pulses:   Pulses palpable and equal bilaterally   Skin:   No bleeding, bruising or rash   Neurologic:   Cranial nerves 2 - 12 grossly intact, sensation intact, DTR       present and equal bilaterally        Results Review:     I reviewed the patient's new clinical results.  none  No results found for: K, CREATININE, TROPONINT     Echo EF Estimated  No results found for: ECHOEFEST      Assessment/Plan       Spondylolisthesis of lumbar region  1.  Status post lumbar laminectomies, 11/13/2020.  2.  Coronary artery disease status post RCA stenting.  3.  Atrial fibrillation, with controlled ventricular response.  4.  Hypertension, dyslipidemia.    Would like to restart anticoagulation with Eliquis 5 mg twice daily when drainage allows.  Will await neurosurgery comment.  Continue the furosemide, metoprolol.  Rates are well controlled.    Despite low blood pressures today, no dizziness.  He noted chest pain today, which he describes as a \"pinch \", momentary, not associated with dyspnea, diaphoresis or nausea.  This is musculoskeletal.  No further work-up of this is necessary.      Would not change other meds.  Will check troponin and ECG as routine check.      Oli Miles MD  11/14/20  08:44 EST      Time: 32    Dictated portions using Dragon dictation software.     During the entire encounter, I was wearing recommended PPE including face mask and eye protection. Hand " sanitization was performed prior to entering room and upon exit.

## 2020-11-14 NOTE — PROGRESS NOTES
"[facility] NEUROSURGERY PROGRESS NOTE      CC: POD 1 L 3-5 decompression/interbody and instrumented fusion      Subjective     Interval History: \"I can feel my legs.\".  Back is sore as expected.  Like the catheter out.  Had x-ray this morning.  No nausea vomiting.    ROS:  Constitutional: No fever, chills  GI: No nausea, vomiting  MS: No back pain  Neuro: Improvement in numbness in legs.  : has morton    Objective     Vital signs in last 24 hours:  Temp:  [97.7 °F (36.5 °C)-99.1 °F (37.3 °C)] 98.5 °F (36.9 °C)  Heart Rate:  [] 77  Resp:  [16-18] 18  BP: (103-165)/(62-98) 106/74    Intake/Output this shift:  No intake/output data recorded.  YULY-155 cc since placement.  45 cc x 4 hours    LABS:  Results from last 7 days   Lab Units 11/14/20  0457   WBC 10*3/mm3 9.97   HEMOGLOBIN g/dL 10.6*   HEMATOCRIT % 30.5*   PLATELETS 10*3/mm3 165     Troponin- <0.010    IMAGING STUDIES:  Lumbar x-rays-postoperative changes L3-5 with instrumentation well-positioned no complicating features    I personally viewed and interpreted the patient's lumbar x-ray.    Meds reviewed/changed: Yes    Current Facility-Administered Medications:   •  allopurinol (ZYLOPRIM) tablet 100 mg, 100 mg, Oral, Nightly, Bill Washington MD, 100 mg at 11/13/20 2008  •  allopurinol (ZYLOPRIM) tablet 300 mg, 300 mg, Oral, Daily, Bill Washington MD, 300 mg at 11/14/20 0817  •  cyclobenzaprine (FLEXERIL) tablet 10 mg, 10 mg, Oral, Q8H PRN, Jaimie Felipe APRN, 10 mg at 11/14/20 1125  •  docusate sodium (COLACE) capsule 100 mg, 100 mg, Oral, Daily, Jaimie Felipe APRN, 100 mg at 11/14/20 1125  •  furosemide (LASIX) tablet 40 mg, 40 mg, Oral, BID, Bill Washington MD, 40 mg at 11/14/20 0817  •  HYDROcodone-acetaminophen (NORCO) 5-325 MG per tablet 1 tablet, 1 tablet, Oral, Q4H PRN, Bill Washington MD, 1 tablet at 11/14/20 0817  •  metoprolol succinate XL (TOPROL-XL) 24 hr tablet 25 mg, 25 mg, Oral, Q24H, Bill Washington MD, 25 mg at 11/14/20 " 0817  •  morphine injection 2 mg, 2 mg, Intravenous, Q4H PRN, 2 mg at 11/14/20 1032 **AND** naloxone (NARCAN) injection 0.4 mg, 0.4 mg, Intravenous, Q5 Min PRN, Bill Washington MD  •  ondansetron (ZOFRAN) tablet 4 mg, 4 mg, Oral, Q6H PRN **OR** ondansetron (ZOFRAN) injection 4 mg, 4 mg, Intravenous, Q6H PRN, Bill Washington MD, 4 mg at 11/13/20 1821  •  senna (SENOKOT) tablet 2 tablet, 2 tablet, Oral, Nightly, Jaimie Felipe, APRN  •  sodium chloride 0.9 % flush 10 mL, 10 mL, Intravenous, PRN, Bill Washington MD  •  sodium chloride 0.9 % flush 3 mL, 3 mL, Intravenous, Q12H, Bill Washington MD, 3 mL at 11/14/20 0818  •  tamsulosin (FLOMAX) 24 hr capsule 0.4 mg, 0.4 mg, Oral, Q PM, Bill Washington MD, 0.4 mg at 11/13/20 2010      Physical Exam:    General:   Awake, alert, oriented x3. Speech clear with no aphasia.  Pleasant cooperative.  Able to turn unassisted in bed.  Back:    LSO brace Off . Incision midline lumbar with sanguinous drainage on dressing.  YULY to bulb suction with sanguinous output.  No incisional redness or swelling.     Motor: Normal muscle strength, bulk, tone in upper and lower extremities.  Some tremors noted at times.      Reflexes: no clonus  Sensation: Normal to light touch.  Proprioception intact  Extremities:   No calf tenderness or swelling      Assessment/Plan     ASSESSMENT:      Spondylolisthesis of lumbar region      PLAN: Patient doing fairly well postop day 1.  Reports that sensation in legs is better.  Back pain as expected.  No headache.  YULY with high output will remain today.  States he felt shaky trying to stand last night with nursing.  Will attempt mobilization again today with physical therapy.  Brace to be placed today for activity.  Monitor CBC.  Preop hemoglobin 13.  Repeat labs in a.m.  CRISTOBAL Fuller and JONO..    I discussed the patient's findings and my recommendations with patient and nursing staff       LOS: 1 day       Jaimie Felipe, AARON  11/14/2020  09:26  "EST    \"Dictated utilizing Dragon dictation\".      "

## 2020-11-15 LAB
BASOPHILS # BLD AUTO: 0.04 10*3/MM3 (ref 0–0.2)
BASOPHILS NFR BLD AUTO: 0.4 % (ref 0–1.5)
DEPRECATED RDW RBC AUTO: 46 FL (ref 37–54)
EOSINOPHIL # BLD AUTO: 0.18 10*3/MM3 (ref 0–0.4)
EOSINOPHIL NFR BLD AUTO: 1.7 % (ref 0.3–6.2)
ERYTHROCYTE [DISTWIDTH] IN BLOOD BY AUTOMATED COUNT: 13.1 % (ref 12.3–15.4)
HCT VFR BLD AUTO: 32 % (ref 37.5–51)
HGB BLD-MCNC: 11 G/DL (ref 13–17.7)
IMM GRANULOCYTES # BLD AUTO: 0.03 10*3/MM3 (ref 0–0.05)
IMM GRANULOCYTES NFR BLD AUTO: 0.3 % (ref 0–0.5)
LYMPHOCYTES # BLD AUTO: 2.02 10*3/MM3 (ref 0.7–3.1)
LYMPHOCYTES NFR BLD AUTO: 19.6 % (ref 19.6–45.3)
MCH RBC QN AUTO: 33.7 PG (ref 26.6–33)
MCHC RBC AUTO-ENTMCNC: 34.4 G/DL (ref 31.5–35.7)
MCV RBC AUTO: 98.2 FL (ref 79–97)
MONOCYTES # BLD AUTO: 1.12 10*3/MM3 (ref 0.1–0.9)
MONOCYTES NFR BLD AUTO: 10.8 % (ref 5–12)
NEUTROPHILS NFR BLD AUTO: 6.94 10*3/MM3 (ref 1.7–7)
NEUTROPHILS NFR BLD AUTO: 67.2 % (ref 42.7–76)
NRBC BLD AUTO-RTO: 0 /100 WBC (ref 0–0.2)
PLATELET # BLD AUTO: 149 10*3/MM3 (ref 140–450)
PMV BLD AUTO: 9.1 FL (ref 6–12)
RBC # BLD AUTO: 3.26 10*6/MM3 (ref 4.14–5.8)
WBC # BLD AUTO: 10.33 10*3/MM3 (ref 3.4–10.8)

## 2020-11-15 PROCEDURE — 97530 THERAPEUTIC ACTIVITIES: CPT

## 2020-11-15 PROCEDURE — 85025 COMPLETE CBC W/AUTO DIFF WBC: CPT | Performed by: NURSE PRACTITIONER

## 2020-11-15 PROCEDURE — 99024 POSTOP FOLLOW-UP VISIT: CPT | Performed by: NURSE PRACTITIONER

## 2020-11-15 PROCEDURE — 25010000002 MORPHINE PER 10 MG: Performed by: NEUROLOGICAL SURGERY

## 2020-11-15 RX ADMIN — SODIUM CHLORIDE, PRESERVATIVE FREE 3 ML: 5 INJECTION INTRAVENOUS at 21:33

## 2020-11-15 RX ADMIN — CYCLOBENZAPRINE 10 MG: 10 TABLET, FILM COATED ORAL at 21:44

## 2020-11-15 RX ADMIN — ALLOPURINOL 300 MG: 300 TABLET ORAL at 08:30

## 2020-11-15 RX ADMIN — METOPROLOL SUCCINATE 25 MG: 25 TABLET, EXTENDED RELEASE ORAL at 08:30

## 2020-11-15 RX ADMIN — MORPHINE SULFATE 2 MG: 2 INJECTION, SOLUTION INTRAMUSCULAR; INTRAVENOUS at 02:28

## 2020-11-15 RX ADMIN — HYDROCODONE BITARTRATE AND ACETAMINOPHEN 1 TABLET: 5; 325 TABLET ORAL at 00:42

## 2020-11-15 RX ADMIN — TAMSULOSIN HYDROCHLORIDE 0.4 MG: 0.4 CAPSULE ORAL at 17:17

## 2020-11-15 RX ADMIN — HYDROCODONE BITARTRATE AND ACETAMINOPHEN 1 TABLET: 5; 325 TABLET ORAL at 12:33

## 2020-11-15 RX ADMIN — HYDROCODONE BITARTRATE AND ACETAMINOPHEN 1 TABLET: 5; 325 TABLET ORAL at 05:57

## 2020-11-15 RX ADMIN — SODIUM CHLORIDE, PRESERVATIVE FREE 3 ML: 5 INJECTION INTRAVENOUS at 08:31

## 2020-11-15 RX ADMIN — ALLOPURINOL 100 MG: 100 TABLET ORAL at 21:32

## 2020-11-15 RX ADMIN — FUROSEMIDE 40 MG: 40 TABLET ORAL at 17:17

## 2020-11-15 RX ADMIN — HYDROCODONE BITARTRATE AND ACETAMINOPHEN 1 TABLET: 5; 325 TABLET ORAL at 17:17

## 2020-11-15 RX ADMIN — CYCLOBENZAPRINE 10 MG: 10 TABLET, FILM COATED ORAL at 06:00

## 2020-11-15 RX ADMIN — DOCUSATE SODIUM 100 MG: 100 CAPSULE, LIQUID FILLED ORAL at 08:30

## 2020-11-15 RX ADMIN — SENNOSIDES 2 TABLET: 8.6 TABLET, FILM COATED ORAL at 21:32

## 2020-11-15 RX ADMIN — FUROSEMIDE 40 MG: 40 TABLET ORAL at 08:30

## 2020-11-15 NOTE — THERAPY TREATMENT NOTE
Patient Name: Juan James  : 1945    MRN: [id number]                              Today's Date: 11/15/2020       Admit Date: 2020    Visit Dx:     ICD-10-CM ICD-9-CM   1. Spondylolisthesis of lumbar region  M43.16 738.4     Patient Active Problem List   Diagnosis   • Follow-up examination, following other surgery   • Lumbar radiculopathy   • Cervical radiculitis   • Essential hypertension   • Coronary artery disease involving native heart   • Chronic bronchitis (CMS/[facility])   • Hyperlipidemia   • Edema   • Rectal pain   • 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[facility])   • Cervical spondylosis with radiculopathy   • History of colon polyps   • Tinnitus   • Shortness of breath   • Pulmonary embolism (CMS/[facility])   • Peptic ulceration   • OA (osteoarthritis)   • Neck pain   • Lower back pain   • Hypertension   • History of blood transfusion   • Hearing loss   • GERD (gastroesophageal reflux disease)   • DDD (degenerative disc disease), lumbar   • DDD (degenerative disc disease), cervical   • Coronary artery disease   • COPD (chronic obstructive pulmonary disease) (CMS/[facility])   • Colon polyps   • CHF (congestive heart failure) (CMS/[facility])   • Cervical spondylosis   • Cervical disc disorder   • Cataract   • BPH (benign prostatic hyperplasia)   • Atrial fibrillation (CMS/[facility])   • Arthritis   • AAA (abdominal aortic aneurysm) (CMS/[facility])   • Medicare annual wellness visit, subsequent   • Heart block AV complete (CMS/[facility])   • PVD (peripheral vascular disease) with claudication (CMS/[facility])   • Acute renal failure (ARF) (CMS/[facility])   • Hospital discharge follow-up   • Idiopathic chronic gout of multiple sites without tophus   • Spondylolisthesis of lumbar region     Past Medical History:   Diagnosis Date   • AAA (abdominal aortic aneurysm) (CMS/[facility])    • Arthritis    • Atherosclerotic heart disease of native coronary artery without angina pectoris    • Atrial fibrillation (CMS/[facility])    • Benign essential hypertension    • BPH (benign  prostatic hyperplasia)    • Cervical spondylosis 11/2012   • CHF (congestive heart failure) (CMS/[facility])    • COPD (chronic obstructive pulmonary disease) (CMS/[facility])    • Coronary artery disease     STENT X 3   • DDD (degenerative disc disease), cervical    • DDD (degenerative disc disease), lumbar    • Dry skin     LOWER LEGS BILAT   • Edema     LOWER LEGS BILAT   • Elevated cholesterol    • Emphysema/COPD (CMS/[facility])    • Essential hypertriglyceridemia    • Hearing loss    • High blood pressure    • History of blood transfusion    • History of gastrointestinal hemorrhage    • History of gout    • History of myasthenia gravis     LAST EPISODE 5-6 YEARS AGO   • Hoarseness, persistent    • Hyperlipidemia    • Hypertension    • Lower back pain    • Lumbar radiculopathy 05/2016   • OA (osteoarthritis)    • Peptic ulceration     PUD   • Skin abnormalities     FLAKEY SKIN LOWER LEGS BILAT   • Sleep apnea     DOES NOT WEAR CPAP MACHINE    • Status post angioplasty with stent     STENT X3   • Tinnitus     BILAT   • Unsteady gait when walking     USES 3 PRONG CANE     Past Surgical History:   Procedure Laterality Date   • ANTERIOR CERVICAL DISCECTOMY W/ FUSION N/A 3/25/2016    Procedure: C3- C5 CERVICAL DISCECTOMY ANTERIOR FUSION WITH INSTRUMENTATION;  Surgeon: Bill Washington MD;  Location: [facility];  Service:    • ARTHRODESIS N/A 07/1983    LUMBAR   • ARTHRODESIS N/A 1987    LUMBAR   • ARTHRODESIS      Spinal Arthrodesis-lower spine-7/1983, 1987--upper spine-1988, 7/1990-Abstracted from [employer]   • BACK SURGERY      Lower Back Surgery   • CARDIAC CATHETERIZATION  07/2009   • CARDIAC CATHETERIZATION  03/18/2003    STENT TO RCA AND PCA, DR. PRIYA LUGO   • CATARACT EXTRACTION Bilateral     LENS IMPLANT   • CERVICAL ARTHRODESIS N/A 07/1990   • CERVICAL ARTHRODESIS N/A 1988   • COLONOSCOPY N/A 10/3/2019    4 MM HYPERPLASTIC POLYP IN ILEOCECAL VALVE, 4 MM SESSILE SERRATED ADENOMA POLYP IN DESCENDING, 5 TUBULOVILLOUS  ADENOMA POLYPS IN RECTUM, 3 TUBULAR ADENOMA POLYPS IN DISTAL RECTUM, 26 MM TUBULAR ADENOMA POLYP IN RECTUM, PIECEMEAL POLYPECTOMY, AREA TATTOOED, RESCOPE IN 6 MONTHS, DR. TAYA CRUZ AT [facility]   • CORONARY ANGIOPLASTY WITH STENT PLACEMENT  02/2009    and 7/2009-PT STATES HAS 3 STENTS   • ENDOSCOPY N/A 06/24/2012    NON BLEEDING EROSIVE GASTROPATHY, 1 DUODENAL ULCER WITH CLEAN BASE, DR. BRANDY WREN AT [facility]   • ENDOSCOPY AND COLONOSCOPY N/A 11/20/2007    EGD WNL, 8 ADENOMATOUS POLYPS IN RECTO-SIGMOID, RESCOPE IN 3 YRS, DR. CRISTINA RODRIGUEZ AT [facility]   • FOOT SURGERY Left     LEFT BIG TOE-JOINT REPLACED   • HAND SURGERY Left     THUMB-JOINT REPLACEMENT   • HAND SURGERY Right     JOINT REPLACEMENT RIGHT INDEX FINGER   • KNEE ARTHROSCOPY Left    • LAPAROSCOPIC CHOLECYSTECTOMY     • NECK SURGERY     • VASECTOMY Bilateral      General Information     Row Name 11/15/20 0833          Physical Therapy Time and Intention    Document Type  therapy note (daily note)  -SV     Mode of Treatment  individual therapy;physical therapy  -     Row Name 11/15/20 0833          General Information    Patient Profile Reviewed  yes  -SV       User Key  (r) = Recorded By, (t) = Taken By, (c) = Cosigned By    Initials Name Provider Type    SV Shirley Lama, PT Physical Therapist        Mobility     Row Name 11/15/20 1016          Bed Mobility    Bed Mobility  sidelying-sit;rolling right  -SV     Rolling Right [place] (Bed Mobility)  verbal cues;standby assist  -SV     Sidelying-Sit [place] (Bed Mobility)  verbal cues;standby assist  -SV     Assistive Device (Bed Mobility)  bed rails  -     Row Name 11/15/20 1016          Transfers    Comment (Transfers)  reported dizzy initially  -     Row Name 11/15/20 1016          Sit-Stand Transfer    Sit-Stand [place] (Transfers)  contact guard  -SV     Assistive Device (Sit-Stand Transfers)  walker, front-wheeled  -     Row Name 11/15/20 1016          Gait/Stairs (Locomotion)     [place] Level (Gait)  contact guard  -SV     Assistive Device (Gait)  walker, front-wheeled  -SV     Distance in Feet (Gait)  200' one mild lob  initially with self recovery at 25', then guarded but no unsteadiness or lob( brace donned in sitting today with min asst/vc)  -SV       User Key  (r) = Recorded By, (t) = Taken By, (c) = Cosigned By    Initials Name Provider Type    Shirley Simpson, PT Physical Therapist        Obj/Interventions    No documentation.       Goals/Plan    No documentation.       Clinical Impression     Row Name 11/15/20 1018          Pain Scale: Numbers Pre/Post-Treatment    Pretreatment Pain Rating  8/10  -SV     Pain Location - Orientation  lower  -SV     Pain Location  back  -SV     Pain Intervention(s)  Repositioned  -SV     Row Name 11/15/20 1018          Vital Signs    O2 Delivery Intra Treatment  room air  -SV     O2 Delivery Post Treatment  room air  -SV     Pre Patient Position  Supine  -SV     Intra Patient Position  Standing  -SV     Post Patient Position  Sitting  -SV     Row Name 11/15/20 1018          Positioning and Restraints    Pre-Treatment Position  in bed  -SV     Post Treatment Position  chair  -SV     In Chair  notified nsg;sitting;call light within reach;encouraged to call for assist;exit alarm on  -SV       User Key  (r) = Recorded By, (t) = Taken By, (c) = Cosigned By    Initials Name Provider Type    Shirley Simpson, PT Physical Therapist        Outcome Measures     Row Name 11/15/20 1019          How much help from another person do you currently need...    Turning from your back to your side while in flat bed without using bedrails?  3  -SV     Moving from lying on back to sitting on the side of a flat bed without bedrails?  3  -SV     Moving to and from a bed to a chair (including a wheelchair)?  3  -SV     Standing up from a chair using your arms (e.g., wheelchair, bedside chair)?  3  -SV     Climbing 3-5 steps with a railing?  3  -SV     To walk in  hospital room?  3  -SV     AM-PAC 6 Clicks Score (PT)  18  -SV       User Key  (r) = Recorded By, (t) = Taken By, (c) = Cosigned By    Initials Name Provider Type    SV Shirley Lama, PT Physical Therapist        Physical Therapy Education                 Title: PT OT SLP Therapies (Done)     Topic: Physical Therapy (Done)     Point: Mobility training (Done)     Learning Progress Summary           Patient Eager, E,TB, VU,NR by  at 11/15/2020 1019    Acceptance, E,D, DU,VU by  at 11/14/2020 1520    Comment: log roll, LSO donning/doffing, back protocol                   Point: Home exercise program (Done)     Learning Progress Summary           Patient Eager, E,TB, VU,NR by  at 11/15/2020 1019    Acceptance, E,D, DU,VU by  at 11/14/2020 1520    Comment: log roll, LSO donning/doffing, back protocol                   Point: Body mechanics (Done)     Learning Progress Summary           Patient Eager, E,TB, VU,NR by  at 11/15/2020 1019    Acceptance, E,D, DU,VU by  at 11/14/2020 1520    Comment: log roll, LSO donning/doffing, back protocol                   Point: Precautions (Done)     Learning Progress Summary           Patient Eager, E,TB, VU,NR by  at 11/15/2020 1019    Acceptance, E,D, DU,VU by  at 11/14/2020 1520    Comment: log roll, LSO donning/doffing, back protocol                               User Key     Initials Effective Dates Name Provider Type Discipline    SV 04/03/18 -  Shirley Lama, PT Physical Therapist PT     07/02/20 -  Quinten Watts, PT DPT Physical Therapist PT              PT Recommendation and Plan           Time Calculation:   PT Charges     Row Name 11/15/20 1021             Time Calculation    Start Time  0833  -      Stop Time  0850  -      Time Calculation (min)  17 min  -      PT Received On  11/15/20  -      PT - Next Appointment  11/16/20  -        User Key  (r) = Recorded By, (t) = Taken By, (c) = Cosigned By    Initials Name Provider Type    SV  Shirley Lama, PT Physical Therapist        Therapy Charges for Today     Code Description Service Date Service Provider Modifiers Qty    [id number] HC PT THERAPEUTIC ACT EA 15 MIN 11/15/2020 Shirley Lama, PT GP 1          PT G-Codes  Outcome Measure Options: AM-PAC 6 Clicks Basic Mobility (PT)  AM-PAC 6 Clicks Score (PT): 18    Shirley Lama, PT  11/15/2020

## 2020-11-15 NOTE — PLAN OF CARE
Pt agreeable to amb today. Pt bed mobiltiy with vc/sba using rail today. CGA for STS to rwx with report of dizziness initially. Pt amb with rwx cga with one lob initially with self recovery in the first 50'. He amb another 150' with cga and no lob.

## 2020-11-15 NOTE — PLAN OF CARE
VSS, AFIB ON MONITOR, ACUTE PAIN CONTINUES, WILL ASK FOR INCREASE IN PO PAIN MEDICATION TO HELP OFFSET PAIN, GOAL IS TO SIT IN CHAIR THIS MORNING FOR BREAKFAST. DRESSING CHANGED DUE TO SATURATION. PATIENT IS ON ROOM AIR. HE MOVES INDEPENDENTLY IN BED, HE HAS SLEPT THROUGH THE NIGHT ONLY WAKING WHEN PAIN BECOMES GREAT. VOIDING WITHOUT DIFFICULTY. ALSO WILL ASK IF PATIENT CAN HAVE HIS LASIX CHANGED TO A 9A AND 5P SCHEDULE SO HE IS NOT HAVING TO VOID KEEPING HIM AWAKE. PATIENT DOES SAY HE FEELS SOME NUMBNESS IN THE FRONT OF HIS LEGS. NO OTHER ISSUES NOTED. WILL CONTINUE TO MONITOR.     Problem: Adult Inpatient Plan of Care  Goal: Patient-Specific Goal (Individualized)  Outcome: Ongoing, Progressing     Problem: Fall Injury Risk  Goal: Absence of Fall and Fall-Related Injury  Outcome: Ongoing, Progressing  Intervention: Identify and Manage Contributors to Fall Injury Risk  Recent Flowsheet Documentation  Taken 11/14/2020 1945 by Raghu Coronado RN  Medication Review/Management: medications reviewed  Self-Care Promotion: independence encouraged  Intervention: Promote Injury-Free Environment  Recent Flowsheet Documentation  Taken 11/15/2020 0235 by Raghu Coronado RN  Safety Promotion/Fall Prevention:   activity supervised   assistive device/personal items within reach   clutter free environment maintained   fall prevention program maintained   safety round/check completed  Taken 11/15/2020 0046 by Raghu Coronado RN  Safety Promotion/Fall Prevention:   activity supervised   assistive device/personal items within reach   clutter free environment maintained   fall prevention program maintained   safety round/check completed  Taken 11/14/2020 2200 by Raghu Coronado RN  Safety Promotion/Fall Prevention:   activity supervised   assistive device/personal items within reach   clutter free environment maintained   fall prevention program maintained   safety round/check completed  Taken 11/14/2020 1945 by Raghu Coronado  RN  Safety Promotion/Fall Prevention:   activity supervised   assistive device/personal items within reach   clutter free environment maintained   fall prevention program maintained   safety round/check completed     Problem: Skin Injury Risk Increased  Goal: Skin Health and Integrity  Outcome: Ongoing, Progressing  Intervention: Optimize Skin Protection  Recent Flowsheet Documentation  Taken 11/15/2020 0235 by Raghu Coronado RN  Head of Bed (HOB): HOB at 15 degrees  Taken 11/15/2020 0046 by Raghu Coronado RN  Head of Bed (HOB): HOB at 15 degrees  Taken 11/14/2020 2200 by Raghu Coronado RN  Head of Bed (HOB): HOB at 15 degrees  Taken 11/14/2020 1945 by Raghu Coronado RN  Pressure Reduction Techniques:   frequent weight shift encouraged   weight shift assistance provided  Head of Bed (HOB): HOB at 15 degrees  Pressure Reduction Devices:   pressure-redistributing mattress utilized   positioning supports utilized  Skin Protection:   adhesive use limited   incontinence pads utilized   Goal Outcome Evaluation:  Plan of Care Reviewed With: patient  Progress: no change

## 2020-11-15 NOTE — PROGRESS NOTES
[facility] NEUROSURGERY PROGRESS NOTE      CC: POD 2 L 3-5 decompression/interbody and instrumented fusion      Subjective     Interval History: drain found out by nursing yesterday afternoon.  Patient reports legs feel much better.  Having back discomfort as expected.  Walking well per nursing.  Tolerating p.o., passing flatus, voiding after Fuller removal.  Nurse reports having to change the dressing at least 3 times since drain fell out.    ROS:  Constitutional: No fever, chills  GI: No nausea, vomiting  MS: Mild back pain  Neuro: Improvement in numbness in legs.  : Voiding    Objective     Vital signs in last 24 hours:  Temp:  [97.8 °F (36.6 °C)-99.1 °F (37.3 °C)] 97.9 °F (36.6 °C)  Heart Rate:  [86-97] 91  Resp:  [16-18] 16  BP: (114-145)/(60-99) 124/76    Intake/Output this shift:  No intake/output data recorded.  YULY-60 cc before removal at 1400    LABS:  Results from last 7 days   Lab Units 11/15/20  0537 11/14/20  0457   WBC 10*3/mm3 10.33 9.97   HEMOGLOBIN g/dL 11.0* 10.6*   HEMATOCRIT % 32.0* 30.5*   PLATELETS 10*3/mm3 149 165       IMAGING STUDIES:  No new imaging    I personally viewed and interpreted the patient's chart.    Meds reviewed/changed: Yes    Current Facility-Administered Medications:   •  allopurinol (ZYLOPRIM) tablet 100 mg, 100 mg, Oral, Nightly, Bill Washington MD, 100 mg at 11/14/20 2036  •  allopurinol (ZYLOPRIM) tablet 300 mg, 300 mg, Oral, Daily, Bill Washington MD, 300 mg at 11/15/20 0830  •  cyclobenzaprine (FLEXERIL) tablet 10 mg, 10 mg, Oral, Q8H PRN, Jaimie Felipe APRN, 10 mg at 11/15/20 0600  •  docusate sodium (COLACE) capsule 100 mg, 100 mg, Oral, Daily, Jaimie Felipe APRN, 100 mg at 11/15/20 0830  •  furosemide (LASIX) tablet 40 mg, 40 mg, Oral, BID, Bill Washington MD, 40 mg at 11/15/20 0830  •  HYDROcodone-acetaminophen (NORCO) 5-325 MG per tablet 1 tablet, 1 tablet, Oral, Q4H PRN, Bill Washington MD, 1 tablet at 11/15/20 0557  •  metoprolol succinate XL  (TOPROL-XL) 24 hr tablet 25 mg, 25 mg, Oral, Q24H, Bill Washington MD, 25 mg at 11/15/20 0830  •  morphine injection 2 mg, 2 mg, Intravenous, Q4H PRN, 2 mg at 11/15/20 0228 **AND** naloxone (NARCAN) injection 0.4 mg, 0.4 mg, Intravenous, Q5 Min PRN, Bill Washington MD  •  ondansetron (ZOFRAN) tablet 4 mg, 4 mg, Oral, Q6H PRN **OR** ondansetron (ZOFRAN) injection 4 mg, 4 mg, Intravenous, Q6H PRN, Bill Washington MD, 4 mg at 11/13/20 1821  •  senna (SENOKOT) tablet 2 tablet, 2 tablet, Oral, Nightly, Jaimie Felipe, APRN  •  sodium chloride 0.9 % flush 10 mL, 10 mL, Intravenous, PRN, Bill Washington MD  •  sodium chloride 0.9 % flush 3 mL, 3 mL, Intravenous, Q12H, Bill Washington MD, 3 mL at 11/15/20 0831  •  tamsulosin (FLOMAX) 24 hr capsule 0.4 mg, 0.4 mg, Oral, Q PM, Bill Washington MD, 0.4 mg at 11/14/20 1621      Physical Exam:    General:   Awake, alert, up in chair.  Back:    LSO brace On . Incision midline lumbar with large dressing in place.  Some sanguinous output on the right side of the same.   Motor: Normal muscle strength, bulk, tone in upper and lower extremities.  No tremors noted today.    Reflexes: no clonus  Sensation: Normal to light touch.   Gait/Station: Per PT note 11/15-bed mobiltiy with vc/sba using rail today. CGA for STS to rwx with report of dizziness initially. Pt amb with rwx cga with one lob initially with self recovery in the first 50'. He amb another 150' with cga and no lob.   Extremities:   No calf tenderness or swelling      Assessment/Plan     ASSESSMENT:      Spondylolisthesis of lumbar region      PLAN: Patient continues to do well with regard to leg pain.  Unfortunately he lost his YULY last night and has had quite a bit of drainage on his dressing.  The incision area is flat.  He is not having any new back or leg pain outside of expected norms.  He is tolerating diet, voiding, ambulating well.  I am not comfortable resuming his Eliquis today with the drain having  "just come out incidentally overnight and having so much drainage on his dressing.  We will watch him today and make a decision regarding the Eliquis resumption tomorrow.  Likely ready for discharge tomorrow as well.  His son will be in town tomorrow to assist him.  Hemoglobin stable.  Repeat CBC AM.    I discussed the patient's findings and my recommendations with patient, nursing staff and Physical therapist.  Dr. Torres       LOS: 2 days       Jaimie Felipe, APRN  11/15/2020  11:12 EST    \"Dictated utilizing Dragon dictation\".      "

## 2020-11-15 NOTE — PLAN OF CARE
Problem: Adult Inpatient Plan of Care  Goal: Plan of Care Review  Outcome: Ongoing, Progressing  Flowsheets  Taken 11/15/2020 1832 by Bartolome Sauceda RN  Outcome Summary: Continuous A-fib.  A&Ox4.  Dressing changed to back x1. Possible start Eliquis on Monday. C/O left elbow swelling/currently on Allopurinol for gout related. Norco given x2.  Had no visitors.  Taken 11/14/2020 1516 by Quinten Watts, PT DPT  Plan of Care Reviewed With: patient  Goal: Patient-Specific Goal (Individualized)  Outcome: Ongoing, Progressing  Goal: Absence of Hospital-Acquired Illness or Injury  Outcome: Ongoing, Progressing  Intervention: Identify and Manage Fall Risk  Recent Flowsheet Documentation  Taken 11/15/2020 1800 by Bartolome Sauceda RN  Safety Promotion/Fall Prevention:   assistive device/personal items within reach   clutter free environment maintained   fall prevention program maintained   gait belt   lighting adjusted   mobility aid in reach   muscle strengthening facilitated   nonskid shoes/slippers when out of bed   room organization consistent   safety round/check completed  Taken 11/15/2020 1600 by Bartolome Sauceda RN  Safety Promotion/Fall Prevention:   activity supervised   assistive device/personal items within reach   clutter free environment maintained  Taken 11/15/2020 1431 by Bartolome Sauceda RN  Safety Promotion/Fall Prevention:   activity supervised   assistive device/personal items within reach   clutter free environment maintained   fall prevention program maintained   mobility aid in reach   muscle strengthening facilitated   nonskid shoes/slippers when out of bed   room organization consistent   safety round/check completed  Taken 11/15/2020 1200 by Bartolome Sauceda RN  Safety Promotion/Fall Prevention:   activity supervised   assistive device/personal items within reach   clutter free environment maintained   fall prevention program maintained   muscle strengthening facilitated   nonskid  shoes/slippers when out of bed   room organization consistent   safety round/check completed  Taken 11/15/2020 1055 by Bartolome Sauceda RN  Safety Promotion/Fall Prevention:   activity supervised   assistive device/personal items within reach   clutter free environment maintained   fall prevention program maintained   gait belt   lighting adjusted   nonskid shoes/slippers when out of bed   muscle strengthening facilitated   safety round/check completed   room organization consistent  Taken 11/15/2020 0800 by Bartolome Sauceda RN  Safety Promotion/Fall Prevention:   activity supervised   assistive device/personal items within reach   clutter free environment maintained   fall prevention program maintained   gait belt   lighting adjusted   mobility aid in reach   muscle strengthening facilitated   nonskid shoes/slippers when out of bed   room organization consistent   safety round/check completed  Goal: Optimal Comfort and Wellbeing  Outcome: Ongoing, Progressing  Intervention: Provide Person-Centered Care  Recent Flowsheet Documentation  Taken 11/15/2020 0800 by Bartolome Sauceda RN  Trust Relationship/Rapport:   care explained   choices provided   questions answered   questions encouraged  Goal: Readiness for Transition of Care  Outcome: Ongoing, Progressing     Problem: Fall Injury Risk  Goal: Absence of Fall and Fall-Related Injury  Outcome: Ongoing, Progressing  Intervention: Identify and Manage Contributors to Fall Injury Risk  Recent Flowsheet Documentation  Taken 11/15/2020 1600 by Bartolome Sauceda RN  Medication Review/Management: medications reviewed  Taken 11/15/2020 1431 by Bartolome Sauceda RN  Medication Review/Management: medications reviewed  Taken 11/15/2020 1200 by Bartolome Sauceda RN  Medication Review/Management: medications reviewed  Taken 11/15/2020 1055 by Bartolome Sauceda RN  Medication Review/Management: medications reviewed  Taken 11/15/2020 0800 by Bartolome Sauceda RN  Medication  Review/Management: medications reviewed  Self-Care Promotion: independence encouraged  Intervention: Promote Injury-Free Environment  Recent Flowsheet Documentation  Taken 11/15/2020 1800 by Bartolome Sauceda RN  Safety Promotion/Fall Prevention:   assistive device/personal items within reach   clutter free environment maintained   fall prevention program maintained   gait belt   lighting adjusted   mobility aid in reach   muscle strengthening facilitated   nonskid shoes/slippers when out of bed   room organization consistent   safety round/check completed  Taken 11/15/2020 1600 by Bartolome Sauceda RN  Safety Promotion/Fall Prevention:   activity supervised   assistive device/personal items within reach   clutter free environment maintained  Taken 11/15/2020 1431 by Bartolome Sauceda RN  Safety Promotion/Fall Prevention:   activity supervised   assistive device/personal items within reach   clutter free environment maintained   fall prevention program maintained   mobility aid in reach   muscle strengthening facilitated   nonskid shoes/slippers when out of bed   room organization consistent   safety round/check completed  Taken 11/15/2020 1200 by Bartolome Sauceda RN  Safety Promotion/Fall Prevention:   activity supervised   assistive device/personal items within reach   clutter free environment maintained   fall prevention program maintained   muscle strengthening facilitated   nonskid shoes/slippers when out of bed   room organization consistent   safety round/check completed  Taken 11/15/2020 1055 by Bartolome Sauceda, RN  Safety Promotion/Fall Prevention:   activity supervised   assistive device/personal items within reach   clutter free environment maintained   fall prevention program maintained   gait belt   lighting adjusted   nonskid shoes/slippers when out of bed   muscle strengthening facilitated   safety round/check completed   room organization consistent  Taken 11/15/2020 0800 by Bartolome Sauceda  RN  Safety Promotion/Fall Prevention:   activity supervised   assistive device/personal items within reach   clutter free environment maintained   fall prevention program maintained   gait belt   lighting adjusted   mobility aid in reach   muscle strengthening facilitated   nonskid shoes/slippers when out of bed   room organization consistent   safety round/check completed     Problem: Skin Injury Risk Increased  Goal: Skin Health and Integrity  Outcome: Ongoing, Progressing  Intervention: Optimize Skin Protection  Recent Flowsheet Documentation  Taken 11/15/2020 0800 by Bartolome Sauceda RN  Pressure Reduction Techniques: frequent weight shift encouraged  Pressure Reduction Devices: pressure-redistributing mattress utilized   Goal Outcome Evaluation:  Plan of Care Reviewed With: patient  Progress: no change  Outcome Summary: Continuous A-fib.  A&Ox4.  Dressing changed to back x1. Possible start Eliquis on Monday. C/O left elbow swelling/currently on Allopurinol for gout related. Norco given x2.  Had no visitors.

## 2020-11-15 NOTE — NURSING NOTE
Call back from Dr Torres. No New orders, he states to monitor dressing for excessive drainage and for c/o excessive pain and pressure around site. Will continue to monitor.

## 2020-11-15 NOTE — NURSING NOTE
Epic messaged attending related to the Pt complaining of left elbow gout flaring up agian.  It is about the size of a golf ball.  He is taking his Allopurinol (300mg day/ 100 nightly) now. Waiting reply.

## 2020-11-15 NOTE — PROGRESS NOTES
Juan James   74 y.o.  male    LOS: 2 days   Patient Care Team:  Luda Kidd MD as PCP - General (Family Medicine)  Bill Washington MD as Surgeon (Neurosurgery)  Self, Zafar Fraire MD as Consulting Physician (Vascular Surgery)  Alesia Aviles MD as Consulting Physician (Cardiology)      Subjective     Interval History:     Patient Complaints: No chest pain or shortness of breath.    Review of Systems:   The following systems were reviewed and negative;  respiratory and cardiovascular    Objective     Vital Sign Min/Max for last 24 hours  Temp  Min: 97.8 °F (36.6 °C)  Max: 99.1 °F (37.3 °C)   BP  Min: 114/60  Max: 145/76    Pulse  Min: 86  Max: 97       Intake/Output Summary (Last 24 hours) at 11/15/2020 0848  Last data filed at 11/15/2020 0500  Gross per 24 hour   Intake --   Output 3160 ml   Net -3160 ml       Daily Weights:  Wt Readings from Last 4 Encounters:   11/13/20 1745 98.9 kg (218 lb)   11/13/20 0749 98.9 kg (218 lb 0.6 oz)   11/05/20 0637 99.8 kg (220 lb)   11/03/20 0800 99.8 kg (220 lb)   10/13/20 0946 100 kg (221 lb 4 oz)       Physical Exam:      General Appearance:    Well developed and well nourished in no acute distress   Head:    Normocephalic, atraumatic   Neck:   supple, trachea midline, no thyromegaly, no   carotid bruit, no JVD   Lungs:    Clear to auscultation,respirations regular, even and                  unlabored    Heart:    Regular rhythm and normal rate, normal S1 and S2,                         No murmur, no gallop, no rub, no click   Chest Wall:    No abnormalities observed   Abdomen:     Normal bowel sounds, no masses, no organomegaly, soft        non-tender, non-distended, no guarding, no rebound                tenderness   Rectal:     Deferred   Extremities:  No edema.  Back brace in place.   Pulses:   Pulses palpable and equal bilaterally   Skin:   No bleeding, bruising or rash   Neurologic:   awake alert and oriented x3, speech clear and approp, no facial  drooping      Results Review:     Potassium No results found for: K       Creatinine No results found for: CREATININE   Magnesium No results found for: MG           Invalid input(s): LABALBU, PROT    Results from last 7 days   Lab Units 11/15/20  0537   WBC 10*3/mm3 10.33   HEMOGLOBIN g/dL 11.0*   HEMATOCRIT % 32.0*   PLATELETS 10*3/mm3 149     Lab Results   Lab Value Date/Time    TROPONINT <0.010 11/14/2020 1009    TROPONINT 0.059 (C) 04/04/2020 1124     No results found for: CHOL  Lab Results   Component Value Date    HDL 58 09/29/2020    HDL 38 (L) 11/26/2019    HDL 37 (L) 07/29/2019     Lab Results   Component Value Date     (H) 09/29/2020     (H) 11/26/2019     (H) 07/29/2019     Lab Results   Component Value Date    TRIG 288 (H) 09/29/2020    TRIG 175 (H) 11/26/2019    TRIG 140 07/29/2019     No components found for: CHOLHDL  Lab Results   Component Value Date    TSH 3.610 04/04/2020     Lab Results   Component Value Date    INR 1.23 (H) 04/04/2020    PTT 30.7 04/04/2020       I reviewed the patient's new clinical results.    Echo EF Estimated  No results found for: ECHOEFEST        Assessment/Plan   1.  Status post lumbar laminectomies, 11/13/2020.  2.  Coronary artery disease status post RCA stenting.  3.  Atrial fibrillation, with controlled ventricular response.  4.  Hypertension, dyslipidemia.    Continues to do well, with negative troponin and ECG today.  Recommend reinitiation of anticoagulation as soon as possible, based on neurosurgical evaluation.  Walking today with a back brace.      Oli Miles MD  11/15/20  08:48 EST      Time: 18    EMR Dragon/Transcription disclaimer:   Much of this encounter note is an electronic transcription/translation of spoken language to printed text. The electronic translation of spoken language may permit erroneous, or at times, nonsensical words or phrases to be inadvertently transcribed.  Although I have reviewed the note for such errors,  some may still exist. Please contact me if needed for clarification or correction.

## 2020-11-16 ENCOUNTER — APPOINTMENT (OUTPATIENT)
Dept: GENERAL RADIOLOGY | Facility: HOSPITAL | Age: 75
End: 2020-11-16

## 2020-11-16 LAB
BASOPHILS # BLD AUTO: 0.04 10*3/MM3 (ref 0–0.2)
BASOPHILS NFR BLD AUTO: 0.4 % (ref 0–1.5)
DEPRECATED RDW RBC AUTO: 42.2 FL (ref 37–54)
EOSINOPHIL # BLD AUTO: 0.18 10*3/MM3 (ref 0–0.4)
EOSINOPHIL NFR BLD AUTO: 1.7 % (ref 0.3–6.2)
ERYTHROCYTE [DISTWIDTH] IN BLOOD BY AUTOMATED COUNT: 12.6 % (ref 12.3–15.4)
HCT VFR BLD AUTO: 32.4 % (ref 37.5–51)
HGB BLD-MCNC: 11.5 G/DL (ref 13–17.7)
IMM GRANULOCYTES # BLD AUTO: 0.05 10*3/MM3 (ref 0–0.05)
IMM GRANULOCYTES NFR BLD AUTO: 0.5 % (ref 0–0.5)
LYMPHOCYTES # BLD AUTO: 2 10*3/MM3 (ref 0.7–3.1)
LYMPHOCYTES NFR BLD AUTO: 19.2 % (ref 19.6–45.3)
MCH RBC QN AUTO: 33.2 PG (ref 26.6–33)
MCHC RBC AUTO-ENTMCNC: 35.5 G/DL (ref 31.5–35.7)
MCV RBC AUTO: 93.6 FL (ref 79–97)
MONOCYTES # BLD AUTO: 1.19 10*3/MM3 (ref 0.1–0.9)
MONOCYTES NFR BLD AUTO: 11.4 % (ref 5–12)
NEUTROPHILS NFR BLD AUTO: 6.98 10*3/MM3 (ref 1.7–7)
NEUTROPHILS NFR BLD AUTO: 66.8 % (ref 42.7–76)
NRBC BLD AUTO-RTO: 0 /100 WBC (ref 0–0.2)
PLATELET # BLD AUTO: 172 10*3/MM3 (ref 140–450)
PMV BLD AUTO: 9.3 FL (ref 6–12)
RBC # BLD AUTO: 3.46 10*6/MM3 (ref 4.14–5.8)
WBC # BLD AUTO: 10.44 10*3/MM3 (ref 3.4–10.8)

## 2020-11-16 PROCEDURE — 99024 POSTOP FOLLOW-UP VISIT: CPT | Performed by: NURSE PRACTITIONER

## 2020-11-16 PROCEDURE — 97110 THERAPEUTIC EXERCISES: CPT

## 2020-11-16 PROCEDURE — 73070 X-RAY EXAM OF ELBOW: CPT

## 2020-11-16 PROCEDURE — 85025 COMPLETE CBC W/AUTO DIFF WBC: CPT | Performed by: NURSE PRACTITIONER

## 2020-11-16 RX ORDER — DOCUSATE SODIUM 100 MG/1
200 CAPSULE, LIQUID FILLED ORAL 2 TIMES DAILY
Status: DISCONTINUED | OUTPATIENT
Start: 2020-11-16 | End: 2020-11-17 | Stop reason: HOSPADM

## 2020-11-16 RX ADMIN — ALLOPURINOL 100 MG: 100 TABLET ORAL at 21:25

## 2020-11-16 RX ADMIN — SENNOSIDES 2 TABLET: 8.6 TABLET, FILM COATED ORAL at 21:25

## 2020-11-16 RX ADMIN — ALLOPURINOL 300 MG: 300 TABLET ORAL at 09:24

## 2020-11-16 RX ADMIN — FUROSEMIDE 40 MG: 40 TABLET ORAL at 09:24

## 2020-11-16 RX ADMIN — HYDROCODONE BITARTRATE AND ACETAMINOPHEN 1 TABLET: 5; 325 TABLET ORAL at 11:26

## 2020-11-16 RX ADMIN — HYDROCODONE BITARTRATE AND ACETAMINOPHEN 1 TABLET: 5; 325 TABLET ORAL at 21:25

## 2020-11-16 RX ADMIN — DOCUSATE SODIUM 200 MG: 100 CAPSULE ORAL at 21:25

## 2020-11-16 RX ADMIN — METOPROLOL SUCCINATE 25 MG: 25 TABLET, EXTENDED RELEASE ORAL at 09:24

## 2020-11-16 RX ADMIN — FUROSEMIDE 40 MG: 40 TABLET ORAL at 18:00

## 2020-11-16 RX ADMIN — APIXABAN 5 MG: 5 TABLET, FILM COATED ORAL at 21:25

## 2020-11-16 RX ADMIN — SODIUM CHLORIDE, PRESERVATIVE FREE 3 ML: 5 INJECTION INTRAVENOUS at 21:26

## 2020-11-16 RX ADMIN — HYDROCODONE BITARTRATE AND ACETAMINOPHEN 1 TABLET: 5; 325 TABLET ORAL at 16:30

## 2020-11-16 RX ADMIN — SODIUM CHLORIDE, PRESERVATIVE FREE 3 ML: 5 INJECTION INTRAVENOUS at 09:25

## 2020-11-16 RX ADMIN — TAMSULOSIN HYDROCHLORIDE 0.4 MG: 0.4 CAPSULE ORAL at 18:00

## 2020-11-16 RX ADMIN — HYDROCODONE BITARTRATE AND ACETAMINOPHEN 1 TABLET: 5; 325 TABLET ORAL at 02:51

## 2020-11-16 RX ADMIN — DOCUSATE SODIUM 100 MG: 100 CAPSULE, LIQUID FILLED ORAL at 09:24

## 2020-11-16 NOTE — PROGRESS NOTES
Juan James   74 y.o.  male    LOS: 3 days   Patient Care Team:  Luda Kidd MD as PCP - General (Family Medicine)  Bill Washington MD as Surgeon (Neurosurgery)  Self, Zafar Fraire MD as Consulting Physician (Vascular Surgery)  Alesia Aviles MD as Consulting Physician (Cardiology)      Subjective   Left elbow pain due to fluid around elbow       Medication Review:   Current Facility-Administered Medications:   •  allopurinol (ZYLOPRIM) tablet 100 mg, 100 mg, Oral, Nightly, Bill Washington MD, 100 mg at 11/15/20 2132  •  allopurinol (ZYLOPRIM) tablet 300 mg, 300 mg, Oral, Daily, Bill Washington MD, 300 mg at 11/16/20 0924  •  cyclobenzaprine (FLEXERIL) tablet 10 mg, 10 mg, Oral, Q8H PRN, Jaimie Felipe, APRN, 10 mg at 11/15/20 2144  •  docusate sodium (COLACE) capsule 100 mg, 100 mg, Oral, Daily, Jaimie Felipe, APRN, 100 mg at 11/16/20 0924  •  furosemide (LASIX) tablet 40 mg, 40 mg, Oral, BID, Bill Washington MD, 40 mg at 11/16/20 0924  •  HYDROcodone-acetaminophen (NORCO) 5-325 MG per tablet 1 tablet, 1 tablet, Oral, Q4H PRN, Bill Washington MD, 1 tablet at 11/16/20 1126  •  metoprolol succinate XL (TOPROL-XL) 24 hr tablet 25 mg, 25 mg, Oral, Q24H, Bill Washington MD, 25 mg at 11/16/20 0924  •  morphine injection 2 mg, 2 mg, Intravenous, Q4H PRN, 2 mg at 11/15/20 0228 **AND** naloxone (NARCAN) injection 0.4 mg, 0.4 mg, Intravenous, Q5 Min PRN, Bill Washington MD  •  ondansetron (ZOFRAN) tablet 4 mg, 4 mg, Oral, Q6H PRN **OR** ondansetron (ZOFRAN) injection 4 mg, 4 mg, Intravenous, Q6H PRN, Bill Washington MD, 4 mg at 11/13/20 1821  •  senna (SENOKOT) tablet 2 tablet, 2 tablet, Oral, Nightly, Jaimie Felipe, APRN, 2 tablet at 11/15/20 2132  •  sodium chloride 0.9 % flush 10 mL, 10 mL, Intravenous, PRN, Bill Washington MD  •  sodium chloride 0.9 % flush 3 mL, 3 mL, Intravenous, Q12H, Bill Washington MD, 3 mL at 11/16/20 0903  •  tamsulosin (FLOMAX) 24 hr capsule 0.4 mg,  0.4 mg, Oral, Q PM, Bill Washington MD, 0.4 mg at 11/15/20 1717      Objective   Vital Sign Min/Max for last 24 hours  Temp  Min: 96.5 °F (35.8 °C)  Max: 97.9 °F (36.6 °C)   BP  Min: 106/59  Max: 151/99    Pulse  Min: 55  Max: 105     Wt Readings from Last 3 Encounters:   11/13/20 98.9 kg (218 lb)   11/05/20 99.8 kg (220 lb)   11/03/20 99.8 kg (220 lb)        Intake/Output Summary (Last 24 hours) at 11/16/2020 1142  Last data filed at 11/16/2020 0928  Gross per 24 hour   Intake --   Output 1075 ml   Net -1075 ml     Physical Exam:      Gen: NAD, resting in bed  Neuro: AAOx3, no deficits  CVS: irreg rate/rhythm, no murmur, rubs or clicks, S1/S2, no S3/S4  Resp: clear t/o  GI: soft, NT, +bs  : voids  Extremities: no edema, left elbow swollen, PPP   Monitor:  Controlled afib    Results Review:         Sodium No results found for: NA   Potassium No results found for: K   Chloride No results found for: CL   Bicarbonate No results found for: PLASMABICARB   BUN No results found for: BUN   Creatinine No results found for: CREATININE   Calcium No results found for: CALCIUM   Magnesium No results found for: MG     Results from last 7 days   Lab Units 11/16/20  0703   WBC 10*3/mm3 10.44   HEMOGLOBIN g/dL 11.5*   HEMATOCRIT % 32.4*   PLATELETS 10*3/mm3 172     Lab Results   Lab Value Date/Time    TROPONINT <0.010 11/14/2020 1009    TROPONINT 0.059 (C) 04/04/2020 1124     No results found for: CHOL  Lab Results   Component Value Date    HDL 58 09/29/2020    HDL 38 (L) 11/26/2019    HDL 37 (L) 07/29/2019     Lab Results   Component Value Date     (H) 09/29/2020     (H) 11/26/2019     (H) 07/29/2019     Lab Results   Component Value Date    TRIG 288 (H) 09/29/2020    TRIG 175 (H) 11/26/2019    TRIG 140 07/29/2019     No components found for: CHOLHDL  No results found for: PTT  No components found for: PT/INR  Lab Results   Component Value Date    HGBA1C 5.40 06/08/2020      Lab Results   Component Value  Date    TSH 3.610 04/04/2020        2D echo 04/06/2020:  Echocardiogram Findings    Left Ventricle Calculated EF = 75%. Estimated EF was in agreement with the calculated EF. Estimated EF appears to be in the range of 61 - 65%. Normal left ventricular cavity size noted. All left ventricular wall segments contract normally. Left ventricular wall thickness is consistent with mild-to-moderate concentric hypertrophy. Left ventricular diastolic dysfunction is noted (grade II w/high LAP) consistent with pseudonormalization.   Right Ventricle Normal right ventricular systolic function noted. Right ventricular cavity is mildly dilated.   Left Atrium Left atrial cavity size is moderately dilated.   Right Atrium Right atrial cavity size is moderately dilated.   Aortic Valve The aortic valve is structurally normal. The valve appears trileaflet. No aortic valve regurgitation is present. No aortic valve stenosis is present.   Mitral Valve The mitral valve is normal in structure. Trace mitral valve regurgitation is present with a centrally-directed jet noted. No significant mitral valve stenosis is present.   Tricuspid Valve The tricuspid valve is normal.  No evidence of tricuspid valve stenosis is present. Mild to moderate tricuspid valve regurgitation is present. Estimated right ventricular systolic pressure from tricuspid regurgitation is markedly elevated (>55 mmHg). Calculated right ventricular systolic pressure from tricuspid regurgitation is 93.1 mmHg. Moderate to severe pulmonary hypertension is present.   Pulmonic Valve The pulmonic valve is structurally normal. There is no significant pulmonic valve stenosis present. There is no pulmonic valve regurgitation present.   Greater Vessels No dilation of the aortic root is present. No dilation of the sinuses of Valsalva is present.   Pericardium The pericardium is normal. There is no evidence of pericardial effusion.       Assessment/ Plan    1.  Spondylolosthesis Status post  lumbar laminectomies, 11/13/2020.  2.  Coronary artery disease status post RCA stenting.  3.  Atrial fibrillation, with controlled ventricular response.  -  On Eliquis and metoprolol succ  4.  Hypertension, dyslipidemia.  5.  Peripheral vascular disease  6. H/o GIB    BP stable. Rate controlled. Hb stable. Restart home dose Eliquis when ok with neuro. Stable from cardiac standpoint.  Will sign off at this time. Patient stated that he was told he will probably go home tomorrow.     Dolly CATHY Aranda, APRN  11/16/20  11:42 EST    Oli Miles M.D.   Reviewed our cardiology group Nurse Practitioner's note.    Pt interviewed and examined.   Findings verified.   Reviewed and agree with corrections or modifications of history, physical, and plans as indicated:    Heart rates this morning with patient at bed rest were . Have been variable, down to 55 and up to 105 on medical record.     may benefit as outpatient from addition of low-dose diltiazem. We will check in the morning to look at trends  EMR Dragon/Transcription disclaimer:   Much of this encounter note is an electronic transcription/translation of spoken language to printed text. The electronic translation of spoken language may permit erroneous, or at times, nonsensical words or phrases to be inadvertently transcribed.  Although I have reviewed the note for such errors, some may still exist. Please contact me if needed for clarification or correction.  Oli Miles M.D.

## 2020-11-16 NOTE — PLAN OF CARE
Patient A&O x4, denies chest pain or SOB. Back dressing changed x1 for moderated bleeding. Pt continue c/o left elbow swelling and pain. Will continue to monitor.   Problem: Adult Inpatient Plan of Care  Goal: Plan of Care Review  Outcome: Ongoing, Progressing  Flowsheets (Taken 11/16/2020 0523)  Plan of Care Reviewed With: patient  Goal: Patient-Specific Goal (Individualized)  Outcome: Ongoing, Progressing  Goal: Absence of Hospital-Acquired Illness or Injury  Outcome: Ongoing, Progressing  Intervention: Identify and Manage Fall Risk  Flowsheets  Taken 11/16/2020 0523  Safety Promotion/Fall Prevention:  • assistive device/personal items within reach  • clutter free environment maintained  Taken 11/16/2020 0450  Safety Promotion/Fall Prevention:  • assistive device/personal items within reach  • clutter free environment maintained  Taken 11/16/2020 0236  Safety Promotion/Fall Prevention:  • assistive device/personal items within reach  • clutter free environment maintained  Taken 11/16/2020 0031  Safety Promotion/Fall Prevention:  • assistive device/personal items within reach  • clutter free environment maintained  Taken 11/15/2020 2239  Safety Promotion/Fall Prevention:  • assistive device/personal items within reach  • clutter free environment maintained  Taken 11/15/2020 2046  Safety Promotion/Fall Prevention:  • assistive device/personal items within reach  • clutter free environment maintained  Intervention: Prevent Skin Injury  Flowsheets  Taken 11/16/2020 0450  Body Position: position changed independently  Taken 11/16/2020 0236  Body Position: position changed independently  Taken 11/16/2020 0031  Body Position: position changed independently  Taken 11/15/2020 2239  Body Position: position changed independently  Taken 11/15/2020 2046  Body Position: position changed independently  Intervention: Prevent and Manage VTE (venous thromboembolism) Risk  Flowsheets  Taken 11/16/2020 0450  VTE  Prevention/Management:  • bilateral  • dorsiflexion/plantar flexion performed  Taken 11/15/2020 2239  VTE Prevention/Management:  • bilateral  • dorsiflexion/plantar flexion performed  Taken 11/15/2020 2046  VTE Prevention/Management:  • bilateral  • sequential compression devices on  Intervention: Prevent Infection  Flowsheets  Taken 11/16/2020 0450  Infection Prevention: rest/sleep promoted  Taken 11/16/2020 0236  Infection Prevention: rest/sleep promoted  Taken 11/16/2020 0031  Infection Prevention: rest/sleep promoted  Taken 11/15/2020 2239  Infection Prevention: rest/sleep promoted  Taken 11/15/2020 2046  Infection Prevention: rest/sleep promoted  Goal: Optimal Comfort and Wellbeing  Outcome: Ongoing, Progressing  Intervention: Provide Person-Centered Care  Flowsheets  Taken 11/16/2020 0523  Trust Relationship/Rapport: care explained  Taken 11/15/2020 2046  Trust Relationship/Rapport: care explained  Goal: Readiness for Transition of Care  Outcome: Ongoing, Progressing     Problem: Fall Injury Risk  Goal: Absence of Fall and Fall-Related Injury  Outcome: Ongoing, Progressing  Intervention: Identify and Manage Contributors to Fall Injury Risk  Flowsheets  Taken 11/16/2020 0450 by Karsten Floyd RN  Medication Review/Management: medications reviewed  Taken 11/16/2020 0236 by Karsten Floyd RN  Medication Review/Management: medications reviewed  Taken 11/16/2020 0031 by Karsten Floyd RN  Medication Review/Management: medications reviewed  Taken 11/15/2020 2239 by Karsten Floyd RN  Medication Review/Management: medications reviewed  Taken 11/15/2020 2046 by Karsten Floyd RN  Medication Review/Management: medications reviewed  Taken 11/15/2020 0800 by Bartolome Sauceda RN  Self-Care Promotion: independence encouraged  Intervention: Promote Injury-Free Environment  Flowsheets  Taken 11/16/2020 0523  Safety Promotion/Fall Prevention:  • assistive device/personal items within  reach  • clutter free environment maintained  Taken 11/16/2020 0450  Safety Promotion/Fall Prevention:  • assistive device/personal items within reach  • clutter free environment maintained  Taken 11/16/2020 0236  Safety Promotion/Fall Prevention:  • assistive device/personal items within reach  • clutter free environment maintained  Taken 11/16/2020 0031  Safety Promotion/Fall Prevention:  • assistive device/personal items within reach  • clutter free environment maintained  Taken 11/15/2020 2239  Safety Promotion/Fall Prevention:  • assistive device/personal items within reach  • clutter free environment maintained  Taken 11/15/2020 2046  Safety Promotion/Fall Prevention:  • assistive device/personal items within reach  • clutter free environment maintained     Problem: Skin Injury Risk Increased  Goal: Skin Health and Integrity  Outcome: Ongoing, Progressing  Intervention: Optimize Skin Protection  Flowsheets  Taken 11/16/2020 0450  Head of Bed (HOB): HOB lowered  Taken 11/16/2020 0236  Head of Bed (HOB): HOB lowered  Taken 11/16/2020 0031  Head of Bed (HOB): HOB lowered  Taken 11/15/2020 2239  Head of Bed (HOB): HOB lowered  Taken 11/15/2020 2046  Pressure Reduction Techniques: frequent weight shift encouraged  Head of Bed (HOB): HOB at 20-30 degrees  Pressure Reduction Devices: pressure-redistributing mattress utilized  Skin Protection: adhesive use limited

## 2020-11-16 NOTE — PLAN OF CARE
Problem: Adult Inpatient Plan of Care  Goal: Plan of Care Review  Outcome: Ongoing, Progressing  Flowsheets (Taken 11/16/2020 9613)  Progress: improving  Plan of Care Reviewed With: patient  Outcome Summary: Pt tolerated treatment well this date. Increased gait distance to 400ft w/ Rw and CGA. Completed stairs w/ use of hand rail and CGA. Safe to d/c home w/ assist or HH from PT standpoint. Patient was wearing a face mask during this therapy encounter. Therapist used appropriate personal protective equipment including eye protection, mask, and gloves.  Mask used was standard procedure mask. Appropriate PPE was worn during the entire therapy session. Hand hygiene was completed before and after therapy session. Patient is not in enhanced droplet precautions.

## 2020-11-16 NOTE — THERAPY TREATMENT NOTE
Patient Name: Juan James  : 1945    MRN: [id number]                              Today's Date: 2020       Admit Date: 2020    Visit Dx:     ICD-10-CM ICD-9-CM   1. Spondylolisthesis of lumbar region  M43.16 738.4     Patient Active Problem List   Diagnosis   • Follow-up examination, following other surgery   • Lumbar radiculopathy   • Cervical radiculitis   • Essential hypertension   • Coronary artery disease involving native heart   • Chronic bronchitis (CMS/[facility])   • Hyperlipidemia   • Edema   • Rectal pain   • 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[facility])   • Cervical spondylosis with radiculopathy   • History of colon polyps   • Tinnitus   • Shortness of breath   • Pulmonary embolism (CMS/[facility])   • Peptic ulceration   • OA (osteoarthritis)   • Neck pain   • Lower back pain   • Hypertension   • History of blood transfusion   • Hearing loss   • GERD (gastroesophageal reflux disease)   • DDD (degenerative disc disease), lumbar   • DDD (degenerative disc disease), cervical   • Coronary artery disease   • COPD (chronic obstructive pulmonary disease) (CMS/[facility])   • Colon polyps   • CHF (congestive heart failure) (CMS/[facility])   • Cervical spondylosis   • Cervical disc disorder   • Cataract   • BPH (benign prostatic hyperplasia)   • Atrial fibrillation (CMS/[facility])   • Arthritis   • AAA (abdominal aortic aneurysm) (CMS/[facility])   • Medicare annual wellness visit, subsequent   • Heart block AV complete (CMS/[facility])   • PVD (peripheral vascular disease) with claudication (CMS/[facility])   • Acute renal failure (ARF) (CMS/[facility])   • Hospital discharge follow-up   • Idiopathic chronic gout of multiple sites without tophus   • Spondylolisthesis of lumbar region     Past Medical History:   Diagnosis Date   • AAA (abdominal aortic aneurysm) (CMS/[facility])    • Arthritis    • Atherosclerotic heart disease of native coronary artery without angina pectoris    • Atrial fibrillation (CMS/[facility])    • Benign essential hypertension    • BPH (benign  prostatic hyperplasia)    • Cervical spondylosis 11/2012   • CHF (congestive heart failure) (CMS/H[facility])    • COPD (chronic obstructive pulmonary disease) (CMS/H[facility])    • Coronary artery disease     STENT X 3   • DDD (degenerative disc disease), cervical    • DDD (degenerative disc disease), lumbar    • Dry skin     LOWER LEGS BILAT   • Edema     LOWER LEGS BILAT   • Elevated cholesterol    • Emphysema/COPD (CMS/HCC)    • Essential hypertriglyceridemia    • Hearing loss    • High blood pressure    • History of blood transfusion    • History of gastrointestinal hemorrhage    • History of gout    • History of myasthenia gravis     LAST EPISODE 5-6 YEARS AGO   • Hoarseness, persistent    • Hyperlipidemia    • Hypertension    • Lower back pain    • Lumbar radiculopathy 05/2016   • OA (osteoarthritis)    • Peptic ulceration     PUD   • Skin abnormalities     FLAKEY SKIN LOWER LEGS BILAT   • Sleep apnea     DOES NOT WEAR CPAP MACHINE    • Status post angioplasty with stent     STENT X3   • Tinnitus     BILAT   • Unsteady gait when walking     USES 3 PRONG CANE     Past Surgical History:   Procedure Laterality Date   • ANTERIOR CERVICAL DISCECTOMY W/ FUSION N/A 3/25/2016    Procedure: C3- C5 CERVICAL DISCECTOMY ANTERIOR FUSION WITH INSTRUMENTATION;  Surgeon: Bill Washington MD;  Location: [facility];  Service:    • ARTHRODESIS N/A 07/1983    LUMBAR   • ARTHRODESIS N/A 1987    LUMBAR   • ARTHRODESIS      Spinal Arthrodesis-lower spine-7/1983, 1987--upper spine-1988, 7/1990-Abstracted from [employer]   • BACK SURGERY      Lower Back Surgery   • CARDIAC CATHETERIZATION  07/2009   • CARDIAC CATHETERIZATION  03/18/2003    STENT TO RCA AND PCA, DR. PRIYA LUGO   • CATARACT EXTRACTION Bilateral     LENS IMPLANT   • CERVICAL ARTHRODESIS N/A 07/1990   • CERVICAL ARTHRODESIS N/A 1988   • COLONOSCOPY N/A 10/3/2019    4 MM HYPERPLASTIC POLYP IN ILEOCECAL VALVE, 4 MM SESSILE SERRATED ADENOMA POLYP IN DESCENDING, 5 TUBULOVILLOUS  ADENOMA POLYPS IN RECTUM, 3 TUBULAR ADENOMA POLYPS IN DISTAL RECTUM, 26 MM TUBULAR ADENOMA POLYP IN RECTUM, PIECEMEAL POLYPECTOMY, AREA TATTOOED, RESCOPE IN 6 MONTHS, DR. TAYA CRUZ AT [facility]   • CORONARY ANGIOPLASTY WITH STENT PLACEMENT  02/2009    and 7/2009- STATES HAS 3 STENTS   • ENDOSCOPY N/A 06/24/2012    NON BLEEDING EROSIVE GASTROPATHY, 1 DUODENAL ULCER WITH CLEAN BASE, DR. BRANDY WREN AT [facility]   • ENDOSCOPY AND COLONOSCOPY N/A 11/20/2007    EGD WNL, 8 ADENOMATOUS POLYPS IN RECTO-SIGMOID, RESCOPE IN 3 YRS, DR. CRISTINA RODRIGUEZ AT [facility]   • FOOT SURGERY Left     LEFT BIG TOE-JOINT REPLACED   • HAND SURGERY Left     THUMB-JOINT REPLACEMENT   • HAND SURGERY Right     JOINT REPLACEMENT RIGHT INDEX FINGER   • KNEE ARTHROSCOPY Left    • LAPAROSCOPIC CHOLECYSTECTOMY     • NECK SURGERY     • VASECTOMY Bilateral      General Information     Row Name 11/16/20 1343          Physical Therapy Time and Intention    Document Type  therapy note (daily note)  -     Mode of Treatment  physical therapy  -     Row Name 11/16/20 1343          General Information    Existing Precautions/Restrictions  fall;brace worn when out of bed;spinal  -     Row Name 11/16/20 1343          Cognition    Orientation Status (Cognition)  oriented x 4  -       User Key  (r) = Recorded By, (t) = Taken By, (c) = Cosigned By    Initials Name Provider Type     Shawna Abdul PTA Physical Therapy Assistant        Mobility     Row Name 11/16/20 1343          Bed Mobility    Bed Mobility  supine-sit;sit-supine  -     Supine-Sit [place] (Bed Mobility)  supervision  -     Sit-Supine [place] (Bed Mobility)  supervision  -     Assistive Device (Bed Mobility)  bed rails;head of bed elevated  -     Comment (Bed Mobility)  log roll  -     Row Name 11/16/20 1343          Sit-Stand Transfer    Sit-Stand [place] (Transfers)  standby assist  -     Assistive Device (Sit-Stand Transfers)  walker, front-wheeled  -     Row Name  11/16/20 1343          Gait/Stairs (Locomotion)    [place] Level (Gait)  contact guard  -     Assistive Device (Gait)  walker, front-wheeled  -     Distance in Feet (Gait)  400  -SM     Deviations/Abnormal Patterns (Gait)  shakila decreased;stride length decreased  -     Bilateral Gait Deviations  forward flexed posture  -SM     [place] Level (Stairs)  contact guard  -SM     Handrail Location (Stairs)  left side (ascending)  -     Number of Steps (Stairs)  3  -SM     Ascending Technique (Stairs)  step-to-step  -SM     Descending Technique (Stairs)  step-to-step  -SM       User Key  (r) = Recorded By, (t) = Taken By, (c) = Cosigned By    Initials Name Provider Type    Shawna Griffin PTA Physical Therapy Assistant        Obj/Interventions     Row Name 11/16/20 1341          Motor Skills    Therapeutic Exercise  -- seated AP, LAQ, marches x10 reps  -       User Key  (r) = Recorded By, (t) = Taken By, (c) = Cosigned By    Initials Name Provider Type    Shawna Griffin PTA Physical Therapy Assistant        Goals/Plan    No documentation.       Clinical Impression     Row Name 11/16/20 0028          Pain    Additional Documentation  Pain Scale: Numbers Pre/Post-Treatment (Group)  -SM     Row Name 11/16/20 6104          Pain Scale: Numbers Pre/Post-Treatment    Pretreatment Pain Rating  2/10  -     Posttreatment Pain Rating  3/10  -     Pain Location  back  -     Pain Intervention(s)  Repositioned;Ambulation/increased activity;Rest  -SM     Row Name 11/16/20 5313          Positioning and Restraints    Pre-Treatment Position  in bed  -     Post Treatment Position  bed  -SM     In Bed  supine;call light within reach;encouraged to call for assist;exit alarm on  -       User Key  (r) = Recorded By, (t) = Taken By, (c) = Cosigned By    Initials Name Provider Type    Shawna Griffin PTA Physical Therapy Assistant        Outcome Measures     Row Name 11/16/20 6327           How much help from another person do you currently need...    Turning from your back to your side while in flat bed without using bedrails?  3  -SM     Moving from lying on back to sitting on the side of a flat bed without bedrails?  3  -SM     Moving to and from a bed to a chair (including a wheelchair)?  4  -SM     Standing up from a chair using your arms (e.g., wheelchair, bedside chair)?  4  -SM     Climbing 3-5 steps with a railing?  3  -SM     To walk in hospital room?  3  -SM     AM-PAC 6 Clicks Score (PT)  20  -     Row Name 11/16/20 1345          Functional Assessment    Outcome Measure Options  AM-PAC 6 Clicks Basic Mobility (PT)  -       User Key  (r) = Recorded By, (t) = Taken By, (c) = Cosigned By    Initials Name Provider Type    Shawna Griffin PTA Physical Therapy Assistant        Physical Therapy Education                 Title: PT OT SLP Therapies (Done)     Topic: Physical Therapy (Done)     Point: Mobility training (Done)     Learning Progress Summary           Patient Acceptance, E,TB,D, VU,NR by  at 11/16/2020 1346    Eager, E,TB, VU,NR by  at 11/15/2020 1019    Acceptance, E,D, DU,VU by  at 11/14/2020 1520    Comment: log roll, LSO donning/doffing, back protocol                   Point: Home exercise program (Done)     Learning Progress Summary           Patient Acceptance, E,TB,D, VU,NR by  at 11/16/2020 1346    Eager, E,TB, VU,NR by  at 11/15/2020 1019    Acceptance, E,D, DU,VU by  at 11/14/2020 1520    Comment: log roll, LSO donning/doffing, back protocol                   Point: Body mechanics (Done)     Learning Progress Summary           Patient Acceptance, E,TB,D, VU,NR by  at 11/16/2020 1346    Eager, E,TB, VU,NR by  at 11/15/2020 1019    Acceptance, E,D, DU,VU by  at 11/14/2020 1520    Comment: log roll, LSO donning/doffing, back protocol                   Point: Precautions (Done)     Learning Progress Summary           Patient Acceptance, E,TB,D, VU,NR  by  at 11/16/2020 1346    Eager, E,TB, VU,NR by  at 11/15/2020 1019    Acceptance, E,D, DU,VU by  at 11/14/2020 1520    Comment: log roll, LSO donning/doffing, back protocol                               User Key     Initials Effective Dates Name Provider Type Discipline     04/03/18 -  Shirley Lama M, PT Physical Therapist PT     03/07/18 -  Shawna Abdul PTA Physical Therapy Assistant PT     07/02/20 -  Quinten Watts M, PT DPT Physical Therapist PT              PT Recommendation and Plan     Plan of Care Reviewed With: patient  Progress: improving  Outcome Summary: Pt tolerated treatment well this date. Increased gait distance to 400ft w/ Rw and CGA. Completed stairs w/ use of hand rail and CGA. Safe to d/c home w/ assist or HH from PT standpoint. Patient was wearing a face mask during this therapy encounter. Therapist used appropriate personal protective equipment including eye protection, mask, and gloves.  Mask used was standard procedure mask. Appropriate PPE was worn during the entire therapy session. Hand hygiene was completed before and after therapy session. Patient is not in enhanced droplet precautions.     Time Calculation:   PT Charges     Row Name 11/16/20 1349             Time Calculation    Start Time  1333  -      Stop Time  1356  -      Time Calculation (min)  23 min  -      PT Received On  11/16/20  -      PT - Next Appointment  11/17/20  -        User Key  (r) = Recorded By, (t) = Taken By, (c) = Cosigned By    Initials Name Provider Type     Shawna Abdul PTA Physical Therapy Assistant        Therapy Charges for Today     Code Description Service Date Service Provider Modifiers Qty    [id number] HC PT THER PROC EA 15 MIN 11/16/2020 Shawna Abdul PTA GP 2          PT G-Codes  Outcome Measure Options: AM-PAC 6 Clicks Basic Mobility (PT)  AM-PAC 6 Clicks Score (PT): 20    Shawna Abdul PTA  11/16/2020

## 2020-11-16 NOTE — PLAN OF CARE
Problem: Adult Inpatient Plan of Care  Goal: Plan of Care Review  Outcome: Ongoing, Progressing  Flowsheets  Taken 11/16/2020 1645 by Bartolome Sauceda RN  Progress: improving  Outcome Summary: A&Ox4.  Norco for pain x2.  Bandage changed/Drainage to Prior YULY drain hole only that was inadvertantly removed by Pt days prior.  Assist x1 with walker/gait belt.  Had BM.  I/S in use. Ortho consult to left elbow.  Transferred to [facility]. Son visited.  Taken 11/16/2020 1346 by Shawna Abdul PTA  Plan of Care Reviewed With: patient  Goal: Patient-Specific Goal (Individualized)  Outcome: Ongoing, Progressing  Goal: Absence of Hospital-Acquired Illness or Injury  Outcome: Ongoing, Progressing  Intervention: Identify and Manage Fall Risk  Recent Flowsheet Documentation  Taken 11/16/2020 1600 by Bartolome Sauceda, RN  Safety Promotion/Fall Prevention:   assistive device/personal items within reach   clutter free environment maintained   fall prevention program maintained   gait belt   lighting adjusted   mobility aid in reach   muscle strengthening facilitated   nonskid shoes/slippers when out of bed   room organization consistent   safety round/check completed  Taken 11/16/2020 1400 by Bartolome Sauceda RN  Safety Promotion/Fall Prevention:   assistive device/personal items within reach   clutter free environment maintained   fall prevention program maintained   room organization consistent   safety round/check completed  Taken 11/16/2020 1200 by Bartolome Sauceda, RN  Safety Promotion/Fall Prevention:   assistive device/personal items within reach   clutter free environment maintained   fall prevention program maintained   mobility aid in reach   muscle strengthening facilitated   nonskid shoes/slippers when out of bed   room organization consistent   safety round/check completed  Taken 11/16/2020 1000 by Bartolome Sauceda, RN  Safety Promotion/Fall Prevention:   assistive device/personal items within reach   clutter free  environment maintained   fall prevention program maintained   gait belt   lighting adjusted   mobility aid in reach   muscle strengthening facilitated   nonskid shoes/slippers when out of bed   room organization consistent   safety round/check completed  Taken 11/16/2020 0800 by Bartolmoe Sauceda RN  Safety Promotion/Fall Prevention:   assistive device/personal items within reach   clutter free environment maintained   fall prevention program maintained   gait belt   lighting adjusted   mobility aid in reach   muscle strengthening facilitated   nonskid shoes/slippers when out of bed   room organization consistent   safety round/check completed  Goal: Optimal Comfort and Wellbeing  Outcome: Ongoing, Progressing  Intervention: Provide Person-Centered Care  Recent Flowsheet Documentation  Taken 11/16/2020 0800 by Bartolome Sauceda RN  Trust Relationship/Rapport:   care explained   choices provided   questions answered   questions encouraged  Goal: Readiness for Transition of Care  Outcome: Ongoing, Progressing     Problem: Fall Injury Risk  Goal: Absence of Fall and Fall-Related Injury  Outcome: Ongoing, Progressing  Intervention: Identify and Manage Contributors to Fall Injury Risk  Recent Flowsheet Documentation  Taken 11/16/2020 1600 by Bartolome Sauceda RN  Medication Review/Management: medications reviewed  Taken 11/16/2020 0800 by Bartolome Sauceda RN  Medication Review/Management: medications reviewed  Self-Care Promotion: independence encouraged  Intervention: Promote Injury-Free Environment  Recent Flowsheet Documentation  Taken 11/16/2020 1600 by Bartolome Sauceda RN  Safety Promotion/Fall Prevention:   assistive device/personal items within reach   clutter free environment maintained   fall prevention program maintained   gait belt   lighting adjusted   mobility aid in reach   muscle strengthening facilitated   nonskid shoes/slippers when out of bed   room organization consistent   safety round/check  completed  Taken 11/16/2020 1400 by Bartolome Sauceda RN  Safety Promotion/Fall Prevention:   assistive device/personal items within reach   clutter free environment maintained   fall prevention program maintained   room organization consistent   safety round/check completed  Taken 11/16/2020 1200 by Bartolome Sauceda RN  Safety Promotion/Fall Prevention:   assistive device/personal items within reach   clutter free environment maintained   fall prevention program maintained   mobility aid in reach   muscle strengthening facilitated   nonskid shoes/slippers when out of bed   room organization consistent   safety round/check completed  Taken 11/16/2020 1000 by Bartolome Sauceda RN  Safety Promotion/Fall Prevention:   assistive device/personal items within reach   clutter free environment maintained   fall prevention program maintained   gait belt   lighting adjusted   mobility aid in reach   muscle strengthening facilitated   nonskid shoes/slippers when out of bed   room organization consistent   safety round/check completed  Taken 11/16/2020 0800 by Bartolome Sauceda RN  Safety Promotion/Fall Prevention:   assistive device/personal items within reach   clutter free environment maintained   fall prevention program maintained   gait belt   lighting adjusted   mobility aid in reach   muscle strengthening facilitated   nonskid shoes/slippers when out of bed   room organization consistent   safety round/check completed     Problem: Skin Injury Risk Increased  Goal: Skin Health and Integrity  Outcome: Ongoing, Progressing  Intervention: Optimize Skin Protection  Recent Flowsheet Documentation  Taken 11/16/2020 0800 by Bartolome Sauceda RN  Pressure Reduction Techniques: frequent weight shift encouraged   Goal Outcome Evaluation:  Plan of Care Reviewed With: patient  Progress: improving  Outcome Summary: A&Ox4.  Norco for pain x2.  Bandage changed/Drainage to Prior YULY drain hole only that was inadvertantly removed by Pt  days prior.  Assist x1 with walker/gait belt.  Had BM.  I/S in use. Ortho consult to left elbow.  Transferred to [facility]. Son visited.

## 2020-11-16 NOTE — PROGRESS NOTES
NEUROSURGERY POST OP PROGRESS NOTE       LOS: 3 days   Patient Care Team:  Luda Kidd MD as PCP - General (Family Medicine)  Bill Washington MD as Surgeon (Neurosurgery)  Self, Zafar Fraire MD as Consulting Physician (Vascular Surgery)  Alesia Aviles MD as Consulting Physician (Cardiology)      Subjective      Interval History:     Reports improvement in leg pain. Now c/o severe throbbing pain and swelling L elbow. Prone to gout flares. Feels shaky with walking, otherwise doing well with walker. Voiding without difficulty. YULY drain fell out yesterday.     History taken from: patient chart RN    Objective        Vital Signs  Temp:  [96.5 °F (35.8 °C)-97.9 °F (36.6 °C)] 97.5 °F (36.4 °C)  Heart Rate:  [] 105  Resp:  [16-18] 18  BP: (106-151)/(59-99) 128/77       Up in chair with brace on. AA&O x 3.   Serosanguinous drainage on bandage; removed.   No leakage noted to palpation of skin surrounding old drain site and along the length of lumbar incision.   No swelling or bruising noted.   Clean dressing applied - will observe  No calf swelling, warmth or tenderness to bilateral palpation.  L elbow swollen, tender, warm on palpation; palpable fluid       Results Review:     I reviewed the patient's new clinical results.     .  Results from last 7 days   Lab Units 11/16/20  0703 11/15/20  0537 11/14/20  0457   WBC 10*3/mm3 10.44 10.33 9.97   HEMOGLOBIN g/dL 11.5* 11.0* 10.6*   HEMATOCRIT % 32.4* 32.0* 30.5*   PLATELETS 10*3/mm3 172 149 165       Assessment/Plan       Spondylolisthesis of lumbar region    POD 3 bilateral L3, L4, L5 laminectomy with facetectomy L4-5, L3-4, TLIF with instrumentation by Dr. Washington for lumbar stenosis and lumbar instability L3-4 and L4-5.  Gout flare, left elbow pain  Hx: A fib; Eliquis on hold      Consult orthopedics for evaluate L elbow  Continue to mobilize  Observe lumbar wound; resume Eliquis later today if bandage remains dry  CBC in am  Hopefully home  tomorrow      Sophie Qiu, APRN  11/16/20  12:05 EST

## 2020-11-17 ENCOUNTER — APPOINTMENT (OUTPATIENT)
Dept: GENERAL RADIOLOGY | Facility: HOSPITAL | Age: 75
End: 2020-11-17

## 2020-11-17 ENCOUNTER — APPOINTMENT (OUTPATIENT)
Dept: CARDIOLOGY | Facility: HOSPITAL | Age: 75
End: 2020-11-17

## 2020-11-17 ENCOUNTER — READMISSION MANAGEMENT (OUTPATIENT)
Dept: CALL CENTER | Facility: HOSPITAL | Age: 75
End: 2020-11-17

## 2020-11-17 ENCOUNTER — APPOINTMENT (OUTPATIENT)
Dept: MRI IMAGING | Facility: HOSPITAL | Age: 75
End: 2020-11-17

## 2020-11-17 VITALS
BODY MASS INDEX: 28.89 KG/M2 | DIASTOLIC BLOOD PRESSURE: 77 MMHG | HEART RATE: 89 BPM | WEIGHT: 218 LBS | RESPIRATION RATE: 16 BRPM | SYSTOLIC BLOOD PRESSURE: 116 MMHG | OXYGEN SATURATION: 99 % | TEMPERATURE: 98.4 F | HEIGHT: 73 IN

## 2020-11-17 PROBLEM — M70.22 OLECRANON BURSITIS OF LEFT ELBOW: Status: ACTIVE | Noted: 2020-11-17

## 2020-11-17 LAB
APPEARANCE FLD: ABNORMAL
BASOPHILS # BLD AUTO: 0.03 10*3/MM3 (ref 0–0.2)
BASOPHILS NFR BLD AUTO: 0.4 % (ref 0–1.5)
BH CV UPPER VENOUS LEFT AXILLARY AUGMENT: NORMAL
BH CV UPPER VENOUS LEFT AXILLARY COMPETENT: NORMAL
BH CV UPPER VENOUS LEFT AXILLARY COMPRESS: NORMAL
BH CV UPPER VENOUS LEFT AXILLARY PHASIC: NORMAL
BH CV UPPER VENOUS LEFT AXILLARY SPONT: NORMAL
BH CV UPPER VENOUS LEFT BASILIC FOREARM COMPRESS: NORMAL
BH CV UPPER VENOUS LEFT BASILIC UPPER COMPRESS: NORMAL
BH CV UPPER VENOUS LEFT BRACHIAL COMPRESS: NORMAL
BH CV UPPER VENOUS LEFT CEPHALIC FOREARM COMPRESS: NORMAL
BH CV UPPER VENOUS LEFT CEPHALIC UPPER COMPRESS: NORMAL
BH CV UPPER VENOUS LEFT INTERNAL JUGULAR AUGMENT: NORMAL
BH CV UPPER VENOUS LEFT INTERNAL JUGULAR COMPETENT: NORMAL
BH CV UPPER VENOUS LEFT INTERNAL JUGULAR COMPRESS: NORMAL
BH CV UPPER VENOUS LEFT INTERNAL JUGULAR PHASIC: NORMAL
BH CV UPPER VENOUS LEFT INTERNAL JUGULAR SPONT: NORMAL
BH CV UPPER VENOUS LEFT RADIAL COMPRESS: NORMAL
BH CV UPPER VENOUS LEFT SUBCLAVIAN AUGMENT: NORMAL
BH CV UPPER VENOUS LEFT SUBCLAVIAN COMPETENT: NORMAL
BH CV UPPER VENOUS LEFT SUBCLAVIAN COMPRESS: NORMAL
BH CV UPPER VENOUS LEFT SUBCLAVIAN PHASIC: NORMAL
BH CV UPPER VENOUS LEFT SUBCLAVIAN SPONT: NORMAL
BH CV UPPER VENOUS LEFT ULNAR COMPRESS: NORMAL
BH CV UPPER VENOUS RIGHT INTERNAL JUGULAR AUGMENT: NORMAL
BH CV UPPER VENOUS RIGHT INTERNAL JUGULAR COMPETENT: NORMAL
BH CV UPPER VENOUS RIGHT INTERNAL JUGULAR COMPRESS: NORMAL
BH CV UPPER VENOUS RIGHT INTERNAL JUGULAR PHASIC: NORMAL
BH CV UPPER VENOUS RIGHT INTERNAL JUGULAR SPONT: NORMAL
BH CV UPPER VENOUS RIGHT SUBCLAVIAN AUGMENT: NORMAL
BH CV UPPER VENOUS RIGHT SUBCLAVIAN COMPETENT: NORMAL
BH CV UPPER VENOUS RIGHT SUBCLAVIAN COMPRESS: NORMAL
BH CV UPPER VENOUS RIGHT SUBCLAVIAN PHASIC: NORMAL
BH CV UPPER VENOUS RIGHT SUBCLAVIAN SPONT: NORMAL
COLOR FLD: ABNORMAL
CRYSTALS FLD MICRO: NORMAL
DEPRECATED RDW RBC AUTO: 44.8 FL (ref 37–54)
EOSINOPHIL # BLD AUTO: 0.23 10*3/MM3 (ref 0–0.4)
EOSINOPHIL NFR BLD AUTO: 2.9 % (ref 0.3–6.2)
ERYTHROCYTE [DISTWIDTH] IN BLOOD BY AUTOMATED COUNT: 12.9 % (ref 12.3–15.4)
HCT VFR BLD AUTO: 31.8 % (ref 37.5–51)
HGB BLD-MCNC: 11.1 G/DL (ref 13–17.7)
IMM GRANULOCYTES # BLD AUTO: 0.04 10*3/MM3 (ref 0–0.05)
IMM GRANULOCYTES NFR BLD AUTO: 0.5 % (ref 0–0.5)
LYMPHOCYTES # BLD AUTO: 1.46 10*3/MM3 (ref 0.7–3.1)
LYMPHOCYTES NFR BLD AUTO: 18.3 % (ref 19.6–45.3)
MCH RBC QN AUTO: 33.5 PG (ref 26.6–33)
MCHC RBC AUTO-ENTMCNC: 34.9 G/DL (ref 31.5–35.7)
MCV RBC AUTO: 96.1 FL (ref 79–97)
METHOD: ABNORMAL
MONOCYTES # BLD AUTO: 0.86 10*3/MM3 (ref 0.1–0.9)
MONOCYTES NFR BLD AUTO: 10.8 % (ref 5–12)
MONOCYTES NFR FLD: 1 %
MONOS+MACROS NFR FLD: 1 %
NEUTROPHILS NFR BLD AUTO: 5.34 10*3/MM3 (ref 1.7–7)
NEUTROPHILS NFR BLD AUTO: 67.1 % (ref 42.7–76)
NEUTROPHILS NFR FLD MANUAL: 98 %
NRBC BLD AUTO-RTO: 0 /100 WBC (ref 0–0.2)
NUC CELL # FLD: 3890 /MM3
PLATELET # BLD AUTO: 179 10*3/MM3 (ref 140–450)
PMV BLD AUTO: 9.1 FL (ref 6–12)
RBC # BLD AUTO: 3.31 10*6/MM3 (ref 4.14–5.8)
RBC # FLD AUTO: ABNORMAL /MM3
URATE SERPL-MCNC: 6 MG/DL (ref 3.4–7)
WBC # BLD AUTO: 7.96 10*3/MM3 (ref 3.4–10.8)

## 2020-11-17 PROCEDURE — 73120 X-RAY EXAM OF HAND: CPT

## 2020-11-17 PROCEDURE — 87075 CULTR BACTERIA EXCEPT BLOOD: CPT | Performed by: PHYSICIAN ASSISTANT

## 2020-11-17 PROCEDURE — 89051 BODY FLUID CELL COUNT: CPT | Performed by: PHYSICIAN ASSISTANT

## 2020-11-17 PROCEDURE — 93971 EXTREMITY STUDY: CPT

## 2020-11-17 PROCEDURE — 89060 EXAM SYNOVIAL FLUID CRYSTALS: CPT | Performed by: PHYSICIAN ASSISTANT

## 2020-11-17 PROCEDURE — 99024 POSTOP FOLLOW-UP VISIT: CPT | Performed by: NURSE PRACTITIONER

## 2020-11-17 PROCEDURE — 85025 COMPLETE CBC W/AUTO DIFF WBC: CPT | Performed by: NURSE PRACTITIONER

## 2020-11-17 PROCEDURE — 87205 SMEAR GRAM STAIN: CPT | Performed by: PHYSICIAN ASSISTANT

## 2020-11-17 PROCEDURE — 87070 CULTURE OTHR SPECIMN AEROBIC: CPT | Performed by: PHYSICIAN ASSISTANT

## 2020-11-17 PROCEDURE — 73221 MRI JOINT UPR EXTREM W/O DYE: CPT

## 2020-11-17 PROCEDURE — 97116 GAIT TRAINING THERAPY: CPT

## 2020-11-17 PROCEDURE — 84550 ASSAY OF BLOOD/URIC ACID: CPT | Performed by: PHYSICIAN ASSISTANT

## 2020-11-17 PROCEDURE — 97530 THERAPEUTIC ACTIVITIES: CPT

## 2020-11-17 RX ORDER — LIDOCAINE HYDROCHLORIDE 10 MG/ML
2 INJECTION, SOLUTION EPIDURAL; INFILTRATION; INTRACAUDAL; PERINEURAL ONCE
Status: COMPLETED | OUTPATIENT
Start: 2020-11-17 | End: 2020-11-17

## 2020-11-17 RX ORDER — CYCLOBENZAPRINE HCL 10 MG
5 TABLET ORAL EVERY 8 HOURS PRN
Qty: 30 TABLET | Refills: 0 | Status: SHIPPED | OUTPATIENT
Start: 2020-11-17 | End: 2021-01-11 | Stop reason: SDUPTHER

## 2020-11-17 RX ORDER — HYDROCODONE BITARTRATE AND ACETAMINOPHEN 5; 325 MG/1; MG/1
1 TABLET ORAL EVERY 6 HOURS PRN
Qty: 40 TABLET | Refills: 0 | Status: SHIPPED | OUTPATIENT
Start: 2020-11-17 | End: 2021-03-18

## 2020-11-17 RX ADMIN — APIXABAN 5 MG: 5 TABLET, FILM COATED ORAL at 08:30

## 2020-11-17 RX ADMIN — DOCUSATE SODIUM 200 MG: 100 CAPSULE ORAL at 08:30

## 2020-11-17 RX ADMIN — LIDOCAINE HYDROCHLORIDE 2 ML: 10 INJECTION, SOLUTION EPIDURAL; INFILTRATION; INTRACAUDAL; PERINEURAL at 06:30

## 2020-11-17 RX ADMIN — FUROSEMIDE 40 MG: 40 TABLET ORAL at 17:08

## 2020-11-17 RX ADMIN — ALLOPURINOL 300 MG: 300 TABLET ORAL at 08:30

## 2020-11-17 RX ADMIN — FUROSEMIDE 40 MG: 40 TABLET ORAL at 08:30

## 2020-11-17 RX ADMIN — TAMSULOSIN HYDROCHLORIDE 0.4 MG: 0.4 CAPSULE ORAL at 17:08

## 2020-11-17 RX ADMIN — METOPROLOL SUCCINATE 25 MG: 25 TABLET, EXTENDED RELEASE ORAL at 08:30

## 2020-11-17 RX ADMIN — HYDROCODONE BITARTRATE AND ACETAMINOPHEN 1 TABLET: 5; 325 TABLET ORAL at 10:23

## 2020-11-17 NOTE — PLAN OF CARE
Problem: Adult Inpatient Plan of Care  Goal: Plan of Care Review  Flowsheets  Taken 11/17/2020 1524  Plan of Care Reviewed With: patient  Taken 11/17/2020 1522  Plan of Care Reviewed With:   patient   son  Outcome Summary:   Pt. mobility conitnues to improve; ambulated 300'x2 with CGA-SBA and rwx. Pt. able to don/doff brace independently. Performed ther ex in standing, sitting and supine to improve LE strength and stability. Plan is to D/C home today pending results from aspiration of elbow. Will continue to monitor.   Patient was intermittently wearing a face mask during this therapy encounter. Therapist used appropriate personal protective equipment including eye protection, mask, and gloves.  Mask used was standard procedure mask. Appropriate PPE was worn during the entire therapy session. Hand hygiene was completed before and after therapy session. Patient is not in enhanced droplet precautions.

## 2020-11-17 NOTE — DISCHARGE SUMMARY
Juan MORGAN Josele  1945    Patient Care Team:  Luda Kidd MD as PCP - General (Family Medicine)  Bill Washington MD as Surgeon (Neurosurgery)  Self, Zafar Fraire MD as Consulting Physician (Vascular Surgery)  Alesia Aviles MD as Consulting Physician (Cardiology)    Date of Admit: 11/13/2020    Date of Discharge:  11/17/2020    Discharge Diagnosis:  Spondylolisthesis of lumbar region    Olecranon bursitis of left elbow       Procedures Performed  Procedure(s):  Lumbar 3 4 and lumbar 4 5 laminectomy and fusion with instrumentation 11/13/20 11/17 0650 SMALL JOINT ARTHROCENTESIS    Complications: NONE    Consultants:   Consults     Date and Time Order Name Status Description    11/16/2020 1714 Inpatient Orthopedic Surgery Consult Completed     11/13/2020 1717 Inpatient Cardiology Consult Completed           Condition on Discharge: stable    Discharge disposition: home    Pertinent Test Results:     Brief HPI: This is a 74-year-old male who is well-known to Dr. Washington.  He had a long history of low back pain with left hip and leg pain as well as numbness in the left leg.  He had tried conservative measures and all have failed. He was subsequently brought to the operating room for the above-stated elective surgical procedure.  Please see admission H&P for further details.    Hospital Course: The patient has a cardiac history.  He takes Eliquis at home.  He was evaluated by Dr. Miles with cardiology during his hospital stay.  Dr. Miles felt that his blood pressure was stable and cleared him to continue his diuretics and metoprolol.  On the second postoperative day, the patient inadvertently pulled out his surgical YULY drain.  There had been some leakage from the drain site throughout the remainder of the day but on Monday, POD 3 the drainage had resolved.  The lumbar incision has remained well approximated.  There is no redness, swelling, or drainage.  The surrounding skin of the surgical site and the  old YULY site were without drainage on palpation.  The patient reports significant improvement in his preoperative left leg pain.  He has been ambulating with physical therapy and tolerating it well.  He began to complain of some left elbow pain on POD 2.  There was some noted swelling and warmth as well as tenderness to the area with palpation.  There is no evidence of erythema.  There was some fluctuance of the skin as well.  An orthopedic consult was requested on POD 3.  He was evaluated yesterday by Dr. Jona Pink's physician assistant.  The left elbow joint was aspirated and the fluid was sent to the laboratory for studies. There were crystals observed.  Uric acid level today was 6.0 which is normal.  The patient states that he is prone to gout flares.  Orthopedics ordered a left elbow MRI which revealed olecranon bursitis of the left elbow which could be either inflammatory or infectious but nonspecific.  No evidence of synovitis or osteomyelitis.  No evidence of tendon deformity.  Orthopedics recommended colchicine but given that the patient has just restarted his Eliquis, I did not think that that would be appropriate.  He reports today that he does have some improvement in the left elbow pain.  He had a Ace wrap.  I confirmed with orthopedics that it was no longer necessary for the patient to wear it.  Orthopedics recommended that the patient contact their office if his left elbow symptoms do not significantly improve in 5 days.  His information has been passed on to the patient.  Patient is voiding and tolerating a diet well.  He is wearing his LSO brace when he is out of bed.  He has been evaluated by physical therapy.  He is ambulating 300 feet with contact-guard to standby assistance while using a rolling walker.  The patient states that he had a bowel movement yesterday.  He feels much better and is ready for discharge home.  Both the patient and Dr. Tao in agreement with the plan.    Temp:  [98.2  °F (36.8 °C)-98.7 °F (37.1 °C)] 98.4 °F (36.9 °C)  Heart Rate:  [77-96] 89  Resp:  [16] 16  BP: (116-139)/(71-83) 116/77    Current labs:  Lab Results (last 24 hours)     Procedure Component Value Units Date/Time    Body Fluid Cell Count With Differential - Synovial Fluid, Elbow, Left [[id number]]  (Abnormal) Collected: 11/17/20 0640    Specimen: Synovial Fluid from Elbow, Left Updated: 11/17/20 0922    Narrative:      The following orders were created for panel order Body Fluid Cell Count With Differential - Synovial Fluid, Elbow, Left.  Procedure                               Abnormality         Status                     ---------                               -----------         ------                     Body fluid cell count - ...[[id number]]  Abnormal            Final result               Body fluid differential ...[[id number]]                      Final result                 Please view results for these tests on the individual orders.    Body Fluid Culture - Synovial Fluid, Elbow, Left [[id number]] Collected: 11/17/20 0640    Specimen: Synovial Fluid from Elbow, Left Updated: 11/17/20 1250     Gram Stain Rare (1+) WBCs seen      No organisms seen    Crystal Exam, Fluid - Synovial Fluid, [[id number]] Collected: 11/17/20 0640    Specimen: Synovial Fluid Updated: 11/17/20 0933     Crystals, Fluid Intracellular crystals observed exhibiting polarization characteristics of Uric Acid    Anaerobic Culture - Synovial Fluid, Elbow, Left [[id number]] Collected: 11/17/20 0640    Specimen: Synovial Fluid from Elbow, Left Updated: 11/17/20 0646    Body fluid cell count - Synovial Fluid, Elbow, Left [[id number]]  (Abnormal) Collected: 11/17/20 0640    Specimen: Synovial Fluid from Elbow, Left Updated: 11/17/20 0922     Color, Fluid Red     Appearance, Fluid Bloody     RBC, Fluid 338,000 /mm3      Nucleated Cells, Fluid 3,890 /mm3      Method: Automated Sysmex XN Method    Body fluid differential - Synovial Fluid, Elbow, Left  [[id number]] Collected: 11/17/20 0640    Specimen: Synovial Fluid from Elbow, Left Updated: 11/17/20 0922     Neutrophils, Fluid 98 %      Monocytes, Fluid 1 %      Mononuclear, Fluid 1 %     CBC & Differential [[id number]]  (Abnormal) Collected: 11/17/20 0649    Specimen: Blood Updated: 11/17/20 0719    Narrative:      The following orders were created for panel order CBC & Differential.  Procedure                               Abnormality         Status                     ---------                               -----------         ------                     CBC Auto Differential[[id number]]        Abnormal            Final result                 Please view results for these tests on the individual orders.    CBC Auto Differential [[id number]]  (Abnormal) Collected: 11/17/20 0649    Specimen: Blood Updated: 11/17/20 0719     WBC 7.96 10*3/mm3      RBC 3.31 10*6/mm3      Hemoglobin 11.1 g/dL      Hematocrit 31.8 %      MCV 96.1 fL      MCH 33.5 pg      MCHC 34.9 g/dL      RDW 12.9 %      RDW-SD 44.8 fl      MPV 9.1 fL      Platelets 179 10*3/mm3      Neutrophil % 67.1 %      Lymphocyte % 18.3 %      Monocyte % 10.8 %      Eosinophil % 2.9 %      Basophil % 0.4 %      Immature Grans % 0.5 %      Neutrophils, Absolute 5.34 10*3/mm3      Lymphocytes, Absolute 1.46 10*3/mm3      Monocytes, Absolute 0.86 10*3/mm3      Eosinophils, Absolute 0.23 10*3/mm3      Basophils, Absolute 0.03 10*3/mm3      Immature Grans, Absolute 0.04 10*3/mm3      nRBC 0.0 /100 WBC     Uric Acid [[id number]]  (Normal) Collected: 11/17/20 0649    Specimen: Blood Updated: 11/17/20 0738     Uric Acid 6.0 mg/dL           Discharge Medications  Ricardo has been reviewed and narcotic consent is on file in the patient's chart.     Your medication list      START taking these medications      Instructions Last Dose Given Next Dose Due   cyclobenzaprine 10 MG tablet  Commonly known as: FLEXERIL      Take 0.5 tablets by mouth Every 8 (Eight) Hours As Needed  for Muscle Spasms.       HYDROcodone-acetaminophen 5-325 MG per tablet  Commonly known as: NORCO      Take 1 tablet by mouth Every 6 (Six) Hours As Needed for Moderate Pain .          CHANGE how you take these medications      Instructions Last Dose Given Next Dose Due   allopurinol 300 MG tablet  Commonly known as: ZYLOPRIM  What changed: Another medication with the same name was changed. Make sure you understand how and when to take each.      Take 1 tablet by mouth Daily.       allopurinol 100 MG tablet  Commonly known as: ZYLOPRIM  What changed:   · when to take this  · additional instructions      Take 1 tablet by mouth Daily. To take along with 300 mg pill to a daily dose 400 mg       folic acid 400 MCG tablet  Commonly known as: FOLVITE  What changed: when to take this      Take 1 tablet by mouth Daily.       Vitamin B-12 5000 MCG tablet dispersible  What changed: additional instructions      Place 1 tablet on the tongue Daily.          CONTINUE taking these medications      Instructions Last Dose Given Next Dose Due   Eliquis 5 MG tablet tablet  Generic drug: apixaban      Take 5 mg by mouth 2 (Two) Times a Day. HOLDING PER DR. GARCIA IN       furosemide 40 MG tablet  Commonly known as: LASIX      TAKE 1 TABLET BY MOUTH TWICE A DAY       metoprolol succinate XL 25 MG 24 hr tablet  Commonly known as: Toprol XL      Take 1 tablet by mouth every night at bedtime.       multivitamin with minerals tablet tablet      Take 1 tablet by mouth Daily. HOLD FOR SURGERY       pitavastatin calcium 2 MG tablet tablet  Commonly known as: Livalo      Take 1 tablet by mouth Every Night.       tamsulosin 0.4 MG capsule 24 hr capsule  Commonly known as: FLOMAX      Take 1 capsule by mouth Every Evening.          STOP taking these medications    traMADol 50 MG tablet  Commonly known as: ULTRAM              Where to Get Your Medications      These medications were sent to [facility] - [place], [place] - [address] -  [phone] [facility] [phone] FX  [address], [facility] [address]    Phone: [phone]   · cyclobenzaprine 10 MG tablet  · HYDROcodone-acetaminophen 5-325 MG per tablet         Discharge Diet:     Diet Order   Procedures   • Diet Regular       Activity at Discharge:   Activity Instructions     Other Activity Instructions      Activity Instructions: All activity instructions are addressed in the attached postoperative instruction sheet in after visit summary.          Call for: questions or concerns    Follow-up Appointments  Future Appointments   Date Time Provider Department Center   12/1/2020 10:45 AM Bill Washington MD [facility]   1/11/2021  7:45 AM Luda Kidd MD [id number]      Contact information for follow-up providers     Luda Kidd MD .    Specialty: Family Medicine  Contact information:  [address]  [address]  [facility] [id number] [phone]                   Contact information for after-discharge care     Home Medical Care     [facility] .    Service: Home Health Services  Contact information:  [address]  [facility] [address] [phone]                           Additional Instructions for the Follow-ups that You Need to Schedule     Call MD With Problems / Concerns   As directed      Instructions: Call Dr. Washington for any incisional redness, swelling, drainage, temperature greater than 100.5 degrees, worsening pain in the back or legs, chest pain, shortness of breath.  For severe chest pain or shortness of breath go directly to the emergency room.    Order Comments: Instructions: Call Dr. Washington for any incisional redness, swelling, drainage, temperature greater than 100.5 degrees, worsening pain in the back or legs, chest pain, shortness of breath.  For severe chest pain or shortness of breath go directly to the emergency room.          Discharge Follow-up with Specialty: Dr Washington; 2 Weeks   As directed    "   Specialty: Dr Washington    Follow Up: 2 Weeks    Follow Up Details: Keep appointment December 1st as previously arranged.         Discharge Follow-up with Specified Provider: Orthopedics   As directed      To: Orthopedics    Follow Up Details: Dr. Jona Pink - call for appointment if elbow pain is not better in 5 days.               Test Results Pending at Discharge  Pending Labs     Order Current Status    Anaerobic Culture - Synovial Fluid, Elbow, Left In process    Body Fluid Culture - Synovial Fluid, Elbow, Left Preliminary result         None    I discussed the discharge instructions with patient and nursing staff    Sophie Qiu, APRN  11/17/20  17:51 EST      \"Dictated utilizing Dragon dictation\".    "

## 2020-11-17 NOTE — PLAN OF CARE
Goal Outcome Evaluation:  Plan of Care Reviewed With: patient  Progress: improving  Outcome Summary: Pt remains stable. Ambulates with walker use and 1 assist. Voiding adequately per BRP. Pain well controlled. Awaiting ortho consult for L elbow swelling and pain, did rec xray of the area. Had BM yesterday. No increased drainage from incision or YULY removal site. Education proivded on htn management with meds and BP monitoring. Plans to d/c home when stable. Will continue to monitor.

## 2020-11-17 NOTE — THERAPY TREATMENT NOTE
Patient Name: Juan James  : 1945    MRN: [id number]                              Today's Date: 2020       Admit Date: 2020    Visit Dx:     ICD-10-CM ICD-9-CM   1. Spondylolisthesis of lumbar region  M43.16 738.4     Patient Active Problem List   Diagnosis   • Follow-up examination, following other surgery   • Lumbar radiculopathy   • Cervical radiculitis   • Essential hypertension   • Coronary artery disease involving native heart   • Chronic bronchitis (CMS/[facility])   • Hyperlipidemia   • Edema   • Rectal pain   • 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[facility])   • Cervical spondylosis with radiculopathy   • History of colon polyps   • Tinnitus   • Shortness of breath   • Pulmonary embolism (CMS/[facility])   • Peptic ulceration   • OA (osteoarthritis)   • Neck pain   • Lower back pain   • Hypertension   • History of blood transfusion   • Hearing loss   • GERD (gastroesophageal reflux disease)   • DDD (degenerative disc disease), lumbar   • DDD (degenerative disc disease), cervical   • Coronary artery disease   • COPD (chronic obstructive pulmonary disease) (CMS/[facility])   • Colon polyps   • CHF (congestive heart failure) (CMS/[facility])   • Cervical spondylosis   • Cervical disc disorder   • Cataract   • BPH (benign prostatic hyperplasia)   • Atrial fibrillation (CMS/[facility])   • Arthritis   • AAA (abdominal aortic aneurysm) (CMS/[facility])   • Medicare annual wellness visit, subsequent   • Heart block AV complete (CMS/[facility])   • PVD (peripheral vascular disease) with claudication (CMS/[facility])   • Acute renal failure (ARF) (CMS/[facility])   • Hospital discharge follow-up   • Idiopathic chronic gout of multiple sites without tophus   • Spondylolisthesis of lumbar region     Past Medical History:   Diagnosis Date   • AAA (abdominal aortic aneurysm) (CMS/[facility])    • Arthritis    • Atherosclerotic heart disease of native coronary artery without angina pectoris    • Atrial fibrillation (CMS/[facility])    • Benign essential hypertension    • BPH (benign  prostatic hyperplasia)    • Cervical spondylosis 11/2012   • CHF (congestive heart failure) (CMS/HCC)    • COPD (chronic obstructive pulmonary disease) (CMS/HCC)    • Coronary artery disease     STENT X 3   • DDD (degenerative disc disease), cervical    • DDD (degenerative disc disease), lumbar    • Dry skin     LOWER LEGS BILAT   • Edema     LOWER LEGS BILAT   • Elevated cholesterol    • Emphysema/COPD (CMS/HCC)    • Essential hypertriglyceridemia    • Hearing loss    • High blood pressure    • History of blood transfusion    • History of gastrointestinal hemorrhage    • History of gout    • History of myasthenia gravis     LAST EPISODE 5-6 YEARS AGO   • Hoarseness, persistent    • Hyperlipidemia    • Hypertension    • Lower back pain    • Lumbar radiculopathy 05/2016   • OA (osteoarthritis)    • Peptic ulceration     PUD   • Skin abnormalities     FLAKEY SKIN LOWER LEGS BILAT   • Sleep apnea     DOES NOT WEAR CPAP MACHINE    • Status post angioplasty with stent     STENT X3   • Tinnitus     BILAT   • Unsteady gait when walking     USES 3 PRONG CANE     Past Surgical History:   Procedure Laterality Date   • ANTERIOR CERVICAL DISCECTOMY W/ FUSION N/A 3/25/2016    Procedure: C3- C5 CERVICAL DISCECTOMY ANTERIOR FUSION WITH INSTRUMENTATION;  Surgeon: Bill Washington MD;  Location: [facility];  Service:    • ARTHRODESIS N/A 07/1983    LUMBAR   • ARTHRODESIS N/A 1987    LUMBAR   • ARTHRODESIS      Spinal Arthrodesis-lower spine-7/1983, 1987--upper spine-1988, 7/1990-Abstracted from [employer]   • BACK SURGERY      Lower Back Surgery   • CARDIAC CATHETERIZATION  07/2009   • CARDIAC CATHETERIZATION  03/18/2003    STENT TO RCA AND PCA, DR. PRIYA LUGO   • CATARACT EXTRACTION Bilateral     LENS IMPLANT   • CERVICAL ARTHRODESIS N/A 07/1990   • CERVICAL ARTHRODESIS N/A 1988   • COLONOSCOPY N/A 10/3/2019    4 MM HYPERPLASTIC POLYP IN ILEOCECAL VALVE, 4 MM SESSILE SERRATED ADENOMA POLYP IN DESCENDING, 5 TUBULOVILLOUS  ADENOMA POLYPS IN RECTUM, 3 TUBULAR ADENOMA POLYPS IN DISTAL RECTUM, 26 MM TUBULAR ADENOMA POLYP IN RECTUM, PIECEMEAL POLYPECTOMY, AREA TATTOOED, RESCOPE IN 6 MONTHS, DR. TAYA CRUZ AT [facility]   • CORONARY ANGIOPLASTY WITH STENT PLACEMENT  02/2009    and 7/2009-PT STATES HAS 3 STENTS   • ENDOSCOPY N/A 06/24/2012    NON BLEEDING EROSIVE GASTROPATHY, 1 DUODENAL ULCER WITH CLEAN BASE, DR. BRANDY WREN AT [facility]   • ENDOSCOPY AND COLONOSCOPY N/A 11/20/2007    EGD WNL, 8 ADENOMATOUS POLYPS IN RECTO-SIGMOID, RESCOPE IN 3 YRS, DR. CRISTINA RODRIGUEZ AT [facility]   • FOOT SURGERY Left     LEFT BIG TOE-JOINT REPLACED   • HAND SURGERY Left     THUMB-JOINT REPLACEMENT   • HAND SURGERY Right     JOINT REPLACEMENT RIGHT INDEX FINGER   • KNEE ARTHROSCOPY Left    • LAPAROSCOPIC CHOLECYSTECTOMY     • NECK SURGERY     • VASECTOMY Bilateral      General Information     [facility] Name 11/17/20 1519          Physical Therapy Time and Intention    Document Type  therapy note (daily note)  -     Mode of Treatment  physical therapy;individual therapy  -[facility] Name 11/17/20 1519          General Information    Patient Profile Reviewed  yes  -     Existing Precautions/Restrictions  fall;brace worn when out of bed;spinal  -     Row Name 11/17/20 1519          Cognition    Orientation Status (Cognition)  oriented x 4  -     Row Name 11/17/20 1519          Safety Issues, Functional Mobility    Impairments Affecting Function (Mobility)  balance;endurance/activity tolerance;pain;strength;postural/trunk control  -       User Key  (r) = Recorded By, (t) = Taken By, (c) = Cosigned By    Initials Name Provider Type     Mary Patel, PT Physical Therapist        Mobility     Row Name 11/17/20 1519          Bed Mobility    Bed Mobility  supine-sit;sit-supine  -     All Activities, [place] (Bed Mobility)  standby assist  -     Rolling Right [place] (Bed Mobility)  standby assist  -     Supine-Sit [place] (Bed Mobility)  standby  assist  -     Sit-Supine [place] (Bed Mobility)  standby assist  -     Sidelying-Sit [place] (Bed Mobility)  standby assist  -[facility] Name 11/17/20 1519          Sit-Stand Transfer    Sit-Stand [place] (Transfers)  standby assist  -     Assistive Device (Sit-Stand Transfers)  walker, front-wheeled  -[facility] Name 11/17/20 1519          Gait/Stairs (Locomotion)    [place] Level (Gait)  contact guard  -     Assistive Device (Gait)  walker, front-wheeled  -     Distance in Feet (Gait)  300' x2  -     Deviations/Abnormal Patterns (Gait)  shakila decreased;stride length decreased  -     Bilateral Gait Deviations  forward flexed posture  -     Comment (Gait/Stairs)  slow, steady gait speed.  -       User Key  (r) = Recorded By, (t) = Taken By, (c) = Cosigned By    Initials Name Provider Type     Mary Patel, PT Physical Therapist        Obj/Interventions     [facility] Name 11/17/20 1520          Motor Skills    Therapeutic Exercise  other (see comments) standing marches, standing heel raises x20; STS from bed x10; seated LAQ and heel/toe raises x10; supine glute set x20  -[facility] Name 11/17/20 1520          Balance    Balance Assessment  sitting static balance  -     Static Sitting Balance  WFL;sitting, edge of bed  -     Balance Interventions  sitting  -[facility] Name 11/17/20 1520          Sensory Assessment (Somatosensory)    Sensory Assessment (Somatosensory)  sensation intact  -       User Key  (r) = Recorded By, (t) = Taken By, (c) = Cosigned By    Initials Name Provider Type     Mary Paetl, PT Physical Therapist        Goals/Plan    No documentation.       Clinical Impression     [facility] Name 11/17/20 1522          Pain    Additional Documentation  Pain Scale: FACES Pre/Post-Treatment (Group)  -[facility] Name 11/17/20 1522          Pain Scale: Numbers Pre/Post-Treatment    Pain Location  back  -     Pain Intervention(s)  Repositioned;Ambulation/increased  activity  -     Row Name 11/17/20 1522          Pain Scale: FACES Pre/Post-Treatment    Pain: FACES Scale, Pretreatment  2-->hurts little bit  -     Posttreatment Pain Rating  2-->hurts little bit  -     Row Name 11/17/20 1522          Plan of Care Review    Plan of Care Reviewed With  patient;son  -     Outcome Summary  Pt. mobility conitnues to improve; ambualted 300'x2 with CGA-SBA and rwx. Pt. able to don/doff brace independently. Performed ther ex in standing, sitting and supine to improve LE strength and stability. Plan is to D/C home today pending results from aspiration of elbow. Will continue to monitor.  -     Row Name 11/17/20 1522          Positioning and Restraints    Pre-Treatment Position  in bed  -     Post Treatment Position  bed  -MH     In Bed  supine;call light within reach;encouraged to call for assist;exit alarm on;with family/caregiver  -       User Key  (r) = Recorded By, (t) = Taken By, (c) = Cosigned By    Initials Name Provider Type    Mary Hernandez PT Physical Therapist        Outcome Measures     Row Name 11/17/20 1523          How much help from another person do you currently need...    Turning from your back to your side while in flat bed without using bedrails?  3  -MH     Moving from lying on back to sitting on the side of a flat bed without bedrails?  3  -MH     Moving to and from a bed to a chair (including a wheelchair)?  4  -MH     Standing up from a chair using your arms (e.g., wheelchair, bedside chair)?  4  -MH     Climbing 3-5 steps with a railing?  3  -MH     To walk in hospital room?  3  -MH     AM-PAC 6 Clicks Score (PT)  20  -     Row Name 11/17/20 1523          Functional Assessment    Outcome Measure Options  AM-PAC 6 Clicks Basic Mobility (PT)  -       User Key  (r) = Recorded By, (t) = Taken By, (c) = Cosigned By    Initials Name Provider Type    Mary Hernandez PT Physical Therapist        Physical Therapy Education                 Title:  PT OT SLP Therapies (Done)     Topic: Physical Therapy (Done)     Point: Mobility training (Done)     Learning Progress Summary           Patient Acceptance, E,D,TB, VU,DU,NR by  at 11/17/2020 1523    Acceptance, E,TB,D, VU,NR by  at 11/16/2020 1346    Eager, E,TB, VU,NR by  at 11/15/2020 1019    Acceptance, E,D, DU,VU by  at 11/14/2020 1520    Comment: log roll, LSO donning/doffing, back protocol                   Point: Home exercise program (Done)     Learning Progress Summary           Patient Acceptance, E,D,TB, VU,DU,NR by  at 11/17/2020 1523    Acceptance, E,TB,D, VU,NR by  at 11/16/2020 1346    Eager, E,TB, VU,NR by  at 11/15/2020 1019    Acceptance, E,D, DU,VU by  at 11/14/2020 1520    Comment: log roll, LSO donning/doffing, back protocol                   Point: Body mechanics (Done)     Learning Progress Summary           Patient Acceptance, E,D,TB, VU,DU,NR by  at 11/17/2020 1523    Acceptance, E,TB,D, VU,NR by  at 11/16/2020 1346    Eager, E,TB, VU,NR by  at 11/15/2020 1019    Acceptance, E,D, DU,VU by  at 11/14/2020 1520    Comment: log roll, LSO donning/doffing, back protocol                   Point: Precautions (Done)     Learning Progress Summary           Patient Acceptance, E,D,TB, VU,DU,NR by  at 11/17/2020 1523    Acceptance, E,TB,D, VU,NR by  at 11/16/2020 1346    Eager, E,TB, VU,NR by  at 11/15/2020 1019    Acceptance, E,D, DU,VU by  at 11/14/2020 1520    Comment: log roll, LSO donning/doffing, back protocol                               User Key     Initials Effective Dates Name Provider Type Discipline     04/03/18 -  Shirley Lama, PT Physical Therapist PT    EULALIO 03/07/18 -  Shawna Abdul, PTA Physical Therapy Assistant PT     07/02/20 -  Quinten Watts, PT DPT Physical Therapist PT     05/26/20 -  Mary Patel, SUSSY Physical Therapist PT              PT Recommendation and Plan     Plan of Care Reviewed With: patient  Outcome Summary: Pt.  mobility conitnues to improve; ambualted 300'x2 with CGA-SBA and rwx. Pt. able to don/doff brace independently. Performed ther ex in standing, sitting and supine to improve LE strength and stability. Plan is to D/C home today pending results from aspiration of elbow. Will continue to monitor.     Time Calculation:   PT Charges     Row Name 11/17/20 1524             Time Calculation    Start Time  1439  -      Stop Time  1504  -      Time Calculation (min)  25 min  -      PT Received On  11/17/20  -      PT - Next Appointment  11/18/20  -         Time Calculation- PT    Total Timed Code Minutes- PT  24 minute(s)  -        User Key  (r) = Recorded By, (t) = Taken By, (c) = Cosigned By    Initials Name Provider Type     Mary Patel, PT Physical Therapist        Therapy Charges for Today     Code Description Service Date Service Provider Modifiers Qty    [id number]  GAIT TRAINING EA 15 MIN 11/17/2020 Mary Patel, PT GP 1    [id number]  PT THERAPEUTIC ACT EA 15 MIN 11/17/2020 Mary Patel PT GP 1          PT G-Codes  Outcome Measure Options: AM-PAC 6 Clicks Basic Mobility (PT)  AM-PAC 6 Clicks Score (PT): 20    Mary Patel PT  11/17/2020

## 2020-11-17 NOTE — PROGRESS NOTES
Left Upper Venous Flow Findings    Location Spont Phasic Augment Competent   Internal Jugular Y        Y        Y        Y          Subclavian Y        Y        Y        Y          Axillary Y        Y        Y        Y             Left Upper Venous Vessel Findings    Location Compress   Internal Jugular C          Subclavian C          Axillary C          Brachial C          Radial C          Ulnar C          Basilic (Upper) C          Basilic (Forearm) C          Cephalic (Upper) C          Cephalic (Forearm) C             Right Upper Venous Flow Findings    Location Spont Phasic Augment Competent   Internal Jugular Y        Y        Y        Y          Subclavian Y        Y        Y        Y             Right Upper Venous Vessel Findings    Location Compress   Internal Jugular C          Subclavian C                 LEFT ELBOW MRI WITHOUT CONTRAST     HISTORY: Elbow pain. Possible gout.     TECHNIQUE: MRI of the left elbow was performed without contrast and is  correlated with elbow x-rays 11/16/2020.     FINDINGS: Marrow signal around the elbow is normal. There is a small,  normal volume of joint fluid. There is no synovitis.     There is a bursal effusion over the dorsum of the elbow. The effusion  contains some foci of signal dropout which are of uncertain etiology and  could represent calcific material, or if there was recently an  aspiration procedure where there is an open wound, these could be air  bubbles. The bursal collection measures 3.7 cm long, 2 cm transverse and  11 mm thick. There is extensive subcutaneous edema over the dorsum of  the elbow.     The triceps tendon is normal in signal and thickness. There is a large,  chronic-appearing interstitial tear at the common extensor tendon  origin. This defect measures about 13 mm AP and 15 mm long. There is a  thin bundle of superficial fibers remaining intact. There is also a  large, chronic-appearing interstitial tear at the common flexor  tendon  origin. This defect measures about 16 mm long and 8 mm AP. There are  superficial fibers remaining intact. The other tendons of the elbow  appear normal. The muscles of the elbow appear normal.     Articular cartilage is preserved. No synovitis or intraarticular body is  present.     IMPRESSION:  Olecranon bursitis at the left elbow. This could be  inflammatory or infectious; the appearance is nonspecific. There is no  evidence of synovitis in the joint nor is there evidence of  osteomyelitis or triceps tendon deformity.     Incidentally noted are chronic-appearing, large interstitial tears at  the common flexor and extensor tendon origins.     This report was finalized on 11/17/2020 2:57 PM by Dr. Arvin Cole M.D.         Plan:  - Venous duplex results reviewed and negative for DVT    - Similarly, MRI images and results were reviewed and are negative for any apparent urgent/emergent conditions.  Gouty arthropathy - would likely benefit from a short course of oral steroids.    - Based on his current eliquis regimen, could also consider colchicine depending on the preference of his Surgeon    -He is clear for discharge from an orthopedic standpoint and should follow up as an outpatient in Dr Pink's office for future questions or concerns or if his symptoms fail to improve in the next 5 days    ---Assessment and plan of care discussed with my supervising physician, Lui Hussein MD, who was in agreement---    Kirt Major, APRN  [phone]

## 2020-11-17 NOTE — DISCHARGE PLACEMENT REQUEST
"Juan Hung (74 y.o. Male)     Date of Birth Social Security Number Address Home Phone MRN    1945  [id number]  [place] [id number] [phone] [id number]    Islam Marital Status          None        Admission Date Admission Type Admitting Provider Attending Provider Department, Room/Bed    11/13/20 Elective Bill Washington MD Reiss, Steven J., MD [address], P897/1    Discharge Date Discharge Disposition Discharge Destination                       Attending Provider: Bill Washington MD    Allergies: Ranolazine Er, Indomethacin, Lisinopril    Isolation: None   Infection: None   Code Status: CPR    Ht: 185.4 cm (73\")   Wt: 98.9 kg (218 lb)    Admission Cmt: None   Principal Problem: Spondylolisthesis of lumbar region [M43.16]                 Active Insurance as of 11/13/2020     Primary Coverage     Payor Plan Insurance Group Employer/Plan Group    MEDICARE MEDICARE A & B      Payor Plan Address Payor Plan Phone Number Payor Plan Fax Number Effective Dates    [address] [phone]  6/1/2006 - None Entered    [facility] [id number]       Subscriber Name Subscriber Birth Date Member ID       JUAN HUNG 1945 [id number]           Secondary Coverage     Payor Plan Insurance Group Employer/Plan Group    [employer] SUP AAR HE[employer] CARE OPTIONS PLAN F     Payor Plan Address Payor Plan Phone Number Payor Plan Fax Number Effective Dates    [facility] [phone]  12/1/2012 - None Entered    [address]       [facility] [id number]       Subscriber Name Subscriber Birth Date Member ID       JUAN HUNG 1945 [id number]                 Emergency Contacts      (Rel.) Home Phone Work Phone Mobile Phone    Erika Blackman Juan (Son) [phone] -- [phone]    Ganga Hung (Son) [phone] -- [phone]              "

## 2020-11-17 NOTE — PROGRESS NOTES
Continued Stay Note  [facility]     Patient Name: Juan James  MRN: [id number]  Today's Date: 11/17/2020    Admit Date: 11/13/2020    Discharge Plan     Row Name 11/17/20 1520       Plan    Plan  Home with family support & [place]     Patient/Family in Agreement with Plan  yes    Plan Comments  Physical Therapy Mackenzie abebe noted. Spoke with the patient who plans to d/c home with family support and would like to work with home health physical therapy after d/c. He requests a referral to [facility] since he has experience with them in the past. Referral sent in [facility]. Plan is for the patient to d/c home with family support and [place] . Pt states his son/Ganga will transport him home by car at d/c. CCP following.        Discharge Codes    No documentation.       Expected Discharge Date and Time     Expected Discharge Date Expected Discharge Time    Nov 17, 2020             Vanessa Padgett RN

## 2020-11-17 NOTE — PROGRESS NOTES
Procedure(s):  Lumbar 3 4 and lumbar 4 5 laminectomy and fusion with instrumentation  11/17 0650 SMALL JOINT ARTHROCENTESIS  LOS: 4 days       Objective :    Vital signs in last 24 hours:  Vitals:    11/16/20 2248 11/17/20 0100 11/17/20 0700 11/17/20 1100   BP: 126/73 118/71 139/83 136/79   BP Location: Left arm Left arm Left arm Left arm   Patient Position: Lying Lying Lying Lying   Pulse: 79 91 90 77   Resp: 16 16 16 16   Temp: 98.2 °F (36.8 °C) 98.4 °F (36.9 °C) 98.2 °F (36.8 °C) 98.7 °F (37.1 °C)   TempSrc: Skin Skin Skin Skin   SpO2: 95% 97% 97% 97%   Weight:       Height:           LABS:  Results from last 7 days   Lab Units 11/17/20  0649   WBC 10*3/mm3 7.96   HEMOGLOBIN g/dL 11.1*   HEMATOCRIT % 31.8*   PLATELETS 10*3/mm3 179     Results for BOBBY HUNG (MRN [id number]) as of 11/17/2020 12:29   Ref. Range 11/17/2020 06:40   Color, Fluid Unknown Red   Appearance, Fluid Latest Ref Range: Clear  Bloody (A)   RBC, Fluid Latest Units: /mm3 338,000   Neutrophils, Fluid Latest Units: % 98   Monocytes, Fluid Latest Units: % 1   Mononuclear, Fluid Latest Units: % 1   Nucleated Cells, Fluid Latest Units: /mm3 3,890   Crystals, Fluid Unknown Intracellular crystals observed exhibiting polarization characteristics of Uric Acid       ASSESSMENT:  Status post Procedure(s):   11/17 0650 SMALL JOINT ARTHROCENTESIS    Plan:  Analysis of joint fluid after elbow aspiration this morning demonstrates intracellular uric acid crystals consistent with gouty arthropathy.      Could consider short course of indomethacin or potentially colchicine or glucocorticoids depending on the requirements of his postoperative protocol and the preferences of his surgeon / medical team.    No results noted on venous duplex, but gout flare could be the culprit for a degree of pitting edema regionally around the elbow.  Will follow for final results of this study as well.      Kirt Major, AARON    Date: 11/17/2020  Time: 12:30 EST

## 2020-11-17 NOTE — PROGRESS NOTES
NEUROSURGERY POST OP PROGRESS NOTE     LOS: 4 days   Patient Care Team:  Luda Kidd MD as PCP - General (Family Medicine)  Bill Washington MD as Surgeon (Neurosurgery)  Self, Zafar Fraire MD as Consulting Physician (Vascular Surgery)  Alesia Aviles MD as Consulting Physician (Cardiology)      Subjective     Interval History: Up in chair. Son at bedside. Appreciate orthopedics evaluation. Joint aspirate sent to lab. Steroid injection given. Still having pain but feels a bit better. MRI L elbow pending.     History taken from: patient chart    Objective          Vital Signs    Temp:  [98.2 °F (36.8 °C)-98.7 °F (37.1 °C)] 98.7 °F (37.1 °C)  Heart Rate:  [] 77  Resp:  [16-18] 16  BP: (118-152)/(71-88) 136/79    Physical Exam    AA&O x 3.   Lumbar incision well approximated. No further drainage.  No calf swelling or tenderness to bilateral palpation  Ace wrap intact to L elbow; fingers warm to touch.        Results Review:     I reviewed the patient's new clinical results.     .  Results from last 7 days   Lab Units 11/17/20  0649 11/16/20  0703 11/15/20  0537   WBC 10*3/mm3 7.96 10.44 10.33   HEMOGLOBIN g/dL 11.1* 11.5* 11.0*   HEMATOCRIT % 31.8* 32.4* 32.0*   PLATELETS 10*3/mm3 179 172 149       Assessment/Plan       Spondylolisthesis of lumbar region    POD 4 bilateral L3, L4, L5 laminectomy with TLIF L3-4, L4-5 with instrumentation for lumbar spinal stenosis, and L3-4, L4-5 instability  Left elbow pain - bursitis diagnosed by orthopedics. S/P fluid aspiration and steroid injection.     MRI L elbow pending, labs on aspirate pending  DC home when cleared by orthopedics    AARON Tsai  11/17/20  15:24 EST

## 2020-11-17 NOTE — PROCEDURES
Orthopedic Procedure Note      Patient: Juan James    Date of Admission: 11/13/2020  7:13 AM    YOB: 1945    Medical Record Number: [id number]    Attending Physician: Bill Washington MD  Surgeon: Dr. Jean Hussein/Cali Stevens PA-C    Pre-procedure Diagnosis: left elbow pain/effusion  Post-procedure Diagnosis: same  Procedure: left olecranon bursa aspiration       Informed Consent: 74 y.o. male presents with left elbow pain/effusion. The risks, benefits, and alternative forms of treatment were discussed with the patient. Patient voiced understanding of the information given and consents to undergo a aspiration of the olecranon bursa of the left elbow.    Time Out was taken to identify the correct patient, extremity, and procedure. The posterior aspect of the left elbow over the olecranon was then prepped with betadine and alcohol in a sterile manner and allowed to dry.    Aspiration: The posterior aspect of the left elbow over the olecranon was then aspirated using utilizing an 18g 1 1/2 inch needle. Approximately 2 cc of cloudy yellow synovial fluid was obtained and sent to the lab in a sterile container for analysis. The patient tolerated the procedure well and a sterile band aid was applied.  Compressive dressing was applied.  Ace wrap was applied.  Patient tolerated the procedure well.      Date:11/17/2020    Cali Stevens PA-C

## 2020-11-17 NOTE — CONSULTS
Orthopaedic Consultation      Patient: Juan James    Date of Admission: 11/13/2020  7:13 AM    YOB: 1945    Medical Record Number: [id number]    Attending Physician:  Bill Washington MD    Consulting Physician:  Cali Stevens PA-C        Chief Complaints: Left elbow pain and swelling    History of Present Illness:     The patient is a pleasant 74-year-old gentleman.  He is status post bilateral L3, L4 and L5 laminectomy with a facetectomy at L4-5 and a medial facetectomy at L3-4 with a transforaminal interbody fusion and instrumentation by Dr. Washington on November 13, 2020.  Patient is currently being followed by the cardiology team.  Currently maintained on Eliquis 5 mg 1 tablet p.o. twice daily.  Patient does have a history of gout.  He states that he takes allopurinol daily.  He started experiencing left elbow pain and swelling postoperatively.  Orthopedics has been consulted for evaluation of the left elbow pain and swelling.  Patient states that he really does not have much pain with movement of the elbow.  However mainly when he is sitting and resting his arm on something he starts having discomfort.  He localizes it mainly to the posterior aspect of the elbow.  Postoperative laboratory studies have been performed.  His vital signs have remained stable.  He currently has been afebrile.  White blood cell count within the normal range.  We did order an x-ray yesterday due to the left elbow pain.  No acute bony abnormality was seen.  There is mild soft tissue swelling over the posterior soft tissues of the lower humerus region.  There is a little bit of a vascular calcification and tiny calcific density in the anterior soft tissue as well as over the posterior olecranon.  Patient denies any recent trauma to his elbow.  Denies any redness.      Allergies:   Allergies   Allergen Reactions   • Ranolazine Er Unknown (See Comments), Headache and Irritability     Headaches and falling asleep.    • Indomethacin Dizziness   • Lisinopril Unknown - Low Severity       Medications:   Home Medications:  Medications Prior to Admission   Medication Sig Dispense Refill Last Dose   • allopurinol (ZYLOPRIM) 100 MG tablet Take 1 tablet by mouth Daily. To take along with 300 mg pill to a daily dose 400 mg (Patient taking differently: Take 100 mg by mouth Every Night.) 90 tablet 3 11/12/2020 at 1830   • allopurinol (ZYLOPRIM) 300 MG tablet Take 1 tablet by mouth Daily. 30 tablet 6 11/12/2020 at 0700   • Cyanocobalamin (VITAMIN B-12) 5000 MCG tablet dispersible Place 1 tablet on the tongue Daily. (Patient taking differently: Place 1 tablet on the tongue Daily. ORAL)   11/12/2020 at 0700   • folic acid (FOLVITE) 400 MCG tablet Take 1 tablet by mouth Daily. (Patient taking differently: Take 400 mcg by mouth 2 (Two) Times a Day.)   11/12/2020 at 1830   • furosemide (LASIX) 40 MG tablet TAKE 1 TABLET BY MOUTH TWICE A DAY (Patient taking differently: Take 40 mg by mouth 2 (Two) Times a Day.) 60 tablet 4 11/12/2020 at 1900   • metoprolol succinate XL (Toprol XL) 25 MG 24 hr tablet Take 1 tablet by mouth every night at bedtime. 30 tablet 11 11/12/2020 at 1900   • pitavastatin calcium (Livalo) 2 MG tablet tablet Take 1 tablet by mouth Every Night. 30 tablet 11 11/12/2020 at 1830   • tamsulosin (FLOMAX) 0.4 MG capsule 24 hr capsule Take 1 capsule by mouth Every Evening.   11/12/2020 at 1830   • traMADol (ULTRAM) 50 MG tablet Take 2 tablets by mouth Every 6 (Six) Hours As Needed for Moderate Pain . 120 tablet 2 11/12/2020 at 0700   • Eliquis 5 MG tablet tablet Take 5 mg by mouth 2 (Two) Times a Day. HOLDING PER DR. GARCIA IN   11/8/2020 at 0700   • multivitamin with minerals (CENTRUM MEN PO) Take 1 tablet by mouth Daily. HOLD FOR SURGERY   11/5/2020       Current Medications:  Scheduled Meds:allopurinol, 100 mg, Oral, Nightly  allopurinol, 300 mg, Oral, Daily  apixaban, 5 mg, Oral, Q12H  docusate sodium, 200 mg, Oral,  BID  furosemide, 40 mg, Oral, BID  lidocaine PF 1%, 2 mL, Injection, Once  metoprolol succinate XL, 25 mg, Oral, Q24H  senna, 2 tablet, Oral, Nightly  sodium chloride, 3 mL, Intravenous, Q12H  tamsulosin, 0.4 mg, Oral, Q PM      Continuous Infusions:   PRN Meds:.cyclobenzaprine  •  HYDROcodone-acetaminophen  •  Morphine **AND** naloxone  •  ondansetron **OR** ondansetron  •  sodium chloride    Past Medical History:   Diagnosis Date   • AAA (abdominal aortic aneurysm) (CMS/[facility])    • Arthritis    • Atherosclerotic heart disease of native coronary artery without angina pectoris    • Atrial fibrillation (CMS/[facility])    • Benign essential hypertension    • BPH (benign prostatic hyperplasia)    • Cervical spondylosis 11/2012   • CHF (congestive heart failure) (CMS/[facility])    • COPD (chronic obstructive pulmonary disease) (CMS/[facility])    • Coronary artery disease     STENT X 3   • DDD (degenerative disc disease), cervical    • DDD (degenerative disc disease), lumbar    • Dry skin     LOWER LEGS BILAT   • Edema     LOWER LEGS BILAT   • Elevated cholesterol    • Emphysema/COPD (CMS/[facility])    • Essential hypertriglyceridemia    • Hearing loss    • High blood pressure    • History of blood transfusion    • History of gastrointestinal hemorrhage    • History of gout    • History of myasthenia gravis     LAST EPISODE 5-6 YEARS AGO   • Hoarseness, persistent    • Hyperlipidemia    • Hypertension    • Lower back pain    • Lumbar radiculopathy 05/2016   • OA (osteoarthritis)    • Peptic ulceration     PUD   • Skin abnormalities     FLAKEY SKIN LOWER LEGS BILAT   • Sleep apnea     DOES NOT WEAR CPAP MACHINE    • Status post angioplasty with stent     STENT X3   • Tinnitus     BILAT   • Unsteady gait when walking     USES 3 PRONG CANE     Past Surgical History:   Procedure Laterality Date   • ANTERIOR CERVICAL DISCECTOMY W/ FUSION N/A 3/25/2016    Procedure: C3- C5 CERVICAL DISCECTOMY ANTERIOR FUSION WITH INSTRUMENTATION;  Surgeon: Bill ÁLVAREZ  MD Padmini;  Location: [facility] OR;  Service:    • ARTHRODESIS N/A 1983    LUMBAR   • ARTHRODESIS N/A     LUMBAR   • ARTHRODESIS      Spinal Arthrodesis-lower spine-1983, --upper spine-, 1990-Abstracted from [facility]   • BACK SURGERY      Lower Back Surgery   • CARDIAC CATHETERIZATION  2009   • CARDIAC CATHETERIZATION  2003    STENT TO RCA AND PCA, DR. PRIYA LUGO   • CATARACT EXTRACTION Bilateral     LENS IMPLANT   • CERVICAL ARTHRODESIS N/A 1990   • CERVICAL ARTHRODESIS N/A    • COLONOSCOPY N/A 10/3/2019    4 MM HYPERPLASTIC POLYP IN ILEOCECAL VALVE, 4 MM SESSILE SERRATED ADENOMA POLYP IN DESCENDING, 5 TUBULOVILLOUS ADENOMA POLYPS IN RECTUM, 3 TUBULAR ADENOMA POLYPS IN DISTAL RECTUM, 26 MM TUBULAR ADENOMA POLYP IN RECTUM, PIECEMEAL POLYPECTOMY, AREA TATTOOED, RESCOPE IN 6 MONTHS, DR. TAYA CRUZ AT [facility]   • CORONARY ANGIOPLASTY WITH STENT PLACEMENT  2009    and 2009-PT STATES HAS 3 STENTS   • ENDOSCOPY N/A 2012    NON BLEEDING EROSIVE GASTROPATHY, 1 DUODENAL ULCER WITH CLEAN BASE, DR. BRANDY WREN AT [facility]   • ENDOSCOPY AND COLONOSCOPY N/A 2007    EGD WNL, 8 ADENOMATOUS POLYPS IN RECTO-SIGMOID, RESCOPE IN 3 YRS, DR. CRISTINA RODRIGUEZ AT [facility]   • FOOT SURGERY Left     LEFT BIG TOE-JOINT REPLACED   • HAND SURGERY Left     THUMB-JOINT REPLACEMENT   • HAND SURGERY Right     JOINT REPLACEMENT RIGHT INDEX FINGER   • KNEE ARTHROSCOPY Left    • LAPAROSCOPIC CHOLECYSTECTOMY     • NECK SURGERY     • VASECTOMY Bilateral      Social History     Occupational History   • Not on file   Tobacco Use   • Smoking status: Former Smoker     Packs/day: 2.00     Years: 40.00     Pack years: 80.00     Types: Cigarettes     Quit date:      Years since quittin.8   • Smokeless tobacco: Former User     Types: Chew   • Tobacco comment: smoked 1 to 2 packs per day for 40 or more years   Substance and Sexual Activity   • Alcohol use: Not Currently     Comment: QUIT DRINKING 2020    • Drug use: No   • Sexual activity: Defer      Social History     Social History Narrative   • Not on file     Family History   Adopted: Yes   Problem Relation Age of Onset   • Heart attack Mother    • Alcohol abuse Mother    • Heart disease Mother    • No Known Problems Father    • Heart disease Other         FH in females before the age of 65   • Malig Hyperthermia Neg Hx          Review of Systems:   No other pertinent positives or negatives other than what is mentioned in the HPI and below.  Constitutional: Negative for fatigue, fever, or weight loss  HEENT: No active headache.  Pulmonary: Patient denies SOA.  Cardiovascular: Patient denies any chest pain.  Gastrointestinal:  Patient denies active vomiting or diarrhea.  Musculoskeletal: Positive for left elbow pain and swelling.  Neurological: Patient denies active dizziness or loss of consciousness.  Skin: Patient denies any active bleeding.    Vital signs in last 24 hours:  Temp:  [97 °F (36.1 °C)-98.4 °F (36.9 °C)] 98.4 °F (36.9 °C)  Heart Rate:  [] 91  Resp:  [16-18] 16  BP: (118-152)/(71-88) 118/71  Vitals:    11/16/20 1647 11/16/20 1902 11/16/20 2248 11/17/20 0100   BP: 152/88 118/73 126/73 118/71   BP Location: Right arm Left arm Left arm Left arm   Patient Position: Sitting Lying Lying Lying   Pulse: 100 96 79 91   Resp: 18 16 16 16   Temp: 98.2 °F (36.8 °C) 98.2 °F (36.8 °C) 98.2 °F (36.8 °C) 98.4 °F (36.9 °C)   TempSrc: Temporal Skin Skin Skin   SpO2: 98% 98% 95% 97%   Weight:       Height:              Physical Exam: 74 y.o. male         General Appearance:  Alert, cooperative, in no acute distress    HEENT:    Atraumatic, Pupils are equal   Neck:   Cervical spine midline, no appreciable JVD   Lungs:     Breathing non-labored and chest rise symmetric    Heart:   Abdomen:     Rectal:       Extremities:   Pulses  Neurovascular:   Skin:   Musculoskeletal:      Pulse regular    Soft, Non-tender or distended    Deferred        No clubbing,  cyanosis, or edema    Intact    Cranial nerves 2 - 12 grossly intact, sensation intact    No skin lesions  Focused physical examination of the left upper extremity was performed.  There is a little bit of soft tissue inflammation over the posterior aspect of the elbow consistent with olecranon bursitis.  There is no surrounding erythema.  There is no induration.  No fluctuance.  However patient does have a little bit of pitting edema to the left forearm.  Patient is nontender to palpation over the pitting edema on the forearm.  He is tender to palpation over the posterior olecranon.  Does not seem to be any significant joint effusion.  No skin lesions.  Range of motion of the elbow with flexion and extension as well as pronation and supination elicits no significant elbow pain.  He only notes discomfort in the elbow when he is resting the arm on his table.  He localizes his pain to the posterior aspect of the elbow.  Ligamentous examination is stable of the elbow.  Compartments are soft.  Neurovascularly intact distally.  Pulses intact.  Full wrist and shoulder range of motion.     Diagnostic Tests:    Results from last 7 days   Lab Units 11/16/20  0703   WBC 10*3/mm3 10.44   HEMOGLOBIN g/dL 11.5*   HEMATOCRIT % 32.4*   PLATELETS 10*3/mm3 172            XR ELBOW 2 VW LEFT-  11/16/2020     HISTORY: Left elbow pain and swelling.     No acute bone joint or soft tissue abnormalities are seen. Tiny calcific  density is seen in the anterior soft tissues which could be related to  vascular calcification of likely no significance. There is mild  posterior swelling of the distal humerus region.     IMPRESSION:  No acute bony abnormality is seen.  2. There is mild soft tissue swelling of the posterior soft tissues of  the lower humerus region.     This report was finalized on 11/16/2020 8:50 PM by Dr. Bartolome Marie M.D.    Assessment:  Patient Active Problem List   Diagnosis   • Follow-up examination, following other  surgery   • Lumbar radiculopathy   • Cervical radiculitis   • Essential hypertension   • Coronary artery disease involving native heart   • Chronic bronchitis (CMS/HCC)   • Hyperlipidemia   • Edema   • Rectal pain   • 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HCC)   • Cervical spondylosis with radiculopathy   • History of colon polyps   • Tinnitus   • Shortness of breath   • Pulmonary embolism (CMS/HCC)   • Peptic ulceration   • OA (osteoarthritis)   • Neck pain   • Lower back pain   • Hypertension   • History of blood transfusion   • Hearing loss   • GERD (gastroesophageal reflux disease)   • DDD (degenerative disc disease), lumbar   • DDD (degenerative disc disease), cervical   • Coronary artery disease   • COPD (chronic obstructive pulmonary disease) (CMS/HCC)   • Colon polyps   • CHF (congestive heart failure) (CMS/[facility])   • Cervical spondylosis   • Cervical disc disorder   • Cataract   • BPH (benign prostatic hyperplasia)   • Atrial fibrillation (CMS/[facility])   • Arthritis   • AAA (abdominal aortic aneurysm) (CMS/[facility])   • Medicare annual wellness visit, subsequent   • Heart block AV complete (CMS/[facility])   • PVD (peripheral vascular disease) with claudication (CMS/[facility])   • Acute renal failure (ARF) (CMS/[facility])   • Hospital discharge follow-up   • Idiopathic chronic gout of multiple sites without tophus   • Spondylolisthesis of lumbar region         Plan:      I did have an extensive discussion with the patient in regards to his situation in the hospital today.  Have reviewed the above.  Reviewed the imaging.  Findings seem to be more consistent with olecranon bursitis as he does localize his pain over the posterior aspect of the elbow.  He really does not have much pain when ranging the elbow.  So does not seem to be within the elbow joint itself.  However there is some pitting edema to the left upper extremity of unknown significance.  Due to the pitting edema I would like to make sure that there is no significant fluid within the  elbow joint.  We will get an MRI of the elbow.  I did go ahead and aspirate the olecranon bursa over the posterior aspect of the elbow.  Approximately 2 cc of cloudy serous fluid was aspirated from the olecranon bursa.  Compressive dressing was applied.  We will send the fluid from the aspirate to the lab.  Sent for cell count with differential and cultures as well as crystal exam.  Patient does have a history of gout.  He is usually maintained on allopurinol.  However he is currently on Eliquis given his recent back surgery.  We will go ahead and order a uric acid level to evaluate for gout.  From the orthopedic standpoint.  We will review the aspiration results.  As well as the MRI.  If there is found to be no significant fluid within the elbow joint then the patient can likely be discharged from the orthopedic standpoint.  We will follow along closely. Thank you very much for this consultation.  All findings will be discussed with my supervising physician Dr. Jean Hussein.     Date: 11/17/2020  Cali Stevens PA-C

## 2020-11-18 ENCOUNTER — TRANSITIONAL CARE MANAGEMENT TELEPHONE ENCOUNTER (OUTPATIENT)
Dept: CALL CENTER | Facility: HOSPITAL | Age: 75
End: 2020-11-18

## 2020-11-18 DIAGNOSIS — Z98.1 S/P LUMBAR SPINAL FUSION: Primary | ICD-10-CM

## 2020-11-18 NOTE — OUTREACH NOTE
Call Center TCM Note      Responses   [facility] patient discharged from?  [place]   Does the patient have one of the following disease processes/diagnoses(primary or secondary)?  General Surgery   TCM attempt successful?  No   Unsuccessful attempts  Attempt 1          Clarita Aguiar MA    11/18/2020, 09:49 EST

## 2020-11-18 NOTE — OUTREACH NOTE
Prep Survey      Responses   [facility] patient discharged from?  [place]   Is LACE score < 7 ?  No   Eligibility  [facility]   Date of Admission  11/13/20   Date of Discharge  11/17/20   Discharge Disposition  Home or Self Care   Discharge diagnosis  Lumbar 3 4 and lumbar 4 5 laminectomy and fusion with instrumentation    Does the patient have one of the following disease processes/diagnoses(primary or secondary)?  General Surgery   Does the patient have Home health ordered?  Yes   What is the Home health agency?   [facility]   Is there a DME ordered?  No   Prep survey completed?  Yes          Fifi Coery RN

## 2020-11-18 NOTE — OUTREACH NOTE
Call Center TCM Note      Responses   [facility] patient discharged from?  [place]   Does the patient have one of the following disease processes/diagnoses(primary or secondary)?  General Surgery   TCM attempt successful?  No   Unsuccessful attempts  Attempt 2          Clarita Aguiar MA    11/18/2020, 16:47 EST

## 2020-11-19 ENCOUNTER — TRANSITIONAL CARE MANAGEMENT TELEPHONE ENCOUNTER (OUTPATIENT)
Dept: CALL CENTER | Facility: HOSPITAL | Age: 75
End: 2020-11-19

## 2020-11-19 ENCOUNTER — TELEPHONE (OUTPATIENT)
Dept: NEUROSURGERY | Facility: CLINIC | Age: 75
End: 2020-11-19

## 2020-11-19 NOTE — OUTREACH NOTE
Call Center TCM Note      Responses   [facility] patient discharged from?  [place]   Does the patient have one of the following disease processes/diagnoses(primary or secondary)?  General Surgery   TCM attempt successful?  Yes   Discharge diagnosis  Lumbar 3 4 and lumbar 4 5 laminectomy and fusion with instrumentation    Meds reviewed with patient/caregiver?  Yes   Is the patient having any side effects they believe may be caused by any medication additions or changes?  No   Does the patient have all medications related to this admission filled (includes all antibiotics, pain medications, etc.)  Yes   Is the patient taking all medications as directed (includes completed medication regime)?  Yes   Does the patient have a follow up appointment scheduled with their surgeon?  Yes   Has the patient kept scheduled appointments due by today?  Yes   What is the Home health agency?   [facility] [place]   Has home health visited the patient within 72 hours of discharge?  Yes   Psychosocial issues?  No   Did the patient receive a copy of their discharge instructions?  Yes   Nursing interventions  Reviewed instructions with patient   What is the patient's perception of their health status since discharge?  Improving   Is the patient /caregiver able to teach back basic post-op care?  Continue use of incentive spirometry at least 1 week post discharge, Drive as instructed by MD in discharge instructions, No tub bath, swimming, or hot tub until instructed by MD, Do not remove steri-strips, Lifting as instructed by MD in discharge instructions, Keep incision areas clean,dry and protected, Take showers only when approved by MD-sponge bathe until then, Practice 'cough and deep breath'   Is the patient/caregiver able to teach back signs and symptoms of incisional infection?  Increased redness, swelling or pain at the incisonal site, Pus or odor from incision, Fever, Increased drainage or bleeding, Incisional warmth   Is the  patient/caregiver able to teach back steps to recovery at home?  Set small, achievable goals for return to baseline health, Practice good oral hygiene, Eat a well-balance diet, Rest and rebuild strength, gradually increase activity, Weigh daily, Make a list of questions for surgeon's appointment   If the patient is a current smoker, are they able to teach back resources for cessation?  Not a smoker   Is the patient/caregiver able to teach back the hierarchy of who to call/visit for symptoms/problems? PCP, Specialist, Home health nurse, Urgent Care, ED, 911  Yes   TCM call completed?  Yes   Wrap up additional comments  Pt doing well s/p lumbar fusion. HH saw pt today and changed dressing. No fever, chills, SOB. Appetite good. Pt declines TCM [facility] but will see surgeon for post op on 12/01/2020          Clarita Aguiar MA    11/19/2020, 16:40 EST

## 2020-11-20 NOTE — PROGRESS NOTES
Case Management Discharge Note      Final Note: Pt d/c'd home with family support & [employer] .    Provided Post Acute Provider List?: Refused  Refused Provider List Comment: declined wanting HH list: requested [facility] if needed due to using htem before  Provided Post Acute Provider Quality & Resource List?: Refused    Selected Continued Care - Discharged on 11/17/2020 Admission date: 11/13/2020 - Discharge disposition: Home or Self Care    Destination    No services have been selected for the patient.              Durable Medical Equipment    No services have been selected for the patient.              Dialysis/Infusion    No services have been selected for the patient.              Home Medical Care Coordination complete    Service Provider Selected Services Address Phone Fax Patient Preferred    [facility] [place]  [facility] [address] [place] [id number] [phone] --          Therapy    No services have been selected for the patient.              Community Resources    No services have been selected for the patient.                       Final Discharge Disposition Code: 06 - home with home health care

## 2020-11-25 ENCOUNTER — READMISSION MANAGEMENT (OUTPATIENT)
Dept: CALL CENTER | Facility: HOSPITAL | Age: 75
End: 2020-11-25

## 2020-12-02 ENCOUNTER — OFFICE VISIT (OUTPATIENT)
Dept: NEUROSURGERY | Facility: CLINIC | Age: 75
End: 2020-12-02

## 2020-12-02 VITALS
DIASTOLIC BLOOD PRESSURE: 82 MMHG | HEART RATE: 78 BPM | WEIGHT: 217 LBS | HEIGHT: 73 IN | SYSTOLIC BLOOD PRESSURE: 130 MMHG | BODY MASS INDEX: 28.76 KG/M2 | TEMPERATURE: 97.6 F

## 2020-12-02 DIAGNOSIS — Z09 SURGICAL FOLLOWUP VISIT: Primary | ICD-10-CM

## 2020-12-02 PROCEDURE — 99024 POSTOP FOLLOW-UP VISIT: CPT | Performed by: NURSE PRACTITIONER

## 2020-12-02 RX ORDER — TRAMADOL HYDROCHLORIDE 50 MG/1
TABLET ORAL
COMMUNITY
Start: 2020-12-01 | End: 2021-01-05 | Stop reason: SDUPTHER

## 2020-12-02 NOTE — PROGRESS NOTES
"Subjective   History of Present Illness: Juan James is a 74 y.o. male is here today for 3 week post op follow-up.    Patient had L3/L4 and L4/L5 laminectomy and fusion with instrumentation on 11.13.2020 by Dr. Washington. Patient had XR Lumbar spine 11.14.2020 at [facility]. Patient was referred to Home Health 11.18.2020.    Patient reports today that he is having minimal back pain. Patient denies leg pain, N/T. Patient states that the PT did help him with his pain.    Patient incision looks healthy, no drainage, no redness, no swelling.    Patient is currently taking Tramadol 50 mg QID PRN for pain.    History of Present Illness    Mr. Greenfield is here for his first postoperative evaluation.  He is doing much better since his surgery.    Review of Systems   Constitutional: Negative for chills and fever.   HENT: Negative for congestion.    Genitourinary: Negative for difficulty urinating and dysuria.   Musculoskeletal: Negative for back pain, gait problem and joint swelling.   Neurological: Positive for numbness.       Objective     Vitals:    12/02/20 0945 12/02/20 1010   BP: 160/86 130/82   BP Location:  Left arm   Patient Position:  Sitting   Cuff Size: Adult Adult   Pulse: 78    Temp: 97.6 °F (36.4 °C)    Weight: 98.4 kg (217 lb)    Height: 185.4 cm (72.99\")      Body mass index is 28.64 kg/m².      Physical Exam  Constitutional:       General: He is not in acute distress.     Appearance: He is well-developed.   Skin:     General: Skin is warm and dry.      Comments: Lumbar incision is well approximated. No redness, swelling or drainage.         Assessment/Plan     Medical Decision Making:      Mr. James is doing quite well.  I recommended that he start some outpatient physical therapy.  He will continue wearing his lumbar brace and follow-up in the office in 1 month with repeat lumbar x-rays.  He will see Dr. Washington at his next office visit.      Diagnoses and all orders for this visit:    1. Surgical followup visit " (Primary)  -     Ambulatory Referral to Physical Therapy Evaluate and treat (core and leg strengthening and intro to HEP); Strengthening  -     XR spine lumbar ap and lateral; Future      Return in about 1 month (around 1/2/2021) for Dr. Washington with new Xrays.

## 2020-12-14 RX ORDER — FUROSEMIDE 40 MG/1
40 TABLET ORAL 2 TIMES DAILY
Qty: 60 TABLET | Refills: 4 | Status: SHIPPED | OUTPATIENT
Start: 2020-12-14 | End: 2021-03-17

## 2020-12-21 ENCOUNTER — HOSPITAL ENCOUNTER (OUTPATIENT)
Dept: GENERAL RADIOLOGY | Facility: HOSPITAL | Age: 75
Discharge: HOME | End: 2020-12-21
Admitting: NURSE PRACTITIONER

## 2020-12-21 DIAGNOSIS — Z09 SURGICAL FOLLOWUP VISIT: ICD-10-CM

## 2020-12-21 PROCEDURE — 72100 X-RAY EXAM L-S SPINE 2/3 VWS: CPT

## 2020-12-22 NOTE — PROGRESS NOTES
I have reviewed these X-rays which also be viewed and discussed with patient by Dr. Washington when he sees patient in 7 days.

## 2020-12-28 RX ORDER — TRAMADOL HYDROCHLORIDE 50 MG/1
100 TABLET ORAL EVERY 6 HOURS PRN
Qty: 130 TABLET | Refills: 0 | Status: CANCELLED | OUTPATIENT
Start: 2020-12-28

## 2020-12-28 NOTE — TELEPHONE ENCOUNTER
Refill fax received from pharmacy on file.    lov 11/3/2020  Next ov 1/11/2021  Last filled 12/1/2020 but no documentation  willie 11/13/2020

## 2020-12-29 ENCOUNTER — TELEPHONE (OUTPATIENT)
Dept: NEUROSURGERY | Facility: CLINIC | Age: 75
End: 2020-12-29

## 2020-12-29 NOTE — PROGRESS NOTES
"Subjective   Patient ID: Juan James is a 75 y.o. male is here today for follow-up with new lumbar x-ray's. Mr. James is 6 weeks and 5 days out from having a L3/L4 and L4/L5 laminectomy and fusion with instrumentation on 11.13.2020.    Today, Mr. James reports having no pain since having surgery. He denies numbness, tingling, and weakness.     Patient is wearing mask in office today.     History of Present Illness    This patient returns today.  He is a little over 6 weeks from a two-level lumbar laminectomy and fusion.  He is actually feeling pretty good.  He has a little pain in his back but nothing severe.  His incision has healed well.    The following portions of the patient's history were reviewed and updated as appropriate: allergies, current medications, past family history, past medical history, past social history, past surgical history and problem list.    Review of Systems   Constitutional: Negative for activity change.   Respiratory: Negative for chest tightness and shortness of breath.    Cardiovascular: Negative for chest pain.   Musculoskeletal: Negative for back pain.   Neurological: Negative for weakness and numbness.       I have reviewed the review of systems as documented by my MA.      Objective     Vitals:    12/30/20 1315   BP: 143/83   BP Location: Right arm   Patient Position: Sitting   Cuff Size: Comment: long adult   Pulse: 50   Temp: 97.5 °F (36.4 °C)   TempSrc: Temporal   Weight: 98.4 kg (217 lb)   Height: 185.4 cm (72.99\")     Body mass index is 28.64 kg/m².      Physical Exam  Neurological:      Mental Status: He is alert and oriented to person, place, and time.       Neurologic Exam     Mental Status   Oriented to person, place, and time.           Assessment/Plan   Independent Review of Radiographic Studies:      I personally reviewed the images from the following studies.    I reviewed his x-rays that were done on 21 December.  This shows good alignment of the spine as it " was postop.  Nothing has changed.    Medical Decision Making:      I told the patient we will go ahead and do a little physical therapy he is not very interested in that I told him to go at least once for a home exercise program.  We will see him back in about 2 months with another x-ray.  He is concerned about his arthritis and he has some bladder pain from his Fuller.  He says the nurse in the hospital told him the Fuller was put in wrong.  I explained that it is almost impossible to put a Fuller in wrong.  Nonetheless he needs a urology consult and we will get him into see someone.  We will make some recommendations for rheumatologist.    Diagnoses and all orders for this visit:    1. Follow-up examination following surgery (Primary)  -     XR Spine Lumbar Complete With Flex & Ext; Future  -     Ambulatory Referral to Physical Therapy  -     Ambulatory Referral to Urology  -     Ambulatory Referral to Rheumatology      Return in about 2 months (around 2/28/2021).

## 2020-12-29 NOTE — TELEPHONE ENCOUNTER
Caller: BOBBY HUNG    Relationship to patient: PT    Best call back number: [phone]    Chief complaint: RETURNING PHILL'S CALL THAT HIS POST OP TODAY IS CANCELLED DUE TO DR GARCIA BEING CALLED OUT ON EMERGENCY    Type of visit: POST OP    Requested date: ANY    If rescheduling, when is the original appointment: 12/29/20 11:15 AM    Additional notes: PT STATES OK TO RESCHEDULE AND NOTIFY HIM VIA [facility]. HIS SCHEDULE IS OPEN.    HUB UNABLE TO WARM TRANSFER AT TIME OF CALL.    THANK YOU.

## 2020-12-30 ENCOUNTER — OFFICE VISIT (OUTPATIENT)
Dept: NEUROSURGERY | Facility: CLINIC | Age: 75
End: 2020-12-30

## 2020-12-30 VITALS
SYSTOLIC BLOOD PRESSURE: 143 MMHG | HEIGHT: 73 IN | BODY MASS INDEX: 28.76 KG/M2 | TEMPERATURE: 97.5 F | HEART RATE: 50 BPM | WEIGHT: 217 LBS | DIASTOLIC BLOOD PRESSURE: 83 MMHG

## 2020-12-30 DIAGNOSIS — Z09 FOLLOW-UP EXAMINATION FOLLOWING SURGERY: Primary | ICD-10-CM

## 2020-12-30 PROCEDURE — 99024 POSTOP FOLLOW-UP VISIT: CPT | Performed by: NEUROLOGICAL SURGERY

## 2020-12-31 ENCOUNTER — TELEPHONE (OUTPATIENT)
Dept: NEUROSURGERY | Facility: CLINIC | Age: 75
End: 2020-12-31

## 2020-12-31 NOTE — TELEPHONE ENCOUNTER
Caller: PT    Relationship: SELF    Best call back number: [phone]    What orders are you requesting (i.e. lab or imaging): PHYSICAL THERAPY    In what timeframe would the patient need to come in: ASAP    Where will you receive your lab/imaging services: [facility] IF POSSIBLE    Additional notes: PT STATES THAT [facility] CALLED HIM TO SCHEDULE AND HE WOULD PREFER TO HAVE IT AT [facility].  ADVISED PT THAT I DO SEE A REF FOR [facility] IN [facility] BUT SINCE HE RECEIVED THE PHONE CALL FROM [facility] HE WANTED TO MAKE SURE PHILL WAS AWARE THAT HE'D PREFER [facility] IN [facility].    PLEASE ADVISE  THANK YOU         \

## 2021-01-05 DIAGNOSIS — M15.9 OSTEOARTHRITIS OF MULTIPLE JOINTS, UNSPECIFIED OSTEOARTHRITIS TYPE: Primary | ICD-10-CM

## 2021-01-05 RX ORDER — TRAMADOL HYDROCHLORIDE 50 MG/1
50 TABLET ORAL EVERY 6 HOURS PRN
Qty: 120 TABLET | Refills: 0 | Status: SHIPPED | OUTPATIENT
Start: 2021-01-05 | End: 2021-02-11

## 2021-01-11 ENCOUNTER — TELEPHONE (OUTPATIENT)
Dept: GASTROENTEROLOGY | Facility: CLINIC | Age: 76
End: 2021-01-11

## 2021-01-11 ENCOUNTER — OFFICE VISIT (OUTPATIENT)
Dept: FAMILY MEDICINE CLINIC | Facility: CLINIC | Age: 76
End: 2021-01-11

## 2021-01-11 VITALS
WEIGHT: 220.25 LBS | SYSTOLIC BLOOD PRESSURE: 146 MMHG | BODY MASS INDEX: 29.19 KG/M2 | DIASTOLIC BLOOD PRESSURE: 83 MMHG | HEIGHT: 73 IN | HEART RATE: 65 BPM | TEMPERATURE: 97.3 F | OXYGEN SATURATION: 98 %

## 2021-01-11 DIAGNOSIS — Z12.5 SCREENING FOR PROSTATE CANCER: ICD-10-CM

## 2021-01-11 DIAGNOSIS — F17.210 SMOKING GREATER THAN 30 PACK YEARS: ICD-10-CM

## 2021-01-11 DIAGNOSIS — M62.838 MUSCLE SPASM: ICD-10-CM

## 2021-01-11 DIAGNOSIS — N42.9 PROSTATE DISEASE: ICD-10-CM

## 2021-01-11 DIAGNOSIS — E78.2 MIXED HYPERLIPIDEMIA: ICD-10-CM

## 2021-01-11 DIAGNOSIS — Z00.00 MEDICARE ANNUAL WELLNESS VISIT, SUBSEQUENT: Primary | ICD-10-CM

## 2021-01-11 DIAGNOSIS — K63.5 HYPERPLASTIC COLONIC POLYP, UNSPECIFIED PART OF COLON: ICD-10-CM

## 2021-01-11 PROCEDURE — G0439 PPPS, SUBSEQ VISIT: HCPCS | Performed by: FAMILY MEDICINE

## 2021-01-11 RX ORDER — CYCLOBENZAPRINE HCL 10 MG
5 TABLET ORAL EVERY 8 HOURS PRN
Qty: 30 TABLET | Refills: 11 | Status: SHIPPED | OUTPATIENT
Start: 2021-01-11 | End: 2021-06-18

## 2021-01-11 NOTE — PROGRESS NOTES
The ABCs of the Annual Wellness Visit  Subsequent Medicare Wellness Visit    Chief Complaint   Patient presents with   • Follow-up   • Hypertension       Subjective   History of Present Illness:  Juan James is a 75 y.o. male who presents for a Subsequent Medicare Wellness Visit.    HEALTH RISK ASSESSMENT    Recent Hospitalizations:  Recently treated at the following:  [facility]    Current Medical Providers:  Patient Care Team:  Luda Kidd MD as PCP - General (Family Medicine)  Bill Washington MD as Surgeon (Neurosurgery)  Self, Zafar Fraire MD as Consulting Physician (Vascular Surgery)  Alesia Aviles MD as Consulting Physician (Cardiology)    Smoking Status:  Social History     Tobacco Use   Smoking Status Former Smoker   • Packs/day: 2.00   • Years: 40.00   • Pack years: 80.00   • Types: Cigarettes   • Quit date:    • Years since quittin.0   Smokeless Tobacco Former User   • Types: Chew   Tobacco Comment    smoked 1 to 2 packs per day for 40 or more years       Alcohol Consumption:  Social History     Substance and Sexual Activity   Alcohol Use Not Currently    Comment: QUIT DRINKING 2020       Depression Screen:   PHQ-2/PHQ-9 Depression Screening 2021   Little interest or pleasure in doing things 0   Feeling down, depressed, or hopeless 0   Trouble falling or staying asleep, or sleeping too much -   Feeling tired or having little energy -   Poor appetite or overeating -   Feeling bad about yourself - or that you are a failure or have let yourself or your family down -   Trouble concentrating on things, such as reading the newspaper or watching television -   Moving or speaking so slowly that other people could have noticed. Or the opposite - being so fidgety or restless that you have been moving around a lot more than usual -   Thoughts that you would be better off dead, or of hurting yourself in some way -   Total Score 0   If you checked off any problems, how  difficult have these problems made it for you to do your work, take care of things at home, or get along with other people? -       Fall Risk Screen:  IRIS Fall Risk Assessment was completed, and patient is at LOW risk for falls.Assessment completed on:1/11/2021    Health Habits and Functional and Cognitive Screening:  Functional & Cognitive Status 1/11/2021   Do you have difficulty preparing food and eating? No   Do you have difficulty bathing yourself, getting dressed or grooming yourself? No   Do you have difficulty using the toilet? No   Do you have difficulty moving around from place to place? No   Do you have trouble with steps or getting out of a bed or a chair? No   Current Diet Well Balanced Diet   Dental Exam Not up to date   Eye Exam Not up to date   Exercise (times per week) 3 times per week   Current Exercises Include Walking   Current Exercise Activities Include -   Do you need help using the phone?  No   Are you deaf or do you have serious difficulty hearing?  No   Do you need help with transportation? No   Do you need help shopping? No   Do you need help preparing meals?  No   Do you need help with housework?  No   Do you need help with laundry? No   Do you need help taking your medications? No   Do you need help managing money? No   Do you ever drive or ride in a car without wearing a seat belt? No   Have you felt unusual stress, anger or loneliness in the last month? No   Who do you live with? Alone   If you need help, do you have trouble finding someone available to you? No   Have you been bothered in the last four weeks by sexual problems? No   Do you have difficulty concentrating, remembering or making decisions? No         Does the patient have evidence of cognitive impairment? No    Asprin use counseling:Does not need ASA (and currently is not on it)    Age-appropriate Screening Schedule:  Refer to the list below for future screening recommendations based on patient's age, sex and/or medical  conditions. Orders for these recommended tests are listed in the plan section. The patient has been provided with a written plan.    Health Maintenance   Topic Date Due   • TDAP/TD VACCINES (1 - Tdap) 12/10/1966   • ZOSTER VACCINE (1 of 2) 12/10/1997   • DIABETIC FOOT EXAM  04/09/2019   • DIABETIC EYE EXAM  04/09/2019   • URINE MICROALBUMIN  07/29/2020   • HEMOGLOBIN A1C  12/08/2020   • LIPID PANEL  09/29/2021   • COLONOSCOPY  10/03/2029   • INFLUENZA VACCINE  Completed          The following portions of the patient's history were reviewed and updated as appropriate: allergies, current medications, past family history, past medical history, past social history, past surgical history and problem list.    Outpatient Medications Prior to Visit   Medication Sig Dispense Refill   • allopurinol (ZYLOPRIM) 100 MG tablet Take 1 tablet by mouth Daily. To take along with 300 mg pill to a daily dose 400 mg (Patient taking differently: Take 100 mg by mouth Every Night.) 90 tablet 3   • allopurinol (ZYLOPRIM) 300 MG tablet Take 1 tablet by mouth Daily. 30 tablet 6   • Cyanocobalamin (VITAMIN B-12) 5000 MCG tablet dispersible Place 1 tablet on the tongue Daily. (Patient taking differently: Place 1 tablet on the tongue Daily. ORAL)     • Eliquis 5 MG tablet tablet Take 5 mg by mouth 2 (Two) Times a Day. HOLDING PER DR. GARCIA IN     • folic acid (FOLVITE) 400 MCG tablet Take 1 tablet by mouth Daily. (Patient taking differently: Take 400 mcg by mouth 2 (Two) Times a Day.)     • furosemide (LASIX) 40 MG tablet Take 1 tablet by mouth 2 (Two) Times a Day. 60 tablet 4   • HYDROcodone-acetaminophen (NORCO) 5-325 MG per tablet Take 1 tablet by mouth Every 6 (Six) Hours As Needed for Moderate Pain . 40 tablet 0   • metoprolol succinate XL (Toprol XL) 25 MG 24 hr tablet Take 1 tablet by mouth every night at bedtime. (Patient taking differently: Take 25 mg by mouth 2 (Two) Times a Day.) 30 tablet 11   • Mirabegron ER (Myrbetriq) 50 MG tablet  sustained-release 24 hour 24 hr tablet Take 50 mg by mouth Daily.     • multivitamin with minerals (CENTRUM MEN PO) Take 1 tablet by mouth Daily. HOLD FOR SURGERY     • pitavastatin calcium (Livalo) 2 MG tablet tablet Take 1 tablet by mouth Every Night. 30 tablet 11   • tamsulosin (FLOMAX) 0.4 MG capsule 24 hr capsule Take 1 capsule by mouth Every Evening.     • traMADol (ULTRAM) 50 MG tablet Take 1 tablet by mouth Every 6 (Six) Hours As Needed for Moderate Pain . 120 tablet 0   • cyclobenzaprine (FLEXERIL) 10 MG tablet Take 0.5 tablets by mouth Every 8 (Eight) Hours As Needed for Muscle Spasms. 30 tablet 0     No facility-administered medications prior to visit.        Patient Active Problem List   Diagnosis   • Follow-up examination, following other surgery   • Lumbar radiculopathy   • Cervical radiculitis   • Essential hypertension   • Coronary artery disease involving native heart   • Chronic bronchitis (CMS/HCC)   • Hyperlipidemia   • Edema   • Rectal pain   • 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HCC)   • Cervical spondylosis with radiculopathy   • History of colon polyps   • Tinnitus   • Shortness of breath   • Pulmonary embolism (CMS/HCC)   • Peptic ulceration   • OA (osteoarthritis)   • Neck pain   • Lower back pain   • Hypertension   • History of blood transfusion   • Hearing loss   • GERD (gastroesophageal reflux disease)   • DDD (degenerative disc disease), lumbar   • DDD (degenerative disc disease), cervical   • Coronary artery disease   • COPD (chronic obstructive pulmonary disease) (CMS/HCC)   • Colon polyps   • CHF (congestive heart failure) (CMS/HCC)   • Cervical spondylosis   • Cervical disc disorder   • Cataract   • BPH (benign prostatic hyperplasia)   • Atrial fibrillation (CMS/HCC)   • Arthritis   • AAA (abdominal aortic aneurysm) (CMS/HCC)   • Medicare annual wellness visit, subsequent   • Heart block AV complete (CMS/HCC)   • PVD (peripheral vascular disease) with claudication (CMS/HCC)   • Acute  "renal failure (ARF) (CMS/[facility])   • Hospital discharge follow-up   • Idiopathic chronic gout of multiple sites without tophus   • Spondylolisthesis of lumbar region   • Olecranon bursitis of left elbow       Advanced Care Planning:  ACP discussion was held with the patient during this visit. Patient has an advance directive in EMR which is still valid.     Review of Systems   Constitutional: Negative.        Compared to one year ago, the patient feels his physical health is worse.  Compared to one year ago, the patient feels his mental health is the same.    Reviewed chart for potential of high risk medication in the elderly: yes  Reviewed chart for potential of harmful drug interactions in the elderly:yes    Objective         Vitals:    01/11/21 0745   BP: 146/83   Pulse: 65   Temp: 97.3 °F (36.3 °C)   SpO2: 98%   Weight: 99.9 kg (220 lb 4 oz)   Height: 185.4 cm (72.99\")       Body mass index is 29.06 kg/m².  Discussed the patient's BMI with him. The BMI is above average; BMI management plan is completed.    Physical Exam  Vitals signs and nursing note reviewed.   Constitutional:       Appearance: He is obese.               Assessment/Plan   Medicare Risks and Personalized Health Plan  CMS Preventative Services Quick Reference  Fall Risk    The above risks/problems have been discussed with the patient.  Pertinent information has been shared with the patient in the After Visit Summary.  Follow up plans and orders are seen below in the Assessment/Plan Section.    Diagnoses and all orders for this visit:    1. Medicare annual wellness visit, subsequent (Primary)    2. Smoking greater than 30 pack years  -     CT Chest Low Dose Wo Cancer Screening; Future    3. Muscle spasm  -     cyclobenzaprine (FLEXERIL) 10 MG tablet; Take 0.5 tablets by mouth Every 8 (Eight) Hours As Needed for Muscle Spasms.  Dispense: 30 tablet; Refill: 11    4. Mixed hyperlipidemia  -     Comprehensive Metabolic Panel  -     Lipid Panel  -     CBC " & Differential    5. Screening for prostate cancer    6. Prostate disease  -     PSA DIAGNOSTIC    7. Hyperplastic colonic polyp, unspecified part of colon  -     Ambulatory Referral For Screening Colonoscopy      Follow Up:  Return in about 1 year (around 1/11/2022) for Medicare Wellness.       An After Visit Summary and PPPS were given to the patient.       Patient has been erroneously marked as diabetic. Based on the available clinical information, he does not have diabetes and should therefore be excluded from diabetic health maintenance and quality measures for the remainder of the reporting period.

## 2021-01-12 LAB
ALBUMIN SERPL-MCNC: 4.3 G/DL (ref 3.5–5.2)
ALBUMIN/GLOB SERPL: 1.4 G/DL
ALP SERPL-CCNC: 115 U/L (ref 39–117)
ALT SERPL-CCNC: 15 U/L (ref 1–41)
AST SERPL-CCNC: 23 U/L (ref 1–40)
BASOPHILS # BLD AUTO: 0.05 10*3/MM3 (ref 0–0.2)
BASOPHILS NFR BLD AUTO: 0.8 % (ref 0–1.5)
BILIRUB SERPL-MCNC: 0.4 MG/DL (ref 0–1.2)
BUN SERPL-MCNC: 19 MG/DL (ref 8–23)
BUN/CREAT SERPL: 15.1 (ref 7–25)
CALCIUM SERPL-MCNC: 9.5 MG/DL (ref 8.6–10.5)
CHLORIDE SERPL-SCNC: 98 MMOL/L (ref 98–107)
CHOLEST SERPL-MCNC: 175 MG/DL (ref 0–200)
CO2 SERPL-SCNC: 32.3 MMOL/L (ref 22–29)
CREAT SERPL-MCNC: 1.26 MG/DL (ref 0.76–1.27)
EOSINOPHIL # BLD AUTO: 0.18 10*3/MM3 (ref 0–0.4)
EOSINOPHIL NFR BLD AUTO: 2.7 % (ref 0.3–6.2)
ERYTHROCYTE [DISTWIDTH] IN BLOOD BY AUTOMATED COUNT: 13.1 % (ref 12.3–15.4)
GLOBULIN SER CALC-MCNC: 3 GM/DL
GLUCOSE SERPL-MCNC: 120 MG/DL (ref 65–99)
HCT VFR BLD AUTO: 39.2 % (ref 37.5–51)
HDLC SERPL-MCNC: 44 MG/DL (ref 40–60)
HGB BLD-MCNC: 13.4 G/DL (ref 13–17.7)
IMM GRANULOCYTES # BLD AUTO: 0.02 10*3/MM3 (ref 0–0.05)
IMM GRANULOCYTES NFR BLD AUTO: 0.3 % (ref 0–0.5)
LDLC SERPL CALC-MCNC: 96 MG/DL (ref 0–100)
LYMPHOCYTES # BLD AUTO: 1.78 10*3/MM3 (ref 0.7–3.1)
LYMPHOCYTES NFR BLD AUTO: 26.9 % (ref 19.6–45.3)
MCH RBC QN AUTO: 33.3 PG (ref 26.6–33)
MCHC RBC AUTO-ENTMCNC: 34.2 G/DL (ref 31.5–35.7)
MCV RBC AUTO: 97.3 FL (ref 79–97)
MONOCYTES # BLD AUTO: 0.69 10*3/MM3 (ref 0.1–0.9)
MONOCYTES NFR BLD AUTO: 10.4 % (ref 5–12)
NEUTROPHILS # BLD AUTO: 3.9 10*3/MM3 (ref 1.7–7)
NEUTROPHILS NFR BLD AUTO: 58.9 % (ref 42.7–76)
NRBC BLD AUTO-RTO: 0 /100 WBC (ref 0–0.2)
PLATELET # BLD AUTO: 200 10*3/MM3 (ref 140–450)
POTASSIUM SERPL-SCNC: 4.2 MMOL/L (ref 3.5–5.2)
PROT SERPL-MCNC: 7.3 G/DL (ref 6–8.5)
PSA SERPL-MCNC: 1.59 NG/ML (ref 0–4)
RBC # BLD AUTO: 4.03 10*6/MM3 (ref 4.14–5.8)
SODIUM SERPL-SCNC: 139 MMOL/L (ref 136–145)
TRIGL SERPL-MCNC: 204 MG/DL (ref 0–150)
VLDLC SERPL CALC-MCNC: 35 MG/DL (ref 5–40)
WBC # BLD AUTO: 6.62 10*3/MM3 (ref 3.4–10.8)

## 2021-01-18 DIAGNOSIS — E78.2 MIXED HYPERLIPIDEMIA: Primary | ICD-10-CM

## 2021-01-18 RX ORDER — ROSUVASTATIN CALCIUM 40 MG/1
40 TABLET, COATED ORAL DAILY
Qty: 90 TABLET | Refills: 3 | Status: SHIPPED | OUTPATIENT
Start: 2021-01-18 | End: 2021-11-22 | Stop reason: SDUPTHER

## 2021-01-21 ENCOUNTER — HOSPITAL ENCOUNTER (OUTPATIENT)
Dept: CT IMAGING | Facility: HOSPITAL | Age: 76
Discharge: HOME | End: 2021-01-21
Admitting: FAMILY MEDICINE

## 2021-01-21 DIAGNOSIS — F17.210 SMOKING GREATER THAN 30 PACK YEARS: ICD-10-CM

## 2021-01-21 PROCEDURE — 71271 CT THORAX LUNG CANCER SCR C-: CPT

## 2021-01-22 ENCOUNTER — TELEPHONE (OUTPATIENT)
Dept: GASTROENTEROLOGY | Facility: CLINIC | Age: 76
End: 2021-01-22

## 2021-01-22 ENCOUNTER — PREP FOR SURGERY (OUTPATIENT)
Dept: OTHER | Facility: HOSPITAL | Age: 76
End: 2021-01-22

## 2021-01-22 DIAGNOSIS — Z12.11 ENCOUNTER FOR SCREENING FOR MALIGNANT NEOPLASM OF COLON: Primary | ICD-10-CM

## 2021-01-22 RX ORDER — SODIUM CHLORIDE, SODIUM LACTATE, POTASSIUM CHLORIDE, CALCIUM CHLORIDE 600; 310; 30; 20 MG/100ML; MG/100ML; MG/100ML; MG/100ML
30 INJECTION, SOLUTION INTRAVENOUS CONTINUOUS
Status: CANCELLED | OUTPATIENT
Start: 2021-02-17

## 2021-01-22 NOTE — TELEPHONE ENCOUNTER
Last scope 10/03/2019 in epic-- personal hx of polyps-- no known family hx of polyps or colon ca-- no ASA or blood thinners-- medications:    allopurinol (ZYLOPRIM) 100 MG tablet      allopurinol (ZYLOPRIM) 300 MG tablet     Cyanocobalamin (VITAMIN B-12) 5000 MCG tablet dispersible     folic acid (FOLVITE) 400 MCG tablet     metoprolol succinate XL (Toprol XL) 25 MG 24 hr tablet     multivitamin with minerals (CENTRUM MEN PO)     tamsulosin (FLOMAX) 0.4 MG capsule 24 hr capsule    Furosemide   Livalo       OA form scanned into media

## 2021-02-05 NOTE — PROGRESS NOTES
"Subjective   Patient ID: Juan James is a 75 y.o. male is here today for 2 month follow-up with new XR Lumbar.    Today patient is not having any pain. faith    Patient, Provider, and MA are all wearing a mask in our office.     History of Present Illness     This patient returns today.  He is doing fairly well.  He really does not have much trouble with his back.  He has some joint pain throughout his body.  We referred him to a rheumatologist but they refused to see him saying he was too old.  They recommended an orthopedic consult.    The following portions of the patient's history were reviewed and updated as appropriate: allergies, current medications, past family history, past medical history, past social history, past surgical history and problem list.    Review of Systems   Constitutional: Negative for chills and fever.   HENT: Negative for congestion.    Genitourinary: Negative for difficulty urinating and dysuria.   Musculoskeletal: Negative for back pain.   Neurological: Negative for weakness and numbness.       I have reviewed the review of systems as documented by my MA.      Objective     Vitals:    02/08/21 1242   BP: 159/96   Cuff Size: Adult   Pulse: 67   Temp: 97.5 °F (36.4 °C)   Weight: 99.9 kg (220 lb 4 oz)   Height: 185.4 cm (72.99\")     Body mass index is 29.07 kg/m².      Physical Exam  Neurological:      Mental Status: He is alert and oriented to person, place, and time.       Neurologic Exam     Mental Status   Oriented to person, place, and time.           Assessment/Plan   Independent Review of Radiographic Studies:      I personally reviewed the images from the following studies.    I reviewed some plain films done on 6 February of this year.  This shows the good alignment of the construct at L3-4 and L4-5.  There is no evidence of abnormal movement on flexion and extension.      Medical Decision Making:      I told the patient about the films.  I told her we would check him again in " 6 months with another x-ray.  In the meantime we will get him into see an orthopedic surgeon.    Diagnoses and all orders for this visit:    1. Follow-up examination following surgery (Primary)  -     XR Spine Lumbar Complete With Flex & Ext; Future  -     Ambulatory Referral to Orthopedic Surgery      Return in about 6 months (around 8/8/2021).

## 2021-02-06 ENCOUNTER — HOSPITAL ENCOUNTER (OUTPATIENT)
Dept: GENERAL RADIOLOGY | Facility: HOSPITAL | Age: 76
Discharge: HOME | End: 2021-02-06
Admitting: NEUROLOGICAL SURGERY

## 2021-02-06 DIAGNOSIS — Z09 FOLLOW-UP EXAMINATION FOLLOWING SURGERY: ICD-10-CM

## 2021-02-06 PROCEDURE — 72114 X-RAY EXAM L-S SPINE BENDING: CPT

## 2021-02-08 ENCOUNTER — OFFICE VISIT (OUTPATIENT)
Dept: NEUROSURGERY | Facility: CLINIC | Age: 76
End: 2021-02-08

## 2021-02-08 VITALS
HEART RATE: 67 BPM | SYSTOLIC BLOOD PRESSURE: 159 MMHG | TEMPERATURE: 97.5 F | BODY MASS INDEX: 29.19 KG/M2 | HEIGHT: 73 IN | WEIGHT: 220.25 LBS | DIASTOLIC BLOOD PRESSURE: 96 MMHG

## 2021-02-08 DIAGNOSIS — Z09 FOLLOW-UP EXAMINATION FOLLOWING SURGERY: Primary | ICD-10-CM

## 2021-02-08 PROCEDURE — 99024 POSTOP FOLLOW-UP VISIT: CPT | Performed by: NEUROLOGICAL SURGERY

## 2021-02-08 RX ORDER — DABIGATRAN ETEXILATE 75 MG/1
75 CAPSULE ORAL 2 TIMES DAILY
COMMUNITY
Start: 2021-01-13 | End: 2022-01-27 | Stop reason: SDUPTHER

## 2021-02-10 ENCOUNTER — TRANSCRIBE ORDERS (OUTPATIENT)
Dept: SLEEP MEDICINE | Facility: HOSPITAL | Age: 76
End: 2021-02-10

## 2021-02-10 DIAGNOSIS — Z01.818 OTHER SPECIFIED PRE-OPERATIVE EXAMINATION: Primary | ICD-10-CM

## 2021-02-11 ENCOUNTER — OFFICE VISIT (OUTPATIENT)
Dept: ORTHOPEDIC SURGERY | Facility: CLINIC | Age: 76
End: 2021-02-11

## 2021-02-11 DIAGNOSIS — M51.36 DDD (DEGENERATIVE DISC DISEASE), LUMBAR: ICD-10-CM

## 2021-02-11 DIAGNOSIS — M25.561 PAIN IN BOTH KNEES, UNSPECIFIED CHRONICITY: Primary | ICD-10-CM

## 2021-02-11 DIAGNOSIS — M53.3 SI (SACROILIAC) JOINT DYSFUNCTION: ICD-10-CM

## 2021-02-11 DIAGNOSIS — M17.0 ARTHRITIS OF BOTH KNEES: ICD-10-CM

## 2021-02-11 DIAGNOSIS — M15.9 OSTEOARTHRITIS OF MULTIPLE JOINTS, UNSPECIFIED OSTEOARTHRITIS TYPE: ICD-10-CM

## 2021-02-11 DIAGNOSIS — M25.562 PAIN IN BOTH KNEES, UNSPECIFIED CHRONICITY: Primary | ICD-10-CM

## 2021-02-11 DIAGNOSIS — M25.552 LEFT HIP PAIN: ICD-10-CM

## 2021-02-11 DIAGNOSIS — I73.9 PERIPHERAL VASCULAR DISEASE (HCC): ICD-10-CM

## 2021-02-11 PROCEDURE — 73562 X-RAY EXAM OF KNEE 3: CPT | Performed by: ORTHOPAEDIC SURGERY

## 2021-02-11 PROCEDURE — 73502 X-RAY EXAM HIP UNI 2-3 VIEWS: CPT | Performed by: ORTHOPAEDIC SURGERY

## 2021-02-11 PROCEDURE — 99204 OFFICE O/P NEW MOD 45 MIN: CPT | Performed by: ORTHOPAEDIC SURGERY

## 2021-02-11 RX ORDER — TRAMADOL HYDROCHLORIDE 50 MG/1
TABLET ORAL
Qty: 120 TABLET | Refills: 0 | Status: SHIPPED | OUTPATIENT
Start: 2021-02-11 | End: 2021-03-18

## 2021-02-11 RX ORDER — DABIGATRAN ETEXILATE MESYLATE 75 MG/1
CAPSULE ORAL
COMMUNITY
Start: 2021-01-22 | End: 2021-03-18

## 2021-02-11 NOTE — PROGRESS NOTES
Patient Name: Juan James   YOB: 1945  Referring Primary Care Physician: Luda Kidd MD  BMI: There is no height or weight on file to calculate BMI.    Chief Complaint:    Chief Complaint   Patient presents with   • Left Hip - Establish Care, Pain   • Right Knee - Establish Care, Pain   • Left Knee - Establish Care, Pain        HPI:     Juan James is a 75 y.o. male who presents today for evaluation of   Chief Complaint   Patient presents with   • Left Hip - Establish Care, Pain   • Right Knee - Establish Care, Pain   • Left Knee - Establish Care, Pain     The patient states he was referred to our office after rheumatology would not see him. He reports bilateral knee pain and left hip pain. The patient states he is also having difficulty with mobility. He indicates pain over his left sacroiliac joint. He reports the sacroiliac pain has been ongoing for 15 to 20 years. The patient describes his sacroiliac pain as severe and greater in severity than his knee pain. He states his symptoms did not improve after his spine surgery. He notes sacroiliac pain with rolling over in bed and ambulation. He walks 3 to 5 miles per day for exercise but requires periodic rest from weight-bearing on the left hip. He is currently taking tramadol and Tylenol. He confirms doing physical therapy mostly for his back.     Blood thinners are contraindicated due to a history of atrial fibrillation. The patient clarifies his aortic aneurysm is not being treated by Dr. Preston any longer but rather by another provider. He states he also has another aneurysm that is being monitored by his provider. He does note that he did have a severe decline in his overall health in 2020 after his wife passed away, and he required hospitalization for 4 days in 2020. He has a history of hand arthritis and swelling. The patient has a history of a previous lumbar fusion.    Subjective   Medications:   Home Medications:  Current  Outpatient Medications on File Prior to Visit   Medication Sig   • allopurinol (ZYLOPRIM) 100 MG tablet Take 1 tablet by mouth Daily. To take along with 300 mg pill to a daily dose 400 mg (Patient taking differently: Take 100 mg by mouth Every Night.)   • allopurinol (ZYLOPRIM) 300 MG tablet Take 1 tablet by mouth Daily.   • Cyanocobalamin (VITAMIN B-12) 5000 MCG tablet dispersible Place 1 tablet on the tongue Daily. (Patient taking differently: Place 1 tablet on the tongue Daily. ORAL)   • cyclobenzaprine (FLEXERIL) 10 MG tablet Take 0.5 tablets by mouth Every 8 (Eight) Hours As Needed for Muscle Spasms.   • dabigatran etexilate (PRADAXA) 75 MG capsule Take 75 mg by mouth 2 (Two) Times a Day.   • folic acid (FOLVITE) 400 MCG tablet Take 1 tablet by mouth Daily. (Patient taking differently: Take 400 mcg by mouth 2 (Two) Times a Day.)   • furosemide (LASIX) 40 MG tablet Take 1 tablet by mouth 2 (Two) Times a Day.   • metoprolol succinate XL (Toprol XL) 25 MG 24 hr tablet Take 1 tablet by mouth every night at bedtime. (Patient taking differently: Take 50 mg by mouth Daily.)   • multivitamin with minerals (CENTRUM MEN PO) Take 1 tablet by mouth Daily. HOLD FOR SURGERY   • rosuvastatin (Crestor) 40 MG tablet Take 1 tablet by mouth Daily.   • tamsulosin (FLOMAX) 0.4 MG capsule 24 hr capsule Take 1 capsule by mouth Every Evening.   • Eliquis 5 MG tablet tablet Take 5 mg by mouth 2 (Two) Times a Day. HOLDING PER DR. GARCIA IN   • HYDROcodone-acetaminophen (NORCO) 5-325 MG per tablet Take 1 tablet by mouth Every 6 (Six) Hours As Needed for Moderate Pain .   • Mirabegron ER (Myrbetriq) 50 MG tablet sustained-release 24 hour 24 hr tablet Take 50 mg by mouth Daily.   • Pradaxa 75 MG capsule    • [DISCONTINUED] traMADol (ULTRAM) 50 MG tablet Take 1 tablet by mouth Every 6 (Six) Hours As Needed for Moderate Pain .     No current facility-administered medications on file prior to visit.      Current Medications:  Scheduled  Meds:  Continuous Infusions:No current facility-administered medications for this visit.     PRN Meds:.    I have reviewed the patient's medical history in detail and updated the computerized patient record.  Review and summarization of old records includes:    Past Medical History:   Diagnosis Date   • AAA (abdominal aortic aneurysm) (CMS/[facility])    • Arthritis    • Atherosclerotic heart disease of native coronary artery without angina pectoris    • Atrial fibrillation (CMS/[facility])    • Benign essential hypertension    • BPH (benign prostatic hyperplasia)    • Cervical spondylosis 11/2012   • CHF (congestive heart failure) (CMS/[facility])    • COPD (chronic obstructive pulmonary disease) (CMS/[facility])    • Coronary artery disease     STENT X 3   • DDD (degenerative disc disease), cervical    • DDD (degenerative disc disease), lumbar    • Dry skin     LOWER LEGS BILAT   • Edema     LOWER LEGS BILAT   • Elevated cholesterol    • Emphysema/COPD (CMS/[facility])    • Essential hypertriglyceridemia    • Hearing loss    • High blood pressure    • History of blood transfusion    • History of gastrointestinal hemorrhage    • History of gout    • History of myasthenia gravis     LAST EPISODE 5-6 YEARS AGO   • Hoarseness, persistent    • Hyperlipidemia    • Hypertension    • Lower back pain    • Lumbar radiculopathy 05/2016   • OA (osteoarthritis)    • Peptic ulceration     PUD   • Skin abnormalities     FLAKEY SKIN LOWER LEGS BILAT   • Sleep apnea     DOES NOT WEAR CPAP MACHINE    • Status post angioplasty with stent     STENT X3   • Tinnitus     BILAT   • Unsteady gait when walking     USES 3 PRONG CANE        Past Surgical History:   Procedure Laterality Date   • ANTERIOR CERVICAL DISCECTOMY W/ FUSION N/A 3/25/2016    Procedure: C3- C5 CERVICAL DISCECTOMY ANTERIOR FUSION WITH INSTRUMENTATION;  Surgeon: Bill Washington MD;  Location: [facility];  Service:    • ARTHRODESIS N/A 07/1983    LUMBAR   • ARTHRODESIS N/A 1987    LUMBAR   •  ARTHRODESIS      Spinal Arthrodesis-lower spine-1983, --upper spine-, 1990-Abstracted from [employer]   • BACK SURGERY      Lower Back Surgery   • CARDIAC CATHETERIZATION  2009   • CARDIAC CATHETERIZATION  2003    STENT TO RCA AND PCA, DR. PRIYA LUGO   • CATARACT EXTRACTION Bilateral     LENS IMPLANT   • CERVICAL ARTHRODESIS N/A 1990   • CERVICAL ARTHRODESIS N/A    • COLONOSCOPY N/A 10/3/2019    4 MM HYPERPLASTIC POLYP IN ILEOCECAL VALVE, 4 MM SESSILE SERRATED ADENOMA POLYP IN DESCENDING, 5 TUBULOVILLOUS ADENOMA POLYPS IN RECTUM, 3 TUBULAR ADENOMA POLYPS IN DISTAL RECTUM, 26 MM TUBULAR ADENOMA POLYP IN RECTUM, PIECEMEAL POLYPECTOMY, AREA TATTOOED, RESCOPE IN 6 MONTHS, DR. TAYA CRUZ AT [facility]   • CORONARY ANGIOPLASTY WITH STENT PLACEMENT  2009    and 2009- STATES HAS 3 STENTS   • ENDOSCOPY N/A 2012    NON BLEEDING EROSIVE GASTROPATHY, 1 DUODENAL ULCER WITH CLEAN BASE, DR. BRANDY WREN AT [facility]   • ENDOSCOPY AND COLONOSCOPY N/A 2007    EGD WNL, 8 ADENOMATOUS POLYPS IN RECTO-SIGMOID, RESCOPE IN 3 YRS, DR. CRISTINA RODRIGUEZ AT [facility]   • FOOT SURGERY Left     LEFT BIG TOE-JOINT REPLACED   • HAND SURGERY Left     THUMB-JOINT REPLACEMENT   • HAND SURGERY Right     JOINT REPLACEMENT RIGHT INDEX FINGER   • KNEE ARTHROSCOPY Left    • LAPAROSCOPIC CHOLECYSTECTOMY     • LUMBAR DISCECTOMY FUSION INSTRUMENTATION N/A 2020    Procedure: Lumbar 3 4 and lumbar 4 5 laminectomy and fusion with instrumentation;  Surgeon: Bill Washington MD;  Location: [facility];  Service: Neurosurgery;  Laterality: N/A;   • NECK SURGERY     • VASECTOMY Bilateral         Social History     Occupational History   • Not on file   Tobacco Use   • Smoking status: Former Smoker     Packs/day: 2.00     Years: 40.00     Pack years: 80.00     Types: Cigarettes     Quit date:      Years since quittin.1   • Smokeless tobacco: Former User     Types: Chew   • Tobacco comment: smoked 1 to 2 packs per day  for 40 or more years   Substance and Sexual Activity   • Alcohol use: Not Currently     Comment: QUIT DRINKING JAN 2020   • Drug use: No   • Sexual activity: Defer      Social History     Social History Narrative   • Not on file        Family History   Adopted: Yes   Problem Relation Age of Onset   • Heart attack Mother    • Alcohol abuse Mother    • Heart disease Mother    • No Known Problems Father    • Heart disease Other         FH in females before the age of 65   • Malig Hyperthermia Neg Hx        ROS: 14 point review of systems was performed and all other systems were reviewed and are negative except for documented findings in HPI and today's encounter.     Allergies:   Allergies   Allergen Reactions   • Ranolazine Er Unknown (See Comments), Headache, Irritability and Unknown - High Severity     Headaches and falling asleep.  Other reaction(s): Irritability  Headaches and falling asleep.   • Apixaban Other (See Comments)   • Indomethacin Dizziness   • Lisinopril Unknown - Low Severity and Unknown - High Severity   • Ranolazine Unknown - High Severity   • Rivaroxaban Unknown - High Severity     Constitutional:  Denies fever, shaking or chills   Eyes:  Denies change in visual acuity   HENT:  Denies nasal congestion or sore throat   Respiratory:  Denies cough or shortness of breath   Cardiovascular:  Denies chest pain or severe LE edema   GI:  Denies abdominal pain, nausea, vomiting, bloody stools or diarrhea   Musculoskeletal:  Numbness, tingling, pain, or loss of motor function only as noted above in history of present illness.  : Denies painful urination or hematuria  Integument:  Denies rash, lesion or ulceration   Neurologic:  Denies headache or focal weakness  Endocrine:  Denies lymphadenopathy  Psych:  Denies confusion or change in mental status   Hem:  Denies active bleeding    OBJECTIVE:  Physical Exam: 75 y.o. male  Wt Readings from Last 3 Encounters:   02/08/21 99.9 kg (220 lb 4 oz)   01/11/21 99.9  "kg (220 lb 4 oz)   12/30/20 98.4 kg (217 lb)     Ht Readings from Last 1 Encounters:   02/08/21 185.4 cm (72.99\")     There is no height or weight on file to calculate BMI.  There were no vitals filed for this visit.  Vital signs reviewed.     General Appearance:    Alert, cooperative, in no acute distress                  Eyes: conjunctiva clear  ENT: external ears and nose atraumatic  CV: no peripheral edema  Resp: normal respiratory effort  Skin: no rashes or wounds; normal turgor  Psych: mood and affect appropriate  Lymph: no nodes appreciated  Neuro: gross sensation intact  Vascular:  Palpable peripheral pulse in noted extremity  Musculoskeletal Extremities:   Bilateral hips: The patient indicates pain and tenderness over the left sacroiliac joint. He denies any pain over his left greater trochanter. Stinchfield test is negative bilaterally. Decreased internal rotation of the bilateral hips. Freida testing is positive on the left of the sacroiliac joint.    Bilateral knees: Crepitation through range of motion. Range of motion is 0 to 120 degrees. Angeline test is negative. Ligaments are stable. No significant effusion or swelling.     Radiology:   Three views of the bilateral knees, including AP, lateral, and 40-degree PA, were obtained today for complaints of pain. Without comparison views available, these show moderate-to-advanced arthritic change in the bilateral knees with severe vascular calcifications.     Two views, including AP pelvis and lateral left hip, were obtained today for complaints of pain. Without comparison views available, these show some arthritic change, especially inferiorly in the left hip. There also appears to be what looks like a staple; however, it could be in his pants. Additional x-ray finding does show degenerative changes throughout the left sacroiliac joint, more than the right. This is were the patient is having tenderness. He has also had a large instrumented fusion, as " documented on the plain x-ray reports and the MRI from [facility].     Lumbar Spine Radiographs: Obtained on 02/06/2021  FINDINGS: The patient has had laminectomies at L3 at L4 combined with  anterior and posterior fusion extending from L3 to L5 and this includes  posterior plates and pedicle screws. No motion at the fused levels is  seen with flexion and extension. There is considerable disc space  narrowing and spurring at L4-5 S1 below the fusion. There is slight  posterior subluxation at L2-3 above the fusion that measures 5 mm and  this does not change with flexion and extension.     Extensive vascular calcification is present.     This report was finalized on 2/6/2021 10:08 AM by Dr. Cheikh Torrez M.D.    MRI of the lumbar spine, obtained on 10/08/2020, mentions multilevel spinal stenosis. It also mentions an increased-in-size aortic aneurysm from 3.3 cm in 2016 to 5.1 cm; however, reviewing his multiple electronic medical charts that he has shows that he sees Dr. Zafar Preston for this.    IMPRESSION:  1.  Multilevel degenerative disease involving the lumbar spine as  described in detail above with no evidence of a focal herniation. This  includes multilevel loss of disc height, disc desiccation,  anterolisthesis of L4 upon L5, facet degenerative disease and neural  foraminal compromise. There is increased spinal stenosis at L3-L4  secondary to increasing posterior element degenerative disease and a  broad-based disc osteophyte complex which is more prominent as compared  to prior examination. Spinal stenosis is estimated to be moderate at  L3-L4. There is moderate neural foraminal compromise on the right at  L4-L5. Neural foraminal compromise is most prominent to the left at  L5-S1 where it is estimated to be moderate-to-severe, unchanged. See  above.  2.  Partial visualization of an infrarenal abdominal aortic aneurysm.  Although the posterior aspect of the aneurysm appears similar to the  prior examination, a  plain film examination performed earlier the same  day suggests the aneurysm has increased in size from 3.3 cm on the MRI  examination of 05/16/2016 to approximately 5.1 cm. Further evaluation  with a CT examination of the abdomen and pelvis with contrast is  suggested.     This report was finalized on 10/8/2020 11:44 AM by Dr. Zafar Jensen M.D.    Assessment:     ICD-10-CM ICD-9-CM   1. Pain in both knees, unspecified chronicity  M25.561 719.46    M25.562    2. Left hip pain  M25.552 719.45   3. SI (sacroiliac) joint dysfunction  M53.3 724.6   4. DDD (degenerative disc disease), lumbar  M51.36 722.52   5. Arthritis of both knees  M17.0 716.96   6. Peripheral vascular disease (CMS/[facility])  I73.9 443.9        Procedures       Plan:   The diagnosis(es), natural history, pathophysiology and treatment for diagnosis(es) were discussed. Opportunity given and questions answered.  Biomechanics of pertinent body areas discussed.  When appropriate, the use of ambulatory aids discussed.    MEDICATIONS:  The risks, benefits, warnings,side effects and alternatives of medications discussed.     We discussed that though he does have arthritis of the knees, there is no significant swelling that would warrant cortisone injection. I do not believe the arthritis of the knees is active or causing his current complaint at this time. I recommend an ultrasound-guided left sacroiliac joint steroid injection. We will provide a referral for this injection.     Scribed for Zafar Haque MD by MARY GARAY.  2/12/2021  08:57 EST    I Zafar Haque MD have personally performed the services described in this document as scribed by the above individual, and it is both accurate and complete.     Zafar Haque MD  2/12/2021  10:52 EST

## 2021-02-15 ENCOUNTER — TELEPHONE (OUTPATIENT)
Dept: FAMILY MEDICINE CLINIC | Facility: CLINIC | Age: 76
End: 2021-02-15

## 2021-02-15 ENCOUNTER — LAB (OUTPATIENT)
Dept: LAB | Facility: HOSPITAL | Age: 76
End: 2021-02-15

## 2021-02-15 DIAGNOSIS — Z01.818 OTHER SPECIFIED PRE-OPERATIVE EXAMINATION: ICD-10-CM

## 2021-02-15 PROCEDURE — C9803 HOPD COVID-19 SPEC COLLECT: HCPCS

## 2021-02-15 PROCEDURE — U0005 INFEC AGEN DETEC AMPLI PROBE: HCPCS

## 2021-02-15 PROCEDURE — U0004 COV-19 TEST NON-CDC HGH THRU: HCPCS

## 2021-02-15 NOTE — TELEPHONE ENCOUNTER
Caller: Juan James    Relationship to patient: Self    Best call back number: [phone]    Patient is needing: PATIENT WAS RETURNING CALL, NOT SURE WHO IT WAS AND NOTHING IN NOTES.     PLEASE ADVISE.

## 2021-02-16 LAB — SARS-COV-2 RNA RESP QL NAA+PROBE: NOT DETECTED

## 2021-02-16 NOTE — TELEPHONE ENCOUNTER
Did you tried to call patient? I am not seeing any notes on anyone calling or why they would call.

## 2021-02-17 ENCOUNTER — ANESTHESIA (OUTPATIENT)
Dept: GASTROENTEROLOGY | Facility: HOSPITAL | Age: 76
End: 2021-02-17

## 2021-02-17 ENCOUNTER — HOSPITAL ENCOUNTER (OUTPATIENT)
Facility: HOSPITAL | Age: 76
Setting detail: HOSPITAL OUTPATIENT SURGERY
Discharge: HOME | End: 2021-02-17
Attending: INTERNAL MEDICINE | Admitting: INTERNAL MEDICINE

## 2021-02-17 ENCOUNTER — ANESTHESIA EVENT (OUTPATIENT)
Dept: GASTROENTEROLOGY | Facility: HOSPITAL | Age: 76
End: 2021-02-17

## 2021-02-17 VITALS
RESPIRATION RATE: 16 BRPM | BODY MASS INDEX: 29.69 KG/M2 | HEIGHT: 73 IN | DIASTOLIC BLOOD PRESSURE: 94 MMHG | SYSTOLIC BLOOD PRESSURE: 123 MMHG | HEART RATE: 58 BPM | WEIGHT: 224 LBS | OXYGEN SATURATION: 97 %

## 2021-02-17 DIAGNOSIS — Z12.11 ENCOUNTER FOR SCREENING FOR MALIGNANT NEOPLASM OF COLON: ICD-10-CM

## 2021-02-17 PROCEDURE — 45380 COLONOSCOPY AND BIOPSY: CPT | Performed by: INTERNAL MEDICINE

## 2021-02-17 PROCEDURE — 25010000002 PROPOFOL 10 MG/ML EMULSION: Performed by: NURSE ANESTHETIST, CERTIFIED REGISTERED

## 2021-02-17 PROCEDURE — 25010000002 GLUCAGON (HUMAN RECOMBINANT) 1 MG RECONSTITUTED SOLUTION: Performed by: INTERNAL MEDICINE

## 2021-02-17 PROCEDURE — 88305 TISSUE EXAM BY PATHOLOGIST: CPT | Performed by: INTERNAL MEDICINE

## 2021-02-17 RX ORDER — PROPOFOL 10 MG/ML
VIAL (ML) INTRAVENOUS CONTINUOUS PRN
Status: DISCONTINUED | OUTPATIENT
Start: 2021-02-17 | End: 2021-02-17 | Stop reason: SURG

## 2021-02-17 RX ORDER — LIDOCAINE HYDROCHLORIDE 20 MG/ML
INJECTION, SOLUTION INFILTRATION; PERINEURAL AS NEEDED
Status: DISCONTINUED | OUTPATIENT
Start: 2021-02-17 | End: 2021-02-17 | Stop reason: SURG

## 2021-02-17 RX ORDER — PROPOFOL 10 MG/ML
VIAL (ML) INTRAVENOUS AS NEEDED
Status: DISCONTINUED | OUTPATIENT
Start: 2021-02-17 | End: 2021-02-17 | Stop reason: SURG

## 2021-02-17 RX ORDER — SODIUM CHLORIDE, SODIUM LACTATE, POTASSIUM CHLORIDE, CALCIUM CHLORIDE 600; 310; 30; 20 MG/100ML; MG/100ML; MG/100ML; MG/100ML
30 INJECTION, SOLUTION INTRAVENOUS CONTINUOUS
Status: DISCONTINUED | OUTPATIENT
Start: 2021-02-17 | End: 2021-02-17 | Stop reason: HOSPADM

## 2021-02-17 RX ADMIN — LIDOCAINE HYDROCHLORIDE 60 MG: 20 INJECTION, SOLUTION INFILTRATION; PERINEURAL at 14:20

## 2021-02-17 RX ADMIN — PROPOFOL 160 MCG/KG/MIN: 10 INJECTION, EMULSION INTRAVENOUS at 14:20

## 2021-02-17 RX ADMIN — PROPOFOL 100 MG: 10 INJECTION, EMULSION INTRAVENOUS at 14:20

## 2021-02-17 RX ADMIN — SODIUM CHLORIDE, POTASSIUM CHLORIDE, SODIUM LACTATE AND CALCIUM CHLORIDE 30 ML/HR: 600; 310; 30; 20 INJECTION, SOLUTION INTRAVENOUS at 14:11

## 2021-02-17 NOTE — H&P
[facility]  Pre Procedure History & Physical    Chief Complaint:   Time for my colonoscopy    Subjective     HPI:   75 y.o. male here for surveillance colonoscopy.  Colonoscpy 2019 with Dr Alves, multiple polyps removed the largest 25mm in rectum.  Recommended 6mos f/u but pt never scheduled.  Last dose of Pradaxa >48hrs ago (Monday AM)    Past Medical History:   Past Medical History:   Diagnosis Date   • AAA (abdominal aortic aneurysm) (CMS/[facility])    • Arthritis    • Atherosclerotic heart disease of native coronary artery without angina pectoris    • Atrial fibrillation (CMS/[facility])    • Benign essential hypertension    • BPH (benign prostatic hyperplasia)    • Cervical spondylosis 11/2012   • CHF (congestive heart failure) (CMS/HCC)    • COPD (chronic obstructive pulmonary disease) (CMS/[facility])    • Coronary artery disease     STENT X 3   • DDD (degenerative disc disease), cervical    • DDD (degenerative disc disease), lumbar    • Dry skin     LOWER LEGS BILAT   • Edema     LOWER LEGS BILAT   • Elevated cholesterol    • Emphysema/COPD (CMS/HCC)    • Essential hypertriglyceridemia    • Hearing loss    • High blood pressure    • History of blood transfusion    • History of gastrointestinal hemorrhage    • History of gout    • History of myasthenia gravis     LAST EPISODE 5-6 YEARS AGO   • Hoarseness, persistent    • Hyperlipidemia    • Hypertension    • Lower back pain    • Lumbar radiculopathy 05/2016   • OA (osteoarthritis)    • Peptic ulceration     PUD   • Skin abnormalities     FLAKEY SKIN LOWER LEGS BILAT   • Sleep apnea     DOES NOT WEAR CPAP MACHINE    • Status post angioplasty with stent     STENT X3   • Tinnitus     BILAT   • Unsteady gait when walking     USES 3 PRONG CANE       Family History:  Family History   Adopted: Yes   Problem Relation Age of Onset   • Heart attack Mother    • Alcohol abuse Mother    • Heart disease Mother    • No Known Problems Father    • Heart disease Other          FH in females before the age of 65   • Malig Hyperthermia Neg Hx        Social History:   reports that he quit smoking about 12 years ago. His smoking use included cigarettes. He has a 80.00 pack-year smoking history. He has quit using smokeless tobacco.  His smokeless tobacco use included chew. He reports previous alcohol use. He reports that he does not use drugs.    Medications:   Medications Prior to Admission   Medication Sig Dispense Refill Last Dose   • allopurinol (ZYLOPRIM) 100 MG tablet Take 1 tablet by mouth Daily. To take along with 300 mg pill to a daily dose 400 mg (Patient taking differently: Take 100 mg by mouth Every Night.) 90 tablet 3 2/16/2021 at Unknown time   • allopurinol (ZYLOPRIM) 300 MG tablet Take 1 tablet by mouth Daily. 30 tablet 6 2/16/2021 at Unknown time   • Cyanocobalamin (VITAMIN B-12) 5000 MCG tablet dispersible Place 1 tablet on the tongue Daily. (Patient taking differently: Place 1 tablet on the tongue Daily. ORAL)   2/16/2021 at Unknown time   • cyclobenzaprine (FLEXERIL) 10 MG tablet Take 0.5 tablets by mouth Every 8 (Eight) Hours As Needed for Muscle Spasms. 30 tablet 11 Past Month at Unknown time   • folic acid (FOLVITE) 400 MCG tablet Take 1 tablet by mouth Daily. (Patient taking differently: Take 400 mcg by mouth 2 (Two) Times a Day.)   2/16/2021 at Unknown time   • furosemide (LASIX) 40 MG tablet Take 1 tablet by mouth 2 (Two) Times a Day. 60 tablet 4 2/16/2021 at Unknown time   • metoprolol succinate XL (Toprol XL) 25 MG 24 hr tablet Take 1 tablet by mouth every night at bedtime. (Patient taking differently: Take 50 mg by mouth Daily.) 30 tablet 11 2/16/2021 at Unknown time   • multivitamin with minerals (CENTRUM MEN PO) Take 1 tablet by mouth Daily. HOLD FOR SURGERY   2/16/2021 at Unknown time   • rosuvastatin (Crestor) 40 MG tablet Take 1 tablet by mouth Daily. 90 tablet 3 2/16/2021 at Unknown time   • tamsulosin (FLOMAX) 0.4 MG capsule 24 hr capsule Take 1 capsule  "by mouth Every Evening.   2/16/2021 at Unknown time   • traMADol (ULTRAM) 50 MG tablet TAKE 1 TABLET BY MOUTH EVERY 6 HOURS AS NEEDED FOR MODERATE PAIN 120 tablet 0 2/16/2021 at Unknown time   • dabigatran etexilate (PRADAXA) 75 MG capsule Take 75 mg by mouth 2 (Two) Times a Day.   2/15/2021   • Eliquis 5 MG tablet tablet Take 5 mg by mouth 2 (Two) Times a Day. HOLDING PER DR. GARCIA IN      • HYDROcodone-acetaminophen (NORCO) 5-325 MG per tablet Take 1 tablet by mouth Every 6 (Six) Hours As Needed for Moderate Pain . 40 tablet 0 More than a month at Unknown time   • Mirabegron ER (Myrbetriq) 50 MG tablet sustained-release 24 hour 24 hr tablet Take 50 mg by mouth Daily.      • Pradaxa 75 MG capsule    2/15/2021       Allergies:  Ranolazine er, Apixaban, Indomethacin, Lisinopril, Ranolazine, and Rivaroxaban    ROS:    Pertinent items are noted in HPI     Objective     Blood pressure (!) 155/101, pulse 63, resp. rate 16, height 185.4 cm (73\"), weight 102 kg (224 lb), SpO2 99 %.    Physical Exam   Constitutional: Pt is oriented to person, place, and time and well-developed, well-nourished, and in no distress.   HENT:   Mouth/Throat: Oropharynx is clear and moist.   Neck: Normal range of motion. Neck supple.   Cardiovascular: Normal rate, regular rhythm and normal heart sounds.    Pulmonary/Chest: Effort normal and breath sounds normal. No respiratory distress. No  wheezes.   Abdominal: Soft. Bowel sounds are normal.   Skin: Skin is warm and dry.   Psychiatric: Mood, memory, affect and judgment normal.     Assessment/Plan     Diagnosis:  Encounter for screening for colon cancer    Anticipated Surgical Procedure:  Colonoscopy    The risks, benefits, and alternatives of this procedure have been discussed with the patient or the responsible party- the patient understands and agrees to proceed.                                                                "

## 2021-02-17 NOTE — ANESTHESIA POSTPROCEDURE EVALUATION
Patient: Juan James    Procedure Summary     Date: 02/17/21 Room / Location:  [facility] /  [facility]    Anesthesia Start: 1415 Anesthesia Stop: 1450    Procedure: COLONOSCOPY to cecum with cold biopsy polypectomy (N/A ) Diagnosis:       Encounter for screening for malignant neoplasm of colon      (Encounter for screening for malignant neoplasm of colon [Z12.11])    Surgeon: Yousuf Méndez MD Provider: Sophie Fregoso MD    Anesthesia Type: MAC ASA Status: 3          Anesthesia Type: MAC    Vitals  Vitals Value Taken Time   BP     Temp     Pulse 79 02/17/21 1452   Resp 16 02/17/21 1452   SpO2 99 % 02/17/21 1452           Post Anesthesia Care and Evaluation    Patient location during evaluation: bedside  Patient participation: complete - patient participated  Level of consciousness: awake  Pain management: adequate  Airway patency: patent  Anesthetic complications: No anesthetic complications    Cardiovascular status: acceptable  Respiratory status: acceptable  Hydration status: acceptable

## 2021-02-17 NOTE — ANESTHESIA PREPROCEDURE EVALUATION
Anesthesia Evaluation                  Airway   Mallampati: II  TM distance: >3 FB  Neck ROM: full  No difficulty expected  Dental - normal exam     Pulmonary - normal exam   (+) pulmonary embolism, a smoker, COPD, shortness of breath, sleep apnea,   Cardiovascular - normal exam    PT is on anticoagulation therapy  Patient on routine beta blocker    (+) hypertension, CAD, dysrhythmias Atrial Fib, CHF , PVD, hyperlipidemia,       Neuro/Psych  (+) numbness,     GI/Hepatic/Renal/Endo    (+)  GERD, PUD,  renal disease,     Musculoskeletal     (+) neck pain,   Abdominal    Substance History      OB/GYN          Other   arthritis,                    Anesthesia Plan    ASA 3     MAC     intravenous induction     Anesthetic plan, all risks, benefits, and alternatives have been provided, discussed and informed consent has been obtained with: patient.      
(4) no impairment

## 2021-02-26 ENCOUNTER — OFFICE VISIT (OUTPATIENT)
Dept: SPORTS MEDICINE | Facility: CLINIC | Age: 76
End: 2021-02-26

## 2021-02-26 VITALS
TEMPERATURE: 98.5 F | HEIGHT: 73 IN | HEART RATE: 57 BPM | BODY MASS INDEX: 29.69 KG/M2 | SYSTOLIC BLOOD PRESSURE: 112 MMHG | WEIGHT: 224 LBS | DIASTOLIC BLOOD PRESSURE: 71 MMHG | RESPIRATION RATE: 15 BRPM | OXYGEN SATURATION: 99 %

## 2021-02-26 DIAGNOSIS — G89.29 CHRONIC LEFT SACROILIAC PAIN: Primary | ICD-10-CM

## 2021-02-26 DIAGNOSIS — M53.3 CHRONIC LEFT SACROILIAC PAIN: Primary | ICD-10-CM

## 2021-02-26 PROCEDURE — 20611 DRAIN/INJ JOINT/BURSA W/US: CPT | Performed by: FAMILY MEDICINE

## 2021-02-26 RX ORDER — TRIAMCINOLONE ACETONIDE 40 MG/ML
40 INJECTION, SUSPENSION INTRA-ARTICULAR; INTRAMUSCULAR ONCE
Status: COMPLETED | OUTPATIENT
Start: 2021-02-26 | End: 2021-02-26

## 2021-02-26 RX ADMIN — TRIAMCINOLONE ACETONIDE 40 MG: 40 INJECTION, SUSPENSION INTRA-ARTICULAR; INTRAMUSCULAR at 11:24

## 2021-03-02 DIAGNOSIS — Z23 IMMUNIZATION DUE: ICD-10-CM

## 2021-03-04 NOTE — PROGRESS NOTES
"Here today for ultrasound-guided sacroiliac joint injection requested by Dr. Haque.    Ultrasound-Guided Sacroiliac Joint Injection Procedure Note    Left sacroiliac joint injection was discussed with the patient in detail, including indication, risks, benefits, and alternatives. Verbal consent was given for the procedure. Injection was performed by physician.  Injection site was identified by ultrasound examination, then cleaned with Betadine and alcohol swabs. Prior to needle insertion, ethyl chloride spray was used for surface anesthesia. Sterile technique was used. Ultrasound guidance was indicated for injection accuracy.  A 22-gauge, 3.5\" spinal needle was guided to the joint under continuous direct ultrasound visualization. Injectate was seen flowing underneath the superficial sacroiliac ligaments and passed without difficulty. The needle was removed and a simple bandage was applied. The procedure was tolerated well without difficulty.    Injection mixture:  1% lidocaine without epinephrine: 2 mL  40 mg/mL triamcinolone acetonide: 1 mL     Diagnoses and all orders for this visit:    Chronic left sacroiliac pain  -     triamcinolone acetonide (KENALOG-40) injection 40 mg       "

## 2021-03-17 PROBLEM — I48.0 PAROXYSMAL ATRIAL FIBRILLATION: Status: ACTIVE | Noted: 2021-01-04

## 2021-03-17 PROBLEM — I10 ESSENTIAL HYPERTENSION: Status: ACTIVE | Noted: 2021-01-04

## 2021-03-17 PROBLEM — I71.40 ABDOMINAL AORTIC ANEURYSM (HCC): Status: ACTIVE | Noted: 2021-01-04

## 2021-03-17 PROBLEM — Z79.01 LONG TERM CURRENT USE OF ANTICOAGULANT: Status: ACTIVE | Noted: 2021-01-04

## 2021-03-17 PROBLEM — Z95.5 STENTED CORONARY ARTERY: Status: ACTIVE | Noted: 2021-01-04

## 2021-03-17 RX ORDER — FUROSEMIDE 40 MG/1
TABLET ORAL
Qty: 180 TABLET | Refills: 0 | Status: SHIPPED | OUTPATIENT
Start: 2021-03-17 | End: 2021-10-04

## 2021-03-18 ENCOUNTER — OFFICE VISIT (OUTPATIENT)
Dept: FAMILY MEDICINE CLINIC | Facility: CLINIC | Age: 76
End: 2021-03-18

## 2021-03-18 VITALS
TEMPERATURE: 97.8 F | OXYGEN SATURATION: 97 % | SYSTOLIC BLOOD PRESSURE: 162 MMHG | HEIGHT: 73 IN | BODY MASS INDEX: 29.93 KG/M2 | WEIGHT: 225.8 LBS | HEART RATE: 68 BPM | DIASTOLIC BLOOD PRESSURE: 76 MMHG

## 2021-03-18 DIAGNOSIS — I48.0 PAROXYSMAL ATRIAL FIBRILLATION (HCC): ICD-10-CM

## 2021-03-18 DIAGNOSIS — Z87.448 HISTORY OF BLOOD IN URINE: Primary | ICD-10-CM

## 2021-03-18 DIAGNOSIS — I10 ESSENTIAL HYPERTENSION: ICD-10-CM

## 2021-03-18 DIAGNOSIS — J43.9 PULMONARY EMPHYSEMA, UNSPECIFIED EMPHYSEMA TYPE (HCC): ICD-10-CM

## 2021-03-18 LAB
BILIRUB BLD-MCNC: NEGATIVE MG/DL
CLARITY, POC: CLEAR
COLOR UR: YELLOW
GLUCOSE UR STRIP-MCNC: NEGATIVE MG/DL
KETONES UR QL: NEGATIVE
LEUKOCYTE EST, POC: NEGATIVE
NITRITE UR-MCNC: NEGATIVE MG/ML
PH UR: 6 [PH] (ref 5–8)
PROT UR STRIP-MCNC: ABNORMAL MG/DL
RBC # UR STRIP: ABNORMAL /UL
SP GR UR: 1.01 (ref 1–1.03)
UROBILINOGEN UR QL: NORMAL

## 2021-03-18 PROCEDURE — 81003 URINALYSIS AUTO W/O SCOPE: CPT | Performed by: FAMILY MEDICINE

## 2021-03-18 PROCEDURE — 99214 OFFICE O/P EST MOD 30 MIN: CPT | Performed by: FAMILY MEDICINE

## 2021-03-18 NOTE — PROGRESS NOTES
Subjective   Juan James is a 75 y.o. male.     Chief Complaint   Patient presents with   • Kidney Stone     patient believes he passed a kidney stone        History of Present Illness     Had pain and blood in urine 6 days ago, now better, had kidney stone.  Pain and blood in the urine resolved since then.  Pain and blood in the urine resolved since then.  Blood pressure today is elevated however patient is asymptomatic.  Could be related to the fact that she had a kidney stone.    The following portions of the patient's history were reviewed and updated as appropriate: allergies, current medications, past family history, past medical history, past social history, past surgical history and problem list.    Past Medical History:   Diagnosis Date   • AAA (abdominal aortic aneurysm) (CMS/Formerly [facility])    • Arthritis    • Atherosclerotic heart disease of native coronary artery without angina pectoris    • Atrial fibrillation (CMS/Formerly [facility])    • Benign essential hypertension    • BPH (benign prostatic hyperplasia)    • Cervical spondylosis 11/2012   • CHF (congestive heart failure) (CMS/Formerly [facility])    • COPD (chronic obstructive pulmonary disease) (CMS/Formerly [facility])    • Coronary artery disease     STENT X 3   • DDD (degenerative disc disease), cervical    • DDD (degenerative disc disease), lumbar    • Dry skin     LOWER LEGS BILAT   • Edema     LOWER LEGS BILAT   • Elevated cholesterol    • Emphysema/COPD (CMS/Formerly [facility])    • Essential hypertriglyceridemia    • Hearing loss    • High blood pressure    • History of blood transfusion    • History of gastrointestinal hemorrhage    • History of gout    • History of myasthenia gravis     LAST EPISODE 5-6 YEARS AGO   • Hoarseness, persistent    • Hyperlipidemia    • Hypertension    • Lower back pain    • Lumbar radiculopathy 05/2016   • OA (osteoarthritis)    • Peptic ulceration     PUD   • Skin abnormalities     FLAKEY SKIN LOWER LEGS BILAT   • Sleep apnea     DOES NOT WEAR CPAP MACHINE    • Status post  angioplasty with stent     STENT X3   • Tinnitus     BILAT   • Unsteady gait when walking     USES 3 PRONG CANE       Past Surgical History:   Procedure Laterality Date   • ANTERIOR CERVICAL DISCECTOMY W/ FUSION N/A 3/25/2016    Procedure: C3- C5 CERVICAL DISCECTOMY ANTERIOR FUSION WITH INSTRUMENTATION;  Surgeon: Bill Washington MD;  Location: [facility] MAIN OR;  Service:    • ARTHRODESIS N/A 07/1983    LUMBAR   • ARTHRODESIS N/A 1987    LUMBAR   • ARTHRODESIS      Spinal Arthrodesis-lower spine-7/1983, 1987--upper spine-1988, 7/1990-Abstracted from [facility]   • BACK SURGERY      Lower Back Surgery   • CARDIAC CATHETERIZATION  07/2009   • CARDIAC CATHETERIZATION  03/18/2003    STENT TO RCA AND PCA, DR. PRIAY LUGO   • CATARACT EXTRACTION Bilateral     LENS IMPLANT   • CERVICAL ARTHRODESIS N/A 07/1990   • CERVICAL ARTHRODESIS N/A 1988   • COLONOSCOPY N/A 10/3/2019    4 MM HYPERPLASTIC POLYP IN ILEOCECAL VALVE, 4 MM SESSILE SERRATED ADENOMA POLYP IN DESCENDING, 5 TUBULOVILLOUS ADENOMA POLYPS IN RECTUM, 3 TUBULAR ADENOMA POLYPS IN DISTAL RECTUM, 26 MM TUBULAR ADENOMA POLYP IN RECTUM, PIECEMEAL POLYPECTOMY, AREA TATTOOED, RESCOPE IN 6 MONTHS, DR. TAYA CRUZ AT [facility]   • COLONOSCOPY N/A 2/17/2021    Procedure: COLONOSCOPY to cecum with cold biopsy polypectomy;  Surgeon: Yousuf Méndez MD;  Location: [facility] ENDOSCOPY;  Service: Gastroenterology;  Laterality: N/A;  pre: hx of polyps  post: polyp   • CORONARY ANGIOPLASTY WITH STENT PLACEMENT  02/2009    and 7/2009-PT STATES HAS 3 STENTS   • ENDOSCOPY N/A 06/24/2012    NON BLEEDING EROSIVE GASTROPATHY, 1 DUODENAL ULCER WITH CLEAN BASE, DR. BRANDY WREN AT [facility]   • ENDOSCOPY AND COLONOSCOPY N/A 11/20/2007    EGD WNL, 8 ADENOMATOUS POLYPS IN RECTO-SIGMOID, RESCOPE IN 3 YRS, DR. CRISTINA RODRIGUEZ AT [facility]   • FOOT SURGERY Left     LEFT BIG TOE-JOINT REPLACED   • HAND SURGERY Left     THUMB-JOINT REPLACEMENT   • HAND SURGERY Right     JOINT REPLACEMENT RIGHT INDEX FINGER   •  KNEE ARTHROSCOPY Left    • LAPAROSCOPIC CHOLECYSTECTOMY     • LUMBAR DISCECTOMY FUSION INSTRUMENTATION N/A 2020    Procedure: Lumbar 3 4 and lumbar 4 5 laminectomy and fusion with instrumentation;  Surgeon: Bill Washington MD;  [facility] OR;  Service: Neurosurgery;  Laterality: N/A;   • NECK SURGERY     • VASECTOMY Bilateral        Family History   Adopted: Yes   Problem Relation Age of Onset   • Heart attack Mother    • Alcohol abuse Mother    • Heart disease Mother    • No Known Problems Father    • Heart disease Other         FH in females before the age of 65   • Malig Hyperthermia Neg Hx        Social History     Socioeconomic History   • Marital status:      Spouse name: Not on file   • Number of children: Not on file   • Years of education: Not on file   • Highest education level: Not on file   Tobacco Use   • Smoking status: Former Smoker     Packs/day: 2.00     Years: 40.00     Pack years: 80.00     Types: Cigarettes     Quit date:      Years since quittin.2   • Smokeless tobacco: Former User     Types: Chew   • Tobacco comment: smoked 1 to 2 packs per day for 40 or more years   Vaping Use   • Vaping Use: Never used   Substance and Sexual Activity   • Alcohol use: Not Currently     Comment: QUIT DRINKING 2020   • Drug use: No   • Sexual activity: Defer       Current Outpatient Medications on File Prior to Visit   Medication Sig Dispense Refill   • allopurinol (ZYLOPRIM) 100 MG tablet Take 1 tablet by mouth Daily. To take along with 300 mg pill to a daily dose 400 mg (Patient taking differently: Take 100 mg by mouth Every Night.) 90 tablet 3   • allopurinol (ZYLOPRIM) 300 MG tablet Take 1 tablet by mouth Daily. 30 tablet 6   • Cyanocobalamin (VITAMIN B-12) 5000 MCG tablet dispersible Place 1 tablet on the tongue Daily. (Patient taking differently: Place 1 tablet on the tongue Daily. ORAL)     • dabigatran etexilate (PRADAXA) 75 MG capsule Take 75 mg by mouth 2 (Two)  Times a Day.     • folic acid (FOLVITE) 400 MCG tablet Take 1 tablet by mouth Daily. (Patient taking differently: Take 400 mcg by mouth 2 (Two) Times a Day.)     • furosemide (LASIX) 40 MG tablet Take 1 tablet by mouth twice daily 180 tablet 0   • metoprolol succinate XL (Toprol XL) 25 MG 24 hr tablet Take 1 tablet by mouth every night at bedtime. (Patient taking differently: Take 50 mg by mouth Daily.) 30 tablet 11   • multivitamin with minerals (CENTRUM MEN PO) Take 1 tablet by mouth Daily. HOLD FOR SURGERY     • rosuvastatin (Crestor) 40 MG tablet Take 1 tablet by mouth Daily. 90 tablet 3   • tamsulosin (FLOMAX) 0.4 MG capsule 24 hr capsule Take 1 capsule by mouth Every Evening.     • cyclobenzaprine (FLEXERIL) 10 MG tablet Take 0.5 tablets by mouth Every 8 (Eight) Hours As Needed for Muscle Spasms. 30 tablet 11   • [DISCONTINUED] HYDROcodone-acetaminophen (NORCO) 5-325 MG per tablet Take 1 tablet by mouth Every 6 (Six) Hours As Needed for Moderate Pain . 40 tablet 0   • [DISCONTINUED] Pradaxa 75 MG capsule      • [DISCONTINUED] traMADol (ULTRAM) 50 MG tablet TAKE 1 TABLET BY MOUTH EVERY 6 HOURS AS NEEDED FOR MODERATE PAIN 120 tablet 0     No current facility-administered medications on file prior to visit.       Review of Systems   Genitourinary: Positive for flank pain and hematuria.       Recent Results (from the past 4704 hour(s))   Comprehensive Metabolic Panel    Collection Time: 09/29/20  9:36 AM    Specimen: Blood   Result Value Ref Range    Glucose 134 (H) 65 - 99 mg/dL    BUN 24 (H) 8 - 23 mg/dL    Creatinine 1.16 0.76 - 1.27 mg/dL    eGFR Non African Am 62 >60 mL/min/1.73    eGFR African Am 75 >60 mL/min/1.73    BUN/Creatinine Ratio 20.7 7.0 - 25.0    Sodium 141 136 - 145 mmol/L    Potassium 4.3 3.5 - 5.2 mmol/L    Chloride 102 98 - 107 mmol/L    Total CO2 28.9 22.0 - 29.0 mmol/L    Calcium 9.4 8.6 - 10.5 mg/dL    Total Protein 7.1 6.0 - 8.5 g/dL    Albumin 4.50 3.50 - 5.20 g/dL    Globulin 2.6 gm/dL     A/G Ratio 1.7 g/dL    Total Bilirubin 0.3 0.0 - 1.2 mg/dL    Alkaline Phosphatase 105 39 - 117 U/L    AST (SGOT) 21 1 - 40 U/L    ALT (SGPT) 22 1 - 41 U/L   Lipid Panel    Collection Time: 09/29/20  9:36 AM    Specimen: Blood   Result Value Ref Range    Total Cholesterol 254 (H) 0 - 200 mg/dL    Triglycerides 288 (H) 0 - 150 mg/dL    HDL Cholesterol 58 40 - 60 mg/dL    VLDL Cholesterol Nathaniel 57.6 mg/dL    LDL Chol Calc (NIH) 138 (H) 0 - 100 mg/dL   CBC & Differential    Collection Time: 09/29/20  9:36 AM    Specimen: Blood   Result Value Ref Range    WBC 8.15 3.40 - 10.80 10*3/mm3    RBC 4.24 4.14 - 5.80 10*6/mm3    Hemoglobin 14.0 13.0 - 17.7 g/dL    Hematocrit 40.7 37.5 - 51.0 %    MCV 96.0 79.0 - 97.0 fL    MCH 33.0 26.6 - 33.0 pg    MCHC 34.4 31.5 - 35.7 g/dL    RDW 13.6 12.3 - 15.4 %    Platelets 170 140 - 450 10*3/mm3    Neutrophil Rel % 78.2 (H) 42.7 - 76.0 %    Lymphocyte Rel % 13.9 (L) 19.6 - 45.3 %    Monocyte Rel % 5.6 5.0 - 12.0 %    Eosinophil Rel % 1.3 0.3 - 6.2 %    Basophil Rel % 0.6 0.0 - 1.5 %    Neutrophils Absolute 6.37 1.70 - 7.00 10*3/mm3    Lymphocytes Absolute 1.13 0.70 - 3.10 10*3/mm3    Monocytes Absolute 0.46 0.10 - 0.90 10*3/mm3    Eosinophils Absolute 0.11 0.00 - 0.40 10*3/mm3    Basophils Absolute 0.05 0.00 - 0.20 10*3/mm3    Immature Granulocyte Rel % 0.4 0.0 - 0.5 %    Immature Grans Absolute 0.03 0.00 - 0.05 10*3/mm3    nRBC 0.1 0.0 - 0.2 /100 WBC   Uric Acid    Collection Time: 09/29/20  9:36 AM    Specimen: Blood   Result Value Ref Range    Uric Acid 5.9 3.4 - 7.0 mg/dL   Basic Metabolic Panel    Collection Time: 11/05/20  6:42 AM    Specimen: Blood   Result Value Ref Range    Glucose 166 (H) 65 - 99 mg/dL    BUN 18 8 - 23 mg/dL    Creatinine 1.26 0.76 - 1.27 mg/dL    Sodium 139 136 - 145 mmol/L    Potassium 4.0 3.5 - 5.2 mmol/L    Chloride 101 98 - 107 mmol/L    CO2 30.7 (H) 22.0 - 29.0 mmol/L    Calcium 9.6 8.6 - 10.5 mg/dL    eGFR Non African Amer 56 (L) >60 mL/min/1.73     BUN/Creatinine Ratio 14.3 7.0 - 25.0    Anion Gap 7.3 5.0 - 15.0 mmol/L   CBC (No Diff)    Collection Time: 11/05/20  6:42 AM    Specimen: Blood   Result Value Ref Range    WBC 6.37 3.40 - 10.80 10*3/mm3    RBC 3.95 (L) 4.14 - 5.80 10*6/mm3    Hemoglobin 13.0 13.0 - 17.7 g/dL    Hematocrit 38.9 37.5 - 51.0 %    MCV 98.5 (H) 79.0 - 97.0 fL    MCH 32.9 26.6 - 33.0 pg    MCHC 33.4 31.5 - 35.7 g/dL    RDW 13.0 12.3 - 15.4 %    RDW-SD 47.6 37.0 - 54.0 fl    MPV 8.7 6.0 - 12.0 fL    Platelets 127 (L) 140 - 450 10*3/mm3   ECG 12 Lead    Collection Time: 11/05/20  7:42 AM   Result Value Ref Range    QT Interval 440 ms   COVID-19,BIOTAP, NP/OP SWAB IN TRANSPORT MEDIA OR SALINE 24-36 HR TAT - Swab, Nasopharynx    Collection Time: 11/11/20  8:06 AM    Specimen: Nasopharynx; Swab   Result Value Ref Range    SARS-CoV-2 PCR Not Detected Not Detected   CBC Auto Differential    Collection Time: 11/14/20  4:57 AM    Specimen: Blood   Result Value Ref Range    WBC 9.97 3.40 - 10.80 10*3/mm3    RBC 3.20 (L) 4.14 - 5.80 10*6/mm3    Hemoglobin 10.6 (L) 13.0 - 17.7 g/dL    Hematocrit 30.5 (L) 37.5 - 51.0 %    MCV 95.3 79.0 - 97.0 fL    MCH 33.1 (H) 26.6 - 33.0 pg    MCHC 34.8 31.5 - 35.7 g/dL    RDW 12.7 12.3 - 15.4 %    RDW-SD 43.5 37.0 - 54.0 fl    MPV 9.4 6.0 - 12.0 fL    Platelets 165 140 - 450 10*3/mm3    Neutrophil % 74.1 42.7 - 76.0 %    Lymphocyte % 11.4 (L) 19.6 - 45.3 %    Monocyte % 13.6 (H) 5.0 - 12.0 %    Eosinophil % 0.1 (L) 0.3 - 6.2 %    Basophil % 0.4 0.0 - 1.5 %    Immature Grans % 0.4 0.0 - 0.5 %    Neutrophils, Absolute 7.38 (H) 1.70 - 7.00 10*3/mm3    Lymphocytes, Absolute 1.14 0.70 - 3.10 10*3/mm3    Monocytes, Absolute 1.36 (H) 0.10 - 0.90 10*3/mm3    Eosinophils, Absolute 0.01 0.00 - 0.40 10*3/mm3    Basophils, Absolute 0.04 0.00 - 0.20 10*3/mm3    Immature Grans, Absolute 0.04 0.00 - 0.05 10*3/mm3    nRBC 0.0 0.0 - 0.2 /100 WBC   ECG 12 Lead    Collection Time: 11/14/20  9:13 AM   Result Value Ref Range    QT  Interval 377 ms   Troponin    Collection Time: 11/14/20 10:09 AM    Specimen: Blood   Result Value Ref Range    Troponin T <0.010 0.000 - 0.030 ng/mL   CBC Auto Differential    Collection Time: 11/15/20  5:37 AM    Specimen: Blood   Result Value Ref Range    WBC 10.33 3.40 - 10.80 10*3/mm3    RBC 3.26 (L) 4.14 - 5.80 10*6/mm3    Hemoglobin 11.0 (L) 13.0 - 17.7 g/dL    Hematocrit 32.0 (L) 37.5 - 51.0 %    MCV 98.2 (H) 79.0 - 97.0 fL    MCH 33.7 (H) 26.6 - 33.0 pg    MCHC 34.4 31.5 - 35.7 g/dL    RDW 13.1 12.3 - 15.4 %    RDW-SD 46.0 37.0 - 54.0 fl    MPV 9.1 6.0 - 12.0 fL    Platelets 149 140 - 450 10*3/mm3    Neutrophil % 67.2 42.7 - 76.0 %    Lymphocyte % 19.6 19.6 - 45.3 %    Monocyte % 10.8 5.0 - 12.0 %    Eosinophil % 1.7 0.3 - 6.2 %    Basophil % 0.4 0.0 - 1.5 %    Immature Grans % 0.3 0.0 - 0.5 %    Neutrophils, Absolute 6.94 1.70 - 7.00 10*3/mm3    Lymphocytes, Absolute 2.02 0.70 - 3.10 10*3/mm3    Monocytes, Absolute 1.12 (H) 0.10 - 0.90 10*3/mm3    Eosinophils, Absolute 0.18 0.00 - 0.40 10*3/mm3    Basophils, Absolute 0.04 0.00 - 0.20 10*3/mm3    Immature Grans, Absolute 0.03 0.00 - 0.05 10*3/mm3    nRBC 0.0 0.0 - 0.2 /100 WBC   CBC Auto Differential    Collection Time: 11/16/20  7:03 AM    Specimen: Blood   Result Value Ref Range    WBC 10.44 3.40 - 10.80 10*3/mm3    RBC 3.46 (L) 4.14 - 5.80 10*6/mm3    Hemoglobin 11.5 (L) 13.0 - 17.7 g/dL    Hematocrit 32.4 (L) 37.5 - 51.0 %    MCV 93.6 79.0 - 97.0 fL    MCH 33.2 (H) 26.6 - 33.0 pg    MCHC 35.5 31.5 - 35.7 g/dL    RDW 12.6 12.3 - 15.4 %    RDW-SD 42.2 37.0 - 54.0 fl    MPV 9.3 6.0 - 12.0 fL    Platelets 172 140 - 450 10*3/mm3    Neutrophil % 66.8 42.7 - 76.0 %    Lymphocyte % 19.2 (L) 19.6 - 45.3 %    Monocyte % 11.4 5.0 - 12.0 %    Eosinophil % 1.7 0.3 - 6.2 %    Basophil % 0.4 0.0 - 1.5 %    Immature Grans % 0.5 0.0 - 0.5 %    Neutrophils, Absolute 6.98 1.70 - 7.00 10*3/mm3    Lymphocytes, Absolute 2.00 0.70 - 3.10 10*3/mm3    Monocytes, Absolute 1.19  (H) 0.10 - 0.90 10*3/mm3    Eosinophils, Absolute 0.18 0.00 - 0.40 10*3/mm3    Basophils, Absolute 0.04 0.00 - 0.20 10*3/mm3    Immature Grans, Absolute 0.05 0.00 - 0.05 10*3/mm3    nRBC 0.0 0.0 - 0.2 /100 WBC   Body Fluid Culture - Synovial Fluid, Elbow, Left    Collection Time: 11/17/20  6:40 AM    Specimen: Elbow, Left; Synovial Fluid   Result Value Ref Range    Body Fluid Culture No growth at 5 days     Gram Stain Rare (1+) WBCs seen     Gram Stain No organisms seen    Crystal Exam, Fluid - Synovial Fluid,    Collection Time: 11/17/20  6:40 AM    Specimen: Synovial Fluid   Result Value Ref Range    Crystals, Fluid       Intracellular crystals observed exhibiting polarization characteristics of Uric Acid   Anaerobic Culture - Synovial Fluid, Elbow, Left    Collection Time: 11/17/20  6:40 AM    Specimen: Elbow, Left; Synovial Fluid   Result Value Ref Range    Anaerobic Culture No anaerobes isolated at 5 days    Body fluid cell count - Synovial Fluid, Elbow, Left    Collection Time: 11/17/20  6:40 AM    Specimen: Elbow, Left; Synovial Fluid   Result Value Ref Range    Color, Fluid Red     Appearance, Fluid Bloody (A) Clear    RBC, Fluid 338,000 /mm3    Nucleated Cells, Fluid 3,890 /mm3    Method: Automated Sysmex XN Method    Body fluid differential - Synovial Fluid, Elbow, Left    Collection Time: 11/17/20  6:40 AM    Specimen: Elbow, Left; Synovial Fluid   Result Value Ref Range    Neutrophils, Fluid 98 %    Monocytes, Fluid 1 %    Mononuclear, Fluid 1 %   CBC Auto Differential    Collection Time: 11/17/20  6:49 AM    Specimen: Blood   Result Value Ref Range    WBC 7.96 3.40 - 10.80 10*3/mm3    RBC 3.31 (L) 4.14 - 5.80 10*6/mm3    Hemoglobin 11.1 (L) 13.0 - 17.7 g/dL    Hematocrit 31.8 (L) 37.5 - 51.0 %    MCV 96.1 79.0 - 97.0 fL    MCH 33.5 (H) 26.6 - 33.0 pg    MCHC 34.9 31.5 - 35.7 g/dL    RDW 12.9 12.3 - 15.4 %    RDW-SD 44.8 37.0 - 54.0 fl    MPV 9.1 6.0 - 12.0 fL    Platelets 179 140 - 450 10*3/mm3     Neutrophil % 67.1 42.7 - 76.0 %    Lymphocyte % 18.3 (L) 19.6 - 45.3 %    Monocyte % 10.8 5.0 - 12.0 %    Eosinophil % 2.9 0.3 - 6.2 %    Basophil % 0.4 0.0 - 1.5 %    Immature Grans % 0.5 0.0 - 0.5 %    Neutrophils, Absolute 5.34 1.70 - 7.00 10*3/mm3    Lymphocytes, Absolute 1.46 0.70 - 3.10 10*3/mm3    Monocytes, Absolute 0.86 0.10 - 0.90 10*3/mm3    Eosinophils, Absolute 0.23 0.00 - 0.40 10*3/mm3    Basophils, Absolute 0.03 0.00 - 0.20 10*3/mm3    Immature Grans, Absolute 0.04 0.00 - 0.05 10*3/mm3    nRBC 0.0 0.0 - 0.2 /100 WBC   Uric Acid    Collection Time: 11/17/20  6:49 AM    Specimen: Blood   Result Value Ref Range    Uric Acid 6.0 3.4 - 7.0 mg/dL   Duplex Venous Upper Extremity - Left CAR    Collection Time: 11/17/20  1:18 PM   Result Value Ref Range    Right Internal Jugular Augment Y     Right Internal Jugular Competent Y     Right Internal Jugular Compress C     Right Internal Jugular Phasic Y     Right Internal Jugular Spont Y     Left Internal Jugular Augment Y     Left Internal Jugular Competent Y     Left Internal Jugular Compress C     Left Internal Jugular Phasic Y     Left Internal Jugular Spont Y     Right Subclavian Augment Y     Right Subclavian Competent Y     Right Subclavian Compress C     Right Subclavian Phasic Y     Right Subclavian Spont Y     Left Subclavian Augment Y     Left Subclavian Competent Y     Left Subclavian Compress C     Left Subclavian Phasic Y     Left Subclavian Spont Y     Left Axillary Augment Y     Left Axillary Competent Y     Left Axillary Compress C     Left Axillary Phasic Y     Left Axillary Spont Y     Left Brachial Compress C     Left Radial Compress C     Left Ulnar Compress C     Left Basilic Upper Compress C     Left Basilic Forearm Compress C     Left Cephalic Upper Compress C     Left Cephalic Forearm Compress C    Comprehensive Metabolic Panel    Collection Time: 01/11/21  8:43 AM    Specimen: Blood   Result Value Ref Range    Glucose 120 (H) 65 - 99  mg/dL    BUN 19 8 - 23 mg/dL    Creatinine 1.26 0.76 - 1.27 mg/dL    eGFR Non African Am 56 (L) >60 mL/min/1.73    eGFR African Am 68 >60 mL/min/1.73    BUN/Creatinine Ratio 15.1 7.0 - 25.0    Sodium 139 136 - 145 mmol/L    Potassium 4.2 3.5 - 5.2 mmol/L    Chloride 98 98 - 107 mmol/L    Total CO2 32.3 (H) 22.0 - 29.0 mmol/L    Calcium 9.5 8.6 - 10.5 mg/dL    Total Protein 7.3 6.0 - 8.5 g/dL    Albumin 4.30 3.50 - 5.20 g/dL    Globulin 3.0 gm/dL    A/G Ratio 1.4 g/dL    Total Bilirubin 0.4 0.0 - 1.2 mg/dL    Alkaline Phosphatase 115 39 - 117 U/L    AST (SGOT) 23 1 - 40 U/L    ALT (SGPT) 15 1 - 41 U/L   Lipid Panel    Collection Time: 01/11/21  8:43 AM    Specimen: Blood   Result Value Ref Range    Total Cholesterol 175 0 - 200 mg/dL    Triglycerides 204 (H) 0 - 150 mg/dL    HDL Cholesterol 44 40 - 60 mg/dL    VLDL Cholesterol Nathaniel 35 5 - 40 mg/dL    LDL Chol Calc (NIH) 96 0 - 100 mg/dL   CBC & Differential    Collection Time: 01/11/21  8:43 AM    Specimen: Blood   Result Value Ref Range    WBC 6.62 3.40 - 10.80 10*3/mm3    RBC 4.03 (L) 4.14 - 5.80 10*6/mm3    Hemoglobin 13.4 13.0 - 17.7 g/dL    Hematocrit 39.2 37.5 - 51.0 %    MCV 97.3 (H) 79.0 - 97.0 fL    MCH 33.3 (H) 26.6 - 33.0 pg    MCHC 34.2 31.5 - 35.7 g/dL    RDW 13.1 12.3 - 15.4 %    Platelets 200 140 - 450 10*3/mm3    Neutrophil Rel % 58.9 42.7 - 76.0 %    Lymphocyte Rel % 26.9 19.6 - 45.3 %    Monocyte Rel % 10.4 5.0 - 12.0 %    Eosinophil Rel % 2.7 0.3 - 6.2 %    Basophil Rel % 0.8 0.0 - 1.5 %    Neutrophils Absolute 3.90 1.70 - 7.00 10*3/mm3    Lymphocytes Absolute 1.78 0.70 - 3.10 10*3/mm3    Monocytes Absolute 0.69 0.10 - 0.90 10*3/mm3    Eosinophils Absolute 0.18 0.00 - 0.40 10*3/mm3    Basophils Absolute 0.05 0.00 - 0.20 10*3/mm3    Immature Granulocyte Rel % 0.3 0.0 - 0.5 %    Immature Grans Absolute 0.02 0.00 - 0.05 10*3/mm3    nRBC 0.0 0.0 - 0.2 /100 WBC   PSA DIAGNOSTIC    Collection Time: 01/11/21  8:43 AM   Result Value Ref Range    PSA 1.590  0.000 - 4.000 ng/mL   COVID-19,BIOTAP, NP/OP SWAB IN TRANSPORT MEDIA OR SALINE 24-36 HR TAT - Swab, Nasopharynx    Collection Time: 02/15/21 11:10 AM    Specimen: Nasopharynx; Swab   Result Value Ref Range    SARS-CoV-2 PCR Not Detected Not Detected   Tissue Pathology Exam    Collection Time: 02/17/21  2:34 PM    Specimen: Large Intestine, Transverse Colon; Polyp   Result Value Ref Range    Case Report       Surgical Pathology Report                         Case: [id number]                                  Authorizing Provider:  Yousuf Méndez MD  Collected:           02/17/2021 02:34 PM          Ordering Location:     [facility]  Received:            02/17/2021 03:21 PM                                 ENDO SUITES                                                                  Pathologist:           Fifi Patiño MD                                                          Specimen:    Large Intestine, Transverse Colon, transverse colon polyp                                  Final Diagnosis       1. Transverse Colon, Polyp, Polypectomy:   A. Fragments of hyperplastic polyp.    jab/pkm       Gross Description       1.  The specimen is received in formalin labeled with the patient's name and further designated 'transverse colon polyp biopsy' are multiple small fragments of gray-tan tissue. The specimen is submitted for embedding as received.        Microscopic Description       Performed, incorporated in diagnosis.     POCT urinalysis dipstick, automated    Collection Time: 03/18/21  2:39 PM    Specimen: Urine   Result Value Ref Range    Color Yellow Yellow, Straw, Dark Yellow, Lanie    Clarity, UA Clear Clear    Specific Gravity  1.015 1.005 - 1.030    pH, Urine 6.0 5.0 - 8.0    Leukocytes Negative Negative    Nitrite, UA Negative Negative    Protein, POC 2+ (A) Negative mg/dL    Glucose, UA Negative Negative, 1000 mg/dL (3+) mg/dL    Ketones, UA Negative Negative    Urobilinogen, UA Normal  "Normal    Bilirubin Negative Negative    Blood, UA Trace (A) Negative     Objective   Vitals:    03/18/21 1433   BP: 162/76   BP Location: Right arm   Patient Position: Sitting   Pulse: 68   Temp: 97.8 °F (36.6 °C)   TempSrc: Temporal   SpO2: 97%   Weight: 102 kg (225 lb 12.8 oz)   Height: 185.4 cm (73\")     Body mass index is 29.79 kg/m².  Physical Exam  Vitals and nursing note reviewed.   Constitutional:       General: He is not in acute distress.     Appearance: He is well-developed. He is not diaphoretic.   Cardiovascular:      Rate and Rhythm: Normal rate and regular rhythm.   Pulmonary:      Effort: Pulmonary effort is normal. No respiratory distress.      Breath sounds: Normal breath sounds. No wheezing.           Diagnoses and all orders for this visit:    1. History of blood in urine (Primary)  Comments:  Trace blood in the urine today.  Status post passing kidney  Orders:  -     POCT urinalysis dipstick, automated    2. Pulmonary emphysema, unspecified emphysema type (CMS/HCC)  Comments:  Stable.  Asymptomatic    3. Essential hypertension  Comments:  Uncontrolled today.  Likely related to kidney stone.  Will observe at home with daily blood pressure checks.  Follow-up 4 to 8 weeks.      4. Paroxysmal atrial fibrillation (CMS/HCC)  Comments:  Stable.  With rate and rhythm control.  Continue current medications.      Return in about 2 months (around 5/18/2021) for HYPERTENSION.            "

## 2021-03-31 ENCOUNTER — TELEPHONE (OUTPATIENT)
Dept: GASTROENTEROLOGY | Facility: CLINIC | Age: 76
End: 2021-03-31

## 2021-04-13 RX ORDER — PREDNISONE 1 MG/1
5 TABLET ORAL DAILY
Qty: 10 TABLET | Refills: 0 | Status: SHIPPED | OUTPATIENT
Start: 2021-04-13 | End: 2021-06-17

## 2021-05-20 ENCOUNTER — OFFICE VISIT (OUTPATIENT)
Dept: FAMILY MEDICINE CLINIC | Facility: CLINIC | Age: 76
End: 2021-05-20

## 2021-05-20 VITALS
WEIGHT: 228.13 LBS | OXYGEN SATURATION: 96 % | HEIGHT: 73 IN | HEART RATE: 65 BPM | DIASTOLIC BLOOD PRESSURE: 81 MMHG | TEMPERATURE: 97.5 F | SYSTOLIC BLOOD PRESSURE: 143 MMHG | BODY MASS INDEX: 30.24 KG/M2

## 2021-05-20 DIAGNOSIS — I25.10 CORONARY ARTERY DISEASE INVOLVING NATIVE HEART WITHOUT ANGINA PECTORIS, UNSPECIFIED VESSEL OR LESION TYPE: ICD-10-CM

## 2021-05-20 DIAGNOSIS — I50.812 CHRONIC RIGHT-SIDED CONGESTIVE HEART FAILURE (HCC): ICD-10-CM

## 2021-05-20 DIAGNOSIS — I48.0 PAROXYSMAL ATRIAL FIBRILLATION (HCC): ICD-10-CM

## 2021-05-20 DIAGNOSIS — I10 ESSENTIAL HYPERTENSION: Primary | ICD-10-CM

## 2021-05-20 PROCEDURE — 99213 OFFICE O/P EST LOW 20 MIN: CPT | Performed by: FAMILY MEDICINE

## 2021-05-20 NOTE — PROGRESS NOTES
Subjective   Juan James is a 75 y.o. male.     Chief Complaint   Patient presents with   • Follow-up   • Hypertension       History of Present Illness     Hypertension follow-up.  Medically stable for the last several weeks.  I reviewed ambulatory home readings that the polyp that the patient brought in great detail.  He did report 1 hypotensive episode.    The following portions of the patient's history were reviewed and updated as appropriate: allergies, current medications, past family history, past medical history, past social history, past surgical history and problem list.    Past Medical History:   Diagnosis Date   • AAA (abdominal aortic aneurysm) (CMS/[facility])    • Arthritis    • Atherosclerotic heart disease of native coronary artery without angina pectoris    • Atrial fibrillation (CMS/[facility])    • Benign essential hypertension    • BPH (benign prostatic hyperplasia)    • Cervical spondylosis 11/2012   • CHF (congestive heart failure) (CMS/[facility])    • COPD (chronic obstructive pulmonary disease) (CMS/[facility])    • Coronary artery disease     STENT X 3   • DDD (degenerative disc disease), cervical    • DDD (degenerative disc disease), lumbar    • Dry skin     LOWER LEGS BILAT   • Edema     LOWER LEGS BILAT   • Elevated cholesterol    • Emphysema/COPD (CMS/[facility])    • Essential hypertriglyceridemia    • Hearing loss    • High blood pressure    • History of blood transfusion    • History of gastrointestinal hemorrhage    • History of gout    • History of myasthenia gravis     LAST EPISODE 5-6 YEARS AGO   • Hoarseness, persistent    • Hyperlipidemia    • Hypertension    • Lower back pain    • Lumbar radiculopathy 05/2016   • OA (osteoarthritis)    • Peptic ulceration     PUD   • Skin abnormalities     FLAKEY SKIN LOWER LEGS BILAT   • Sleep apnea     DOES NOT WEAR CPAP MACHINE    • Status post angioplasty with stent     STENT X3   • Tinnitus     BILAT   • Unsteady gait when walking     USES 3 PRONG CANE       Past  Surgical History:   Procedure Laterality Date   • ANTERIOR CERVICAL DISCECTOMY W/ FUSION N/A 3/25/2016    Procedure: C3- C5 CERVICAL DISCECTOMY ANTERIOR FUSION WITH INSTRUMENTATION;  Surgeon: iBll Washington MD;  Location: [facility] MAIN OR;  Service:    • ARTHRODESIS N/A 07/1983    LUMBAR   • ARTHRODESIS N/A 1987    LUMBAR   • ARTHRODESIS      Spinal Arthrodesis-lower spine-7/1983, 1987--upper spine-1988, 7/1990-Abstracted from [employer]   • BACK SURGERY      Lower Back Surgery   • CARDIAC CATHETERIZATION  07/2009   • CARDIAC CATHETERIZATION  03/18/2003    STENT TO RCA AND PCA, DR. PRIYA LUGO   • CATARACT EXTRACTION Bilateral     LENS IMPLANT   • CERVICAL ARTHRODESIS N/A 07/1990   • CERVICAL ARTHRODESIS N/A 1988   • COLONOSCOPY N/A 10/3/2019    4 MM HYPERPLASTIC POLYP IN ILEOCECAL VALVE, 4 MM SESSILE SERRATED ADENOMA POLYP IN DESCENDING, 5 TUBULOVILLOUS ADENOMA POLYPS IN RECTUM, 3 TUBULAR ADENOMA POLYPS IN DISTAL RECTUM, 26 MM TUBULAR ADENOMA POLYP IN RECTUM, PIECEMEAL POLYPECTOMY, AREA TATTOOED, RESCOPE IN 6 MONTHS, DR. TAYA CRUZ AT [facility]   • COLONOSCOPY N/A 2/17/2021    Procedure: COLONOSCOPY to cecum with cold biopsy polypectomy;  Surgeon: Yousuf Méndez MD;  Location: [facility] ENDOSCOPY;  Service: Gastroenterology;  Laterality: N/A;  pre: hx of polyps  post: polyp   • CORONARY ANGIOPLASTY WITH STENT PLACEMENT  02/2009    and 7/2009-PT STATES HAS 3 STENTS   • ENDOSCOPY N/A 06/24/2012    NON BLEEDING EROSIVE GASTROPATHY, 1 DUODENAL ULCER WITH CLEAN BASE, DR. BRANDY WREN AT [facility]   • ENDOSCOPY AND COLONOSCOPY N/A 11/20/2007    EGD WNL, 8 ADENOMATOUS POLYPS IN RECTO-SIGMOID, RESCOPE IN 3 YRS, DR. CRISTINA RODRIGUEZ AT [facility]   • FOOT SURGERY Left     LEFT BIG TOE-JOINT REPLACED   • HAND SURGERY Left     THUMB-JOINT REPLACEMENT   • HAND SURGERY Right     JOINT REPLACEMENT RIGHT INDEX FINGER   • KNEE ARTHROSCOPY Left    • LAPAROSCOPIC CHOLECYSTECTOMY     • LUMBAR DISCECTOMY FUSION INSTRUMENTATION N/A 11/13/2020     Procedure: Lumbar 3 4 and lumbar 4 5 laminectomy and fusion with instrumentation;  Surgeon: Bill Washington MD;  Location: [facility] MAIN OR;  Service: Neurosurgery;  Laterality: N/A;   • NECK SURGERY     • VASECTOMY Bilateral        Family History   Adopted: Yes   Problem Relation Age of Onset   • Heart attack Mother    • Alcohol abuse Mother    • Heart disease Mother    • No Known Problems Father    • Heart disease Other         FH in females before the age of 65   • Malig Hyperthermia Neg Hx        Social History     Socioeconomic History   • Marital status:      Spouse name: Not on file   • Number of children: Not on file   • Years of education: Not on file   • Highest education level: Not on file   Tobacco Use   • Smoking status: Former Smoker     Packs/day: 2.00     Years: 40.00     Pack years: 80.00     Types: Cigarettes     Quit date:      Years since quittin.3   • Smokeless tobacco: Former User     Types: Chew   • Tobacco comment: smoked 1 to 2 packs per day for 40 or more years   Vaping Use   • Vaping Use: Never used   Substance and Sexual Activity   • Alcohol use: Not Currently     Comment: QUIT DRINKING 2020   • Drug use: No   • Sexual activity: Defer       Current Outpatient Medications on File Prior to Visit   Medication Sig Dispense Refill   • allopurinol (ZYLOPRIM) 100 MG tablet Take 1 tablet by mouth Daily. To take along with 300 mg pill to a daily dose 400 mg (Patient taking differently: Take 100 mg by mouth Every Night.) 90 tablet 3   • allopurinol (ZYLOPRIM) 300 MG tablet Take 1 tablet by mouth Daily. 30 tablet 6   • Cyanocobalamin (VITAMIN B-12) 5000 MCG tablet dispersible Place 1 tablet on the tongue Daily. (Patient taking differently: Place 1 tablet on the tongue Daily. ORAL)     • cyclobenzaprine (FLEXERIL) 10 MG tablet Take 0.5 tablets by mouth Every 8 (Eight) Hours As Needed for Muscle Spasms. 30 tablet 11   • dabigatran etexilate (PRADAXA) 75 MG capsule Take 75 mg by  mouth 2 (Two) Times a Day.     • folic acid (FOLVITE) 400 MCG tablet Take 1 tablet by mouth Daily. (Patient taking differently: Take 400 mcg by mouth 2 (Two) Times a Day.)     • furosemide (LASIX) 40 MG tablet Take 1 tablet by mouth twice daily 180 tablet 0   • metoprolol succinate XL (Toprol XL) 25 MG 24 hr tablet Take 1 tablet by mouth every night at bedtime. (Patient taking differently: Take 50 mg by mouth Daily.) 30 tablet 11   • multivitamin with minerals (CENTRUM MEN PO) Take 1 tablet by mouth Daily. HOLD FOR SURGERY     • predniSONE (DELTASONE) 5 MG tablet Take 1 tablet by mouth Daily. 10 tablet 0   • rosuvastatin (Crestor) 40 MG tablet Take 1 tablet by mouth Daily. 90 tablet 3   • tamsulosin (FLOMAX) 0.4 MG capsule 24 hr capsule Take 1 capsule by mouth Every Evening.       No current facility-administered medications on file prior to visit.       Review of Systems   Constitutional: Negative.        Recent Results (from the past 4704 hour(s))   COVID-19,BIOTAP, NP/OP SWAB IN TRANSPORT MEDIA OR SALINE 24-36 HR TAT - Swab, Nasopharynx    Collection Time: 11/11/20  8:06 AM    Specimen: Nasopharynx; Swab   Result Value Ref Range    SARS-CoV-2 PCR Not Detected Not Detected   CBC Auto Differential    Collection Time: 11/14/20  4:57 AM    Specimen: Blood   Result Value Ref Range    WBC 9.97 3.40 - 10.80 10*3/mm3    RBC 3.20 (L) 4.14 - 5.80 10*6/mm3    Hemoglobin 10.6 (L) 13.0 - 17.7 g/dL    Hematocrit 30.5 (L) 37.5 - 51.0 %    MCV 95.3 79.0 - 97.0 fL    MCH 33.1 (H) 26.6 - 33.0 pg    MCHC 34.8 31.5 - 35.7 g/dL    RDW 12.7 12.3 - 15.4 %    RDW-SD 43.5 37.0 - 54.0 fl    MPV 9.4 6.0 - 12.0 fL    Platelets 165 140 - 450 10*3/mm3    Neutrophil % 74.1 42.7 - 76.0 %    Lymphocyte % 11.4 (L) 19.6 - 45.3 %    Monocyte % 13.6 (H) 5.0 - 12.0 %    Eosinophil % 0.1 (L) 0.3 - 6.2 %    Basophil % 0.4 0.0 - 1.5 %    Immature Grans % 0.4 0.0 - 0.5 %    Neutrophils, Absolute 7.38 (H) 1.70 - 7.00 10*3/mm3    Lymphocytes, Absolute 1.14  0.70 - 3.10 10*3/mm3    Monocytes, Absolute 1.36 (H) 0.10 - 0.90 10*3/mm3    Eosinophils, Absolute 0.01 0.00 - 0.40 10*3/mm3    Basophils, Absolute 0.04 0.00 - 0.20 10*3/mm3    Immature Grans, Absolute 0.04 0.00 - 0.05 10*3/mm3    nRBC 0.0 0.0 - 0.2 /100 WBC   ECG 12 Lead    Collection Time: 11/14/20  9:13 AM   Result Value Ref Range    QT Interval 377 ms   Troponin    Collection Time: 11/14/20 10:09 AM    Specimen: Blood   Result Value Ref Range    Troponin T <0.010 0.000 - 0.030 ng/mL   CBC Auto Differential    Collection Time: 11/15/20  5:37 AM    Specimen: Blood   Result Value Ref Range    WBC 10.33 3.40 - 10.80 10*3/mm3    RBC 3.26 (L) 4.14 - 5.80 10*6/mm3    Hemoglobin 11.0 (L) 13.0 - 17.7 g/dL    Hematocrit 32.0 (L) 37.5 - 51.0 %    MCV 98.2 (H) 79.0 - 97.0 fL    MCH 33.7 (H) 26.6 - 33.0 pg    MCHC 34.4 31.5 - 35.7 g/dL    RDW 13.1 12.3 - 15.4 %    RDW-SD 46.0 37.0 - 54.0 fl    MPV 9.1 6.0 - 12.0 fL    Platelets 149 140 - 450 10*3/mm3    Neutrophil % 67.2 42.7 - 76.0 %    Lymphocyte % 19.6 19.6 - 45.3 %    Monocyte % 10.8 5.0 - 12.0 %    Eosinophil % 1.7 0.3 - 6.2 %    Basophil % 0.4 0.0 - 1.5 %    Immature Grans % 0.3 0.0 - 0.5 %    Neutrophils, Absolute 6.94 1.70 - 7.00 10*3/mm3    Lymphocytes, Absolute 2.02 0.70 - 3.10 10*3/mm3    Monocytes, Absolute 1.12 (H) 0.10 - 0.90 10*3/mm3    Eosinophils, Absolute 0.18 0.00 - 0.40 10*3/mm3    Basophils, Absolute 0.04 0.00 - 0.20 10*3/mm3    Immature Grans, Absolute 0.03 0.00 - 0.05 10*3/mm3    nRBC 0.0 0.0 - 0.2 /100 WBC   CBC Auto Differential    Collection Time: 11/16/20  7:03 AM    Specimen: Blood   Result Value Ref Range    WBC 10.44 3.40 - 10.80 10*3/mm3    RBC 3.46 (L) 4.14 - 5.80 10*6/mm3    Hemoglobin 11.5 (L) 13.0 - 17.7 g/dL    Hematocrit 32.4 (L) 37.5 - 51.0 %    MCV 93.6 79.0 - 97.0 fL    MCH 33.2 (H) 26.6 - 33.0 pg    MCHC 35.5 31.5 - 35.7 g/dL    RDW 12.6 12.3 - 15.4 %    RDW-SD 42.2 37.0 - 54.0 fl    MPV 9.3 6.0 - 12.0 fL    Platelets 172 140 - 450  10*3/mm3    Neutrophil % 66.8 42.7 - 76.0 %    Lymphocyte % 19.2 (L) 19.6 - 45.3 %    Monocyte % 11.4 5.0 - 12.0 %    Eosinophil % 1.7 0.3 - 6.2 %    Basophil % 0.4 0.0 - 1.5 %    Immature Grans % 0.5 0.0 - 0.5 %    Neutrophils, Absolute 6.98 1.70 - 7.00 10*3/mm3    Lymphocytes, Absolute 2.00 0.70 - 3.10 10*3/mm3    Monocytes, Absolute 1.19 (H) 0.10 - 0.90 10*3/mm3    Eosinophils, Absolute 0.18 0.00 - 0.40 10*3/mm3    Basophils, Absolute 0.04 0.00 - 0.20 10*3/mm3    Immature Grans, Absolute 0.05 0.00 - 0.05 10*3/mm3    nRBC 0.0 0.0 - 0.2 /100 WBC   Body Fluid Culture - Synovial Fluid, Elbow, Left    Collection Time: 11/17/20  6:40 AM    Specimen: Elbow, Left; Synovial Fluid   Result Value Ref Range    Body Fluid Culture No growth at 5 days     Gram Stain Rare (1+) WBCs seen     Gram Stain No organisms seen    Crystal Exam, Fluid - Synovial Fluid,    Collection Time: 11/17/20  6:40 AM    Specimen: Synovial Fluid   Result Value Ref Range    Crystals, Fluid       Intracellular crystals observed exhibiting polarization characteristics of Uric Acid   Anaerobic Culture - Synovial Fluid, Elbow, Left    Collection Time: 11/17/20  6:40 AM    Specimen: Elbow, Left; Synovial Fluid   Result Value Ref Range    Anaerobic Culture No anaerobes isolated at 5 days    Body fluid cell count - Synovial Fluid, Elbow, Left    Collection Time: 11/17/20  6:40 AM    Specimen: Elbow, Left; Synovial Fluid   Result Value Ref Range    Color, Fluid Red     Appearance, Fluid Bloody (A) Clear    RBC, Fluid 338,000 /mm3    Nucleated Cells, Fluid 3,890 /mm3    Method: Automated Sysmex XN Method    Body fluid differential - Synovial Fluid, Elbow, Left    Collection Time: 11/17/20  6:40 AM    Specimen: Elbow, Left; Synovial Fluid   Result Value Ref Range    Neutrophils, Fluid 98 %    Monocytes, Fluid 1 %    Mononuclear, Fluid 1 %   CBC Auto Differential    Collection Time: 11/17/20  6:49 AM    Specimen: Blood   Result Value Ref Range    WBC 7.96 3.40 -  10.80 10*3/mm3    RBC 3.31 (L) 4.14 - 5.80 10*6/mm3    Hemoglobin 11.1 (L) 13.0 - 17.7 g/dL    Hematocrit 31.8 (L) 37.5 - 51.0 %    MCV 96.1 79.0 - 97.0 fL    MCH 33.5 (H) 26.6 - 33.0 pg    MCHC 34.9 31.5 - 35.7 g/dL    RDW 12.9 12.3 - 15.4 %    RDW-SD 44.8 37.0 - 54.0 fl    MPV 9.1 6.0 - 12.0 fL    Platelets 179 140 - 450 10*3/mm3    Neutrophil % 67.1 42.7 - 76.0 %    Lymphocyte % 18.3 (L) 19.6 - 45.3 %    Monocyte % 10.8 5.0 - 12.0 %    Eosinophil % 2.9 0.3 - 6.2 %    Basophil % 0.4 0.0 - 1.5 %    Immature Grans % 0.5 0.0 - 0.5 %    Neutrophils, Absolute 5.34 1.70 - 7.00 10*3/mm3    Lymphocytes, Absolute 1.46 0.70 - 3.10 10*3/mm3    Monocytes, Absolute 0.86 0.10 - 0.90 10*3/mm3    Eosinophils, Absolute 0.23 0.00 - 0.40 10*3/mm3    Basophils, Absolute 0.03 0.00 - 0.20 10*3/mm3    Immature Grans, Absolute 0.04 0.00 - 0.05 10*3/mm3    nRBC 0.0 0.0 - 0.2 /100 WBC   Uric Acid    Collection Time: 11/17/20  6:49 AM    Specimen: Blood   Result Value Ref Range    Uric Acid 6.0 3.4 - 7.0 mg/dL   Duplex Venous Upper Extremity - Left CAR    Collection Time: 11/17/20  1:18 PM   Result Value Ref Range    Right Internal Jugular Augment Y     Right Internal Jugular Competent Y     Right Internal Jugular Compress C     Right Internal Jugular Phasic Y     Right Internal Jugular Spont Y     Left Internal Jugular Augment Y     Left Internal Jugular Competent Y     Left Internal Jugular Compress C     Left Internal Jugular Phasic Y     Left Internal Jugular Spont Y     Right Subclavian Augment Y     Right Subclavian Competent Y     Right Subclavian Compress C     Right Subclavian Phasic Y     Right Subclavian Spont Y     Left Subclavian Augment Y     Left Subclavian Competent Y     Left Subclavian Compress C     Left Subclavian Phasic Y     Left Subclavian Spont Y     Left Axillary Augment Y     Left Axillary Competent Y     Left Axillary Compress C     Left Axillary Phasic Y     Left Axillary Spont Y     Left Brachial Compress C      Left Radial Compress C     Left Ulnar Compress C     Left Basilic Upper Compress C     Left Basilic Forearm Compress C     Left Cephalic Upper Compress C     Left Cephalic Forearm Compress C    Comprehensive Metabolic Panel    Collection Time: 01/11/21  8:43 AM    Specimen: Blood   Result Value Ref Range    Glucose 120 (H) 65 - 99 mg/dL    BUN 19 8 - 23 mg/dL    Creatinine 1.26 0.76 - 1.27 mg/dL    eGFR Non African Am 56 (L) >60 mL/min/1.73    eGFR African Am 68 >60 mL/min/1.73    BUN/Creatinine Ratio 15.1 7.0 - 25.0    Sodium 139 136 - 145 mmol/L    Potassium 4.2 3.5 - 5.2 mmol/L    Chloride 98 98 - 107 mmol/L    Total CO2 32.3 (H) 22.0 - 29.0 mmol/L    Calcium 9.5 8.6 - 10.5 mg/dL    Total Protein 7.3 6.0 - 8.5 g/dL    Albumin 4.30 3.50 - 5.20 g/dL    Globulin 3.0 gm/dL    A/G Ratio 1.4 g/dL    Total Bilirubin 0.4 0.0 - 1.2 mg/dL    Alkaline Phosphatase 115 39 - 117 U/L    AST (SGOT) 23 1 - 40 U/L    ALT (SGPT) 15 1 - 41 U/L   Lipid Panel    Collection Time: 01/11/21  8:43 AM    Specimen: Blood   Result Value Ref Range    Total Cholesterol 175 0 - 200 mg/dL    Triglycerides 204 (H) 0 - 150 mg/dL    HDL Cholesterol 44 40 - 60 mg/dL    VLDL Cholesterol Nathaniel 35 5 - 40 mg/dL    LDL Chol Calc (NIH) 96 0 - 100 mg/dL   CBC & Differential    Collection Time: 01/11/21  8:43 AM    Specimen: Blood   Result Value Ref Range    WBC 6.62 3.40 - 10.80 10*3/mm3    RBC 4.03 (L) 4.14 - 5.80 10*6/mm3    Hemoglobin 13.4 13.0 - 17.7 g/dL    Hematocrit 39.2 37.5 - 51.0 %    MCV 97.3 (H) 79.0 - 97.0 fL    MCH 33.3 (H) 26.6 - 33.0 pg    MCHC 34.2 31.5 - 35.7 g/dL    RDW 13.1 12.3 - 15.4 %    Platelets 200 140 - 450 10*3/mm3    Neutrophil Rel % 58.9 42.7 - 76.0 %    Lymphocyte Rel % 26.9 19.6 - 45.3 %    Monocyte Rel % 10.4 5.0 - 12.0 %    Eosinophil Rel % 2.7 0.3 - 6.2 %    Basophil Rel % 0.8 0.0 - 1.5 %    Neutrophils Absolute 3.90 1.70 - 7.00 10*3/mm3    Lymphocytes Absolute 1.78 0.70 - 3.10 10*3/mm3    Monocytes Absolute 0.69 0.10 -  0.90 10*3/mm3    Eosinophils Absolute 0.18 0.00 - 0.40 10*3/mm3    Basophils Absolute 0.05 0.00 - 0.20 10*3/mm3    Immature Granulocyte Rel % 0.3 0.0 - 0.5 %    Immature Grans Absolute 0.02 0.00 - 0.05 10*3/mm3    nRBC 0.0 0.0 - 0.2 /100 WBC   PSA DIAGNOSTIC    Collection Time: 01/11/21  8:43 AM   Result Value Ref Range    PSA 1.590 0.000 - 4.000 ng/mL   COVID-19,BIOTAP, NP/OP SWAB IN TRANSPORT MEDIA OR SALINE 24-36 HR TAT - Swab, Nasopharynx    Collection Time: 02/15/21 11:10 AM    Specimen: Nasopharynx; Swab   Result Value Ref Range    SARS-CoV-2 PCR Not Detected Not Detected   Tissue Pathology Exam    Collection Time: 02/17/21  2:34 PM    Specimen: Large Intestine, Transverse Colon; Polyp   Result Value Ref Range    Case Report       Surgical Pathology Report                         Case: [id number]                                  Authorizing Provider:  Yousuf Méndez MD  Collected:           02/17/2021 02:34 PM          Ordering Location:     [facility]  Received:            02/17/2021 03:21 PM                                 ENDO SUITES                                                                  Pathologist:           Fifi Patiño MD                                                          Specimen:    Large Intestine, Transverse Colon, transverse colon polyp                                  Final Diagnosis       1. Transverse Colon, Polyp, Polypectomy:   A. Fragments of hyperplastic polyp.    jab/pkm       Gross Description       1.  The specimen is received in formalin labeled with the patient's name and further designated 'transverse colon polyp biopsy' are multiple small fragments of gray-tan tissue. The specimen is submitted for embedding as received.        Microscopic Description       Performed, incorporated in diagnosis.     POCT urinalysis dipstick, automated    Collection Time: 03/18/21  2:39 PM    Specimen: Urine   Result Value Ref Range    Color Yellow Yellow,  "Straw, Dark Yellow, Lanie    Clarity, UA Clear Clear    Specific Gravity  1.015 1.005 - 1.030    pH, Urine 6.0 5.0 - 8.0    Leukocytes Negative Negative    Nitrite, UA Negative Negative    Protein, POC 2+ (A) Negative mg/dL    Glucose, UA Negative Negative, 1000 mg/dL (3+) mg/dL    Ketones, UA Negative Negative    Urobilinogen, UA Normal Normal    Bilirubin Negative Negative    Blood, UA Trace (A) Negative     Objective   Vitals:    05/20/21 0759   BP: 143/81   Pulse: 65   Temp: 97.5 °F (36.4 °C)   SpO2: 96%   Weight: 103 kg (228 lb 2 oz)   Height: 185.4 cm (72.99\")     Body mass index is 30.1 kg/m².  Physical Exam  Vitals and nursing note reviewed.   Constitutional:       General: He is not in acute distress.     Appearance: He is well-developed. He is not diaphoretic.   Cardiovascular:      Rate and Rhythm: Normal rate and regular rhythm.   Pulmonary:      Effort: Pulmonary effort is normal. No respiratory distress.      Breath sounds: Normal breath sounds. No wheezing.           Diagnoses and all orders for this visit:    1. Essential hypertension (Primary)    2. Coronary artery disease involving native heart without angina pectoris, unspecified vessel or lesion type  -     Ambulatory Referral to Cardiology    3. Chronic right-sided congestive heart failure (CMS/HCC)  -     Ambulatory Referral to Cardiology    4. Paroxysmal atrial fibrillation (CMS/HCC)  -     Ambulatory Referral to Cardiology       Return in about 3 months (around 8/20/2021) for HYPERTENSION.          "

## 2021-06-17 ENCOUNTER — OFFICE VISIT (OUTPATIENT)
Dept: SPORTS MEDICINE | Facility: CLINIC | Age: 76
End: 2021-06-17

## 2021-06-17 VITALS
HEART RATE: 68 BPM | HEIGHT: 73 IN | SYSTOLIC BLOOD PRESSURE: 138 MMHG | DIASTOLIC BLOOD PRESSURE: 80 MMHG | WEIGHT: 225 LBS | OXYGEN SATURATION: 98 % | BODY MASS INDEX: 29.82 KG/M2 | TEMPERATURE: 97.8 F

## 2021-06-17 DIAGNOSIS — M10.9 GOUT, UNSPECIFIED CAUSE, UNSPECIFIED CHRONICITY, UNSPECIFIED SITE: Primary | ICD-10-CM

## 2021-06-17 DIAGNOSIS — M53.3 CHRONIC LEFT SACROILIAC PAIN: Primary | ICD-10-CM

## 2021-06-17 DIAGNOSIS — G89.29 CHRONIC LEFT SACROILIAC PAIN: Primary | ICD-10-CM

## 2021-06-17 PROCEDURE — 20611 DRAIN/INJ JOINT/BURSA W/US: CPT | Performed by: FAMILY MEDICINE

## 2021-06-17 RX ORDER — COLCHICINE 0.6 MG/1
0.6 TABLET ORAL 4 TIMES DAILY PRN
Qty: 30 TABLET | Refills: 3 | Status: SHIPPED | OUTPATIENT
Start: 2021-06-17 | End: 2021-06-18

## 2021-06-17 RX ORDER — TRIAMCINOLONE ACETONIDE 40 MG/ML
40 INJECTION, SUSPENSION INTRA-ARTICULAR; INTRAMUSCULAR ONCE
Status: COMPLETED | OUTPATIENT
Start: 2021-06-17 | End: 2021-06-17

## 2021-06-17 RX ADMIN — TRIAMCINOLONE ACETONIDE 40 MG: 40 INJECTION, SUSPENSION INTRA-ARTICULAR; INTRAMUSCULAR at 09:52

## 2021-06-17 NOTE — PROGRESS NOTES
"Here today for repeat SI joint injection.  Ultrasound-Guided Sacroiliac Joint Injection Procedure Note    Left sacroiliac joint injection was discussed with the patient in detail, including indication, risks, benefits, and alternatives. Verbal consent was given for the procedure. Injection was performed by physician.  Injection site was identified by ultrasound examination, then cleaned with Betadine and alcohol swabs. Prior to needle insertion, ethyl chloride spray was used for surface anesthesia. Sterile technique was used. Ultrasound guidance was indicated for injection accuracy.  A 22-gauge, 3.5\" spinal needle was guided to the joint under continuous direct ultrasound visualization. Injectate was seen flowing underneath the superficial sacroiliac ligaments and passed without difficulty. The needle was removed and a simple bandage was applied. The procedure was tolerated well without difficulty.    Injection mixture:  1% lidocaine without epinephrine: 2 mL  40 mg/mL triamcinolone acetonide: 1 mL     Diagnoses and all orders for this visit:    Chronic left sacroiliac pain  -     triamcinolone acetonide ([id number]-40) injection 40 mg       "

## 2021-06-18 ENCOUNTER — OFFICE VISIT (OUTPATIENT)
Dept: CARDIOLOGY | Facility: CLINIC | Age: 76
End: 2021-06-18

## 2021-06-18 VITALS
BODY MASS INDEX: 30.09 KG/M2 | SYSTOLIC BLOOD PRESSURE: 130 MMHG | WEIGHT: 227 LBS | DIASTOLIC BLOOD PRESSURE: 80 MMHG | HEART RATE: 56 BPM | HEIGHT: 73 IN

## 2021-06-18 DIAGNOSIS — Z95.5 STENTED CORONARY ARTERY: ICD-10-CM

## 2021-06-18 DIAGNOSIS — I25.118 CORONARY ARTERY DISEASE OF NATIVE ARTERY OF NATIVE HEART WITH STABLE ANGINA PECTORIS (HCC): Primary | ICD-10-CM

## 2021-06-18 DIAGNOSIS — I25.118 CORONARY ARTERY DISEASE INVOLVING NATIVE HEART WITH OTHER FORM OF ANGINA PECTORIS, UNSPECIFIED VESSEL OR LESION TYPE (HCC): ICD-10-CM

## 2021-06-18 DIAGNOSIS — I71.40 ABDOMINAL AORTIC ANEURYSM (AAA) WITHOUT RUPTURE (HCC): ICD-10-CM

## 2021-06-18 DIAGNOSIS — I10 ESSENTIAL HYPERTENSION: ICD-10-CM

## 2021-06-18 DIAGNOSIS — I27.20 PULMONARY HTN (HCC): ICD-10-CM

## 2021-06-18 PROCEDURE — 99204 OFFICE O/P NEW MOD 45 MIN: CPT | Performed by: INTERNAL MEDICINE

## 2021-06-18 NOTE — PROGRESS NOTES
Subjective:     Encounter Date:06/18/21      Patient ID: Juan James is a 75 y.o. male.    Chief Complaint:  History of Present Illness    Dear Dr. Kidd,    I had the pleasure of seeing this patient in the office today for initial evaluation and consultation.  I appreciate that you sent him in to see us.  They come in today to be seen for atrial fibrillation, coronary artery disease, and a history of pulmonary hypertension.    Patient is previously followed with Dr. Aviles.  He last saw him in January of this year.  Prior to that, he was followed by Dr. Miles and before that Dr. Pinto.    He has a history of CAD, he had 3 stents in 2009, apparently in his right coronary artery.  He has a history of atrial fibrillation on chronic anticoagulation and rate control.  He apparently has been in atrial fibrillation ever since 2009.  He was admitted April 2020 with dig toxicity, hyperkalemia, and bradycardia.  He also had an echocardiogram performed that time that showed normal left-ventricular systolic function but severe pulmonary hypertension:  Results for orders placed during the hospital encounter of 04/04/20    Adult Transthoracic Echo Complete W/ Cont if Necessary Per Protocol    Interpretation Summary  · Calculated right ventricular systolic pressure from tricuspid regurgitation is 93.1 mmHg.  · Left ventricular wall thickness is consistent with mild-to-moderate concentric hypertrophy.  · Left ventricular diastolic dysfunction (grade II) consistent with pseudonormalization.  · Right ventricular cavity is mildly dilated.  · Left atrial cavity size is moderately dilated.  · Mild to moderate tricuspid valve regurgitation is present.    RV is enlarged, RA enlarged, IVC enlarged, but function of the RV is good.  The degree of TR is not as significant as typically seen in this situation, with mild to moderate TR present.  Incomplete envelope is present.    He comes in today in part because he wants to see  "if he can get his atrial fibrillation taken care of.  He does not like being on the Pradaxa because of the cost.  He has had a significant GI bleed in the past when he was on Xarelto with a reported gastric ulcer.  He was seen by GI when he was hospitalized April 2020 at that point there was no contraindication to anticoagulation from a GI standpoint.  He apparently was on Eliquis but did not tolerate it, he said he just did not feel good on it, and has been on Pradaxa for some time now.  He states he has not had any issues with GI bleeding or gastric upset on Pradaxa.    Regarding the pulmonary hypertension, he was apparently diagnosed with sleep apnea but he does not believe that he has sleep apnea.  He has not been treated for it.  He has not had his pulmonary arterial pressure checked since his echocardiogram April 2020 that showed severe pulmonary hypertension with a PA pressure 93 mmHg.    The following portions of the patient's history were reviewed and updated as appropriate: allergies, current medications, past family history, past medical history, past social history, past surgical history and problem list.    Procedures       Objective:     Vitals:    06/18/21 1021   BP: 130/80   Pulse: 56   Weight: 103 kg (227 lb)   Height: 185.4 cm (73\")     Body mass index is 29.95 kg/m².      Physical Exam    Data and records reviewed:     Lab Results   Component Value Date    GLUCOSE 166 (H) 11/05/2020    BUN 19 01/11/2021    CREATININE 1.26 01/11/2021    EGFRIFNONA 56 (L) 01/11/2021    EGFRIFAFRI 68 01/11/2021    BCR 15.1 01/11/2021    K 4.2 01/11/2021    CO2 32.3 (H) 01/11/2021    CALCIUM 9.5 01/11/2021    PROTENTOTREF 7.3 01/11/2021    ALBUMIN 4.30 01/11/2021    LABIL2 1.4 01/11/2021    AST 23 01/11/2021    ALT 15 01/11/2021     No results found for: CHOL  Lab Results   Component Value Date    TRIG 204 (H) 01/11/2021    TRIG 288 (H) 09/29/2020    TRIG 175 (H) 11/26/2019     Lab Results   Component Value Date    " HDL 44 01/11/2021    HDL 58 09/29/2020    HDL 38 (L) 11/26/2019     Lab Results   Component Value Date    LDL 96 01/11/2021     (H) 09/29/2020     (H) 11/26/2019     Lab Results   Component Value Date    VLDL 35 01/11/2021    VLDL 57.6 09/29/2020    VLDL 35 11/26/2019     Lab Results   Component Value Date    LDLHDL 2.66 11/26/2019    LDLHDL 3.16 07/29/2019     CBC    CBC 11/16/20 11/17/20 1/11/21   WBC 10.44 7.96 6.62   RBC 3.46 (A) 3.31 (A) 4.03 (A)   Hemoglobin 11.5 (A) 11.1 (A) 13.4   Hematocrit 32.4 (A) 31.8 (A) 39.2   MCV 93.6 96.1 97.3 (A)   MCH 33.2 (A) 33.5 (A) 33.3 (A)   MCHC 35.5 34.9 34.2   RDW 12.6 12.9 13.1   Platelets 172 179 200   (A) Abnormal value            No radiology results for the last 90 days.  Results for orders placed during the hospital encounter of 04/04/20    Adult Transthoracic Echo Complete W/ Cont if Necessary Per Protocol    Interpretation Summary  · Calculated right ventricular systolic pressure from tricuspid regurgitation is 93.1 mmHg.  · Left ventricular wall thickness is consistent with mild-to-moderate concentric hypertrophy.  · Left ventricular diastolic dysfunction (grade II) consistent with pseudonormalization.  · Right ventricular cavity is mildly dilated.  · Left atrial cavity size is moderately dilated.  · Mild to moderate tricuspid valve regurgitation is present.    RV is enlarged, RA enlarged, IVC enlarged, but function of the RV is good.  The degree of TR is not as significant as typically seen in this situation, with mild to moderate TR present.  Incomplete envelope is present.    CHADS-VASc Risk Assessment            4 Total Score    1 CHF    1 Hypertension    1 Vascular Disease    1 Age 65-74        Criteria that do not apply:    Age >/= 75    DM    PRIOR STROKE/TIA/THROMBO    Sex: Female              Assessment:          Diagnosis Plan   1. Coronary artery disease of native artery of native heart with stable angina pectoris (CMS/HCC)  Adult  Transthoracic Echo Complete W/ Cont if Necessary Per Protocol    Holter Monitor - 48 Hour   2. Abdominal aortic aneurysm (AAA) without rupture (CMS/HCC)  Adult Transthoracic Echo Complete W/ Cont if Necessary Per Protocol    Holter Monitor - 48 Hour   3. Stented coronary artery  Adult Transthoracic Echo Complete W/ Cont if Necessary Per Protocol    Holter Monitor - 48 Hour   4. Essential hypertension  Adult Transthoracic Echo Complete W/ Cont if Necessary Per Protocol    Holter Monitor - 48 Hour   5. Coronary artery disease involving native heart with other form of angina pectoris, unspecified vessel or lesion type (CMS/HCC)  Adult Transthoracic Echo Complete W/ Cont if Necessary Per Protocol    Holter Monitor - 48 Hour   6. Pulmonary HTN (CMS/HCC)  Adult Transthoracic Echo Complete W/ Cont if Necessary Per Protocol    Holter Monitor - 48 Hour          Plan:       1.  Atrial fibrillation-at least persistent, probably permanent, every EKG I can find dating back to 2016 shows atrial fibrillation.  He says he would really like to get cardioverted or treated so he can go back in sinus rhythm, but it looks highly likely that he has been in A. fib so long that this would not be successful.  I Vilma put a 48-hour Holter monitor on to ensure that he is not having paroxysmal atrial fibrillation.  Currently continue his current medical therapy  2.  Coronary disease, prior stent placement apparently in the RCA, no chest discomfort or symptoms of ischemia, continue current medical therapy  3.  Patient is not on antiplatelet therapy is probably appropriate  4.  Chronic anticoagulation-patient is on Pradaxa because of his [id number] score of 4, he has been maintained on Pradaxa for some period of time.  Given his prior GI bleed I would not have initiated him on this, but he reports severe GI bleeding on Xarelto and then intolerance to Eliquis and he is not willing to have routine blood work drawn as would be necessary with  warfarin, I have not adjusted or altered his anticoagulation regimen  5.  Severe pulmonary hypertension-does not look like his been evaluated since April 2020 nor has he had any attention focused on this, we will repeat an echocardiogram reassess his pulmonary hypertension  6.  Patient was apparently diagnosed with sleep apnea but he does not believe he has it it is not willing to use CPAP  7.  COPD  Thank you very much for allowing us to participate in the care of this pleasant patient.  Please don't hesitate to call if I can be of assistance in any way.      Current Outpatient Medications:   •  allopurinol (ZYLOPRIM) 100 MG tablet, Take 1 tablet by mouth Daily. To take along with 300 mg pill to a daily dose 400 mg (Patient taking differently: Take 100 mg by mouth Every Night.), Disp: 90 tablet, Rfl: 3  •  allopurinol (ZYLOPRIM) 300 MG tablet, Take 1 tablet by mouth Daily. (Patient taking differently: Take 300 mg by mouth Every Morning.), Disp: 30 tablet, Rfl: 6  •  Cyanocobalamin (VITAMIN B-12) 5000 MCG tablet dispersible, Place 1 tablet on the tongue Daily. (Patient taking differently: Place 1 tablet on the tongue Daily. ORAL), Disp: , Rfl:   •  dabigatran etexilate (PRADAXA) 75 MG capsule, Take 75 mg by mouth 2 (Two) Times a Day., Disp: , Rfl:   •  folic acid (FOLVITE) 400 MCG tablet, Take 1 tablet by mouth Daily. (Patient taking differently: Take 400 mcg by mouth 2 (Two) Times a Day.), Disp: , Rfl:   •  furosemide (LASIX) 40 MG tablet, Take 1 tablet by mouth twice daily, Disp: 180 tablet, Rfl: 0  •  metoprolol succinate XL (Toprol XL) 25 MG 24 hr tablet, Take 1 tablet by mouth every night at bedtime. (Patient taking differently: Take 50 mg by mouth Daily.), Disp: 30 tablet, Rfl: 11  •  multivitamin with minerals (CENTRUM MEN PO), Take 1 tablet by mouth Daily. HOLD FOR SURGERY, Disp: , Rfl:   •  rosuvastatin (Crestor) 40 MG tablet, Take 1 tablet by mouth Daily., Disp: 90 tablet, Rfl: 3  •  tamsulosin (FLOMAX)  0.4 MG capsule 24 hr capsule, Take 1 capsule by mouth Every Evening., Disp: , Rfl:          Return in about 1 year (around 6/18/2022).

## 2021-07-01 RX ORDER — TAMSULOSIN HYDROCHLORIDE 0.4 MG/1
1 CAPSULE ORAL EVERY EVENING
Qty: 30 CAPSULE | Refills: 11 | Status: SHIPPED | OUTPATIENT
Start: 2021-07-01 | End: 2022-01-25 | Stop reason: SDUPTHER

## 2021-07-08 ENCOUNTER — HOSPITAL ENCOUNTER (OUTPATIENT)
Dept: CARDIOLOGY | Facility: HOSPITAL | Age: 76
Discharge: HOME | End: 2021-07-08
Admitting: INTERNAL MEDICINE

## 2021-07-08 VITALS
HEIGHT: 73 IN | SYSTOLIC BLOOD PRESSURE: 150 MMHG | WEIGHT: 227.07 LBS | HEART RATE: 67 BPM | DIASTOLIC BLOOD PRESSURE: 80 MMHG | BODY MASS INDEX: 30.09 KG/M2

## 2021-07-08 DIAGNOSIS — Z95.5 STENTED CORONARY ARTERY: ICD-10-CM

## 2021-07-08 DIAGNOSIS — I25.118 CORONARY ARTERY DISEASE OF NATIVE ARTERY OF NATIVE HEART WITH STABLE ANGINA PECTORIS (HCC): ICD-10-CM

## 2021-07-08 DIAGNOSIS — I25.118 CORONARY ARTERY DISEASE INVOLVING NATIVE HEART WITH OTHER FORM OF ANGINA PECTORIS, UNSPECIFIED VESSEL OR LESION TYPE (HCC): ICD-10-CM

## 2021-07-08 DIAGNOSIS — I27.20 PULMONARY HTN (HCC): ICD-10-CM

## 2021-07-08 DIAGNOSIS — I10 ESSENTIAL HYPERTENSION: ICD-10-CM

## 2021-07-08 DIAGNOSIS — I71.40 ABDOMINAL AORTIC ANEURYSM (AAA) WITHOUT RUPTURE (HCC): ICD-10-CM

## 2021-07-08 LAB
AORTIC ARCH: 2.8 CM
ASCENDING AORTA: 4.1 CM
BH CV ECHO MEAS - ACS: 2.2 CM
BH CV ECHO MEAS - AO MAX PG (FULL): 3.1 MMHG
BH CV ECHO MEAS - AO MAX PG: 7.7 MMHG
BH CV ECHO MEAS - AO MEAN PG (FULL): 3 MMHG
BH CV ECHO MEAS - AO MEAN PG: 5 MMHG
BH CV ECHO MEAS - AO ROOT AREA (BSA CORRECTED): 1.7
BH CV ECHO MEAS - AO ROOT AREA: 11.3 CM^2
BH CV ECHO MEAS - AO ROOT DIAM: 3.8 CM
BH CV ECHO MEAS - AO V2 MAX: 139 CM/SEC
BH CV ECHO MEAS - AO V2 MEAN: 107 CM/SEC
BH CV ECHO MEAS - AO V2 VTI: 30 CM
BH CV ECHO MEAS - ASC AORTA: 4.1 CM
BH CV ECHO MEAS - AVA(I,A): 2.5 CM^2
BH CV ECHO MEAS - AVA(I,D): 2.5 CM^2
BH CV ECHO MEAS - AVA(V,A): 3.2 CM^2
BH CV ECHO MEAS - AVA(V,D): 3.2 CM^2
BH CV ECHO MEAS - BSA(HAYCOCK): 2.3 M^2
BH CV ECHO MEAS - BSA: 2.3 M^2
BH CV ECHO MEAS - BZI_BMI: 29.9 KILOGRAMS/M^2
BH CV ECHO MEAS - BZI_METRIC_HEIGHT: 185.4 CM
BH CV ECHO MEAS - BZI_METRIC_WEIGHT: 103 KG
BH CV ECHO MEAS - EDV(MOD-SP2): 55 ML
BH CV ECHO MEAS - EDV(MOD-SP4): 68 ML
BH CV ECHO MEAS - EDV(TEICH): 147.4 ML
BH CV ECHO MEAS - EF(CUBED): 72 %
BH CV ECHO MEAS - EF(MOD-BP): 69.2 %
BH CV ECHO MEAS - EF(MOD-SP2): 74.5 %
BH CV ECHO MEAS - EF(MOD-SP4): 63.2 %
BH CV ECHO MEAS - EF(TEICH): 63.1 %
BH CV ECHO MEAS - ESV(MOD-SP2): 14 ML
BH CV ECHO MEAS - ESV(MOD-SP4): 25 ML
BH CV ECHO MEAS - ESV(TEICH): 54.4 ML
BH CV ECHO MEAS - FS: 34.5 %
BH CV ECHO MEAS - IVS/LVPW: 1.2
BH CV ECHO MEAS - IVSD: 1.7 CM
BH CV ECHO MEAS - LAT PEAK E' VEL: 11.3 CM/SEC
BH CV ECHO MEAS - LV DIASTOLIC VOL/BSA (35-75): 29.9 ML/M^2
BH CV ECHO MEAS - LV MASS(C)D: 391.4 GRAMS
BH CV ECHO MEAS - LV MASS(C)DI: 172.4 GRAMS/M^2
BH CV ECHO MEAS - LV MAX PG: 4.7 MMHG
BH CV ECHO MEAS - LV MEAN PG: 2 MMHG
BH CV ECHO MEAS - LV SYSTOLIC VOL/BSA (12-30): 11 ML/M^2
BH CV ECHO MEAS - LV V1 MAX: 108 CM/SEC
BH CV ECHO MEAS - LV V1 MEAN: 65.4 CM/SEC
BH CV ECHO MEAS - LV V1 VTI: 18.3 CM
BH CV ECHO MEAS - LVIDD: 5.5 CM
BH CV ECHO MEAS - LVIDS: 3.6 CM
BH CV ECHO MEAS - LVLD AP2: 7.6 CM
BH CV ECHO MEAS - LVLD AP4: 7.4 CM
BH CV ECHO MEAS - LVLS AP2: 6.4 CM
BH CV ECHO MEAS - LVLS AP4: 6.5 CM
BH CV ECHO MEAS - LVOT AREA (M): 4.2 CM^2
BH CV ECHO MEAS - LVOT AREA: 4.2 CM^2
BH CV ECHO MEAS - LVOT DIAM: 2.3 CM
BH CV ECHO MEAS - LVPWD: 1.4 CM
BH CV ECHO MEAS - MED PEAK E' VEL: 8.5 CM/SEC
BH CV ECHO MEAS - MR MAX PG: 117.9 MMHG
BH CV ECHO MEAS - MR MAX VEL: 543 CM/SEC
BH CV ECHO MEAS - MV DEC SLOPE: 662 CM/SEC^2
BH CV ECHO MEAS - MV DEC TIME: 0.25 SEC
BH CV ECHO MEAS - MV E MAX VEL: 106 CM/SEC
BH CV ECHO MEAS - MV MAX PG: 9.5 MMHG
BH CV ECHO MEAS - MV MEAN PG: 3 MMHG
BH CV ECHO MEAS - MV P1/2T MAX VEL: 156 CM/SEC
BH CV ECHO MEAS - MV P1/2T: 69 MSEC
BH CV ECHO MEAS - MV V2 MAX: 154 CM/SEC
BH CV ECHO MEAS - MV V2 MEAN: 77.9 CM/SEC
BH CV ECHO MEAS - MV V2 VTI: 36.1 CM
BH CV ECHO MEAS - MVA P1/2T LCG: 1.4 CM^2
BH CV ECHO MEAS - MVA(P1/2T): 3.2 CM^2
BH CV ECHO MEAS - MVA(VTI): 2.1 CM^2
BH CV ECHO MEAS - PA ACC TIME: 0.1 SEC
BH CV ECHO MEAS - PA MAX PG (FULL): 1.9 MMHG
BH CV ECHO MEAS - PA MAX PG: 5.6 MMHG
BH CV ECHO MEAS - PA PR(ACCEL): 36.3 MMHG
BH CV ECHO MEAS - PA V2 MAX: 118 CM/SEC
BH CV ECHO MEAS - RAP SYSTOLE: 8 MMHG
BH CV ECHO MEAS - RV MAX PG: 3.7 MMHG
BH CV ECHO MEAS - RV MEAN PG: 2 MMHG
BH CV ECHO MEAS - RV V1 MAX: 96 CM/SEC
BH CV ECHO MEAS - RV V1 MEAN: 56.7 CM/SEC
BH CV ECHO MEAS - RV V1 VTI: 14.8 CM
BH CV ECHO MEAS - RVSP: 45 MMHG
BH CV ECHO MEAS - SI(AO): 149.8 ML/M^2
BH CV ECHO MEAS - SI(CUBED): 52.7 ML/M^2
BH CV ECHO MEAS - SI(LVOT): 33.5 ML/M^2
BH CV ECHO MEAS - SI(MOD-SP2): 18.1 ML/M^2
BH CV ECHO MEAS - SI(MOD-SP4): 18.9 ML/M^2
BH CV ECHO MEAS - SI(TEICH): 41 ML/M^2
BH CV ECHO MEAS - SUP REN AO DIAM: 3.4 CM
BH CV ECHO MEAS - SV(AO): 340.2 ML
BH CV ECHO MEAS - SV(CUBED): 119.7 ML
BH CV ECHO MEAS - SV(LVOT): 76 ML
BH CV ECHO MEAS - SV(MOD-SP2): 41 ML
BH CV ECHO MEAS - SV(MOD-SP4): 43 ML
BH CV ECHO MEAS - SV(TEICH): 93 ML
BH CV ECHO MEAS - TAPSE (>1.6): 2.5 CM
BH CV ECHO MEAS - TR MAX VEL: 304 CM/SEC
BH CV ECHO MEASUREMENTS AVERAGE E/E' RATIO: 10.71
BH CV XLRA - RV BASE: 3.7 CM
BH CV XLRA - RV LENGTH: 7.8 CM
BH CV XLRA - RV MID: 2.1 CM
BH CV XLRA - TDI S': 13.8 CM/SEC
LEFT ATRIUM VOLUME INDEX: 25 ML/M2
LV EF 2D ECHO EST: 69 %
MAXIMAL PREDICTED HEART RATE: 147 BPM
SINUS: 3.2 CM
STJ: 2.9 CM
STRESS TARGET HR: 125 BPM

## 2021-07-08 PROCEDURE — 93306 TTE W/DOPPLER COMPLETE: CPT

## 2021-07-08 PROCEDURE — 93306 TTE W/DOPPLER COMPLETE: CPT | Performed by: INTERNAL MEDICINE

## 2021-07-09 ENCOUNTER — TELEPHONE (OUTPATIENT)
Dept: CARDIOLOGY | Facility: CLINIC | Age: 76
End: 2021-07-09

## 2021-07-09 NOTE — TELEPHONE ENCOUNTER
----- Message from AARON Medina sent at 7/9/2021  9:40 AM EDT -----  Dr. Mo is out of the office this week so I am reviewing the study for him.  Please call patient.  Heart function normal.  Right atrium mildly dilated.  Moderate calcification of the aortic valve, but it is functioning normally.  Mild mitral annular calcification and trace mitral regurgitation.  Mild tricuspid valve regurgitation.  The study was completed to remeasure pulmonary hypertension.  This has improved from his echocardiogram in 2020, but is still in the moderate range.  If he changes his mind about evaluation for obstructive sleep apnea we can make that referral.  Thank you.

## 2021-07-09 NOTE — TELEPHONE ENCOUNTER
Notified patient of results and recommendations. He verbalized understanding.    Cecilia Barton, RN  Triage [facility] [place]

## 2021-08-06 ENCOUNTER — HOSPITAL ENCOUNTER (OUTPATIENT)
Dept: GENERAL RADIOLOGY | Facility: HOSPITAL | Age: 76
Discharge: HOME | End: 2021-08-06
Admitting: NEUROLOGICAL SURGERY

## 2021-08-06 DIAGNOSIS — Z09 FOLLOW-UP EXAMINATION FOLLOWING SURGERY: ICD-10-CM

## 2021-08-06 PROCEDURE — 72114 X-RAY EXAM L-S SPINE BENDING: CPT

## 2021-08-10 ENCOUNTER — OFFICE VISIT (OUTPATIENT)
Dept: NEUROSURGERY | Facility: CLINIC | Age: 76
End: 2021-08-10

## 2021-08-10 VITALS
SYSTOLIC BLOOD PRESSURE: 125 MMHG | TEMPERATURE: 98 F | HEART RATE: 88 BPM | BODY MASS INDEX: 30.09 KG/M2 | HEIGHT: 73 IN | WEIGHT: 227.07 LBS | DIASTOLIC BLOOD PRESSURE: 82 MMHG

## 2021-08-10 DIAGNOSIS — M51.36 DDD (DEGENERATIVE DISC DISEASE), LUMBAR: Primary | ICD-10-CM

## 2021-08-10 PROCEDURE — 99213 OFFICE O/P EST LOW 20 MIN: CPT | Performed by: NEUROLOGICAL SURGERY

## 2021-08-10 NOTE — PROGRESS NOTES
"Subjective   Patient ID: Juan James is a 75 y.o. male is here today for 6 month follow-up with a new XR Lumbar Spine.    Today patient is having mild low back pain, along with L calf numbness.     Patient, Provider, and MA are all wearing a mask in our office today.     History of Present Illness    This patient returns today.  He is doing pretty well.  He has almost no pain.  He has a little numbness in his left calf.    The following portions of the patient's history were reviewed and updated as appropriate: allergies, current medications, past family history, past medical history, past social history, past surgical history and problem list.    Review of Systems   Constitutional: Negative for chills and fever.   HENT: Negative for congestion.    Genitourinary: Negative for difficulty urinating and dysuria.   Musculoskeletal: Positive for back pain. Negative for myalgias and neck stiffness.   Neurological: Positive for numbness.       I have reviewed the review of systems as documented by my MA.      Objective     Vitals:    08/10/21 1218   BP: 125/82   Cuff Size: Adult   Pulse: 88   Temp: 98 °F (36.7 °C)   Weight: 103 kg (227 lb 1.2 oz)   Height: 185.4 cm (72.99\")     Body mass index is 29.97 kg/m².      Physical Exam  Neurological:      Mental Status: He is alert and oriented to person, place, and time.       Neurologic Exam     Mental Status   Oriented to person, place, and time.           Assessment/Plan   Independent Review of Radiographic Studies:      I personally reviewed the images from the following studies.    I reviewed his x-rays which were done on 6 August.  This shows good solid fusion at both L3-4 and L4-5.    Medical Decision Making:      I told the patient that from my point of view there is nothing further I need to do.  I told her to call me if anything happens in the future I told him to avoid strenuously heavy lifting.    Diagnoses and all orders for this visit:    1. DDD (degenerative " disc disease), lumbar (Primary)      Return if symptoms worsen or fail to improve.

## 2021-08-19 ENCOUNTER — OFFICE VISIT (OUTPATIENT)
Dept: FAMILY MEDICINE CLINIC | Facility: CLINIC | Age: 76
End: 2021-08-19

## 2021-08-19 VITALS
SYSTOLIC BLOOD PRESSURE: 165 MMHG | TEMPERATURE: 96.9 F | WEIGHT: 228 LBS | HEIGHT: 73 IN | BODY MASS INDEX: 30.22 KG/M2 | OXYGEN SATURATION: 98 % | DIASTOLIC BLOOD PRESSURE: 102 MMHG | HEART RATE: 73 BPM

## 2021-08-19 DIAGNOSIS — I48.0 PAROXYSMAL ATRIAL FIBRILLATION (HCC): ICD-10-CM

## 2021-08-19 DIAGNOSIS — E11.69 TYPE 2 DIABETES MELLITUS WITH OTHER SPECIFIED COMPLICATION, WITHOUT LONG-TERM CURRENT USE OF INSULIN (HCC): ICD-10-CM

## 2021-08-19 DIAGNOSIS — E78.2 MIXED HYPERLIPIDEMIA: ICD-10-CM

## 2021-08-19 DIAGNOSIS — I25.10 CORONARY ARTERY DISEASE INVOLVING NATIVE HEART WITHOUT ANGINA PECTORIS, UNSPECIFIED VESSEL OR LESION TYPE: ICD-10-CM

## 2021-08-19 DIAGNOSIS — I10 ESSENTIAL HYPERTENSION: Primary | ICD-10-CM

## 2021-08-19 PROBLEM — E11.9 DIABETES MELLITUS: Status: ACTIVE | Noted: 2021-08-19

## 2021-08-19 PROCEDURE — 99214 OFFICE O/P EST MOD 30 MIN: CPT | Performed by: FAMILY MEDICINE

## 2021-08-19 NOTE — PROGRESS NOTES
Subjective   Juan James is a 75 y.o. male.     Chief Complaint   Patient presents with   • Follow-up   • Hypertension       History of Present Illness     Hypertension follow-up.  Patient has elevated blood pressure today in the office however he had a 32ounce mug of coffee this morning right before heading to the office.  he has a headache as well today.  Hyperlipidemia follow-up stable on current medications.  Coronary artery disease stable on current medication seeing cardiologist.  CATHY. fib stable with rate control.  .    The following portions of the patient's history were reviewed and updated as appropriate: allergies, current medications, past family history, past medical history, past social history, past surgical history and problem list.    Past Medical History:   Diagnosis Date   • AAA (abdominal aortic aneurysm) (CMS/[facility])    • Arthritis    • Atherosclerotic heart disease of native coronary artery without angina pectoris    • Atrial fibrillation (CMS/[facility])    • Benign essential hypertension    • BPH (benign prostatic hyperplasia)    • Cervical spondylosis 11/2012   • CHF (congestive heart failure) (CMS/[facility])    • COPD (chronic obstructive pulmonary disease) (CMS/[facility])    • Coronary artery disease     STENT X 3   • DDD (degenerative disc disease), cervical    • DDD (degenerative disc disease), lumbar    • Dry skin     LOWER LEGS BILAT   • Edema     LOWER LEGS BILAT   • Elevated cholesterol    • Emphysema/COPD (CMS/[facility])    • Essential hypertriglyceridemia    • Hearing loss    • High blood pressure    • History of blood transfusion    • History of gastrointestinal hemorrhage    • History of gout    • History of myasthenia gravis     LAST EPISODE 5-6 YEARS AGO   • Hoarseness, persistent    • Hyperlipidemia    • Hypertension    • Lower back pain    • Lumbar radiculopathy 05/2016   • OA (osteoarthritis)    • Peptic ulceration     PUD   • Skin abnormalities     FLAKEY SKIN LOWER LEGS BILAT   • Sleep apnea      DOES NOT WEAR CPAP MACHINE    • Status post angioplasty with stent     STENT X3   • Tinnitus     BILAT   • Unsteady gait when walking     USES 3 PRONG CANE       Past Surgical History:   Procedure Laterality Date   • ANTERIOR CERVICAL DISCECTOMY W/ FUSION N/A 3/25/2016    Procedure: C3- C5 CERVICAL DISCECTOMY ANTERIOR FUSION WITH INSTRUMENTATION;  Surgeon: Bill Washington MD;  Location: [facility] MAIN OR;  Service:    • ARTHRODESIS N/A 07/1983    LUMBAR   • ARTHRODESIS N/A 1987    LUMBAR   • ARTHRODESIS      Spinal Arthrodesis-lower spine-7/1983, 1987--upper spine-1988, 7/1990-Abstracted from [facility]   • BACK SURGERY      Lower Back Surgery   • CARDIAC CATHETERIZATION  07/2009   • CARDIAC CATHETERIZATION  03/18/2003    STENT TO RCA AND PCA, DR. PRIYA LUGO   • CATARACT EXTRACTION Bilateral     LENS IMPLANT   • CERVICAL ARTHRODESIS N/A 07/1990   • CERVICAL ARTHRODESIS N/A 1988   • COLONOSCOPY N/A 10/3/2019    4 MM HYPERPLASTIC POLYP IN ILEOCECAL VALVE, 4 MM SESSILE SERRATED ADENOMA POLYP IN DESCENDING, 5 TUBULOVILLOUS ADENOMA POLYPS IN RECTUM, 3 TUBULAR ADENOMA POLYPS IN DISTAL RECTUM, 26 MM TUBULAR ADENOMA POLYP IN RECTUM, PIECEMEAL POLYPECTOMY, AREA TATTOOED, RESCOPE IN 6 MONTHS, DR. TAYA CRUZ AT [facility]   • COLONOSCOPY N/A 2/17/2021    Procedure: COLONOSCOPY to cecum with cold biopsy polypectomy;  Surgeon: Yousuf Méndez MD;  Location: [facility] ENDOSCOPY;  Service: Gastroenterology;  Laterality: N/A;  pre: hx of polyps  post: polyp   • CORONARY ANGIOPLASTY WITH STENT PLACEMENT  02/2009    and 7/2009-PT STATES HAS 3 STENTS   • ENDOSCOPY N/A 06/24/2012    NON BLEEDING EROSIVE GASTROPATHY, 1 DUODENAL ULCER WITH CLEAN BASE, DR. BARNDY WREN AT [facility]   • ENDOSCOPY AND COLONOSCOPY N/A 11/20/2007    EGD WNL, 8 ADENOMATOUS POLYPS IN RECTO-SIGMOID, RESCOPE IN 3 YRS, DR. CRISTINA RODRIGUEZ AT [facility]   • FOOT SURGERY Left     LEFT BIG TOE-JOINT REPLACED   • HAND SURGERY Left     THUMB-JOINT REPLACEMENT   • HAND SURGERY Right      JOINT REPLACEMENT RIGHT INDEX FINGER   • KNEE ARTHROSCOPY Left    • LAPAROSCOPIC CHOLECYSTECTOMY     • LUMBAR DISCECTOMY FUSION INSTRUMENTATION N/A 2020    Procedure: Lumbar 3 4 and lumbar 4 5 laminectomy and fusion with instrumentation;  Surgeon: Bill Washington MD;  Location: [facility] OR;  Service: Neurosurgery;  Laterality: N/A;   • NECK SURGERY     • VASECTOMY Bilateral        Family History   Adopted: Yes   Problem Relation Age of Onset   • Heart attack Mother 66   • Alcohol abuse Mother    • Heart disease Mother 66   • No Known Problems Father    • Heart disease Other         FH in females before the age of 65   • Malig Hyperthermia Neg Hx        Social History     Socioeconomic History   • Marital status:      Spouse name: Not on file   • Number of children: Not on file   • Years of education: Not on file   • Highest education level: Not on file   Tobacco Use   • Smoking status: Former Smoker     Packs/day: 2.00     Years: 40.00     Pack years: 80.00     Types: Cigarettes     Quit date:      Years since quittin.6   • Smokeless tobacco: Former User     Types: Chew   • Tobacco comment: smoked 1 to 2 packs per day for 40 or more years   Vaping Use   • Vaping Use: Never used   Substance and Sexual Activity   • Alcohol use: Not Currently     Comment: QUIT DRINKING 2020   • Drug use: No   • Sexual activity: Defer       Current Outpatient Medications on File Prior to Visit   Medication Sig Dispense Refill   • allopurinol (ZYLOPRIM) 100 MG tablet Take 1 tablet by mouth Daily. To take along with 300 mg pill to a daily dose 400 mg (Patient taking differently: Take 100 mg by mouth Every Night.) 90 tablet 3   • allopurinol (ZYLOPRIM) 300 MG tablet Take 1 tablet by mouth Daily. (Patient taking differently: Take 300 mg by mouth Every Morning.) 30 tablet 6   • Cyanocobalamin (VITAMIN B-12) 5000 MCG tablet dispersible Place 1 tablet on the tongue Daily. (Patient taking differently: Place 1  tablet on the tongue Daily. ORAL)     • dabigatran etexilate (PRADAXA) 75 MG capsule Take 75 mg by mouth 2 (Two) Times a Day.     • folic acid (FOLVITE) 400 MCG tablet Take 1 tablet by mouth Daily. (Patient taking differently: Take 400 mcg by mouth 2 (Two) Times a Day.)     • furosemide (LASIX) 40 MG tablet Take 1 tablet by mouth twice daily 180 tablet 0   • metoprolol succinate XL (Toprol XL) 25 MG 24 hr tablet Take 1 tablet by mouth every night at bedtime. (Patient taking differently: Take 50 mg by mouth Daily.) 30 tablet 11   • multivitamin with minerals (CENTRUM MEN PO) Take 1 tablet by mouth Daily. HOLD FOR SURGERY     • rosuvastatin (Crestor) 40 MG tablet Take 1 tablet by mouth Daily. 90 tablet 3   • tamsulosin (FLOMAX) 0.4 MG capsule 24 hr capsule Take 1 capsule by mouth Every Evening. 30 capsule 11     No current facility-administered medications on file prior to visit.       Review of Systems   Constitutional: Negative.    Neurological: Positive for headache.       Recent Results (from the past 4704 hour(s))   COVID-19,BIOTAP, NP/OP SWAB IN TRANSPORT MEDIA OR SALINE 24-36 HR TAT - Swab, Nasopharynx    Collection Time: 02/15/21 11:10 AM    Specimen: Nasopharynx; Swab   Result Value Ref Range    SARS-CoV-2 PCR Not Detected Not Detected   Tissue Pathology Exam    Collection Time: 02/17/21  2:34 PM    Specimen: Large Intestine, Transverse Colon; Polyp   Result Value Ref Range    Case Report       Surgical Pathology Report                         Case: [id number]                                  Authorizing Provider:  Yousuf Méndez MD  Collected:           02/17/2021 02:34 PM          Ordering Location:     [facility]  Received:            02/17/2021 03:21 PM                                 ENDO SUITES                                                                  Pathologist:           Fifi Patiño MD                                                          Specimen:    Large  Intestine, Transverse Colon, transverse colon polyp                                  Final Diagnosis       1. Transverse Colon, Polyp, Polypectomy:   A. Fragments of hyperplastic polyp.    devin/marion       Gross Description       1.  The specimen is received in formalin labeled with the patient's name and further designated 'transverse colon polyp biopsy' are multiple small fragments of gray-tan tissue. The specimen is submitted for embedding as received.        Microscopic Description       Performed, incorporated in diagnosis.     POCT urinalysis dipstick, automated    Collection Time: 03/18/21  2:39 PM    Specimen: Urine   Result Value Ref Range    Color Yellow Yellow, Straw, Dark Yellow, Lanie    Clarity, UA Clear Clear    Specific Gravity  1.015 1.005 - 1.030    pH, Urine 6.0 5.0 - 8.0    Leukocytes Negative Negative    Nitrite, UA Negative Negative    Protein, POC 2+ (A) Negative mg/dL    Glucose, UA Negative Negative, 1000 mg/dL (3+) mg/dL    Ketones, UA Negative Negative    Urobilinogen, UA Normal Normal    Bilirubin Negative Negative    Blood, UA Trace (A) Negative   Holter Monitor - 48 Hour    Collection Time: 07/08/21  3:04 PM   Result Value Ref Range    Target HR (85%) 125 bpm    Max. Pred. HR (100%) 147 bpm   Adult Transthoracic Echo Complete W/ Cont if Necessary Per Protocol    Collection Time: 07/08/21  3:08 PM   Result Value Ref Range    BSA 2.3 m^2    IVSd 1.7 cm    LVIDd 5.5 cm    LVIDs 3.6 cm    LVPWd 1.4 cm    IVS/LVPW 1.2     FS 34.5 %    EDV(Teich) 147.4 ml    ESV(Teich) 54.4 ml    EF(Teich) 63.1 %    EF(cubed) 72.0 %    LV mass(C)d 391.4 grams    LV mass(C)dI 172.4 grams/m^2    SV(Teich) 93.0 ml    SI(Teich) 41.0 ml/m^2    SV(cubed) 119.7 ml    SI(cubed) 52.7 ml/m^2    Ao root diam 3.8 cm    Ao root area 11.3 cm^2    ACS 2.2 cm    asc Aorta Diam 4.1 cm    LVOT diam 2.3 cm    LVOT area 4.2 cm^2    LVOT area(traced) 4.2 cm^2    LVLd ap4 7.4 cm    EDV(MOD-sp4) 68.0 ml    LVLs ap4 6.5 cm     ESV(MOD-sp4) 25.0 ml    EF(MOD-sp4) 63.2 %    LVLd ap2 7.6 cm    EDV(MOD-sp2) 55.0 ml    LVLs ap2 6.4 cm    ESV(MOD-sp2) 14.0 ml    EF(MOD-sp2) 74.5 %    SV(MOD-sp4) 43.0 ml    SI(MOD-sp4) 18.9 ml/m^2    SV(MOD-sp2) 41.0 ml    SI(MOD-sp2) 18.1 ml/m^2    Ao root area (BSA corrected) 1.7     LV Mendoza Vol (BSA corrected) 29.9 ml/m^2    LV Sys Vol (BSA corrected) 11.0 ml/m^2    TAPSE (>1.6) 2.5 cm    EF(MOD-bp) 69.2 %    MV E max carmelo 106.0 cm/sec    MV V2 max 154.0 cm/sec    MV max PG 9.5 mmHg    MV V2 mean 77.9 cm/sec    MV mean PG 3.0 mmHg    MV V2 VTI 36.1 cm    MVA(VTI) 2.1 cm^2    MV P1/2t max carmelo 156.0 cm/sec    MV P1/2t 69.0 msec    MVA(P1/2t) 3.2 cm^2    MV dec slope 662.0 cm/sec^2    MV dec time 0.25 sec    Ao pk carmelo 139.0 cm/sec    Ao max PG 7.7 mmHg    Ao max PG (full) 3.1 mmHg    Ao V2 mean 107.0 cm/sec    Ao mean PG 5.0 mmHg    Ao mean PG (full) 3.0 mmHg    Ao V2 VTI 30.0 cm    BENJA(I,A) 2.5 cm^2    BENJA(I,D) 2.5 cm^2    BENJA(V,A) 3.2 cm^2    BENJA(V,D) 3.2 cm^2    LV V1 max PG 4.7 mmHg    LV V1 mean PG 2.0 mmHg    LV V1 max 108.0 cm/sec    LV V1 mean 65.4 cm/sec    LV V1 VTI 18.3 cm    MR max carmelo 543.0 cm/sec    MR max .9 mmHg    SV(Ao) 340.2 ml    SI(Ao) 149.8 ml/m^2    SV(LVOT) 76.0 ml    SI(LVOT) 33.5 ml/m^2    PA V2 max 118.0 cm/sec    PA max PG 5.6 mmHg    PA max PG (full) 1.9 mmHg    PA acc time 0.1 sec    RV V1 max PG 3.7 mmHg    RV V1 mean PG 2.0 mmHg    RV V1 max 96.0 cm/sec    RV V1 mean 56.7 cm/sec    RV V1 VTI 14.8 cm    TR max carmelo 304.0 cm/sec    RVSP(TR) 45.0 mmHg    RAP systole 8.0 mmHg    PA pr(Accel) 36.3 mmHg    MVA P1/2T LCG 1.4 cm^2    RV Base 3.7 cm    RV Length 7.8 cm    RV Mid 2.1 cm    Lat Peak E' Carmelo 11.3 cm/sec    Med Peak E' Carmelo 8.5 cm/sec    RV S' 13.8 cm/sec     CV ECHO AMADA - BZI_BMI 29.9 kilograms/m^2     CV ECHO AMADA - BSA(HAYCOCK) 2.3 m^2     CV ECHO AMADA - BZI_METRIC_WEIGHT 103.0 kg     CV ECHO AMADA - BZI_METRIC_HEIGHT 185.4 cm    Avg E/e' ratio 10.71     Target HR  "(85%) 125 bpm    Max. Pred. HR (100%) 147 bpm    Sinus 3.2 cm    STJ 2.9 cm    Ascending aorta 4.1 cm    Aortic arch 2.8 cm    LA Volume Index 25.0 mL/m2    Abdo Ao Diam 3.4 cm    Echo EF Estimated 69 %     Objective   Vitals:    08/19/21 0751 08/19/21 0753 08/19/21 0825   BP: 170/99 (!) 162/102 (!) 165/102   Pulse: 73     Temp: 96.9 °F (36.1 °C)     SpO2: 98%     Weight: 103 kg (228 lb)     Height: 185.4 cm (72.99\")       Body mass index is 30.09 kg/m².  Physical Exam  Vitals and nursing note reviewed.   Constitutional:       General: He is not in acute distress.     Appearance: He is well-developed. He is not diaphoretic.   Cardiovascular:      Rate and Rhythm: Normal rate and regular rhythm.   Pulmonary:      Effort: Pulmonary effort is normal. No respiratory distress.      Breath sounds: Normal breath sounds. No wheezing.           Diagnoses and all orders for this visit:    1. Essential hypertension (Primary)  -     Comprehensive Metabolic Panel  -     Lipid Panel    2. Coronary artery disease involving native heart without angina pectoris, unspecified vessel or lesion type  -     Comprehensive Metabolic Panel  -     Lipid Panel    3. Mixed hyperlipidemia  -     Comprehensive Metabolic Panel  -     Lipid Panel    4. Type 2 diabetes mellitus with other specified complication, without long-term current use of insulin (CMS/HCC)  -     Comprehensive Metabolic Panel  -     Lipid Panel  -     CBC & Differential  -     Urinalysis without microscopic (no culture) - Urine, Clean Catch  -     Hemoglobin A1c    5. Paroxysmal atrial fibrillation (CMS/HCC)      Continue current medications for all above chronic conditions.  Labs today    Return in about 3 months (around 11/19/2021) for HYPERTENSION.      "

## 2021-08-20 LAB
ALBUMIN SERPL-MCNC: 4.4 G/DL (ref 3.5–5.2)
ALBUMIN/GLOB SERPL: 1.4 G/DL
ALP SERPL-CCNC: 101 U/L (ref 39–117)
ALT SERPL-CCNC: 24 U/L (ref 1–41)
AST SERPL-CCNC: 29 U/L (ref 1–40)
BASOPHILS # BLD AUTO: 0.04 10*3/MM3 (ref 0–0.2)
BASOPHILS NFR BLD AUTO: 0.6 % (ref 0–1.5)
BILIRUB SERPL-MCNC: 0.5 MG/DL (ref 0–1.2)
BUN SERPL-MCNC: 23 MG/DL (ref 8–23)
BUN/CREAT SERPL: 17.2 (ref 7–25)
CALCIUM SERPL-MCNC: 9.8 MG/DL (ref 8.6–10.5)
CHLORIDE SERPL-SCNC: 103 MMOL/L (ref 98–107)
CHOLEST SERPL-MCNC: 162 MG/DL (ref 0–200)
CO2 SERPL-SCNC: 29.8 MMOL/L (ref 22–29)
CREAT SERPL-MCNC: 1.34 MG/DL (ref 0.76–1.27)
EOSINOPHIL # BLD AUTO: 0.12 10*3/MM3 (ref 0–0.4)
EOSINOPHIL NFR BLD AUTO: 1.8 % (ref 0.3–6.2)
ERYTHROCYTE [DISTWIDTH] IN BLOOD BY AUTOMATED COUNT: 13.2 % (ref 12.3–15.4)
GLOBULIN SER CALC-MCNC: 3.2 GM/DL
GLUCOSE SERPL-MCNC: 105 MG/DL (ref 65–99)
HBA1C MFR BLD: 5.8 % (ref 4.8–5.6)
HCT VFR BLD AUTO: 49.3 % (ref 37.5–51)
HDLC SERPL-MCNC: 69 MG/DL (ref 40–60)
HGB BLD-MCNC: 16.3 G/DL (ref 13–17.7)
IMM GRANULOCYTES # BLD AUTO: 0.03 10*3/MM3 (ref 0–0.05)
IMM GRANULOCYTES NFR BLD AUTO: 0.5 % (ref 0–0.5)
LDLC SERPL CALC-MCNC: 76 MG/DL (ref 0–100)
LYMPHOCYTES # BLD AUTO: 1.73 10*3/MM3 (ref 0.7–3.1)
LYMPHOCYTES NFR BLD AUTO: 26.5 % (ref 19.6–45.3)
MCH RBC QN AUTO: 33.7 PG (ref 26.6–33)
MCHC RBC AUTO-ENTMCNC: 33.1 G/DL (ref 31.5–35.7)
MCV RBC AUTO: 101.9 FL (ref 79–97)
MONOCYTES # BLD AUTO: 0.47 10*3/MM3 (ref 0.1–0.9)
MONOCYTES NFR BLD AUTO: 7.2 % (ref 5–12)
NEUTROPHILS # BLD AUTO: 4.13 10*3/MM3 (ref 1.7–7)
NEUTROPHILS NFR BLD AUTO: 63.4 % (ref 42.7–76)
NRBC BLD AUTO-RTO: 0 /100 WBC (ref 0–0.2)
PLATELET # BLD AUTO: 137 10*3/MM3 (ref 140–450)
POTASSIUM SERPL-SCNC: 4 MMOL/L (ref 3.5–5.2)
PROT SERPL-MCNC: 7.6 G/DL (ref 6–8.5)
RBC # BLD AUTO: 4.84 10*6/MM3 (ref 4.14–5.8)
SODIUM SERPL-SCNC: 143 MMOL/L (ref 136–145)
TRIGL SERPL-MCNC: 92 MG/DL (ref 0–150)
VLDLC SERPL CALC-MCNC: 17 MG/DL (ref 5–40)
WBC # BLD AUTO: 6.52 10*3/MM3 (ref 3.4–10.8)

## 2021-09-03 DIAGNOSIS — M79.641 PAIN OF RIGHT HAND: ICD-10-CM

## 2021-09-04 RX ORDER — ALLOPURINOL 300 MG/1
TABLET ORAL
Qty: 30 TABLET | Refills: 5 | Status: SHIPPED | OUTPATIENT
Start: 2021-09-04 | End: 2021-11-15 | Stop reason: SDUPTHER

## 2021-09-20 ENCOUNTER — PROCEDURE VISIT (OUTPATIENT)
Dept: SPORTS MEDICINE | Facility: CLINIC | Age: 76
End: 2021-09-20

## 2021-09-20 VITALS
TEMPERATURE: 97.3 F | HEIGHT: 72 IN | OXYGEN SATURATION: 98 % | HEART RATE: 57 BPM | SYSTOLIC BLOOD PRESSURE: 158 MMHG | WEIGHT: 228 LBS | DIASTOLIC BLOOD PRESSURE: 86 MMHG | BODY MASS INDEX: 30.88 KG/M2

## 2021-09-20 DIAGNOSIS — M53.3 CHRONIC LEFT SACROILIAC PAIN: Primary | ICD-10-CM

## 2021-09-20 DIAGNOSIS — G89.29 CHRONIC LEFT SACROILIAC PAIN: Primary | ICD-10-CM

## 2021-09-20 PROCEDURE — 20611 DRAIN/INJ JOINT/BURSA W/US: CPT | Performed by: FAMILY MEDICINE

## 2021-09-20 RX ORDER — METHYLPREDNISOLONE ACETATE 80 MG/ML
80 INJECTION, SUSPENSION INTRA-ARTICULAR; INTRALESIONAL; INTRAMUSCULAR; SOFT TISSUE ONCE
Status: COMPLETED | OUTPATIENT
Start: 2021-09-20 | End: 2021-09-20

## 2021-09-20 RX ADMIN — METHYLPREDNISOLONE ACETATE 80 MG: 80 INJECTION, SUSPENSION INTRA-ARTICULAR; INTRALESIONAL; INTRAMUSCULAR; SOFT TISSUE at 09:52

## 2021-09-20 NOTE — PROGRESS NOTES
"Requesting repeat SI injection    Ultrasound-Guided Sacroiliac Joint Injection Procedure Note    Left sacroiliac joint injection was discussed with the patient in detail, including indication, risks, benefits, and alternatives. Verbal consent was given for the procedure. Injection was performed by physician.  Injection site was identified by ultrasound examination, then cleaned with Betadine and alcohol swabs. Prior to needle insertion, ethyl chloride spray was used for surface anesthesia. Sterile technique was used. Ultrasound guidance was indicated for injection accuracy.  A 22-gauge, 3.5\" spinal needle was guided to the joint under continuous direct ultrasound visualization. Injectate was seen flowing underneath the superficial sacroiliac ligaments and passed without difficulty. The needle was removed and a simple bandage was applied. The procedure was tolerated well without difficulty.    Injection mixture:  1% lidocaine without epinephrine: 2 mL  Methylprednisolone 80 mg/mL: 1 mL     Diagnoses and all orders for this visit:    Chronic left sacroiliac pain  -     methylPREDNISolone acetate (DEPO-medrol) injection 80 mg     Try depo medrol today for longer duration. If still only 3 months benefit consider pain management consult for possible ablations.   "

## 2021-10-04 RX ORDER — FUROSEMIDE 40 MG/1
TABLET ORAL
Qty: 180 TABLET | Refills: 0 | Status: SHIPPED | OUTPATIENT
Start: 2021-10-04 | End: 2022-01-14

## 2021-10-18 ENCOUNTER — TRANSCRIBE ORDERS (OUTPATIENT)
Dept: ADMINISTRATIVE | Facility: HOSPITAL | Age: 76
End: 2021-10-18

## 2021-10-18 DIAGNOSIS — I71.40 ABDOMINAL AORTIC ANEURYSM (AAA) WITHOUT RUPTURE (HCC): Primary | ICD-10-CM

## 2021-11-13 DIAGNOSIS — M1A.09X0 IDIOPATHIC CHRONIC GOUT OF MULTIPLE SITES WITHOUT TOPHUS: ICD-10-CM

## 2021-11-15 ENCOUNTER — OFFICE VISIT (OUTPATIENT)
Dept: FAMILY MEDICINE CLINIC | Facility: CLINIC | Age: 76
End: 2021-11-15

## 2021-11-15 VITALS
HEIGHT: 72 IN | SYSTOLIC BLOOD PRESSURE: 140 MMHG | WEIGHT: 235.25 LBS | DIASTOLIC BLOOD PRESSURE: 82 MMHG | BODY MASS INDEX: 31.86 KG/M2 | TEMPERATURE: 98 F | OXYGEN SATURATION: 96 % | HEART RATE: 76 BPM

## 2021-11-15 DIAGNOSIS — E11.69 TYPE 2 DIABETES MELLITUS WITH OTHER SPECIFIED COMPLICATION, WITHOUT LONG-TERM CURRENT USE OF INSULIN (HCC): ICD-10-CM

## 2021-11-15 DIAGNOSIS — M1A.09X0 IDIOPATHIC CHRONIC GOUT OF MULTIPLE SITES WITHOUT TOPHUS: ICD-10-CM

## 2021-11-15 DIAGNOSIS — M79.641 PAIN OF RIGHT HAND: ICD-10-CM

## 2021-11-15 DIAGNOSIS — E78.2 MIXED HYPERLIPIDEMIA: Primary | ICD-10-CM

## 2021-11-15 DIAGNOSIS — I10 ESSENTIAL HYPERTENSION: ICD-10-CM

## 2021-11-15 PROCEDURE — 99214 OFFICE O/P EST MOD 30 MIN: CPT | Performed by: FAMILY MEDICINE

## 2021-11-15 RX ORDER — ALLOPURINOL 300 MG/1
300 TABLET ORAL DAILY
Qty: 90 TABLET | Refills: 3 | Status: SHIPPED | OUTPATIENT
Start: 2021-11-15 | End: 2022-12-12

## 2021-11-15 RX ORDER — ALLOPURINOL 100 MG/1
100 TABLET ORAL DAILY
Qty: 90 TABLET | Refills: 3 | Status: SHIPPED | OUTPATIENT
Start: 2021-11-15 | End: 2023-02-21 | Stop reason: SDUPTHER

## 2021-11-15 RX ORDER — METOPROLOL SUCCINATE 50 MG/1
50 TABLET, EXTENDED RELEASE ORAL EVERY EVENING
COMMUNITY
Start: 2021-09-24 | End: 2022-01-27 | Stop reason: SDUPTHER

## 2021-11-15 RX ORDER — ALLOPURINOL 100 MG/1
TABLET ORAL
Qty: 90 TABLET | Refills: 0 | Status: SHIPPED | OUTPATIENT
Start: 2021-11-15 | End: 2021-11-15 | Stop reason: SDUPTHER

## 2021-11-15 NOTE — PROGRESS NOTES
Subjective   Juan James is a 75 y.o. male.     Chief Complaint   Patient presents with   • Follow-up   • Hypertension       History of Present Illness   Hypertension follow-up.  In the beginning of the visit.  Patient had elevated blood pressure he was observed and we gave him some time to rest and the blood pressure came back much better at borderline control.    Paroxysmal A. fib follow-up stable.  Hyperlipidemia follow-up stable.  Diabetes.  Is in remission at this time no medications.      The following portions of the patient's history were reviewed and updated as appropriate: allergies, current medications, past family history, past medical history, past social history, past surgical history and problem list.    Past Medical History:   Diagnosis Date   • AAA (abdominal aortic aneurysm) ([facility])    • Arthritis    • Atherosclerotic heart disease of native coronary artery without angina pectoris    • Atrial fibrillation ([facility])    • Benign essential hypertension    • BPH (benign prostatic hyperplasia)    • Cervical spondylosis 11/2012   • CHF (congestive heart failure) ([facility])    • COPD (chronic obstructive pulmonary disease) ([facility])    • Coronary artery disease     STENT X 3   • DDD (degenerative disc disease), cervical    • DDD (degenerative disc disease), lumbar    • Dry skin     LOWER LEGS BILAT   • Edema     LOWER LEGS BILAT   • Elevated cholesterol    • Emphysema/COPD ([facility])    • Essential hypertriglyceridemia    • Hearing loss    • High blood pressure    • History of blood transfusion    • History of gastrointestinal hemorrhage    • History of gout    • History of myasthenia gravis     LAST EPISODE 5-6 YEARS AGO   • Hoarseness, persistent    • Hyperlipidemia    • Hypertension    • Lower back pain    • Lumbar radiculopathy 05/2016   • OA (osteoarthritis)    • Peptic ulceration     PUD   • Skin abnormalities     FLAKEY SKIN LOWER LEGS BILAT   • Sleep apnea     DOES NOT WEAR CPAP MACHINE    • Status post  angioplasty with stent     STENT X3   • Tinnitus     BILAT   • Unsteady gait when walking     USES 3 PRONG CANE       Past Surgical History:   Procedure Laterality Date   • ANTERIOR CERVICAL DISCECTOMY W/ FUSION N/A 3/25/2016    Procedure: C3- C5 CERVICAL DISCECTOMY ANTERIOR FUSION WITH INSTRUMENTATION;  Surgeon: Bill Washington MD;  Location: [facility] MAIN OR;  Service:    • ARTHRODESIS N/A 07/1983    LUMBAR   • ARTHRODESIS N/A 1987    LUMBAR   • ARTHRODESIS      Spinal Arthrodesis-lower spine-7/1983, 1987--upper spine-1988, 7/1990-Abstracted from [facility]   • BACK SURGERY      Lower Back Surgery   • CARDIAC CATHETERIZATION  07/2009   • CARDIAC CATHETERIZATION  03/18/2003    STENT TO RCA AND PCA, DR. PRIYA LUGO   • CATARACT EXTRACTION Bilateral     LENS IMPLANT   • CERVICAL ARTHRODESIS N/A 07/1990   • CERVICAL ARTHRODESIS N/A 1988   • COLONOSCOPY N/A 10/3/2019    4 MM HYPERPLASTIC POLYP IN ILEOCECAL VALVE, 4 MM SESSILE SERRATED ADENOMA POLYP IN DESCENDING, 5 TUBULOVILLOUS ADENOMA POLYPS IN RECTUM, 3 TUBULAR ADENOMA POLYPS IN DISTAL RECTUM, 26 MM TUBULAR ADENOMA POLYP IN RECTUM, PIECEMEAL POLYPECTOMY, AREA TATTOOED, RESCOPE IN 6 MONTHS, DR. TAYA CRUZ AT [facility]   • COLONOSCOPY N/A 2/17/2021    Procedure: COLONOSCOPY to cecum with cold biopsy polypectomy;  Surgeon: Yousuf Méndez MD;  Location: [facility] ENDOSCOPY;  Service: Gastroenterology;  Laterality: N/A;  pre: hx of polyps  post: polyp   • CORONARY ANGIOPLASTY WITH STENT PLACEMENT  02/2009    and 7/2009-PT STATES HAS 3 STENTS   • ENDOSCOPY N/A 06/24/2012    NON BLEEDING EROSIVE GASTROPATHY, 1 DUODENAL ULCER WITH CLEAN BASE, DR. BRANDY WREN AT [facility]   • ENDOSCOPY AND COLONOSCOPY N/A 11/20/2007    EGD WNL, 8 ADENOMATOUS POLYPS IN RECTO-SIGMOID, RESCOPE IN 3 YRS, DR. CRISTINA RODRIGUEZ AT [facility]   • FOOT SURGERY Left     LEFT BIG TOE-JOINT REPLACED   • HAND SURGERY Left     THUMB-JOINT REPLACEMENT   • HAND SURGERY Right     JOINT REPLACEMENT RIGHT INDEX FINGER   •  KNEE ARTHROSCOPY Left    • LAPAROSCOPIC CHOLECYSTECTOMY     • LUMBAR DISCECTOMY FUSION INSTRUMENTATION N/A 2020    Procedure: Lumbar 3 4 and lumbar 4 5 laminectomy and fusion with instrumentation;  Surgeon: Bill Washington MD;  Location: [facility] OR;  Service: Neurosurgery;  Laterality: N/A;   • NECK SURGERY     • VASECTOMY Bilateral        Family History   Adopted: Yes   Problem Relation Age of Onset   • Heart attack Mother 66   • Alcohol abuse Mother    • Heart disease Mother 66   • No Known Problems Father    • Heart disease Other         FH in females before the age of 65   • Malig Hyperthermia Neg Hx        Social History     Socioeconomic History   • Marital status:    Tobacco Use   • Smoking status: Former Smoker     Packs/day: 2.00     Years: 40.00     Pack years: 80.00     Types: Cigarettes     Quit date:      Years since quittin.8   • Smokeless tobacco: Former User     Types: Chew   • Tobacco comment: smoked 1 to 2 packs per day for 40 or more years   Vaping Use   • Vaping Use: Never used   Substance and Sexual Activity   • Alcohol use: Not Currently     Comment: QUIT DRINKING 2020   • Drug use: No   • Sexual activity: Defer       Current Outpatient Medications on File Prior to Visit   Medication Sig Dispense Refill   • Cyanocobalamin (VITAMIN B-12) 5000 MCG tablet dispersible Place 1 tablet on the tongue Daily. (Patient taking differently: Place 1 tablet on the tongue Daily. ORAL)     • dabigatran etexilate (PRADAXA) 75 MG capsule Take 75 mg by mouth 2 (Two) Times a Day.     • folic acid (FOLVITE) 400 MCG tablet Take 1 tablet by mouth Daily. (Patient taking differently: Take 400 mcg by mouth 2 (Two) Times a Day.)     • furosemide (LASIX) 40 MG tablet Take 1 tablet by mouth twice daily 180 tablet 0   • metoprolol succinate XL (TOPROL-XL) 50 MG 24 hr tablet      • multivitamin with minerals (CENTRUM MEN PO) Take 1 tablet by mouth Daily. HOLD FOR SURGERY     • rosuvastatin  (Crestor) 40 MG tablet Take 1 tablet by mouth Daily. 90 tablet 3   • tamsulosin (FLOMAX) 0.4 MG capsule 24 hr capsule Take 1 capsule by mouth Every Evening. 30 capsule 11   • [DISCONTINUED] allopurinol (ZYLOPRIM) 100 MG tablet TAKE 1 TABLET BY MOUTH ONCE DAILY -  TO  TAKE  ALONG  WITH  300  MG  PILL  TO  A  DAILY  DOSE  OF  400  MG 90 tablet 0   • [DISCONTINUED] allopurinol (ZYLOPRIM) 300 MG tablet Take 1 tablet by mouth once daily 30 tablet 5   • [DISCONTINUED] allopurinol (ZYLOPRIM) 100 MG tablet Take 1 tablet by mouth Daily. To take along with 300 mg pill to a daily dose 400 mg (Patient taking differently: Take 100 mg by mouth Every Night.) 90 tablet 3   • [DISCONTINUED] metoprolol succinate XL (Toprol XL) 25 MG 24 hr tablet Take 1 tablet by mouth every night at bedtime. (Patient taking differently: Take 50 mg by mouth Daily.) 30 tablet 11     No current facility-administered medications on file prior to visit.       Review of Systems   Constitutional: Negative.        Recent Results (from the past 4704 hour(s))   Holter Monitor - 48 Hour    Collection Time: 07/08/21  3:04 PM   Result Value Ref Range    Target HR (85%) 125 bpm    Max. Pred. HR (100%) 147 bpm   Adult Transthoracic Echo Complete W/ Cont if Necessary Per Protocol    Collection Time: 07/08/21  3:08 PM   Result Value Ref Range    BSA 2.3 m^2    IVSd 1.7 cm    LVIDd 5.5 cm    LVIDs 3.6 cm    LVPWd 1.4 cm    IVS/LVPW 1.2     FS 34.5 %    EDV(Teich) 147.4 ml    ESV(Teich) 54.4 ml    EF(Teich) 63.1 %    EF(cubed) 72.0 %    LV mass(C)d 391.4 grams    LV mass(C)dI 172.4 grams/m^2    SV(Teich) 93.0 ml    SI(Teich) 41.0 ml/m^2    SV(cubed) 119.7 ml    SI(cubed) 52.7 ml/m^2    Ao root diam 3.8 cm    Ao root area 11.3 cm^2    ACS 2.2 cm    asc Aorta Diam 4.1 cm    LVOT diam 2.3 cm    LVOT area 4.2 cm^2    LVOT area(traced) 4.2 cm^2    LVLd ap4 7.4 cm    EDV(MOD-sp4) 68.0 ml    LVLs ap4 6.5 cm    ESV(MOD-sp4) 25.0 ml    EF(MOD-sp4) 63.2 %    LVLd ap2 7.6 cm     EDV(MOD-sp2) 55.0 ml    LVLs ap2 6.4 cm    ESV(MOD-sp2) 14.0 ml    EF(MOD-sp2) 74.5 %    SV(MOD-sp4) 43.0 ml    SI(MOD-sp4) 18.9 ml/m^2    SV(MOD-sp2) 41.0 ml    SI(MOD-sp2) 18.1 ml/m^2    Ao root area (BSA corrected) 1.7     LV Mendoza Vol (BSA corrected) 29.9 ml/m^2    LV Sys Vol (BSA corrected) 11.0 ml/m^2    TAPSE (>1.6) 2.5 cm    EF(MOD-bp) 69.2 %    MV E max carmelo 106.0 cm/sec    MV V2 max 154.0 cm/sec    MV max PG 9.5 mmHg    MV V2 mean 77.9 cm/sec    MV mean PG 3.0 mmHg    MV V2 VTI 36.1 cm    MVA(VTI) 2.1 cm^2    MV P1/2t max carmelo 156.0 cm/sec    MV P1/2t 69.0 msec    MVA(P1/2t) 3.2 cm^2    MV dec slope 662.0 cm/sec^2    MV dec time 0.25 sec    Ao pk carmelo 139.0 cm/sec    Ao max PG 7.7 mmHg    Ao max PG (full) 3.1 mmHg    Ao V2 mean 107.0 cm/sec    Ao mean PG 5.0 mmHg    Ao mean PG (full) 3.0 mmHg    Ao V2 VTI 30.0 cm    BENJA(I,A) 2.5 cm^2    BENJA(I,D) 2.5 cm^2    BENJA(V,A) 3.2 cm^2    BENJA(V,D) 3.2 cm^2    LV V1 max PG 4.7 mmHg    LV V1 mean PG 2.0 mmHg    LV V1 max 108.0 cm/sec    LV V1 mean 65.4 cm/sec    LV V1 VTI 18.3 cm    MR max carmelo 543.0 cm/sec    MR max .9 mmHg    SV(Ao) 340.2 ml    SI(Ao) 149.8 ml/m^2    SV(LVOT) 76.0 ml    SI(LVOT) 33.5 ml/m^2    PA V2 max 118.0 cm/sec    PA max PG 5.6 mmHg    PA max PG (full) 1.9 mmHg    PA acc time 0.1 sec    RV V1 max PG 3.7 mmHg    RV V1 mean PG 2.0 mmHg    RV V1 max 96.0 cm/sec    RV V1 mean 56.7 cm/sec    RV V1 VTI 14.8 cm    TR max carmelo 304.0 cm/sec    RVSP(TR) 45.0 mmHg    RAP systole 8.0 mmHg    PA pr(Accel) 36.3 mmHg    MVA P1/2T LCG 1.4 cm^2    RV Base 3.7 cm    RV Length 7.8 cm    RV Mid 2.1 cm    Lat Peak E' Carmelo 11.3 cm/sec    Med Peak E' Carmelo 8.5 cm/sec    RV S' 13.8 cm/sec     CV ECHO AMADA - BZI_BMI 29.9 kilograms/m^2     CV ECHO AMADA - BSA(HAYCOCK) 2.3 m^2     CV ECHO AMADA - BZI_METRIC_WEIGHT 103.0 kg     CV ECHO AMADA - BZI_METRIC_HEIGHT 185.4 cm    Avg E/e' ratio 10.71     Target HR (85%) 125 bpm    Max. Pred. HR (100%) 147 bpm    Sinus 3.2 cm     STJ 2.9 cm    Ascending aorta 4.1 cm    Aortic arch 2.8 cm    LA Volume Index 25.0 mL/m2    Abdo Ao Diam 3.4 cm    Echo EF Estimated 69 %   Comprehensive Metabolic Panel    Collection Time: 08/19/21  8:35 AM    Specimen: Blood   Result Value Ref Range    Glucose 105 (H) 65 - 99 mg/dL    BUN 23 8 - 23 mg/dL    Creatinine 1.34 (H) 0.76 - 1.27 mg/dL    eGFR Non African Am 52 (L) >60 mL/min/1.73    eGFR African Am 63 >60 mL/min/1.73    BUN/Creatinine Ratio 17.2 7.0 - 25.0    Sodium 143 136 - 145 mmol/L    Potassium 4.0 3.5 - 5.2 mmol/L    Chloride 103 98 - 107 mmol/L    Total CO2 29.8 (H) 22.0 - 29.0 mmol/L    Calcium 9.8 8.6 - 10.5 mg/dL    Total Protein 7.6 6.0 - 8.5 g/dL    Albumin 4.40 3.50 - 5.20 g/dL    Globulin 3.2 gm/dL    A/G Ratio 1.4 g/dL    Total Bilirubin 0.5 0.0 - 1.2 mg/dL    Alkaline Phosphatase 101 39 - 117 U/L    AST (SGOT) 29 1 - 40 U/L    ALT (SGPT) 24 1 - 41 U/L   Lipid Panel    Collection Time: 08/19/21  8:35 AM    Specimen: Blood   Result Value Ref Range    Total Cholesterol 162 0 - 200 mg/dL    Triglycerides 92 0 - 150 mg/dL    HDL Cholesterol 69 (H) 40 - 60 mg/dL    VLDL Cholesterol Nathaniel 17 5 - 40 mg/dL    LDL Chol Calc (NIH) 76 0 - 100 mg/dL   CBC & Differential    Collection Time: 08/19/21  8:35 AM    Specimen: Blood   Result Value Ref Range    WBC 6.52 3.40 - 10.80 10*3/mm3    RBC 4.84 4.14 - 5.80 10*6/mm3    Hemoglobin 16.3 13.0 - 17.7 g/dL    Hematocrit 49.3 37.5 - 51.0 %    .9 (H) 79.0 - 97.0 fL    MCH 33.7 (H) 26.6 - 33.0 pg    MCHC 33.1 31.5 - 35.7 g/dL    RDW 13.2 12.3 - 15.4 %    Platelets 137 (L) 140 - 450 10*3/mm3    Neutrophil Rel % 63.4 42.7 - 76.0 %    Lymphocyte Rel % 26.5 19.6 - 45.3 %    Monocyte Rel % 7.2 5.0 - 12.0 %    Eosinophil Rel % 1.8 0.3 - 6.2 %    Basophil Rel % 0.6 0.0 - 1.5 %    Neutrophils Absolute 4.13 1.70 - 7.00 10*3/mm3    Lymphocytes Absolute 1.73 0.70 - 3.10 10*3/mm3    Monocytes Absolute 0.47 0.10 - 0.90 10*3/mm3    Eosinophils Absolute 0.12 0.00 -  "0.40 10*3/mm3    Basophils Absolute 0.04 0.00 - 0.20 10*3/mm3    Immature Granulocyte Rel % 0.5 0.0 - 0.5 %    Immature Grans Absolute 0.03 0.00 - 0.05 10*3/mm3    nRBC 0.0 0.0 - 0.2 /100 WBC   Hemoglobin A1c    Collection Time: 08/19/21  8:35 AM    Specimen: Blood   Result Value Ref Range    Hemoglobin A1C 5.80 (H) 4.80 - 5.60 %     Objective   Vitals:    11/15/21 0810 11/15/21 0901   BP: 169/94 140/82   BP Location:  Left arm   Pulse: 76    Temp: 98 °F (36.7 °C)    SpO2: 96%    Weight: 107 kg (235 lb 4 oz)    Height: 182.9 cm (72.01\")      Body mass index is 31.9 kg/m².  Physical Exam  Vitals and nursing note reviewed.   Constitutional:       General: He is not in acute distress.     Appearance: He is well-developed. He is not diaphoretic.   Cardiovascular:      Rate and Rhythm: Normal rate and regular rhythm.   Pulmonary:      Effort: Pulmonary effort is normal. No respiratory distress.      Breath sounds: Normal breath sounds. No wheezing.           Diagnoses and all orders for this visit:    1. Mixed hyperlipidemia (Primary)  -     Comprehensive Metabolic Panel  -     Lipid Panel With LDL / HDL Ratio    2. Essential hypertension  -     Comprehensive Metabolic Panel  -     Lipid Panel With LDL / HDL Ratio    3. Type 2 diabetes mellitus with other specified complication, without long-term current use of insulin (HCC)  -     Hemoglobin A1c  -     Comprehensive Metabolic Panel  -     Lipid Panel With LDL / HDL Ratio    4. Idiopathic chronic gout of multiple sites without tophus  -     allopurinol (ZYLOPRIM) 100 MG tablet; Take 1 tablet by mouth Daily.  Dispense: 90 tablet; Refill: 3    5. Pain of right hand  -     allopurinol (ZYLOPRIM) 300 MG tablet; Take 1 tablet by mouth Daily.  Dispense: 90 tablet; Refill: 3    Return in about 4 months (around 3/15/2022) for HYPERTENSION.            "

## 2021-11-16 ENCOUNTER — HOSPITAL ENCOUNTER (OUTPATIENT)
Dept: CT IMAGING | Facility: HOSPITAL | Age: 76
Discharge: HOME | End: 2021-11-16
Admitting: SURGERY

## 2021-11-16 DIAGNOSIS — I71.40 ABDOMINAL AORTIC ANEURYSM (AAA) WITHOUT RUPTURE (HCC): ICD-10-CM

## 2021-11-16 LAB
ALBUMIN SERPL-MCNC: 4.2 G/DL (ref 3.7–4.7)
ALBUMIN/GLOB SERPL: 1.3 {RATIO} (ref 1.2–2.2)
ALP SERPL-CCNC: 97 IU/L (ref 44–121)
ALT SERPL-CCNC: 26 IU/L (ref 0–44)
AST SERPL-CCNC: 33 IU/L (ref 0–40)
BILIRUB SERPL-MCNC: 0.5 MG/DL (ref 0–1.2)
BUN SERPL-MCNC: 18 MG/DL (ref 8–27)
BUN/CREAT SERPL: 15 (ref 10–24)
CALCIUM SERPL-MCNC: 9.2 MG/DL (ref 8.6–10.2)
CHLORIDE SERPL-SCNC: 100 MMOL/L (ref 96–106)
CHOLEST SERPL-MCNC: 174 MG/DL (ref 100–199)
CO2 SERPL-SCNC: 24 MMOL/L (ref 20–29)
CREAT BLDA-MCNC: 1.3 MG/DL (ref 0.6–1.3)
CREAT SERPL-MCNC: 1.22 MG/DL (ref 0.76–1.27)
GLOBULIN SER CALC-MCNC: 3.3 G/DL (ref 1.5–4.5)
GLUCOSE SERPL-MCNC: ABNORMAL MG/DL
HBA1C MFR BLD: 6.1 % (ref 4.8–5.6)
HDLC SERPL-MCNC: 60 MG/DL
LDLC SERPL CALC-MCNC: 79 MG/DL (ref 0–99)
LDLC/HDLC SERPL: 1.3 RATIO (ref 0–3.6)
POTASSIUM SERPL-SCNC: ABNORMAL MMOL/L
PROT SERPL-MCNC: 7.5 G/DL (ref 6–8.5)
SODIUM SERPL-SCNC: 145 MMOL/L (ref 134–144)
TRIGL SERPL-MCNC: 213 MG/DL (ref 0–149)
VLDLC SERPL CALC-MCNC: 35 MG/DL (ref 5–40)

## 2021-11-16 PROCEDURE — 74174 CTA ABD&PLVS W/CONTRAST: CPT

## 2021-11-16 PROCEDURE — 82565 ASSAY OF CREATININE: CPT

## 2021-11-16 PROCEDURE — 0 IOPAMIDOL PER 1 ML: Performed by: SURGERY

## 2021-11-16 RX ADMIN — IOPAMIDOL 95 ML: 755 INJECTION, SOLUTION INTRAVENOUS at 09:46

## 2021-11-22 DIAGNOSIS — E78.2 MIXED HYPERLIPIDEMIA: ICD-10-CM

## 2021-11-22 RX ORDER — ROSUVASTATIN CALCIUM 40 MG/1
40 TABLET, COATED ORAL DAILY
Qty: 90 TABLET | Refills: 3 | Status: SHIPPED | OUTPATIENT
Start: 2021-11-22 | End: 2023-01-24 | Stop reason: SDUPTHER

## 2021-12-15 ENCOUNTER — PRE-ADMISSION TESTING (OUTPATIENT)
Dept: PREADMISSION TESTING | Facility: HOSPITAL | Age: 76
End: 2021-12-15

## 2021-12-15 VITALS
TEMPERATURE: 97.7 F | OXYGEN SATURATION: 98 % | WEIGHT: 236 LBS | HEIGHT: 73 IN | DIASTOLIC BLOOD PRESSURE: 77 MMHG | RESPIRATION RATE: 18 BRPM | BODY MASS INDEX: 31.28 KG/M2 | HEART RATE: 63 BPM | SYSTOLIC BLOOD PRESSURE: 172 MMHG

## 2021-12-15 LAB
ANION GAP SERPL CALCULATED.3IONS-SCNC: 10.8 MMOL/L (ref 5–15)
BUN SERPL-MCNC: 26 MG/DL (ref 8–23)
BUN/CREAT SERPL: 24.3 (ref 7–25)
CALCIUM SPEC-SCNC: 9.2 MG/DL (ref 8.6–10.5)
CHLORIDE SERPL-SCNC: 100 MMOL/L (ref 98–107)
CO2 SERPL-SCNC: 26.2 MMOL/L (ref 22–29)
CREAT SERPL-MCNC: 1.07 MG/DL (ref 0.76–1.27)
DEPRECATED RDW RBC AUTO: 48.8 FL (ref 37–54)
ERYTHROCYTE [DISTWIDTH] IN BLOOD BY AUTOMATED COUNT: 13.1 % (ref 12.3–15.4)
GFR SERPL CREATININE-BSD FRML MDRD: 68 ML/MIN/1.73
GLUCOSE SERPL-MCNC: 104 MG/DL (ref 65–99)
HCT VFR BLD AUTO: 43.2 % (ref 37.5–51)
HGB BLD-MCNC: 14.6 G/DL (ref 13–17.7)
MCH RBC QN AUTO: 34.4 PG (ref 26.6–33)
MCHC RBC AUTO-ENTMCNC: 33.8 G/DL (ref 31.5–35.7)
MCV RBC AUTO: 101.9 FL (ref 79–97)
PLATELET # BLD AUTO: 131 10*3/MM3 (ref 140–450)
PMV BLD AUTO: 9.5 FL (ref 6–12)
POTASSIUM SERPL-SCNC: 3.7 MMOL/L (ref 3.5–5.2)
RBC # BLD AUTO: 4.24 10*6/MM3 (ref 4.14–5.8)
SARS-COV-2 ORF1AB RESP QL NAA+PROBE: NOT DETECTED
SODIUM SERPL-SCNC: 137 MMOL/L (ref 136–145)
WBC NRBC COR # BLD: 5.44 10*3/MM3 (ref 3.4–10.8)

## 2021-12-15 PROCEDURE — 36415 COLL VENOUS BLD VENIPUNCTURE: CPT

## 2021-12-15 PROCEDURE — C9803 HOPD COVID-19 SPEC COLLECT: HCPCS

## 2021-12-15 PROCEDURE — 85027 COMPLETE CBC AUTOMATED: CPT

## 2021-12-15 PROCEDURE — 80048 BASIC METABOLIC PNL TOTAL CA: CPT

## 2021-12-15 PROCEDURE — U0004 COV-19 TEST NON-CDC HGH THRU: HCPCS

## 2021-12-15 PROCEDURE — U0005 INFEC AGEN DETEC AMPLI PROBE: HCPCS

## 2021-12-15 RX ORDER — CHLORHEXIDINE GLUCONATE 500 MG/1
CLOTH TOPICAL TAKE AS DIRECTED
Status: ON HOLD | COMMUNITY
End: 2021-12-17

## 2021-12-15 NOTE — DISCHARGE INSTRUCTIONS
Take the following medications the morning of surgery: NONE    ARRIVE  AM ON 12/17/21    If you are on prescription narcotic pain medication to control your pain you may also take that medication the morning of surgery.    General Instructions:  • Do not eat solid food after midnight the night before surgery.  • You may drink clear liquids day of surgery but must stop at least one hour before your hospital arrival time.  • It is beneficial for you to have a clear drink that contains carbohydrates the day of surgery.  We suggest a 12 to 20 ounce bottle of Gatorade or Powerade for non-diabetic patients or a 12 to 20 ounce bottle of G2 or Powerade Zero for diabetic patients. (Pediatric patients, are not advised to drink a 12 to 20 ounce carbohydrate drink)    Clear liquids are liquids you can see through.  Nothing red in color.     Plain water                               Sports drinks  Sodas                                   Gelatin (Jell-O)  Fruit juices without pulp such as white grape juice and apple juice  Popsicles that contain no fruit or yogurt  Tea or coffee (no cream or milk added)  Gatorade / Powerade  G2 / Powerade Zero    • Infants may have breast milk up to four hours before surgery.  • Infants drinking formula may drink formula up to six hours before surgery.   • Patients who avoid smoking, chewing tobacco and alcohol for 4 weeks prior to surgery have a reduced risk of post-operative complications.  Quit smoking as many days before surgery as you can.  • Do not smoke, use chewing tobacco or drink alcohol the day of surgery.   • If applicable bring your C-PAP/ BI-PAP machine.  • Bring any papers given to you in the doctor’s office.  • Wear clean comfortable clothes.  • Do not wear contact lenses, false eyelashes or make-up.  Bring a case for your glasses.   • Bring crutches or walker if applicable.  • Remove all piercings.  Leave jewelry and any other valuables at home.  • Hair extensions with  metal clips must be removed prior to surgery.  • The Pre-Admission Testing nurse will instruct you to bring medications if unable to obtain an accurate list in Pre-Admission Testing.        If you were given a blood bank ID arm band remember to bring it with you the day of surgery.    Preventing a Surgical Site Infection:  • For 2 to 3 days before surgery, avoid shaving with a razor because the razor can irritate skin and make it easier to develop an infection.    • Any areas of open skin can increase the risk of a post-operative wound infection by allowing bacteria to enter and travel throughout the body.  Notify your surgeon if you have any skin wounds / rashes even if it is not near the expected surgical site.  The area will need assessed to determine if surgery should be delayed until it is healed.  • The night prior to surgery shower using a fresh bar of anti-bacterial soap (such as Dial) and clean washcloth.  Sleep in a clean bed with clean clothing.  Do not allow pets to sleep with you.  • Shower on the morning of surgery using a fresh bar of anti-bacterial soap (such as Dial) and clean washcloth.  Dry with a clean towel and dress in clean clothing.  • Ask your surgeon if you will be receiving antibiotics prior to surgery.  • Make sure you, your family, and all healthcare providers clean their hands with soap and water or an alcohol based hand  before caring for you or your wound.    Day of surgery:  Your arrival time is approximately two hours before your scheduled surgery time.  Upon arrival, a Pre-op nurse and Anesthesiologist will review your health history, obtain vital signs, and answer questions you may have.  The only belongings needed at this time will be a list of your home medications and if applicable your C-PAP/BI-PAP machine.  A Pre-op nurse will start an IV and you may receive medication in preparation for surgery, including something to help you relax.     Please be aware that surgery  does come with discomfort.  We want to make every effort to control your discomfort so please discuss any uncontrolled symptoms with your nurse.   Your doctor will most likely have prescribed pain medications.      If you are going home after surgery you will receive individualized written care instructions before being discharged.  A responsible adult must drive you to and from the hospital on the day of your surgery and stay with you for 24 hours.  Discharge prescriptions can be filled by the hospital pharmacy during regular pharmacy hours.  If you are having surgery late in the day/evening your prescription may be e-prescribed to your pharmacy.  Please verify your pharmacy hours or chose a 24 hour pharmacy to avoid not having access to your prescription because your pharmacy has closed for the day.    If you are staying overnight following surgery, you will be transported to your hospital room following the recovery period.  [facility] has all private rooms.    If you have any questions please call Pre-Admission Testing at [phone].  Deductibles and co-payments are collected on the day of service. Please be prepared to pay the required co-pay, deductible or deposit on the day of service as defined by your plan.    Patient Education for Self-Quarantine Process    • Following your COVID testing, we strongly recommend that you wear a mask when you are with other people and practice social distancing.   • Limit your activities to only required outings.  • Wash your hands with soap and water frequently for at least 20 seconds.   • Avoid touching your eyes, nose and mouth with unwashed hands.  • Do not share anything - utensils, drinking glasses, food from the same bowl.   • Sanitize household surfaces daily. Include all high touch areas (door handles, light switches, phones, countertops, etc.)    Call your surgeon immediately if you experience any of the following symptoms:  • Sore  Throat  • Shortness of Breath or difficulty breathing  • Cough  • Chills  • Body soreness or muscle pain  • Headache  • Fever  • New loss of taste or smell  • Do not arrive for your surgery ill.  Your procedure will need to be rescheduled to another time.  You will need to call your physician before the day of surgery to avoid any unnecessary exposure to hospital staff as well as other patients.    CHLORHEXIDINE CLOTH INSTRUCTIONS  The morning of surgery follow these instructions using the Chlorhexidine cloths you've been given.  These steps reduce bacteria on the body.  Do not use the cloths near your eyes, ears mouth, genitalia or on open wounds.  Throw the cloths away after use but do not try to flush them down a toilet.      • Open and remove one cloth at a time from the package.    • Leave the cloth unfolded and begin the bathing.  • Massage the skin with the cloths using gentle pressure to remove bacteria.  Do not scrub harshly.   • Follow the steps below with one 2% CHG cloth per area (6 total cloths).  • One cloth for neck, shoulders and chest.  • One cloth for both arms, hands, fingers and underarms (do underarms last).  • One cloth for the abdomen followed by groin.  • One cloth for right leg and foot including between the toes.  • One cloth for left leg and foot including between the toes.  • The last cloth is to be used for the back of the neck, back and buttocks.    Allow the CHG to air dry 3 minutes on the skin which will give it time to work and decrease the chance of irritation.  The skin may feel sticky until it is dry.  Do not rinse with water or any other liquid or you will lose the beneficial effects of the CHG.  If mild skin irritation occurs, do rinse the skin to remove the CHG.  Report this to the nurse at time of admission.  Do not apply lotions, creams, ointments, deodorants or perfumes after using the clothes. Dress in clean clothes before coming to the hospital.

## 2021-12-17 ENCOUNTER — ANESTHESIA EVENT (OUTPATIENT)
Dept: PERIOP | Facility: HOSPITAL | Age: 76
End: 2021-12-17

## 2021-12-17 ENCOUNTER — APPOINTMENT (OUTPATIENT)
Dept: GENERAL RADIOLOGY | Facility: HOSPITAL | Age: 76
End: 2021-12-17

## 2021-12-17 ENCOUNTER — ANESTHESIA (OUTPATIENT)
Dept: PERIOP | Facility: HOSPITAL | Age: 76
End: 2021-12-17

## 2021-12-17 ENCOUNTER — HOSPITAL ENCOUNTER (INPATIENT)
Facility: HOSPITAL | Age: 76
LOS: 2 days | Discharge: HOME | End: 2021-12-19
Attending: SURGERY | Admitting: SURGERY

## 2021-12-17 DIAGNOSIS — I71.40 ABDOMINAL AORTIC ANEURYSM (AAA) WITHOUT RUPTURE (HCC): ICD-10-CM

## 2021-12-17 DIAGNOSIS — G47.33 CHRONIC INTERMITTENT HYPOXIA WITH OBSTRUCTIVE SLEEP APNEA: ICD-10-CM

## 2021-12-17 DIAGNOSIS — J43.9 PULMONARY EMPHYSEMA, UNSPECIFIED EMPHYSEMA TYPE (HCC): ICD-10-CM

## 2021-12-17 DIAGNOSIS — I71.40 AAA (ABDOMINAL AORTIC ANEURYSM) WITHOUT RUPTURE (HCC): Primary | ICD-10-CM

## 2021-12-17 DIAGNOSIS — G47.34 CHRONIC INTERMITTENT HYPOXIA WITH OBSTRUCTIVE SLEEP APNEA: ICD-10-CM

## 2021-12-17 LAB
ABO GROUP BLD: NORMAL
BLD GP AB SCN SERPL QL: NEGATIVE
GLUCOSE BLDC GLUCOMTR-MCNC: 129 MG/DL (ref 70–130)
RH BLD: NEGATIVE
T&S EXPIRATION DATE: NORMAL

## 2021-12-17 PROCEDURE — 85347 COAGULATION TIME ACTIVATED: CPT

## 2021-12-17 PROCEDURE — 25010000002 FENTANYL CITRATE (PF) 50 MCG/ML SOLUTION: Performed by: NURSE ANESTHETIST, CERTIFIED REGISTERED

## 2021-12-17 PROCEDURE — 86901 BLOOD TYPING SEROLOGIC RH(D): CPT | Performed by: SURGERY

## 2021-12-17 PROCEDURE — 86850 RBC ANTIBODY SCREEN: CPT | Performed by: SURGERY

## 2021-12-17 PROCEDURE — C1725 CATH, TRANSLUMIN NON-LASER: HCPCS | Performed by: SURGERY

## 2021-12-17 PROCEDURE — C1889 IMPLANT/INSERT DEVICE, NOC: HCPCS | Performed by: SURGERY

## 2021-12-17 PROCEDURE — 0 CEFAZOLIN IN DEXTROSE 2-4 GM/100ML-% SOLUTION: Performed by: SURGERY

## 2021-12-17 PROCEDURE — 0 CEFAZOLIN PER 500 MG: Performed by: SURGERY

## 2021-12-17 PROCEDURE — C1887 CATHETER, GUIDING: HCPCS | Performed by: SURGERY

## 2021-12-17 PROCEDURE — C1769 GUIDE WIRE: HCPCS | Performed by: SURGERY

## 2021-12-17 PROCEDURE — 25010000002 NEOSTIGMINE 5 MG/10ML SOLUTION: Performed by: NURSE ANESTHETIST, CERTIFIED REGISTERED

## 2021-12-17 PROCEDURE — C1894 INTRO/SHEATH, NON-LASER: HCPCS | Performed by: SURGERY

## 2021-12-17 PROCEDURE — 25010000002 PHENYLEPHRINE 10 MG/ML SOLUTION: Performed by: NURSE ANESTHETIST, CERTIFIED REGISTERED

## 2021-12-17 PROCEDURE — 25010000002 FENTANYL CITRATE (PF) 50 MCG/ML SOLUTION: Performed by: ANESTHESIOLOGY

## 2021-12-17 PROCEDURE — 86900 BLOOD TYPING SEROLOGIC ABO: CPT | Performed by: SURGERY

## 2021-12-17 PROCEDURE — 25010000002 MIDAZOLAM PER 1 MG: Performed by: ANESTHESIOLOGY

## 2021-12-17 PROCEDURE — 82962 GLUCOSE BLOOD TEST: CPT

## 2021-12-17 PROCEDURE — 25010000002 HEPARIN (PORCINE) PER 1000 UNITS: Performed by: NURSE ANESTHETIST, CERTIFIED REGISTERED

## 2021-12-17 PROCEDURE — 25010000002 PROTAMINE SULFATE PER 10 MG: Performed by: NURSE ANESTHETIST, CERTIFIED REGISTERED

## 2021-12-17 PROCEDURE — 25010000002 ONDANSETRON PER 1 MG: Performed by: NURSE ANESTHETIST, CERTIFIED REGISTERED

## 2021-12-17 PROCEDURE — C2628 CATHETER, OCCLUSION: HCPCS | Performed by: SURGERY

## 2021-12-17 PROCEDURE — 94799 UNLISTED PULMONARY SVC/PX: CPT

## 2021-12-17 PROCEDURE — 25010000002 HEPARIN (PORCINE) PER 1000 UNITS: Performed by: SURGERY

## 2021-12-17 PROCEDURE — 25010000002 PROPOFOL 10 MG/ML EMULSION: Performed by: NURSE ANESTHETIST, CERTIFIED REGISTERED

## 2021-12-17 PROCEDURE — 25010000002 PHENYLEPHRINE 10 MG/ML SOLUTION 5 ML VIAL: Performed by: NURSE ANESTHETIST, CERTIFIED REGISTERED

## 2021-12-17 PROCEDURE — 04V03DZ RESTRICTION OF ABDOMINAL AORTA WITH INTRALUMINAL DEVICE, PERCUTANEOUS APPROACH: ICD-10-PCS | Performed by: SURGERY

## 2021-12-17 PROCEDURE — 94640 AIRWAY INHALATION TREATMENT: CPT

## 2021-12-17 PROCEDURE — 25010000002 HYDROMORPHONE PER 4 MG: Performed by: NURSE ANESTHETIST, CERTIFIED REGISTERED

## 2021-12-17 PROCEDURE — 0 IOPAMIDOL PER 1 ML: Performed by: SURGERY

## 2021-12-17 DEVICE — LIGACLIP MCA MULTIPLE CLIP APPLIERS, 20 SMALL CLIPS
Type: IMPLANTABLE DEVICE | Site: ARTERY FEMORAL | Status: FUNCTIONAL
Brand: LIGACLIP

## 2021-12-17 DEVICE — GRFT EXCLDR CONTRALAT 12MMX12CM: Type: IMPLANTABLE DEVICE | Site: ARTERY FEMORAL | Status: FUNCTIONAL

## 2021-12-17 DEVICE — GRFT EXCLDR CONTRALAT 12MMX10CM: Type: IMPLANTABLE DEVICE | Site: ARTERY FEMORAL | Status: FUNCTIONAL

## 2021-12-17 DEVICE — LIGACLIP MCA MULTIPLE CLIP APPLIERS, 20 MEDIUM CLIPS
Type: IMPLANTABLE DEVICE | Site: ARTERY FEMORAL | Status: FUNCTIONAL
Brand: LIGACLIP

## 2021-12-17 DEVICE — EXCLUDER AAA ENDO TRNK IPSI 31MMX14.5MMX13CM 18FR
Type: IMPLANTABLE DEVICE | Site: ARTERY FEMORAL | Status: FUNCTIONAL
Brand: GORE EXCLUDER AAA ENDOPROSTHESIS WITH C3 DELIVERY

## 2021-12-17 RX ORDER — ASPIRIN 81 MG/1
81 TABLET ORAL DAILY
Status: DISCONTINUED | OUTPATIENT
Start: 2021-12-17 | End: 2021-12-19 | Stop reason: HOSPADM

## 2021-12-17 RX ORDER — ACETAMINOPHEN 500 MG
1000 TABLET ORAL EVERY 6 HOURS SCHEDULED
Status: DISCONTINUED | OUTPATIENT
Start: 2021-12-17 | End: 2021-12-19 | Stop reason: HOSPADM

## 2021-12-17 RX ORDER — HYDROMORPHONE HYDROCHLORIDE 1 MG/ML
0.25 INJECTION, SOLUTION INTRAMUSCULAR; INTRAVENOUS; SUBCUTANEOUS
Status: DISCONTINUED | OUTPATIENT
Start: 2021-12-17 | End: 2021-12-19 | Stop reason: HOSPADM

## 2021-12-17 RX ORDER — FENTANYL CITRATE 50 UG/ML
50 INJECTION, SOLUTION INTRAMUSCULAR; INTRAVENOUS
Status: DISCONTINUED | OUTPATIENT
Start: 2021-12-17 | End: 2021-12-17 | Stop reason: HOSPADM

## 2021-12-17 RX ORDER — SODIUM CHLORIDE, SODIUM LACTATE, POTASSIUM CHLORIDE, CALCIUM CHLORIDE 600; 310; 30; 20 MG/100ML; MG/100ML; MG/100ML; MG/100ML
INJECTION, SOLUTION INTRAVENOUS CONTINUOUS PRN
Status: DISCONTINUED | OUTPATIENT
Start: 2021-12-17 | End: 2021-12-17 | Stop reason: SURG

## 2021-12-17 RX ORDER — IPRATROPIUM BROMIDE AND ALBUTEROL SULFATE 2.5; .5 MG/3ML; MG/3ML
3 SOLUTION RESPIRATORY (INHALATION)
Status: DISPENSED | OUTPATIENT
Start: 2021-12-17 | End: 2021-12-19

## 2021-12-17 RX ORDER — LABETALOL HYDROCHLORIDE 5 MG/ML
INJECTION, SOLUTION INTRAVENOUS AS NEEDED
Status: DISCONTINUED | OUTPATIENT
Start: 2021-12-17 | End: 2021-12-17 | Stop reason: SURG

## 2021-12-17 RX ORDER — GABAPENTIN 300 MG/1
300 CAPSULE ORAL 3 TIMES DAILY
Status: DISCONTINUED | OUTPATIENT
Start: 2021-12-17 | End: 2021-12-19 | Stop reason: HOSPADM

## 2021-12-17 RX ORDER — HYDROMORPHONE HCL 110MG/55ML
PATIENT CONTROLLED ANALGESIA SYRINGE INTRAVENOUS AS NEEDED
Status: DISCONTINUED | OUTPATIENT
Start: 2021-12-17 | End: 2021-12-17 | Stop reason: SURG

## 2021-12-17 RX ORDER — ONDANSETRON 2 MG/ML
4 INJECTION INTRAMUSCULAR; INTRAVENOUS ONCE AS NEEDED
Status: DISCONTINUED | OUTPATIENT
Start: 2021-12-17 | End: 2021-12-17 | Stop reason: HOSPADM

## 2021-12-17 RX ORDER — ROSUVASTATIN CALCIUM 40 MG/1
40 TABLET, COATED ORAL DAILY
Status: DISCONTINUED | OUTPATIENT
Start: 2021-12-17 | End: 2021-12-19 | Stop reason: HOSPADM

## 2021-12-17 RX ORDER — NEOSTIGMINE METHYLSULFATE 0.5 MG/ML
INJECTION, SOLUTION INTRAVENOUS AS NEEDED
Status: DISCONTINUED | OUTPATIENT
Start: 2021-12-17 | End: 2021-12-17 | Stop reason: SURG

## 2021-12-17 RX ORDER — HYDROCODONE BITARTRATE AND ACETAMINOPHEN 7.5; 325 MG/1; MG/1
1 TABLET ORAL ONCE AS NEEDED
Status: DISCONTINUED | OUTPATIENT
Start: 2021-12-17 | End: 2021-12-17 | Stop reason: HOSPADM

## 2021-12-17 RX ORDER — IBUPROFEN 600 MG/1
600 TABLET ORAL ONCE AS NEEDED
Status: DISCONTINUED | OUTPATIENT
Start: 2021-12-17 | End: 2021-12-17 | Stop reason: HOSPADM

## 2021-12-17 RX ORDER — PROTAMINE SULFATE 10 MG/ML
INJECTION, SOLUTION INTRAVENOUS AS NEEDED
Status: DISCONTINUED | OUTPATIENT
Start: 2021-12-17 | End: 2021-12-17 | Stop reason: SURG

## 2021-12-17 RX ORDER — LIDOCAINE HYDROCHLORIDE 40 MG/ML
SOLUTION TOPICAL AS NEEDED
Status: DISCONTINUED | OUTPATIENT
Start: 2021-12-17 | End: 2021-12-17 | Stop reason: SURG

## 2021-12-17 RX ORDER — LABETALOL HYDROCHLORIDE 5 MG/ML
5 INJECTION, SOLUTION INTRAVENOUS
Status: DISCONTINUED | OUTPATIENT
Start: 2021-12-17 | End: 2021-12-17 | Stop reason: HOSPADM

## 2021-12-17 RX ORDER — DIPHENHYDRAMINE HYDROCHLORIDE 50 MG/ML
12.5 INJECTION INTRAMUSCULAR; INTRAVENOUS
Status: DISCONTINUED | OUTPATIENT
Start: 2021-12-17 | End: 2021-12-17 | Stop reason: HOSPADM

## 2021-12-17 RX ORDER — HEPARIN SODIUM 1000 [USP'U]/ML
INJECTION, SOLUTION INTRAVENOUS; SUBCUTANEOUS AS NEEDED
Status: DISCONTINUED | OUTPATIENT
Start: 2021-12-17 | End: 2021-12-17 | Stop reason: SURG

## 2021-12-17 RX ORDER — SODIUM CHLORIDE, SODIUM LACTATE, POTASSIUM CHLORIDE, CALCIUM CHLORIDE 600; 310; 30; 20 MG/100ML; MG/100ML; MG/100ML; MG/100ML
9 INJECTION, SOLUTION INTRAVENOUS CONTINUOUS PRN
Status: DISCONTINUED | OUTPATIENT
Start: 2021-12-17 | End: 2021-12-17

## 2021-12-17 RX ORDER — HYDROMORPHONE HYDROCHLORIDE 1 MG/ML
0.5 INJECTION, SOLUTION INTRAMUSCULAR; INTRAVENOUS; SUBCUTANEOUS
Status: DISCONTINUED | OUTPATIENT
Start: 2021-12-17 | End: 2021-12-17 | Stop reason: HOSPADM

## 2021-12-17 RX ORDER — PHENYLEPHRINE HYDROCHLORIDE 10 MG/ML
INJECTION INTRAVENOUS AS NEEDED
Status: DISCONTINUED | OUTPATIENT
Start: 2021-12-17 | End: 2021-12-17 | Stop reason: SURG

## 2021-12-17 RX ORDER — ALLOPURINOL 100 MG/1
100 TABLET ORAL DAILY
Status: DISCONTINUED | OUTPATIENT
Start: 2021-12-17 | End: 2021-12-17

## 2021-12-17 RX ORDER — METOPROLOL SUCCINATE 50 MG/1
50 TABLET, EXTENDED RELEASE ORAL EVERY EVENING
Status: DISCONTINUED | OUTPATIENT
Start: 2021-12-17 | End: 2021-12-19 | Stop reason: HOSPADM

## 2021-12-17 RX ORDER — FAMOTIDINE 10 MG/ML
20 INJECTION, SOLUTION INTRAVENOUS
Status: COMPLETED | OUTPATIENT
Start: 2021-12-17 | End: 2021-12-17

## 2021-12-17 RX ORDER — CEFAZOLIN SODIUM 2 G/100ML
2 INJECTION, SOLUTION INTRAVENOUS ONCE
Status: COMPLETED | OUTPATIENT
Start: 2021-12-17 | End: 2021-12-17

## 2021-12-17 RX ORDER — ONDANSETRON 4 MG/1
4 TABLET, FILM COATED ORAL EVERY 6 HOURS PRN
Status: DISCONTINUED | OUTPATIENT
Start: 2021-12-17 | End: 2021-12-19 | Stop reason: HOSPADM

## 2021-12-17 RX ORDER — ONDANSETRON 2 MG/ML
4 INJECTION INTRAMUSCULAR; INTRAVENOUS EVERY 6 HOURS PRN
Status: DISCONTINUED | OUTPATIENT
Start: 2021-12-17 | End: 2021-12-19 | Stop reason: HOSPADM

## 2021-12-17 RX ORDER — SODIUM CHLORIDE 0.9 % (FLUSH) 0.9 %
10 SYRINGE (ML) INJECTION EVERY 12 HOURS SCHEDULED
Status: DISCONTINUED | OUTPATIENT
Start: 2021-12-17 | End: 2021-12-17 | Stop reason: HOSPADM

## 2021-12-17 RX ORDER — MIDAZOLAM HYDROCHLORIDE 1 MG/ML
INJECTION INTRAMUSCULAR; INTRAVENOUS
Status: COMPLETED | OUTPATIENT
Start: 2021-12-17 | End: 2021-12-17

## 2021-12-17 RX ORDER — SODIUM CHLORIDE, SODIUM LACTATE, POTASSIUM CHLORIDE, CALCIUM CHLORIDE 600; 310; 30; 20 MG/100ML; MG/100ML; MG/100ML; MG/100ML
100 INJECTION, SOLUTION INTRAVENOUS CONTINUOUS
Status: DISCONTINUED | OUTPATIENT
Start: 2021-12-17 | End: 2021-12-17

## 2021-12-17 RX ORDER — PROMETHAZINE HYDROCHLORIDE 25 MG/1
25 SUPPOSITORY RECTAL ONCE AS NEEDED
Status: DISCONTINUED | OUTPATIENT
Start: 2021-12-17 | End: 2021-12-17 | Stop reason: HOSPADM

## 2021-12-17 RX ORDER — DIPHENHYDRAMINE HCL 25 MG
25 CAPSULE ORAL
Status: DISCONTINUED | OUTPATIENT
Start: 2021-12-17 | End: 2021-12-17 | Stop reason: HOSPADM

## 2021-12-17 RX ORDER — GLYCOPYRROLATE 0.2 MG/ML
INJECTION INTRAMUSCULAR; INTRAVENOUS AS NEEDED
Status: DISCONTINUED | OUTPATIENT
Start: 2021-12-17 | End: 2021-12-17 | Stop reason: SURG

## 2021-12-17 RX ORDER — MIDAZOLAM HYDROCHLORIDE 1 MG/ML
1 INJECTION INTRAMUSCULAR; INTRAVENOUS
Status: DISCONTINUED | OUTPATIENT
Start: 2021-12-17 | End: 2021-12-17 | Stop reason: HOSPADM

## 2021-12-17 RX ORDER — ALLOPURINOL 300 MG/1
300 TABLET ORAL DAILY
Status: DISCONTINUED | OUTPATIENT
Start: 2021-12-17 | End: 2021-12-17

## 2021-12-17 RX ORDER — ALLOPURINOL 100 MG/1
100 TABLET ORAL EVERY MORNING
Status: DISCONTINUED | OUTPATIENT
Start: 2021-12-18 | End: 2021-12-19 | Stop reason: HOSPADM

## 2021-12-17 RX ORDER — EPHEDRINE SULFATE 50 MG/ML
5 INJECTION, SOLUTION INTRAVENOUS ONCE AS NEEDED
Status: DISCONTINUED | OUTPATIENT
Start: 2021-12-17 | End: 2021-12-17 | Stop reason: HOSPADM

## 2021-12-17 RX ORDER — OXYCODONE HYDROCHLORIDE 5 MG/1
5 TABLET ORAL EVERY 4 HOURS PRN
Status: DISCONTINUED | OUTPATIENT
Start: 2021-12-17 | End: 2021-12-19 | Stop reason: HOSPADM

## 2021-12-17 RX ORDER — LANOLIN ALCOHOL/MO/W.PET/CERES
400 CREAM (GRAM) TOPICAL DAILY
Status: DISCONTINUED | OUTPATIENT
Start: 2021-12-17 | End: 2021-12-19 | Stop reason: HOSPADM

## 2021-12-17 RX ORDER — ROCURONIUM BROMIDE 10 MG/ML
INJECTION, SOLUTION INTRAVENOUS AS NEEDED
Status: DISCONTINUED | OUTPATIENT
Start: 2021-12-17 | End: 2021-12-17 | Stop reason: SURG

## 2021-12-17 RX ORDER — ONDANSETRON 2 MG/ML
INJECTION INTRAMUSCULAR; INTRAVENOUS AS NEEDED
Status: DISCONTINUED | OUTPATIENT
Start: 2021-12-17 | End: 2021-12-17 | Stop reason: SURG

## 2021-12-17 RX ORDER — LIDOCAINE HYDROCHLORIDE 20 MG/ML
INJECTION, SOLUTION INFILTRATION; PERINEURAL AS NEEDED
Status: DISCONTINUED | OUTPATIENT
Start: 2021-12-17 | End: 2021-12-17 | Stop reason: SURG

## 2021-12-17 RX ORDER — PROPOFOL 10 MG/ML
VIAL (ML) INTRAVENOUS AS NEEDED
Status: DISCONTINUED | OUTPATIENT
Start: 2021-12-17 | End: 2021-12-17 | Stop reason: SURG

## 2021-12-17 RX ORDER — FLUMAZENIL 0.1 MG/ML
0.2 INJECTION INTRAVENOUS AS NEEDED
Status: DISCONTINUED | OUTPATIENT
Start: 2021-12-17 | End: 2021-12-17 | Stop reason: HOSPADM

## 2021-12-17 RX ORDER — ALLOPURINOL 300 MG/1
300 TABLET ORAL NIGHTLY
Status: DISCONTINUED | OUTPATIENT
Start: 2021-12-17 | End: 2021-12-19 | Stop reason: HOSPADM

## 2021-12-17 RX ORDER — PROMETHAZINE HYDROCHLORIDE 25 MG/1
25 TABLET ORAL ONCE AS NEEDED
Status: DISCONTINUED | OUTPATIENT
Start: 2021-12-17 | End: 2021-12-17 | Stop reason: HOSPADM

## 2021-12-17 RX ORDER — FENTANYL CITRATE 50 UG/ML
25 INJECTION, SOLUTION INTRAMUSCULAR; INTRAVENOUS
Status: DISCONTINUED | OUTPATIENT
Start: 2021-12-17 | End: 2021-12-17 | Stop reason: HOSPADM

## 2021-12-17 RX ORDER — SODIUM CHLORIDE 0.9 % (FLUSH) 0.9 %
10 SYRINGE (ML) INJECTION AS NEEDED
Status: DISCONTINUED | OUTPATIENT
Start: 2021-12-17 | End: 2021-12-17 | Stop reason: HOSPADM

## 2021-12-17 RX ORDER — NALOXONE HCL 0.4 MG/ML
0.2 VIAL (ML) INJECTION AS NEEDED
Status: DISCONTINUED | OUTPATIENT
Start: 2021-12-17 | End: 2021-12-17 | Stop reason: HOSPADM

## 2021-12-17 RX ORDER — TAMSULOSIN HYDROCHLORIDE 0.4 MG/1
0.4 CAPSULE ORAL EVERY EVENING
Status: DISCONTINUED | OUTPATIENT
Start: 2021-12-17 | End: 2021-12-19 | Stop reason: HOSPADM

## 2021-12-17 RX ORDER — SODIUM CHLORIDE 0.9 % (FLUSH) 0.9 %
10 SYRINGE (ML) INJECTION AS NEEDED
Status: DISCONTINUED | OUTPATIENT
Start: 2021-12-17 | End: 2021-12-19 | Stop reason: HOSPADM

## 2021-12-17 RX ORDER — SODIUM CHLORIDE 0.9 % (FLUSH) 0.9 %
10 SYRINGE (ML) INJECTION EVERY 12 HOURS SCHEDULED
Status: DISCONTINUED | OUTPATIENT
Start: 2021-12-17 | End: 2021-12-19 | Stop reason: HOSPADM

## 2021-12-17 RX ORDER — OXYCODONE AND ACETAMINOPHEN 7.5; 325 MG/1; MG/1
1 TABLET ORAL EVERY 4 HOURS PRN
Status: DISCONTINUED | OUTPATIENT
Start: 2021-12-17 | End: 2021-12-17 | Stop reason: HOSPADM

## 2021-12-17 RX ORDER — FENTANYL CITRATE 50 UG/ML
INJECTION, SOLUTION INTRAMUSCULAR; INTRAVENOUS
Status: COMPLETED | OUTPATIENT
Start: 2021-12-17 | End: 2021-12-17

## 2021-12-17 RX ORDER — FENTANYL CITRATE 50 UG/ML
INJECTION, SOLUTION INTRAMUSCULAR; INTRAVENOUS AS NEEDED
Status: DISCONTINUED | OUTPATIENT
Start: 2021-12-17 | End: 2021-12-17 | Stop reason: SURG

## 2021-12-17 RX ORDER — HYDRALAZINE HYDROCHLORIDE 20 MG/ML
5 INJECTION INTRAMUSCULAR; INTRAVENOUS
Status: DISCONTINUED | OUTPATIENT
Start: 2021-12-17 | End: 2021-12-17 | Stop reason: HOSPADM

## 2021-12-17 RX ADMIN — GABAPENTIN 300 MG: 300 CAPSULE ORAL at 17:01

## 2021-12-17 RX ADMIN — ROCURONIUM BROMIDE 50 MG: 50 INJECTION INTRAVENOUS at 07:36

## 2021-12-17 RX ADMIN — ALLOPURINOL 300 MG: 300 TABLET ORAL at 20:16

## 2021-12-17 RX ADMIN — MIDAZOLAM 1 MG: 1 INJECTION INTRAMUSCULAR; INTRAVENOUS at 07:11

## 2021-12-17 RX ADMIN — PHENYLEPHRINE HYDROCHLORIDE 100 MCG: 10 INJECTION, SOLUTION INTRAVENOUS at 08:29

## 2021-12-17 RX ADMIN — FENTANYL CITRATE 50 MCG: 0.05 INJECTION, SOLUTION INTRAMUSCULAR; INTRAVENOUS at 07:36

## 2021-12-17 RX ADMIN — PHENYLEPHRINE HYDROCHLORIDE 0.5 MCG/KG/MIN: 10 INJECTION INTRAVENOUS at 09:08

## 2021-12-17 RX ADMIN — PROTAMINE SULFATE 40 MG: 10 INJECTION, SOLUTION INTRAVENOUS at 10:24

## 2021-12-17 RX ADMIN — PHENYLEPHRINE HYDROCHLORIDE 200 MCG: 10 INJECTION, SOLUTION INTRAVENOUS at 08:52

## 2021-12-17 RX ADMIN — HYDROMORPHONE HYDROCHLORIDE 0.5 MG: 2 INJECTION, SOLUTION INTRAMUSCULAR; INTRAVENOUS; SUBCUTANEOUS at 08:18

## 2021-12-17 RX ADMIN — PHENYLEPHRINE HYDROCHLORIDE 200 MCG: 10 INJECTION, SOLUTION INTRAVENOUS at 09:08

## 2021-12-17 RX ADMIN — ASPIRIN 81 MG: 81 TABLET, COATED ORAL at 17:30

## 2021-12-17 RX ADMIN — FENTANYL CITRATE 25 MCG: 0.05 INJECTION, SOLUTION INTRAMUSCULAR; INTRAVENOUS at 08:10

## 2021-12-17 RX ADMIN — SODIUM CHLORIDE, POTASSIUM CHLORIDE, SODIUM LACTATE AND CALCIUM CHLORIDE 9 ML/HR: 600; 310; 30; 20 INJECTION, SOLUTION INTRAVENOUS at 11:30

## 2021-12-17 RX ADMIN — FENTANYL CITRATE 25 MCG: 0.05 INJECTION, SOLUTION INTRAMUSCULAR; INTRAVENOUS at 07:12

## 2021-12-17 RX ADMIN — PHENYLEPHRINE HYDROCHLORIDE 200 MCG: 10 INJECTION, SOLUTION INTRAVENOUS at 08:58

## 2021-12-17 RX ADMIN — NEOSTIGMINE METHYLSULFATE 2 MG: 0.5 INJECTION INTRAVENOUS at 10:36

## 2021-12-17 RX ADMIN — ACETAMINOPHEN 1000 MG: 500 TABLET, FILM COATED ORAL at 17:01

## 2021-12-17 RX ADMIN — Medication 400 MCG: at 17:00

## 2021-12-17 RX ADMIN — GABAPENTIN 300 MG: 300 CAPSULE ORAL at 20:16

## 2021-12-17 RX ADMIN — FAMOTIDINE 20 MG: 10 INJECTION INTRAVENOUS at 06:53

## 2021-12-17 RX ADMIN — LABETALOL HYDROCHLORIDE 5 MG: 5 INJECTION, SOLUTION INTRAVENOUS at 08:21

## 2021-12-17 RX ADMIN — LIDOCAINE HYDROCHLORIDE 1 EACH: 40 SOLUTION TOPICAL at 07:38

## 2021-12-17 RX ADMIN — IPRATROPIUM BROMIDE AND ALBUTEROL SULFATE 3 ML: 2.5; .5 SOLUTION RESPIRATORY (INHALATION) at 15:30

## 2021-12-17 RX ADMIN — METOPROLOL SUCCINATE 50 MG: 50 TABLET, EXTENDED RELEASE ORAL at 17:00

## 2021-12-17 RX ADMIN — PHENYLEPHRINE HYDROCHLORIDE 100 MCG: 10 INJECTION, SOLUTION INTRAVENOUS at 08:37

## 2021-12-17 RX ADMIN — SODIUM CHLORIDE, POTASSIUM CHLORIDE, SODIUM LACTATE AND CALCIUM CHLORIDE: 600; 310; 30; 20 INJECTION, SOLUTION INTRAVENOUS at 06:43

## 2021-12-17 RX ADMIN — IOPAMIDOL 60 ML: 510 INJECTION, SOLUTION INTRAVASCULAR at 10:38

## 2021-12-17 RX ADMIN — SODIUM CHLORIDE, POTASSIUM CHLORIDE, SODIUM LACTATE AND CALCIUM CHLORIDE 9 ML/HR: 600; 310; 30; 20 INJECTION, SOLUTION INTRAVENOUS at 06:53

## 2021-12-17 RX ADMIN — FENTANYL CITRATE 50 MCG: 50 INJECTION INTRAMUSCULAR; INTRAVENOUS at 11:09

## 2021-12-17 RX ADMIN — PROPOFOL 200 MG: 10 INJECTION, EMULSION INTRAVENOUS at 07:36

## 2021-12-17 RX ADMIN — LIDOCAINE HYDROCHLORIDE 80 MG: 20 INJECTION, SOLUTION INFILTRATION; PERINEURAL at 07:36

## 2021-12-17 RX ADMIN — SODIUM CHLORIDE, POTASSIUM CHLORIDE, SODIUM LACTATE AND CALCIUM CHLORIDE: 600; 310; 30; 20 INJECTION, SOLUTION INTRAVENOUS at 08:10

## 2021-12-17 RX ADMIN — ONDANSETRON 4 MG: 2 INJECTION INTRAMUSCULAR; INTRAVENOUS at 10:29

## 2021-12-17 RX ADMIN — FENTANYL CITRATE 25 MCG: 0.05 INJECTION, SOLUTION INTRAMUSCULAR; INTRAVENOUS at 08:15

## 2021-12-17 RX ADMIN — CEFAZOLIN SODIUM 2 G: 2 INJECTION, SOLUTION INTRAVENOUS at 07:36

## 2021-12-17 RX ADMIN — ROSUVASTATIN CALCIUM 40 MG: 40 TABLET, FILM COATED ORAL at 17:00

## 2021-12-17 RX ADMIN — PHENYLEPHRINE HYDROCHLORIDE 200 MCG: 10 INJECTION, SOLUTION INTRAVENOUS at 08:39

## 2021-12-17 RX ADMIN — ACETAMINOPHEN 1000 MG: 500 TABLET, FILM COATED ORAL at 23:47

## 2021-12-17 RX ADMIN — GLYCOPYRROLATE 0.4 MG: 0.2 INJECTION INTRAMUSCULAR; INTRAVENOUS at 10:36

## 2021-12-17 RX ADMIN — HEPARIN SODIUM 10000 UNITS: 1000 INJECTION INTRAVENOUS; SUBCUTANEOUS at 08:37

## 2021-12-17 RX ADMIN — ACETAMINOPHEN 1000 MG: 500 TABLET, FILM COATED ORAL at 13:19

## 2021-12-17 RX ADMIN — TAMSULOSIN HYDROCHLORIDE 0.4 MG: 0.4 CAPSULE ORAL at 17:00

## 2021-12-17 NOTE — OP NOTE
Operative Note  Location: [facility]  Date of Admission:  12/17/2021  OR Date: 12/17/2021    Juan James  12/17/2021    Pre-op Diagnosis: AAA (abdominal aortic aneurysm) [I71.4]  Post-op Diagnosis: Same    Procedure: EVAR    CPT Codes:  84405 Aorto-biIliac endograft   +[phone] (2 times) Open femoral access     Surgeon: Jean Patricia MD    Assistant: Johan Domingo MD     Anesthesia: General    Estimated Blood Loss: 750 mL    Complications: None    Specimen: None    Findings: Patient was extremely calcified access vessels including the iliacs and bilateral common femorals.  Proactively chose to do open femoral exposure was bilaterally and had access in the softest portions of the arteries.  Unable to clamp completely for closure so this was the source of most of the blood loss.  Unable to advance the primary 18 Arabic sheath from the right into the aorta and so device was delivered bareback through the common iliac arteries.  Patient had a nearly occluded right renal artery stenosis.  This was not treated.    Implants:   Implant Name Type Inv. Item Serial No.  Lot No. LRB No. Used Action   CLIPAPPLR M/ ENDO LIGACLIP9 3/8IN MD - [id number] Implant CLIPAPPLR M/ ENDO LIGACLIP9 3/8IN MD  [employer]  DIV OF  AND J [id number] N/A 1 Implanted   CLIPAPPLR M/ ENDO LIGACLIP 9 3/8IN  - [id number] Implant CLIPAPPLR M/ ENDO LIGACLIP 9 3/8IN   [employer]  DIV OF J AND J [id number] N/A 1 Implanted   GRFT EXCLDR C3 TRNK I/LAT [id number] - [id number] - [id number] Implant GRFT EXCLDR C3 TRNK I/LAT [id number] [employer] [id number] N/A 1 Implanted   GRFT EXCLDR CONTRALAT [id number] - [id number] Implant GRFT EXCLDR CONTRALAT [id number]  [employer] [id number] N/A 1 Implanted   GRFT EXCLDR CONTRALAT [id number] - [id number] Implant GRFT EXCLDR CONTRALAT [id number]  [employer] [id number] N/A 1 Implanted   CLIPAPPLR M/ ENDO LIGACLIP9 3/8IN MD - [id number] Implant CLIPAPPROHAN M/ ENDO  LIGACLIP9 3/8IN MD  [facility]  DIV OF J AND J [id number] N/A 1 Implanted       Indications:    The patient is an 76 y.o. male referred for evaluation because of a large abdominal aortic aneurysm.  The patient was seen and evaluated for endovascular repair.  Risks, benefits, and alternatives were discussed with the patient who agreed to proceed.         Procedure:  The patient was seen in the Holding Room. The risks, benefits, complications, treatment options, and expected outcomes were discussed with the patient. The patient concurred with the proposed plan, giving informed consent. The site of surgery properly noted/marked and patient identified as Juan James and the procedure verified as AAA repair. A Time Out was held and the above information confirmed.    Standard monitoring lines and Fuller catheter were placed. General Anesthesia  was induced. The patient had a Fuller catheter placed. The patient was prepped and draped in a sterile fashion. The abdomen and both legs were prepped and draped in the usual sterile fashion. Small transverse bilateral groin incisions were created and the common femoral arteries were surrounded proximally and distally with vessel loops.The patient was fully anticoagulated with heparin. Bilateral aortic catheters were passed. Then a 18 Citizen of the [place] sheath was inserted into the ipsilateral right common femoral artery and advanced into the aorta under fluoroscopic guidance. The main body endoprosthesis, measuring 31 mm in aortic diameter x 14.5 mm in limb diameter x 13 cm in length was inserted into the sheath and positioned in the abdominal aorta.  This was advanced bareback because the 18 Citizen of the [place] sheath could not advance past the calcium at the origin of the right external iliac artery.  A catheter was positioned in the suprarenal position from the contralateral femoral approach. An aortogram was obtained which revealed position of the renal arteries. The graft was deployed just  distal to the renal arteries.    The aortic catheter was then retracted and used to access the contralateral limb docking port. Once this was achieved, a stiff wire was passed through the docking port. A 14 Tongan sheath was inserted into the contralateral femoral artery and the contralateral limb measuring 12 mm in limb diameter x 12 cm in length was positioned into the docking port. The graft markers were aligned and then the limb was deployed to land above the internal iliac artery.     An ipsilateral extension limb (12mm diameter by 10cm length) was deployed to land above the internal iliac artery.    Noncompliant balloon angioplasty was done over all the overlapping iliacs because of the heavily calcified nature of his disease and the noncompliant balloon angioplasty was done to achieve proximal aortic seal, this was also done to all overlapping segments, and the distal seal zones.    A completion arteriogram was obtained which revealed successful exclusion of the aneurysm without evidence of an endoleak. There was good position of the stent graft. The sheaths were then removed. The femoral arteries were closed after appropriate flushing. There was good flow in each femoral artery. The wounds were irrigated with antibiotic solution. Each groin was closed in three layers of interrupted 3-0 Vicryl and the skin closed with 4-0 Vicryl and demral glue for the skin At the conclusion of the procedure there was good Doppler flow to each foot. Sterile dressings applied.

## 2021-12-17 NOTE — PLAN OF CARE
Goal Outcome Evaluation:  Plan of Care Reviewed With: patient        Progress: improving  Outcome Summary: patient vss. no pain reported. carrying out post op orders. will monitor

## 2021-12-17 NOTE — ANESTHESIA PROCEDURE NOTES
Airway  Urgency: elective    Date/Time: 12/17/2021 7:38 AM  Airway not difficult    General Information and Staff    Patient location during procedure: OR  Anesthesiologist: Teddy Childers MD  CRNA: John Anderson CRNA    Indications and Patient Condition  Indications for airway management: airway protection    Preoxygenated: yes  MILS not maintained throughout  Mask difficulty assessment: 1 - vent by mask    Final Airway Details  Final airway type: endotracheal airway      Successful airway: ETT  Cuffed: yes   Successful intubation technique: direct laryngoscopy  Facilitating devices/methods: anterior pressure/BURP  Endotracheal tube insertion site: oral  Blade: Nando  Blade size: 3  ETT size (mm): 7.5  Cormack-Lehane Classification: grade I - full view of glottis  Placement verified by: chest auscultation and capnometry   Cuff volume (mL): 8  Measured from: lips  ETT/EBT  to lips (cm): 22  Number of attempts at approach: 1  Assessment: lips, teeth, and gum same as pre-op and atraumatic intubation    Additional Comments  Pre O2, SIAI

## 2021-12-17 NOTE — ANESTHESIA PROCEDURE NOTES
Arterial Line      Patient location during procedure: holding area  Stop Time:12/17/2021 7:10 AM       Line placed for hemodynamic monitoring and ABGs/Labs/ISTAT.  Performed By   Anesthesiologist: Teddy Childers MD  Preanesthetic Checklist  Completed: patient identified, IV checked, site marked, risks and benefits discussed, surgical consent, monitors and equipment checked, pre-op evaluation and timeout performed  Arterial Line Prep   Sterile Tech: cap and mask  Prep: ChloraPrep  Patient monitoring: blood pressure monitoring, continuous pulse oximetry and EKG  Arterial Line Procedure   Laterality:left  Location:  radial artery  Catheter size: 20 G   Guidance: palpation technique  Number of attempts: 1  Successful placement: yes  Post Assessment   Dressing Type: occlusive dressing applied, secured with tape and wrist guard applied.   Complications no  Circ/Move/Sens Assessment: normal.   Patient Tolerance: patient tolerated the procedure well with no apparent complications

## 2021-12-17 NOTE — BRIEF OP NOTE
Unfit for open AAA repair:  No    Maximum AAA Diameter:  5.5 cm        Aortic Neck Length (Lowest renal to area where neck has increased by 10%):  20 mm    Aortic Neck Diameter (Largest outer Wall to outer wall in seal zone):  27 mm    Aorta-Neck Angle (Max angle between axis of suprarenal aorta and neck):  <45    Neck-AAA Angle (Maximum angle between axis of aneurysm neck and proximal portion of sac:  <45    Access Right  Open femoral, transverse    Access Left  Open femoral, transverse    Closure Device Type:  Largest Sheath SIze Used (either side): 18    Right None  Number of Devices used on Right: NA      Closure Device Type:  Left:None  Number of Devices used on Right: NA    Proximal and Distal Zone of Disease/Treatment:  9   Right: 10R  Left: 10L

## 2021-12-17 NOTE — ANESTHESIA POSTPROCEDURE EVALUATION
Patient: Juan James    Procedure Summary     Date: 12/17/21 Room / Location:  [facility] 18 [facility] /  [facility] OR 18/19    Anesthesia Start: 0726 Anesthesia Stop: 1101    Procedure: ENDOVASCULAR ANEURYSM REPAIR GORE (N/A ) Diagnosis:     Surgeons: Jean Patricia MD Provider: Teddy Childers MD    Anesthesia Type: general ASA Status: 3          Anesthesia Type: general    Vitals  Vitals Value Taken Time   /79 12/17/21 1246   Temp 36.6 °C (97.8 °F) 12/17/21 1057   Pulse 72 12/17/21 1249   Resp 14 12/17/21 1200   SpO2 97 % 12/17/21 1249   Vitals shown include unvalidated device data.        Post Anesthesia Care and Evaluation    Patient location during evaluation: PACU  Patient participation: complete - patient participated  Level of consciousness: awake and alert  Pain management: adequate  Airway patency: patent  Anesthetic complications: No anesthetic complications  PONV Status: none  Cardiovascular status: acceptable and hemodynamically stable  Respiratory status: acceptable, nonlabored ventilation and spontaneous ventilation  Hydration status: acceptable

## 2021-12-17 NOTE — ANESTHESIA PREPROCEDURE EVALUATION
Anesthesia Evaluation     Patient summary reviewed   NPO Solid Status: > 8 hours             Airway   Mallampati: II  Neck ROM: limited  No difficulty expected  Dental      Pulmonary    (+) pulmonary embolism, COPD, sleep apnea,   Cardiovascular     ECG reviewed  Rhythm: regular    (+) hypertension, CAD, cardiac stents dysrhythmias Atrial Fib,       Neuro/Psych  GI/Hepatic/Renal/Endo    (+)  GERD,  diabetes mellitus,     Musculoskeletal     (+) back pain, neck pain,   Abdominal    Substance History      OB/GYN          Other                        Anesthesia Plan    ASA 3     general       Anesthetic plan, all risks, benefits, and alternatives have been provided, discussed and informed consent has been obtained with: patient.  Use of blood products discussed with patient .

## 2021-12-18 ENCOUNTER — APPOINTMENT (OUTPATIENT)
Dept: GENERAL RADIOLOGY | Facility: HOSPITAL | Age: 76
End: 2021-12-18

## 2021-12-18 LAB
ACT BLD: 148 SECONDS (ref 82–152)
ACT BLD: 154 SECONDS (ref 82–152)
ACT BLD: 220 SECONDS (ref 82–152)
ACT BLD: 279 SECONDS (ref 82–152)
ANION GAP SERPL CALCULATED.3IONS-SCNC: 6.5 MMOL/L (ref 5–15)
BASOPHILS # BLD AUTO: 0.03 10*3/MM3 (ref 0–0.2)
BASOPHILS NFR BLD AUTO: 0.5 % (ref 0–1.5)
BUN SERPL-MCNC: 19 MG/DL (ref 8–23)
BUN/CREAT SERPL: 16.4 (ref 7–25)
CALCIUM SPEC-SCNC: 8.1 MG/DL (ref 8.6–10.5)
CHLORIDE SERPL-SCNC: 104 MMOL/L (ref 98–107)
CO2 SERPL-SCNC: 27.5 MMOL/L (ref 22–29)
CREAT SERPL-MCNC: 1.16 MG/DL (ref 0.76–1.27)
DEPRECATED RDW RBC AUTO: 48.8 FL (ref 37–54)
EOSINOPHIL # BLD AUTO: 0.12 10*3/MM3 (ref 0–0.4)
EOSINOPHIL NFR BLD AUTO: 2 % (ref 0.3–6.2)
ERYTHROCYTE [DISTWIDTH] IN BLOOD BY AUTOMATED COUNT: 13 % (ref 12.3–15.4)
GFR SERPL CREATININE-BSD FRML MDRD: 62 ML/MIN/1.73
GLUCOSE SERPL-MCNC: 125 MG/DL (ref 65–99)
HCT VFR BLD AUTO: 37.1 % (ref 37.5–51)
HGB BLD-MCNC: 12.6 G/DL (ref 13–17.7)
IMM GRANULOCYTES # BLD AUTO: 0.03 10*3/MM3 (ref 0–0.05)
IMM GRANULOCYTES NFR BLD AUTO: 0.5 % (ref 0–0.5)
LYMPHOCYTES # BLD AUTO: 1.16 10*3/MM3 (ref 0.7–3.1)
LYMPHOCYTES NFR BLD AUTO: 19.8 % (ref 19.6–45.3)
MCH RBC QN AUTO: 34.6 PG (ref 26.6–33)
MCHC RBC AUTO-ENTMCNC: 34 G/DL (ref 31.5–35.7)
MCV RBC AUTO: 101.9 FL (ref 79–97)
MONOCYTES # BLD AUTO: 0.66 10*3/MM3 (ref 0.1–0.9)
MONOCYTES NFR BLD AUTO: 11.2 % (ref 5–12)
NEUTROPHILS NFR BLD AUTO: 3.87 10*3/MM3 (ref 1.7–7)
NEUTROPHILS NFR BLD AUTO: 66 % (ref 42.7–76)
NRBC BLD AUTO-RTO: 0 /100 WBC (ref 0–0.2)
PLATELET # BLD AUTO: 101 10*3/MM3 (ref 140–450)
PMV BLD AUTO: 9.5 FL (ref 6–12)
POTASSIUM SERPL-SCNC: 4 MMOL/L (ref 3.5–5.2)
RBC # BLD AUTO: 3.64 10*6/MM3 (ref 4.14–5.8)
SODIUM SERPL-SCNC: 138 MMOL/L (ref 136–145)
WBC NRBC COR # BLD: 5.87 10*3/MM3 (ref 3.4–10.8)

## 2021-12-18 PROCEDURE — 71045 X-RAY EXAM CHEST 1 VIEW: CPT

## 2021-12-18 PROCEDURE — 80048 BASIC METABOLIC PNL TOTAL CA: CPT | Performed by: SURGERY

## 2021-12-18 PROCEDURE — 94799 UNLISTED PULMONARY SVC/PX: CPT

## 2021-12-18 PROCEDURE — 25010000002 ENOXAPARIN PER 10 MG: Performed by: SURGERY

## 2021-12-18 PROCEDURE — 85025 COMPLETE CBC W/AUTO DIFF WBC: CPT | Performed by: SURGERY

## 2021-12-18 PROCEDURE — 94762 N-INVAS EAR/PLS OXIMTRY CONT: CPT

## 2021-12-18 RX ADMIN — TAMSULOSIN HYDROCHLORIDE 0.4 MG: 0.4 CAPSULE ORAL at 18:03

## 2021-12-18 RX ADMIN — IPRATROPIUM BROMIDE AND ALBUTEROL SULFATE 3 ML: 2.5; .5 SOLUTION RESPIRATORY (INHALATION) at 23:14

## 2021-12-18 RX ADMIN — METOPROLOL SUCCINATE 50 MG: 50 TABLET, EXTENDED RELEASE ORAL at 18:03

## 2021-12-18 RX ADMIN — ALLOPURINOL 300 MG: 300 TABLET ORAL at 20:18

## 2021-12-18 RX ADMIN — OXYCODONE HYDROCHLORIDE 5 MG: 5 TABLET ORAL at 18:03

## 2021-12-18 RX ADMIN — Medication 400 MCG: at 09:03

## 2021-12-18 RX ADMIN — GABAPENTIN 300 MG: 300 CAPSULE ORAL at 09:03

## 2021-12-18 RX ADMIN — ASPIRIN 81 MG: 81 TABLET, COATED ORAL at 09:03

## 2021-12-18 RX ADMIN — SODIUM CHLORIDE, PRESERVATIVE FREE 10 ML: 5 INJECTION INTRAVENOUS at 20:18

## 2021-12-18 RX ADMIN — IPRATROPIUM BROMIDE AND ALBUTEROL SULFATE 3 ML: 2.5; .5 SOLUTION RESPIRATORY (INHALATION) at 06:22

## 2021-12-18 RX ADMIN — ACETAMINOPHEN 1000 MG: 500 TABLET, FILM COATED ORAL at 06:14

## 2021-12-18 RX ADMIN — IPRATROPIUM BROMIDE AND ALBUTEROL SULFATE 3 ML: 2.5; .5 SOLUTION RESPIRATORY (INHALATION) at 15:51

## 2021-12-18 RX ADMIN — ROSUVASTATIN CALCIUM 40 MG: 40 TABLET, FILM COATED ORAL at 09:03

## 2021-12-18 RX ADMIN — GABAPENTIN 300 MG: 300 CAPSULE ORAL at 18:03

## 2021-12-18 RX ADMIN — ACETAMINOPHEN 1000 MG: 500 TABLET, FILM COATED ORAL at 18:03

## 2021-12-18 RX ADMIN — GABAPENTIN 300 MG: 300 CAPSULE ORAL at 20:18

## 2021-12-18 RX ADMIN — ENOXAPARIN SODIUM 40 MG: 40 INJECTION SUBCUTANEOUS at 09:03

## 2021-12-18 RX ADMIN — ACETAMINOPHEN 1000 MG: 500 TABLET, FILM COATED ORAL at 14:28

## 2021-12-18 NOTE — CONSULTS
Referring Provider: Dr. Garcia  Reason for Consultation: Hypoxia    Patient Care Team:  Luda Kidd MD as PCP - General (Family Medicine)  Bill Washington MD as Surgeon (Neurosurgery)  Self, Zafar Fraire MD as Consulting Physician (Vascular Surgery)  Alesia Aviles MD as Consulting Physician (Cardiology)  Chris Mo III, MD as Consulting Physician (Cardiology)    Chief complaint:   Consulted for hypoxia    History of present illness:    Subjective   This is a 76-year-old male patient with history of aortic aneurysm, expanding in size.  He was admitted on 12/17.  He underwent endovascular aortic repair with bilateral open femoral artery exposure yesterday.    He is a former smoker, 80 packs year with history of COPD.  He quit in 2009.  Review of the records indicated that he was previously on bronchodilators as of 2020 and and apparently was on BiPAP.    Overnight, he was noted to have intermittent desaturation prompting consultation.  Tobin SpO2 was 79%.  He is currently in room air and his SPO2 is 97%.    On further questioning, he indicated that he was seen by a pulmonologist before and prescribed inhaler therapy but he did not feel that they helped a lot and he had to pay up to $300 out-of-pocket and therefore he stopped the treatment.  He was also treated with PAP which he used for a while but then stopped due to lack of benefit and also stated that he did not believe that he was having apnea and felt like the physicians were pushing him to buy the equipments.    He currently denies shortness of breath or cough.  He stated that he used to snore but currently sleeps alone and not aware of snoring.  He wakes up once at night to urinate.  He denies EDS.    Labs reviewed: Bicarb = 27; hemoglobin 12.6.        Review of Systems  Constitutional: No fever or chills.   ENMT: No sinus congestion  Cardiovascular: No chest pain, palpitation or legs swelling.    Respiratory: No dyspnea, cough  or wheezing.  Gastrointestinal: No constipation, diarrhea or abdominal pain   Neurology: No headache, weakness, numbness or dizziness.   Musculoskeletal: No joints pain, stiffness or swelling.   Psychiatry: No depression.  Genitourinary: No dysuria or frequent urination  Endo: No weight changes. No cold or warm intolerance.  Lymphatic: No swollen glands.  Integumentary: No rash.    History  Past Medical History:   Diagnosis Date   • AAA (abdominal aortic aneurysm) ([facility])    • Arthritis    • Atherosclerotic heart disease of native coronary artery without angina pectoris    • Atrial fibrillation ([facility])    • Benign essential hypertension    • BPH (benign prostatic hyperplasia)    • Cervical spondylosis 11/2012   • CHF (congestive heart failure) ([facility])    • COPD (chronic obstructive pulmonary disease) ([facility])    • Coronary artery disease     STENT X 3   • DDD (degenerative disc disease), cervical    • DDD (degenerative disc disease), lumbar    • Dry skin     LOWER LEGS BILAT   • Edema     LOWER LEGS BILAT   • Elevated cholesterol    • Emphysema/COPD ([facility])    • Essential hypertriglyceridemia    • Hearing loss    • High blood pressure    • History of blood transfusion    • History of gastrointestinal hemorrhage    • History of gout    • History of myasthenia gravis     LAST EPISODE 5-6 YEARS AGO   • Hoarseness, persistent    • Hyperlipidemia    • Hypertension    • Lower back pain    • Lumbar radiculopathy 05/2016   • OA (osteoarthritis)    • Peptic ulceration     PUD   • Skin abnormalities     FLAKEY SKIN LOWER LEGS BILAT   • Sleep apnea     DOES NOT WEAR CPAP MACHINE    • Status post angioplasty with stent     STENT X3   • Tinnitus     BILAT   • Unsteady gait when walking     USES 3 PRONG CANE   ,   Past Surgical History:   Procedure Laterality Date   • ANTERIOR CERVICAL DISCECTOMY W/ FUSION N/A 3/25/2016    Procedure: C3- C5 CERVICAL DISCECTOMY ANTERIOR FUSION WITH INSTRUMENTATION;  Surgeon: Bill Washington MD;  Location:  [facility] MAIN OR;  Service:    • ARTHRODESIS N/A 07/1983    LUMBAR   • ARTHRODESIS N/A 1987    LUMBAR   • ARTHRODESIS      Spinal Arthrodesis-lower spine-7/1983, 1987--upper spine-1988, 7/1990-Abstracted from [employer]   • BACK SURGERY      Lower Back Surgery   • CARDIAC CATHETERIZATION  07/2009   • CARDIAC CATHETERIZATION  03/18/2003    STENT TO RCA AND PCA, DR. PRIYA LUGO   • CATARACT EXTRACTION Bilateral     LENS IMPLANT   • CERVICAL ARTHRODESIS N/A 07/1990   • CERVICAL ARTHRODESIS N/A 1988   • COLONOSCOPY N/A 10/3/2019    4 MM HYPERPLASTIC POLYP IN ILEOCECAL VALVE, 4 MM SESSILE SERRATED ADENOMA POLYP IN DESCENDING, 5 TUBULOVILLOUS ADENOMA POLYPS IN RECTUM, 3 TUBULAR ADENOMA POLYPS IN DISTAL RECTUM, 26 MM TUBULAR ADENOMA POLYP IN RECTUM, PIECEMEAL POLYPECTOMY, AREA TATTOOED, RESCOPE IN 6 MONTHS, DR. TAYA CRUZ AT [facility]   • COLONOSCOPY N/A 2/17/2021    Procedure: COLONOSCOPY to cecum with cold biopsy polypectomy;  Surgeon: Yousuf Méndez MD;  Location: [facility] ENDOSCOPY;  Service: Gastroenterology;  Laterality: N/A;  pre: hx of polyps  post: polyp   • CORONARY ANGIOPLASTY WITH STENT PLACEMENT  02/2009    and 7/2009-PT STATES HAS 3 STENTS   • ENDOSCOPY N/A 06/24/2012    NON BLEEDING EROSIVE GASTROPATHY, 1 DUODENAL ULCER WITH CLEAN BASE, DR. BRANDY WREN AT [facility]   • ENDOSCOPY AND COLONOSCOPY N/A 11/20/2007    EGD WNL, 8 ADENOMATOUS POLYPS IN RECTO-SIGMOID, RESCOPE IN 3 YRS, DR. CRISTINA RODRIGUEZ AT [facility]   • FOOT SURGERY Left     LEFT BIG TOE-JOINT REPLACED   • HAND SURGERY Left     THUMB-JOINT REPLACEMENT   • HAND SURGERY Right     JOINT REPLACEMENT RIGHT INDEX FINGER   • KNEE ARTHROSCOPY Left    • LAPAROSCOPIC CHOLECYSTECTOMY     • LUMBAR DISCECTOMY FUSION INSTRUMENTATION N/A 11/13/2020    Procedure: Lumbar 3 4 and lumbar 4 5 laminectomy and fusion with instrumentation;  Surgeon: Bill Washington MD;  Location: [facility] MAIN OR;  Service: Neurosurgery;  Laterality: N/A;   • NECK SURGERY     • VASECTOMY Bilateral     ,   Family History   Adopted: Yes   Problem Relation Age of Onset   • Heart attack Mother 66   • Alcohol abuse Mother    • Heart disease Mother 66   • No Known Problems Father    • Heart disease Other         FH in females before the age of 65   • Malig Hyperthermia Neg Hx    ,   Social History     Socioeconomic History   • Marital status:    Tobacco Use   • Smoking status: Former Smoker     Packs/day: 2.00     Years: 40.00     Pack years: 80.00     Types: Cigarettes     Quit date:      Years since quittin.9   • Smokeless tobacco: Former User     Types: Chew   • Tobacco comment: smoked 1 to 2 packs per day for 40 or more years   Vaping Use   • Vaping Use: Never used   Substance and Sexual Activity   • Drug use: No   • Sexual activity: Defer     E-cigarette/Vaping   • E-cigarette/Vaping Use Never User      E-cigarette/Vaping Substances   • Nicotine No    • THC No    • CBD No    • Flavoring No      E-cigarette/Vaping Devices   • Disposable No    • Pre-filled or Refillable Cartridge No    • Refillable Tank No    • Pre-filled Pod No        ,   Medications Prior to Admission   Medication Sig Dispense Refill Last Dose   • allopurinol (ZYLOPRIM) 100 MG tablet Take 1 tablet by mouth Daily. (Patient taking differently: Take 100 mg by mouth Every Morning.) 90 tablet 3 2021 at 0600   • allopurinol (ZYLOPRIM) 300 MG tablet Take 1 tablet by mouth Daily. (Patient taking differently: Take 300 mg by mouth Every Night.) 90 tablet 3 2021 at 1700   • Cyanocobalamin (VITAMIN B-12) 5000 MCG tablet dispersible Place 1 tablet on the tongue Daily. (Patient taking differently: Place 5,000 mcg on the tongue Daily. ORAL)   2021 at 0600   • folic acid (FOLVITE) 400 MCG tablet Take 1 tablet by mouth Daily. (Patient taking differently: Take 400 mcg by mouth 2 (Two) Times a Day.)   2021 at 1700   • furosemide (LASIX) 40 MG tablet Take 1 tablet by mouth twice daily (Patient taking differently: Take 40 mg  by mouth 2 (Two) Times a Day.) 180 tablet 0 12/16/2021 at 1200   • metoprolol succinate XL (TOPROL-XL) 50 MG 24 hr tablet Take 50 mg by mouth Every Evening.   12/16/2021 at 1700   • multivitamin with minerals (CENTRUM MEN PO) Take 1 tablet by mouth Daily. HOLD FOR SURGERY   12/16/2021 at 0600   • rosuvastatin (Crestor) 40 MG tablet Take 1 tablet by mouth Daily. (Patient taking differently: Take 40 mg by mouth Every Night.) 90 tablet 3 12/16/2021 at 1700   • tamsulosin (FLOMAX) 0.4 MG capsule 24 hr capsule Take 1 capsule by mouth Every Evening. 30 capsule 11 12/16/2021 at 1700   • dabigatran etexilate (PRADAXA) 75 MG capsule Take 75 mg by mouth 2 (Two) Times a Day. HELD FOR SURGERY. LAST DOSE 12/13/21 12/13/2021 at 0600   , Scheduled Meds:  acetaminophen, 1,000 mg, Oral, Q6H  allopurinol, 100 mg, Oral, QAM  allopurinol, 300 mg, Oral, Nightly  aspirin, 81 mg, Oral, Daily  enoxaparin, 40 mg, Subcutaneous, Daily  folic acid, 400 mcg, Oral, Daily  gabapentin, 300 mg, Oral, TID  ipratropium-albuterol, 3 mL, Nebulization, Q8H - RT  metoprolol succinate XL, 50 mg, Oral, Q PM  rosuvastatin, 40 mg, Oral, Daily  sodium chloride, 10 mL, Intravenous, Q12H  tamsulosin, 0.4 mg, Oral, Q PM    , Continuous Infusions:   , PRN Meds:  HYDROmorphone  •  ondansetron **OR** ondansetron  •  oxyCODONE  •  sodium chloride and Allergies:  Ranolazine er, Apixaban, Indomethacin, Lisinopril, and Rivaroxaban    Objective     Vital Signs   Temp:  [98 °F (36.7 °C)-98.6 °F (37 °C)] 98.2 °F (36.8 °C)  Heart Rate:  [56-86] 80  Resp:  [16-17] 16  BP: ()/(58-97) 152/87  Arterial Line BP: (102-147)/(48-83) 117/62    PPE used per hospital policy    Physical Exam:  Constitutional: Not in acute distress.  Eyes: Injected conjunctivae, EOMI. pupils equal reactive to light.  ENMT: [place] and Mallampati score 3. No oral thrush.  Moist tongue.  External nares normal.  Neck: Large. Trachea midline. No thyromegaly  Heart: RRR, no murmur  Lungs: Equal but  diminished air entry throughout.  No crackles or wheezing.  Nonlabored breathing.  Non labored breathing.   Abdomen: Obese. Soft. No tenderness or dullness. No HSM.  Extremities: No cyanosis, clubbing or pitting edema.  Warm extremities and well-perfused.  Neuro: Conscious, alert, oriented x3.  Strength 5/5 in arms.  Psych: Appropriate mood and affect.    Integumentary: No rash.  Normal skin turgor  Lymphatic: No palpable cervical or supraclavicular lymph nodes.      Diagnostic imaging:  I personally and independently reviewed the following images:   CXR 12/18/2021: Normal.  No pulmonary filtrates        Assessment   1. Intermittent nocturnal hypoxemia, suggestive of sleep apnea  2. COPD, no current exacerbation  3. STEFANO, prior diagnosis.  Noncompliant with Pap therapy  4. AAA s/p EVAR  5. Obesity, BMI 31      Recommendations:    Check overnight oximetry.  If desaturation then prescribe oxygen at night on discharge.  He does not want to use Pap but stated that he may consider down the road.  If he gets discharged home then we will see him as outpatient and arrange for overnight oximetry.    Outpatient follow-up with PFT and consider reevaluation for inhaler therapy.  I told him that we may get him something cheap and covered by his insurance.    Counseled for weight loss    Okay to discharge home from pulmonary perspective          Jayy Bruno MD  12/18/21  15:47 EST

## 2021-12-18 NOTE — PLAN OF CARE
Goal Outcome Evaluation:         VSS, A&O x4, A-fib rate controlled, RA and 2 LPM while sleeping. Patient had morton and A-line removed early in shift. Patient voided 100 ml, bladder scan showed only 190 ml. Patient due to void by 1930. Patient ambulated in room and villalpadno this shift without distress. Patient has order for overnight oximetry d/t suspected STEFANO and requiring nocturnal O2. Patient expected to d/c 12/19/21

## 2021-12-18 NOTE — PROGRESS NOTES
Name: Juan James ADMIT: 2021   : 1945  PCP: Luda Kidd MD    MRN: [id number] LOS: 1 days   AGE/SEX: 76 y.o. male  ROOM: [address]    Billin, Post Op Global    76 y.o. male POD 1 s/p EVAR with bilateral open femoral exposures    Doing well post op.  Pain well controlled on current regimen.  Not requiring any vasoactive drips.  Has not been out of bed or ambulated.  Per nursing staff, oxygen saturation dropping to 70s while he is sleeping.  He does not use oxygen at home.    Scheduled Medications:   acetaminophen, 1,000 mg, Oral, Q6H  allopurinol, 100 mg, Oral, QAM  allopurinol, 300 mg, Oral, Nightly  aspirin, 81 mg, Oral, Daily  enoxaparin, 40 mg, Subcutaneous, Daily  folic acid, 400 mcg, Oral, Daily  gabapentin, 300 mg, Oral, TID  ipratropium-albuterol, 3 mL, Nebulization, Q8H - RT  metoprolol succinate XL, 50 mg, Oral, Q PM  rosuvastatin, 40 mg, Oral, Daily  sodium chloride, 10 mL, Intravenous, Q12H  tamsulosin, 0.4 mg, Oral, Q PM      Active Infusions:     Vital Signs  Vital Signs Patient Vitals for the past 24 hrs:   BP Temp Temp src Pulse Resp SpO2 Height Weight   21 0700 115/75 98.2 °F (36.8 °C) Oral 56 16 94 % -- --   21 0622 -- -- -- 66 16 96 % -- --   21 0600 113/58 -- -- 70 -- 96 % -- --   21 0500 94/66 -- -- 69 -- 97 % -- --   21 0400 126/66 -- -- 69 -- 97 % -- --   21 0300 99/61 -- -- 69 -- 96 % -- --   21 0258 99/61 98.6 °F (37 °C) Oral 60 17 -- -- --   21 0232 102/59 -- -- 75 -- 93 % -- --   12/18/21 0200 -- -- -- 65 -- 97 % -- --   21 0100 112/71 -- -- 64 -- 97 % -- --   21 0000 -- -- -- 76 -- 96 % -- --   21 146/77 98 °F (36.7 °C) Oral 63 17 95 % -- --   21 -- -- -- -- -- 97 % -- --   21 131/67 -- -- 74 -- -- -- --   21 124/82 -- -- 70 -- 96 % -- --   21 190 122/88 98 °F (36.7 °C) Oral 69 17 96 % -- --   21 134/87 -- -- 67 -- 97 %  "-- --   12/17/21 1701 133/97 -- -- 86 -- 92 % -- --   12/17/21 1601 131/74 -- -- 66 -- 90 % -- --   12/17/21 1539 129/81 -- -- 73 -- 95 % -- --   12/17/21 1530 -- -- -- 71 18 97 % -- --   12/17/21 1416 144/89 98 °F (36.7 °C) Oral 79 16 97 % 185.4 cm (72.99\") 107 kg (236 lb)   12/17/21 1345 (!) 147/102 -- -- 75 16 97 % -- --   12/17/21 1330 153/97 97.8 °F (36.6 °C) Oral 64 16 97 % -- --   12/17/21 1315 (!) 136/102 -- -- 71 16 98 % -- --   12/17/21 1300 154/86 -- -- 66 -- 92 % -- --   12/17/21 1245 146/79 -- -- 74 -- 95 % -- --   12/17/21 1230 141/83 -- -- 79 -- 94 % -- --   12/17/21 1215 131/91 -- -- 81 -- 90 % -- --   12/17/21 1200 140/86 -- -- 73 14 94 % -- --   12/17/21 1145 137/95 -- -- 81 14 96 % -- --   12/17/21 1130 126/82 -- -- 75 16 95 % -- --   12/17/21 1115 146/86 -- -- 73 16 93 % -- --     I/O:  I/O last 3 completed shifts:  In: 3420 [P.O.:960; I.V.:2100; Blood:360]  Out: 2400 [Urine:1650; Blood:750]  Physical Exam:    Physical Exam   BMI 31  Awake, alert, NAD  Abdomen soft, NT, ND  Groin incisions c/d/i with bruising, palpable DP pulses bilaterally    CBC    Results from last 7 days   Lab Units 12/18/21  0614 12/15/21  0839   WBC 10*3/mm3 5.87 5.44   HEMOGLOBIN g/dL 12.6* 14.6   PLATELETS 10*3/mm3 101* 131*     BMP   Results from last 7 days   Lab Units 12/18/21  0614 12/15/21  0839   SODIUM mmol/L 138 137   POTASSIUM mmol/L 4.0 3.7   CHLORIDE mmol/L 104 100   CO2 mmol/L 27.5 26.2   BUN mg/dL 19 26*   CREATININE mg/dL 1.16 1.07   GLUCOSE mg/dL 125* 104*     Coag     Assessment/Plan   Assessment & Plan    COPD (chronic obstructive pulmonary disease) (HCC)    Abdominal aortic aneurysm (HCC)    AAA (abdominal aortic aneurysm) without rupture (HCC)    76 y.o. male POD 1 s/p EVAR with bilateral open femoral artery exposure    -doing well post op  -d/c arterial line  -d/c Fuller  -pain controlled  -will consult pulmonology regarding nocturnal hypoxia-may need referral for sleep study vs home oxygen for hospital " discharge    Personal protective equipment used for this patient encounter:  Patient wearing surgical mask []    Provider wearing a surgical mask [x]    Gloves []    Eye protection []    Face Shield []    Gown []    N 95 respirator or CAPR/PAPR []   Duration of interaction 10 mins    Norma Garcia MD  Vascular Surgery  [facility]  O: [phone]  F: [phone]      Please call my office with any question: [phone]    Active Hospital Problems    Diagnosis  POA   • AAA (abdominal aortic aneurysm) without rupture (HCC) [I71.4]  Yes   • Abdominal aortic aneurysm (HCC) [I71.4]  Yes   • COPD (chronic obstructive pulmonary disease) (HCC) [J44.9]  Yes      Resolved Hospital Problems   No resolved problems to display.

## 2021-12-18 NOTE — PLAN OF CARE
Goal Outcome Evaluation:  Plan of Care Reviewed With: patient        Progress: improving  Outcome Summary: S/p AAA sten graft. Bilateral groin incision CDI, open to air with dermabond. Bilateral sites soft, some tenderness when palpating. Pulses palpable, verified with doppler. Denies pain. Susanne to left wrist correlating with noninvasives. VSS, desats while sleeping. FC to bedside drainage, good UO. Progressing towards goals, will continue to monitor.

## 2021-12-19 ENCOUNTER — READMISSION MANAGEMENT (OUTPATIENT)
Dept: CALL CENTER | Facility: HOSPITAL | Age: 76
End: 2021-12-19

## 2021-12-19 VITALS
RESPIRATION RATE: 18 BRPM | TEMPERATURE: 97.8 F | DIASTOLIC BLOOD PRESSURE: 87 MMHG | BODY MASS INDEX: 31.28 KG/M2 | OXYGEN SATURATION: 95 % | WEIGHT: 236 LBS | SYSTOLIC BLOOD PRESSURE: 131 MMHG | HEART RATE: 69 BPM | HEIGHT: 73 IN

## 2021-12-19 PROCEDURE — 94799 UNLISTED PULMONARY SVC/PX: CPT

## 2021-12-19 PROCEDURE — 25010000002 ENOXAPARIN PER 10 MG: Performed by: SURGERY

## 2021-12-19 PROCEDURE — 94761 N-INVAS EAR/PLS OXIMETRY MLT: CPT

## 2021-12-19 RX ORDER — ASPIRIN 81 MG/1
81 TABLET ORAL DAILY
Refills: 11
Start: 2021-12-20

## 2021-12-19 RX ORDER — ACETAMINOPHEN 500 MG
1000 TABLET ORAL EVERY 6 HOURS SCHEDULED
Qty: 24 TABLET | Refills: 0 | Status: SHIPPED | OUTPATIENT
Start: 2021-12-19 | End: 2021-12-22

## 2021-12-19 RX ORDER — OXYCODONE HYDROCHLORIDE 5 MG/1
5 TABLET ORAL EVERY 4 HOURS PRN
Qty: 20 TABLET | Refills: 0 | Status: SHIPPED | OUTPATIENT
Start: 2021-12-19 | End: 2021-12-24

## 2021-12-19 RX ADMIN — Medication 400 MCG: at 08:45

## 2021-12-19 RX ADMIN — IPRATROPIUM BROMIDE AND ALBUTEROL SULFATE 3 ML: 2.5; .5 SOLUTION RESPIRATORY (INHALATION) at 06:56

## 2021-12-19 RX ADMIN — ACETAMINOPHEN 1000 MG: 500 TABLET, FILM COATED ORAL at 06:06

## 2021-12-19 RX ADMIN — GABAPENTIN 300 MG: 300 CAPSULE ORAL at 08:45

## 2021-12-19 RX ADMIN — ENOXAPARIN SODIUM 40 MG: 40 INJECTION SUBCUTANEOUS at 08:44

## 2021-12-19 RX ADMIN — ALLOPURINOL 100 MG: 100 TABLET ORAL at 06:06

## 2021-12-19 RX ADMIN — ACETAMINOPHEN 1000 MG: 500 TABLET, FILM COATED ORAL at 11:52

## 2021-12-19 RX ADMIN — ROSUVASTATIN CALCIUM 40 MG: 40 TABLET, FILM COATED ORAL at 08:45

## 2021-12-19 RX ADMIN — ASPIRIN 81 MG: 81 TABLET, COATED ORAL at 08:45

## 2021-12-19 RX ADMIN — SODIUM CHLORIDE, PRESERVATIVE FREE 10 ML: 5 INJECTION INTRAVENOUS at 08:45

## 2021-12-19 NOTE — DISCHARGE SUMMARY
Name: Juan James ADMIT: 2021   : 1945  PCP: Luda Kidd MD    MRN: [id number] LOS: 2 days   AGE/SEX: 76 y.o. male  ROOM: [facility] E549/1     Date of Admission: 2021  Date of Discharge:  2021    PCP: Luda Kidd MD      DISCHARGE DIAGNOSIS  Active Hospital Problems    Diagnosis  POA   • AAA (abdominal aortic aneurysm) without rupture ([facility]) [I71.4]  Yes   • Abdominal aortic aneurysm (HCC) [I71.4]  Yes   • COPD (chronic obstructive pulmonary disease) ([facility]) [J44.9]  Yes      Resolved Hospital Problems   No resolved problems to display.       SECONDARY DIAGNOSES  Past Medical History:   Diagnosis Date   • AAA (abdominal aortic aneurysm) ([facility])    • Arthritis    • Atherosclerotic heart disease of native coronary artery without angina pectoris    • Atrial fibrillation ([facility])    • Benign essential hypertension    • BPH (benign prostatic hyperplasia)    • Cervical spondylosis 2012   • CHF (congestive heart failure) ([facility])    • COPD (chronic obstructive pulmonary disease) ([facility])    • Coronary artery disease     STENT X 3   • DDD (degenerative disc disease), cervical    • DDD (degenerative disc disease), lumbar    • Dry skin     LOWER LEGS BILAT   • Edema     LOWER LEGS BILAT   • Elevated cholesterol    • Emphysema/COPD ([facility])    • Essential hypertriglyceridemia    • Hearing loss    • High blood pressure    • History of blood transfusion    • History of gastrointestinal hemorrhage    • History of gout    • History of myasthenia gravis     LAST EPISODE 5-6 YEARS AGO   • Hoarseness, persistent    • Hyperlipidemia    • Hypertension    • Lower back pain    • Lumbar radiculopathy 2016   • OA (osteoarthritis)    • Peptic ulceration     PUD   • Skin abnormalities     FLAKEY SKIN LOWER LEGS BILAT   • Sleep apnea     DOES NOT WEAR CPAP MACHINE    • Status post angioplasty with stent     STENT X3   • Tinnitus     BILAT   • Unsteady gait when walking     USES 3 PRONG CANE  "      CONSULTS   Consults     Date and Time Order Name Status Description    12/18/2021 11:20 AM Inpatient Pulmonology Consult Completed           PROCEDURES PERFORMED    Date: EVAR with bilateral open femoral artery exposure, 12/17/21    HOSPITAL COURSE  Patient is a 76 y.o. male presented to [facility] admitted postoperatively following EVAR on 12/17/21.  Please see the admitting history and physical for further details.  His post operative course was complicated by hypoxia while sleeping, with oxygen saturations decreasing to the seventies while sleeping.  Pulmonary was consulted and they recommend home oxygen and outpatient pulmonary follow up, which were ordered at discharge.  Otherwise, he had an unremarkable post operative course and recovered as expected.  He was discharged home on POD 2.       VITAL SIGNS  /87 (BP Location: Left arm, Patient Position: Sitting)   Pulse 69   Temp 97.8 °F (36.6 °C) (Oral)   Resp 18   Ht 185.4 cm (72.99\")   Wt 107 kg (236 lb)   SpO2 95%   BMI 31.14 kg/m²   Objective  CONDITION ON DISCHARGE  Stable.      DISCHARGE DISPOSITION   Home or Self Care      DISCHARGE MEDICATIONS     Discharge Medications      New Medications      Instructions Start Date   acetaminophen 500 MG tablet  Commonly known as: TYLENOL   1,000 mg, Oral, Every 6 Hours Scheduled      aspirin 81 MG EC tablet   81 mg, Oral, Daily   Start Date: December 20, 2021     oxyCODONE 5 MG immediate release tablet  Commonly known as: ROXICODONE   5 mg, Oral, Every 4 Hours PRN         Changes to Medications      Instructions Start Date   allopurinol 100 MG tablet  Commonly known as: ZYLOPRIM  What changed: when to take this   100 mg, Oral, Daily      allopurinol 300 MG tablet  Commonly known as: ZYLOPRIM  What changed: when to take this   300 mg, Oral, Daily      folic acid 400 MCG tablet  Commonly known as: FOLVITE  What changed: when to take this   400 mcg, Oral, Daily      rosuvastatin 40 MG " tablet  Commonly known as: Crestor  What changed: when to take this   40 mg, Oral, Daily      Vitamin B-12 5000 MCG tablet dispersible  What changed: additional instructions   1 tablet, Translingual, Daily         Continue These Medications      Instructions Start Date   dabigatran etexilate 75 MG capsule  Commonly known as: PRADAXA   75 mg, Oral, 2 Times Daily, HELD FOR SURGERY. LAST DOSE 21      furosemide 40 MG tablet  Commonly known as: LASIX   Take 1 tablet by mouth twice daily      metoprolol succinate XL 50 MG 24 hr tablet  Commonly known as: TOPROL-XL   50 mg, Oral, Every Evening      multivitamin with minerals tablet tablet   1 tablet, Oral, Daily, HOLD FOR SURGERY       tamsulosin 0.4 MG capsule 24 hr capsule  Commonly known as: FLOMAX   0.4 mg, Oral, Every Evening              Future Appointments   Date Time Provider Department Center   2021  9:40 AM Stephan Harvey MD [facility] SPMD EPT [facility]   2022  8:30 AM Luda Kidd MD [facility] PC [id number] [facility]   2022 10:00 AM Oly Sabillon APRN [facility] CD [id number] [facility]      Follow-up Information     Donnie Patino MD. Schedule an appointment as soon as possible for a visit in 1 month(s).    Specialties: Pulmonary Disease, Sleep Medicine  Contact information:  [address] [address]  [facility] [id number] [phone]             Luda Kidd MD .    Specialty: Family Medicine  Contact information:  [address] [facility] [address]  [facility] [id number] [phone]                         TEST  RESULTS PENDING AT DISCHARGE       Billin, Post Op Global    Norma Garcia MD  Office Number [phone]    21  12:28 EST

## 2021-12-19 NOTE — DISCHARGE INSTR - OTHER ORDERS
Contact [facility] at [phone] when you arrive home to schedule same-day oxygen concentrator delivery for your nocturnal oxygen.

## 2021-12-19 NOTE — DISCHARGE PLACEMENT REQUEST
"Bobby Hung (76 y.o. Male)             Date of Birth Social Security Number Address Home Phone MRN    1945  [address]  [place] [address] [phone] [id number]    Buddhism Marital Status             None        Admission Date Admission Type Admitting Provider Attending Provider Department, Room/Bed    12/17/21 Elective Jean Patricia MD Thomas, Bradley Glynn, MD [address], E549/1    Discharge Date Discharge Disposition Discharge Destination           Home or Self Care              Attending Provider: Jean Patricia MD    Allergies: Ranolazine Er, Apixaban, Indomethacin, Lisinopril, Rivaroxaban    Isolation: None   Infection: None   Code Status: CPR   Advance Care Planning Activity    Ht: 185.4 cm (72.99\")   Wt: 107 kg (236 lb)    Admission Cmt: None   Principal Problem: None                Active Insurance as of 12/17/2021     Primary Coverage     Payor Plan Insurance Group Employer/Plan Group    MEDICARE MEDICARE A & B      Payor Plan Address Payor Plan Phone Number Payor Plan Fax Number Effective Dates    [address] [phone]  6/1/2006 - None Entered    [facility] [id number]       Subscriber Name Subscriber Birth Date Member ID       BOBBY HUNG 1945 [id number]           Secondary Coverage     Payor Plan Insurance Group Employer/Plan Group    A[employer] MC SUP AAR HEALTH CARE OPTIONS PLAN F     Payor Plan Address Payor Plan Phone Number Payor Plan Fax Number Effective Dates    [facility] [phone]  12/1/2012 - None Entered    [address]       [facility] [address]       Subscriber Name Subscriber Birth Date Member ID       BOBBY HUNG 1945 [id number]                 Emergency Contacts      (Rel.) Home Phone Work Phone Mobile Phone    Bobby Hung Jr (Son) [phone] -- [phone]    Ganga Hung (Son) [phone] -- [phone]            "

## 2021-12-19 NOTE — DISCHARGE INSTRUCTIONS
Wound care: no showers for 2 days post op.  You may shower starting on 12/19/21.  No soaking or submerging incisions under water for 2 weeks post op.  Skin glue is over your incisions.  This will begin peeling away about 5-7 days after your procedure and you may completely remove it once it begins peeling.  No lifting anything heavier than 10 lbs for 1 week post operatively.      Pain: take tylenol over the counter (follow instructions on the bottle) and a stronger pain medication has been sent to your pharmacy for you to take as needed.     If any fever (>101.5 F), purulent drainage from wound, or redness around wound call our office at [phone].

## 2021-12-19 NOTE — CASE MANAGEMENT/SOCIAL WORK
Continued Stay Note  [facility]     Patient Name: Juan James  MRN: [id number]  Today's Date: 12/19/2021    Admit Date: 12/17/2021     Discharge Plan     Row Name 12/19/21 1324       Plan    Plan Home with nocturnal oxygen from [facility]. Denies needs    Patient/Family in Agreement with Plan yes    Plan Comments CCP notified by RN patient will be discharged today and has nocturnal oxygen orders. Noted overnight oximetry uploaded into epic. Spoke with patient who has selected [facility] to provide nocturnal oxygen. Referral called to Carmen/Adams who confirms she has required documentation and patient is okay to discharge from a DME standpoint. CCP notified patient that nocturnal oxygen has been arranged and he will need to contact [facility] upon arriving home to have concentrator delivered to his home. Patient confirms he has a ride home and denies other discharge planning needs. MIRI/Nisreen updated. Cecilia Atkinson RN/CARLOS               Discharge Codes    No documentation.               Expected Discharge Date and Time     Expected Discharge Date Expected Discharge Time    Dec 19, 2021             Shawna Atkinson

## 2021-12-19 NOTE — PLAN OF CARE
Goal Outcome Evaluation:      VSS, A&O x4, RA, patient ambulating in room and urinating without problems. Patient  does not appear in distress and has c/o pain this shift. Patient to d/c today.

## 2021-12-19 NOTE — OUTREACH NOTE
Prep Survey      Responses   Gnosticist facility patient discharged from? [place]   Is LACE score < 7 ? No   Emergency Room discharge w/ pulse ox? No   Eligibility [facility]   Date of Admission 12/17/21   Date of Discharge 12/19/21   Discharge Disposition Home or Self Care   Discharge diagnosis EVAR with bilateral open femoral artery exposure for AAA, Hx COPD, post-op hypoxia   Does the patient have one of the following disease processes/diagnoses(primary or secondary)? General Surgery   Does the patient have Home health ordered? No   Is there a DME ordered? Yes   What DME was ordered? nocturnal O2 from [place]   Prep survey completed? Yes          Chana Colvin RN

## 2021-12-19 NOTE — PLAN OF CARE
Goal Outcome Evaluation:  VSS. Bilateral groins c/d/I, soft. Patient c/o soreness, but denies need for pain meds. Pedal pulses per doppler. Has started to void well with urinal, PVR <100cc. On overnight oxymetry now on RA. Plan to dc home in am.  Will continue to monitor.

## 2021-12-20 ENCOUNTER — HOSPITAL ENCOUNTER (EMERGENCY)
Facility: HOSPITAL | Age: 76
Discharge: HOME | End: 2021-12-20
Attending: EMERGENCY MEDICINE | Admitting: EMERGENCY MEDICINE

## 2021-12-20 ENCOUNTER — APPOINTMENT (OUTPATIENT)
Dept: CT IMAGING | Facility: HOSPITAL | Age: 76
End: 2021-12-20

## 2021-12-20 ENCOUNTER — TELEPHONE (OUTPATIENT)
Dept: SPORTS MEDICINE | Facility: CLINIC | Age: 76
End: 2021-12-20

## 2021-12-20 ENCOUNTER — TRANSITIONAL CARE MANAGEMENT TELEPHONE ENCOUNTER (OUTPATIENT)
Dept: CALL CENTER | Facility: HOSPITAL | Age: 76
End: 2021-12-20

## 2021-12-20 VITALS
HEART RATE: 83 BPM | HEIGHT: 73 IN | OXYGEN SATURATION: 96 % | SYSTOLIC BLOOD PRESSURE: 167 MMHG | TEMPERATURE: 97.9 F | RESPIRATION RATE: 18 BRPM | DIASTOLIC BLOOD PRESSURE: 97 MMHG | BODY MASS INDEX: 31.14 KG/M2

## 2021-12-20 DIAGNOSIS — R10.31 BILATERAL GROIN PAIN: ICD-10-CM

## 2021-12-20 DIAGNOSIS — R10.32 BILATERAL GROIN PAIN: ICD-10-CM

## 2021-12-20 DIAGNOSIS — R33.9 URINARY RETENTION: Primary | ICD-10-CM

## 2021-12-20 LAB
ALBUMIN SERPL-MCNC: 3.6 G/DL (ref 3.5–5.2)
ALBUMIN/GLOB SERPL: 1.1 G/DL
ALP SERPL-CCNC: 132 U/L (ref 39–117)
ALT SERPL W P-5'-P-CCNC: 95 U/L (ref 1–41)
ANION GAP SERPL CALCULATED.3IONS-SCNC: 9.5 MMOL/L (ref 5–15)
APTT PPP: 32.4 SECONDS (ref 22.7–35.4)
AST SERPL-CCNC: 61 U/L (ref 1–40)
BASOPHILS # BLD AUTO: 0.03 10*3/MM3 (ref 0–0.2)
BASOPHILS NFR BLD AUTO: 0.4 % (ref 0–1.5)
BILIRUB SERPL-MCNC: 0.8 MG/DL (ref 0–1.2)
BUN SERPL-MCNC: 13 MG/DL (ref 8–23)
BUN/CREAT SERPL: 13.3 (ref 7–25)
CALCIUM SPEC-SCNC: 8.7 MG/DL (ref 8.6–10.5)
CHLORIDE SERPL-SCNC: 104 MMOL/L (ref 98–107)
CO2 SERPL-SCNC: 26.5 MMOL/L (ref 22–29)
CREAT SERPL-MCNC: 0.98 MG/DL (ref 0.76–1.27)
DEPRECATED RDW RBC AUTO: 46 FL (ref 37–54)
EOSINOPHIL # BLD AUTO: 0.15 10*3/MM3 (ref 0–0.4)
EOSINOPHIL NFR BLD AUTO: 2.2 % (ref 0.3–6.2)
ERYTHROCYTE [DISTWIDTH] IN BLOOD BY AUTOMATED COUNT: 12.7 % (ref 12.3–15.4)
GFR SERPL CREATININE-BSD FRML MDRD: 75 ML/MIN/1.73
GLOBULIN UR ELPH-MCNC: 3.4 GM/DL
GLUCOSE SERPL-MCNC: 111 MG/DL (ref 65–99)
HCT VFR BLD AUTO: 36.6 % (ref 37.5–51)
HGB BLD-MCNC: 13 G/DL (ref 13–17.7)
IMM GRANULOCYTES # BLD AUTO: 0.03 10*3/MM3 (ref 0–0.05)
IMM GRANULOCYTES NFR BLD AUTO: 0.4 % (ref 0–0.5)
INR PPP: 1.06 (ref 0.9–1.1)
LIPASE SERPL-CCNC: 42 U/L (ref 13–60)
LYMPHOCYTES # BLD AUTO: 1.08 10*3/MM3 (ref 0.7–3.1)
LYMPHOCYTES NFR BLD AUTO: 16 % (ref 19.6–45.3)
MCH RBC QN AUTO: 35.3 PG (ref 26.6–33)
MCHC RBC AUTO-ENTMCNC: 35.5 G/DL (ref 31.5–35.7)
MCV RBC AUTO: 99.5 FL (ref 79–97)
MONOCYTES # BLD AUTO: 0.73 10*3/MM3 (ref 0.1–0.9)
MONOCYTES NFR BLD AUTO: 10.8 % (ref 5–12)
NEUTROPHILS NFR BLD AUTO: 4.72 10*3/MM3 (ref 1.7–7)
NEUTROPHILS NFR BLD AUTO: 70.2 % (ref 42.7–76)
NRBC BLD AUTO-RTO: 0 /100 WBC (ref 0–0.2)
PLATELET # BLD AUTO: 105 10*3/MM3 (ref 140–450)
PMV BLD AUTO: 9.1 FL (ref 6–12)
POTASSIUM SERPL-SCNC: 3.8 MMOL/L (ref 3.5–5.2)
PROT SERPL-MCNC: 7 G/DL (ref 6–8.5)
PROTHROMBIN TIME: 13.6 SECONDS (ref 11.7–14.2)
RBC # BLD AUTO: 3.68 10*6/MM3 (ref 4.14–5.8)
SODIUM SERPL-SCNC: 140 MMOL/L (ref 136–145)
WBC NRBC COR # BLD: 6.74 10*3/MM3 (ref 3.4–10.8)

## 2021-12-20 PROCEDURE — 85730 THROMBOPLASTIN TIME PARTIAL: CPT | Performed by: EMERGENCY MEDICINE

## 2021-12-20 PROCEDURE — 99283 EMERGENCY DEPT VISIT LOW MDM: CPT

## 2021-12-20 PROCEDURE — 80053 COMPREHEN METABOLIC PANEL: CPT | Performed by: EMERGENCY MEDICINE

## 2021-12-20 PROCEDURE — 51702 INSERT TEMP BLADDER CATH: CPT

## 2021-12-20 PROCEDURE — 85025 COMPLETE CBC W/AUTO DIFF WBC: CPT | Performed by: EMERGENCY MEDICINE

## 2021-12-20 PROCEDURE — 25010000002 IOPAMIDOL 61 % SOLUTION: Performed by: PHYSICIAN ASSISTANT

## 2021-12-20 PROCEDURE — 83690 ASSAY OF LIPASE: CPT | Performed by: EMERGENCY MEDICINE

## 2021-12-20 PROCEDURE — 74177 CT ABD & PELVIS W/CONTRAST: CPT

## 2021-12-20 PROCEDURE — 85610 PROTHROMBIN TIME: CPT | Performed by: EMERGENCY MEDICINE

## 2021-12-20 RX ORDER — SODIUM CHLORIDE 0.9 % (FLUSH) 0.9 %
10 SYRINGE (ML) INJECTION AS NEEDED
Status: DISCONTINUED | OUTPATIENT
Start: 2021-12-20 | End: 2021-12-20 | Stop reason: HOSPADM

## 2021-12-20 RX ADMIN — IOPAMIDOL 85 ML: 612 INJECTION, SOLUTION INTRAVENOUS at 05:18

## 2021-12-20 NOTE — CONSULTS
Name: Juan James ADMIT: 2021   : 1945  PCP: Luda Kidd MD    MRN: [id number] LOS: 0 days   AGE/SEX: 76 y.o. male  ROOM:      Consults  Norma Garcia MD     LOS: 0 days   Patient Care Team:  Luda Kidd MD as PCP - General (Family Medicine)  Bill Washington MD as Surgeon (Neurosurgery)  Zafar Preston MD as Consulting Physician (Vascular Surgery)  Alesia Aviles MD as Consulting Physician (Cardiology)  Chris Mo III, MD as Consulting Physician (Cardiology)    Subjective     History of Present Illness  76 y.o. male with a h/o EVAR with bilateral open femoral access on 21 who presented to the ER this morning with scrotal swelling and urinary retention.  Post operatively, he had urinary retention that resolved with his home flomax.  No fevers, no chills, no drainage from incisions.  CT A/P reviewed, post op changes to both groins.  No leukocytosis, labs otherwise within normal limits.        Review of Systems   Constitutional: Negative.    HENT: Negative.    Eyes: Negative.    Respiratory: Negative.    Cardiovascular: Negative.    Gastrointestinal: Negative.    Endocrine: Negative.    Genitourinary: Positive for decreased urine volume, penile swelling and scrotal swelling.   Musculoskeletal: Negative.    Skin: Negative.    Allergic/Immunologic: Negative.    Neurological: Negative.    Hematological: Negative.    Psychiatric/Behavioral: Negative.        Past Medical History:   Diagnosis Date   • AAA (abdominal aortic aneurysm) ([facility])    • Arthritis    • Atherosclerotic heart disease of native coronary artery without angina pectoris    • Atrial fibrillation ([facility])    • Benign essential hypertension    • BPH (benign prostatic hyperplasia)    • Cervical spondylosis 2012   • CHF (congestive heart failure) ([facility])    • COPD (chronic obstructive pulmonary disease) ([facility])    • Coronary artery disease     STENT X 3   • DDD (degenerative disc disease), cervical    •  DDD (degenerative disc disease), lumbar    • Dry skin     LOWER LEGS BILAT   • Edema     LOWER LEGS BILAT   • Elevated cholesterol    • Emphysema/COPD ([facility])    • Essential hypertriglyceridemia    • Hearing loss    • High blood pressure    • History of blood transfusion    • History of gastrointestinal hemorrhage    • History of gout    • History of myasthenia gravis     LAST EPISODE 5-6 YEARS AGO   • Hoarseness, persistent    • Hyperlipidemia    • Hypertension    • Lower back pain    • Lumbar radiculopathy 05/2016   • OA (osteoarthritis)    • Peptic ulceration     PUD   • Skin abnormalities     FLAKEY SKIN LOWER LEGS BILAT   • Sleep apnea     DOES NOT WEAR CPAP MACHINE    • Status post angioplasty with stent     STENT X3   • Tinnitus     BILAT   • Unsteady gait when walking     USES 3 PRONG CANE       Past Surgical History:   Procedure Laterality Date   • ANTERIOR CERVICAL DISCECTOMY W/ FUSION N/A 3/25/2016    Procedure: C3- C5 CERVICAL DISCECTOMY ANTERIOR FUSION WITH INSTRUMENTATION;  Surgeon: Bill Washington MD;  Location: [facility];  Service:    • ARTHRODESIS N/A 07/1983    LUMBAR   • ARTHRODESIS N/A 1987    LUMBAR   • ARTHRODESIS      Spinal Arthrodesis-lower spine-7/1983, 1987--upper spine-1988, 7/1990-Abstracted from [employer]   • BACK SURGERY      Lower Back Surgery   • CARDIAC CATHETERIZATION  07/2009   • CARDIAC CATHETERIZATION  03/18/2003    STENT TO RCA AND PCA, DR. PRIYA LUGO   • CATARACT EXTRACTION Bilateral     LENS IMPLANT   • CERVICAL ARTHRODESIS N/A 07/1990   • CERVICAL ARTHRODESIS N/A 1988   • COLONOSCOPY N/A 10/3/2019    4 MM HYPERPLASTIC POLYP IN ILEOCECAL VALVE, 4 MM SESSILE SERRATED ADENOMA POLYP IN DESCENDING, 5 TUBULOVILLOUS ADENOMA POLYPS IN RECTUM, 3 TUBULAR ADENOMA POLYPS IN DISTAL RECTUM, 26 MM TUBULAR ADENOMA POLYP IN RECTUM, PIECEMEAL POLYPECTOMY, AREA TATTOOED, RESCOPE IN 6 MONTHS, DR. TAYA CRUZ AT [facility]   • COLONOSCOPY N/A 2/17/2021    Procedure: COLONOSCOPY to cecum with  cold biopsy polypectomy;  Surgeon: Yousuf Méndez MD;  Location: [facility] ENDOSCOPY;  Service: Gastroenterology;  Laterality: N/A;  pre: hx of polyps  post: polyp   • CORONARY ANGIOPLASTY WITH STENT PLACEMENT  2009    and 2009-PT STATES HAS 3 STENTS   • ENDOSCOPY N/A 2012    NON BLEEDING EROSIVE GASTROPATHY, 1 DUODENAL ULCER WITH CLEAN BASE, DR. BRANDY WREN AT [facility]   • ENDOSCOPY AND COLONOSCOPY N/A 2007    EGD WNL, 8 ADENOMATOUS POLYPS IN RECTO-SIGMOID, RESCOPE IN 3 YRS, DR. CRISTINA RODRIGUEZ AT [facility]   • FOOT SURGERY Left     LEFT BIG TOE-JOINT REPLACED   • HAND SURGERY Left     THUMB-JOINT REPLACEMENT   • HAND SURGERY Right     JOINT REPLACEMENT RIGHT INDEX FINGER   • KNEE ARTHROSCOPY Left    • LAPAROSCOPIC CHOLECYSTECTOMY     • LUMBAR DISCECTOMY FUSION INSTRUMENTATION N/A 2020    Procedure: Lumbar 3 4 and lumbar 4 5 laminectomy and fusion with instrumentation;  Surgeon: Bill Washington MD;  Location: [facility] MAIN OR;  Service: Neurosurgery;  Laterality: N/A;   • NECK SURGERY     • VASECTOMY Bilateral        Family History   Adopted: Yes   Problem Relation Age of Onset   • Heart attack Mother 66   • Alcohol abuse Mother    • Heart disease Mother 66   • No Known Problems Father    • Heart disease Other         FH in females before the age of 65   • Malig Hyperthermia Neg Hx        Social History     Tobacco Use   • Smoking status: Former Smoker     Packs/day: 2.00     Years: 40.00     Pack years: 80.00     Types: Cigarettes     Quit date:      Years since quittin.9   • Smokeless tobacco: Former User     Types: Chew   • Tobacco comment: smoked 1 to 2 packs per day for 40 or more years   Vaping Use   • Vaping Use: Never used   Substance Use Topics   • Alcohol use: Not on file     Comment: QUIT HEAVY DRINKING 2020/ OCCAS NOW   • Drug use: No       Allergies: Ranolazine er, Apixaban, Indomethacin, Lisinopril, and Rivaroxaban    (Not in a hospital admission)          •  [COMPLETED]  Insert peripheral IV **AND** sodium chloride      Objective   Temp:  [97.8 °F (36.6 °C)-97.9 °F (36.6 °C)] 97.9 °F (36.6 °C)  Heart Rate:  [] 80  Resp:  [18] 18  BP: (192-202)/(105-155) 192/155    No intake/output data recorded.    Physical Exam  Constitutional:       Appearance: Normal appearance.      Comments: Central obesity, BMI 31   HENT:      Head: Normocephalic and atraumatic.      Right Ear: External ear normal.      Left Ear: External ear normal.      Nose: Nose normal.      Mouth/Throat:      Mouth: Mucous membranes are moist.      Pharynx: Oropharynx is clear.   Eyes:      Extraocular Movements: Extraocular movements intact.      Conjunctiva/sclera: Conjunctivae normal.      Pupils: Pupils are equal, round, and reactive to light.   Cardiovascular:      Rate and Rhythm: Normal rate and regular rhythm.      Comments: Palpable peripheral pulses  Groin incisions c/d/i, not significantly changed from exam yesterday  Pulmonary:      Effort: No respiratory distress.   Abdominal:      General: Abdomen is flat. Bowel sounds are normal.   Genitourinary:     Comments: Scrotal and penile edema  Musculoskeletal:         General: No swelling or deformity.      Cervical back: Normal range of motion and neck supple.   Skin:     General: Skin is warm and dry.      Capillary Refill: Capillary refill takes less than 2 seconds.   Neurological:      General: No focal deficit present.      Mental Status: He is alert and oriented to person, place, and time.   Psychiatric:         Mood and Affect: Mood normal.         Behavior: Behavior normal.         Results from last 7 days   Lab Units 12/20/21  0435 12/18/21  0614 12/15/21  0839   WBC 10*3/mm3 6.74 5.87 5.44   HEMOGLOBIN g/dL 13.0 12.6* 14.6   PLATELETS 10*3/mm3 105* 101* 131*     Results from last 7 days   Lab Units 12/20/21  0435 12/18/21  0614 12/15/21  0839   SODIUM mmol/L 140 138 137   POTASSIUM mmol/L 3.8 4.0 3.7   CHLORIDE mmol/L 104 104 100   CO2 mmol/L 26.5  27.5 26.2   BUN mg/dL 13 19 26*   CREATININE mg/dL 0.98 1.16 1.07   GLUCOSE mg/dL 111* 125* 104*   Estimated Creatinine Clearance: 84.8 mL/min (by C-G formula based on SCr of 0.98 mg/dL).  Results from last 7 days   Lab Units 12/20/21  0435   PROTIME Seconds 13.6   INR  1.06       Imaging Studies:  CT Abdomen Pelvis With Contrast [[id number]] (Order [id number])  Order  Status: Final result       Patient Location    Patient Class Location   Emergency [facility] EMERGENCY DEPT, 01, 01    [phone]     Study Notes       Dayne Parker, RRT on 12/20/2021  5:28 AM   ROOM: ER 1     REASON FOR EXAM: Post-operative abdominal swelling and pain.  AAA repair Dec 17 2021     CONTRAST: Isovue 300, 85 ml     CTDI: 23.4     DLP: 1586.6     # OF IMAGES: 608   Appointment Information      PACS Images     Radiology Images    Study Result    Narrative & Impression   CT ABDOMEN PELVIS W CONTRAST-     HISTORY:  Postop abdominal pain and swelling, AAA repair December 17.      TECHNIQUE:  CT of the abdomen and pelvis was performed following the  administration of intravenous contrast.    Radiation dose reduction  techniques were utilized, including automated exposure control and  exposure modulation based on body size.     COMPARISON:  CTA abdomen and pelvis 11/16/2021.     FINDINGS: The heart is enlarged. There is no pericardial effusion. There  is extensive calcific coronary artery atherosclerosis, which is  partially imaged. There are calcified granulomata and calcified lymph  nodes in the lung bases. There is mild bibasilar atelectasis. Pleural  spaces are clear.  The liver is normal in size. There is cirrhotic liver morphology. The  gallbladder is surgically absent. The pancreas, spleen, and adrenal  glands within normal limits. The right kidney is asymmetrically small.  There is bilateral renal cortical thinning, likely sequela of prior  infection and/or infarction. There is a 0.3 cm nonobstructing inferior  right renal stone. There are  at least 2 nonobstructing left renal  stones, measuring up to 0.4 cm. There is no hydronephrosis. There is  mild asymmetric dilatation of the distal left ureter, which is not  significantly changed from 11/16/2021.  There are new post surgical changes with an infrarenal abdominal  aortobiiliac stent graft. The excluded aneurysm sac measures 5.6 x 5.2  cm, 5.6 x 5.1 cm when remeasured at a similar level. There is background  extensive calcific aortoiliac and branch vessel atherosclerosis. There  is trace free pelvic fluid. No discrete or drainable fluid collection is  identified. There is new mild fat stranding adjacent to the mid to  distal abdominal aorta and common iliac arteries.  There is no bowel obstruction. The appendix is present and normal in  size. There is scattered colonic diverticula without CT evidence of  acute diverticulitis. The bladder is collapsed around a Fuller catheter.  The prostate is present.  There are surgical clips in the bilateral inguinal regions, as well as  moderate subcutaneous fat stranding and a small amount of subcutaneous  air, which is likely postoperative. No discrete or drainable fluid  collection is identified. There is multilevel degenerative disc disease.  There are postsurgical changes from posterior spinal decompression and  fusion. There are bone graft donor sites in the bilateral iliac bones.        IMPRESSION:     1.  New postsurgical changes from an aortobiiliac stent graft, as above.  Mild para-aortic fat stranding, as well as subcutaneous stranding and  air within overlying the bilateral inguinal regions, is likely within  normal limits for recent postoperative state. No discrete or drainable  fluid collection is identified.  2.  No bowel obstruction.  3.  Bilateral nonobstructing renal stones.     Discussed with AARON Curry at 6:07 AM.     This report was finalized on 12/20/2021 6:09 AM by Dr. Mana Porter M.D.                There are no [facility]  problems to display for this patient.    Problem Points:  1:  Patient has a self-limited or minor (max of 2)  Total problem points:1    Data Points:  1:  I have reviewed or order clinical lab test  1:  I have reviewed or order radiology test (except heart catheterization or echo)  2:  I have reviewed and summation of old records and/or discussed the patients care with another health care provider  Total data points:4 or more    Risk Points:  Moderate:  Chronic illness with mild exacerbation    MDM Prob point Data point Risk   SF 1 1 Minimal   Low 2 2 Low   Mod 3 3 Moderate   High 4 4 High     Code MDM History Exam Time   27169 SF/Low Detailed Detailed 30   61625 Mod Comprehensive Comprehensive 50   04629 High Comprehensive Comprehensive 70     Detailed history:  4 elements HPI or status of 3 chronic problems; 2-9 system ROS  Comprehensive:  4 elements HPI or status of 3 chronic problems;  10 system ROS    Detailed Exam:  12 findings from any organ system  Comprehensive Exam:  2 findings from each of 9 systems.     [place]    Assessment/Plan       * No active hospital problems. *        76 y.o. male with h/o AAA s/p EVAR with bilateral open femoral access on 21.  Discharged from hospital yesterday.  Returns today with urinary retention, scrotal swelling, and groin discomfort.    -post op urinary retention: continue morton, continue home flomax, outpatient urology follow up    -scrotal swelling- routine post op appearance    -no evidence of wound infection or other complication.  Follow up as scheduled with Dr. Patricia    I discussed the patients findings and my recommendations with patient and consulting provider.  Please call my office with any question: [phone]    Norma Garcia MD  21  08:16 EST

## 2021-12-20 NOTE — PROGRESS NOTES
Clinical Pharmacy Services: Medication History    Juan James is a 76 y.o. male presenting to [facility] for   Chief Complaint   Patient presents with   • Post-op Problem       He  has a past medical history of AAA (abdominal aortic aneurysm) ([facility]), Arthritis, Atherosclerotic heart disease of native coronary artery without angina pectoris, Atrial fibrillation ([facility]), Benign essential hypertension, BPH (benign prostatic hyperplasia), Cervical spondylosis (11/2012), CHF (congestive heart failure) ([facility]), COPD (chronic obstructive pulmonary disease) ([facility]), Coronary artery disease, DDD (degenerative disc disease), cervical, DDD (degenerative disc disease), lumbar, Dry skin, Edema, Elevated cholesterol, Emphysema/COPD ([facility]), Essential hypertriglyceridemia, Hearing loss, High blood pressure, History of blood transfusion, History of gastrointestinal hemorrhage, History of gout, History of myasthenia gravis, Hoarseness, persistent, Hyperlipidemia, Hypertension, Lower back pain, Lumbar radiculopathy (05/2016), OA (osteoarthritis), Peptic ulceration, Skin abnormalities, Sleep apnea, Status post angioplasty with stent, Tinnitus, and Unsteady gait when walking.    Allergies as of 12/20/2021 - Reviewed 12/20/2021   Allergen Reaction Noted   • Ranolazine er Unknown (See Comments), Headache, Irritability, and Unknown - High Severity 07/29/2019   • Apixaban Headache 01/13/2021   • Indomethacin Dizziness 09/24/2020   • Lisinopril Unknown - High Severity and Unknown - Low Severity 09/24/2020   • Rivaroxaban Other (See Comments) 07/20/2020       Medication information was obtained from: Patient  Pharmacy and Phone Number:     Prior to Admission Medications     Prescriptions Last Dose Informant Patient Reported? Taking?    acetaminophen (TYLENOL) 500 MG tablet 12/19/2021 Self No Yes    Take 2 tablets by mouth Every 6 (Six) Hours for 12 doses.    allopurinol (ZYLOPRIM) 100 MG tablet 12/19/2021 Self No Yes    Take 1  tablet by mouth Daily.    Patient taking differently:  Take 100 mg by mouth Every Morning.    allopurinol (ZYLOPRIM) 300 MG tablet Past Week Self No Yes    Take 1 tablet by mouth Daily.    Patient taking differently:  Take 300 mg by mouth Every Night.    aspirin 81 MG EC tablet 12/19/2021 Self No Yes    Take 1 tablet by mouth Daily.    Cyanocobalamin (VITAMIN B-12) 5000 MCG tablet dispersible 12/19/2021 Self No Yes    Place 1 tablet on the tongue Daily.    Patient taking differently:  Place 5,000 mcg on the tongue Daily.    dabigatran etexilate (PRADAXA) 75 MG capsule 12/19/2021 Self Yes Yes    Take 75 mg by mouth 2 (Two) Times a Day.    folic acid (FOLVITE) 400 MCG tablet 12/19/2021 Self No Yes    Take 1 tablet by mouth Daily.    Patient taking differently:  Take 400 mcg by mouth 2 (Two) Times a Day.    furosemide (LASIX) 40 MG tablet 12/19/2021 Self No Yes    Take 1 tablet by mouth twice daily    Patient taking differently:  Take 40 mg by mouth 2 (Two) Times a Day.    metoprolol succinate XL (TOPROL-XL) 50 MG 24 hr tablet Past Week Self Yes Yes    Take 50 mg by mouth Every Evening.    multivitamin with minerals (CENTRUM MEN PO) 12/19/2021 Self Yes Yes    Take 1 tablet by mouth Daily.    oxyCODONE (ROXICODONE) 5 MG immediate release tablet 12/19/2021 Self No Yes    Take 1 tablet by mouth Every 4 (Four) Hours As Needed for Moderate Pain  for up to 5 days.    rosuvastatin (Crestor) 40 MG tablet Past Week Self No Yes    Take 1 tablet by mouth Daily.    Patient taking differently:  Take 40 mg by mouth Every Night.    tamsulosin (FLOMAX) 0.4 MG capsule 24 hr capsule Past Week Self No Yes    Take 1 capsule by mouth Every Evening.            Medication notes:     This medication list is complete to the best of my knowledge as of 12/20/2021    Please call if questions.    Lisandro Jewell  Medication History Technician  [phone]    12/20/2021 08:23 EST

## 2021-12-20 NOTE — ED PROVIDER NOTES
" EMERGENCY DEPARTMENT ENCOUNTER    Room Number:  01/01  Date of encounter:  12/20/2021  PCP: Luda Kidd MD  Historian: Patient      PPE    Patient was placed in face mask in first look. Patient was wearing facemask when I entered the room and throughout our encounter. I wore full protective equipment throughout this patient encounter including a CAPR face mask, and gloves. Hand hygiene was performed before donning protective equipment and after removal when leaving the room.        HPI:  Chief Complaint: Urinary retention  A complete HPI/ROS/PMH/PSH/SH/FH are unobtainable due to: Nothing    Context: Juan James is a 76 y.o. male who arrives to the ED via private vehicle.  Patient presents with c/o mild to moderate, constant, pressure to his groin area that started around 2:15 AM this morning. Patient states that he felt \" tight\" in his groin. Around 11 when he went to bed. He states that the last time he was able to really urinate was around 215 this morning also. He states he only dribbles at that time. Patient states he was just discharged yesterday after having a AAA repair and felt well upon discharge. Patient denies fever, chills, chest pain, shortness of breath, abdominal pain, leg swelling or any other symptoms.  Patient states that nothing makes the symptoms better and nothing worsens symptoms.          PAST MEDICAL HISTORY  Active Ambulatory Problems     Diagnosis Date Noted   • Follow-up examination, following other surgery 04/11/2016   • Lumbar radiculopathy 05/10/2016   • Cervical radiculitis 09/20/2016   • Coronary artery disease involving native heart 07/29/2019   • Chronic bronchitis (HCC) 07/29/2019   • Hyperlipidemia 07/29/2019   • Edema 07/29/2019   • Rectal pain 07/29/2019   • Ocular myasthenia gravis (HCC) 11/02/2012   • Cervical spondylosis with radiculopathy 11/02/2012   • History of colon polyps 08/28/2019   • Tinnitus    • Shortness of breath    • Pulmonary embolism (HCC)    • " Peptic ulceration    • OA (osteoarthritis)    • Neck pain    • Lower back pain    • History of blood transfusion    • Hearing loss    • GERD (gastroesophageal reflux disease)    • DDD (degenerative disc disease), lumbar    • DDD (degenerative disc disease), cervical    • Coronary artery disease    • COPD (chronic obstructive pulmonary disease) ([facility])    • Colon polyps    • CHF (congestive heart failure) ([facility])    • Cervical spondylosis 11/01/2012   • Cervical disc disorder    • Cataract    • BPH (benign prostatic hyperplasia)    • Paroxysmal atrial fibrillation ([facility]) 01/04/2021   • Arthritis    • Abdominal aortic aneurysm ([facility]) 01/04/2021   • Medicare annual wellness visit, subsequent 11/26/2019   • Heart block AV complete ([facility]) 04/04/2020   • PVD (peripheral vascular disease) with claudication ([facility]) 04/04/2020   • Acute renal failure (ARF) ([facility]) 04/04/2020   • Hospital discharge follow-up 04/17/2020   • Idiopathic chronic gout of multiple sites without tophus 10/13/2020   • Spondylolisthesis of lumbar region 10/20/2020   • Olecranon bursitis of left elbow 11/17/2020   • Encounter for screening for malignant neoplasm of colon 01/22/2021   • Long term current use of anticoagulant 01/04/2021   • Stented coronary artery 01/04/2021   • Essential hypertension 01/04/2021   • History of blood in urine 03/18/2021   • Diabetes mellitus ([facility]) 08/19/2021   • Pain of right hand 11/15/2021   • AAA (abdominal aortic aneurysm) without rupture ([facility]) 12/17/2021     Resolved Ambulatory Problems     Diagnosis Date Noted   • Cervical disc disorder with radiculopathy of mid-cervical region 02/18/2016   • Essential hypertension 07/29/2019   • Prediabetes 07/29/2019   • Hypertension      Past Medical History:   Diagnosis Date   • AAA (abdominal aortic aneurysm) ([facility])    • Atherosclerotic heart disease of native coronary artery without angina pectoris    • Atrial fibrillation ([facility])    • Benign essential hypertension    • Dry skin    •  Elevated cholesterol    • Emphysema/COPD (HCC)    • Essential hypertriglyceridemia    • High blood pressure    • History of gastrointestinal hemorrhage    • History of gout    • History of myasthenia gravis    • Hoarseness, persistent    • Skin abnormalities    • Sleep apnea    • Status post angioplasty with stent    • Unsteady gait when walking          PAST SURGICAL HISTORY  Past Surgical History:   Procedure Laterality Date   • ANTERIOR CERVICAL DISCECTOMY W/ FUSION N/A 3/25/2016    Procedure: C3- C5 CERVICAL DISCECTOMY ANTERIOR FUSION WITH INSTRUMENTATION;  Surgeon: Bill Washington MD;  Location: [facility] MAIN OR;  Service:    • ARTERIOGRAM AORTIC N/A 12/17/2021    Procedure: ENDOVASCULAR ANEURYSM REPAIR GORE;  Surgeon: Jean Patricia MD;  Location: [facility] HYBRID OR 18/19;  Service: Vascular;  Laterality: N/A;   • ARTHRODESIS N/A 07/1983    LUMBAR   • ARTHRODESIS N/A 1987    LUMBAR   • ARTHRODESIS      Spinal Arthrodesis-lower spine-7/1983, 1987--upper spine-1988, 7/1990-Abstracted from [employer]   • BACK SURGERY      Lower Back Surgery   • CARDIAC CATHETERIZATION  07/2009   • CARDIAC CATHETERIZATION  03/18/2003    STENT TO RCA AND PCA, DR. PRIYA LUGO   • CATARACT EXTRACTION Bilateral     LENS IMPLANT   • CERVICAL ARTHRODESIS N/A 07/1990   • CERVICAL ARTHRODESIS N/A 1988   • COLONOSCOPY N/A 10/3/2019    4 MM HYPERPLASTIC POLYP IN ILEOCECAL VALVE, 4 MM SESSILE SERRATED ADENOMA POLYP IN DESCENDING, 5 TUBULOVILLOUS ADENOMA POLYPS IN RECTUM, 3 TUBULAR ADENOMA POLYPS IN DISTAL RECTUM, 26 MM TUBULAR ADENOMA POLYP IN RECTUM, PIECEMEAL POLYPECTOMY, AREA TATTOOED, RESCOPE IN 6 MONTHS, DR. TAYA CRUZ AT [facility]   • COLONOSCOPY N/A 2/17/2021    Procedure: COLONOSCOPY to cecum with cold biopsy polypectomy;  Surgeon: Yousuf Méndez MD;  Location: [facility] ENDOSCOPY;  Service: Gastroenterology;  Laterality: N/A;  pre: hx of polyps  post: polyp   • CORONARY ANGIOPLASTY WITH STENT PLACEMENT  02/2009    and  2009-PT STATES HAS 3 STENTS   • ENDOSCOPY N/A 2012    NON BLEEDING EROSIVE GASTROPATHY, 1 DUODENAL ULCER WITH CLEAN BASE, DR. BRANDY WREN AT [facility]   • ENDOSCOPY AND COLONOSCOPY N/A 2007    EGD WNL, 8 ADENOMATOUS POLYPS IN RECTO-SIGMOID, RESCOPE IN 3 YRS, DR. CRISTINA RODRIGUEZ AT [facility]   • FOOT SURGERY Left     LEFT BIG TOE-JOINT REPLACED   • HAND SURGERY Left     THUMB-JOINT REPLACEMENT   • HAND SURGERY Right     JOINT REPLACEMENT RIGHT INDEX FINGER   • KNEE ARTHROSCOPY Left    • LAPAROSCOPIC CHOLECYSTECTOMY     • LUMBAR DISCECTOMY FUSION INSTRUMENTATION N/A 2020    Procedure: Lumbar 3 4 and lumbar 4 5 laminectomy and fusion with instrumentation;  Surgeon: Bill Washington MD;  Location: [facility] OR;  Service: Neurosurgery;  Laterality: N/A;   • NECK SURGERY     • VASECTOMY Bilateral          FAMILY HISTORY  Family History   Adopted: Yes   Problem Relation Age of Onset   • Heart attack Mother 66   • Alcohol abuse Mother    • Heart disease Mother 66   • No Known Problems Father    • Heart disease Other         FH in females before the age of 65   • Malig Hyperthermia Neg Hx          SOCIAL HISTORY  Social History     Socioeconomic History   • Marital status:    Tobacco Use   • Smoking status: Former Smoker     Packs/day: 2.00     Years: 40.00     Pack years: 80.00     Types: Cigarettes     Quit date:      Years since quittin9   • Smokeless tobacco: Former User     Types: Chew   • Tobacco comment: smoked 1 to 2 packs per day for 40 or more years   Vaping Use   • Vaping Use: Never used   Substance and Sexual Activity   • Drug use: No   • Sexual activity: Defer         ALLERGIES  Ranolazine er, Apixaban, Indomethacin, Lisinopril, and Rivaroxaban        REVIEW OF SYSTEMS  Review of Systems     All systems reviewed and negative except for those discussed in HPI.        PHYSICAL EXAM    ED Triage Vitals   Temp Heart Rate Resp BP SpO2   21 0416 21 0416 21 0416 21  0430 12/20/21 0416   97.9 °F (36.6 °C) 107 18 (!) 202/105 95 %       Physical Exam  GENERAL: Well appearing, nontoxic appearing, not distressed  HENT: normocephalic, atraumatic  EYES: no scleral icterus, PERRL  CV: Irregularly irregular, regular rate, no murmur  RESPIRATORY: normal effort, CTAB  ABDOMEN: soft, normal bowel sounds, no tenderness  Tenderness to bilateral groin area, surgical incisions to bilateral groin clean dry and intact with no drainage, there is some bruising  MUSCULOSKELETAL: no deformity, no lower extremity edema or calf tenderness laterally  NEURO: alert, moves all extremities, follows commands, mental status normal/baseline  SKIN: warm, dry, no rash   Psych: Appropriate mood and affect  Nursing notes and vital signs reviewed      LAB RESULTS  Recent Results (from the past 24 hour(s))   Comprehensive Metabolic Panel    Collection Time: 12/20/21  4:35 AM    Specimen: Blood   Result Value Ref Range    Glucose 111 (H) 65 - 99 mg/dL    BUN 13 8 - 23 mg/dL    Creatinine 0.98 0.76 - 1.27 mg/dL    Sodium 140 136 - 145 mmol/L    Potassium 3.8 3.5 - 5.2 mmol/L    Chloride 104 98 - 107 mmol/L    CO2 26.5 22.0 - 29.0 mmol/L    Calcium 8.7 8.6 - 10.5 mg/dL    Total Protein 7.0 6.0 - 8.5 g/dL    Albumin 3.60 3.50 - 5.20 g/dL    ALT (SGPT) 95 (H) 1 - 41 U/L    AST (SGOT) 61 (H) 1 - 40 U/L    Alkaline Phosphatase 132 (H) 39 - 117 U/L    Total Bilirubin 0.8 0.0 - 1.2 mg/dL    eGFR Non African Amer 75 >60 mL/min/1.73    Globulin 3.4 gm/dL    A/G Ratio 1.1 g/dL    BUN/Creatinine Ratio 13.3 7.0 - 25.0    Anion Gap 9.5 5.0 - 15.0 mmol/L   Lipase    Collection Time: 12/20/21  4:35 AM    Specimen: Blood   Result Value Ref Range    Lipase 42 13 - 60 U/L   Protime-INR    Collection Time: 12/20/21  4:35 AM    Specimen: Blood   Result Value Ref Range    Protime 13.6 11.7 - 14.2 Seconds    INR 1.06 0.90 - 1.10   aPTT    Collection Time: 12/20/21  4:35 AM    Specimen: Blood   Result Value Ref Range    PTT 32.4 22.7 -  35.4 seconds   CBC Auto Differential    Collection Time: 12/20/21  4:35 AM    Specimen: Blood   Result Value Ref Range    WBC 6.74 3.40 - 10.80 10*3/mm3    RBC 3.68 (L) 4.14 - 5.80 10*6/mm3    Hemoglobin 13.0 13.0 - 17.7 g/dL    Hematocrit 36.6 (L) 37.5 - 51.0 %    MCV 99.5 (H) 79.0 - 97.0 fL    MCH 35.3 (H) 26.6 - 33.0 pg    MCHC 35.5 31.5 - 35.7 g/dL    RDW 12.7 12.3 - 15.4 %    RDW-SD 46.0 37.0 - 54.0 fl    MPV 9.1 6.0 - 12.0 fL    Platelets 105 (L) 140 - 450 10*3/mm3    Neutrophil % 70.2 42.7 - 76.0 %    Lymphocyte % 16.0 (L) 19.6 - 45.3 %    Monocyte % 10.8 5.0 - 12.0 %    Eosinophil % 2.2 0.3 - 6.2 %    Basophil % 0.4 0.0 - 1.5 %    Immature Grans % 0.4 0.0 - 0.5 %    Neutrophils, Absolute 4.72 1.70 - 7.00 10*3/mm3    Lymphocytes, Absolute 1.08 0.70 - 3.10 10*3/mm3    Monocytes, Absolute 0.73 0.10 - 0.90 10*3/mm3    Eosinophils, Absolute 0.15 0.00 - 0.40 10*3/mm3    Basophils, Absolute 0.03 0.00 - 0.20 10*3/mm3    Immature Grans, Absolute 0.03 0.00 - 0.05 10*3/mm3    nRBC 0.0 0.0 - 0.2 /100 WBC       Ordered the above labs and independently reviewed the results.      RADIOLOGY  CT Abdomen Pelvis With Contrast    Result Date: 12/20/2021  CT ABDOMEN PELVIS W CONTRAST-  HISTORY:  Postop abdominal pain and swelling, AAA repair December 17.  TECHNIQUE:  CT of the abdomen and pelvis was performed following the administration of intravenous contrast.    Radiation dose reduction techniques were utilized, including automated exposure control and exposure modulation based on body size.  COMPARISON:  CTA abdomen and pelvis 11/16/2021.  FINDINGS: The heart is enlarged. There is no pericardial effusion. There is extensive calcific coronary artery atherosclerosis, which is partially imaged. There are calcified granulomata and calcified lymph nodes in the lung bases. There is mild bibasilar atelectasis. Pleural spaces are clear. The liver is normal in size. There is cirrhotic liver morphology. The gallbladder is surgically  absent. The pancreas, spleen, and adrenal glands within normal limits. The right kidney is asymmetrically small. There is bilateral renal cortical thinning, likely sequela of prior infection and/or infarction. There is a 0.3 cm nonobstructing inferior right renal stone. There are at least 2 nonobstructing left renal stones, measuring up to 0.4 cm. There is no hydronephrosis. There is mild asymmetric dilatation of the distal left ureter, which is not significantly changed from 11/16/2021. There are new post surgical changes with an infrarenal abdominal aortobiiliac stent graft. The excluded aneurysm sac measures 5.6 x 5.2 cm, 5.6 x 5.1 cm when remeasured at a similar level. There is background extensive calcific aortoiliac and branch vessel atherosclerosis. There is trace free pelvic fluid. No discrete or drainable fluid collection is identified. There is new mild fat stranding adjacent to the mid to distal abdominal aorta and common iliac arteries. There is no bowel obstruction. The appendix is present and normal in size. There is scattered colonic diverticula without CT evidence of acute diverticulitis. The bladder is collapsed around a Fuller catheter. The prostate is present. There are surgical clips in the bilateral inguinal regions, as well as moderate subcutaneous fat stranding and a small amount of subcutaneous air, which is likely postoperative. No discrete or drainable fluid collection is identified. There is multilevel degenerative disc disease. There are postsurgical changes from posterior spinal decompression and fusion. There are bone graft donor sites in the bilateral iliac bones.        1.  New postsurgical changes from an aortobiiliac stent graft, as above. Mild para-aortic fat stranding, as well as subcutaneous stranding and air within overlying the bilateral inguinal regions, is likely within normal limits for recent postoperative state. No discrete or drainable fluid collection is identified. 2.   No bowel obstruction. 3.  Bilateral nonobstructing renal stones.  Discussed with AARON Curry at 6:07 AM.  This report was finalized on 12/20/2021 6:09 AM by Dr. Mana Porter M.D.        I ordered the above noted radiological studies and viewed the images on the PACS system.       MEDICAL RECORD REVIEW  Medical records reviewed in [facility], patient was discharged yesterday after being here and having a EVAR with bilateral open femoral artery exposure.      PROCEDURES    Procedures        DIFFERENTIAL DIAGNOSIS  Differential diagnosis include but are not limited to the following: Urinary retention, urinary tract infection, postop problem, electrolyte abnormality      PROGRESS, DATA ANALYSIS, CONSULTS, AND MEDICAL DECISION MAKING        ED Course as of 12/20/21 1137   Mon Dec 20, 2021   0631 Discussed pertinent information from history and physical exam with patient.  Discussed differential diagnosis and plan for ED evaluation/work-up and treatment including labs to evaluate for infection, anemia, CT of the abdomen and pelvis to rule out any intra-abdominal abnormality..  All questions answered.  Patient is agreeable with this plan.     [MS]   0632 Has a Morton catheter that was placed while in the ER, he has approximately 400 cc of urine out, he states he feels a little better but can't really tell any difference in the pressure that he is having. [MS]   0738 Discussed with Dr Montemayor, on call Vascular surgeon, regarding patient's exam and workup done here today.  She will see patient in the ER. She states that she told patient that he would have swelling yesterday when she discharged him. She does recommend keeping morton in and having patient follow up with urology. [MS]   0848 Dr Montemayor has seen and evaluated patient, she is good with patient being discharged home with follow up with Urology and their office as previously scheduled. [MS]   0849 Patient updated on work-up done here today and that we  will be sending him out with a Fuller catheter, he was instructed on how to switch the bags from the nighttime bag and the leg bag for the day.  He should keep his follow-up appointment with his vascular surgeon as previously scheduled and he should call [facility] today to get a follow-up appointment scheduled with him.  He was given strict return to ER precautions.  Patient verbalized understanding and is agreeable to this plan. [MS]   0851 Reviewed pt's history and workup with Dr. Barger.  After a bedside evaluation, they agree with the plan of care.       [MS]      ED Course User Index  [MS] Mayra Low, APRN     Discussed plan for discharge, as there is no emergent indication for admission. Pt/family is agreeable and understands need for follow up and repeat testing.  Pt is aware that discharge does not mean that nothing is wrong but it indicates no emergency is present that requires admission and they must continue care with follow-up as given below or physician of their choice.   Patient/Family voiced understanding of above instructions.  Patient discharged in stable condition.    DIAGNOSIS  Final diagnoses:   Urinary retention   Bilateral groin pain       FOLLOW UP   Luda Kidd MD  [address]  [place] [address]  [phone]          Jean Patricia MD  [address]  [place] [address]  [phone]      keep scheduled follow up appointment    [facility]  [address] [facility] [address]  [phone]  Call today        RX     Medication List      Changed    * allopurinol 100 MG tablet  Commonly known as: ZYLOPRIM  Take 1 tablet by mouth Daily.  What changed: when to take this     * allopurinol 300 MG tablet  Commonly known as: ZYLOPRIM  Take 1 tablet by mouth Daily.  What changed: when to take this     folic acid 400 MCG tablet  Commonly known as: FOLVITE  Take 1 tablet by mouth Daily.  What changed: when to take this      rosuvastatin 40 MG tablet  Commonly known as: Crestor  Take 1 tablet by mouth Daily.  What changed: when to take this         * This list has 2 medication(s) that are the same as other medications prescribed for you. Read the directions carefully, and ask your doctor or other care provider to review them with you.                MEDICATIONS GIVEN IN ED    Medications   iopamidol (ISOVUE-300) 61 % injection 100 mL (85 mL Intravenous Given 12/20/21 0518)           COURSE & MEDICAL DECISION MAKING  Any/All labs and Any/All Imaging studies that were ordered were reviewed and are noted above.  Results were reviewed/discussed with the patient and they were also made aware of online access.    Pt also made aware that some labs, such as cultures, will not be resulted during ER visit and followup with PMD is necessary.        Mayra Low, APRN  12/20/21 1138

## 2021-12-20 NOTE — TELEPHONE ENCOUNTER
I called and spoke with patient, informed he needs to contact vascular and seek clearance from them. If he needs to cancel or reschedule his injection with us he can call us back and let us know.   All information was verbally understood.     Thanks  Kemi

## 2021-12-20 NOTE — OUTREACH NOTE
Call Center TCM Note      Responses   [facility] patient discharged from? [place]   Does the patient have one of the following disease processes/diagnoses(primary or secondary)? General Surgery   TCM attempt successful? Yes   Call start time 0922   Call end time 0925   Discharge diagnosis EVAR with bilateral open femoral artery exposure for AAA, Hx COPD, post-op hypoxia   Is patient permission given to speak with other caregiver? No   Meds reviewed with patient/caregiver? Yes   Does the patient have all medications related to this admission filled (includes all antibiotics, pain medications, etc.) Yes   Is the patient taking all medications as directed (includes completed medication regime)? Yes   Does the patient have a follow up appointment scheduled with their surgeon? No   Nursing Interventions Educated patient on importance of making appointment,  Advised patient to make appointment  [Patient to follow up with Dr Patricia 4 weeks. ]   Has the patient kept scheduled appointments due by today? N/A  [Patient seen in ER this morning.  Patient to follow up with urology. ]   Comments PCP Dr Kidd. Patient declined to schedule PCP follow up with call today.    Has home health visited the patient within 72 hours of discharge? N/A   Comments Home with morton catheter.    Did the patient receive a copy of their discharge instructions? Yes   What is the patient's perception of their health status since discharge? New symptoms unrelated to diagnosis  [Patient seen this am in ER with urinary retention. Home with morton cath, continue flomax and f/u with urology. ]   Is the patient/caregiver able to teach back steps to recovery at home? Rest and rebuild strength, gradually increase activity   If the patient is a current smoker, are they able to teach back resources for cessation? Not a smoker   Is the patient/caregiver able to teach back the hierarchy of who to call/visit for symptoms/problems? PCP, Specialist, Home  health nurse, Urgent Care, ED, 911 Yes   TCM call completed? Yes   Wrap up additional comments Brief call with patient. He just got home from the ER this am. Patient declined any needs from PCP office. Reports he will be following up with his surgeon, and urology.           Clarita Ruano RN    12/20/2021, 09:29 EST

## 2021-12-20 NOTE — CASE MANAGEMENT/SOCIAL WORK
Case Management Discharge Note      [facility] Note: [facility]         Selected Continued Care - Discharged on 12/19/2021 Admission date: 12/17/2021 - Discharge disposition: Home or Self Care    Destination    No services have been selected for the patient.              Durable Medical Equipment Coordination complete.    Service Provider Selected Services Address Phone Fax Patient Preferred    [facility]  Durable Medical Equipment [address], [place] [address] [phone] --          Dialysis/Infusion    No services have been selected for the patient.              Home Medical Care    No services have been selected for the patient.              Therapy    No services have been selected for the patient.              Community Resources    No services have been selected for the patient.              Community & DME    No services have been selected for the patient.                  Transportation Services  Private: Car    Final Discharge Disposition Code: 01 - home or self-care

## 2021-12-20 NOTE — DISCHARGE INSTRUCTIONS
Continue current home medications  Increase fluid intake  [facility] with [facility], call today to get an appointment scheduled  Keep scheduled appointment for follow-up with your vascular surgeon  Return to the ER for fever, chills, chest pain, shortness of breath, leg swelling, decreased urine output or any new or worsening symptoms

## 2021-12-20 NOTE — TELEPHONE ENCOUNTER
Patient called in, wants to know if he would still be able to get his cortisone injection scheduled for tomorrow with us. He currently has a catheter and had a recent surgical procedure with vascular last week.     Thank you   Kemi

## 2021-12-20 NOTE — ED TRIAGE NOTES
"Patient to ed from  with complaints of abdominal swelling and groin swelling after a \"vascular surgery\" performed on Friday.       I wore full protective equipment throughout this patient encounter including a face mask, goggles, and gloves. Hand hygiene was performed before donning protective equipment and after removal when leaving the room.    "

## 2021-12-20 NOTE — ED PROVIDER NOTES
I have supervised the care provided by the midlevel provider.    We have discussed this patient's history, physical exam, and treatment plan.   I have reviewed the note and have personally examined the patient and agree with the plan of care.  See attached attending note.  My personal findings are below:    Patient complains of groin swelling and difficulty urinating.  He was discharged yesterday after having an endovascular AAA repair.  Denies chest pain, shortness of breath, syncope, fever, chills, vomiting, or diarrhea.    On exam: Awake and alert.  Heart is regular rate and rhythm.  Lungs are clear bilaterally.  Abdomen is obese, soft, and nontender.  There is bruising and swelling in both inguinal areas.  Incisions are intact and without signs of infection.  Equal pedal pulses bilaterally.    Labs are unremarkable.  CT scan showed expected postsurgical changes.  Fuller catheter was placed before I evaluated the patient.  MARIANNA Craft, spoke with Dr. Montemayor of vascular surgery.  She is going to come to the ED and evaluate the patient.     John Barger MD  12/20/21 [id number]

## 2021-12-21 ENCOUNTER — PROCEDURE VISIT (OUTPATIENT)
Dept: SPORTS MEDICINE | Facility: CLINIC | Age: 76
End: 2021-12-21

## 2021-12-21 ENCOUNTER — TRANSCRIBE ORDERS (OUTPATIENT)
Dept: ADMINISTRATIVE | Facility: HOSPITAL | Age: 76
End: 2021-12-21

## 2021-12-21 VITALS
WEIGHT: 239 LBS | SYSTOLIC BLOOD PRESSURE: 120 MMHG | DIASTOLIC BLOOD PRESSURE: 80 MMHG | OXYGEN SATURATION: 97 % | RESPIRATION RATE: 16 BRPM | HEART RATE: 90 BPM | BODY MASS INDEX: 32.37 KG/M2 | TEMPERATURE: 98 F | HEIGHT: 72 IN

## 2021-12-21 DIAGNOSIS — M53.3 CHRONIC LEFT SACROILIAC PAIN: Primary | ICD-10-CM

## 2021-12-21 DIAGNOSIS — I71.40 ABDOMINAL AORTIC ANEURYSM (AAA) WITHOUT RUPTURE (HCC): Primary | ICD-10-CM

## 2021-12-21 DIAGNOSIS — G89.29 CHRONIC LEFT SACROILIAC PAIN: Primary | ICD-10-CM

## 2021-12-21 PROCEDURE — 20611 DRAIN/INJ JOINT/BURSA W/US: CPT | Performed by: FAMILY MEDICINE

## 2021-12-21 RX ORDER — TRIAMCINOLONE ACETONIDE 40 MG/ML
80 INJECTION, SUSPENSION INTRA-ARTICULAR; INTRAMUSCULAR ONCE
Status: COMPLETED | OUTPATIENT
Start: 2021-12-21 | End: 2021-12-21

## 2021-12-21 RX ADMIN — TRIAMCINOLONE ACETONIDE 80 MG: 40 INJECTION, SUSPENSION INTRA-ARTICULAR; INTRAMUSCULAR at 09:45

## 2021-12-21 NOTE — PROGRESS NOTES
"Requesting repeat SI injection - notes depo medrol did not work as well as kenalog.     Ultrasound-Guided Sacroiliac Joint Injection Procedure Note    Left sacroiliac joint injection was discussed with the patient in detail, including indication, risks, benefits, and alternatives. Verbal consent was given for the procedure. Injection was performed by physician.  Injection site was identified by ultrasound examination, then cleaned with Betadine and alcohol swabs. Prior to needle insertion, ethyl chloride spray was used for surface anesthesia. Sterile technique was used. Ultrasound guidance was indicated for injection accuracy.  A 22-gauge, 3.5\" spinal needle was guided to the joint under continuous direct ultrasound visualization. Injectate was seen flowing underneath the superficial sacroiliac ligaments and passed without difficulty. The needle was removed and a simple bandage was applied. The procedure was tolerated well without difficulty.    Injection mixture:  2% lidocaine without epinephrine: 3 mL  40 mg/mL triamcinolone acetonide: 2mL     Diagnoses and all orders for this visit:    Chronic left sacroiliac pain  -     triamcinolone acetonide (KENALOG-40) injection 80 mg       "

## 2021-12-29 ENCOUNTER — READMISSION MANAGEMENT (OUTPATIENT)
Dept: CALL CENTER | Facility: HOSPITAL | Age: 76
End: 2021-12-29

## 2021-12-29 NOTE — OUTREACH NOTE
General Surgery Week 2 Survey      Responses   [facility] patient discharged from? [place]   Does the patient have one of the following disease processes/diagnoses(primary or secondary)? General Surgery   Week 2 attempt successful? Yes   Call start time 1308   Call end time 1313   Discharge diagnosis EVAR with bilateral open femoral artery exposure for AAA, Hx COPD, post-op hypoxia   Meds reviewed with patient/caregiver? Yes   Is the patient taking all medications as directed (includes completed medication regime)? Yes   Has the patient kept scheduled appointments due by today? Yes   Comments saw urology   DME comments Not using oxygen   Psychosocial issues? No   Comments Home with morton catheter. --not d/c'd   What is the patient's perception of their health status since discharge? Improving   Nursing interventions Nurse provided patient education   Is the patient/caregiver able to teach back signs and symptoms of incisional infection? Increased redness, swelling or pain at the incisonal site,  Increased drainage or bleeding   Is the patient/caregiver able to teach back steps to recovery at home? Rest and rebuild strength, gradually increase activity   Is the patient/caregiver able to teach back the hierarchy of who to call/visit for symptoms/problems? PCP, Specialist, Home health nurse, Urgent Care, ED, 911 Yes   Additional teach back comments Pt doing well and no issues from ED visit. He questined about having a CT scan in january and I have advised him to contact his surgeon to see if this is needed.    Week 2 call completed? Yes          Norma Pradhan RN

## 2022-01-06 ENCOUNTER — READMISSION MANAGEMENT (OUTPATIENT)
Dept: CALL CENTER | Facility: HOSPITAL | Age: 77
End: 2022-01-06

## 2022-01-06 NOTE — OUTREACH NOTE
"General Surgery Week 3 Survey      Responses   [facility] patient discharged from? [place]   Does the patient have one of the following disease processes/diagnoses(primary or secondary)? General Surgery   Week 3 attempt successful? Yes   Call start time 1241   Call end time 1246   Discharge diagnosis EVAR with bilateral open femoral artery exposure for AAA, Hx COPD, post-op hypoxia   Meds reviewed with patient/caregiver? Yes   Is the patient taking all medications as directed (includes completed medication regime)? Yes   Has the patient kept scheduled appointments due by today? Yes   Comments Fuller out day after he went home   What is the patient's perception of their health status since discharge? Improving   Week 3 call completed? Yes   Wrap up additional comments State he feels good. Reports bp has been \"good\" and he has no complaints, states he has healed well.            Blank Joy RN  "

## 2022-01-14 RX ORDER — FUROSEMIDE 40 MG/1
TABLET ORAL
Qty: 180 TABLET | Refills: 0 | Status: SHIPPED | OUTPATIENT
Start: 2022-01-14 | End: 2022-05-23

## 2022-01-17 ENCOUNTER — READMISSION MANAGEMENT (OUTPATIENT)
Dept: CALL CENTER | Facility: HOSPITAL | Age: 77
End: 2022-01-17

## 2022-01-17 NOTE — OUTREACH NOTE
General Surgery Week 4 Survey      Responses   [facility] patient discharged from? [place]   Does the patient have one of the following disease processes/diagnoses(primary or secondary)? General Surgery   Week 4 attempt successful? Yes   Call start time 1515   Call end time 1516   Discharge diagnosis EVAR with bilateral open femoral artery exposure for AAA, Hx COPD, post-op hypoxia   Is the patient taking all medications as directed (includes completed medication regime)? Yes   Has the patient kept scheduled appointments due by today? Yes   Psychosocial issues? No   What is the patient's perception of their health status since discharge? Improving   Nursing interventions Nurse provided patient education   Is the patient/caregiver able to teach back steps to recovery at home? Rest and rebuild strength, gradually increase activity   If the patient is a current smoker, are they able to teach back resources for cessation? Not a smoker   Is the patient/caregiver able to teach back the hierarchy of who to call/visit for symptoms/problems? PCP, Specialist, Home health nurse, Urgent Care, ED, 911 Yes   Week 4 call completed? Yes   Would the patient like one additional call? No   Graduated Yes   Is the patient interested in additional calls from an ambulatory ?  NOTE:  applies to high risk patients requiring additional follow-up. No   Did the patient feel the follow up calls were helpful during their recovery period? Yes   Was the number of calls appropriate? Yes          Surjit Fragoso RN

## 2022-01-24 ENCOUNTER — HOSPITAL ENCOUNTER (OUTPATIENT)
Dept: CT IMAGING | Facility: HOSPITAL | Age: 77
Discharge: HOME | End: 2022-01-24
Admitting: SURGERY

## 2022-01-24 DIAGNOSIS — I71.40 ABDOMINAL AORTIC ANEURYSM (AAA) WITHOUT RUPTURE: ICD-10-CM

## 2022-01-24 LAB — CREAT BLDA-MCNC: 1.3 MG/DL (ref 0.6–1.3)

## 2022-01-24 PROCEDURE — 0 IOPAMIDOL PER 1 ML: Performed by: SURGERY

## 2022-01-24 PROCEDURE — 74174 CTA ABD&PLVS W/CONTRAST: CPT

## 2022-01-24 PROCEDURE — 82565 ASSAY OF CREATININE: CPT

## 2022-01-24 RX ADMIN — IOPAMIDOL 95 ML: 755 INJECTION, SOLUTION INTRAVENOUS at 13:39

## 2022-01-25 RX ORDER — TAMSULOSIN HYDROCHLORIDE 0.4 MG/1
1 CAPSULE ORAL EVERY EVENING
Qty: 30 CAPSULE | Refills: 11 | Status: SHIPPED | OUTPATIENT
Start: 2022-01-25 | End: 2023-02-21 | Stop reason: SDUPTHER

## 2022-01-27 RX ORDER — DABIGATRAN ETEXILATE 75 MG/1
75 CAPSULE ORAL 2 TIMES DAILY
Qty: 180 CAPSULE | Refills: 3 | Status: SHIPPED | OUTPATIENT
Start: 2022-01-27

## 2022-01-27 RX ORDER — METOPROLOL SUCCINATE 50 MG/1
50 TABLET, EXTENDED RELEASE ORAL EVERY EVENING
Qty: 90 TABLET | Refills: 3 | Status: SHIPPED | OUTPATIENT
Start: 2022-01-27 | End: 2023-01-24 | Stop reason: SDUPTHER

## 2022-02-14 ENCOUNTER — OFFICE VISIT (OUTPATIENT)
Dept: FAMILY MEDICINE CLINIC | Facility: CLINIC | Age: 77
End: 2022-02-14

## 2022-02-14 VITALS
BODY MASS INDEX: 32.43 KG/M2 | WEIGHT: 239.4 LBS | TEMPERATURE: 96.8 F | SYSTOLIC BLOOD PRESSURE: 132 MMHG | HEIGHT: 72 IN | OXYGEN SATURATION: 99 % | DIASTOLIC BLOOD PRESSURE: 72 MMHG | HEART RATE: 78 BPM

## 2022-02-14 DIAGNOSIS — Z00.00 MEDICARE ANNUAL WELLNESS VISIT, SUBSEQUENT: Primary | ICD-10-CM

## 2022-02-14 DIAGNOSIS — I10 ESSENTIAL HYPERTENSION: ICD-10-CM

## 2022-02-14 DIAGNOSIS — E78.2 MIXED HYPERLIPIDEMIA: ICD-10-CM

## 2022-02-14 DIAGNOSIS — R73.03 PREDIABETES: ICD-10-CM

## 2022-02-14 PROBLEM — E11.9 DIABETES MELLITUS: Status: RESOLVED | Noted: 2021-08-19 | Resolved: 2022-02-14

## 2022-02-14 PROCEDURE — 1170F FXNL STATUS ASSESSED: CPT | Performed by: FAMILY MEDICINE

## 2022-02-14 PROCEDURE — 1159F MED LIST DOCD IN RCRD: CPT | Performed by: FAMILY MEDICINE

## 2022-02-14 PROCEDURE — G0439 PPPS, SUBSEQ VISIT: HCPCS | Performed by: FAMILY MEDICINE

## 2022-02-14 NOTE — PROGRESS NOTES
The ABCs of the Annual Wellness Visit  Subsequent Medicare Wellness Visit    No chief complaint on file.     Subjective    History of Present Illness:  Juan James is a 76 y.o. male who presents for a Subsequent Medicare Wellness Visit.  Patient has been erroneously marked as diabetic. Based on the available clinical information, he does not have diabetes and should therefore be excluded from diabetic health maintenance and quality measures for the remainder of the reporting period.    The following portions of the patient's history were reviewed and   updated as appropriate: allergies, current medications, past family history, past medical history, past social history, past surgical history and problem list.    Compared to one year ago, the patient feels his physical   health is the same.    Compared to one year ago, the patient feels his mental   health is the same.    Recent Hospitalizations:  This patient has had a [facility] admission record on file within the last 365 days.    Current Medical Providers:  Patient Care Team:  Luda Kidd MD as PCP - General (Family Medicine)  Bill Washington MD as Surgeon (Neurosurgery)  Mohan, Zafar Fraire MD as Consulting Physician (Vascular Surgery)  Alesia Aviles MD as Consulting Physician (Cardiology)  Chris Mo III, MD as Consulting Physician (Cardiology)    Outpatient Medications Prior to Visit   Medication Sig Dispense Refill   • allopurinol (ZYLOPRIM) 100 MG tablet Take 1 tablet by mouth Daily. (Patient taking differently: Take 100 mg by mouth Every Morning.) 90 tablet 3   • allopurinol (ZYLOPRIM) 300 MG tablet Take 1 tablet by mouth Daily. (Patient taking differently: Take 300 mg by mouth Every Night.) 90 tablet 3   • aspirin 81 MG EC tablet Take 1 tablet by mouth Daily.  11   • Cyanocobalamin (VITAMIN B-12) 5000 MCG tablet dispersible Place 1 tablet on the tongue Daily. (Patient taking differently: Place 5,000 mcg on the tongue Daily.)     •  dabigatran etexilate (PRADAXA) 75 MG capsule Take 1 capsule by mouth 2 (Two) Times a Day. 180 capsule 3   • folic acid (FOLVITE) 400 MCG tablet Take 1 tablet by mouth Daily. (Patient taking differently: Take 400 mcg by mouth 2 (Two) Times a Day.)     • furosemide (LASIX) 40 MG tablet Take 1 tablet by mouth twice daily 180 tablet 0   • metoprolol succinate XL (TOPROL-XL) 50 MG 24 hr tablet Take 1 tablet by mouth Every Evening. 90 tablet 3   • multivitamin with minerals (CENTRUM MEN PO) Take 1 tablet by mouth Daily.     • rosuvastatin (Crestor) 40 MG tablet Take 1 tablet by mouth Daily. (Patient taking differently: Take 40 mg by mouth Every Night.) 90 tablet 3   • tamsulosin (FLOMAX) 0.4 MG capsule 24 hr capsule Take 1 capsule by mouth Every Evening. 30 capsule 11     No facility-administered medications prior to visit.       No opioid medication identified on active medication list. I have reviewed chart for other potential  high risk medication/s and harmful drug interactions in the elderly.          Aspirin is on active medication list. Aspirin use is indicated based on review of current medical condition/s. Pros and cons of this therapy have been discussed today. Benefits of this medication outweigh potential harm.  Patient has been encouraged to continue taking this medication.  .      Patient Active Problem List   Diagnosis   • Follow-up examination, following other surgery   • Lumbar radiculopathy   • Cervical radiculitis   • Coronary artery disease involving native heart   • Chronic bronchitis (HCC)   • Hyperlipidemia   • Edema   • Rectal pain   • Ocular myasthenia gravis (HCC)   • Cervical spondylosis with radiculopathy   • History of colon polyps   • Tinnitus   • Shortness of breath   • Pulmonary embolism (HCC)   • Peptic ulceration   • OA (osteoarthritis)   • Neck pain   • Lower back pain   • History of blood transfusion   • Hearing loss   • GERD (gastroesophageal reflux disease)   • DDD (degenerative disc  "disease), lumbar   • DDD (degenerative disc disease), cervical   • Coronary artery disease   • COPD (chronic obstructive pulmonary disease) (HCC)   • Colon polyps   • CHF (congestive heart failure) (HCC)   • Cervical spondylosis   • Cervical disc disorder   • Cataract   • BPH (benign prostatic hyperplasia)   • Paroxysmal atrial fibrillation (HCC)   • Arthritis   • Abdominal aortic aneurysm (HCC)   • Medicare annual wellness visit, subsequent   • Heart block AV complete (HCC)   • PVD (peripheral vascular disease) with claudication (HCC)   • Acute renal failure (ARF) (HCC)   • Hospital discharge follow-up   • Idiopathic chronic gout of multiple sites without tophus   • Spondylolisthesis of lumbar region   • Olecranon bursitis of left elbow   • Encounter for screening for malignant neoplasm of colon   • Long term current use of anticoagulant   • Stented coronary artery   • Essential hypertension   • History of blood in urine   • Pain of right hand   • AAA (abdominal aortic aneurysm) without rupture ([facility])     Advance Care Planning  Advance Directive is not on file.  ACP discussion was held with the patient during this visit. Patient has an advance directive (not in EMR), copy requested.    Review of Systems   Constitutional: Negative.         Objective    Vitals:    22 0756   BP: 132/72   BP Location: Right arm   Patient Position: Sitting   Pulse: 78   Temp: 96.8 °F (36 °C)   SpO2: 99%   Weight: 109 kg (239 lb 6.4 oz)   Height: 182.9 cm (72.01\")     BMI Readings from Last 1 Encounters:   22 32.46 kg/m²   BMI is above normal parameters. Recommendations include: exercise counseling    Does the patient have evidence of cognitive impairment? No    Physical Exam            HEALTH RISK ASSESSMENT    Smoking Status:  Social History     Tobacco Use   Smoking Status Former Smoker   • Packs/day: 2.00   • Years: 40.00   • Pack years: 80.00   • Types: Cigarettes   • Quit date:    • Years since quittin.1 "   Smokeless Tobacco Former User   • Types: Chew   Tobacco Comment    smoked 1 to 2 packs per day for 40 or more years     Alcohol Consumption:  Social History     Substance and Sexual Activity   Alcohol Use None    Comment: QUIT HEAVY DRINKING JAN 2020/ OCCAS NOW     Fall Risk Screen:    IRIS Fall Risk Assessment was completed, and patient is at LOW risk for falls.Assessment completed on:2/14/2022    Depression Screening:  PHQ-2/PHQ-9 Depression Screening 2/14/2022   Little interest or pleasure in doing things 0   Feeling down, depressed, or hopeless -   Trouble falling or staying asleep, or sleeping too much -   Feeling tired or having little energy -   Poor appetite or overeating -   Feeling bad about yourself - or that you are a failure or have let yourself or your family down -   Trouble concentrating on things, such as reading the newspaper or watching television -   Moving or speaking so slowly that other people could have noticed. Or the opposite - being so fidgety or restless that you have been moving around a lot more than usual -   Thoughts that you would be better off dead, or of hurting yourself in some way -   Total Score 0   If you checked off any problems, how difficult have these problems made it for you to do your work, take care of things at home, or get along with other people? -       Health Habits and Functional and Cognitive Screening:  Functional & Cognitive Status 2/14/2022   Do you have difficulty preparing food and eating? No   Do you have difficulty bathing yourself, getting dressed or grooming yourself? No   Do you have difficulty using the toilet? No   Do you have difficulty moving around from place to place? No   Do you have trouble with steps or getting out of a bed or a chair? No   Current Diet Well Balanced Diet   Dental Exam Not up to date   Eye Exam Not up to date   Exercise (times per week) 0 times per week   Current Exercises Include No Regular Exercise   Current Exercise  Activities Include -   Do you need help using the phone?  No   Are you deaf or do you have serious difficulty hearing?  Yes   Do you need help with transportation? No   Do you need help shopping? No   Do you need help preparing meals?  No   Do you need help with housework?  No   Do you need help with laundry? No   Do you need help taking your medications? No   Do you need help managing money? No   Do you ever drive or ride in a car without wearing a seat belt? No   Have you felt unusual stress, anger or loneliness in the last month? No   Who do you live with? Alone   If you need help, do you have trouble finding someone available to you? No   Have you been bothered in the last four weeks by sexual problems? -   Do you have difficulty concentrating, remembering or making decisions? No       Age-appropriate Screening Schedule:  Refer to the list below for future screening recommendations based on patient's age, sex and/or medical conditions. Orders for these recommended tests are listed in the plan section. The patient has been provided with a written plan.    Health Maintenance   Topic Date Due   • DIABETIC FOOT EXAM  Never done   • DIABETIC EYE EXAM  Never done   • URINE MICROALBUMIN  07/29/2020   • HEMOGLOBIN A1C  05/15/2022   • LIPID PANEL  11/15/2022   • TDAP/TD VACCINES (2 - Td or Tdap) 05/20/2031   • INFLUENZA VACCINE  Completed   • ZOSTER VACCINE  Completed              Assessment/Plan   CMS Preventative Services Quick Reference  Risk Factors Identified During Encounter  Fall Risk-High or Moderate  The above risks/problems have been discussed with the patient.  Follow up actions/plans if indicated are seen below in the Assessment/Plan Section.  Pertinent information has been shared with the patient in the After Visit Summary.    Diagnoses and all orders for this visit:    1. Medicare annual wellness visit, subsequent (Primary)    2. Essential hypertension    3. Mixed hyperlipidemia    4.  Prediabetes    continue current medications.    Follow Up:      Return in about 3 months (around 5/14/2022) for DIABETES.  Return in about 3 months (around 5/14/2022) for DIABETES.    An After Visit Summary and PPPS were made available to the patient.

## 2022-03-22 ENCOUNTER — PROCEDURE VISIT (OUTPATIENT)
Dept: SPORTS MEDICINE | Facility: CLINIC | Age: 77
End: 2022-03-22

## 2022-03-22 VITALS
HEIGHT: 72 IN | WEIGHT: 239 LBS | HEART RATE: 76 BPM | RESPIRATION RATE: 16 BRPM | OXYGEN SATURATION: 96 % | SYSTOLIC BLOOD PRESSURE: 148 MMHG | TEMPERATURE: 97.7 F | BODY MASS INDEX: 32.37 KG/M2 | DIASTOLIC BLOOD PRESSURE: 82 MMHG

## 2022-03-22 DIAGNOSIS — G89.29 CHRONIC LEFT SACROILIAC PAIN: Primary | ICD-10-CM

## 2022-03-22 DIAGNOSIS — M53.3 CHRONIC LEFT SACROILIAC PAIN: Primary | ICD-10-CM

## 2022-03-22 PROCEDURE — 20611 DRAIN/INJ JOINT/BURSA W/US: CPT | Performed by: FAMILY MEDICINE

## 2022-03-22 RX ORDER — TRIAMCINOLONE ACETONIDE 40 MG/ML
80 INJECTION, SUSPENSION INTRA-ARTICULAR; INTRAMUSCULAR ONCE
Status: COMPLETED | OUTPATIENT
Start: 2022-03-22 | End: 2022-03-22

## 2022-03-22 RX ADMIN — TRIAMCINOLONE ACETONIDE 80 MG: 40 INJECTION, SUSPENSION INTRA-ARTICULAR; INTRAMUSCULAR at 09:45

## 2022-03-22 NOTE — PROGRESS NOTES
"Juan is a 76 y.o. year old male     Chief Complaint   Patient presents with   • Injections     SI joint injection    Last injection worked well    Ultrasound-Guided Sacroiliac Joint Injection Procedure Note    Left sacroiliac joint injection was discussed with the patient in detail, including indication, risks, benefits, and alternatives. Verbal consent was given for the procedure. Injection was performed by physician.  Injection site was identified by ultrasound examination, then cleaned with Betadine and alcohol swabs. Prior to needle insertion, ethyl chloride spray was used for surface anesthesia. Sterile technique was used. Ultrasound guidance was indicated for injection accuracy.  A 22-gauge, 3.5\" spinal needle was guided to the joint under continuous direct ultrasound visualization. Injectate was seen flowing underneath the superficial sacroiliac ligaments and passed without difficulty. The needle was removed and a simple bandage was applied. The procedure was tolerated well without difficulty.    Injection mixture:  1% lidocaine without epinephrine: 2 mL  40 mg/mL triamcinolone acetonide: 2 mL     Diagnoses and all orders for this visit:    Chronic left sacroiliac pain  -     triamcinolone acetonide (KENALOG-40) injection 80 mg        "

## 2022-05-05 ENCOUNTER — DOCUMENTATION (OUTPATIENT)
Dept: FAMILY MEDICINE CLINIC | Facility: CLINIC | Age: 77
End: 2022-05-05

## 2022-05-05 NOTE — PROGRESS NOTES
Patient has been erroneously marked as diabetic. Based on the available clinical information, he does not have diabetes and should therefore be excluded from diabetic health maintenance and quality measures for the remainder of the reporting period.

## 2022-05-16 ENCOUNTER — OFFICE VISIT (OUTPATIENT)
Dept: FAMILY MEDICINE CLINIC | Facility: CLINIC | Age: 77
End: 2022-05-16

## 2022-05-16 VITALS
OXYGEN SATURATION: 93 % | DIASTOLIC BLOOD PRESSURE: 82 MMHG | BODY MASS INDEX: 31.29 KG/M2 | HEIGHT: 72 IN | SYSTOLIC BLOOD PRESSURE: 134 MMHG | HEART RATE: 62 BPM | TEMPERATURE: 98 F | WEIGHT: 231 LBS

## 2022-05-16 DIAGNOSIS — R73.03 PREDIABETES: ICD-10-CM

## 2022-05-16 DIAGNOSIS — E78.2 MIXED HYPERLIPIDEMIA: ICD-10-CM

## 2022-05-16 DIAGNOSIS — I25.118 CORONARY ARTERY DISEASE INVOLVING NATIVE HEART WITH OTHER FORM OF ANGINA PECTORIS, UNSPECIFIED VESSEL OR LESION TYPE: ICD-10-CM

## 2022-05-16 DIAGNOSIS — F17.210 CIGARETTE SMOKER: ICD-10-CM

## 2022-05-16 DIAGNOSIS — I10 ESSENTIAL HYPERTENSION: Primary | ICD-10-CM

## 2022-05-16 PROCEDURE — 99214 OFFICE O/P EST MOD 30 MIN: CPT | Performed by: FAMILY MEDICINE
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
## 2022-05-17 LAB
ALBUMIN SERPL-MCNC: 3.9 G/DL (ref 3.7–4.7)
ALBUMIN/GLOB SERPL: 1.6 {RATIO} (ref 1.2–2.2)
ALP SERPL-CCNC: 81 IU/L (ref 44–121)
ALT SERPL-CCNC: 27 IU/L (ref 0–44)
AST SERPL-CCNC: 27 IU/L (ref 0–40)
BILIRUB SERPL-MCNC: 0.5 MG/DL (ref 0–1.2)
BUN SERPL-MCNC: 27 MG/DL (ref 8–27)
BUN/CREAT SERPL: 19 (ref 10–24)
CALCIUM SERPL-MCNC: 9 MG/DL (ref 8.6–10.2)
CHLORIDE SERPL-SCNC: 103 MMOL/L (ref 96–106)
CHOLEST SERPL-MCNC: 164 MG/DL (ref 100–199)
CO2 SERPL-SCNC: 26 MMOL/L (ref 20–29)
CREAT SERPL-MCNC: 1.43 MG/DL (ref 0.76–1.27)
EGFRCR SERPLBLD CKD-EPI 2021: 51 ML/MIN/1.73
GLOBULIN SER CALC-MCNC: 2.4 G/DL (ref 1.5–4.5)
GLUCOSE SERPL-MCNC: 99 MG/DL (ref 65–99)
HBA1C MFR BLD: 5.5 % (ref 4.8–5.6)
HDLC SERPL-MCNC: 69 MG/DL
LDLC SERPL CALC-MCNC: 75 MG/DL (ref 0–99)
LDLC/HDLC SERPL: 1.1 RATIO (ref 0–3.6)
POTASSIUM SERPL-SCNC: 3.8 MMOL/L (ref 3.5–5.2)
PROT SERPL-MCNC: 6.3 G/DL (ref 6–8.5)
SODIUM SERPL-SCNC: 145 MMOL/L (ref 134–144)
TRIGL SERPL-MCNC: 116 MG/DL (ref 0–149)
VLDLC SERPL CALC-MCNC: 20 MG/DL (ref 5–40)

## 2022-05-23 RX ORDER — FUROSEMIDE 40 MG/1
TABLET ORAL
Qty: 180 TABLET | Refills: 0 | Status: SHIPPED | OUTPATIENT
Start: 2022-05-23 | End: 2022-09-09

## 2022-06-22 ENCOUNTER — OFFICE VISIT (OUTPATIENT)
Dept: CARDIOLOGY | Facility: CLINIC | Age: 77
End: 2022-06-22

## 2022-06-22 VITALS
WEIGHT: 229 LBS | SYSTOLIC BLOOD PRESSURE: 130 MMHG | HEART RATE: 68 BPM | HEIGHT: 72 IN | BODY MASS INDEX: 31.02 KG/M2 | DIASTOLIC BLOOD PRESSURE: 82 MMHG

## 2022-06-22 DIAGNOSIS — I25.10 CORONARY ARTERY DISEASE INVOLVING NATIVE CORONARY ARTERY OF NATIVE HEART WITHOUT ANGINA PECTORIS: Primary | ICD-10-CM

## 2022-06-22 DIAGNOSIS — I48.21 PERMANENT ATRIAL FIBRILLATION: ICD-10-CM

## 2022-06-22 DIAGNOSIS — E78.2 MIXED HYPERLIPIDEMIA: ICD-10-CM

## 2022-06-22 DIAGNOSIS — I10 ESSENTIAL HYPERTENSION: ICD-10-CM

## 2022-06-22 PROBLEM — Z87.448 HISTORY OF BLOOD IN URINE: Status: RESOLVED | Noted: 2021-03-18 | Resolved: 2022-06-22

## 2022-06-22 PROBLEM — Z12.11 ENCOUNTER FOR SCREENING FOR MALIGNANT NEOPLASM OF COLON: Status: RESOLVED | Noted: 2021-01-22 | Resolved: 2022-06-22

## 2022-06-22 PROBLEM — E11.9 TYPE 2 DIABETES MELLITUS WITHOUT COMPLICATION: Status: ACTIVE | Noted: 2020-04-23

## 2022-06-22 PROBLEM — D31.30 NEVUS OF CHOROID: Status: ACTIVE | Noted: 2022-03-28

## 2022-06-22 PROBLEM — Z86.0100 HISTORY OF COLON POLYPS: Status: RESOLVED | Noted: 2019-08-28 | Resolved: 2022-06-22

## 2022-06-22 PROBLEM — R60.9 EDEMA: Status: RESOLVED | Noted: 2019-07-29 | Resolved: 2022-06-22

## 2022-06-22 PROBLEM — N40.0 BENIGN PROSTATIC HYPERPLASIA: Status: ACTIVE | Noted: 2022-03-28

## 2022-06-22 PROBLEM — I44.2 HEART BLOCK AV COMPLETE: Status: RESOLVED | Noted: 2020-04-04 | Resolved: 2022-06-22

## 2022-06-22 PROBLEM — Z95.5 STENTED CORONARY ARTERY: Status: RESOLVED | Noted: 2021-01-04 | Resolved: 2022-06-22

## 2022-06-22 PROBLEM — Z79.01 LONG TERM CURRENT USE OF ANTICOAGULANT: Status: RESOLVED | Noted: 2021-01-04 | Resolved: 2022-06-22

## 2022-06-22 PROBLEM — J44.9 CHRONIC OBSTRUCTIVE PULMONARY DISEASE: Status: RESOLVED | Noted: 2022-03-28 | Resolved: 2022-06-22

## 2022-06-22 PROBLEM — M79.641 PAIN OF RIGHT HAND: Status: RESOLVED | Noted: 2021-11-15 | Resolved: 2022-06-22

## 2022-06-22 PROBLEM — M10.9 GOUT: Status: ACTIVE | Noted: 2018-11-27

## 2022-06-22 PROBLEM — F17.210 CIGARETTE SMOKER: Status: RESOLVED | Noted: 2022-05-16 | Resolved: 2022-06-22

## 2022-06-22 PROBLEM — Z09 HOSPITAL DISCHARGE FOLLOW-UP: Status: RESOLVED | Noted: 2020-04-17 | Resolved: 2022-06-22

## 2022-06-22 PROBLEM — N17.9 ACUTE RENAL FAILURE (ARF): Status: RESOLVED | Noted: 2020-04-04 | Resolved: 2022-06-22

## 2022-06-22 PROBLEM — I48.0 PAROXYSMAL ATRIAL FIBRILLATION: Status: RESOLVED | Noted: 2021-01-04 | Resolved: 2022-06-22

## 2022-06-22 PROBLEM — K62.89 RECTAL PAIN: Status: RESOLVED | Noted: 2019-07-29 | Resolved: 2022-06-22

## 2022-06-22 PROBLEM — I71.40 ABDOMINAL AORTIC ANEURYSM (HCC): Status: RESOLVED | Noted: 2021-01-04 | Resolved: 2022-06-22

## 2022-06-22 PROBLEM — Z00.00 MEDICARE ANNUAL WELLNESS VISIT, SUBSEQUENT: Status: RESOLVED | Noted: 2019-11-26 | Resolved: 2022-06-22

## 2022-06-22 PROBLEM — Z86.010 HISTORY OF COLON POLYPS: Status: RESOLVED | Noted: 2019-08-28 | Resolved: 2022-06-22

## 2022-06-22 PROBLEM — J44.9 CHRONIC OBSTRUCTIVE PULMONARY DISEASE: Status: ACTIVE | Noted: 2022-03-28

## 2022-06-22 PROCEDURE — 93000 ELECTROCARDIOGRAM COMPLETE: CPT | Performed by: NURSE PRACTITIONER

## 2022-06-22 PROCEDURE — 99214 OFFICE O/P EST MOD 30 MIN: CPT | Performed by: NURSE PRACTITIONER
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
## 2022-06-23 ENCOUNTER — PROCEDURE VISIT (OUTPATIENT)
Dept: SPORTS MEDICINE | Facility: CLINIC | Age: 77
End: 2022-06-23

## 2022-06-23 VITALS
BODY MASS INDEX: 31.02 KG/M2 | SYSTOLIC BLOOD PRESSURE: 144 MMHG | DIASTOLIC BLOOD PRESSURE: 84 MMHG | RESPIRATION RATE: 16 BRPM | OXYGEN SATURATION: 98 % | HEIGHT: 72 IN | HEART RATE: 63 BPM | TEMPERATURE: 97.8 F | WEIGHT: 229 LBS

## 2022-06-23 DIAGNOSIS — M53.3 CHRONIC LEFT SACROILIAC PAIN: Primary | ICD-10-CM

## 2022-06-23 DIAGNOSIS — G89.29 CHRONIC LEFT SACROILIAC PAIN: Primary | ICD-10-CM

## 2022-06-23 PROCEDURE — 76942 ECHO GUIDE FOR BIOPSY: CPT | Performed by: FAMILY MEDICINE

## 2022-06-23 PROCEDURE — 20552 NJX 1/MLT TRIGGER POINT 1/2: CPT | Performed by: FAMILY MEDICINE

## 2022-06-23 RX ORDER — TRIAMCINOLONE ACETONIDE 40 MG/ML
80 INJECTION, SUSPENSION INTRA-ARTICULAR; INTRAMUSCULAR ONCE
Status: COMPLETED | OUTPATIENT
Start: 2022-06-23 | End: 2022-06-23

## 2022-06-23 RX ADMIN — TRIAMCINOLONE ACETONIDE 80 MG: 40 INJECTION, SUSPENSION INTRA-ARTICULAR; INTRAMUSCULAR at 09:48

## 2022-06-23 NOTE — PROGRESS NOTES
"Requesting repeat SI injection    Ultrasound-Guided Sacroiliac Joint Injection Procedure Note    Left sacroiliac joint injection was discussed with the patient in detail, including indication, risks, benefits, and alternatives. Verbal consent was given for the procedure. Injection was performed by physician.  Injection site was identified by ultrasound examination, then cleaned with Betadine and alcohol swabs. Prior to needle insertion, ethyl chloride spray was used for surface anesthesia. Sterile technique was used. Ultrasound guidance was indicated for injection accuracy.  A 22-gauge, 3.5\" spinal needle was guided to the joint under continuous direct ultrasound visualization. Injectate was seen flowing underneath the superficial sacroiliac ligaments and passed without difficulty. The needle was removed and a simple bandage was applied. The procedure was tolerated well without difficulty.    Injection mixture:  2% lidocaine without epinephrine: 2 mL  40 mg/mL triamcinolone acetonide: 2 mL     Diagnoses and all orders for this visit:    Chronic left sacroiliac pain  -     triamcinolone acetonide (KENALOG-40) injection 80 mg                       "

## 2022-06-28 ENCOUNTER — HOSPITAL ENCOUNTER (OUTPATIENT)
Dept: CT IMAGING | Facility: HOSPITAL | Age: 77
Discharge: HOME | End: 2022-06-28
Admitting: FAMILY MEDICINE

## 2022-06-28 DIAGNOSIS — F17.210 CIGARETTE SMOKER: ICD-10-CM

## 2022-06-28 PROCEDURE — 71271 CT THORAX LUNG CANCER SCR C-: CPT

## 2022-07-11 ENCOUNTER — TRANSCRIBE ORDERS (OUTPATIENT)
Dept: ADMINISTRATIVE | Facility: HOSPITAL | Age: 77
End: 2022-07-11

## 2022-07-11 DIAGNOSIS — Z86.79 S/P REPAIR OF ABDOMINAL AORTIC ANEURYSM USING BIFURCATION GRAFT: Primary | ICD-10-CM

## 2022-07-11 DIAGNOSIS — Z95.828 S/P REPAIR OF ABDOMINAL AORTIC ANEURYSM USING BIFURCATION GRAFT: Primary | ICD-10-CM

## 2022-08-15 ENCOUNTER — OFFICE VISIT (OUTPATIENT)
Dept: FAMILY MEDICINE CLINIC | Facility: CLINIC | Age: 77
End: 2022-08-15

## 2022-08-15 VITALS
WEIGHT: 233 LBS | SYSTOLIC BLOOD PRESSURE: 158 MMHG | OXYGEN SATURATION: 95 % | HEART RATE: 76 BPM | DIASTOLIC BLOOD PRESSURE: 94 MMHG | TEMPERATURE: 98 F | BODY MASS INDEX: 31.6 KG/M2

## 2022-08-15 DIAGNOSIS — R73.03 PREDIABETES: ICD-10-CM

## 2022-08-15 DIAGNOSIS — I10 ESSENTIAL HYPERTENSION: Primary | ICD-10-CM

## 2022-08-15 DIAGNOSIS — K21.9 GASTROESOPHAGEAL REFLUX DISEASE WITHOUT ESOPHAGITIS: ICD-10-CM

## 2022-08-15 DIAGNOSIS — N18.31 STAGE 3A CHRONIC KIDNEY DISEASE: ICD-10-CM

## 2022-08-15 DIAGNOSIS — E78.2 MIXED HYPERLIPIDEMIA: ICD-10-CM

## 2022-08-15 PROBLEM — E11.9 TYPE 2 DIABETES MELLITUS WITHOUT COMPLICATION (HCC): Status: RESOLVED | Noted: 2020-04-23 | Resolved: 2022-08-15

## 2022-08-15 PROCEDURE — 99214 OFFICE O/P EST MOD 30 MIN: CPT | Performed by: FAMILY MEDICINE

## 2022-08-15 RX ORDER — PANTOPRAZOLE SODIUM 40 MG/1
40 TABLET, DELAYED RELEASE ORAL DAILY
Qty: 30 TABLET | Refills: 11 | Status: SHIPPED | OUTPATIENT
Start: 2022-08-15
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
## 2022-08-16 LAB
ALBUMIN SERPL-MCNC: 4.1 G/DL (ref 3.7–4.7)
ALBUMIN/CREAT UR: 2895 MG/G CREAT (ref 0–29)
ALBUMIN/GLOB SERPL: 1.4 {RATIO} (ref 1.2–2.2)
ALP SERPL-CCNC: 96 IU/L (ref 44–121)
ALT SERPL-CCNC: 30 IU/L (ref 0–44)
AST SERPL-CCNC: 40 IU/L (ref 0–40)
BILIRUB SERPL-MCNC: 0.6 MG/DL (ref 0–1.2)
BUN SERPL-MCNC: 14 MG/DL (ref 8–27)
BUN/CREAT SERPL: 12 (ref 10–24)
CALCIUM SERPL-MCNC: 9.2 MG/DL (ref 8.6–10.2)
CHLORIDE SERPL-SCNC: 98 MMOL/L (ref 96–106)
CO2 SERPL-SCNC: 24 MMOL/L (ref 20–29)
CREAT SERPL-MCNC: 1.17 MG/DL (ref 0.76–1.27)
CREAT UR-MCNC: 27.5 MG/DL
EGFRCR-CYS SERPLBLD CKD-EPI 2021: 65 ML/MIN/1.73
GLOBULIN SER CALC-MCNC: 3 G/DL (ref 1.5–4.5)
GLUCOSE SERPL-MCNC: 106 MG/DL (ref 65–99)
MICROALBUMIN UR-MCNC: 796.2 UG/ML
POTASSIUM SERPL-SCNC: 4.6 MMOL/L (ref 3.5–5.2)
PROT SERPL-MCNC: 7.1 G/DL (ref 6–8.5)
SODIUM SERPL-SCNC: 140 MMOL/L (ref 134–144)

## 2022-08-26 ENCOUNTER — TELEPHONE (OUTPATIENT)
Dept: NEUROSURGERY | Facility: CLINIC | Age: 77
End: 2022-08-26

## 2022-08-26 NOTE — TELEPHONE ENCOUNTER
Caller:  BOBBY HUNG    Relationship: SELF    Best call back number: [phone]    What was the call regarding: PT CALLED AND IS A PT OF DR. WASHINGTON.  PT WAS LAST SEEN 08/10/2021     Office Visit with Bill Washington MD (08/10/2021)    PT STATES HE PICKED UP SOMETHING AND HAS HURT HIS BACK.  PT STATES HE IS HAVING TROUBLE BARING WEIGHT ON RIGHT LEG.  PT STATES THE PAIN SYMPTOMS STARTED Tuesday.  THE PAIN STARTED IN HIS BACK AND HAS SINCE MOVED TO HIS LEG.  PT IS NOT CURRENTLY TAKING ANYTHING FOR PAIN.  PT STATES PAIN SCALE IN AM 15/10 AND THE MORE HE IS ON HIS FEET IT EASES AWAY AND AS THE DAY GOES ON IS 5/10.  PT DENIES LOSS OF BOWEL OR BLADDER AND HAS HAD NO RECENT IMAGING.      PLEASE REVIEW AND ADVISE FOR SCHEDULING.    THANK YOU

## 2022-08-30 NOTE — TELEPHONE ENCOUNTER
PT RETURNED CALL CHECKING THE STATUS OF THE NOTE.      PT STATES HE IS STILL HAVING BACK PAIN AND WOULD LIKE TO SCHEDULE.     PLEASE CALL PT @ [phone]    THANK YOU

## 2022-09-02 ENCOUNTER — OFFICE VISIT (OUTPATIENT)
Dept: NEUROSURGERY | Facility: CLINIC | Age: 77
End: 2022-09-02

## 2022-09-02 ENCOUNTER — HOSPITAL ENCOUNTER (OUTPATIENT)
Dept: GENERAL RADIOLOGY | Facility: HOSPITAL | Age: 77
Discharge: HOME | End: 2022-09-02
Admitting: NEUROLOGICAL SURGERY

## 2022-09-02 VITALS
WEIGHT: 233 LBS | BODY MASS INDEX: 31.56 KG/M2 | HEIGHT: 72 IN | OXYGEN SATURATION: 96 % | TEMPERATURE: 98 F | SYSTOLIC BLOOD PRESSURE: 131 MMHG | HEART RATE: 94 BPM | DIASTOLIC BLOOD PRESSURE: 80 MMHG

## 2022-09-02 DIAGNOSIS — M54.16 LUMBAR RADICULOPATHY: Primary | ICD-10-CM

## 2022-09-02 DIAGNOSIS — M54.16 LUMBAR RADICULOPATHY: ICD-10-CM

## 2022-09-02 PROCEDURE — 72114 X-RAY EXAM L-S SPINE BENDING: CPT

## 2022-09-02 PROCEDURE — 99214 OFFICE O/P EST MOD 30 MIN: CPT | Performed by: NEUROLOGICAL SURGERY

## 2022-09-06 ENCOUNTER — OFFICE VISIT (OUTPATIENT)
Dept: NEUROSURGERY | Facility: CLINIC | Age: 77
End: 2022-09-06

## 2022-09-06 DIAGNOSIS — M25.551 HIP PAIN, RIGHT: ICD-10-CM

## 2022-09-06 DIAGNOSIS — M54.16 LUMBAR RADICULOPATHY: Primary | ICD-10-CM

## 2022-09-06 PROCEDURE — 99441 PR PHYS/QHP TELEPHONE EVALUATION 5-10 MIN: CPT | Performed by: NEUROLOGICAL SURGERY

## 2022-09-06 NOTE — PROGRESS NOTES
Subjective   Patient ID: Juan James is a 76 y.o. male is here today via telephone for follow-up with a new XR L-spine done on 09/02/2022.    You have chosen to receive care through a telehealth visit.  Do you consent to use a video/audio connection for your medical care today? Yes    We had a telephone visit today.  The patient was at home and I was in the office.  We talked for 5 minutes.    History of Present Illness     This patient continues with pain in his right hip and his right leg.    The following portions of the patient's history were reviewed and updated as appropriate: allergies, current medications, past family history, past medical history, past social history, past surgical history and problem list.    Review of Systems    I have reviewed the review of systems as documented by my MA.    Objective     Physical Exam  Neurological:      Mental Status: He is alert and oriented to person, place, and time.       Neurologic Exam     Mental Status   Oriented to person, place, and time.           Assessment & Plan   Independent Review of Radiographic Studies:      I personally reviewed the images from the following studies.    I reviewed his plain films done on 2 September.  This shows good alignment of the construct at L3-4 and L4-5.  There is no evidence of instability or failure of the instrumentation.  The hip x-rays which I ordered were not done.    Medical Decision Making:      I told the patient about the imaging.  I told him that from my point of view I kind of think the problem is coming from his hip.  I suggested that before we do anything else with his back we should get him into see an orthopedic surgeon to check out the hip.  I explained that the orthopedic surgeons have x-ray machines in their office so we do not need to push the fact that they did not do the x-ray as ordered at the hospital.  If they do not think there is a problem with the hip then I will see him back and we will work-up  his back further.    Diagnoses and all orders for this visit:    1. Lumbar radiculopathy (Primary)    2. Hip pain, right  -     Ambulatory Referral to Orthopedic Surgery      Return for Recheck and call after treatment or consultation.

## 2022-09-09 ENCOUNTER — OFFICE VISIT (OUTPATIENT)
Dept: FAMILY MEDICINE CLINIC | Facility: CLINIC | Age: 77
End: 2022-09-09

## 2022-09-09 VITALS
HEIGHT: 72 IN | BODY MASS INDEX: 32.23 KG/M2 | SYSTOLIC BLOOD PRESSURE: 166 MMHG | TEMPERATURE: 97.5 F | DIASTOLIC BLOOD PRESSURE: 106 MMHG | HEART RATE: 72 BPM | OXYGEN SATURATION: 97 % | WEIGHT: 238 LBS

## 2022-09-09 DIAGNOSIS — I10 ESSENTIAL HYPERTENSION: ICD-10-CM

## 2022-09-09 DIAGNOSIS — E78.2 MIXED HYPERLIPIDEMIA: ICD-10-CM

## 2022-09-09 DIAGNOSIS — M15.9 OSTEOARTHRITIS OF MULTIPLE JOINTS, UNSPECIFIED OSTEOARTHRITIS TYPE: ICD-10-CM

## 2022-09-09 DIAGNOSIS — R73.03 PREDIABETES: ICD-10-CM

## 2022-09-09 DIAGNOSIS — R60.0 BILATERAL LOWER EXTREMITY EDEMA: Primary | ICD-10-CM

## 2022-09-09 PROCEDURE — 99214 OFFICE O/P EST MOD 30 MIN: CPT | Performed by: FAMILY MEDICINE

## 2022-09-09 RX ORDER — FUROSEMIDE 40 MG/1
40 TABLET ORAL 3 TIMES DAILY
Qty: 270 TABLET | Refills: 1 | Status: SHIPPED | OUTPATIENT
Start: 2022-09-09

## 2022-09-09 RX ORDER — POTASSIUM CHLORIDE 750 MG/1
10 CAPSULE, EXTENDED RELEASE ORAL 2 TIMES DAILY
Qty: 60 CAPSULE | Refills: 3 | Status: SHIPPED | OUTPATIENT
Start: 2022-09-09 | End: 2022-11-15

## 2022-09-09 RX ORDER — OXYCODONE HYDROCHLORIDE AND ACETAMINOPHEN 5; 325 MG/1; MG/1
1 TABLET ORAL EVERY 8 HOURS PRN
Qty: 21 TABLET | Refills: 0 | Status: SHIPPED | OUTPATIENT
Start: 2022-09-09 | End: 2022-09-16
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
## 2022-09-16 ENCOUNTER — OFFICE VISIT (OUTPATIENT)
Dept: ORTHOPEDIC SURGERY | Facility: CLINIC | Age: 77
End: 2022-09-16

## 2022-09-16 VITALS — HEIGHT: 72 IN | BODY MASS INDEX: 32.23 KG/M2 | WEIGHT: 238 LBS | TEMPERATURE: 97.8 F

## 2022-09-16 DIAGNOSIS — M76.891 HIP ABDUCTOR TENDINITIS, RIGHT: ICD-10-CM

## 2022-09-16 DIAGNOSIS — K40.90 INGUINAL HERNIA WITHOUT OBSTRUCTION OR GANGRENE, RECURRENCE NOT SPECIFIED, UNSPECIFIED LATERALITY: ICD-10-CM

## 2022-09-16 DIAGNOSIS — G57.11 MERALGIA PARAESTHETICA, RIGHT: ICD-10-CM

## 2022-09-16 DIAGNOSIS — M70.61 GREATER TROCHANTERIC BURSITIS OF RIGHT HIP: Primary | ICD-10-CM

## 2022-09-16 PROCEDURE — 73502 X-RAY EXAM HIP UNI 2-3 VIEWS: CPT | Performed by: ORTHOPAEDIC SURGERY

## 2022-09-16 PROCEDURE — 99214 OFFICE O/P EST MOD 30 MIN: CPT | Performed by: ORTHOPAEDIC SURGERY
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
## 2022-09-23 ENCOUNTER — OFFICE VISIT (OUTPATIENT)
Dept: SURGERY | Facility: CLINIC | Age: 77
End: 2022-09-23

## 2022-09-23 VITALS — HEIGHT: 72 IN | BODY MASS INDEX: 32.23 KG/M2 | WEIGHT: 238 LBS

## 2022-09-23 DIAGNOSIS — R10.31 SEVERE RIGHT GROIN PAIN: ICD-10-CM

## 2022-09-23 DIAGNOSIS — R10.31 RIGHT GROIN PAIN: Primary | ICD-10-CM

## 2022-09-23 PROCEDURE — 99203 OFFICE O/P NEW LOW 30 MIN: CPT | Performed by: SURGERY

## 2022-09-26 ENCOUNTER — OFFICE VISIT (OUTPATIENT)
Dept: SPORTS MEDICINE | Facility: CLINIC | Age: 77
End: 2022-09-26

## 2022-09-26 VITALS
TEMPERATURE: 98.2 F | RESPIRATION RATE: 16 BRPM | HEART RATE: 72 BPM | BODY MASS INDEX: 32.23 KG/M2 | SYSTOLIC BLOOD PRESSURE: 128 MMHG | OXYGEN SATURATION: 98 % | WEIGHT: 238 LBS | HEIGHT: 72 IN | DIASTOLIC BLOOD PRESSURE: 88 MMHG

## 2022-09-26 DIAGNOSIS — M53.3 CHRONIC LEFT SACROILIAC PAIN: Primary | ICD-10-CM

## 2022-09-26 DIAGNOSIS — G89.29 CHRONIC LEFT SACROILIAC PAIN: Primary | ICD-10-CM

## 2022-09-26 PROCEDURE — 76942 ECHO GUIDE FOR BIOPSY: CPT | Performed by: FAMILY MEDICINE

## 2022-09-26 PROCEDURE — 20552 NJX 1/MLT TRIGGER POINT 1/2: CPT | Performed by: FAMILY MEDICINE

## 2022-09-26 RX ORDER — TRIAMCINOLONE ACETONIDE 40 MG/ML
80 INJECTION, SUSPENSION INTRA-ARTICULAR; INTRAMUSCULAR ONCE
Status: COMPLETED | OUTPATIENT
Start: 2022-09-26 | End: 2022-09-26

## 2022-09-26 RX ADMIN — TRIAMCINOLONE ACETONIDE 80 MG: 40 INJECTION, SUSPENSION INTRA-ARTICULAR; INTRAMUSCULAR at 11:29

## 2022-09-26 NOTE — PROGRESS NOTES
"Here requesting repeat left sacroiliac joint injection.  Last injection was notably beneficial.    Ultrasound-Guided Sacroiliac Joint Injection Procedure Note    Left sacroiliac joint injection was discussed with the patient in detail, including indication, risks, benefits, and alternatives. Verbal consent was given for the procedure. Injection was performed by physician.  Injection site was identified by ultrasound examination, then cleaned with Betadine and alcohol swabs. Prior to needle insertion, ethyl chloride spray was used for surface anesthesia. Sterile technique was used. Ultrasound guidance was indicated for injection accuracy.  A 22-gauge, 3.5\" spinal needle was guided to the joint under continuous direct ultrasound visualization. Injectate was seen flowing underneath the superficial sacroiliac ligaments and passed without difficulty. The needle was removed and a simple bandage was applied. The procedure was tolerated well without difficulty.    Injection mixture:  1% lidocaine without epinephrine: 2 mL  40 mg/mL triamcinolone acetonide: 2 mL     Diagnoses and all orders for this visit:    Chronic left sacroiliac pain  -     triamcinolone acetonide (KENALOG-40) injection 80 mg       "
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
## 2022-10-07 ENCOUNTER — HOSPITAL ENCOUNTER (OUTPATIENT)
Dept: ULTRASOUND IMAGING | Facility: HOSPITAL | Age: 77
Discharge: HOME | End: 2022-10-07
Admitting: SURGERY

## 2022-10-07 DIAGNOSIS — R10.31 RIGHT GROIN PAIN: ICD-10-CM

## 2022-10-07 PROCEDURE — 76705 ECHO EXAM OF ABDOMEN: CPT

## 2022-10-11 ENCOUNTER — TELEPHONE (OUTPATIENT)
Dept: SURGERY | Facility: CLINIC | Age: 77
End: 2022-10-11

## 2022-10-11 DIAGNOSIS — R10.31 RIGHT GROIN PAIN: Primary | ICD-10-CM

## 2022-10-11 NOTE — TELEPHONE ENCOUNTER
I called Mr. James to review the results of the ultrasound.  There is not a visualized hernia on examination.  He tells me that he continues to have significant right-sided pain with radiation into his testicle.  I have recommended proceeding with an MRI of his pelvis to evaluate for an occult hernia that is not visualized on ultrasound versus a muscle strain.  I will call him once the results of this are available to make further plans.

## 2022-10-21 ENCOUNTER — HOSPITAL ENCOUNTER (OUTPATIENT)
Dept: MRI IMAGING | Facility: HOSPITAL | Age: 77
Discharge: HOME | End: 2022-10-21
Admitting: SURGERY

## 2022-10-21 DIAGNOSIS — R10.31 RIGHT GROIN PAIN: ICD-10-CM

## 2022-10-21 PROCEDURE — A9577 INJ MULTIHANCE: HCPCS | Performed by: SURGERY

## 2022-10-21 PROCEDURE — 0 GADOBENATE DIMEGLUMINE 529 MG/ML SOLUTION: Performed by: SURGERY

## 2022-10-21 PROCEDURE — 82565 ASSAY OF CREATININE: CPT

## 2022-10-21 PROCEDURE — 72197 MRI PELVIS W/O & W/DYE: CPT

## 2022-10-21 RX ADMIN — GADOBENATE DIMEGLUMINE 20 ML: 529 INJECTION, SOLUTION INTRAVENOUS at 07:23

## 2022-10-22 LAB — CREAT BLDA-MCNC: 1 MG/DL (ref 0.6–1.3)

## 2022-10-27 NOTE — PROGRESS NOTES
I called and reviewed the results of the MRI with Mr. James.  Fortunately the pain that he was having has resolved.  There is no evidence of an inguinal hernia on the MRI and there is also no evidence of a muscle strain.  Based on his symptoms and the clinical course I suspect he did have an abdominal wall muscle strain related to the activity he was doing which has improved with the continued mobility that he did.

## 2022-11-15 ENCOUNTER — OFFICE VISIT (OUTPATIENT)
Dept: FAMILY MEDICINE CLINIC | Facility: CLINIC | Age: 77
End: 2022-11-15

## 2022-11-15 VITALS
DIASTOLIC BLOOD PRESSURE: 90 MMHG | RESPIRATION RATE: 22 BRPM | SYSTOLIC BLOOD PRESSURE: 130 MMHG | OXYGEN SATURATION: 97 % | HEART RATE: 89 BPM | WEIGHT: 229 LBS | BODY MASS INDEX: 31.02 KG/M2 | TEMPERATURE: 97.3 F | HEIGHT: 72 IN

## 2022-11-15 DIAGNOSIS — E78.2 MIXED HYPERLIPIDEMIA: ICD-10-CM

## 2022-11-15 DIAGNOSIS — I10 ESSENTIAL HYPERTENSION: Primary | ICD-10-CM

## 2022-11-15 PROBLEM — R73.03 PREDIABETES: Status: RESOLVED | Noted: 2019-07-29 | Resolved: 2022-11-15

## 2022-11-15 PROCEDURE — 99213 OFFICE O/P EST LOW 20 MIN: CPT | Performed by: FAMILY MEDICINE
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
## 2022-11-16 LAB
ALBUMIN SERPL-MCNC: 4.2 G/DL (ref 3.5–5.2)
ALBUMIN/GLOB SERPL: 1.8 G/DL
ALP SERPL-CCNC: 90 U/L (ref 39–117)
ALT SERPL-CCNC: 26 U/L (ref 1–41)
AST SERPL-CCNC: 26 U/L (ref 1–40)
BILIRUB SERPL-MCNC: 0.5 MG/DL (ref 0–1.2)
BUN SERPL-MCNC: 19 MG/DL (ref 8–23)
BUN/CREAT SERPL: 17.3 (ref 7–25)
CALCIUM SERPL-MCNC: 9.3 MG/DL (ref 8.6–10.5)
CHLORIDE SERPL-SCNC: 101 MMOL/L (ref 98–107)
CHOLEST SERPL-MCNC: 135 MG/DL (ref 0–200)
CO2 SERPL-SCNC: 30 MMOL/L (ref 22–29)
CREAT SERPL-MCNC: 1.1 MG/DL (ref 0.76–1.27)
EGFRCR SERPLBLD CKD-EPI 2021: 70.4 ML/MIN/1.73
GLOBULIN SER CALC-MCNC: 2.3 GM/DL
GLUCOSE SERPL-MCNC: 102 MG/DL (ref 65–99)
HDLC SERPL-MCNC: 52 MG/DL (ref 40–60)
LDLC SERPL CALC-MCNC: 62 MG/DL (ref 0–100)
POTASSIUM SERPL-SCNC: 3.7 MMOL/L (ref 3.5–5.2)
PROT SERPL-MCNC: 6.5 G/DL (ref 6–8.5)
SODIUM SERPL-SCNC: 141 MMOL/L (ref 136–145)
TRIGL SERPL-MCNC: 119 MG/DL (ref 0–150)
VLDLC SERPL CALC-MCNC: 21 MG/DL (ref 5–40)

## 2022-12-10 DIAGNOSIS — M79.641 PAIN OF RIGHT HAND: ICD-10-CM

## 2022-12-12 RX ORDER — ALLOPURINOL 300 MG/1
300 TABLET ORAL NIGHTLY
Qty: 90 TABLET | Refills: 3 | Status: SHIPPED | OUTPATIENT
Start: 2022-12-12

## 2022-12-28 ENCOUNTER — OFFICE VISIT (OUTPATIENT)
Dept: SPORTS MEDICINE | Facility: CLINIC | Age: 77
End: 2022-12-28

## 2022-12-28 VITALS
SYSTOLIC BLOOD PRESSURE: 128 MMHG | TEMPERATURE: 97.8 F | DIASTOLIC BLOOD PRESSURE: 74 MMHG | OXYGEN SATURATION: 98 % | HEART RATE: 65 BPM

## 2022-12-28 DIAGNOSIS — G89.29 CHRONIC LEFT SACROILIAC PAIN: Primary | ICD-10-CM

## 2022-12-28 DIAGNOSIS — M53.3 CHRONIC LEFT SACROILIAC PAIN: Primary | ICD-10-CM

## 2022-12-28 PROCEDURE — 76942 ECHO GUIDE FOR BIOPSY: CPT | Performed by: FAMILY MEDICINE

## 2022-12-28 PROCEDURE — 20552 NJX 1/MLT TRIGGER POINT 1/2: CPT | Performed by: FAMILY MEDICINE

## 2022-12-28 RX ORDER — TRIAMCINOLONE ACETONIDE 40 MG/ML
80 INJECTION, SUSPENSION INTRA-ARTICULAR; INTRAMUSCULAR ONCE
Status: COMPLETED | OUTPATIENT
Start: 2022-12-28 | End: 2022-12-28

## 2022-12-28 RX ADMIN — TRIAMCINOLONE ACETONIDE 80 MG: 40 INJECTION, SUSPENSION INTRA-ARTICULAR; INTRAMUSCULAR at 14:16

## 2022-12-28 NOTE — PROGRESS NOTES
"Here today requesting repeat injection to the left sacroiliac joint    Ultrasound-Guided Sacroiliac Joint Injection Procedure Note    Left sacroiliac joint injection was discussed with the patient in detail, including indication, risks, benefits, and alternatives. Verbal consent was given for the procedure. Injection was performed by physician.  Injection site was identified by ultrasound examination, then cleaned with Betadine and alcohol swabs. Prior to needle insertion, ethyl chloride spray was used for surface anesthesia. Sterile technique was used. Ultrasound guidance was indicated for injection accuracy.  A 22-gauge, 3.5\" spinal needle was guided to the joint under continuous direct ultrasound visualization. Injectate was seen flowing underneath the superficial sacroiliac ligaments and passed without difficulty. The needle was removed and a simple bandage was applied. The procedure was tolerated well without difficulty.    Injection mixture:  1% lidocaine without epinephrine: 2 mL  40 mg/mL triamcinolone acetonide: 2 mL     Diagnoses and all orders for this visit:    Chronic left sacroiliac pain  -     triamcinolone acetonide (KENALOG-40) injection 80 mg       "

## 2023-01-04 ENCOUNTER — HOSPITAL ENCOUNTER (OUTPATIENT)
Dept: CT IMAGING | Facility: HOSPITAL | Age: 78
Discharge: HOME | End: 2023-01-04
Admitting: SURGERY
Payer: MEDICARE

## 2023-01-04 DIAGNOSIS — Z95.828 S/P REPAIR OF ABDOMINAL AORTIC ANEURYSM USING BIFURCATION GRAFT: ICD-10-CM

## 2023-01-04 DIAGNOSIS — Z86.79 S/P REPAIR OF ABDOMINAL AORTIC ANEURYSM USING BIFURCATION GRAFT: ICD-10-CM

## 2023-01-04 LAB — CREAT BLDA-MCNC: 1.5 MG/DL (ref 0.6–1.3)

## 2023-01-04 PROCEDURE — 82565 ASSAY OF CREATININE: CPT

## 2023-01-04 PROCEDURE — 0 IOPAMIDOL PER 1 ML: Performed by: SURGERY

## 2023-01-04 PROCEDURE — 74174 CTA ABD&PLVS W/CONTRAST: CPT

## 2023-01-04 RX ADMIN — IOPAMIDOL 95 ML: 755 INJECTION, SOLUTION INTRAVENOUS at 10:36

## 2023-01-24 DIAGNOSIS — E78.2 MIXED HYPERLIPIDEMIA: ICD-10-CM

## 2023-01-24 RX ORDER — METOPROLOL SUCCINATE 50 MG/1
50 TABLET, EXTENDED RELEASE ORAL EVERY EVENING
Qty: 90 TABLET | Refills: 3 | Status: SHIPPED | OUTPATIENT
Start: 2023-01-24

## 2023-01-24 RX ORDER — ROSUVASTATIN CALCIUM 40 MG/1
40 TABLET, COATED ORAL DAILY
Qty: 90 TABLET | Refills: 3 | Status: SHIPPED | OUTPATIENT
Start: 2023-01-24

## 2023-01-24 NOTE — TELEPHONE ENCOUNTER
Caller: Juan James    Relationship: Self    Best call back number: [phone]    Requested Prescriptions:   Requested Prescriptions     Pending Prescriptions Disp Refills   • metoprolol succinate XL (TOPROL-XL) 50 MG 24 hr tablet 90 tablet 3     Sig: Take 1 tablet by mouth Every Evening.   • rosuvastatin (Crestor) 40 MG tablet 90 tablet 3     Sig: Take 1 tablet by mouth Daily.        Pharmacy where request should be sent: [address] [phone]  - [phone] FX     Additional details provided by patient:     Does the patient have less than a 3 day supply:  [x] Yes  [] No    Would you like a call back once the refill request has been completed: [] Yes [x] No    If the office needs to give you a call back, can they leave a voicemail: [] Yes [x] No    Pavithra Iglesias Rep   01/24/23 12:28 EST

## 2023-02-02 ENCOUNTER — OFFICE VISIT (OUTPATIENT)
Dept: FAMILY MEDICINE CLINIC | Facility: CLINIC | Age: 78
End: 2023-02-02
Payer: MEDICARE

## 2023-02-02 VITALS
DIASTOLIC BLOOD PRESSURE: 74 MMHG | SYSTOLIC BLOOD PRESSURE: 128 MMHG | WEIGHT: 229 LBS | HEART RATE: 81 BPM | HEIGHT: 72 IN | BODY MASS INDEX: 31.02 KG/M2 | RESPIRATION RATE: 16 BRPM | OXYGEN SATURATION: 96 % | TEMPERATURE: 97.7 F

## 2023-02-02 DIAGNOSIS — I48.0 PAROXYSMAL ATRIAL FIBRILLATION: ICD-10-CM

## 2023-02-02 DIAGNOSIS — E78.2 MIXED HYPERLIPIDEMIA: ICD-10-CM

## 2023-02-02 DIAGNOSIS — I10 ESSENTIAL HYPERTENSION: Primary | ICD-10-CM

## 2023-02-02 DIAGNOSIS — K21.9 GASTROESOPHAGEAL REFLUX DISEASE WITHOUT ESOPHAGITIS: ICD-10-CM

## 2023-02-02 DIAGNOSIS — M10.00 IDIOPATHIC GOUT, UNSPECIFIED CHRONICITY, UNSPECIFIED SITE: ICD-10-CM

## 2023-02-02 DIAGNOSIS — R73.03 PREDIABETES: ICD-10-CM

## 2023-02-02 PROBLEM — J42 CHRONIC BRONCHITIS: Status: RESOLVED | Noted: 2019-07-29 | Resolved: 2023-02-02

## 2023-02-02 PROBLEM — M70.22 OLECRANON BURSITIS OF LEFT ELBOW: Status: RESOLVED | Noted: 2020-11-17 | Resolved: 2023-02-02

## 2023-02-02 PROCEDURE — 99214 OFFICE O/P EST MOD 30 MIN: CPT | Performed by: FAMILY MEDICINE

## 2023-02-02 NOTE — PROGRESS NOTES
"Chief Complaint  Establish Care    Subjective        Juan James presents to [facility]  History of Present Illness  New pt to me was a pt of Dr Kidd.  prediabetes- stable and no medication  GERD- stable and stopped med and doing well  Gout last flare up was years ago.   HLD- on med and daily exercise.    HTN- no CP or HA  A fib- sees cardio  Objective   Vital Signs:  /74 (BP Location: Right arm, Patient Position: Sitting, Cuff Size: Large Adult)   Pulse 81   Temp 97.7 °F (36.5 °C) (Temporal)   Resp 16   Ht 182.9 cm (72.01\")   Wt 104 kg (229 lb)   SpO2 96%   BMI 31.05 kg/m²   Estimated body mass index is 31.05 kg/m² as calculated from the following:    Height as of this encounter: 182.9 cm (72.01\").    Weight as of this encounter: 104 kg (229 lb).       BMI is >= 30 and <35. (Class 1 Obesity). The following options were offered after discussion;: weight loss educational material (shared in after visit summary) and exercise counseling/recommendations      Physical Exam  Vitals and nursing note reviewed.   Constitutional:       Appearance: Normal appearance. He is well-developed.   Cardiovascular:      Rate and Rhythm: Normal rate. Rhythm irregular.      Heart sounds: Normal heart sounds. No murmur heard.  Pulmonary:      Effort: Pulmonary effort is normal. No respiratory distress.      Breath sounds: Normal breath sounds. No stridor. No wheezing or rhonchi.   Neurological:      General: No focal deficit present.      Mental Status: He is alert and oriented to person, place, and time. He is not disoriented.   Psychiatric:         Mood and Affect: Mood normal.         Behavior: Behavior normal.        Result Review :                   Assessment and Plan   Diagnoses and all orders for this visit:    1. Essential hypertension (Primary)    2. Idiopathic gout, unspecified chronicity, unspecified site    3. Gastroesophageal reflux disease without esophagitis    4. " Prediabetes    5. Paroxysmal atrial fibrillation (HCC)    6. Mixed hyperlipidemia             Follow Up   Return in about 3 months (around 5/2/2023) for hypertension, hyperlipidema, A. Fib.  Patient was given instructions and counseling regarding his condition or for health maintenance advice. Please see specific information pulled into the AVS if appropriate.     Refills, work on diet, exercise and weight loss.    Patient has been erroneously marked as diabetic. Based on the available clinical information, he does not have diabetes and should therefore be excluded from diabetic health maintenance and quality measures for the remainder of the reporting period.   I have reviewed pt last lipids, cmp, micro

## 2023-02-21 DIAGNOSIS — M1A.09X0 IDIOPATHIC CHRONIC GOUT OF MULTIPLE SITES WITHOUT TOPHUS: ICD-10-CM

## 2023-02-21 RX ORDER — TAMSULOSIN HYDROCHLORIDE 0.4 MG/1
1 CAPSULE ORAL EVERY EVENING
Qty: 90 CAPSULE | Refills: 0 | Status: SHIPPED | OUTPATIENT
Start: 2023-02-21 | End: 2023-03-10 | Stop reason: SDUPTHER

## 2023-02-21 RX ORDER — ALLOPURINOL 100 MG/1
100 TABLET ORAL DAILY
Qty: 90 TABLET | Refills: 0 | Status: SHIPPED | OUTPATIENT
Start: 2023-02-21

## 2023-02-21 NOTE — TELEPHONE ENCOUNTER
LOV 2/2/2023 w/Dr. Yip    NOV 5/9/2023    Last Filled    Allopurinol 11/15/2021    Tamsulosin 1/25/2022

## 2023-03-10 RX ORDER — TAMSULOSIN HYDROCHLORIDE 0.4 MG/1
1 CAPSULE ORAL EVERY EVENING
Qty: 90 CAPSULE | Refills: 0 | Status: SHIPPED | OUTPATIENT
Start: 2023-03-10

## 2023-03-29 ENCOUNTER — PROCEDURE VISIT (OUTPATIENT)
Dept: SPORTS MEDICINE | Facility: CLINIC | Age: 78
End: 2023-03-29
Payer: MEDICARE

## 2023-03-29 VITALS
OXYGEN SATURATION: 96 % | SYSTOLIC BLOOD PRESSURE: 152 MMHG | HEIGHT: 72 IN | BODY MASS INDEX: 30.48 KG/M2 | HEART RATE: 54 BPM | TEMPERATURE: 98.3 F | WEIGHT: 225 LBS | DIASTOLIC BLOOD PRESSURE: 58 MMHG

## 2023-03-29 DIAGNOSIS — G89.29 CHRONIC LEFT SACROILIAC PAIN: Primary | ICD-10-CM

## 2023-03-29 DIAGNOSIS — M53.3 CHRONIC LEFT SACROILIAC PAIN: Primary | ICD-10-CM

## 2023-03-29 DIAGNOSIS — K43.9 VENTRAL HERNIA WITHOUT OBSTRUCTION OR GANGRENE: ICD-10-CM

## 2023-03-29 RX ORDER — TRIAMCINOLONE ACETONIDE 40 MG/ML
80 INJECTION, SUSPENSION INTRA-ARTICULAR; INTRAMUSCULAR ONCE
Status: COMPLETED | OUTPATIENT
Start: 2023-03-29 | End: 2023-03-29

## 2023-03-29 RX ADMIN — TRIAMCINOLONE ACETONIDE 80 MG: 40 INJECTION, SUSPENSION INTRA-ARTICULAR; INTRAMUSCULAR at 14:21

## 2023-03-29 NOTE — PROGRESS NOTES
"Here for repeat L SI injection    Ultrasound-Guided Sacroiliac Joint Injection Procedure Note     Left sacroiliac joint injection was discussed with the patient in detail, including indication, risks, benefits, and alternatives. Verbal consent was given for the procedure. Injection was performed by physician.  Injection site was identified by ultrasound examination, then cleaned with Betadine and alcohol swabs. Prior to needle insertion, ethyl chloride spray was used for surface anesthesia. Sterile technique was used. Ultrasound guidance was indicated for injection accuracy.  A 22-gauge, 3.5\" spinal needle was guided to the joint under continuous direct ultrasound visualization. Injectate was seen flowing underneath the superficial sacroiliac ligaments and passed without difficulty. The needle was removed and a simple bandage was applied. The procedure was tolerated well without difficulty.     Injection mixture:  1% lidocaine without epinephrine: 3 mL  40 mg/mL triamcinolone acetonide: 2 mL     *After performing the injection the patient requested me to look at a concern on his abdominal wall.  He states he has had a bulge in the midline of the abdomen for quite some time but it recently became larger and painful with a tearing sensation.  On a brief physical exam he feels like he has a hernia defect just superior to the umbilicus which is mildly tender to palpation.  It is fully reducible but easily reproducible.    Diagnoses and all orders for this visit:    Chronic left sacroiliac pain  -     triamcinolone acetonide (KENALOG-40) injection 80 mg    Ventral hernia without obstruction or gangrene  -     Ambulatory Referral to General Surgery    Repeat SI joint injection tolerated well.  Regarding his abdominal defect, this may be painful diastases recti but also some concern for ventral hernia or extension of an umbilical hernia.  He requested consultation with a general surgeon to discuss this so I took the " liberty of making these arrangements.

## 2023-03-31 NOTE — PROGRESS NOTES
Kenalog injection was administered in office 03/29/2023; not patient supplied   NDC:[address]-7  LOT: [id number]  EXP DATE:09/01/2024

## 2023-04-05 ENCOUNTER — OFFICE VISIT (OUTPATIENT)
Dept: SURGERY | Facility: CLINIC | Age: 78
End: 2023-04-05
Payer: MEDICARE

## 2023-04-05 VITALS — WEIGHT: 233.8 LBS | BODY MASS INDEX: 31.67 KG/M2 | HEIGHT: 72 IN

## 2023-04-05 DIAGNOSIS — M62.08 DIASTASIS OF RECTUS ABDOMINIS: Primary | ICD-10-CM

## 2023-04-05 PROCEDURE — 1160F RVW MEDS BY RX/DR IN RCRD: CPT | Performed by: SURGERY

## 2023-04-05 PROCEDURE — 1159F MED LIST DOCD IN RCRD: CPT | Performed by: SURGERY

## 2023-04-05 PROCEDURE — 99213 OFFICE O/P EST LOW 20 MIN: CPT | Performed by: SURGERY
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
## 2023-05-09 ENCOUNTER — OFFICE VISIT (OUTPATIENT)
Dept: FAMILY MEDICINE CLINIC | Facility: CLINIC | Age: 78
End: 2023-05-09
Payer: MEDICARE

## 2023-05-09 VITALS
BODY MASS INDEX: 28.96 KG/M2 | RESPIRATION RATE: 14 BRPM | TEMPERATURE: 97.7 F | OXYGEN SATURATION: 97 % | DIASTOLIC BLOOD PRESSURE: 82 MMHG | SYSTOLIC BLOOD PRESSURE: 134 MMHG | HEIGHT: 72 IN | WEIGHT: 213.8 LBS | HEART RATE: 74 BPM

## 2023-05-09 DIAGNOSIS — R82.90 ABNORMAL URINE: ICD-10-CM

## 2023-05-09 DIAGNOSIS — Z12.5 PROSTATE CANCER SCREENING: ICD-10-CM

## 2023-05-09 DIAGNOSIS — E78.2 MIXED HYPERLIPIDEMIA: ICD-10-CM

## 2023-05-09 DIAGNOSIS — L85.3 DRY SKIN: ICD-10-CM

## 2023-05-09 DIAGNOSIS — R31.29 OTHER MICROSCOPIC HEMATURIA: ICD-10-CM

## 2023-05-09 DIAGNOSIS — Z00.00 ENCOUNTER FOR MEDICARE ANNUAL WELLNESS EXAM: Primary | ICD-10-CM

## 2023-05-09 DIAGNOSIS — I10 ESSENTIAL HYPERTENSION: ICD-10-CM

## 2023-05-09 LAB
BILIRUB BLD-MCNC: NEGATIVE MG/DL
CLARITY, POC: ABNORMAL
COLOR UR: YELLOW
EXPIRATION DATE: ABNORMAL
GLUCOSE UR STRIP-MCNC: NEGATIVE MG/DL
KETONES UR QL: NEGATIVE
LEUKOCYTE EST, POC: ABNORMAL
Lab: ABNORMAL
NITRITE UR-MCNC: NEGATIVE MG/ML
PH UR: 6.5 [PH] (ref 5–8)
PROT UR STRIP-MCNC: ABNORMAL MG/DL
RBC # UR STRIP: ABNORMAL /UL
SP GR UR: 1.01 (ref 1–1.03)
UROBILINOGEN UR QL: NORMAL

## 2023-05-09 NOTE — PROGRESS NOTES
The ABCs of the Annual Wellness Visit  Subsequent Medicare Wellness Visit    Subjective    Juan James is a 77 y.o. male who presents for a Subsequent Medicare Wellness Visit.    The following portions of the patient's history were reviewed and   updated as appropriate: allergies, current medications, past family history, past medical history, past social history, past surgical history and problem list.    Compared to one year ago, the patient feels his physical   health is the same.    Compared to one year ago, the patient feels his mental   health is the same.    Recent Hospitalizations:  He was not admitted to the hospital during the last year.       Current Medical Providers:  Patient Care Team:  Sosa Yip MD as PCP - General (Family Medicine)  Bill Washington MD as Surgeon (Neurosurgery)  Mohan, Zafar Fraire MD as Consulting Physician (Vascular Surgery)  Alesia Aviles MD as Consulting Physician (Cardiology)  Chris Mo III, MD as Consulting Physician (Cardiology)    Outpatient Medications Prior to Visit   Medication Sig Dispense Refill   • allopurinol (ZYLOPRIM) 100 MG tablet Take 1 tablet by mouth Daily. 90 tablet 0   • allopurinol (ZYLOPRIM) 300 MG tablet Take 1 tablet by mouth Every Night. 90 tablet 3   • aspirin 81 MG EC tablet Take 1 tablet by mouth Daily.  11   • Cyanocobalamin (VITAMIN B-12) 5000 MCG tablet dispersible Place 1 tablet on the tongue Daily. (Patient taking differently: Place 1 tablet on the tongue Daily.)     • dabigatran etexilate (PRADAXA) 75 MG capsule Take 1 capsule by mouth 2 (Two) Times a Day. 180 capsule 3   • folic acid (FOLVITE) 400 MCG tablet Take 1 tablet by mouth Daily. (Patient taking differently: Take 1 tablet by mouth 2 (Two) Times a Day.)     • furosemide (LASIX) 40 MG tablet Take 1 tablet by mouth 3 (Three) Times a Day. 270 tablet 1   • metoprolol succinate XL (TOPROL-XL) 50 MG 24 hr tablet Take 1 tablet by mouth Every Evening. 90 tablet 3   •  multivitamin with minerals tablet tablet Take 1 tablet by mouth Daily.     • pantoprazole (Protonix) 40 MG EC tablet Take 1 tablet by mouth Daily. 30 tablet 11   • rosuvastatin (Crestor) 40 MG tablet Take 1 tablet by mouth Daily. 90 tablet 3   • tamsulosin (FLOMAX) 0.4 MG capsule 24 hr capsule Take 1 capsule by mouth Every Evening. 90 capsule 0     No facility-administered medications prior to visit.       No opioid medication identified on active medication list. I have reviewed chart for other potential  high risk medication/s and harmful drug interactions in the elderly.          Aspirin is on active medication list. Aspirin use is indicated based on review of current medical condition/s. Pros and cons of this therapy have been discussed today. Benefits of this medication outweigh potential harm.  Patient has been encouraged to continue taking this medication.  .      Patient Active Problem List   Diagnosis   • Cervical radiculitis   • Hyperlipidemia   • Prediabetes   • Cervical spondylosis with radiculopathy   • Pulmonary embolism   • Peptic ulceration   • OA (osteoarthritis)   • Hearing loss   • GERD (gastroesophageal reflux disease)   • DDD (degenerative disc disease), lumbar   • DDD (degenerative disc disease), cervical   • COPD (chronic obstructive pulmonary disease) (HCC)   • Colon polyps   • Cervical spondylosis   • Cervical disc disorder   • Cataract   • BPH (benign prostatic hyperplasia)   • Paroxysmal atrial fibrillation   • Arthritis   • PVD (peripheral vascular disease) with claudication   • Idiopathic chronic gout of multiple sites without tophus   • Spondylolisthesis of lumbar region   • Essential hypertension   • AAA (abdominal aortic aneurysm) without rupture   • Gout   • Nevus of choroid   • Benign prostatic hyperplasia   • DDD (degenerative disc disease), cervical   • Lumbar radiculopathy   • Stage 3a chronic kidney disease   • Encounter for Medicare annual wellness exam   • Other microscopic  "hematuria     Advance Care Planning   Advance Care Planning     Advance Directive is not on file.  ACP discussion was held with the patient during this visit. Patient has an advance directive (not in EMR), copy requested.     Objective    Vitals:    23 0745   BP: 134/82   BP Location: Right arm   Patient Position: Sitting   Cuff Size: Adult   Pulse: 74   Resp: 14   Temp: 97.7 °F (36.5 °C)   TempSrc: Temporal   SpO2: 97%   Weight: 97 kg (213 lb 12.8 oz)   Height: 182.9 cm (72.01\")   PainSc: 0-No pain     Estimated body mass index is 28.99 kg/m² as calculated from the following:    Height as of this encounter: 182.9 cm (72.01\").    Weight as of this encounter: 97 kg (213 lb 12.8 oz).    BMI is >= 25 and <30. (Overweight) The following options were offered after discussion;: weight loss educational material (shared in after visit summary)      Does the patient have evidence of cognitive impairment? No    Lab Results   Component Value Date    CHLPL 156 2023    TRIG 124 2023    HDL 56 2023    LDL 78 2023    VLDL 22 2023        HEALTH RISK ASSESSMENT    Smoking Status:  Social History     Tobacco Use   Smoking Status Former   • Packs/day: 2.00   • Years: 40.00   • Pack years: 80.00   • Types: Cigarettes   • Quit date: 2/10/2009   • Years since quittin.2   Smokeless Tobacco Former   Tobacco Comments    caffiene use: daily     Alcohol Consumption:  Social History     Substance and Sexual Activity   Alcohol Use Not Currently    Comment: QUIT HEAVY DRINKING 2020/ OCCAS NOW     Fall Risk Screen:    STEADI Fall Risk Assessment was completed, and patient is at LOW risk for falls.Assessment completed on:2023    Depression Screenin/9/2023     7:42 AM   PHQ-2/PHQ-9 Depression Screening   Little Interest or Pleasure in Doing Things 0-->not at all   Feeling Down, Depressed or Hopeless 0-->not at all   PHQ-9: Brief Depression Severity Measure Score 0       Health Habits and " Functional and Cognitive Screenin/9/2023     7:00 AM   Functional & Cognitive Status   Do you have difficulty preparing food and eating? No   Do you have difficulty bathing yourself, getting dressed or grooming yourself? No   Do you have difficulty using the toilet? No   Do you have difficulty moving around from place to place? No   Do you have trouble with steps or getting out of a bed or a chair? No   Current Diet Well Balanced Diet   Dental Exam Not up to date   Eye Exam Up to date   Exercise (times per week) 5 times per week   Current Exercises Include Light Weights   Do you need help using the phone?  No   Are you deaf or do you have serious difficulty hearing?  Yes   Do you need help with transportation? No   Do you need help shopping? No   Do you need help preparing meals?  No   Do you need help with housework?  No   Do you need help with laundry? No   Do you need help taking your medications? No   Do you need help managing money? No   Do you ever drive or ride in a car without wearing a seat belt? No   Have you felt unusual stress, anger or loneliness in the last month? No   Who do you live with? Alone   If you need help, do you have trouble finding someone available to you? No   Do you have difficulty concentrating, remembering or making decisions? No       Age-appropriate Screening Schedule:  Refer to the list below for future screening recommendations based on patient's age, sex and/or medical conditions. Orders for these recommended tests are listed in the plan section. The patient has been provided with a written plan.    Health Maintenance   Topic Date Due   • LUNG CANCER SCREENING  2023   • INFLUENZA VACCINE  2023   • COLORECTAL CANCER SCREENING  2024   • ANNUAL WELLNESS VISIT  2024   • LIPID PANEL  2024   • TDAP/TD VACCINES (2 - Td or Tdap) 2031   • COVID-19 Vaccine  Completed   • Pneumococcal Vaccine 65+  Completed   • AAA SCREEN (ONE-TIME)  Completed   •  "ZOSTER VACCINE  Completed   • HEPATITIS C SCREENING  Discontinued                  CMS Preventative Services Quick Reference  Risk Factors Identified During Encounter  None Identified  The above risks/problems have been discussed with the patient.  Pertinent information has been shared with the patient in the After Visit Summary.  An After Visit Summary and PPPS were made available to the patient.    Follow Up:   Next Medicare Wellness visit to be scheduled in 1 year.       MWE done and patient to work on diet and exercise for Preventive Counseling.          Additional E&M Note during same encounter follows:  Patient has multiple medical problems which are significant and separately identifiable that require additional work above and beyond the Medicare Wellness Visit.      Chief Complaint  Medicare Wellness-subsequent    Subjective        HPI  Juan James is also being seen today for dryness and scaly skin at ankles and feet.  When he gets some swelling in his feet and ankles, he does get some discomfort from skin stretching.  He states it feels like alligator skin.  He has tried a couple of lotions and Eucerin Cream but not helping his feet or ankles.      [facility] and needs to recheck to make sure it has cleared.  He did not have any symptoms at the time of his dx.  He still has no symptoms.         Objective   Vital Signs:  /82 (BP Location: Right arm, Patient Position: Sitting, Cuff Size: Adult)   Pulse 74   Temp 97.7 °F (36.5 °C) (Temporal)   Resp 14   Ht 182.9 cm (72.01\")   Wt 97 kg (213 lb 12.8 oz)   SpO2 97%   BMI 28.99 kg/m²     Physical Exam  Vitals and nursing note reviewed.   Constitutional:       Appearance: Normal appearance. He is well-developed.   Cardiovascular:      Rate and Rhythm: Normal rate and regular rhythm.      Heart sounds: Normal heart sounds. No murmur heard.  Pulmonary:      Effort: Pulmonary effort is normal. No respiratory distress. "      Breath sounds: Normal breath sounds. No stridor. No wheezing or rhonchi.   Skin:     Comments: Dry skin on feet and ankles.     Neurological:      General: No focal deficit present.      Mental Status: He is alert and oriented to person, place, and time. He is not disoriented.   Psychiatric:         Mood and Affect: Mood normal.         Behavior: Behavior normal.                         Assessment and Plan   Diagnoses and all orders for this visit:    1. Encounter for Medicare annual wellness exam (Primary)    2. Dry skin  -     Ambulatory Referral to Dermatology    3. Mixed hyperlipidemia  -     Cancel: Lipid Panel  -     Lipid Panel    4. Essential hypertension  -     Cancel: Comprehensive Metabolic Panel  -     Comprehensive Metabolic Panel    5. Abnormal urine  -     Cancel: POC Urinalysis Dipstick, Automated  -     POC Urinalysis Dipstick, Automated    6. Prostate cancer screening  -     PSA Screen    7. Other microscopic hematuria             Follow Up   Return in about 3 months (around 8/9/2023) for hypertension, hyperlipidema.  Patient was given instructions and counseling regarding his condition or for health maintenance advice. Please see specific information pulled into the AVS if appropriate.       Will get labs, UA, derm referral.  Continue to work on diet and exercise.   May need urology referral based on his blood in urine.

## 2023-05-10 LAB
ALBUMIN SERPL-MCNC: 4.3 G/DL (ref 3.5–5.2)
ALBUMIN/GLOB SERPL: 1.5 G/DL
ALP SERPL-CCNC: 83 U/L (ref 39–117)
ALT SERPL-CCNC: 16 U/L (ref 1–41)
AST SERPL-CCNC: 23 U/L (ref 1–40)
BILIRUB SERPL-MCNC: 0.8 MG/DL (ref 0–1.2)
BUN SERPL-MCNC: 24 MG/DL (ref 8–23)
BUN/CREAT SERPL: 17.8 (ref 7–25)
CALCIUM SERPL-MCNC: 10.2 MG/DL (ref 8.6–10.5)
CHLORIDE SERPL-SCNC: 101 MMOL/L (ref 98–107)
CHOLEST SERPL-MCNC: 156 MG/DL (ref 0–200)
CO2 SERPL-SCNC: 30.3 MMOL/L (ref 22–29)
CREAT SERPL-MCNC: 1.35 MG/DL (ref 0.76–1.27)
EGFRCR SERPLBLD CKD-EPI 2021: 54.8 ML/MIN/1.73
GLOBULIN SER CALC-MCNC: 2.9 GM/DL
GLUCOSE SERPL-MCNC: 99 MG/DL (ref 65–99)
HDLC SERPL-MCNC: 56 MG/DL (ref 40–60)
LDLC SERPL CALC-MCNC: 78 MG/DL (ref 0–100)
POTASSIUM SERPL-SCNC: 4.7 MMOL/L (ref 3.5–5.2)
PROT SERPL-MCNC: 7.2 G/DL (ref 6–8.5)
PSA SERPL-MCNC: 1.86 NG/ML (ref 0–4)
SODIUM SERPL-SCNC: 142 MMOL/L (ref 136–145)
TRIGL SERPL-MCNC: 124 MG/DL (ref 0–150)
VLDLC SERPL CALC-MCNC: 22 MG/DL (ref 5–40)

## 2023-05-11 ENCOUNTER — PATIENT ROUNDING (BHMG ONLY) (OUTPATIENT)
Dept: FAMILY MEDICINE CLINIC | Facility: CLINIC | Age: 78
End: 2023-05-11
Payer: MEDICARE

## 2023-05-11 ENCOUNTER — TELEPHONE (OUTPATIENT)
Dept: FAMILY MEDICINE CLINIC | Facility: CLINIC | Age: 78
End: 2023-05-11
Payer: MEDICARE

## 2023-05-11 DIAGNOSIS — R31.29 OTHER MICROSCOPIC HEMATURIA: Primary | ICD-10-CM

## 2023-05-11 NOTE — TELEPHONE ENCOUNTER
Ok for hub and FD    LMTCB    You have a little bit of blood in your urine.  Looks like you have had this last couple years.  Have you had this evaluated by a urologist yet?  If not, we need to get you set up with 1 to get it evaluated.  All your other labs are in good range.  Continue current plan.  Follow-up as scheduled.     ----- Message from Sosa Yip MD sent at 5/11/2023  3:31 PM EDT -----  You have a little bit of blood in your urine.  Looks like you have had this last couple years.  Have you had this evaluated by a urologist yet?  If not, we need to get you set up with 1 to get it evaluated.  All your other labs are in good range.  Continue current plan.  Follow-up as scheduled.

## 2023-05-11 NOTE — PROGRESS NOTES
• A My-Chart message has been sent to the patient for PATIENT ROUNDING with [facility]

## 2023-05-19 DIAGNOSIS — M1A.09X0 IDIOPATHIC CHRONIC GOUT OF MULTIPLE SITES WITHOUT TOPHUS: ICD-10-CM

## 2023-05-19 RX ORDER — ALLOPURINOL 300 MG/1
300 TABLET ORAL DAILY
Qty: 90 TABLET | Refills: 0 | Status: SHIPPED | OUTPATIENT
Start: 2023-05-19

## 2023-06-30 PROBLEM — F32.1 CURRENT MODERATE EPISODE OF MAJOR DEPRESSIVE DISORDER WITHOUT PRIOR EPISODE: Status: ACTIVE | Noted: 2023-06-30

## 2023-06-30 PROBLEM — R60.0 EDEMA OF BOTH LEGS: Status: ACTIVE | Noted: 2023-06-30

## 2023-06-30 PROBLEM — R04.0 BLEEDING NOSE: Status: ACTIVE | Noted: 2023-06-30

## 2023-07-31 DIAGNOSIS — Z72.0 TOBACCO ABUSE: Primary | ICD-10-CM

## 2023-07-31 DIAGNOSIS — F17.211 NICOTINE DEPENDENCE, CIGARETTES, IN REMISSION: ICD-10-CM

## 2023-08-08 ENCOUNTER — OFFICE VISIT (OUTPATIENT)
Dept: CARDIOLOGY | Facility: CLINIC | Age: 78
End: 2023-08-08
Payer: MEDICARE

## 2023-08-08 ENCOUNTER — OFFICE VISIT (OUTPATIENT)
Dept: FAMILY MEDICINE CLINIC | Facility: CLINIC | Age: 78
End: 2023-08-08
Payer: MEDICARE

## 2023-08-08 VITALS
DIASTOLIC BLOOD PRESSURE: 72 MMHG | WEIGHT: 208.4 LBS | BODY MASS INDEX: 28.23 KG/M2 | SYSTOLIC BLOOD PRESSURE: 138 MMHG | HEART RATE: 67 BPM | HEIGHT: 72 IN

## 2023-08-08 VITALS
OXYGEN SATURATION: 98 % | SYSTOLIC BLOOD PRESSURE: 138 MMHG | HEART RATE: 57 BPM | HEIGHT: 72 IN | TEMPERATURE: 96.9 F | WEIGHT: 208 LBS | DIASTOLIC BLOOD PRESSURE: 84 MMHG | BODY MASS INDEX: 28.17 KG/M2 | RESPIRATION RATE: 14 BRPM

## 2023-08-08 DIAGNOSIS — I10 ESSENTIAL HYPERTENSION: ICD-10-CM

## 2023-08-08 DIAGNOSIS — N18.31 STAGE 3A CHRONIC KIDNEY DISEASE: ICD-10-CM

## 2023-08-08 DIAGNOSIS — I48.21 PERMANENT ATRIAL FIBRILLATION: Primary | ICD-10-CM

## 2023-08-08 DIAGNOSIS — J43.9 PULMONARY EMPHYSEMA, UNSPECIFIED EMPHYSEMA TYPE: ICD-10-CM

## 2023-08-08 DIAGNOSIS — E78.2 MIXED HYPERLIPIDEMIA: ICD-10-CM

## 2023-08-08 DIAGNOSIS — F32.1 CURRENT MODERATE EPISODE OF MAJOR DEPRESSIVE DISORDER WITHOUT PRIOR EPISODE: ICD-10-CM

## 2023-08-08 DIAGNOSIS — I10 ESSENTIAL HYPERTENSION: Primary | ICD-10-CM

## 2023-08-08 PROCEDURE — 1159F MED LIST DOCD IN RCRD: CPT | Performed by: FAMILY MEDICINE

## 2023-08-08 PROCEDURE — 99214 OFFICE O/P EST MOD 30 MIN: CPT | Performed by: FAMILY MEDICINE

## 2023-08-08 PROCEDURE — 93000 ELECTROCARDIOGRAM COMPLETE: CPT | Performed by: INTERNAL MEDICINE

## 2023-08-08 PROCEDURE — 1160F RVW MEDS BY RX/DR IN RCRD: CPT | Performed by: INTERNAL MEDICINE

## 2023-08-08 PROCEDURE — 3079F DIAST BP 80-89 MM HG: CPT | Performed by: FAMILY MEDICINE

## 2023-08-08 PROCEDURE — 1160F RVW MEDS BY RX/DR IN RCRD: CPT | Performed by: FAMILY MEDICINE

## 2023-08-08 PROCEDURE — 3075F SYST BP GE 130 - 139MM HG: CPT | Performed by: FAMILY MEDICINE

## 2023-08-08 PROCEDURE — 3078F DIAST BP <80 MM HG: CPT | Performed by: INTERNAL MEDICINE

## 2023-08-08 PROCEDURE — 1159F MED LIST DOCD IN RCRD: CPT | Performed by: INTERNAL MEDICINE

## 2023-08-08 PROCEDURE — 99214 OFFICE O/P EST MOD 30 MIN: CPT | Performed by: INTERNAL MEDICINE

## 2023-08-08 PROCEDURE — 3075F SYST BP GE 130 - 139MM HG: CPT | Performed by: INTERNAL MEDICINE

## 2023-08-08 RX ORDER — FLUOXETINE HYDROCHLORIDE 20 MG/1
20 CAPSULE ORAL DAILY
Qty: 30 CAPSULE | Refills: 3 | Status: SHIPPED | OUTPATIENT
Start: 2023-08-08

## 2023-08-08 NOTE — PROGRESS NOTES
Subjective:     Encounter Date:08/08/23      Patient ID: Juan James is a 77 y.o. male.    Chief Complaint:  History of Present Illness    Dear Dr. Kidd,    I had the pleasure of seeing this patient in the office today for follow-up of atrial fibrillation, coronary artery disease, and a history of pulmonary hypertension.    He denies any chest pain, pressure, tightness, squeezing, or heartburn.  He has not experienced any feeling of palpitations, tachycardia or heart racing and no presyncope or syncope.  There has not been any problems with dizziness or lightheadedness.  There has not been any orthopnea or PND, and no problems with lower extremity edema.  He denies any shortness of breath at rest or with activity and has not had any wheezing.  He  has continued to perform daily activities of living without any specific problem or change in the level of activity.    Patient was previously followed with Dr. Aviles.  He last saw him in January of this year.  Prior to that, he was followed by Dr. Miles and before that Dr. Pinto.    He has a history of CAD, he had 3 stents in 2009, apparently in his right coronary artery.  He has a history of atrial fibrillation on chronic anticoagulation and rate control.  He apparently has been in atrial fibrillation ever since 2009.  He was admitted April 2020 with dig toxicity, hyperkalemia, and bradycardia.  He also had an echocardiogram performed that time that showed normal left-ventricular systolic function but severe pulmonary hypertension:    In June 2021 he underwent diagnostic evaluation with a Holter monitor and an echocardiogram:  48-hour Holter monitor showed atrial fibrillation with an average heart rate of 56, rare PVCs, and 4 runs of nonsustained VT up to 9 beats in duration. Echocardiogram showed normal LVEF, right atrium mildly dilated, moderate aortic valve calcification of the cusp, and RVSP 45 mmHg. Dr. Mo recommended that he continue current  "therapy.     He does not like being on the Pradaxa because of the cost.  He has had a significant GI bleed in the past when he was on Xarelto with a reported gastric ulcer.  He was seen by GI when he was hospitalized April 2020 at that point there was no contraindication to anticoagulation from a GI standpoint.  He apparently was on Eliquis but did not tolerate it, he said he just did not feel good on it, and has been on Pradaxa for some time now.  He states he has not had any issues with GI bleeding or gastric upset on Pradaxa.    Regarding the pulmonary hypertension, he was apparently diagnosed with sleep apnea but he does not believe that he has sleep apnea.  He has not been treated for it.  He has not had his pulmonary arterial pressure checked since his echocardiogram April 2020 that showed severe pulmonary hypertension with a PA pressure 93 mmHg.    The following portions of the patient's history were reviewed and updated as appropriate: allergies, current medications, past family history, past medical history, past social history, past surgical history and problem list.      ECG 12 Lead    Date/Time: 8/8/2023 10:20 AM  Performed by: Chris Mo III, MD  Authorized by: Chris Mo III, MD   Comparison: compared with previous ECG   Similar to previous ECG  Rhythm: atrial fibrillation  Rate: normal  Conduction: conduction normal  QRS axis: normal  Other findings: poor R wave progression    Clinical impression: abnormal EKG           Objective:     Vitals:    08/08/23 0957   BP: 138/72   BP Location: Left arm   Patient Position: Sitting   Pulse: 67   Weight: 94.5 kg (208 lb 6.4 oz)   Height: 182.9 cm (72.01\")     Body mass index is 28.26 kg/mý.      Vitals reviewed.   Constitutional:       General: Not in acute distress.     Appearance: Well-developed. Not diaphoretic.   Eyes:      General:         Right eye: No discharge.         Left eye: No discharge.      Conjunctiva/sclera: Conjunctivae normal.   HENT: "      Head: Normocephalic and atraumatic.      Nose: Nose normal.   Neck:      Thyroid: No thyromegaly.      Trachea: No tracheal deviation.   Pulmonary:      Effort: Pulmonary effort is normal. No respiratory distress.      Breath sounds: Normal breath sounds. No stridor.   Chest:      Chest wall: Not tender to palpatation.   Cardiovascular:      Normal rate. Regular rhythm.      Murmurs: There is no murmur.      . No S3 gallop. No click. No rub.   Pulses:     Intact distal pulses.   Edema:     Peripheral edema absent.   Abdominal:      General: Bowel sounds are normal. There is no distension.      Palpations: Abdomen is soft. There is no abdominal mass.   Musculoskeletal: Normal range of motion.         General: No tenderness or deformity.      Cervical back: Normal range of motion and neck supple. Skin:     General: Skin is warm and dry.      Findings: No erythema or rash.   Neurological:      Mental Status: Alert.   Psychiatric:         Attention and Perception: Attention normal.       Data and records reviewed:     Lab Results   Component Value Date    GLUCOSE 99 05/09/2023    BUN 24 (H) 05/09/2023    CREATININE 1.35 (H) 05/09/2023    EGFRIFNONA 75 12/20/2021    EGFRIFAFRI 68 11/15/2021    BCR 17.8 05/09/2023    K 4.7 05/09/2023    CO2 30.3 (H) 05/09/2023    CALCIUM 10.2 05/09/2023    PROTENTOTREF 7.2 05/09/2023    ALBUMIN 4.3 05/09/2023    LABIL2 1.5 05/09/2023    AST 23 05/09/2023    ALT 16 05/09/2023     No results found for: CHOL  Lab Results   Component Value Date    TRIG 124 05/09/2023    TRIG 119 11/15/2022    TRIG 116 05/16/2022     Lab Results   Component Value Date    HDL 56 05/09/2023    HDL 52 11/15/2022    HDL 69 05/16/2022     Lab Results   Component Value Date    LDL 78 05/09/2023    LDL 62 11/15/2022    LDL 75 05/16/2022     Lab Results   Component Value Date    VLDL 22 05/09/2023    VLDL 21 11/15/2022    VLDL 20 05/16/2022     Lab Results   Component Value Date    LDLHDL 1.1 05/16/2022    LDLHDL  1.3 11/15/2021    LDLHDL 2.66 11/26/2019         No radiology results for the last 90 days.  Results for orders placed during the hospital encounter of 07/08/21    Adult Transthoracic Echo Complete W/ Cont if Necessary Per Protocol    Interpretation Summary  ú Estimated left ventricular EF = 69% Left ventricular systolic function is normal.  ú Left ventricular diastolic function was indeterminate.  ú The right atrial cavity is mildly dilated.  ú There is moderate calcification of the aortic valve mainly affecting the left coronary cusp(s).  ú No aortic valve regurgitation or stenosis is present.  ú Mild tricuspid valve regurgitation is present. Estimated right ventricular systolic pressure from tricuspid regurgitation is moderately elevated (45-55 mmHg). Calculated right ventricular systolic pressure from tricuspid regurgitation is 45.0 mmHg.    CHADS-VASc Risk Assessment              4 Total Score    1 CHF    1 Hypertension    1 Vascular Disease    1 Age 65-74        Criteria that do not apply:    Age >/= 75    DM    PRIOR STROKE/TIA/THROMBO    Sex: Female                Assessment:          Diagnosis Plan   1. Permanent atrial fibrillation  ECG 12 Lead      2. Mixed hyperlipidemia  ECG 12 Lead      3. Essential hypertension  ECG 12 Lead      4. Stage 3a chronic kidney disease  ECG 12 Lead      5. Pulmonary emphysema, unspecified emphysema type  ECG 12 Lead             Plan:       1.  Permanent atrial fibrillation- Currently continue his current medical therapy  2.  Coronary disease, prior stent placement apparently in the RCA, no chest discomfort or symptoms of ischemia, continue current medical therapy  3.  pulmonary hypertension-pulmonary arterial pressure 45 on most recent echocardiogram  4.  Chronic anticoagulation-patient is on Pradaxa because of his [id number] score of 4, he has been maintained on Pradaxa for some period of time.  Given his prior GI bleed I would not have initiated him on this, but he  reports severe GI bleeding on Xarelto and then intolerance to Eliquis and he is not willing to have routine blood work drawn as would be necessary with warfarin, I have not adjusted or altered his anticoagulation regimen  5.  COPD  6.  Patient was apparently diagnosed with sleep apnea but he does not believe he has it it is not willing to use CPAP  7.  Ascending aortic aneurysm- stable on most recent CT.    Thank you very much for allowing us to participate in the care of this pleasant patient.  Please don't hesitate to call if I can be of assistance in any way.      Current Outpatient Medications:     allopurinol (ZYLOPRIM) 300 MG tablet, Take 1 tablet by mouth Every Night., Disp: 90 tablet, Rfl: 3    aspirin 81 MG EC tablet, Take 1 tablet by mouth Daily., Disp: , Rfl: 11    Cyanocobalamin (VITAMIN B-12) 5000 MCG tablet dispersible, Place 1 tablet on the tongue Daily. (Patient taking differently: Place 1 tablet on the tongue Daily.), Disp: , Rfl:     FLUoxetine (PROzac) 20 MG capsule, Take 1 capsule by mouth Daily., Disp: 30 capsule, Rfl: 3    folic acid (FOLVITE) 400 MCG tablet, Take 1 tablet by mouth Daily., Disp: , Rfl:     furosemide (LASIX) 40 MG tablet, Take 1 tablet by mouth 3 (Three) Times a Day., Disp: 270 tablet, Rfl: 1    hydroCHLOROthiazide (HYDRODIURIL) 25 MG tablet, Take 1 tablet by mouth Daily., Disp: 14 tablet, Rfl: 0    metoprolol succinate XL (TOPROL-XL) 50 MG 24 hr tablet, Take 1 tablet by mouth Every Evening., Disp: 90 tablet, Rfl: 3    multivitamin with minerals tablet tablet, Take 1 tablet by mouth Daily., Disp: , Rfl:     pantoprazole (Protonix) 40 MG EC tablet, Take 1 tablet by mouth Daily., Disp: 30 tablet, Rfl: 11    Pradaxa 75 MG capsule, TAKE 1 CAPSULE BY MOUTH 2 (TWO) TIMES A DAY, Disp: 180 capsule, Rfl: 0    rosuvastatin (Crestor) 40 MG tablet, Take 1 tablet by mouth Daily., Disp: 90 tablet, Rfl: 3    tamsulosin (FLOMAX) 0.4 MG capsule 24 hr capsule, Take 1 capsule by mouth Every  Evening., Disp: 90 capsule, Rfl: 0         No follow-ups on file.

## 2023-08-08 NOTE — PROGRESS NOTES
"Chief Complaint  Hyperlipidemia    Subjective        Juan James presents to [facility]  History of Present Illness  Pt presents today for refills, labs and  HtN- no CP or HA  HLD- on med and is active.    Depression- med was started 2 months ago and not helping.  It is also causing him to grind his teeth at night.    Seen urology recently and will see cardio later today.    Objective   Vital Signs:  /84 (BP Location: Right arm, Patient Position: Sitting, Cuff Size: Adult)   Pulse 57   Temp 96.9 øF (36.1 øC) (Temporal)   Resp 14   Ht 182.9 cm (72.01\")   Wt 94.3 kg (208 lb)   SpO2 98%   BMI 28.20 kg/mý   Estimated body mass index is 28.2 kg/mý as calculated from the following:    Height as of this encounter: 182.9 cm (72.01\").    Weight as of this encounter: 94.3 kg (208 lb).               Physical Exam  Vitals and nursing note reviewed.   Constitutional:       Appearance: Normal appearance. He is well-developed.   Cardiovascular:      Rate and Rhythm: Normal rate. Rhythm irregular.      Heart sounds: Normal heart sounds. No murmur heard.  Pulmonary:      Effort: Pulmonary effort is normal. No respiratory distress.      Breath sounds: Normal breath sounds. No stridor. No wheezing or rhonchi.   Neurological:      General: No focal deficit present.      Mental Status: He is alert and oriented to person, place, and time. He is not disoriented.   Psychiatric:         Mood and Affect: Mood normal.         Behavior: Behavior normal.      Result Review :                   Assessment and Plan   Diagnoses and all orders for this visit:    1. Essential hypertension (Primary)    2. Mixed hyperlipidemia    3. Current moderate episode of major depressive disorder without prior episode  -     FLUoxetine (PROzac) 20 MG capsule; Take 1 capsule by mouth Daily.  Dispense: 30 capsule; Refill: 3             Follow Up   Return in about 3 months (around 11/8/2023) for hypertension, " hyperlipidema.  Patient was given instructions and counseling regarding his condition or for health maintenance advice. Please see specific information pulled into the AVS if appropriate.     Stop zoloft and start fluoxetine.  SE discussed.    Patient has been erroneously marked as diabetic. Based on the available clinical information, he does not have diabetes and should therefore be excluded from diabetic health maintenance and quality measures for the remainder of the reporting period.

## 2023-08-21 RX ORDER — TAMSULOSIN HYDROCHLORIDE 0.4 MG/1
1 CAPSULE ORAL EVERY EVENING
Qty: 90 CAPSULE | Refills: 0 | Status: SHIPPED | OUTPATIENT
Start: 2023-08-21

## 2023-09-07 DIAGNOSIS — R60.0 EDEMA OF BOTH LEGS: ICD-10-CM

## 2023-09-07 RX ORDER — HYDROCHLOROTHIAZIDE 25 MG/1
25 TABLET ORAL DAILY
Qty: 30 TABLET | Refills: 0 | Status: SHIPPED | OUTPATIENT
Start: 2023-09-07

## 2023-09-28 ENCOUNTER — OFFICE VISIT (OUTPATIENT)
Dept: SPORTS MEDICINE | Facility: CLINIC | Age: 78
End: 2023-09-28
Payer: MEDICARE

## 2023-09-28 VITALS
HEIGHT: 72 IN | TEMPERATURE: 97.6 F | DIASTOLIC BLOOD PRESSURE: 60 MMHG | BODY MASS INDEX: 27.77 KG/M2 | OXYGEN SATURATION: 98 % | SYSTOLIC BLOOD PRESSURE: 136 MMHG | WEIGHT: 205 LBS

## 2023-09-28 DIAGNOSIS — G89.29 CHRONIC LEFT SACROILIAC PAIN: Primary | ICD-10-CM

## 2023-09-28 DIAGNOSIS — M53.3 CHRONIC LEFT SACROILIAC PAIN: Primary | ICD-10-CM

## 2023-09-28 RX ORDER — TRIAMCINOLONE ACETONIDE 40 MG/ML
40 INJECTION, SUSPENSION INTRA-ARTICULAR; INTRAMUSCULAR ONCE
Status: SHIPPED | OUTPATIENT
Start: 2023-09-28

## 2023-09-28 RX ADMIN — TRIAMCINOLONE ACETONIDE 40 MG: 40 INJECTION, SUSPENSION INTRA-ARTICULAR; INTRAMUSCULAR at 10:32

## 2023-09-28 NOTE — PROGRESS NOTES
"Juan is a 77 y.o. year old male     Chief Complaint   Patient presents with    Back Pain     Patient in office wanting to discuss pain in his back and get steroid injection       History of Present Illness  HPI     Requesting repeat sacroiliac joint injection.  He notes that his pain all over his body has improved after these injections in the past    Review of Systems    /60 (BP Location: Right arm, Patient Position: Sitting, Cuff Size: Adult)   Temp 97.6 °F (36.4 °C) (Temporal)   Ht 182.9 cm (72.01\")   Wt 93 kg (205 lb)   SpO2 98%   BMI 27.80 kg/m²      Physical Exam    Vital signs reviewed.   General: No acute distress.      MSK Exam:  Ortho Exam    Ultrasound-Guided Sacroiliac Joint Injection Procedure Note    Left sacroiliac joint injection was discussed with the patient in detail, including indication, risks, benefits, and alternatives. Verbal consent was given for the procedure. Injection was performed by physician.  Injection site was identified by ultrasound examination, then cleaned with Betadine and alcohol swabs. Prior to needle insertion, ethyl chloride spray was used for surface anesthesia. Sterile technique was used. Ultrasound guidance was indicated for injection accuracy.  A 22-gauge, 3.5\" spinal needle was guided to the joint under continuous direct ultrasound visualization. Injectate was seen flowing underneath the superficial sacroiliac ligaments and passed without difficulty. The needle was removed and a simple bandage was applied. The procedure was tolerated well without difficulty.    Injection mixture:  1% lidocaine without epinephrine: 2 mL  40 mg/mL triamcinolone acetonide: 1 mL     Diagnoses and all orders for this visit:    Chronic left sacroiliac pain  -     triamcinolone acetonide (KENALOG-40) injection 40 mg      Repeat injection tolerated well.  We will plan to follow-up in about 6 weeks tentatively in case his diffuse arthritic pain improves again with this injection " and begins to return.  We may need consider other underlying diagnoses or other treatment strategies for his chronic polyarthritis in the context of multiple health conditions as well as interactions with Pradaxa    JOSE MIGUEL Dragon/Transcription disclaimer:    Much of this encounter note is an electronic transcription/translation of spoken language to printed text.  The electronic translation of spoken language may permit erroneous, or at times, nonsensical words or phrases to be inadvertently transcribed.  Although I have reviewed the note for such errors some may still exist.

## 2023-10-30 RX ORDER — DABIGATRAN ETEXILATE MESYLATE 75 MG/1
75 CAPSULE ORAL EVERY 12 HOURS SCHEDULED
Qty: 180 CAPSULE | Refills: 0 | Status: SHIPPED | OUTPATIENT
Start: 2023-10-30

## 2023-11-01 ENCOUNTER — HOSPITAL ENCOUNTER (OUTPATIENT)
Dept: CT IMAGING | Facility: HOSPITAL | Age: 78
Discharge: HOME | End: 2023-11-01
Admitting: FAMILY MEDICINE
Payer: MEDICARE

## 2023-11-01 DIAGNOSIS — Z72.0 TOBACCO ABUSE: ICD-10-CM

## 2023-11-01 DIAGNOSIS — F17.211 NICOTINE DEPENDENCE, CIGARETTES, IN REMISSION: ICD-10-CM

## 2023-11-01 PROCEDURE — 71271 CT THORAX LUNG CANCER SCR C-: CPT

## 2023-11-02 DIAGNOSIS — R93.89 ABNORMAL CT SCAN: Primary | ICD-10-CM

## 2023-11-02 DIAGNOSIS — J92.9 PLEURAL THICKENING: ICD-10-CM

## 2023-11-02 PROBLEM — J43.8 OTHER EMPHYSEMA: Status: ACTIVE | Noted: 2023-11-02

## 2023-11-05 DIAGNOSIS — F32.1 CURRENT MODERATE EPISODE OF MAJOR DEPRESSIVE DISORDER WITHOUT PRIOR EPISODE: ICD-10-CM

## 2023-11-06 ENCOUNTER — OFFICE VISIT (OUTPATIENT)
Dept: FAMILY MEDICINE CLINIC | Facility: CLINIC | Age: 78
End: 2023-11-06
Payer: MEDICARE

## 2023-11-06 VITALS
TEMPERATURE: 97.8 F | DIASTOLIC BLOOD PRESSURE: 88 MMHG | WEIGHT: 207.3 LBS | SYSTOLIC BLOOD PRESSURE: 139 MMHG | RESPIRATION RATE: 18 BRPM | HEART RATE: 66 BPM | OXYGEN SATURATION: 99 % | BODY MASS INDEX: 28.08 KG/M2 | HEIGHT: 72 IN

## 2023-11-06 DIAGNOSIS — R93.89 ABNORMAL CT SCAN: ICD-10-CM

## 2023-11-06 DIAGNOSIS — R53.83 OTHER FATIGUE: ICD-10-CM

## 2023-11-06 DIAGNOSIS — M79.10 MYALGIA: ICD-10-CM

## 2023-11-06 DIAGNOSIS — M25.50 ARTHRALGIA OF MULTIPLE JOINTS: ICD-10-CM

## 2023-11-06 DIAGNOSIS — I10 ESSENTIAL HYPERTENSION: ICD-10-CM

## 2023-11-06 DIAGNOSIS — E78.2 MIXED HYPERLIPIDEMIA: Primary | ICD-10-CM

## 2023-11-06 RX ORDER — FLUOXETINE HYDROCHLORIDE 20 MG/1
20 CAPSULE ORAL DAILY
Qty: 90 CAPSULE | Refills: 0 | OUTPATIENT
Start: 2023-11-06

## 2023-11-06 NOTE — PROGRESS NOTES
"Chief Complaint  Follow-up (CT Scan ) and Pain (Hard time moving around, getting up and standing )    Subjective        Juan James presents to [facility]  Pain      Pt presents today for refills, labs and  HTN- no CP  HLD- on med and stable.    Abnormal Lung screening CT and needs to discuss this.    He is aching and in pain all over his body and trouble getting around and doing things.  Tylenol not really helping much.  This has been going on for about 7 months.    Objective   Vital Signs:  /88 (BP Location: Right arm, Patient Position: Sitting, Cuff Size: Adult)   Pulse 66   Temp 97.8 °F (36.6 °C) (Tympanic)   Resp 18   Ht 182.9 cm (72.01\")   Wt 94 kg (207 lb 4.8 oz)   SpO2 99%   BMI 28.11 kg/m²   Estimated body mass index is 28.11 kg/m² as calculated from the following:    Height as of this encounter: 182.9 cm (72.01\").    Weight as of this encounter: 94 kg (207 lb 4.8 oz).               Physical Exam  Vitals and nursing note reviewed.   Constitutional:       Appearance: Normal appearance. He is well-developed.   HENT:      Mouth/Throat:      Mouth: Mucous membranes are moist.      Pharynx: Oropharynx is clear.   Eyes:      General: No scleral icterus.        Right eye: No discharge.         Left eye: No discharge.      Extraocular Movements: Extraocular movements intact.      Conjunctiva/sclera: Conjunctivae normal.      Pupils: Pupils are equal, round, and reactive to light.   Cardiovascular:      Rate and Rhythm: Normal rate and regular rhythm.      Heart sounds: Normal heart sounds. No murmur heard.  Pulmonary:      Effort: Pulmonary effort is normal. No respiratory distress.      Breath sounds: Normal breath sounds. No stridor. No wheezing or rhonchi.   Neurological:      General: No focal deficit present.      Mental Status: He is alert and oriented to person, place, and time. He is not disoriented.   Psychiatric:         Mood and Affect: Mood normal.        "  Behavior: Behavior normal.        Result Review :                   Assessment and Plan   Diagnoses and all orders for this visit:    1. Mixed hyperlipidemia (Primary)  -     Lipid Panel    2. Essential hypertension  -     Comprehensive Metabolic Panel    3. Abnormal CT scan    4. Arthralgia of multiple joints  -     Sedimentation Rate  -     C-reactive Protein    5. Other fatigue  -     CBC & Differential  -     TSH Rfx On Abnormal To Free T4  -     Vitamin B12    6. Myalgia  -     CK Isoenzymes             Follow Up   Return in about 6 months (around 5/6/2024).  Patient was given instructions and counseling regarding his condition or for health maintenance advice. Please see specific information pulled into the AVS if appropriate.       Refills, labs and discussed CT scan and will get additional scans done.    Will get labs today and work.  Will treat accordingly.

## 2023-11-08 LAB
ALBUMIN SERPL-MCNC: 3.9 G/DL (ref 3.8–4.8)
ALBUMIN/GLOB SERPL: 1.3 {RATIO} (ref 1.2–2.2)
ALP SERPL-CCNC: 113 IU/L (ref 44–121)
ALT SERPL-CCNC: 40 IU/L (ref 0–44)
AST SERPL-CCNC: 64 IU/L (ref 0–40)
BASOPHILS # BLD AUTO: 0 X10E3/UL (ref 0–0.2)
BASOPHILS NFR BLD AUTO: 1 %
BILIRUB SERPL-MCNC: 0.5 MG/DL (ref 0–1.2)
BUN SERPL-MCNC: 26 MG/DL (ref 8–27)
BUN/CREAT SERPL: 25 (ref 10–24)
CALCIUM SERPL-MCNC: 9.3 MG/DL (ref 8.6–10.2)
CHLORIDE SERPL-SCNC: 102 MMOL/L (ref 96–106)
CHOLEST SERPL-MCNC: 144 MG/DL (ref 100–199)
CK BB CFR SERPL ELPH: 0 %
CK MACRO1 CFR SERPL: 0 %
CK MACRO2 CFR SERPL: 0 %
CK MB CFR SERPL ELPH: 0 % (ref 0–3)
CK MM CFR SERPL ELPH: 100 % (ref 97–100)
CK SERPL-CCNC: 50 U/L (ref 41–331)
CO2 SERPL-SCNC: 25 MMOL/L (ref 20–29)
CREAT SERPL-MCNC: 1.02 MG/DL (ref 0.76–1.27)
CRP SERPL-MCNC: 11 MG/L (ref 0–10)
EGFRCR SERPLBLD CKD-EPI 2021: 77 ML/MIN/1.73
EOSINOPHIL # BLD AUTO: 0.1 X10E3/UL (ref 0–0.4)
EOSINOPHIL NFR BLD AUTO: 2 %
ERYTHROCYTE [DISTWIDTH] IN BLOOD BY AUTOMATED COUNT: 13.7 % (ref 11.6–15.4)
ERYTHROCYTE [SEDIMENTATION RATE] IN BLOOD BY WESTERGREN METHOD: 40 MM/HR (ref 0–30)
GLOBULIN SER CALC-MCNC: 2.9 G/DL (ref 1.5–4.5)
GLUCOSE SERPL-MCNC: 95 MG/DL (ref 70–99)
HCT VFR BLD AUTO: 42.1 % (ref 37.5–51)
HDLC SERPL-MCNC: 56 MG/DL
HGB BLD-MCNC: 13.8 G/DL (ref 13–17.7)
IMM GRANULOCYTES # BLD AUTO: 0 X10E3/UL (ref 0–0.1)
IMM GRANULOCYTES NFR BLD AUTO: 1 %
LDLC SERPL CALC-MCNC: 64 MG/DL (ref 0–99)
LYMPHOCYTES # BLD AUTO: 1.6 X10E3/UL (ref 0.7–3.1)
LYMPHOCYTES NFR BLD AUTO: 27 %
MCH RBC QN AUTO: 32.8 PG (ref 26.6–33)
MCHC RBC AUTO-ENTMCNC: 32.8 G/DL (ref 31.5–35.7)
MCV RBC AUTO: 100 FL (ref 79–97)
MONOCYTES # BLD AUTO: 0.5 X10E3/UL (ref 0.1–0.9)
MONOCYTES NFR BLD AUTO: 8 %
NEUTROPHILS # BLD AUTO: 3.7 X10E3/UL (ref 1.4–7)
NEUTROPHILS NFR BLD AUTO: 61 %
PLATELET # BLD AUTO: 162 X10E3/UL (ref 150–450)
POTASSIUM SERPL-SCNC: 4 MMOL/L (ref 3.5–5.2)
PROT SERPL-MCNC: 6.8 G/DL (ref 6–8.5)
RBC # BLD AUTO: 4.21 X10E6/UL (ref 4.14–5.8)
SODIUM SERPL-SCNC: 139 MMOL/L (ref 134–144)
TRIGL SERPL-MCNC: 138 MG/DL (ref 0–149)
TSH SERPL DL<=0.005 MIU/L-ACNC: 1.44 UIU/ML (ref 0.45–4.5)
VIT B12 SERPL-MCNC: 1028 PG/ML (ref 232–1245)
VLDLC SERPL CALC-MCNC: 24 MG/DL (ref 5–40)
WBC # BLD AUTO: 5.8 X10E3/UL (ref 3.4–10.8)

## 2023-11-09 ENCOUNTER — TELEPHONE (OUTPATIENT)
Dept: NEUROSURGERY | Facility: CLINIC | Age: 78
End: 2023-11-09
Payer: MEDICARE

## 2023-11-09 ENCOUNTER — OFFICE VISIT (OUTPATIENT)
Dept: SPORTS MEDICINE | Facility: CLINIC | Age: 78
End: 2023-11-09
Payer: MEDICARE

## 2023-11-09 VITALS
WEIGHT: 207 LBS | DIASTOLIC BLOOD PRESSURE: 82 MMHG | BODY MASS INDEX: 28.04 KG/M2 | HEIGHT: 72 IN | TEMPERATURE: 98 F | SYSTOLIC BLOOD PRESSURE: 160 MMHG | HEART RATE: 54 BPM | OXYGEN SATURATION: 97 %

## 2023-11-09 DIAGNOSIS — M51.36 DDD (DEGENERATIVE DISC DISEASE), LUMBAR: Primary | ICD-10-CM

## 2023-11-09 DIAGNOSIS — M79.605 LEG PAIN, BILATERAL: ICD-10-CM

## 2023-11-09 DIAGNOSIS — M79.604 LEG PAIN, BILATERAL: ICD-10-CM

## 2023-11-09 PROCEDURE — 3079F DIAST BP 80-89 MM HG: CPT | Performed by: FAMILY MEDICINE

## 2023-11-09 PROCEDURE — 3077F SYST BP >= 140 MM HG: CPT | Performed by: FAMILY MEDICINE

## 2023-11-09 PROCEDURE — 99214 OFFICE O/P EST MOD 30 MIN: CPT | Performed by: FAMILY MEDICINE

## 2023-11-09 RX ORDER — PREDNISONE 20 MG/1
TABLET ORAL
Qty: 18 TABLET | Refills: 0 | Status: SHIPPED | OUTPATIENT
Start: 2023-11-09

## 2023-11-09 NOTE — PROGRESS NOTES
"Juan is a 77 y.o. year old male     Chief Complaint   Patient presents with    Back Pain     BILATERAL LEG- Patient is here to follow up on bilateral leg pain and weakness, states previous cortisone injections did not help.        History of Present Illness  HPI   Here for progressively worsening low back pain as well as pain/weakness in both legs. Worsening for few weeks. No numbness. No  symptoms.   Reviewed recent labs from PCP - mildly high CRP and ESR. Normal CK.      Review of Systems    /82 (BP Location: Right arm, Patient Position: Sitting, Cuff Size: Adult)   Pulse 54   Temp 98 °F (36.7 °C) (Temporal)   Ht 182.9 cm (72.01\")   Wt 93.9 kg (207 lb)   SpO2 97%   BMI 28.07 kg/m²      Physical Exam    Vital signs reviewed.   General: No acute distress.      MSK Exam:  Ortho Exam  No significant lumbar tenderness.  He has considerable difficulty standing up from the chair, he relies on pushing off the arm of the chair and his cane to stand up due to sensation of weakness in both posterior thighs as well as pain right worse than left bilateral posterior thighs.  There is no point motion tenderness.  There is no pain and good strength with hip extension.  There is good strength but pain with knee flexion right worse than left.    Lumbar Spine X-Ray  Indication: Pain  Views: AP and Lateral and Flexion and Extension views    Findings:  Postsurgical changes with multilevel fusion appears stable.  There is stable spondylolisthesis.  Stable vascular grafting.  Compared to previous study September 2022.    Procedures     Diagnoses and all orders for this visit:    DDD (degenerative disc disease), lumbar  -     XR Spine Lumbar Complete 4+VW  -     predniSONE (DELTASONE) 20 MG tablet; Take 3 tabs daily for 3 days, then 2 daily for 3 days, then 1 daily for 3 days    Leg pain, bilateral  -     XR Spine Lumbar Complete 4+VW  -     predniSONE (DELTASONE) 20 MG tablet; Take 3 tabs daily for 3 days, then 2 daily " for 3 days, then 1 daily for 3 days      Overall I believe there are 3 likely explanations for his symptoms today.  1, muscular pain secondary to his chronic lumbar pathology.  2, worsening lumbar pathology causing neurogenic claudication.  3, polymyalgia rheumatica evidenced by elevated systemic inflammatory markers on recent labs.  We will plan to treat this with a prednisone burst also would like him to follow-up with Dr. Washington.  Based on his response to prednisone and his opinion, consultation with rheumatology may be indicated.          EMR Dragon/Transcription disclaimer:    Much of this encounter note is an electronic transcription/translation of spoken language to printed text.  The electronic translation of spoken language may permit erroneous, or at times, nonsensical words or phrases to be inadvertently transcribed.  Although I have reviewed the note for such errors some may still exist.

## 2023-11-09 NOTE — TELEPHONE ENCOUNTER
INCOMING CALL FROM PATIENT AFTER COMPLETING NEW XRAY LUMBAR IMAGING AND OFFICE VISIT CONSULT W/ DR CARDENAS (REFERRED TO BY RADHA MEHTA) ; PATIENT STATES HIS PCP ORDERED EXTENSIVE LABS AND BLOOD WORK ; DR CARDENAS ALSO DIRECTING BACK TO DR GARCIA.     I SCHEDULED MY FIRST AVAILABLE FOLLOW UP W/ DR GARCIA BUT THIS WAS OUT UNTIL 12/7/23 & THIS MAY REQUIRE GREATER URGENCY UPON DR GARCIA'S REVIEW.    PATIENT REQUESTING DR GARCIA / HIS CLINICAL TEAM REVIEW 1) DR LOZA'S LABWORK / LAST OVN 2) DR ARVIZU LAST OVN AND XR IMAGING REPORT FROM 11/9/23 ; BOTH ARE DIRECTING PATIENT TO RETURN TO DR GARCIA AS SOON AS POSSIBLE.     REQUESTING SOONER FU W/ DR GARCIA D/T DR LOZA AND DR CARDENAS BOTH STRESSED FOR HIM TO RETURN AND BE SEEN BY DR GARCIA WITH NEW LABS AND IMAGING D/T THE SEVERITY OF HIS ONGOING SEVERE PAIN AND SYMPTOMS.     PLEASE C/B PATIENT BOBBY HUNG PH: [phone] BEFORE END OF WEEK TO CONFIRM IF CURRENT F/U OIKAY OR NEEDS SOONER.

## 2023-11-13 RX ORDER — TAMSULOSIN HYDROCHLORIDE 0.4 MG/1
1 CAPSULE ORAL EVERY EVENING
Qty: 90 CAPSULE | Refills: 0 | Status: SHIPPED | OUTPATIENT
Start: 2023-11-13

## 2023-11-13 NOTE — TELEPHONE ENCOUNTER
"S/w patient and informed per Dr. Washington     \"Just try to get this patient in next week sometime. \"    Explained to patient that I have him scheduled for 11/16/2023 @ 3:30pm, patient expressed understanding   "

## 2023-11-14 DIAGNOSIS — F51.01 PRIMARY INSOMNIA: Primary | ICD-10-CM

## 2023-11-14 RX ORDER — TRAZODONE HYDROCHLORIDE 50 MG/1
TABLET ORAL
Qty: 30 TABLET | Refills: 1 | Status: SHIPPED | OUTPATIENT
Start: 2023-11-14

## 2023-11-15 RX ORDER — TAMSULOSIN HYDROCHLORIDE 0.4 MG/1
1 CAPSULE ORAL EVERY EVENING
Qty: 90 CAPSULE | Refills: 0 | OUTPATIENT
Start: 2023-11-15

## 2023-11-15 NOTE — TELEPHONE ENCOUNTER
Informed pt that Trazodone was sent to pharmacy for sleep. He stated he would still like something called in for RLS

## 2023-11-15 NOTE — PROGRESS NOTES
Subjective   Patient ID: Juan James is a 77 y.o. male is here today for follow-up post ortho consult. Patient had a XR Lumbar done on 11/09/2023    Today patient states that he has low back pain    History of Present Illness    This patient was last seen in September 2022.  At that time he was having pain in his back with radiation into his right hip and his right leg.  I did review a note from an orthopedic surgeon in September of last year who felt he did have some bursitis.  Since then he has been tolerating some pain in his back but over the past couple of months he has had really severe pain in his legs.  It is in both of his legs.  Currently he is on steroids and that is helping.    The following portions of the patient's history were reviewed and updated as appropriate: allergies, current medications, past family history, past medical history, past social history, past surgical history, and problem list.    Review of Systems   Constitutional:  Negative for chills and fever.   HENT:  Negative for congestion.    Musculoskeletal:  Positive for back pain and myalgias. Negative for gait problem.   Neurological:  Positive for weakness and numbness.       I reviewed the review of systems listed by the patient and discussed by my MA    Objective     There were no vitals filed for this visit.  There is no height or weight on file to calculate BMI.    Tobacco Use: Medium Risk (11/16/2023)    Patient History     Smoking Tobacco Use: Former     Smokeless Tobacco Use: Former     Passive Exposure: Not on file          Physical Exam  Neurological:      Mental Status: He is alert and oriented to person, place, and time.       Neurologic Exam     Mental Status   Oriented to person, place, and time.           Assessment & Plan   Independent Review of Radiographic Studies:      I personally reviewed the images from the following studies.    I reviewed films done in September of last year which show good alignment of the  construct at L3-4 and L4-5.    Medical Decision Making:      I told the patient I see little option but to reimage his lumbar spine.  I will see him back once that has been completed.    Diagnoses and all orders for this visit:    1. DDD (degenerative disc disease), lumbar (Primary)  -     XR Spine Lumbar Complete With Flex & Ext; Future  -     MRI Lumbar Spine With & Without Contrast; Future      Return for After radiology test.

## 2023-11-16 ENCOUNTER — TELEPHONE (OUTPATIENT)
Dept: SPORTS MEDICINE | Facility: CLINIC | Age: 78
End: 2023-11-16
Payer: MEDICARE

## 2023-11-16 ENCOUNTER — OFFICE VISIT (OUTPATIENT)
Dept: NEUROSURGERY | Facility: CLINIC | Age: 78
End: 2023-11-16
Payer: MEDICARE

## 2023-11-16 DIAGNOSIS — M51.36 DDD (DEGENERATIVE DISC DISEASE), LUMBAR: Primary | ICD-10-CM

## 2023-11-16 PROCEDURE — 1159F MED LIST DOCD IN RCRD: CPT | Performed by: NEUROLOGICAL SURGERY

## 2023-11-16 PROCEDURE — 99214 OFFICE O/P EST MOD 30 MIN: CPT | Performed by: NEUROLOGICAL SURGERY

## 2023-11-16 PROCEDURE — 1160F RVW MEDS BY RX/DR IN RCRD: CPT | Performed by: NEUROLOGICAL SURGERY

## 2023-11-16 NOTE — TELEPHONE ENCOUNTER
Patient requesting return call to speak to doctor or medical assistant. Says on high dose steroid, last day. Wants some info on what to expect as that wears off. Requested return call to phone# on file. Please advise.

## 2023-11-16 NOTE — TELEPHONE ENCOUNTER
Called and spoke with patient states he wanted to update Dr. Harvey that he is feeling better.    He is planning out traveling this weekend and was advised by Dr. Zavala today that if his symptoms worsen to contact his office.     No further action required.

## 2023-11-19 ENCOUNTER — HOSPITAL ENCOUNTER (OUTPATIENT)
Facility: HOSPITAL | Age: 78
Discharge: HOME | End: 2023-11-19
Admitting: FAMILY MEDICINE
Payer: MEDICARE

## 2023-11-19 DIAGNOSIS — J92.9 PLEURAL THICKENING: ICD-10-CM

## 2023-11-19 DIAGNOSIS — R93.89 ABNORMAL CT SCAN: ICD-10-CM

## 2023-11-19 PROCEDURE — 25510000001 IOPAMIDOL 61 % SOLUTION: Performed by: FAMILY MEDICINE

## 2023-11-19 PROCEDURE — 0 DIATRIZOATE MEGLUMINE & SODIUM PER 1 ML: Performed by: FAMILY MEDICINE

## 2023-11-19 PROCEDURE — 71260 CT THORAX DX C+: CPT

## 2023-11-19 PROCEDURE — 74160 CT ABDOMEN W/CONTRAST: CPT

## 2023-11-19 RX ADMIN — DIATRIZOATE MEGLUMINE AND DIATRIZOATE SODIUM 30 ML: 600; 100 SOLUTION ORAL; RECTAL at 11:32

## 2023-11-19 RX ADMIN — IOPAMIDOL 100 ML: 612 INJECTION, SOLUTION INTRAVENOUS at 11:31

## 2023-11-21 DIAGNOSIS — M79.604 LEG PAIN, BILATERAL: ICD-10-CM

## 2023-11-21 DIAGNOSIS — M51.36 DDD (DEGENERATIVE DISC DISEASE), LUMBAR: ICD-10-CM

## 2023-11-21 DIAGNOSIS — M79.605 LEG PAIN, BILATERAL: ICD-10-CM

## 2023-11-21 RX ORDER — PREDNISONE 20 MG/1
TABLET ORAL
Qty: 18 TABLET | Refills: 0 | Status: SHIPPED | OUTPATIENT
Start: 2023-11-21

## 2023-11-21 NOTE — TELEPHONE ENCOUNTER
Patient is requesting a prescription for   Prednisone be called in. He is leaving out of town tomorrow.     Please advise.

## 2023-11-25 DIAGNOSIS — R93.89 ABNORMAL CT SCAN: Primary | ICD-10-CM

## 2023-11-25 PROBLEM — K57.30 SIGMOID DIVERTICULOSIS: Status: ACTIVE | Noted: 2023-11-25

## 2023-11-25 PROBLEM — N20.0 KIDNEY STONES: Status: ACTIVE | Noted: 2023-11-25

## 2023-12-01 DIAGNOSIS — F51.01 PRIMARY INSOMNIA: ICD-10-CM

## 2023-12-01 RX ORDER — TRAZODONE HYDROCHLORIDE 100 MG/1
100 TABLET ORAL NIGHTLY
Qty: 30 TABLET | Refills: 3 | Status: SHIPPED | OUTPATIENT
Start: 2023-12-01 | End: 2023-12-04 | Stop reason: SDUPTHER

## 2023-12-04 DIAGNOSIS — F51.01 PRIMARY INSOMNIA: ICD-10-CM

## 2023-12-04 RX ORDER — TRAZODONE HYDROCHLORIDE 100 MG/1
100 TABLET ORAL NIGHTLY
Qty: 30 TABLET | Refills: 3 | Status: SHIPPED | OUTPATIENT
Start: 2023-12-04

## 2023-12-08 ENCOUNTER — HOSPITAL ENCOUNTER (OUTPATIENT)
Dept: MRI IMAGING | Facility: HOSPITAL | Age: 78
Discharge: HOME | End: 2023-12-08
Payer: MEDICARE

## 2023-12-08 ENCOUNTER — HOSPITAL ENCOUNTER (OUTPATIENT)
Dept: GENERAL RADIOLOGY | Facility: HOSPITAL | Age: 78
Discharge: HOME | End: 2023-12-08
Payer: MEDICARE

## 2023-12-08 DIAGNOSIS — M51.36 DDD (DEGENERATIVE DISC DISEASE), LUMBAR: ICD-10-CM

## 2023-12-08 PROCEDURE — 72158 MRI LUMBAR SPINE W/O & W/DYE: CPT

## 2023-12-08 PROCEDURE — 82565 ASSAY OF CREATININE: CPT

## 2023-12-08 PROCEDURE — A9577 INJ MULTIHANCE: HCPCS | Performed by: NEUROLOGICAL SURGERY

## 2023-12-08 PROCEDURE — 0 GADOBENATE DIMEGLUMINE 529 MG/ML SOLUTION: Performed by: NEUROLOGICAL SURGERY

## 2023-12-08 PROCEDURE — 72114 X-RAY EXAM L-S SPINE BENDING: CPT

## 2023-12-08 RX ADMIN — GADOBENATE DIMEGLUMINE 20 ML: 529 INJECTION, SOLUTION INTRAVENOUS at 14:35

## 2023-12-09 LAB — CREAT BLDA-MCNC: 1.3 MG/DL (ref 0.6–1.3)

## 2023-12-11 DIAGNOSIS — M79.641 PAIN OF RIGHT HAND: ICD-10-CM

## 2023-12-11 RX ORDER — ALLOPURINOL 300 MG/1
300 TABLET ORAL NIGHTLY
Qty: 90 TABLET | Refills: 3 | Status: SHIPPED | OUTPATIENT
Start: 2023-12-11

## 2023-12-12 ENCOUNTER — OFFICE VISIT (OUTPATIENT)
Dept: NEUROSURGERY | Facility: CLINIC | Age: 78
End: 2023-12-12
Payer: MEDICARE

## 2023-12-12 DIAGNOSIS — M43.16 SPONDYLOLISTHESIS OF LUMBAR REGION: Primary | ICD-10-CM

## 2023-12-12 PROCEDURE — 1160F RVW MEDS BY RX/DR IN RCRD: CPT | Performed by: NEUROLOGICAL SURGERY

## 2023-12-12 PROCEDURE — 1159F MED LIST DOCD IN RCRD: CPT | Performed by: NEUROLOGICAL SURGERY

## 2023-12-12 PROCEDURE — 99214 OFFICE O/P EST MOD 30 MIN: CPT | Performed by: NEUROLOGICAL SURGERY

## 2023-12-12 NOTE — PROGRESS NOTES
Subjective   Patient ID: Juan James is a 78 y.o. male is here today for follow-up with a new MRI L-spine done on 12/08/2023 @ [facility].    Today patient states that his symptoms worsening, patient is having weakness in B/L  legs, along with low back pain    History of Present Illness    This patient was last here in mid November.  He was having pain in both of his legs.  He has been taking some steroids which was helping but we decided to check an MRI.    The following portions of the patient's history were reviewed and updated as appropriate: allergies, current medications, past family history, past medical history, past social history, past surgical history, and problem list.    Review of Systems   Constitutional:  Negative for chills and fever.   HENT:  Negative for congestion.    Genitourinary:  Negative for difficulty urinating and dysuria.   Musculoskeletal:  Positive for back pain, gait problem and myalgias.   Neurological:  Positive for weakness. Negative for numbness.       I reviewed the review of systems listed by the patient and discussed by my MA    Objective     There were no vitals filed for this visit.  There is no height or weight on file to calculate BMI.    Tobacco Use: Medium Risk (12/12/2023)    Patient History     Smoking Tobacco Use: Former     Smokeless Tobacco Use: Former     Passive Exposure: Not on file          Physical Exam  Neurological:      Mental Status: He is alert and oriented to person, place, and time.       Neurologic Exam     Mental Status   Oriented to person, place, and time.           Assessment & Plan   Independent Review of Radiographic Studies:      I personally reviewed the images from the following studies.    We sent him for plain films and an MRI.  The plain films show a fusion at L3-4 and L4-5.  This seems to show pretty good alignment of the construct at both of those levels and I think there is a solid fusion at both levels.  There is no evidence of  abnormal movement on flexion and extension.  On the MRI there is a widely patent canal at T12-L1.  L1 to is open as well.  L2-3 shows a little lateral recess stenosis but not severe.  L3-4 and L4-5 are widely open as is L5-S1.    Medical Decision Making:      I told the patient I thought the plain films and the MRI look okay.  I suggest that we try some epidural blocks and physical therapy.  He agrees.  I told him to call me when that has been done and we can make further recommendations from there.    Diagnoses and all orders for this visit:    1. Spondylolisthesis of lumbar region (Primary)  -     Ambulatory Referral to Physical Therapy  -     Epidural Block      Return for Recheck and call after treatment or consultation.

## 2023-12-26 DIAGNOSIS — M79.605 LEG PAIN, BILATERAL: ICD-10-CM

## 2023-12-26 DIAGNOSIS — M79.604 LEG PAIN, BILATERAL: ICD-10-CM

## 2023-12-26 DIAGNOSIS — M51.36 DDD (DEGENERATIVE DISC DISEASE), LUMBAR: ICD-10-CM

## 2023-12-26 RX ORDER — PREDNISONE 20 MG/1
TABLET ORAL
Qty: 18 TABLET | Refills: 0 | Status: SHIPPED | OUTPATIENT
Start: 2023-12-26

## 2023-12-29 ENCOUNTER — HOSPITAL ENCOUNTER (OUTPATIENT)
Dept: PAIN MEDICINE | Facility: HOSPITAL | Age: 78
Discharge: HOME | End: 2023-12-29
Payer: MEDICARE

## 2023-12-29 ENCOUNTER — HOSPITAL ENCOUNTER (OUTPATIENT)
Dept: GENERAL RADIOLOGY | Facility: HOSPITAL | Age: 78
Discharge: HOME | End: 2023-12-29
Payer: MEDICARE

## 2023-12-29 ENCOUNTER — ANESTHESIA EVENT (OUTPATIENT)
Dept: PAIN MEDICINE | Facility: HOSPITAL | Age: 78
End: 2023-12-29
Payer: MEDICARE

## 2023-12-29 ENCOUNTER — ANESTHESIA (OUTPATIENT)
Dept: PAIN MEDICINE | Facility: HOSPITAL | Age: 78
End: 2023-12-29
Payer: MEDICARE

## 2023-12-29 VITALS
DIASTOLIC BLOOD PRESSURE: 101 MMHG | BODY MASS INDEX: 27.3 KG/M2 | TEMPERATURE: 98.4 F | WEIGHT: 206 LBS | SYSTOLIC BLOOD PRESSURE: 149 MMHG | OXYGEN SATURATION: 95 % | HEART RATE: 75 BPM | RESPIRATION RATE: 16 BRPM | HEIGHT: 73 IN

## 2023-12-29 DIAGNOSIS — R52 PAIN: ICD-10-CM

## 2023-12-29 DIAGNOSIS — M54.16 LUMBAR RADICULOPATHY: Primary | ICD-10-CM

## 2023-12-29 PROCEDURE — 25510000001 IOPAMIDOL 41 % SOLUTION: Performed by: ANESTHESIOLOGY

## 2023-12-29 PROCEDURE — 77003 FLUOROGUIDE FOR SPINE INJECT: CPT

## 2023-12-29 PROCEDURE — 25010000002 METHYLPREDNISOLONE PER 80 MG: Performed by: ANESTHESIOLOGY

## 2023-12-29 RX ORDER — MIDAZOLAM HYDROCHLORIDE 1 MG/ML
1 INJECTION INTRAMUSCULAR; INTRAVENOUS
Status: DISCONTINUED | OUTPATIENT
Start: 2023-12-29 | End: 2023-12-30 | Stop reason: HOSPADM

## 2023-12-29 RX ORDER — FENTANYL CITRATE 50 UG/ML
50 INJECTION, SOLUTION INTRAMUSCULAR; INTRAVENOUS AS NEEDED
Status: DISCONTINUED | OUTPATIENT
Start: 2023-12-29 | End: 2023-12-30 | Stop reason: HOSPADM

## 2023-12-29 RX ORDER — METHYLPREDNISOLONE ACETATE 80 MG/ML
80 INJECTION, SUSPENSION INTRA-ARTICULAR; INTRALESIONAL; INTRAMUSCULAR; SOFT TISSUE ONCE
Status: COMPLETED | OUTPATIENT
Start: 2023-12-29 | End: 2023-12-29

## 2023-12-29 RX ORDER — LIDOCAINE HYDROCHLORIDE 10 MG/ML
1 INJECTION, SOLUTION INFILTRATION; PERINEURAL ONCE
Status: DISCONTINUED | OUTPATIENT
Start: 2023-12-29 | End: 2023-12-30 | Stop reason: HOSPADM

## 2023-12-29 RX ORDER — IOPAMIDOL 408 MG/ML
3 INJECTION, SOLUTION INTRATHECAL
Status: COMPLETED | OUTPATIENT
Start: 2023-12-29 | End: 2023-12-29

## 2023-12-29 RX ADMIN — IOPAMIDOL 10 ML: 408 INJECTION, SOLUTION INTRATHECAL at 09:36

## 2023-12-29 RX ADMIN — METHYLPREDNISOLONE ACETATE 80 MG: 80 INJECTION, SUSPENSION INTRA-ARTICULAR; INTRALESIONAL; INTRAMUSCULAR; SOFT TISSUE at 09:36
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
## 2023-12-29 NOTE — ANESTHESIA PROCEDURE NOTES
PAIN Epidural block    Pre-sedation assessment completed: 12/29/2023 9:33 AM    Patient reassessed immediately prior to procedure    Patient location during procedure: pain clinic  Start Time: 12/29/2023 9:33 AM  Stop Time: 12/29/2023 9:44 AM  Indication:at surgeon's request and procedure for pain  Performed By  Anesthesiologist: Arvin Manrique MD  Preanesthetic Checklist  Completed: patient identified, IV checked, site marked, risks and benefits discussed, surgical consent, monitors and equipment checked, pre-op evaluation and timeout performed  Additional Notes  Dx:  Post-Op Diagnosis Codes:     * Lumbar radiculopathy [M54.16]     * Spondylolisthesis of lumbar region [M43.16]  80 mg depomedrol in epid    Plan : return to clinic as needed    Hardware made it impossible to get in at L4-5 we went above the previous fusion to L2-3 and he did have a mild transitory paresthesia reminiscent of his normal pain  Prep:  Pt Position:prone (prone)  Sterile Tech:cap, gloves, mask and sterile barrier  Prep:chlorhexidine gluconate and isopropyl alcohol  Monitoring:blood pressure monitoring, EKG and continuous pulse oximetry  Procedure:Sedation: no     Approach:midline  Guidance: fluoroscopy and c arm pa and lat and loss of resistance  Location:lumbar  Level:2-3 (interlaminar)  Needle Type:Olive  Needle Gauge:20  Aspiration:negative  Medications:  Preservative Free Saline:3mL  Isovue:1mL  Depomedrol:80 mg  Post Assessment:  Pt Tolerance:patient tolerated the procedure well with no apparent complications  Complications:no

## 2023-12-29 NOTE — DISCHARGE INSTRUCTIONS

## 2023-12-31 DIAGNOSIS — E78.2 MIXED HYPERLIPIDEMIA: ICD-10-CM

## 2023-12-31 RX ORDER — METOPROLOL SUCCINATE 50 MG/1
50 TABLET, EXTENDED RELEASE ORAL EVERY EVENING
Qty: 90 TABLET | Refills: 0 | Status: SHIPPED | OUTPATIENT
Start: 2023-12-31

## 2023-12-31 RX ORDER — ROSUVASTATIN CALCIUM 40 MG/1
40 TABLET, COATED ORAL DAILY
Qty: 90 TABLET | Refills: 0 | Status: SHIPPED | OUTPATIENT
Start: 2023-12-31

## 2024-01-31 ENCOUNTER — HOSPITAL ENCOUNTER (OUTPATIENT)
Dept: PET IMAGING | Facility: HOSPITAL | Age: 79
Discharge: HOME | End: 2024-01-31
Admitting: FAMILY MEDICINE
Payer: MEDICARE

## 2024-01-31 DIAGNOSIS — R93.89 ABNORMAL CT SCAN: ICD-10-CM

## 2024-01-31 LAB — CREAT BLDA-MCNC: 1.8 MG/DL (ref 0.6–1.3)

## 2024-01-31 PROCEDURE — 82565 ASSAY OF CREATININE: CPT

## 2024-01-31 PROCEDURE — 71260 CT THORAX DX C+: CPT

## 2024-01-31 PROCEDURE — 25510000001 IOPAMIDOL 61 % SOLUTION: Performed by: FAMILY MEDICINE

## 2024-01-31 RX ADMIN — IOPAMIDOL 85 ML: 612 INJECTION, SOLUTION INTRAVENOUS at 13:46

## 2024-02-05 DIAGNOSIS — R93.89 ABNORMAL CT SCAN: Primary | ICD-10-CM

## 2024-02-05 RX ORDER — DABIGATRAN ETEXILATE MESYLATE 75 MG/1
CAPSULE ORAL
Qty: 180 CAPSULE | Refills: 2 | Status: SHIPPED | OUTPATIENT
Start: 2024-02-05

## 2024-02-06 ENCOUNTER — OFFICE VISIT (OUTPATIENT)
Dept: FAMILY MEDICINE CLINIC | Facility: CLINIC | Age: 79
End: 2024-02-06
Payer: MEDICARE

## 2024-02-06 VITALS
SYSTOLIC BLOOD PRESSURE: 132 MMHG | WEIGHT: 206 LBS | BODY MASS INDEX: 27.3 KG/M2 | HEART RATE: 88 BPM | OXYGEN SATURATION: 96 % | TEMPERATURE: 98.2 F | RESPIRATION RATE: 18 BRPM | HEIGHT: 73 IN | DIASTOLIC BLOOD PRESSURE: 84 MMHG

## 2024-02-06 DIAGNOSIS — R73.03 PREDIABETES: ICD-10-CM

## 2024-02-06 DIAGNOSIS — E78.2 MIXED HYPERLIPIDEMIA: ICD-10-CM

## 2024-02-06 DIAGNOSIS — R73.9 HYPERGLYCEMIA: ICD-10-CM

## 2024-02-06 DIAGNOSIS — R10.9 BILATERAL FLANK PAIN: Primary | ICD-10-CM

## 2024-02-06 DIAGNOSIS — I10 ESSENTIAL HYPERTENSION: ICD-10-CM

## 2024-02-06 LAB
BILIRUB BLD-MCNC: NEGATIVE MG/DL
CLARITY, POC: CLEAR
COLOR UR: YELLOW
EXPIRATION DATE: ABNORMAL
GLUCOSE UR STRIP-MCNC: NEGATIVE MG/DL
KETONES UR QL: NEGATIVE
LEUKOCYTE EST, POC: NEGATIVE
Lab: ABNORMAL
NITRITE UR-MCNC: NEGATIVE MG/ML
PH UR: 6 [PH] (ref 5–8)
PROT UR STRIP-MCNC: ABNORMAL MG/DL
RBC # UR STRIP: NEGATIVE /UL
SP GR UR: 1.01 (ref 1–1.03)
UROBILINOGEN UR QL: ABNORMAL

## 2024-02-06 PROCEDURE — 81003 URINALYSIS AUTO W/O SCOPE: CPT | Performed by: FAMILY MEDICINE

## 2024-02-06 PROCEDURE — 3075F SYST BP GE 130 - 139MM HG: CPT | Performed by: FAMILY MEDICINE

## 2024-02-06 PROCEDURE — G2211 COMPLEX E/M VISIT ADD ON: HCPCS | Performed by: FAMILY MEDICINE

## 2024-02-06 PROCEDURE — 1160F RVW MEDS BY RX/DR IN RCRD: CPT | Performed by: FAMILY MEDICINE

## 2024-02-06 PROCEDURE — 3079F DIAST BP 80-89 MM HG: CPT | Performed by: FAMILY MEDICINE

## 2024-02-06 PROCEDURE — 99214 OFFICE O/P EST MOD 30 MIN: CPT | Performed by: FAMILY MEDICINE

## 2024-02-06 PROCEDURE — 1159F MED LIST DOCD IN RCRD: CPT | Performed by: FAMILY MEDICINE

## 2024-02-06 NOTE — PROGRESS NOTES
"Chief Complaint  Hyperlipidemia, Hypertension, and Flank Pain (B/L )    Subjective        Juan James presents to [facility] PRIMARY [facility]  Hyperlipidemia    Hypertension    Flank Pain      Pt presents today for refills, labs  HtN- no CP or HA  HLD - on med and not very active.    He has had 3 CT of lungs in recent past and will need a recheck in about 6 months.    Flank pain bilaterally- he has no dysuria, urgency or frequency.  No blood in urine.    Objective   Vital Signs:  /84 (BP Location: Left arm, Patient Position: Sitting, Cuff Size: Large Adult)   Pulse 88   Temp 98.2 °F (36.8 °C) (Tympanic)   Resp 18   Ht 185.4 cm (72.99\")   Wt 93.4 kg (206 lb)   SpO2 96%   BMI 27.18 kg/m²   Estimated body mass index is 27.18 kg/m² as calculated from the following:    Height as of this encounter: 185.4 cm (72.99\").    Weight as of this encounter: 93.4 kg (206 lb).               Physical Exam  Vitals and nursing note reviewed.   Constitutional:       Appearance: Normal appearance. He is well-developed.   Cardiovascular:      Rate and Rhythm: Normal rate and regular rhythm.      Heart sounds: Normal heart sounds. No murmur heard.  Pulmonary:      Effort: Pulmonary effort is normal. No respiratory distress.      Breath sounds: Normal breath sounds. No stridor. No wheezing or rhonchi.   Abdominal:      Tenderness: There is no abdominal tenderness. There is right CVA tenderness and left CVA tenderness. There is no guarding or rebound.   Neurological:      General: No focal deficit present.      Mental Status: He is alert and oriented to person, place, and time. He is not disoriented.   Psychiatric:         Mood and Affect: Mood normal.         Behavior: Behavior normal.        Result Review :                     Assessment and Plan     Diagnoses and all orders for this visit:    1. Essential hypertension (Primary)  -     Comprehensive Metabolic Panel    2. Mixed hyperlipidemia  -     Lipid " Panel    3. Hyperglycemia  -     Hemoglobin A1c    4. Bilateral flank pain  -     Urine Culture - Urine, Urine, Clean Catch    5. Prediabetes  -     Hemoglobin A1c             Follow Up     Return in about 3 months (around 5/6/2024) for hypertension, hyperlipidema.  Patient was given instructions and counseling regarding his condition or for health maintenance advice. Please see specific information pulled into the AVS if appropriate.       Refills and labs today.  Will get culture.  Will continue follow up with spine doc.    Answers submitted by the patient for this visit:  Primary Reason for Visit (Submitted on 2/5/2024)  What is the primary reason for your visit?: Back Pain

## 2024-02-07 RX ORDER — TAMSULOSIN HYDROCHLORIDE 0.4 MG/1
1 CAPSULE ORAL EVERY EVENING
Qty: 90 CAPSULE | Refills: 3 | Status: SHIPPED | OUTPATIENT
Start: 2024-02-07

## 2024-02-10 LAB
ALBUMIN SERPL-MCNC: 4 G/DL (ref 3.5–5.2)
ALBUMIN/GLOB SERPL: 1.7 G/DL
ALP SERPL-CCNC: 98 U/L (ref 39–117)
ALT SERPL-CCNC: 19 U/L (ref 1–41)
AST SERPL-CCNC: 26 U/L (ref 1–40)
BACTERIA UR CULT: ABNORMAL
BACTERIA UR CULT: ABNORMAL
BILIRUB SERPL-MCNC: 0.4 MG/DL (ref 0–1.2)
BUN SERPL-MCNC: 25 MG/DL (ref 8–23)
BUN/CREAT SERPL: 18.9 (ref 7–25)
CALCIUM SERPL-MCNC: 9.4 MG/DL (ref 8.6–10.5)
CHLORIDE SERPL-SCNC: 103 MMOL/L (ref 98–107)
CHOLEST SERPL-MCNC: 126 MG/DL (ref 0–200)
CO2 SERPL-SCNC: 24 MMOL/L (ref 22–29)
CREAT SERPL-MCNC: 1.32 MG/DL (ref 0.76–1.27)
EGFRCR SERPLBLD CKD-EPI 2021: 55.9 ML/MIN/1.73
GLOBULIN SER CALC-MCNC: 2.4 GM/DL
GLUCOSE SERPL-MCNC: 94 MG/DL (ref 65–99)
HBA1C MFR BLD: 5.9 % (ref 4.8–5.6)
HDLC SERPL-MCNC: 48 MG/DL (ref 40–60)
LDLC SERPL CALC-MCNC: 55 MG/DL (ref 0–100)
OTHER ANTIBIOTIC SUSC ISLT: ABNORMAL
POTASSIUM SERPL-SCNC: 4 MMOL/L (ref 3.5–5.2)
PROT SERPL-MCNC: 6.4 G/DL (ref 6–8.5)
SODIUM SERPL-SCNC: 143 MMOL/L (ref 136–145)
TRIGL SERPL-MCNC: 134 MG/DL (ref 0–150)
VLDLC SERPL CALC-MCNC: 23 MG/DL (ref 5–40)

## 2024-02-10 RX ORDER — CIPROFLOXACIN 500 MG/1
500 TABLET, FILM COATED ORAL 2 TIMES DAILY
Qty: 14 TABLET | Refills: 9 | Status: SHIPPED | OUTPATIENT
Start: 2024-02-10

## 2024-02-26 DIAGNOSIS — F51.01 PRIMARY INSOMNIA: ICD-10-CM

## 2024-02-26 RX ORDER — TRAZODONE HYDROCHLORIDE 100 MG/1
100 TABLET ORAL NIGHTLY
Qty: 30 TABLET | Refills: 3 | Status: SHIPPED | OUTPATIENT
Start: 2024-02-26

## 2024-02-29 ENCOUNTER — TELEMEDICINE (OUTPATIENT)
Dept: FAMILY MEDICINE CLINIC | Facility: CLINIC | Age: 79
End: 2024-02-29
Payer: MEDICARE

## 2024-02-29 DIAGNOSIS — M10.00 IDIOPATHIC GOUT, UNSPECIFIED CHRONICITY, UNSPECIFIED SITE: Primary | ICD-10-CM

## 2024-02-29 RX ORDER — COLCHICINE 0.6 MG/1
TABLET ORAL
Qty: 30 TABLET | Refills: 1 | Status: SHIPPED | OUTPATIENT
Start: 2024-02-29

## 2024-02-29 RX ORDER — PREDNISONE 5 MG/1
TABLET ORAL
Qty: 21 TABLET | Refills: 0 | Status: SHIPPED | OUTPATIENT
Start: 2024-02-29
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
## 2024-03-05 ENCOUNTER — OFFICE VISIT (OUTPATIENT)
Dept: SPORTS MEDICINE | Facility: CLINIC | Age: 79
End: 2024-03-05
Payer: MEDICARE

## 2024-03-05 ENCOUNTER — TELEPHONE (OUTPATIENT)
Dept: SPORTS MEDICINE | Facility: CLINIC | Age: 79
End: 2024-03-05

## 2024-03-05 VITALS
WEIGHT: 221 LBS | DIASTOLIC BLOOD PRESSURE: 84 MMHG | SYSTOLIC BLOOD PRESSURE: 166 MMHG | HEIGHT: 73 IN | TEMPERATURE: 97.3 F | HEART RATE: 62 BPM | OXYGEN SATURATION: 98 % | BODY MASS INDEX: 29.29 KG/M2

## 2024-03-05 DIAGNOSIS — M79.605 LEG PAIN, BILATERAL: ICD-10-CM

## 2024-03-05 DIAGNOSIS — M79.604 LEG PAIN, BILATERAL: ICD-10-CM

## 2024-03-05 DIAGNOSIS — M48.062 NEUROGENIC CLAUDICATION DUE TO LUMBAR SPINAL STENOSIS: ICD-10-CM

## 2024-03-05 DIAGNOSIS — M51.36 DDD (DEGENERATIVE DISC DISEASE), LUMBAR: Primary | ICD-10-CM

## 2024-03-05 PROCEDURE — 3079F DIAST BP 80-89 MM HG: CPT | Performed by: FAMILY MEDICINE

## 2024-03-05 PROCEDURE — 99214 OFFICE O/P EST MOD 30 MIN: CPT | Performed by: FAMILY MEDICINE

## 2024-03-05 PROCEDURE — 3077F SYST BP >= 140 MM HG: CPT | Performed by: FAMILY MEDICINE

## 2024-03-05 RX ORDER — GABAPENTIN 100 MG/1
300 CAPSULE ORAL 3 TIMES DAILY
Qty: 90 CAPSULE | Refills: 0 | Status: SHIPPED | OUTPATIENT
Start: 2024-03-05

## 2024-03-05 NOTE — PROGRESS NOTES
"Juan is a 78 y.o. year old male     Chief Complaint   Patient presents with    Back Pain     LUMBAR SPINE- Patient is here to follow up on lower back pain, patient is interested in repeat steroid injection.        History of Present Illness  HPI   Here to follow-up on low back and right leg pain.  Since his last visit he states he has had stable dull pain in the left low back beltline area where we have previously injected his sacroiliac joint.  This has been relatively mild and is not an active concern.  He is more concerned about pain going down his right leg which feels like a tight cramping sensation of the muscle and is associated with intermittent twitching.  Since his last visit with me he did see neurosurgery and had a repeat lumbar MRI.  He had an epidural but notably the epidural was placed at the L2-L3 level, not at the L4-L5 level where his pathology is on the MRI.      Review of Systems    /84 (BP Location: Left arm, Patient Position: Sitting, Cuff Size: Adult)   Pulse 62   Temp 97.3 °F (36.3 °C) (Temporal)   Ht 185.4 cm (72.99\")   Wt 100 kg (221 lb)   SpO2 98%   BMI 29.17 kg/m²      Physical Exam    Vital signs reviewed.   General: No acute distress.      MSK Exam:  Ortho Exam  Minimal tenderness in the right leg medial hamstring.  There is pain and tightness throughout the leg with extension of the right leg.  He has a positive slump maneuver on the right side worse than the left side.  Sensation is intact.  Strength is symmetric although there is slight weakness with flexion of the knees and plantarflexion of the ankles compared to extension of the knees and dorsiflexion of the ankles.  Minimal tenderness on the sacroiliac joints.  Procedures     Diagnoses and all orders for this visit:    DDD (degenerative disc disease), lumbar  -     gabapentin (NEURONTIN) 100 MG capsule; Take 3 capsules by mouth 3 (Three) Times a Day. For nerve pain  -     Ambulatory Referral to Physical Therapy " Evaluate and treat, Kirill    Leg pain, bilateral  -     gabapentin (NEURONTIN) 100 MG capsule; Take 3 capsules by mouth 3 (Three) Times a Day. For nerve pain  -     Ambulatory Referral to Physical Therapy Evaluate and treat, Kirill    Neurogenic claudication due to lumbar spinal stenosis  -     gabapentin (NEURONTIN) 100 MG capsule; Take 3 capsules by mouth 3 (Three) Times a Day. For nerve pain  -     Ambulatory Referral to Physical Therapy Evaluate and treat, Kirill      Overall this appears to be neurogenic from his back still.  His hardware is appropriate but unfortunately limits him from having a focused epidural injection.  I think would be reasonable to try some gabapentin starting at 100 mg and increasing up to 300 mg 3 times a day to try to get him some pain relief.  We discussed anticipated side effects with sedation.  I think would be helpful for him to try some physical therapy again but consider aquatics based on his chronic pathology.  Follow-up with me in about 4 weeks to check on his progress    EMR Dragon/Transcription disclaimer:    Much of this encounter note is an electronic transcription/translation of spoken language to printed text.  The electronic translation of spoken language may permit erroneous, or at times, nonsensical words or phrases to be inadvertently transcribed.  Although I have reviewed the note for such errors some may still exist.

## 2024-03-05 NOTE — TELEPHONE ENCOUNTER
[facility] pharmacist called wanting a clarification on gabapentin medication instructions, it was prescribed to take 3 capsules by mouth 3 times a day.     Per Dr. Harvey prescription is correct since patient has agreed and aware to start with 1 capsule a day and slowly built up to 3 capsules per day and he is not expected to run out in 10 days.     Confirmed with pharmacist Dr. Harvey's verbal clarification.

## 2024-03-11 ENCOUNTER — TELEPHONE (OUTPATIENT)
Dept: SPORTS MEDICINE | Facility: CLINIC | Age: 79
End: 2024-03-11

## 2024-03-11 DIAGNOSIS — G70.00 MYASTHENIA GRAVIS: Primary | ICD-10-CM

## 2024-03-11 NOTE — TELEPHONE ENCOUNTER
Caller: Juan James    Relationship: Self    Best call back number: [phone] (home)       What is the best time to reach you: ANY    Who are you requesting to speak with (clinical staff, provider,  specific staff member): SPECIFIC STAFF    Do you know the name of the person who called: GRAYSON    What was the call regarding: UNKNOWN

## 2024-03-12 DIAGNOSIS — I72.4 ANEURYSM OF ARTERY OF LOWER EXTREMITY: Primary | ICD-10-CM

## 2024-03-12 RX ORDER — HYDROCHLOROTHIAZIDE 25 MG/1
25 TABLET ORAL DAILY
Qty: 30 TABLET | Refills: 3 | Status: SHIPPED | OUTPATIENT
Start: 2024-03-12

## 2024-03-17 DIAGNOSIS — M79.604 LEG PAIN, BILATERAL: ICD-10-CM

## 2024-03-17 DIAGNOSIS — M79.605 LEG PAIN, BILATERAL: ICD-10-CM

## 2024-03-17 DIAGNOSIS — M51.36 DDD (DEGENERATIVE DISC DISEASE), LUMBAR: ICD-10-CM

## 2024-03-17 DIAGNOSIS — M48.062 NEUROGENIC CLAUDICATION DUE TO LUMBAR SPINAL STENOSIS: ICD-10-CM

## 2024-03-19 ENCOUNTER — TELEPHONE (OUTPATIENT)
Dept: SPORTS MEDICINE | Facility: CLINIC | Age: 79
End: 2024-03-19

## 2024-03-19 DIAGNOSIS — M79.604 LEG PAIN, BILATERAL: ICD-10-CM

## 2024-03-19 DIAGNOSIS — M79.605 LEG PAIN, BILATERAL: ICD-10-CM

## 2024-03-19 DIAGNOSIS — M48.062 NEUROGENIC CLAUDICATION DUE TO LUMBAR SPINAL STENOSIS: ICD-10-CM

## 2024-03-19 DIAGNOSIS — M51.36 DDD (DEGENERATIVE DISC DISEASE), LUMBAR: ICD-10-CM

## 2024-03-19 NOTE — TELEPHONE ENCOUNTER
Caller: Juan James    Relationship: Self    Best call back number: [phone] (home)     Requested Prescriptions:   Requested Prescriptions      No prescriptions requested or ordered in this encounter    gabapentin (NEURONTIN) 100 MG capsule     Pharmacy where request should be sent: [facility] [address] [place] [address] [address] [place] [address] - [phone]  - [phone] FX     Last office visit with prescribing clinician: 3/5/2024   Last telemedicine visit with prescribing clinician: Visit date not found   Next office visit with prescribing clinician: 4/5/2024     Additional details provided by patient: PT IS OUT OF MEDICATION    Does the patient have less than a 3 day supply:  [x] Yes  [] No    Would you like a call back once the refill request has been completed: [x] Yes [] No    If the office needs to give you a call back, can they leave a voicemail: [x] Yes [] No    Pavithra Shipley Rep   03/19/24 11:21 EDT

## 2024-03-20 RX ORDER — GABAPENTIN 100 MG/1
300 CAPSULE ORAL 3 TIMES DAILY
Qty: 90 CAPSULE | Refills: 0 | Status: SHIPPED | OUTPATIENT
Start: 2024-03-20 | End: 2024-04-05 | Stop reason: SINTOL

## 2024-03-20 RX ORDER — GABAPENTIN 100 MG/1
CAPSULE ORAL
Qty: 90 CAPSULE | Refills: 0 | OUTPATIENT
Start: 2024-03-20

## 2024-03-22 RX ORDER — PREDNISONE 5 MG/1
TABLET ORAL
Qty: 21 TABLET | Refills: 0 | Status: SHIPPED | OUTPATIENT
Start: 2024-03-22

## 2024-03-27 DIAGNOSIS — E78.2 MIXED HYPERLIPIDEMIA: ICD-10-CM

## 2024-03-27 RX ORDER — METOPROLOL SUCCINATE 50 MG/1
50 TABLET, EXTENDED RELEASE ORAL EVERY EVENING
Qty: 90 TABLET | Refills: 0 | Status: SHIPPED | OUTPATIENT
Start: 2024-03-27

## 2024-03-27 RX ORDER — ROSUVASTATIN CALCIUM 40 MG/1
40 TABLET, COATED ORAL DAILY
Qty: 90 TABLET | Refills: 0 | Status: SHIPPED | OUTPATIENT
Start: 2024-03-27

## 2024-03-29 ENCOUNTER — TREATMENT (OUTPATIENT)
Dept: PHYSICAL THERAPY | Facility: CLINIC | Age: 79
End: 2024-03-29
Payer: MEDICARE

## 2024-03-29 DIAGNOSIS — Z86.69 HISTORY OF MYASTHENIA GRAVIS: ICD-10-CM

## 2024-03-29 DIAGNOSIS — Z98.1 HISTORY OF LUMBAR FUSION: ICD-10-CM

## 2024-03-29 DIAGNOSIS — M48.062 NEUROGENIC CLAUDICATION DUE TO LUMBAR SPINAL STENOSIS: ICD-10-CM

## 2024-03-29 DIAGNOSIS — R53.1 GENERALIZED WEAKNESS: Primary | ICD-10-CM

## 2024-03-29 DIAGNOSIS — Z74.09 IMPAIRED FUNCTIONAL MOBILITY, BALANCE, GAIT, AND ENDURANCE: ICD-10-CM

## 2024-03-29 DIAGNOSIS — Z98.1 HISTORY OF FUSION OF CERVICAL SPINE: ICD-10-CM

## 2024-03-29 DIAGNOSIS — M54.16 RADICULOPATHY, LUMBAR REGION: ICD-10-CM

## 2024-03-29 PROCEDURE — 97162 PT EVAL MOD COMPLEX 30 MIN: CPT | Performed by: PHYSICAL THERAPIST

## 2024-03-29 PROCEDURE — 97110 THERAPEUTIC EXERCISES: CPT | Performed by: PHYSICAL THERAPIST

## 2024-03-29 NOTE — PROGRESS NOTES
Physical Therapy Initial Evaluation and Plan of Care      Patient: Juan James   : 1945  Diagnosis/ICD-10 Code:  Generalized weakness [R53.1]  Referring practitioner: Stephan Harvey MD  Date of Initial Visit: 3/29/2024  Today's Date: 3/29/2024  Patient seen for 1 sessions    Visit Diagnoses:    ICD-10-CM ICD-9-CM   1. Generalized weakness  R53.1 780.79   2. History of lumbar fusion  Z98.1 V45.4   3. History of fusion of cervical spine  Z98.1 V45.4   4. Impaired functional mobility, balance, gait, and endurance  Z74.09 V49.89   5. Radiculopathy, lumbar region  M54.16 724.4   6. Neurogenic claudication due to lumbar spinal stenosis  M48.062 724.03   7. History of myasthenia gravis  Z86.69 V12.49       [facility] [place] [address]  [phone] (phone)  [phone] (fax)         Subjective Evaluation    History of Present Illness  Date of surgery: about a year.  Mechanism of injury: Keny has a history of 6 spinal surgeries. He had an injury as a kid trying to jump down the stairs The latest one was on 2020. His first surgery was in , lumbar fusion, then 2 cervical surgeries, and 3 other lumbar surgeries. He has a history of myasthenia gravis and is concerned that is what is affecting his weakness that hs been getting worse over the last year. He has a neurology appt coming up in a few month. He has difficulty with washing hair, raising up arms, opening jars, walking (uses a walker for longer distance). He has been on steroids int he past, but MD doesn't want to keep giving him those. He has started on gabapentin. He has had one fall this past year but frequently stumbles  and catches himself. He belonged to a gym and was going regularly until March/April of last year and had to stop, has dropped membership.      He has 2 boys that live out of town.     PMHx: reviewed in chart, joint replacement of R index MCP      Patient  Occupation: retired (last 31 years for [employer]) Quality of life: poor    Pain  Current pain ratin  At worst pain rating: 10  Location: B thigh pain, some in SI joint  Quality: burning, tight and throbbing  Aggravating factors: lifting, standing, ambulation and stairs (transfers sit to stand, bed mobility)    Social Support  Lives in: one-story house (handicap accessible, lives on 1/2 acre (takes care of the yard))  Lives with: alone ()    Treatments  Previous treatment: physical therapy  Patient Goals  Patient goals for therapy: increased strength, return to sport/leisure activities and improved balance  Patient goal: get back to walking, go fishing           Objective          Palpation   Left   Hypertonic in the rectus femoris, vastus lateralis and vastus medialis.   Tenderness of the rectus femoris, vastus lateralis and vastus medialis.     Right   Hypertonic in the rectus femoris, vastus lateralis and vastus medialis. Tenderness of the rectus femoris, vastus lateralis and vastus medialis.     Neurological Testing     Sensation     Shoulder   Left Shoulder   Intact: light touch    Right Shoulder   Intact: Light touch    Lumbar   Left   Intact: light touch  Diminished: light touch  Absent: light touch    Right   Intact: light touch  Diminished: light touch  Absent: light touch    Comments   Left light touch: absent in plantar foot and great toe, decreased up to sock line  Right light touch: absent in plantar foot and great toe, decreased up to sock line    Active Range of Motion   Left Shoulder   Flexion: 75 degrees   Abduction: 70 degrees     Right Shoulder   Flexion: 80 degrees   Abduction: 70 degrees     Passive Range of Motion   Left Shoulder   Flexion: 130 degrees     Right Shoulder   Flexion: 130 degrees     Additional Passive Range of Motion Details  In sitting    Strength/Myotome Testing     Left Shoulder     Planes of Motion   Flexion: 3-   Abduction: 3-   External rotation at 0°: 3+   Internal  rotation at 0°: 4-     Isolated Muscles   Biceps: 4-   Triceps: 4     Right Shoulder     Planes of Motion   Flexion: 3-   Abduction: 3-   External rotation at 0°: 3+   Internal rotation at 0°: 4-     Isolated Muscles   Biceps: 4   Triceps: 4     Left Hip   Planes of Motion   Flexion: 3  Abduction: 3  Adduction: 4  External rotation: 4-    Right Hip   Planes of Motion   Flexion: 3+  Abduction: 3  Adduction: 4  External rotation: 4-    Left Knee   Flexion: 4-  Extension: 4-    Right Knee   Flexion: 4  Extension: 4    Left Ankle/Foot   Dorsiflexion: 5    Right Ankle/Foot   Dorsiflexion: 5    Additional Strength Details  Pain in arms with all MMT  Pain in legs with all MMT    Left Knee Flexibility Comments:   Moderate hamstrings and calf tightness    Right Knee Flexibility Comments:   Moderate hamstrings and calf tightness    Ambulation   Weight-Bearing Status   Assistive device used: single point cane    Additional Weight-Bearing Status Details  Does have a rollator walker that I suggested he use in/out of therapy    Observational Gait   Gait: antalgic   Decreased walking speed, stride length, left step length and right step length.   Left foot contact pattern: foot flat  Right foot contact pattern: foot flat    Quality of Movement During Gait   Trunk  Forward lean.   Trunk (Left): Positive left lateral lean over stance limb.   Trunk (Right): Positive lateral lean over stance limb.     Knee    Knee (Left): Positive increased flexion during stance.   Knee (Right): Positive increased flexion during stance.     Functional Assessment     Comments  After sitting for about 35 min in the treatment room, pt had a hard time standing back up and required SBA/CGA because the knees kept wanting to buckle. He needs max A from UE for sit to stand from a std height chair.  Reports he is unable to walk up stairs, can go down ok with a handrail        Education on aquatic therapy, properties of water, using walker in and out of the  facility      Functional Outcome Score:   Oswestry Back Index=74%  LEFS=17.5%      Assessment & Plan       Assessment  Impairments: abnormal gait, abnormal or restricted ROM, activity intolerance, impaired balance, impaired physical strength, lacks appropriate home exercise program and pain with function   Functional limitations: carrying objects, lifting, sleeping, walking, pushing, uncomfortable because of pain, moving in bed, sitting and standing   Assessment details: Juan James is a 78 y.o. year-old male referred to physical therapy for generalized weakness. He presents with a evolving clinical presentation.  He has comorbidities of 4 lumbar surgeries, neuropathy of B feet, history of myasthenia gravis, 2 neck surgeries and personal factors of living about 40 min away from this clinic that may affect his progress in the plan of care.  Keny has difficulty with transfers, gait, endurance, and general weakness of UE, LE, and trunk.  Pt would benefit from therapy to help improve his ability to perform house hold chores, walk better on uneven surfaces, and return to working out with decreased pain and fatigue.    Prognosis: good    Goals  Plan Goals: ST wks  1. Patient will be independent with education for symptom management, joint protection and strategies to minimize stress on affected tissues  2. Pt is able to tolerate 30 min of aquatic therapy with reports of reduced pain levels after treatment for 2-3 hours  3. Pt able to get up from a std height chair with only mod A from UE on the arm rests    LT wks  1. Pt able to get up from a std height chair with only Min A of UE on arm rests  2. Pt to improve trunk, UE, and LE strength to 4 to 4+ for greater ease with getting in/out of bed and showering with greater ease  3. Pt able to exit the pool on the stairs using a reciprocal pattern with use of the handrail  4. Pt to improve Oswestry Back index score from 74% to 58% for overall functional  improvement with walking in the community  5. Pt to improve score on LEFS from 17.5% to 40% so he is able to work in the yard with greater ease and go fishing  6. Pt to be independent with progressive HEP for core and LE strength      Plan  Therapy options: will be seen for skilled therapy services  Planned modality interventions: thermotherapy (hydrocollator packs), TENS, cryotherapy and hydrotherapy  Other planned modality interventions: aquatic therapy  Planned therapy interventions: abdominal trunk stabilization, ADL retraining, balance/weight-bearing training, body mechanics training, flexibility, functional ROM exercises, gait training, home exercise program, IADL retraining, joint mobilization, manual therapy, postural training, soft tissue mobilization, spinal/joint mobilization, strengthening, stretching, therapeutic activities, transfer training and neuromuscular re-education  Frequency: 2x week  Duration in weeks: 12  Treatment plan discussed with: patient        Timed:  Manual Therapy:         mins  17642;  Therapeutic Exercise:    8     mins  34722;     Neuromuscular Waylon:        mins  56047;    Therapeutic Activity:          mins  97643;     Gait Training:           mins  37369;     Ultrasound:          mins  06969;    Iontophoresis         mins 90146        Untimed:  Electrical Stimulation:         mins  15773 ( );  Traction:       mins  14450;   Dry Needling   (1-2 muscles)             mins 81925 (Self-pay)  Dry Needling (3-4 muscles)           mins 15729 (Self-pay)  Dry Needling Trial              mins DRYNDLTRIAL  (No Charge)    Low Eval          Mins  33348  Mod Eval     35     Mins  96450  High Eval                            Mins  26156    Timed Treatment:   8   mins   Total Treatment:     43   mins    PT SIGNATURE: Sophie Vieira PT, CDNT    License Number: [id number]    Electronically signed by Sophie Vieira PT, 03/29/24, 2:17 PM EDT    DATE TREATMENT INITIATED: 3/29/2024    Initial  Certification  Certification Period: 6/27/2024  I certify that the therapy services are furnished while this patient is under my care.  The services outlined above are required by this patient, and will be reviewed every 90 days.     PHYSICIAN: Stephan Havrey MD   NPI: [id number]                                         DATE:     Please sign and return via fax to [phone] Thank you, [facility].

## 2024-04-05 ENCOUNTER — OFFICE VISIT (OUTPATIENT)
Dept: SPORTS MEDICINE | Facility: CLINIC | Age: 79
End: 2024-04-05
Payer: MEDICARE

## 2024-04-05 VITALS
BODY MASS INDEX: 29.29 KG/M2 | DIASTOLIC BLOOD PRESSURE: 78 MMHG | OXYGEN SATURATION: 100 % | TEMPERATURE: 98.6 F | SYSTOLIC BLOOD PRESSURE: 170 MMHG | WEIGHT: 221 LBS | HEART RATE: 86 BPM | HEIGHT: 73 IN

## 2024-04-05 DIAGNOSIS — M51.36 DDD (DEGENERATIVE DISC DISEASE), LUMBAR: ICD-10-CM

## 2024-04-05 DIAGNOSIS — M48.062 NEUROGENIC CLAUDICATION DUE TO LUMBAR SPINAL STENOSIS: ICD-10-CM

## 2024-04-05 DIAGNOSIS — G89.29 CHRONIC SACROILIAC PAIN: Primary | ICD-10-CM

## 2024-04-05 DIAGNOSIS — M53.3 CHRONIC SACROILIAC PAIN: Primary | ICD-10-CM

## 2024-04-05 RX ORDER — PREGABALIN 75 MG/1
75 CAPSULE ORAL NIGHTLY
Qty: 30 CAPSULE | Refills: 0 | Status: SHIPPED | OUTPATIENT
Start: 2024-04-05

## 2024-04-05 RX ORDER — TRIAMCINOLONE ACETONIDE 40 MG/ML
80 INJECTION, SUSPENSION INTRA-ARTICULAR; INTRAMUSCULAR ONCE
Status: COMPLETED | OUTPATIENT
Start: 2024-04-05 | End: 2024-04-05

## 2024-04-05 RX ADMIN — TRIAMCINOLONE ACETONIDE 80 MG: 40 INJECTION, SUSPENSION INTRA-ARTICULAR; INTRAMUSCULAR at 14:07

## 2024-04-05 NOTE — PROGRESS NOTES
"Juan is a 78 y.o. year old male     Chief Complaint   Patient presents with    Back Pain     LUMBAR SPINE- Patient is here to follow up on lower back pain.        History of Present Illness  History of Present Illness  The patient is here today to follow up on low back pain with neurogenic claudication.    Since his last visit, the patient continues to experience aching, midline low back pain. He intends to consult with neurology regarding the potential role of myasthenia gravis in his symptoms, including leg weakness. Despite the administration of gabapentin, which improved his nocturnal sleep but did not significantly alleviate his daytime pain. However, he reports an increase in leg swelling with this medication. He has also engaged with aquatic physical therapy and plans to continue this treatment approximately one month from now.    I have reviewed the patient's medical, family, and social history in detail and updated the computerized patient record.    Review of Systems    /78 (BP Location: Right arm, Patient Position: Sitting, Cuff Size: Adult)   Pulse 86   Temp 98.6 °F (37 °C) (Temporal)   Ht 185.4 cm (72.99\")   Wt 100 kg (221 lb)   SpO2 100%   BMI 29.17 kg/m²      Physical Exam    Vital signs reviewed.   General: No acute distress.      Physical Exam  There is tenderness to palpation in both sacroiliac joints. His gait remains unstable, similar compared to previous visits.    Results  Results      Procedures     Ultrasound-Guided Sacroiliac Joint Injection Procedure Note    Bilateral sacroiliac joint injection was discussed with the patient in detail, including indication, risks, benefits, and alternatives. Verbal consent was given for the procedure. Injection was performed by physician.  Injection site was identified by ultrasound examination, then cleaned with Betadine and alcohol swabs. Prior to needle insertion, ethyl chloride spray was used for surface anesthesia. Sterile technique was " "used. Ultrasound guidance was indicated for injection accuracy.  A 22-gauge, 3.5\" spinal needle was guided to the joint under continuous direct ultrasound visualization. Injectate was seen flowing underneath the superficial sacroiliac ligaments and passed without difficulty. The needle was removed and a simple bandage was applied. The procedure was tolerated well without difficulty.    Injection mixture:  1% lidocaine without epinephrine: 2 mL  40 mg/mL triamcinolone acetonide: 1 mL     Diagnoses and all orders for this visit:    Chronic sacroiliac pain  -     triamcinolone acetonide (KENALOG-40) injection 80 mg    DDD (degenerative disc disease), lumbar  -     pregabalin (Lyrica) 75 MG capsule; Take 1 capsule by mouth Every Night.    Neurogenic claudication due to lumbar spinal stenosis  -     pregabalin (Lyrica) 75 MG capsule; Take 1 capsule by mouth Every Night.      Assessment & Plan  1. Low back pain accompanied by neurogenic claudication.  Today, we deliberated over potential treatment options. Considering his side effects, chronic pain, and neurogenic symptoms, we have decided to transition him from gabapentin to Lyrica due to the associated swelling. This may potentially yield overlapping benefits indicative of restless legs tendencies. Although he may also need to consult with his primary care provider regarding a long-term solution, it is deemed prudent to initiate a trial of Lyrica. We also mutually agreed to administer bilateral sacroiliac joint injections, as previously documented.    Follow-up  The patient's progress will be reassessed in the near future. He will provide an update in the near future.    Patient or patient representative verbalized consent for the use of Ambient Listening during the visit with  Stephan Harvey MD for chart documentation. 4/5/2024  14:06 EDT  *Dictated after leaving exam room.   Stephan Harvey MD   14:07 EDT   04/05/24   "

## 2024-04-14 VITALS
HEART RATE: 87 BPM | SYSTOLIC BLOOD PRESSURE: 163 MMHG | BODY MASS INDEX: 29.16 KG/M2 | DIASTOLIC BLOOD PRESSURE: 88 MMHG | RESPIRATION RATE: 16 BRPM | HEIGHT: 73 IN | OXYGEN SATURATION: 99 % | TEMPERATURE: 97.9 F | WEIGHT: 220 LBS

## 2024-04-14 PROCEDURE — 99283 EMERGENCY DEPT VISIT LOW MDM: CPT

## 2024-04-15 ENCOUNTER — APPOINTMENT (OUTPATIENT)
Dept: GENERAL RADIOLOGY | Facility: HOSPITAL | Age: 79
End: 2024-04-15
Payer: MEDICARE

## 2024-04-15 ENCOUNTER — HOSPITAL ENCOUNTER (EMERGENCY)
Facility: HOSPITAL | Age: 79
Discharge: HOME | End: 2024-04-15
Attending: EMERGENCY MEDICINE | Admitting: EMERGENCY MEDICINE
Payer: MEDICARE

## 2024-04-15 DIAGNOSIS — L03.113 RIGHT ARM CELLULITIS: Primary | ICD-10-CM

## 2024-04-15 LAB
ALBUMIN SERPL-MCNC: 4.2 G/DL (ref 3.5–5.2)
ALBUMIN/GLOB SERPL: 1.3 G/DL
ALP SERPL-CCNC: 102 U/L (ref 39–117)
ALT SERPL W P-5'-P-CCNC: 21 U/L (ref 1–41)
ANION GAP SERPL CALCULATED.3IONS-SCNC: 13.7 MMOL/L (ref 5–15)
AST SERPL-CCNC: 25 U/L (ref 1–40)
BASOPHILS # BLD AUTO: 0.04 10*3/MM3 (ref 0–0.2)
BASOPHILS NFR BLD AUTO: 0.4 % (ref 0–1.5)
BILIRUB SERPL-MCNC: 0.5 MG/DL (ref 0–1.2)
BUN SERPL-MCNC: 35 MG/DL (ref 8–23)
BUN/CREAT SERPL: 25 (ref 7–25)
CALCIUM SPEC-SCNC: 8.9 MG/DL (ref 8.6–10.5)
CHLORIDE SERPL-SCNC: 99 MMOL/L (ref 98–107)
CO2 SERPL-SCNC: 27.3 MMOL/L (ref 22–29)
CREAT SERPL-MCNC: 1.4 MG/DL (ref 0.76–1.27)
DEPRECATED RDW RBC AUTO: 50.1 FL (ref 37–54)
EGFRCR SERPLBLD CKD-EPI 2021: 52.1 ML/MIN/1.73
EOSINOPHIL # BLD AUTO: 0.12 10*3/MM3 (ref 0–0.4)
EOSINOPHIL NFR BLD AUTO: 1.1 % (ref 0.3–6.2)
ERYTHROCYTE [DISTWIDTH] IN BLOOD BY AUTOMATED COUNT: 14.7 % (ref 12.3–15.4)
GLOBULIN UR ELPH-MCNC: 3.3 GM/DL
GLUCOSE SERPL-MCNC: 138 MG/DL (ref 65–99)
HCT VFR BLD AUTO: 35 % (ref 37.5–51)
HGB BLD-MCNC: 11.1 G/DL (ref 13–17.7)
IMM GRANULOCYTES # BLD AUTO: 0.08 10*3/MM3 (ref 0–0.05)
IMM GRANULOCYTES NFR BLD AUTO: 0.7 % (ref 0–0.5)
LYMPHOCYTES # BLD AUTO: 2.09 10*3/MM3 (ref 0.7–3.1)
LYMPHOCYTES NFR BLD AUTO: 19.1 % (ref 19.6–45.3)
MCH RBC QN AUTO: 29.8 PG (ref 26.6–33)
MCHC RBC AUTO-ENTMCNC: 31.7 G/DL (ref 31.5–35.7)
MCV RBC AUTO: 93.8 FL (ref 79–97)
MONOCYTES # BLD AUTO: 1.03 10*3/MM3 (ref 0.1–0.9)
MONOCYTES NFR BLD AUTO: 9.4 % (ref 5–12)
NEUTROPHILS NFR BLD AUTO: 69.3 % (ref 42.7–76)
NEUTROPHILS NFR BLD AUTO: 7.57 10*3/MM3 (ref 1.7–7)
NRBC BLD AUTO-RTO: 0 /100 WBC (ref 0–0.2)
PLATELET # BLD AUTO: 170 10*3/MM3 (ref 140–450)
PMV BLD AUTO: 9 FL (ref 6–12)
POTASSIUM SERPL-SCNC: 3.7 MMOL/L (ref 3.5–5.2)
PROT SERPL-MCNC: 7.5 G/DL (ref 6–8.5)
RBC # BLD AUTO: 3.73 10*6/MM3 (ref 4.14–5.8)
SODIUM SERPL-SCNC: 140 MMOL/L (ref 136–145)
WBC NRBC COR # BLD AUTO: 10.93 10*3/MM3 (ref 3.4–10.8)

## 2024-04-15 PROCEDURE — 80053 COMPREHEN METABOLIC PANEL: CPT

## 2024-04-15 PROCEDURE — 36415 COLL VENOUS BLD VENIPUNCTURE: CPT

## 2024-04-15 PROCEDURE — 73090 X-RAY EXAM OF FOREARM: CPT

## 2024-04-15 PROCEDURE — 85025 COMPLETE CBC W/AUTO DIFF WBC: CPT

## 2024-04-15 RX ORDER — DOXYCYCLINE 100 MG/1
100 CAPSULE ORAL 2 TIMES DAILY
Qty: 13 CAPSULE | Refills: 0 | Status: SHIPPED | OUTPATIENT
Start: 2024-04-15

## 2024-04-15 RX ORDER — DOXYCYCLINE 100 MG/1
100 CAPSULE ORAL ONCE
Status: COMPLETED | OUTPATIENT
Start: 2024-04-15 | End: 2024-04-15

## 2024-04-15 RX ADMIN — DOXYCYCLINE 100 MG: 100 CAPSULE ORAL at 04:18

## 2024-04-15 NOTE — DISCHARGE INSTRUCTIONS
You have been given emergency department evaluation.  This evaluation is intended to rule out life-threatening conditions.  Is not a complete evaluation.  You could require further testing as determined by your primary care physician or any referred specialist.  Please follow-up with all doctors that you are referred to.  Please be sure to take your prescribed medications and follow any specific instructions in the discharge instructions.  Please follow-up with your primary care physician within 48 hours.  Please have your primary care provider recheck your blood pressure.  Rest.  Drink plenty fluids, stay hydrated.  Return to the emergency department immediately if you notice any increase in redness or swelling to your arm above where it is at this time.  Please return to the emergency department if you experience chest pain, shortness of breath, abdominal pain, fever greater than 102, intractable vomiting.  Please return to the emergency department if your symptoms continue or worsen, or if you begin to experience any other concerning symptom.

## 2024-04-15 NOTE — ED PROVIDER NOTES
MD ATTESTATION NOTE    SHARED VISIT: This visit was performed by BOTH a physician and an APC. The substantive portion of the medical decision making was performed by this attesting physician who made or approved the management plan and takes responsibility for patient management. All studies documented in the APC note (if performed) were independently interpreted by me.    The ANNY and I have discussed this patient's history, physical exam, MDM, and treatment plan.  I have reviewed the documentation and personally had a face to face interaction with the patient. The attached note describes my personal findings.      Juan James is a 78 y.o. male who presents to the ED c/o acute insect bite.  Patient reports redness and itchiness to right forearm.  Patient unsure what specifically may have bitten him.  States he feels a knot at the center of the area.  No fevers chills chest pain or shortness of breath.    On exam:  GENERAL: not distressed  HENT: nares patent  EYES: no scleral icterus  CV: regular rhythm, regular rate  RESPIRATORY: normal effort  ABDOMEN: soft  MUSCULOSKELETAL: no deformity  NEURO: alert, moves all extremities, follows commands  SKIN: warm, dry, erythema with with warmth and central induration to right forearm and appreciate any fluctuance or abscess    Labs  No results found for this or any previous visit (from the past 24 hour(s)).      Radiology  No Radiology Exams Resulted Within Past 24 Hours    Medications given in the ED:  Medications   doxycycline (MONODOX) capsule 100 mg (100 mg Oral Given 4/15/24 0418)       Orders placed during this visit:  Orders Placed This Encounter   Procedures    XR Forearm 2 View Left    Comprehensive Metabolic Panel    CBC Auto Differential    CBC & Differential       Medical Decision Making:  ED Course as of 04/18/24 1847   Mon Apr 15, 2024   0222 WBC(!): 10.93 [AR]   0223 Creatinine(!): 1.40 [AR]   0229 I discussed the case with Dr. Garcia and they agree to  evaluate the patient at the bedside.    [AR]   0408 You have been given emergency department evaluation.  This evaluation is intended to rule out life-threatening conditions.  Is not a complete evaluation.  You could require further testing as determined by your primary care physician or any referred specialist.  Please follow-up with all doctors that you are referred to.  Please be sure to take your prescribed medications and follow any specific instructions in the discharge instructions.  Please follow-up with your primary care physician within 48 hours.  Please have your primary care provider recheck your blood pressure.  Please return to the emergency department if you experience chest pain, shortness of breath, abdominal pain, fever greater than 102, intractable vomiting.  Please return to the emergency department if your symptoms continue or worsen, or if you begin to experience any other concerning symptom.     [AR]      ED Course User Index  [AR] Juanis Anderson APRN       Differential diagnosis:  Abscess, cellulitis, insect bite    Diagnosis  Final diagnoses:   Right arm cellulitis          Harshad Garcia MD  04/18/24 7094

## 2024-04-15 NOTE — PROGRESS NOTES
Continued Stay Note  [facility]     Patient Name: Juan James  MRN: [id number]  Today's Date: 4/9/2020    Admit Date: 4/4/2020    Discharge Plan     Row Name 04/09/20 1337       Plan    Provided Post Acute Provider List?  Yes    Post Acute Provider List  Home Health    Provided Post Acute Provider Quality & Resource List?  Yes    Post Acute Provider Quality and Resource List  Home Health    Delivered To  Patient    Method of Delivery  In person    Patient/Family in Agreement with Plan  yes    Plan Comments  Met with patient at bedside to discuss discharge plan.  Patient confirmed his plan is to return to his home and Juan James Son [phone] will be staying with him.  Patient states that he has a walker, bedside commode, and shower bench at home. Patient requires 1-2 person assist when walking and would benefit from additional therapy services.  Gave patient provider list, he said he wants [facility], spoke with Jb she will review and follow. Patient to have overnight pulse oximetry done to evaluate for oxygen needs, if needed, he is ok with [facility] provider.  Discussed and reviewed with patient meds to beds, he is agreeable and understands he is responsible for any co-pay or deductibles.  Patient states he has food and necessities available at home. Family will be able to transport. Will continue monitor for new or changing needs.        Discharge Codes    No documentation.             Lulu Gupta RN     yes

## 2024-04-15 NOTE — ED PROVIDER NOTES
" EMERGENCY DEPARTMENT ENCOUNTER  Room Number:  T03/03  PCP: Sosa Yip MD  Independent Historians: Patient      HPI:  Chief Complaint: had concerns including Insect Bite and Rash.     A complete HPI/ROS/PMH/PSH/SH/FH are unobtainable due to: None    Chronic or social conditions impacting patient care (Social Determinants of Health): None      Context: Juan James is a 78 y.o. male who presents to the ED with c/o pain, swelling, itchingand redness to right forearm. Patient states itching and redness started yesterday however today he noticed an increased in redness along with pain and swelling. He believes he was bit by something but did not witness a bit. Patient reports he feels as if something is in his arm because he can feel a \"knot'.Pain is described as throbbing ache. Patient denies fever, chills, headache, lightheadedness, dizziness, numbness, tingling, unilateral extremity weakness, syncope, shortness of breath, chest pain, abdominal pain, nausea, vomiting, diarrhea, dysuria, hematuria, or other complaints.        Review of prior external notes (non-ED) -and- Review of prior external test results outside of this encounter:   04/05/2024: Sport medicine office visit. Pt was seen in follow up with low back pain. Medication changed from gabapentin to pregabalin.      PAST MEDICAL HISTORY  Active Ambulatory Problems     Diagnosis Date Noted    Cervical radiculitis 09/20/2016    Hyperlipidemia 07/29/2019    Prediabetes 07/29/2019    Cervical spondylosis with radiculopathy 11/02/2012    Pulmonary embolism     Peptic ulceration     OA (osteoarthritis)     Hearing loss     GERD (gastroesophageal reflux disease)     DDD (degenerative disc disease), lumbar     DDD (degenerative disc disease), cervical     COPD (chronic obstructive pulmonary disease)     Colon polyps     Cervical spondylosis 11/01/2012    Cervical disc disorder     Cataract     BPH (benign prostatic hyperplasia)     Permanent atrial " fibrillation 01/04/2021    Arthritis     PVD (peripheral vascular disease) with claudication 04/04/2020    Idiopathic chronic gout of multiple sites without tophus 10/13/2020    Spondylolisthesis of lumbar region 10/20/2020    Essential hypertension 01/04/2021    AAA (abdominal aortic aneurysm) without rupture 12/17/2021    Gout 11/27/2018    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    Benign prostatic hyperplasia 03/28/2022    DDD (degenerative disc disease), cervical 11/01/2012    Lumbar radiculopathy 11/02/2012    Stage 3a chronic kidney disease 08/15/2022    Encounter for Medicare annual wellness exam 05/09/2023    Other microscopic hematuria 05/11/2023    Edema of both legs 06/30/2023    Bleeding nose 06/30/2023    Current moderate episode of major depressive disorder without prior episode 06/30/2023    Abnormal CT scan 11/02/2023    Pleural thickening 11/02/2023    Other emphysema 11/02/2023    Arthralgia of multiple joints 11/06/2023    Kidney stones 11/25/2023    Sigmoid diverticulosis 11/25/2023    Bilateral flank pain 02/06/2024    Myasthenia gravis 03/11/2024     Resolved Ambulatory Problems     Diagnosis Date Noted    Cervical disc disorder with radiculopathy of mid-cervical region 02/18/2016    Follow-up examination, following other surgery 04/11/2016    Lumbar radiculopathy 05/10/2016    Essential hypertension 07/29/2019    Coronary artery disease involving native heart 07/29/2019    Chronic bronchitis 07/29/2019    Edema 07/29/2019    Rectal pain 07/29/2019    Ocular myasthenia gravis 11/02/2012    History of colon polyps 08/28/2019    Tinnitus     Shortness of breath     Neck pain     Lower back pain     Hypertension     History of blood transfusion     Coronary artery disease     CHF (congestive heart failure)     Abdominal aortic aneurysm 01/04/2021    Medicare annual wellness visit, subsequent 11/26/2019    Heart block AV complete 04/04/2020    Acute renal failure (ARF) 04/04/2020    Hospital discharge  follow-up 04/17/2020    Olecranon bursitis of left elbow 11/17/2020    Encounter for screening for malignant neoplasm of colon 01/22/2021    Long term current use of anticoagulant 01/04/2021    Stented coronary artery 01/04/2021    History of blood in urine 03/18/2021    Diabetes mellitus 08/19/2021    Pain of right hand 11/15/2021    Cigarette smoker 05/16/2022    Aneurysm of aorta 05/19/2016    Disorder of prostate 06/08/2016    Congestive heart failure 07/22/2014    Chronic obstructive pulmonary disease 03/28/2022    Atherosclerotic heart disease of native coronary artery without angina pectoris 07/22/2014    Type 2 diabetes mellitus without complication 04/23/2020    Hyperlipidemia 08/12/2013     Past Medical History:   Diagnosis Date    AAA (abdominal aortic aneurysm)     ADHD (attention deficit hyperactivity disorder) 07/1965    Atrial fibrillation     Benign essential hypertension     Cholelithiasis 2019    Clotting disorder     Dry skin     Elevated cholesterol     Emphysema/COPD     Essential hypertriglyceridemia     Headache     Heart murmur 2009    High blood pressure     History of gastrointestinal hemorrhage     History of gout     History of myasthenia gravis     Hoarseness, persistent     Injury of back 2637-8616    Injury of neck 4498-2856    Kidney stone 2020    Renal insufficiency     Shoulder injury 2019    Skin abnormalities     Sleep apnea     Status post angioplasty with stent     Unsteady gait when walking     Urinary tract infection 2023    Wrist sprain 2023         PAST SURGICAL HISTORY  Past Surgical History:   Procedure Laterality Date    ANTERIOR CERVICAL DISCECTOMY W/ FUSION N/A 03/25/2016    Procedure: C3- C5 CERVICAL DISCECTOMY ANTERIOR FUSION WITH INSTRUMENTATION;  Surgeon: Bill Washington MD;  Location: [facility];  Service:     ARTERIOGRAM AORTIC N/A 12/17/2021    Procedure: ENDOVASCULAR ANEURYSM REPAIR GORE;  Surgeon: Jean Patricia MD;  Location: [facility] OR  18/19;  Service: Vascular;  Laterality: N/A;    ARTHRODESIS N/A 07/1983    LUMBAR    ARTHRODESIS N/A 1987    LUMBAR    ARTHRODESIS      Spinal Arthrodesis-lower spine-7/1983, 1987--upper spine-1988, 7/1990-Abstracted from [facility]    BACK SURGERY      Lower Back Surgery    CARDIAC CATHETERIZATION  07/2009    CARDIAC CATHETERIZATION  03/18/2003    STENT TO RCA AND PCA, DR. PRIYA LUGO    CATARACT EXTRACTION Bilateral     LENS IMPLANT    CERVICAL ARTHRODESIS N/A 07/1990    CERVICAL ARTHRODESIS N/A 1988    COLONOSCOPY N/A 10/03/2019    4 MM HYPERPLASTIC POLYP IN ILEOCECAL VALVE, 4 MM SESSILE SERRATED ADENOMA POLYP IN DESCENDING, 5 TUBULOVILLOUS ADENOMA POLYPS IN RECTUM, 3 TUBULAR ADENOMA POLYPS IN DISTAL RECTUM, 26 MM TUBULAR ADENOMA POLYP IN RECTUM, PIECEMEAL POLYPECTOMY, AREA TATTOOED, RESCOPE IN 6 MONTHS, DR. TAYA CRUZ AT [facility]    COLONOSCOPY N/A 02/17/2021    Procedure: COLONOSCOPY to cecum with cold biopsy polypectomy;  Surgeon: Yousuf Méndez MD;  Location: [facility] ENDOSCOPY;  Service: Gastroenterology;  Laterality: N/A;  pre: hx of polyps  post: polyp    CORONARY ANGIOPLASTY WITH STENT PLACEMENT  02/2009    and 7/2009-PT STATES HAS 3 STENTS    CORONARY STENT PLACEMENT      ENDOSCOPY N/A 06/24/2012    NON BLEEDING EROSIVE GASTROPATHY, 1 DUODENAL ULCER WITH CLEAN BASE, DR. BRANDY WREN AT [facility]    ENDOSCOPY AND COLONOSCOPY N/A 11/20/2007    EGD WNL, 8 ADENOMATOUS POLYPS IN RECTO-SIGMOID, RESCOPE IN 3 YRS, DR. CRISTINA RODRIGUEZ AT [facility]    FOOT SURGERY Left     LEFT BIG TOE-JOINT REPLACED    HAND SURGERY Left     THUMB-JOINT REPLACEMENT    HAND SURGERY Right     JOINT REPLACEMENT RIGHT INDEX FINGER    JOINT REPLACEMENT      two hand and one foot joint    KNEE ARTHROSCOPY Left     KNEE SURGERY      LAPAROSCOPIC CHOLECYSTECTOMY      LUMBAR DISCECTOMY FUSION INSTRUMENTATION N/A 11/13/2020    Procedure: Lumbar 3 4 and lumbar 4 5 laminectomy and fusion with instrumentation;  Surgeon: Bill Washington MD;  Location:  [facility] MAIN OR;  Service: Neurosurgery;  Laterality: N/A;    NECK SURGERY      TRIGGER POINT INJECTION      VASECTOMY Bilateral          FAMILY HISTORY  Family History   Adopted: Yes   Problem Relation Age of Onset    Heart attack Mother 66    Alcohol abuse Mother         Adopted    Heart disease Mother 66    No Known Problems Father     Heart disease Other         FH in females before the age of 65    Malig Hyperthermia Neg Hx          SOCIAL HISTORY  Social History     Socioeconomic History    Marital status:    Tobacco Use    Smoking status: Former     Current packs/day: 0.00     Average packs/day: 2.0 packs/day for 45.0 years (90.0 ttl pk-yrs)     Types: Cigarettes, Pipe, Cigars     Start date: 2/10/1964     Quit date: 2/10/2009     Years since quitting: 15.1    Smokeless tobacco: Former    Tobacco comments:     enjoyed smoking   Vaping Use    Vaping status: Never Used   Substance and Sexual Activity    Alcohol use: Not Currently     Comment: QUIT HEAVY DRINKING JAN 2020/ OCCAS NOW    Drug use: Never    Sexual activity: Not Currently     Partners: Female     Birth control/protection: Vasectomy         ALLERGIES  Ranolazine er, Apixaban, Indomethacin, Lisinopril, and Rivaroxaban      REVIEW OF SYSTEMS  Included in HPI  All systems reviewed and negative except for those discussed in HPI.      PHYSICAL EXAM    I have reviewed the triage vital signs and nursing notes.    ED Triage Vitals   Temp Heart Rate Resp BP SpO2   04/14/24 2252 04/14/24 2252 04/14/24 2252 04/14/24 2313 04/14/24 2252   97.9 °F (36.6 °C) 87 16 163/88 99 %      Temp src Heart Rate Source Patient Position BP Location FiO2 (%)   04/14/24 2252 04/14/24 2252 04/14/24 2313 04/14/24 2313 --   Tympanic Monitor Lying Left arm        GENERAL: not distressed  HENT: nares patent  Head/neck/ face are symmetric without gross deformity or swelling  EYES: no scleral icterus  CV: regular rhythm, regular rate with intact distal pulses  RESPIRATORY: normal  effort and no respiratory distress  ABDOMEN: soft and non-tender  MUSCULOSKELETAL: no deformity  TTP to medial right forearm  NEURO: alert and appropriate, moves all extremities, follows commands  SKIN: warm, dry  Erythema, warmth, and swelling to right medial forearm consistent with cellulitis.    Vital signs and nursing notes reviewed.      LAB RESULTS  Labs Reviewed   COMPREHENSIVE METABOLIC PANEL - Abnormal; Notable for the following components:       Result Value    Glucose 138 (*)     BUN 35 (*)     Creatinine 1.40 (*)     eGFR 52.1 (*)     All other components within normal limits    Narrative:     GFR Normal >60  Chronic Kidney Disease <60  Kidney Failure <15    The GFR formula is only valid for adults with stable renal function between ages 18 and 70.   CBC WITH AUTO DIFFERENTIAL - Abnormal; Notable for the following components:    WBC 10.93 (*)     RBC 3.73 (*)     Hemoglobin 11.1 (*)     Hematocrit 35.0 (*)     Lymphocyte % 19.1 (*)     Immature Grans % 0.7 (*)     Neutrophils, Absolute 7.57 (*)     Monocytes, Absolute 1.03 (*)     Immature Grans, Absolute 0.08 (*)     All other components within normal limits   CBC AND DIFFERENTIAL    Narrative:     The following orders were created for panel order CBC & Differential.  Procedure                               Abnormality         Status                     ---------                               -----------         ------                     CBC Auto Differential[[id number]]        Abnormal            Final result                 Please view results for these tests on the individual orders.           RADIOLOGY  XR Forearm 2 View Left   Final Result   No acute osseous findings.       This report was finalized on 4/15/2024 1:28 AM by Dr. Saskia Johnson M.D on Workstation: [id number]                  MEDICATIONS GIVEN IN ER  Medications   doxycycline (MONODOX) capsule 100 mg (100 mg Oral Given 4/15/24 0418)           OUTPATIENT MEDICATION  MANAGEMENT:  No current Epic-ordered facility-administered medications on file.     Current Outpatient Medications Ordered in Epic   Medication Sig Dispense Refill    allopurinol (ZYLOPRIM) 300 MG tablet Take 1 tablet by mouth Every Night. 90 tablet 3    aspirin 81 MG EC tablet Take 1 tablet by mouth Daily.  11    colchicine 0.6 MG tablet Take 2 pills at onset of symptoms.  Then take 1 pill in an hour.  Then take twice a day for 3 days. 30 tablet 1    Cyanocobalamin (VITAMIN B-12) 5000 MCG tablet dispersible Place 1 tablet on the tongue Daily. (Patient taking differently: Place 1 tablet on the tongue Daily.)      doxycycline (MONODOX) 100 MG capsule Take 1 capsule by mouth 2 (Two) Times a Day. 13 capsule 0    folic acid (FOLVITE) 400 MCG tablet Take 1 tablet by mouth Daily.      furosemide (LASIX) 40 MG tablet Take 1 tablet by mouth 3 (Three) Times a Day. 270 tablet 1    hydroCHLOROthiazide 25 MG tablet Take 1 tablet by mouth Daily. 30 tablet 3    metoprolol succinate XL (TOPROL-XL) 50 MG 24 hr tablet TAKE 1 TABLET BY MOUTH ONCE DAILY IN THE EVENING 90 tablet 0    multivitamin with minerals tablet tablet Take 1 tablet by mouth Daily.      Pradaxa 75 MG capsule TAKE 1 CAPSULES BY MOUTH TWICE DAILY 180 capsule 2    predniSONE (DELTASONE) 5 MG tablet 6 pills day 1 5 pills day 2 4 pills day 3 3 pills day 4 2 pills day 5 1 pill day 6 21 tablet 0    pregabalin (Lyrica) 75 MG capsule Take 1 capsule by mouth Every Night. 30 capsule 0    rOPINIRole (REQUIP) 1 MG tablet Take 1 tablet by mouth Every Night. Take 1 hour before bedtime. 30 tablet 2    rosuvastatin (CRESTOR) 40 MG tablet Take 1 tablet by mouth once daily 90 tablet 0    tamsulosin (FLOMAX) 0.4 MG capsule 24 hr capsule TAKE 1 CAPSULE BY MOUTH ONCE DAILY IN THE EVENING 90 capsule 3    traZODone (DESYREL) 100 MG tablet Take 1 tablet by mouth Every Night. 30 tablet 3           PROGRESS, DATA ANALYSIS, CONSULTS, AND MEDICAL DECISION MAKING  ORDERS PLACED DURING THIS  VISIT:  Orders Placed This Encounter   Procedures    XR Forearm 2 View Left    Comprehensive Metabolic Panel    CBC Auto Differential    CBC & Differential       All labs have been independently interpreted by me.  All radiology studies have been reviewed by me. All EKG's have been independently viewed by me.  Discussion below represents my analysis of pertinent findings related to patient's condition, differential diagnosis, treatment plan and final disposition.    Differential diagnosis includes but is not limited to:   Cellulitis, contusion, foreign body, etc.    ED Course/Progress Notes/MDM:  ED Course as of 04/18/24 0526   Mon Apr 15, 2024   0222 WBC(!): 10.93 [AR]   0223 Creatinine(!): 1.40 [AR]   0229 I discussed the case with Dr. Garcia and they agree to evaluate the patient at the bedside.    [AR]   0408 You have been given emergency department evaluation.  This evaluation is intended to rule out life-threatening conditions.  Is not a complete evaluation.  You could require further testing as determined by your primary care physician or any referred specialist.  Please follow-up with all doctors that you are referred to.  Please be sure to take your prescribed medications and follow any specific instructions in the discharge instructions.  Please follow-up with your primary care physician within 48 hours.  Please have your primary care provider recheck your blood pressure.  Please return to the emergency department if you experience chest pain, shortness of breath, abdominal pain, fever greater than 102, intractable vomiting.  Please return to the emergency department if your symptoms continue or worsen, or if you begin to experience any other concerning symptom.     [AR]      ED Course User Index  [AR] Juanis Anderson APRN             MDM:  Patient is a 78 y.o. male who presents with c/o pain, swelling, itching, and redness to right forearm. Symptoms and exam finding consistent with cellulitis. No FB on  x-ray. WBC 10. Will treat with doxycycline. Advised follow up with PCP. Strict return precautions given.      COMPLEXITY OF CARE  Admission was considered but after careful review of the patient's presentation, physical examination, diagnostic results, and response to treatment the patient may be safely discharged with outpatient follow-up.        DIAGNOSIS  Final diagnoses:   Right arm cellulitis         DISPOSITION  ED Disposition       ED Disposition   Discharge    Condition   Stable    Comment   --                  Please note that portions of this document were completed with a voice recognition program.    Note Disclaimer: At [facility], we believe that sharing information builds trust and better relationships. You are receiving this note because you recently visited [facility]. It is possible you will see health information before a provider has talked with you about it. This kind of information can be easy to misunderstand. To help you fully understand what it means for your health, we urge you to discuss this note with your provider.     Juanis Anderson, AARON  04/18/24 0528

## 2024-04-16 DIAGNOSIS — G25.81 RLS (RESTLESS LEGS SYNDROME): Primary | ICD-10-CM

## 2024-04-16 RX ORDER — ROPINIROLE 1 MG/1
1 TABLET, FILM COATED ORAL NIGHTLY
Qty: 30 TABLET | Refills: 2 | Status: SHIPPED | OUTPATIENT
Start: 2024-04-16

## 2024-05-07 ENCOUNTER — OFFICE VISIT (OUTPATIENT)
Dept: FAMILY MEDICINE CLINIC | Facility: CLINIC | Age: 79
End: 2024-05-07
Payer: MEDICARE

## 2024-05-07 VITALS
OXYGEN SATURATION: 95 % | HEIGHT: 73 IN | HEART RATE: 68 BPM | RESPIRATION RATE: 18 BRPM | WEIGHT: 224 LBS | DIASTOLIC BLOOD PRESSURE: 78 MMHG | BODY MASS INDEX: 29.69 KG/M2 | TEMPERATURE: 97.8 F | SYSTOLIC BLOOD PRESSURE: 126 MMHG

## 2024-05-07 DIAGNOSIS — I10 ESSENTIAL HYPERTENSION: ICD-10-CM

## 2024-05-07 DIAGNOSIS — J30.2 SEASONAL ALLERGIES: ICD-10-CM

## 2024-05-07 DIAGNOSIS — Z12.11 SCREEN FOR COLON CANCER: ICD-10-CM

## 2024-05-07 DIAGNOSIS — E78.2 MIXED HYPERLIPIDEMIA: Primary | ICD-10-CM

## 2024-05-07 DIAGNOSIS — I48.21 PERMANENT ATRIAL FIBRILLATION: ICD-10-CM

## 2024-05-07 DIAGNOSIS — R73.03 PREDIABETES: ICD-10-CM

## 2024-05-07 PROCEDURE — 3078F DIAST BP <80 MM HG: CPT | Performed by: FAMILY MEDICINE

## 2024-05-07 PROCEDURE — 1125F AMNT PAIN NOTED PAIN PRSNT: CPT | Performed by: FAMILY MEDICINE

## 2024-05-07 PROCEDURE — 3074F SYST BP LT 130 MM HG: CPT | Performed by: FAMILY MEDICINE

## 2024-05-07 PROCEDURE — G2211 COMPLEX E/M VISIT ADD ON: HCPCS | Performed by: FAMILY MEDICINE

## 2024-05-07 PROCEDURE — 1159F MED LIST DOCD IN RCRD: CPT | Performed by: FAMILY MEDICINE

## 2024-05-07 PROCEDURE — 99214 OFFICE O/P EST MOD 30 MIN: CPT | Performed by: FAMILY MEDICINE

## 2024-05-07 PROCEDURE — 1160F RVW MEDS BY RX/DR IN RCRD: CPT | Performed by: FAMILY MEDICINE

## 2024-05-07 RX ORDER — CETIRIZINE HYDROCHLORIDE 10 MG/1
10 TABLET ORAL DAILY
Qty: 30 TABLET | Refills: 3 | Status: SHIPPED | OUTPATIENT
Start: 2024-05-07

## 2024-05-07 RX ORDER — PYRIDOSTIGMINE BROMIDE 60 MG/1
60 TABLET ORAL 3 TIMES DAILY
COMMUNITY

## 2024-05-07 RX ORDER — PREDNISONE 10 MG/1
2 TABLET ORAL DAILY
COMMUNITY
Start: 2024-05-01

## 2024-05-07 RX ORDER — HYDROCHLOROTHIAZIDE 25 MG/1
25 TABLET ORAL DAILY
Qty: 30 TABLET | Refills: 3 | Status: SHIPPED | OUTPATIENT
Start: 2024-05-07

## 2024-05-08 LAB
ALBUMIN/CREAT UR: 3553 MG/G CREAT (ref 0–29)
CREAT UR-MCNC: 26.1 MG/DL
HBA1C MFR BLD: 6.4 % (ref 4.8–5.6)
MICROALBUMIN UR-MCNC: 927.3 UG/ML

## 2024-05-13 PROBLEM — E11.9 TYPE 2 DIABETES MELLITUS IN REMISSION: Status: ACTIVE | Noted: 2024-05-13

## 2024-05-21 ENCOUNTER — TELEMEDICINE (OUTPATIENT)
Dept: FAMILY MEDICINE CLINIC | Facility: CLINIC | Age: 79
End: 2024-05-21
Payer: MEDICARE

## 2024-05-21 DIAGNOSIS — Z00.00 ENCOUNTER FOR MEDICARE ANNUAL WELLNESS EXAM: Primary | ICD-10-CM

## 2024-05-21 PROCEDURE — 1170F FXNL STATUS ASSESSED: CPT | Performed by: FAMILY MEDICINE

## 2024-05-21 PROCEDURE — 1160F RVW MEDS BY RX/DR IN RCRD: CPT | Performed by: FAMILY MEDICINE

## 2024-05-21 PROCEDURE — 1125F AMNT PAIN NOTED PAIN PRSNT: CPT | Performed by: FAMILY MEDICINE

## 2024-05-21 PROCEDURE — G0439 PPPS, SUBSEQ VISIT: HCPCS | Performed by: FAMILY MEDICINE

## 2024-05-21 PROCEDURE — 1159F MED LIST DOCD IN RCRD: CPT | Performed by: FAMILY MEDICINE

## 2024-05-21 NOTE — PROGRESS NOTES
The ABCs of the Annual Wellness Visit  Subsequent Medicare Wellness Visit    Subjective      Juan James is a 78 y.o. male who presents for a Subsequent Medicare Wellness Visit.  Consented to a MyChart visit.  He is at home and I am at my desk at [place] [id number].  The following portions of the patient's history were reviewed and   updated as appropriate: allergies, current medications, past family history, past medical history, past social history, past surgical history, and problem list.    Compared to one year ago, the patient feels his physical   health is the same.    Compared to one year ago, the patient feels his mental   health is the same.    Recent Hospitalizations:  He was not admitted to the hospital during the last year.       Current Medical Providers:  Patient Care Team:  Sosa Yip MD as PCP - General (Family Medicine)  Bill Washington MD as Surgeon (Neurosurgery)  Mohan, Zafar Fraire MD as Consulting Physician (Vascular Surgery)  Alesia Aviles MD as Consulting Physician (Cardiology)  Chris Mo III, MD as Consulting Physician (Cardiology)    Outpatient Medications Prior to Visit   Medication Sig Dispense Refill    allopurinol (ZYLOPRIM) 300 MG tablet Take 1 tablet by mouth Every Night. 90 tablet 3    aspirin 81 MG EC tablet Take 1 tablet by mouth Daily.  11    cetirizine (zyrTEC) 10 MG tablet Take 1 tablet by mouth Daily. 30 tablet 3    colchicine 0.6 MG tablet Take 2 pills at onset of symptoms.  Then take 1 pill in an hour.  Then take twice a day for 3 days. 30 tablet 1    Cyanocobalamin (VITAMIN B-12) 5000 MCG tablet dispersible Place 1 tablet on the tongue Daily. (Patient taking differently: Place 1 tablet on the tongue Daily.)      folic acid (FOLVITE) 400 MCG tablet Take 1 tablet by mouth Daily.      furosemide (LASIX) 40 MG tablet Take 1 tablet by mouth 3 (Three) Times a Day. 270 tablet 1    hydroCHLOROthiazide 25 MG tablet Take 1 tablet by mouth Daily. 30 tablet 3    metoprolol  succinate XL (TOPROL-XL) 50 MG 24 hr tablet TAKE 1 TABLET BY MOUTH ONCE DAILY IN THE EVENING 90 tablet 0    multivitamin with minerals tablet tablet Take 1 tablet by mouth Daily.      Pradaxa 75 MG capsule TAKE 1 CAPSULES BY MOUTH TWICE DAILY 180 capsule 2    predniSONE (DELTASONE) 10 MG tablet Take 2 tablets by mouth Daily.      pregabalin (Lyrica) 75 MG capsule Take 1 capsule by mouth Every Night. 30 capsule 0    pyridostigmine (MESTINON) 60 MG tablet Take 1 tablet by mouth 3 (Three) Times a Day. 1/2 of a pill 3 times a day for 1 week. After that 1 tab 3x's daily      rOPINIRole (REQUIP) 1 MG tablet Take 1 tablet by mouth Every Night. Take 1 hour before bedtime. 30 tablet 2    rosuvastatin (CRESTOR) 40 MG tablet Take 1 tablet by mouth once daily 90 tablet 0    tamsulosin (FLOMAX) 0.4 MG capsule 24 hr capsule TAKE 1 CAPSULE BY MOUTH ONCE DAILY IN THE EVENING 90 capsule 3    traZODone (DESYREL) 100 MG tablet Take 1 tablet by mouth Every Night. 30 tablet 3     No facility-administered medications prior to visit.       No opioid medication identified on active medication list. I have reviewed chart for other potential  high risk medication/s and harmful drug interactions in the elderly.        Aspirin is on active medication list. Aspirin use is indicated based on review of current medical condition/s. Pros and cons of this therapy have been discussed today. Benefits of this medication outweigh potential harm.  Patient has been encouraged to continue taking this medication.  .      Patient Active Problem List   Diagnosis    Cervical radiculitis    Hyperlipidemia    Prediabetes    Cervical spondylosis with radiculopathy    Pulmonary embolism    Peptic ulceration    OA (osteoarthritis)    Hearing loss    GERD (gastroesophageal reflux disease)    DDD (degenerative disc disease), lumbar    DDD (degenerative disc disease), cervical    COPD (chronic obstructive pulmonary disease)    Colon polyps    Cervical spondylosis     "Cervical disc disorder    Cataract    BPH (benign prostatic hyperplasia)    Permanent atrial fibrillation    Arthritis    PVD (peripheral vascular disease) with claudication    Idiopathic chronic gout of multiple sites without tophus    Spondylolisthesis of lumbar region    Essential hypertension    AAA (abdominal aortic aneurysm) without rupture    Gout    Nevus of choroid    Benign prostatic hyperplasia    DDD (degenerative disc disease), cervical    Lumbar radiculopathy    Stage 3a chronic kidney disease    Encounter for Medicare annual wellness exam    Other microscopic hematuria    Edema of both legs    Bleeding nose    Current moderate episode of major depressive disorder without prior episode    Abnormal CT scan    Pleural thickening    Other emphysema    Arthralgia of multiple joints    Kidney stones    Sigmoid diverticulosis    Bilateral flank pain    Myasthenia gravis    RLS (restless legs syndrome)    Type 2 diabetes mellitus in remission     Advance Care Planning   Advance Care Planning     Advance Directive is not on file.  ACP discussion was held with the patient during this visit. Patient has an advance directive (not in EMR), copy requested.     Objective    There were no vitals filed for this visit.  Estimated body mass index is 29.56 kg/m² as calculated from the following:    Height as of 5/7/24: 185.4 cm (72.99\").    Weight as of 5/7/24: 102 kg (224 lb).           Does the patient have evidence of cognitive impairment?   No    Lab Results   Component Value Date    HGBA1C 6.40 (H) 05/07/2024          HEALTH RISK ASSESSMENT    Smoking Status:  Social History     Tobacco Use   Smoking Status Former    Current packs/day: 0.00    Average packs/day: 2.0 packs/day for 45.0 years (90.0 ttl pk-yrs)    Types: Cigarettes, Pipe, Cigars    Start date: 2/10/1964    Quit date: 2/10/2009    Years since quitting: 15.2   Smokeless Tobacco Former   Tobacco Comments    enjoyed smoking     Alcohol Consumption:  Social " History     Substance and Sexual Activity   Alcohol Use Not Currently    Comment: QUIT HEAVY DRINKING 2020/  NOW     Fall Risk Screen:    IRIS Fall Risk Assessment has not been completed.    Depression Screenin/6/2024     7:37 AM   PHQ-2/PHQ-9 Depression Screening   Little Interest or Pleasure in Doing Things 0-->not at all   Feeling Down, Depressed or Hopeless 0-->not at all   PHQ-9: Brief Depression Severity Measure Score 0       Health Habits and Functional and Cognitive Screenin/21/2024     8:00 AM   Functional & Cognitive Status   Do you have difficulty preparing food and eating? No   Do you have difficulty bathing yourself, getting dressed or grooming yourself? No   Do you have difficulty using the toilet? No   Do you have difficulty moving around from place to place? No   Do you have trouble with steps or getting out of a bed or a chair? No   Current Diet Well Balanced Diet   Dental Exam Up to date   Eye Exam Up to date   Exercise (times per week) 4 times per week   Current Exercises Include Walking   Do you need help using the phone?  No   Are you deaf or do you have serious difficulty hearing?  Yes   Do you need help to go to places out of walking distance? No   Do you need help shopping? No   Do you need help preparing meals?  No   Do you need help with housework?  No   Do you need help with laundry? No   Do you need help taking your medications? No   Do you need help managing money? No   Do you ever drive or ride in a car without wearing a seat belt? No   Have you felt unusual stress, anger or loneliness in the last month? No   Who do you live with? Alone   If you need help, do you have trouble finding someone available to you? No   Do you have difficulty concentrating, remembering or making decisions? No       Age-appropriate Screening Schedule:  Refer to the list below for future screening recommendations based on patient's age, sex and/or medical conditions. Orders for  these recommended tests are listed in the plan section. The patient has been provided with a written plan.    Health Maintenance   Topic Date Due    RSV Vaccine - Adults (1 - 1-dose 60+ series) Never done    DIABETIC EYE EXAM  06/17/2023    COVID-19 Vaccine (7 - 2023-24 season) 12/18/2023    COLORECTAL CANCER SCREENING  02/17/2024    INFLUENZA VACCINE  08/01/2024    HEMOGLOBIN A1C  11/07/2024    LIPID PANEL  02/06/2025    BMI FOLLOWUP  03/05/2025    URINE MICROALBUMIN  05/07/2025    ANNUAL WELLNESS VISIT  05/21/2025    TDAP/TD VACCINES (2 - Td or Tdap) 05/20/2031    Pneumococcal Vaccine 65+  Completed    ZOSTER VACCINE  Completed    HEPATITIS C SCREENING  Discontinued    LUNG CANCER SCREENING  Discontinued                  CMS Preventative Services Quick Reference  Risk Factors Identified During Encounter:    None Identified    The above risks/problems have been discussed with the patient.  Pertinent information has been shared with the patient in the After Visit Summary.    Diagnoses and all orders for this visit:    1. Encounter for Medicare annual wellness exam (Primary)        Follow Up:   Next Medicare Wellness visit to be scheduled in 1 year.      An After Visit Summary and PPPS were made available to the patient.  20 min   Preventive Counseling for MWE:  Work on diet and exercise .    Consider RSV vaccine.

## 2024-05-27 DIAGNOSIS — F51.01 PRIMARY INSOMNIA: ICD-10-CM

## 2024-05-27 RX ORDER — TRAZODONE HYDROCHLORIDE 100 MG/1
100 TABLET ORAL NIGHTLY
Qty: 90 TABLET | Refills: 2 | Status: SHIPPED | OUTPATIENT
Start: 2024-05-27

## 2024-06-03 DIAGNOSIS — R60.0 BILATERAL LOWER EXTREMITY EDEMA: ICD-10-CM

## 2024-06-03 RX ORDER — FUROSEMIDE 40 MG/1
40 TABLET ORAL 3 TIMES DAILY
Qty: 270 TABLET | Refills: 0 | Status: SHIPPED | OUTPATIENT
Start: 2024-06-03

## 2024-06-10 ENCOUNTER — OFFICE VISIT (OUTPATIENT)
Dept: CARDIOLOGY | Facility: CLINIC | Age: 79
End: 2024-06-10
Payer: MEDICARE

## 2024-06-10 VITALS
OXYGEN SATURATION: 98 % | DIASTOLIC BLOOD PRESSURE: 70 MMHG | SYSTOLIC BLOOD PRESSURE: 140 MMHG | HEIGHT: 73 IN | HEART RATE: 68 BPM | WEIGHT: 216.2 LBS | BODY MASS INDEX: 28.65 KG/M2

## 2024-06-10 DIAGNOSIS — R42 LIGHTHEADEDNESS: Primary | ICD-10-CM

## 2024-06-10 DIAGNOSIS — I77.810 ASCENDING AORTA DILATION: ICD-10-CM

## 2024-06-10 DIAGNOSIS — E78.2 MIXED HYPERLIPIDEMIA: ICD-10-CM

## 2024-06-10 DIAGNOSIS — I48.21 PERMANENT ATRIAL FIBRILLATION: ICD-10-CM

## 2024-06-10 DIAGNOSIS — R26.89 BALANCE PROBLEM: ICD-10-CM

## 2024-06-10 DIAGNOSIS — I77.810 AORTIC ROOT DILATION: ICD-10-CM

## 2024-06-10 DIAGNOSIS — I27.20 PULMONARY HYPERTENSION: ICD-10-CM

## 2024-06-10 DIAGNOSIS — I10 ESSENTIAL HYPERTENSION: ICD-10-CM

## 2024-06-10 DIAGNOSIS — G70.00 MYASTHENIA GRAVIS: ICD-10-CM

## 2024-06-10 PROCEDURE — 1159F MED LIST DOCD IN RCRD: CPT | Performed by: NURSE PRACTITIONER

## 2024-06-10 PROCEDURE — 1160F RVW MEDS BY RX/DR IN RCRD: CPT | Performed by: NURSE PRACTITIONER

## 2024-06-10 PROCEDURE — 99214 OFFICE O/P EST MOD 30 MIN: CPT | Performed by: NURSE PRACTITIONER

## 2024-06-10 PROCEDURE — 3077F SYST BP >= 140 MM HG: CPT | Performed by: NURSE PRACTITIONER

## 2024-06-10 PROCEDURE — 93000 ELECTROCARDIOGRAM COMPLETE: CPT | Performed by: NURSE PRACTITIONER

## 2024-06-10 PROCEDURE — 3078F DIAST BP <80 MM HG: CPT | Performed by: NURSE PRACTITIONER
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
## 2024-07-05 ENCOUNTER — OFFICE VISIT (OUTPATIENT)
Dept: SPORTS MEDICINE | Facility: CLINIC | Age: 79
End: 2024-07-05
Payer: MEDICARE

## 2024-07-05 VITALS
OXYGEN SATURATION: 95 % | HEART RATE: 80 BPM | HEIGHT: 73 IN | WEIGHT: 213 LBS | SYSTOLIC BLOOD PRESSURE: 160 MMHG | DIASTOLIC BLOOD PRESSURE: 80 MMHG | BODY MASS INDEX: 28.23 KG/M2 | TEMPERATURE: 97.7 F

## 2024-07-05 DIAGNOSIS — M53.3 CHRONIC SACROILIAC PAIN: Primary | ICD-10-CM

## 2024-07-05 DIAGNOSIS — M51.36 DDD (DEGENERATIVE DISC DISEASE), LUMBAR: ICD-10-CM

## 2024-07-05 DIAGNOSIS — G89.29 CHRONIC SACROILIAC PAIN: Primary | ICD-10-CM

## 2024-07-05 PROCEDURE — 3079F DIAST BP 80-89 MM HG: CPT | Performed by: FAMILY MEDICINE

## 2024-07-05 PROCEDURE — 99213 OFFICE O/P EST LOW 20 MIN: CPT | Performed by: FAMILY MEDICINE

## 2024-07-05 PROCEDURE — 3077F SYST BP >= 140 MM HG: CPT | Performed by: FAMILY MEDICINE

## 2024-07-05 NOTE — PROGRESS NOTES
"Juan is a 78 y.o. year old male     Chief Complaint   Patient presents with    Hip Pain     LEFT HIP- Patient is here to follow up on his left hip pain.        History of Present Illness  HPI   Here to follow-up on chronic low back pain with sacroiliac dysfunction.  Since his last visit he started treatment for his myasthenia gravis with neurology.  He states he feels considerably better overall and his back pain is also improved.  He rates it about a 2/10 currently.  He has been much more physically active.      Review of Systems    /80 (BP Location: Right arm, Patient Position: Sitting, Cuff Size: Adult)   Pulse 80   Temp 97.7 °F (36.5 °C) (Temporal)   Ht 185.4 cm (72.99\")   Wt 96.6 kg (213 lb)   SpO2 95%   BMI 28.11 kg/m²      Physical Exam    Vital signs reviewed.   General: No acute distress.      MSK Exam:  Ortho Exam  Normal gait and station  Procedures     Diagnoses and all orders for this visit:    Chronic sacroiliac pain    DDD (degenerative disc disease), lumbar    Overall he is recovering very well here.  I suspect a considerable degree of his improvement is related to improved muscular function and therefore his gait and posture support.  We had a pleasant conversation and will hold off on injection of any kind today.  He will continue routine physical activity and follow-up with me as needed.      EMR Dragon/Transcription disclaimer:    Much of this encounter note is an electronic transcription/translation of spoken language to printed text.  The electronic translation of spoken language may permit erroneous, or at times, nonsensical words or phrases to be inadvertently transcribed.  Although I have reviewed the note for such errors some may still exist.    "

## 2024-07-08 RX ORDER — METOPROLOL SUCCINATE 50 MG/1
50 TABLET, EXTENDED RELEASE ORAL EVERY EVENING
Qty: 90 TABLET | Refills: 0 | Status: SHIPPED | OUTPATIENT
Start: 2024-07-08

## 2024-07-09 DIAGNOSIS — G25.81 RLS (RESTLESS LEGS SYNDROME): ICD-10-CM

## 2024-07-09 RX ORDER — ROPINIROLE 1 MG/1
TABLET, FILM COATED ORAL
Qty: 90 TABLET | Refills: 0 | Status: SHIPPED | OUTPATIENT
Start: 2024-07-09

## 2024-07-11 DIAGNOSIS — G25.81 RLS (RESTLESS LEGS SYNDROME): ICD-10-CM

## 2024-07-11 RX ORDER — ROPINIROLE 1 MG/1
TABLET, FILM COATED ORAL
Qty: 90 TABLET | Refills: 0 | OUTPATIENT
Start: 2024-07-11

## 2024-07-15 ENCOUNTER — HOSPITAL ENCOUNTER (OUTPATIENT)
Facility: HOSPITAL | Age: 79
Discharge: HOME | End: 2024-07-15
Admitting: NURSE PRACTITIONER
Payer: MEDICARE

## 2024-07-15 ENCOUNTER — OFFICE VISIT (OUTPATIENT)
Age: 79
End: 2024-07-15
Payer: MEDICARE

## 2024-07-15 VITALS
BODY MASS INDEX: 28.23 KG/M2 | WEIGHT: 213 LBS | DIASTOLIC BLOOD PRESSURE: 80 MMHG | SYSTOLIC BLOOD PRESSURE: 122 MMHG | HEIGHT: 73 IN

## 2024-07-15 DIAGNOSIS — I72.4 ANEURYSM OF ARTERY OF LOWER EXTREMITY: ICD-10-CM

## 2024-07-15 DIAGNOSIS — I71.43 INFRARENAL ABDOMINAL AORTIC ANEURYSM (AAA) WITHOUT RUPTURE: Primary | ICD-10-CM

## 2024-07-15 DIAGNOSIS — I77.810 DILATED AORTIC ROOT: ICD-10-CM

## 2024-07-15 LAB
ABDOMINAL AORTA AORTIC ANASTOMOSIS PSV: 47 CM/S
ABDOMINAL AORTA LEFT DIST ANASTOMOSIS PSV: 97 CM/S
ABDOMINAL AORTA LEFT LIMB GRAFT PSV: 67 CM/S
ABDOMINAL AORTA RIGHT DIST ANASTOMOSIS PSV: 206 CM/S
ABDOMINAL AORTA RIGHT LIMB GRAFT PSV: 90 CM/S
ABDOMINAL MID AORTA AP: 2.71 CM
ABDOMINAL MID AORTA VEL: 40 CM/S
ABDOMINAL PROX AORTA AP: 2.3 CM
ABDOMINAL PROX AORTA VEL: 47 CM/S
BH CV VAS ABDOMINAL AO MID GRAFT BODY PSV: 40 CM/S
BH CV VAS ABDOMINAL AO RESIDUAL LUMEN AP MEASURE: 5.49 CM
BH CV VAS ABDOMINAL AO RESIDUAL LUMEN TRANS MEASURE: 5.79 CM

## 2024-07-15 PROCEDURE — 93978 VASCULAR STUDY: CPT

## 2024-07-15 NOTE — PROGRESS NOTES
Chief Complaint  Follow-up (6mo follow up with ASG)    Subjective        Juan James presents to [facility]  HPI   Juan James is a 78 y.o. male that has been followed in our office by Dr. Patricia for an abdominal aortic aneurysm.  In 2021, he had an aortic stent graft.  He returns today in follow-up along with an aortic duplex. He   reports he  has been doing well without hospitalizations or surgeries. He  denies any worsening abdominal pain, back pain, or pain that radiates to his groin. He denies any claudication symptoms, rest pain, or tissue loss.  He also has a thoracic dilation.  He had serial CTs for this.    Review of Systems   Constitutional:  Negative for fever.   Eyes:  Negative for visual disturbance.   Cardiovascular:  Negative for leg swelling.   Gastrointestinal:  Negative for abdominal pain.   Musculoskeletal:  Negative for back pain.   Skin:  Negative for color change, pallor and wound.   Neurological:  Negative for dizziness, facial asymmetry, speech difficulty and weakness.        Juan James  reports that he quit smoking about 15 years ago. His smoking use included cigarettes, pipe, and cigars. He started smoking about 60 years ago. He has a 90 pack-year smoking history. He has quit using smokeless tobacco..        Objective   Vital Signs:  Vitals:    07/15/24 0805   BP: 122/80      Body mass index is 28.11 kg/m².           Physical Exam  Vitals reviewed.   Constitutional:       Appearance: Normal appearance.   HENT:      Head: Normocephalic.   Cardiovascular:      Rate and Rhythm: Normal rate and regular rhythm.      Pulses:           Dorsalis pedis pulses are 3+ on the right side and 3+ on the left side.        Posterior tibial pulses are 3+ on the right side and 3+ on the left side.   Pulmonary:      Effort: Pulmonary effort is normal.   Skin:     General: Skin is warm.   Neurological:      General: No focal deficit present.      Mental Status:  He is alert and oriented to person, place, and time.   Psychiatric:         Mood and Affect: Mood normal.          Result Review :      Previous aortic duplex: 5.48 x 5.43 cm    Aortic duplex done today: Duplex Aorta IVC Iliac Graft Complete CAR (07/15/2024 07:57)     CT Chest With Contrast Diagnostic (01/31/2024 14:01)     CT Abdomen With Contrast (11/19/2023 11:31)                     Assessment and Plan     Diagnoses and all orders for this visit:    1. Infrarenal abdominal aortic aneurysm (AAA) without rupture (Primary)  -     Duplex Aorta IVC Iliac Graft Complete CAR; Future    2. Dilated aortic root        Patient presents today for follow up of his abdominal aortic aneurysm.  This shows that is patent with no endoleak.  Size remains at 5.7 cm similar to the appearance on his CT of the abdomen in 2023.  We discussed adequate blood pressure control.  He is on a statin for cholesterol control.  We discussed his thoracic dilation.  This is in his descending aorta and aortic root.  I have offered to refer him to cardiothoracic surgery, though he refuses at this time.  His imaging has been stable.  We did discuss a referral if it begins to grow and he is agreeable.  He  will return in 6 months along with aortic duplex.            Follow Up     Return in about 6 months (around 1/15/2025) for aortic duplex.  Patient was given instructions and counseling regarding his condition or for health maintenance advice. Please see specific information pulled into the AVS if appropriate.     AARON Hinson

## 2024-07-25 DIAGNOSIS — E78.2 MIXED HYPERLIPIDEMIA: ICD-10-CM

## 2024-07-25 RX ORDER — ROSUVASTATIN CALCIUM 40 MG/1
40 TABLET, COATED ORAL DAILY
Qty: 90 TABLET | Refills: 0 | Status: SHIPPED | OUTPATIENT
Start: 2024-07-25

## 2024-07-29 DIAGNOSIS — J43.9 PULMONARY EMPHYSEMA, UNSPECIFIED EMPHYSEMA TYPE: Primary | ICD-10-CM

## 2024-07-31 ENCOUNTER — HOSPITAL ENCOUNTER (OUTPATIENT)
Facility: HOSPITAL | Age: 79
Discharge: HOME | End: 2024-07-31
Admitting: FAMILY MEDICINE
Payer: MEDICARE

## 2024-07-31 VITALS
DIASTOLIC BLOOD PRESSURE: 79 MMHG | OXYGEN SATURATION: 94 % | SYSTOLIC BLOOD PRESSURE: 160 MMHG | RESPIRATION RATE: 24 BRPM | HEART RATE: 58 BPM

## 2024-07-31 DIAGNOSIS — R93.89 ABNORMAL CT SCAN: ICD-10-CM

## 2024-07-31 PROCEDURE — 25510000001 IOPAMIDOL 61 % SOLUTION: Performed by: FAMILY MEDICINE

## 2024-07-31 PROCEDURE — 71260 CT THORAX DX C+: CPT

## 2024-07-31 RX ADMIN — IOPAMIDOL 100 ML: 612 INJECTION, SOLUTION INTRAVENOUS at 08:59

## 2024-07-31 NOTE — NURSING NOTE
Patient left leg is swollen, red and weeping fluid. Patient reports it has been this way for months but this is the worst it has ever been. I cleaned the wound draining blood on his lower left shin area with NS then dressed it with polysporin, adaptic non stick dressing and coban dressing.   When patient laid on the CT table, he complained of SOA. Patient reported it was a panic attack like feeling and he just felt SOA suddenly but was better after a few minutes with sitting up. I told the patient he needed to go to the ED for evaluation and he refuses at present. He reports someone looked at his leg months ago.

## 2024-08-01 ENCOUNTER — PATIENT MESSAGE (OUTPATIENT)
Dept: FAMILY MEDICINE CLINIC | Facility: CLINIC | Age: 79
End: 2024-08-01
Payer: MEDICARE

## 2024-08-04 DIAGNOSIS — R93.89 ABNORMAL CT OF THE CHEST: Primary | ICD-10-CM

## 2024-08-04 PROBLEM — K74.69 OTHER CIRRHOSIS OF LIVER: Status: ACTIVE | Noted: 2024-08-04

## 2024-08-05 DIAGNOSIS — I10 ESSENTIAL HYPERTENSION: ICD-10-CM

## 2024-08-05 NOTE — TELEPHONE ENCOUNTER
Pt states cardio told him to go somewhere else, he refuses to go to the ED . He said he states that his leg is weeping so badly that it is soaking his pants. I suggested he go to the ED again pt refused and stated he would see AP at his next appt

## 2024-08-06 ENCOUNTER — TELEPHONE (OUTPATIENT)
Dept: FAMILY MEDICINE CLINIC | Facility: CLINIC | Age: 79
End: 2024-08-06
Payer: MEDICARE

## 2024-08-06 RX ORDER — HYDROCHLOROTHIAZIDE 25 MG/1
25 TABLET ORAL DAILY
Qty: 90 TABLET | Refills: 0 | Status: SHIPPED | OUTPATIENT
Start: 2024-08-06

## 2024-08-06 NOTE — TELEPHONE ENCOUNTER
Ok to relay     LMTCB    Your CT shows you still have emphysema.  Recommend continued follow up in 6 months.  Also, follow up with lung doctor I am setting you up with.  You have cirrhotic liver.  Follow up with your GI doctor regarding this.  All else is stable.

## 2024-08-09 ENCOUNTER — TRANSCRIBE ORDERS (OUTPATIENT)
Dept: ADMINISTRATIVE | Facility: HOSPITAL | Age: 79
End: 2024-08-09
Payer: MEDICARE

## 2024-08-09 DIAGNOSIS — I26.99: Primary | ICD-10-CM

## 2024-08-09 DIAGNOSIS — R06.00 DYSPNEA, UNSPECIFIED TYPE: Primary | ICD-10-CM

## 2024-08-09 DIAGNOSIS — U07.1: Primary | ICD-10-CM

## 2024-08-09 DIAGNOSIS — J44.9 CHRONIC OBSTRUCTIVE PULMONARY DISEASE, UNSPECIFIED COPD TYPE: Primary | ICD-10-CM

## 2024-08-14 ENCOUNTER — OFFICE VISIT (OUTPATIENT)
Dept: GASTROENTEROLOGY | Facility: CLINIC | Age: 79
End: 2024-08-14
Payer: MEDICARE

## 2024-08-14 VITALS
DIASTOLIC BLOOD PRESSURE: 66 MMHG | HEIGHT: 73 IN | BODY MASS INDEX: 28.69 KG/M2 | HEART RATE: 92 BPM | SYSTOLIC BLOOD PRESSURE: 125 MMHG | WEIGHT: 216.5 LBS

## 2024-08-14 DIAGNOSIS — K70.31 ALCOHOLIC CIRRHOSIS OF LIVER WITH ASCITES: Primary | ICD-10-CM

## 2024-08-14 DIAGNOSIS — Z79.899 HIGH RISK MEDICATION USE: ICD-10-CM

## 2024-08-14 DIAGNOSIS — K74.69 OTHER CIRRHOSIS OF LIVER: ICD-10-CM

## 2024-08-14 RX ORDER — ALBUTEROL SULFATE 90 UG/1
AEROSOL, METERED RESPIRATORY (INHALATION)
COMMUNITY
Start: 2024-08-01

## 2024-08-14 RX ORDER — FLUTICASONE FUROATE, UMECLIDINIUM BROMIDE AND VILANTEROL TRIFENATATE 100; 62.5; 25 UG/1; UG/1; UG/1
1 POWDER RESPIRATORY (INHALATION) DAILY
COMMUNITY
Start: 2024-08-01

## 2024-08-14 RX ORDER — SPIRONOLACTONE 50 MG/1
50 TABLET, FILM COATED ORAL DAILY
Qty: 30 TABLET | Refills: 1 | Status: SHIPPED | OUTPATIENT
Start: 2024-08-14

## 2024-08-14 NOTE — PROGRESS NOTES
"Chief Complaint  Other cirrhosis of liver    Subjective          History of Present Illness    Juan James is a  78 y.o. male presents for self-referral for cirrhosis of the liver noted incidentally on CT scan. He had colonoscopy as outpatient with Dr. Méndez in 2021.    2/5/2024 CT of the chest for shortness of air showed incidental finding of cirrhotic liver.  4/15/2024 labs showed CMP with creatinine 1.4, BUN 35, normal liver functions and albumin.  CBC with hemoglobin 11.1, normocytic normochromic and normal platelets although he did show thrombocytopenia in 2021.  History of Daily alcohol use 6 drinks/day for 25 years. Now only occasional ETOH use.     He reports lower extremity edema and abdominal distension ongoing for at least a year. He is on lasix 40mg TID and hydrochlorothiazide 25mg daily through his PCP.  He reports weeping fluid from his lower extremities.    He has a GI history of colon polyps as well as duodenal ulcer.  He is not currently on an antiacid.    2/17/2021 colonoscopy with Dr. Méndez showed 2 polyps, diverticulosis.  Pathology with hyperplastic polyp.  Recall 3 years.    Colonoscopy 10/19 (Dr PATRICIA Alves) - multiple adenomas, 2.6 cm rectal polyp removed piecemeal and path consistent with tubulovillous adenoma     History of emphysema. He has afib on Pradaxa followed by Dr. Mo. Quit smoking 15 years ago. No hx of IVDU. Diagnosed with MG and follows with neurology.     Objective   Vital Signs:   /66 (BP Location: Left arm, Patient Position: Sitting, Cuff Size: Adult)   Pulse 92   Ht 185.4 cm (72.99\")   Wt 98.2 kg (216 lb 8 oz)   BMI 28.57 kg/m²       Physical Exam  Vitals reviewed.   Constitutional:       General: He is awake. He is not in acute distress.     Appearance: Normal appearance. He is well-developed and well-groomed.   HENT:      Head: Normocephalic.   Pulmonary:      Effort: Pulmonary effort is normal. No respiratory distress.   Abdominal:      General: Abdomen " is protuberant. Bowel sounds are normal. There is distension.      Palpations: Abdomen is soft. There is hepatomegaly. There is no mass.      Tenderness: There is generalized abdominal tenderness. There is no guarding.   Skin:     Coloration: Skin is not pale.   Neurological:      Mental Status: He is alert and oriented to person, place, and time.      Gait: Gait is intact.   Psychiatric:         Mood and Affect: Mood and affect normal.         Speech: Speech normal.         Behavior: Behavior normal. Behavior is cooperative.         Judgment: Judgment normal.          Result Review :             Assessment and Plan    Diagnoses and all orders for this visit:    1. Alcoholic cirrhosis of liver with ascites (Primary)  -     CT Abdomen With & Without Contrast  -     Hepatitis Panel, Acute  -     Anti-Smooth Muscle Antibody Titer  -     Mitochondrial Antibodies, M2  -     Ceruloplasmin  -     Alpha - 1 - Antitrypsin  -     IgG  -     Gamma GT  -     Celiac Ab tTG DGP TIgA  -     SIMON Direct Reflex to 11 Biomarker  -     AFP Tumor Marker  -     Protime-INR  -     Comprehensive Metabolic Panel  -     CBC & Differential  -     Basic Metabolic Panel; Future    2. Other cirrhosis of liver  -     Hepatitis Panel, Acute    3. High risk medication use  -     Basic Metabolic Panel; Future    Other orders  -     spironolactone (Aldactone) 50 MG tablet; Take 1 tablet by mouth Daily.  Dispense: 30 tablet; Refill: 1    Continue furosemide 40 mg 3 times daily, stop hydrochlorozide. Start spironolactone 50 mg.  Starting low since patient apparently has history of hyperkalemia of unknown source.  Currently not on potassium and potassium level recently was normal despite being on the 2 diuretics above. Recheck BMP next week. Will make further adjustments to diuretics next week based on labs.     Likely cirrhosis is related to previous alcohol use.  Recommended no further alcohol use even on occasion.. Recommend 2g or less sodium intake.   He is drinking electrolytes which I recommend he dilute with water. He works outside a lot so there is a concern for dehydration.     Check labs above and CT liver further evaluation.     He is due for colonoscopy and will also need EGD for variceal evaluation.  We will get him scheduled for this once I can get his ascites better controlled.    Follow Up   Return in about 1 week (around 8/21/2024) for lab only and follow 9/3/24 at 3pm.    Dragon dictation used throughout this note.     Asia Hampton PA-C

## 2024-08-15 ENCOUNTER — TELEPHONE (OUTPATIENT)
Dept: GASTROENTEROLOGY | Facility: CLINIC | Age: 79
End: 2024-08-15
Payer: MEDICARE

## 2024-08-15 DIAGNOSIS — K70.31 ALCOHOLIC CIRRHOSIS OF LIVER WITH ASCITES: ICD-10-CM

## 2024-08-15 DIAGNOSIS — Z79.899 HIGH RISK MEDICATION USE: ICD-10-CM

## 2024-08-15 LAB
A1AT SERPL-MCNC: 176 MG/DL (ref 101–187)
AFP-TM SERPL-MCNC: 2.6 NG/ML (ref 0–8.4)
ALBUMIN SERPL-MCNC: 3.9 G/DL (ref 3.8–4.8)
ALP SERPL-CCNC: 74 IU/L (ref 44–121)
ALT SERPL-CCNC: 19 IU/L (ref 0–44)
ANA SER QL: NEGATIVE
AST SERPL-CCNC: 25 IU/L (ref 0–40)
BASOPHILS # BLD AUTO: 0 X10E3/UL (ref 0–0.2)
BASOPHILS NFR BLD AUTO: 0 %
BILIRUB SERPL-MCNC: 0.5 MG/DL (ref 0–1.2)
BUN SERPL-MCNC: 49 MG/DL (ref 8–27)
BUN/CREAT SERPL: 25 (ref 10–24)
CALCIUM SERPL-MCNC: 9 MG/DL (ref 8.6–10.2)
CERULOPLASMIN SERPL-MCNC: 30.2 MG/DL (ref 16–31)
CHLORIDE SERPL-SCNC: 96 MMOL/L (ref 96–106)
CO2 SERPL-SCNC: 27 MMOL/L (ref 20–29)
CREAT SERPL-MCNC: 1.96 MG/DL (ref 0.76–1.27)
EGFRCR SERPLBLD CKD-EPI 2021: 35 ML/MIN/1.73
EOSINOPHIL # BLD AUTO: 0 X10E3/UL (ref 0–0.4)
EOSINOPHIL NFR BLD AUTO: 0 %
ERYTHROCYTE [DISTWIDTH] IN BLOOD BY AUTOMATED COUNT: 17 % (ref 11.6–15.4)
GGT SERPL-CCNC: 110 IU/L (ref 0–65)
GLIADIN PEPTIDE IGA SER-ACNC: 9 UNITS (ref 0–19)
GLIADIN PEPTIDE IGG SER-ACNC: 4 UNITS (ref 0–19)
GLOBULIN SER CALC-MCNC: 2.5 G/DL (ref 1.5–4.5)
GLUCOSE SERPL-MCNC: 139 MG/DL (ref 70–99)
HAV IGM SERPL QL IA: NEGATIVE
HBV CORE IGM SERPL QL IA: NEGATIVE
HBV SURFACE AG SERPL QL IA: NEGATIVE
HCT VFR BLD AUTO: 28.5 % (ref 37.5–51)
HCV AB SERPL QL IA: NORMAL
HCV IGG SERPL QL IA: NON REACTIVE
HGB BLD-MCNC: 8.4 G/DL (ref 13–17.7)
IGA SERPL-MCNC: 394 MG/DL (ref 61–437)
IGG SERPL-MCNC: 885 MG/DL (ref 603–1613)
IMM GRANULOCYTES # BLD AUTO: 0.1 X10E3/UL (ref 0–0.1)
IMM GRANULOCYTES NFR BLD AUTO: 1 %
INR PPP: 1.1 (ref 0.9–1.2)
LYMPHOCYTES # BLD AUTO: 0.5 X10E3/UL (ref 0.7–3.1)
LYMPHOCYTES NFR BLD AUTO: 6 %
MCH RBC QN AUTO: 26.5 PG (ref 26.6–33)
MCHC RBC AUTO-ENTMCNC: 29.5 G/DL (ref 31.5–35.7)
MCV RBC AUTO: 90 FL (ref 79–97)
MITOCHONDRIA M2 IGG SER-ACNC: <20 UNITS (ref 0–20)
MONOCYTES # BLD AUTO: 0.7 X10E3/UL (ref 0.1–0.9)
MONOCYTES NFR BLD AUTO: 8 %
NEUTROPHILS # BLD AUTO: 7.9 X10E3/UL (ref 1.4–7)
NEUTROPHILS NFR BLD AUTO: 85 %
PLATELET # BLD AUTO: 140 X10E3/UL (ref 150–450)
POTASSIUM SERPL-SCNC: 3.5 MMOL/L (ref 3.5–5.2)
PROT SERPL-MCNC: 6.4 G/DL (ref 6–8.5)
PROTHROMBIN TIME: 12.1 SEC (ref 9.1–12)
RBC # BLD AUTO: 3.17 X10E6/UL (ref 4.14–5.8)
SMA IGG SER-ACNC: 8 UNITS (ref 0–19)
SODIUM SERPL-SCNC: 138 MMOL/L (ref 134–144)
TTG IGA SER-ACNC: <2 U/ML (ref 0–3)
TTG IGG SER-ACNC: <2 U/ML (ref 0–5)
WBC # BLD AUTO: 9.3 X10E3/UL (ref 3.4–10.8)

## 2024-08-15 NOTE — TELEPHONE ENCOUNTER
Hub staff attempted to follow warm transfer process and was unsuccessful     Caller: Juan James    Relationship to patient: Self    Best call back number: [phone]    Patient is needing: PT WAS SEEN YESTERDAY BY GRAYSON AND SHE ORDERED SOME LABS FOR PT. PT IS CALLING BECAUSE [facility] IS NEEDING THE ORDERS. PT WAS ASKING IF THE ORDERS CAN BE EMAILED TO HIM.     EMAIL -     [email or web address]       PLEASE CALL AND ADVISE. IT'S OK TO LVM.

## 2024-08-15 NOTE — TELEPHONE ENCOUNTER
Called pt , he states he needs his lab order to have labs drawn next week in [place].  Advised will send via [employer]. Sent lab order via his [employer] DDt.

## 2024-08-22 ENCOUNTER — OFFICE VISIT (OUTPATIENT)
Dept: SPORTS MEDICINE | Facility: CLINIC | Age: 79
End: 2024-08-22
Payer: MEDICARE

## 2024-08-22 VITALS
OXYGEN SATURATION: 98 % | HEIGHT: 73 IN | DIASTOLIC BLOOD PRESSURE: 68 MMHG | BODY MASS INDEX: 28.63 KG/M2 | HEART RATE: 58 BPM | SYSTOLIC BLOOD PRESSURE: 128 MMHG | WEIGHT: 216 LBS | TEMPERATURE: 96.4 F

## 2024-08-22 DIAGNOSIS — G89.29 CHRONIC SACROILIAC PAIN: Primary | ICD-10-CM

## 2024-08-22 DIAGNOSIS — M53.3 CHRONIC SACROILIAC PAIN: Primary | ICD-10-CM

## 2024-08-22 DIAGNOSIS — M51.36 DDD (DEGENERATIVE DISC DISEASE), LUMBAR: ICD-10-CM

## 2024-08-22 LAB
BUN SERPL-MCNC: 39 MG/DL (ref 8–27)
BUN/CREAT SERPL: 25 (ref 10–24)
CALCIUM SERPL-MCNC: 8.8 MG/DL (ref 8.6–10.2)
CHLORIDE SERPL-SCNC: 94 MMOL/L (ref 96–106)
CO2 SERPL-SCNC: 24 MMOL/L (ref 20–29)
CREAT SERPL-MCNC: 1.54 MG/DL (ref 0.76–1.27)
EGFRCR SERPLBLD CKD-EPI 2021: 46 ML/MIN/1.73
GLUCOSE SERPL-MCNC: 301 MG/DL (ref 70–99)
POTASSIUM SERPL-SCNC: 4.2 MMOL/L (ref 3.5–5.2)
SODIUM SERPL-SCNC: 135 MMOL/L (ref 134–144)

## 2024-08-22 RX ORDER — TRIAMCINOLONE ACETONIDE 40 MG/ML
80 INJECTION, SUSPENSION INTRA-ARTICULAR; INTRAMUSCULAR ONCE
Status: COMPLETED | OUTPATIENT
Start: 2024-08-22 | End: 2024-08-22

## 2024-08-22 RX ADMIN — TRIAMCINOLONE ACETONIDE 80 MG: 40 INJECTION, SUSPENSION INTRA-ARTICULAR; INTRAMUSCULAR at 15:44

## 2024-08-22 NOTE — PROGRESS NOTES
"Juan is a 78 y.o. year old male     Chief Complaint   Patient presents with    Back Pain     LUMBAR SPINE- Patient is here to follow up on his lower back pian, patient is interested in a repeat steroid injection.       History of Present Illness  HPI   Follow-up low back pain.  Since his last visit his pain is unfortunately worsened across the low back on both sides with radiation down both legs.      Review of Systems    /68 (BP Location: Left arm, Patient Position: Sitting, Cuff Size: Adult)   Pulse 58   Temp 96.4 °F (35.8 °C) (Temporal)   Ht 185.4 cm (72.99\")   Wt 98 kg (216 lb)   SpO2 98%   BMI 28.51 kg/m²      Physical Exam    Vital signs reviewed.   General: No acute distress.      MSK Exam:  Ortho Exam  Tenderness outpatient proximal lower lumbar spine and bilateral sacroiliac joints.  Sensation intact throughout both lower extremities.  There is intact symmetric strength with hip flexion/extension, knee flexion/extension, ankle dorsiflexion/plantarflexion.  No foot drop.  No clonus.      Procedures   Ultrasound-Guided Sacroiliac Joint Injection Procedure Note    Bilateral sacroiliac joint injection was discussed with the patient in detail, including indication, risks, benefits, and alternatives. Verbal consent was given for the procedure. Injection was performed by physician.  Injection site was identified by ultrasound examination, then cleaned with Betadine and alcohol swabs. Prior to needle insertion, ethyl chloride spray was used for surface anesthesia. Sterile technique was used. Ultrasound guidance was indicated for injection accuracy.  A 22-gauge, 3.5\" spinal needle was guided to the joint under continuous direct ultrasound visualization. Injectate was seen flowing underneath the superficial sacroiliac ligaments and passed without difficulty. The needle was removed and a simple bandage was applied. The procedure was tolerated well without difficulty.    Injection mixture:  1% lidocaine " without epinephrine: 2 mL  40 mg/mL triamcinolone acetonide: 1 mL       Diagnoses and all orders for this visit:    Chronic sacroiliac pain  -     triamcinolone acetonide (KENALOG-40) injection 80 mg    DDD (degenerative disc disease), lumbar    Discussed options and agreed on repeat sacroiliac joint injection bilaterally.  This will address some of his known pathology, the question is if any of his current symptoms are indicating new pathology in his lumbar spine.  If his pain is incompletely delineated after this, we will follow-up for further treatment.      EMR Dragon/Transcription disclaimer:    Much of this encounter note is an electronic transcription/translation of spoken language to printed text.  The electronic translation of spoken language may permit erroneous, or at times, nonsensical words or phrases to be inadvertently transcribed.  Although I have reviewed the note for such errors some may still exist.

## 2024-08-23 ENCOUNTER — HOSPITAL ENCOUNTER (OUTPATIENT)
Dept: GENERAL RADIOLOGY | Facility: HOSPITAL | Age: 79
Discharge: HOME | End: 2024-08-23
Payer: MEDICARE

## 2024-08-23 ENCOUNTER — HOSPITAL ENCOUNTER (OUTPATIENT)
Dept: NUCLEAR MEDICINE | Facility: HOSPITAL | Age: 79
Discharge: HOME | End: 2024-08-23
Payer: MEDICARE

## 2024-08-23 ENCOUNTER — HOSPITAL ENCOUNTER (OUTPATIENT)
Dept: CARDIOLOGY | Facility: HOSPITAL | Age: 79
Discharge: HOME | End: 2024-08-23
Payer: MEDICARE

## 2024-08-23 VITALS
HEIGHT: 72 IN | SYSTOLIC BLOOD PRESSURE: 135 MMHG | DIASTOLIC BLOOD PRESSURE: 73 MMHG | WEIGHT: 216 LBS | BODY MASS INDEX: 29.26 KG/M2

## 2024-08-23 DIAGNOSIS — R06.00 DYSPNEA, UNSPECIFIED TYPE: ICD-10-CM

## 2024-08-23 DIAGNOSIS — I26.99: ICD-10-CM

## 2024-08-23 DIAGNOSIS — Z86.711 HISTORY OF PULMONARY EMBOLISM: ICD-10-CM

## 2024-08-23 DIAGNOSIS — U07.1: ICD-10-CM

## 2024-08-23 LAB
AORTIC DIMENSIONLESS INDEX: 0.6 (DI)
BH CV ECHO MEAS - ACS: 2.42 CM
BH CV ECHO MEAS - AI P1/2T: 518.2 MSEC
BH CV ECHO MEAS - AO MAX PG: 8 MMHG
BH CV ECHO MEAS - AO MEAN PG: 4.1 MMHG
BH CV ECHO MEAS - AO ROOT DIAM: 4.3 CM
BH CV ECHO MEAS - AO V2 MAX: 141.6 CM/SEC
BH CV ECHO MEAS - AO V2 VTI: 28.3 CM
BH CV ECHO MEAS - AVA(I,D): 2.6 CM2
BH CV ECHO MEAS - EDV(CUBED): 137.1 ML
BH CV ECHO MEAS - EDV(MOD-SP2): 122 ML
BH CV ECHO MEAS - EDV(MOD-SP4): 125 ML
BH CV ECHO MEAS - EF(MOD-BP): 71.9 %
BH CV ECHO MEAS - EF(MOD-SP2): 73.8 %
BH CV ECHO MEAS - EF(MOD-SP4): 68 %
BH CV ECHO MEAS - ESV(CUBED): 30.5 ML
BH CV ECHO MEAS - ESV(MOD-SP2): 32 ML
BH CV ECHO MEAS - ESV(MOD-SP4): 40 ML
BH CV ECHO MEAS - FS: 39.4 %
BH CV ECHO MEAS - IVS/LVPW: 1.18 CM
BH CV ECHO MEAS - IVSD: 1.24 CM
BH CV ECHO MEAS - LAT PEAK E' VEL: 11.2 CM/SEC
BH CV ECHO MEAS - LV DIASTOLIC VOL/BSA (35-75): 56.8 CM2
BH CV ECHO MEAS - LV MASS(C)D: 229.9 GRAMS
BH CV ECHO MEAS - LV MAX PG: 3.3 MMHG
BH CV ECHO MEAS - LV MEAN PG: 1.56 MMHG
BH CV ECHO MEAS - LV SYSTOLIC VOL/BSA (12-30): 18.2 CM2
BH CV ECHO MEAS - LV V1 MAX: 91.1 CM/SEC
BH CV ECHO MEAS - LV V1 VTI: 18 CM
BH CV ECHO MEAS - LVIDD: 5.2 CM
BH CV ECHO MEAS - LVIDS: 3.1 CM
BH CV ECHO MEAS - LVOT AREA: 4.2 CM2
BH CV ECHO MEAS - LVOT DIAM: 2.3 CM
BH CV ECHO MEAS - LVPWD: 1.05 CM
BH CV ECHO MEAS - MED PEAK E' VEL: 10.3 CM/SEC
BH CV ECHO MEAS - MR MAX PG: 117.9 MMHG
BH CV ECHO MEAS - MR MAX VEL: 542.9 CM/SEC
BH CV ECHO MEAS - MV DEC SLOPE: 586.6 CM/SEC2
BH CV ECHO MEAS - MV DEC TIME: 0.13 SEC
BH CV ECHO MEAS - MV E MAX VEL: 153 CM/SEC
BH CV ECHO MEAS - MV MAX PG: 13.4 MMHG
BH CV ECHO MEAS - MV MEAN PG: 2.7 MMHG
BH CV ECHO MEAS - MV P1/2T: 92.8 MSEC
BH CV ECHO MEAS - MV V2 VTI: 44.2 CM
BH CV ECHO MEAS - MVA(P1/2T): 2.37 CM2
BH CV ECHO MEAS - MVA(VTI): 1.69 CM2
BH CV ECHO MEAS - PA ACC TIME: 0.06 SEC
BH CV ECHO MEAS - PA V2 MAX: 104.2 CM/SEC
BH CV ECHO MEAS - RAP SYSTOLE: 15 MMHG
BH CV ECHO MEAS - RV MAX PG: 2.6 MMHG
BH CV ECHO MEAS - RV V1 MAX: 81 CM/SEC
BH CV ECHO MEAS - RV V1 VTI: 14.6 CM
BH CV ECHO MEAS - RVSP: 62 MMHG
BH CV ECHO MEAS - SV(LVOT): 74.6 ML
BH CV ECHO MEAS - SV(MOD-SP2): 90 ML
BH CV ECHO MEAS - SV(MOD-SP4): 85 ML
BH CV ECHO MEAS - SVI(LVOT): 33.9 ML/M2
BH CV ECHO MEAS - SVI(MOD-SP2): 40.9 ML/M2
BH CV ECHO MEAS - SVI(MOD-SP4): 38.6 ML/M2
BH CV ECHO MEAS - TAPSE (>1.6): 2.08 CM
BH CV ECHO MEAS - TR MAX PG: 47.1 MMHG
BH CV ECHO MEAS - TR MAX VEL: 343.1 CM/SEC
BH CV ECHO MEASUREMENTS AVERAGE E/E' RATIO: 14.23
BH CV XLRA - RV BASE: 4.5 CM
BH CV XLRA - RV LENGTH: 7.9 CM
BH CV XLRA - RV MID: 3.1 CM
BH CV XLRA - TDI S': 12.5 CM/SEC
LEFT ATRIUM VOLUME INDEX: 41 ML/M2
SINUS: 3.9 CM
STJ: 3.6 CM

## 2024-08-23 PROCEDURE — 0 TECHNETIUM TC 99M PENTETATE INHALER: Performed by: INTERNAL MEDICINE

## 2024-08-23 PROCEDURE — 93306 TTE W/DOPPLER COMPLETE: CPT

## 2024-08-23 PROCEDURE — A9567 TECHNETIUM TC-99M AEROSOL: HCPCS | Performed by: INTERNAL MEDICINE

## 2024-08-23 PROCEDURE — 78582 LUNG VENTILAT&PERFUS IMAGING: CPT

## 2024-08-23 PROCEDURE — 0 TECHNETIUM ALBUMIN AGGREGATED: Performed by: INTERNAL MEDICINE

## 2024-08-23 PROCEDURE — A9540 TC99M MAA: HCPCS | Performed by: INTERNAL MEDICINE

## 2024-08-23 PROCEDURE — 71046 X-RAY EXAM CHEST 2 VIEWS: CPT

## 2024-08-23 PROCEDURE — 25510000001 PERFLUTREN 6.52 MG/ML SUSPENSION 2 ML VIAL: Performed by: INTERNAL MEDICINE

## 2024-08-23 RX ADMIN — KIT FOR THE PREPARATION OF TECHNETIUM TC 99M PENTETATE 1 DOSE: 20 INJECTION, POWDER, LYOPHILIZED, FOR SOLUTION INTRAVENOUS; RESPIRATORY (INHALATION) at 10:59

## 2024-08-23 RX ADMIN — KIT FOR THE PREPARATION OF TECHNETIUM TC 99M ALBUMIN AGGREGATED 1 DOSE: 2.5 INJECTION, POWDER, FOR SOLUTION INTRAVENOUS at 10:59

## 2024-08-23 RX ADMIN — SODIUM CHLORIDE 3 ML: 9 INJECTION INTRAMUSCULAR; INTRAVENOUS; SUBCUTANEOUS at 12:18

## 2024-08-26 ENCOUNTER — TELEPHONE (OUTPATIENT)
Dept: FAMILY MEDICINE CLINIC | Facility: CLINIC | Age: 79
End: 2024-08-26
Payer: MEDICARE

## 2024-08-26 NOTE — TELEPHONE ENCOUNTER
"Spoke w/Mr. James.  Informed him that we would not be transferring his care to Dr. Reardon but could provide names of offices that are taking new patients.  He stated \"it's like that, huh\".  I said yes sir.  He stated to just make sure he doesn't get scheduled with him.  He would let me know what he wanted to do.  I told him I could not accommodate that as he is his patient.  He said to cancel all his upcoming appointments with him.  I thanked him and said I would take care of that.  "

## 2024-08-26 NOTE — TELEPHONE ENCOUNTER
Caller: Juan James    Relationship: Self    Best call back number: [phone]     Who is your current provider: DR. LOZA    Is your current provider offboarding? NO    Who would you like your new provider to be: DR. THORNTON    What are your reasons for transferring care: PATIENT STATES HE WANTS TO LIVE AND DOES NOT WANT TO EXPLAIN ANY FURTHER THAN THAT.     Additional notes: PLEASE CALL PATIENT AND ADVISE IF CHANGE CAN BE MADE.

## 2024-08-30 DIAGNOSIS — R60.0 BILATERAL LOWER EXTREMITY EDEMA: ICD-10-CM

## 2024-08-30 RX ORDER — FUROSEMIDE 40 MG
40 TABLET ORAL 3 TIMES DAILY
Qty: 270 TABLET | Refills: 0 | Status: SHIPPED | OUTPATIENT
Start: 2024-08-30

## 2024-09-03 ENCOUNTER — OFFICE VISIT (OUTPATIENT)
Dept: GASTROENTEROLOGY | Facility: CLINIC | Age: 79
End: 2024-09-03
Payer: MEDICARE

## 2024-09-03 VITALS
HEIGHT: 72 IN | DIASTOLIC BLOOD PRESSURE: 89 MMHG | HEART RATE: 97 BPM | WEIGHT: 209.7 LBS | SYSTOLIC BLOOD PRESSURE: 165 MMHG | BODY MASS INDEX: 28.4 KG/M2

## 2024-09-03 DIAGNOSIS — K74.69 OTHER CIRRHOSIS OF LIVER: ICD-10-CM

## 2024-09-03 DIAGNOSIS — Z79.899 HIGH RISK MEDICATION USE: ICD-10-CM

## 2024-09-03 DIAGNOSIS — D64.9 ANEMIA, UNSPECIFIED TYPE: ICD-10-CM

## 2024-09-03 DIAGNOSIS — K70.31 ALCOHOLIC CIRRHOSIS OF LIVER WITH ASCITES: Primary | ICD-10-CM

## 2024-09-03 RX ORDER — SPIRONOLACTONE 100 MG/1
100 TABLET, FILM COATED ORAL DAILY
Qty: 30 TABLET | Refills: 1 | Status: SHIPPED | OUTPATIENT
Start: 2024-09-03

## 2024-09-03 NOTE — PROGRESS NOTES
Chief Complaint  Alcoholic cirrhosis of liver with ascites    Subjective          History of Present Illness    Juan James is a  78 y.o. male presents for follow-up on cirrhosis.  He is a patient of Dr. Méndez last seen by me on 8/14/2024.    At last visit he is reporting lower extremity edema and abdominal distention with findings of cirrhosis on CT scan.  Presumably due to 25-year history of daily alcohol use-has stopped all ETOH use.  I continued him on Lasix 40 mg 3 times daily, stopped his hydrochlorothiazide and started the spironolactone 50 mg daily.  He has a history of hyperkalemia of an unknown source so I started him off slowly.  Also recommended 2 g sodium diet.  8/21/2024 BMP showed normal potassium at 4.2, creatinine 1.54-improved from 1 week prior.      He did show a drop in his hemoglobin to 8.4 from 11.14 months prior.  We will recheck this today.  Normocytic, hypochromic.  He is on Pradaxa and Aspirin for Afib, CAD, and Aneurysm repair. He denies melena and hematochezia. He admits chills at night. Denies SOA, RUTLEDGE. He reports legs cramps that are worse since starting spironolactone, worse with walking too far as well.     He also has history of duodenal ulcer.  Not currently on antiacid.    From 9/3/2024 office note:  2/5/2024 CT of the chest for shortness of air showed incidental finding of cirrhotic liver.  4/15/2024 labs showed CMP with creatinine 1.4, BUN 35, normal liver functions and albumin.  CBC with hemoglobin 11.1, normocytic normochromic and normal platelets although he did show thrombocytopenia in 2021.      He has a GI history of colon polyps as well as duodenal ulcer.  He is not currently on an antiacid.     2/17/2021 colonoscopy with Dr. Méndez showed 2 polyps, diverticulosis.  Pathology with hyperplastic polyp.  Recall 3 years.     Colonoscopy 10/19 (Dr PATRICIA Alves) - multiple adenomas, 2.6 cm rectal polyp removed piecemeal and path consistent with tubulovillous adenoma     "  History of emphysema. He has afib on Pradaxa followed by Dr. Mo. Quit smoking 15 years ago. No hx of IVDU. Diagnosed with MG and follows with neurology.      History of Daily alcohol use 6 drinks/day for 25 years. Now only occasional ETOH use     Objective   Vital Signs:   /89 (BP Location: Left arm, Patient Position: Sitting, Cuff Size: Adult)   Pulse 97   Ht 182.9 cm (72\")   Wt 95.1 kg (209 lb 11.2 oz)   BMI 28.44 kg/m²       Physical Exam  Vitals reviewed.   Constitutional:       General: He is awake. He is not in acute distress.     Appearance: Normal appearance. He is well-developed and well-groomed.   HENT:      Head: Normocephalic.   Pulmonary:      Effort: Pulmonary effort is normal. No respiratory distress.   Musculoskeletal:      Right lower leg: Tenderness present. 2+ Pitting Edema present.      Left lower leg: Tenderness present. 2+ Pitting Edema present.   Skin:     Coloration: Skin is not pale.   Neurological:      Mental Status: He is alert and oriented to person, place, and time.      Gait: Gait is intact.   Psychiatric:         Mood and Affect: Mood and affect normal.         Speech: Speech normal.         Behavior: Behavior is cooperative.         Judgment: Judgment normal.          Result Review :             Assessment and Plan    Diagnoses and all orders for this visit:    1. Alcoholic cirrhosis of liver with ascites (Primary)  -     CBC & Differential  -     Iron Profile  -     Ferritin  -     Vitamin B12  -     Folate  -     Basic Metabolic Panel  -     Basic Metabolic Panel; Future    2. High risk medication use  -     CBC & Differential  -     Iron Profile  -     Ferritin  -     Vitamin B12  -     Folate  -     Basic Metabolic Panel  -     Basic Metabolic Panel; Future    3. Other cirrhosis of liver    4. Anemia, unspecified type  -     CBC & Differential  -     Iron Profile  -     Ferritin  -     Vitamin B12  -     Folate  -     Basic Metabolic Panel    Other orders  -     " spironolactone (Aldactone) 100 MG tablet; Take 1 tablet by mouth Daily.  Dispense: 30 tablet; Refill: 1    He has CT scan scheduled on 9/5/24. Plan for paracentesis if moderate-severe ascites shown.     Increase spironolactone to 100 mg, continue furosemide 40 mg at TID dosing. Continue low salt diet.     Recheck BMP next week--to reassess electrolytes and kidney functions with above diuretic change.    He is due for colonoscopy and will also need EGD for variceal evaluation. We will get him scheduled for this soon--recheck hemoglobin today.    Follow Up   Return in about 22 days (around 9/25/2024) for 1300.    Dragon dictation used throughout this note.     Asia Hampton PA-C

## 2024-09-04 ENCOUNTER — PATIENT MESSAGE (OUTPATIENT)
Dept: GASTROENTEROLOGY | Facility: CLINIC | Age: 79
End: 2024-09-04
Payer: MEDICARE

## 2024-09-04 DIAGNOSIS — D50.9 IRON DEFICIENCY ANEMIA, UNSPECIFIED IRON DEFICIENCY ANEMIA TYPE: ICD-10-CM

## 2024-09-04 DIAGNOSIS — K70.31 ALCOHOLIC CIRRHOSIS OF LIVER WITH ASCITES: Primary | ICD-10-CM

## 2024-09-04 LAB
BASOPHILS # BLD AUTO: 0.01 10*3/MM3 (ref 0–0.2)
BASOPHILS NFR BLD AUTO: 0.1 % (ref 0–1.5)
BUN SERPL-MCNC: 27 MG/DL (ref 8–23)
BUN/CREAT SERPL: 17.6 (ref 7–25)
CALCIUM SERPL-MCNC: 8.7 MG/DL (ref 8.6–10.5)
CHLORIDE SERPL-SCNC: 96 MMOL/L (ref 98–107)
CO2 SERPL-SCNC: 27.7 MMOL/L (ref 22–29)
CREAT SERPL-MCNC: 1.53 MG/DL (ref 0.76–1.27)
EGFRCR SERPLBLD CKD-EPI 2021: 46.8 ML/MIN/1.73
EOSINOPHIL # BLD AUTO: 0.01 10*3/MM3 (ref 0–0.4)
EOSINOPHIL NFR BLD AUTO: 0.1 % (ref 0.3–6.2)
ERYTHROCYTE [DISTWIDTH] IN BLOOD BY AUTOMATED COUNT: 16.8 % (ref 12.3–15.4)
FERRITIN SERPL-MCNC: 38.3 NG/ML (ref 30–400)
FOLATE SERPL-MCNC: >20 NG/ML (ref 4.78–24.2)
GLUCOSE SERPL-MCNC: 307 MG/DL (ref 65–99)
HCT VFR BLD AUTO: 26.2 % (ref 37.5–51)
HGB BLD-MCNC: 7.8 G/DL (ref 13–17.7)
IMM GRANULOCYTES # BLD AUTO: 0.15 10*3/MM3 (ref 0–0.05)
IMM GRANULOCYTES NFR BLD AUTO: 1.3 % (ref 0–0.5)
IRON SATN MFR SERPL: 7 % (ref 20–50)
IRON SERPL-MCNC: 31 MCG/DL (ref 59–158)
LYMPHOCYTES # BLD AUTO: 0.63 10*3/MM3 (ref 0.7–3.1)
LYMPHOCYTES NFR BLD AUTO: 5.4 % (ref 19.6–45.3)
MCH RBC QN AUTO: 25.5 PG (ref 26.6–33)
MCHC RBC AUTO-ENTMCNC: 29.8 G/DL (ref 31.5–35.7)
MCV RBC AUTO: 85.6 FL (ref 79–97)
MONOCYTES # BLD AUTO: 0.68 10*3/MM3 (ref 0.1–0.9)
MONOCYTES NFR BLD AUTO: 5.8 % (ref 5–12)
NEUTROPHILS # BLD AUTO: 10.27 10*3/MM3 (ref 1.7–7)
NEUTROPHILS NFR BLD AUTO: 87.3 % (ref 42.7–76)
NRBC BLD AUTO-RTO: 0 /100 WBC (ref 0–0.2)
PLATELET # BLD AUTO: 166 10*3/MM3 (ref 140–450)
POTASSIUM SERPL-SCNC: 5.4 MMOL/L (ref 3.5–5.2)
RBC # BLD AUTO: 3.06 10*6/MM3 (ref 4.14–5.8)
SODIUM SERPL-SCNC: 133 MMOL/L (ref 136–145)
TIBC SERPL-MCNC: 444 MCG/DL
UIBC SERPL-MCNC: 413 MCG/DL (ref 112–346)
VIT B12 SERPL-MCNC: 1930 PG/ML (ref 211–946)
WBC # BLD AUTO: 11.75 10*3/MM3 (ref 3.4–10.8)

## 2024-09-04 NOTE — PROGRESS NOTES
Can you please check with his cardiologist, Dr. Mo and his vascular surgeon,  Self: See if it is okay if he can hold his Pradaxa 3-4 days prior to EGD and colonoscopy.  He needs this ASAP given drop in hemoglobin although no overt GI blood loss.  Needs to be assessed for esophageal varices and may require banding.

## 2024-09-05 ENCOUNTER — HOSPITAL ENCOUNTER (OUTPATIENT)
Dept: PET IMAGING | Facility: HOSPITAL | Age: 79
Discharge: HOME | End: 2024-09-05
Admitting: PHYSICIAN ASSISTANT
Payer: MEDICARE

## 2024-09-05 PROCEDURE — 74170 CT ABD WO CNTRST FLWD CNTRST: CPT

## 2024-09-05 PROCEDURE — 25510000001 IOPAMIDOL 61 % SOLUTION: Performed by: PHYSICIAN ASSISTANT

## 2024-09-05 PROCEDURE — 0 DIATRIZOATE MEGLUMINE & SODIUM PER 1 ML: Performed by: PHYSICIAN ASSISTANT

## 2024-09-05 RX ORDER — CYCLOBENZAPRINE HCL 10 MG
10 TABLET ORAL NIGHTLY PRN
Qty: 30 TABLET | Refills: 0 | Status: SHIPPED | OUTPATIENT
Start: 2024-09-05

## 2024-09-05 RX ORDER — IOPAMIDOL 612 MG/ML
100 INJECTION, SOLUTION INTRAVASCULAR
Status: COMPLETED | OUTPATIENT
Start: 2024-09-05 | End: 2024-09-05

## 2024-09-05 RX ADMIN — IOPAMIDOL 100 ML: 612 INJECTION, SOLUTION INTRAVENOUS at 07:45

## 2024-09-05 RX ADMIN — DIATRIZOATE MEGLUMINE AND DIATRIZOATE SODIUM 30 ML: 660; 100 LIQUID ORAL; RECTAL at 07:45

## 2024-09-05 NOTE — TELEPHONE ENCOUNTER
From: Juan James  To: Asia Hampton  Sent: 9/4/2024 9:05 PM EDT  Subject: Flexeril    you know I am having trouble getting sleep due to severe muscle cramps.  I there anyway of getting a prescription for Cyclobenzaprine hydrochloride 10 mg?  I feel I could deal with other problems if I could get some sleep.  Thanks in advance  Keny James  1945  [phone]

## 2024-09-06 ENCOUNTER — TELEPHONE (OUTPATIENT)
Dept: GASTROENTEROLOGY | Facility: CLINIC | Age: 79
End: 2024-09-06
Payer: MEDICARE

## 2024-09-06 NOTE — TELEPHONE ENCOUNTER
Please make sure his vascular surgeon is also okay with holding Pradaxa as he does have significant vascular issues.    Once approved: Creatinine clearance mildly reduced: Estimate 55 mL/min: Okay to hold Pradaxa 3 days prior to procedure.

## 2024-09-06 NOTE — TELEPHONE ENCOUNTER
----- Message from Asia Hampton sent at 9/4/2024  5:06 PM EDT -----  Can you please check with his cardiologist, Dr. Mo and his vascular surgeon,  Self: See if it is okay if he can hold his Pradaxa 3-4 days prior to EGD and colonoscopy.  He needs this ASAP given drop in hemoglobin although no overt GI blood loss.  Needs to be assessed for esophageal varices and may require banding.

## 2024-09-09 ENCOUNTER — TELEPHONE (OUTPATIENT)
Dept: GASTROENTEROLOGY | Facility: CLINIC | Age: 79
End: 2024-09-09
Payer: MEDICARE

## 2024-09-09 PROBLEM — D50.9 IRON DEFICIENCY ANEMIA: Status: ACTIVE | Noted: 2024-09-04

## 2024-09-09 PROBLEM — K70.31 ALCOHOLIC CIRRHOSIS OF LIVER WITH ASCITES: Status: ACTIVE | Noted: 2024-09-04

## 2024-09-09 NOTE — TELEPHONE ENCOUNTER
Faxed sent to Dr. Preston's office with confirmation received. Await Dr. Preston's recommendations.

## 2024-09-09 NOTE — TELEPHONE ENCOUNTER
Email sent to dany   for COLONOSCOPY on 10/3/2024  arrive at 12:30  . Sent prep instructions to pt my chart....miralax

## 2024-09-11 ENCOUNTER — TELEPHONE (OUTPATIENT)
Dept: GASTROENTEROLOGY | Facility: CLINIC | Age: 79
End: 2024-09-11
Payer: MEDICARE

## 2024-09-11 DIAGNOSIS — N18.9 CHRONIC KIDNEY DISEASE, UNSPECIFIED CKD STAGE: Primary | ICD-10-CM

## 2024-09-11 NOTE — TELEPHONE ENCOUNTER
Reviewed entire patient's chart with Dr. Méndez including most recent labs, CT scan.  He reviewed recent echocardiogram.  Patient has left-sided heart failure and follows with Dr. Chris Mo, cardiology.  He has no ascites on the CT scan.  Dr. Méndez is concern for possible heart failure as source of fluid overload.  I called and spoke to patient and he reports his lower extremity swelling is much better.  Still with dependent edema in the soles of his feet if he sits for too long.  Still with pain in his legs, particularly with walking.  I left a message on cardiologist Dr. Mo' MA's voicemail, and also sent Dr. Mo secure chat.    Patient may need inpatient admission for further evaluation versus outpatient cardiology evaluation.  He will also need nephrology referral/consult--if he is not admitted, I will order this is outpatient.

## 2024-09-12 ENCOUNTER — TELEPHONE (OUTPATIENT)
Dept: CARDIOLOGY | Facility: CLINIC | Age: 79
End: 2024-09-12
Payer: MEDICARE

## 2024-09-12 ENCOUNTER — HOSPITAL ENCOUNTER (OUTPATIENT)
Dept: RESPIRATORY THERAPY | Facility: HOSPITAL | Age: 79
Discharge: HOME | End: 2024-09-12
Payer: MEDICARE

## 2024-09-12 DIAGNOSIS — J44.9 CHRONIC OBSTRUCTIVE PULMONARY DISEASE, UNSPECIFIED COPD TYPE: ICD-10-CM

## 2024-09-12 PROCEDURE — 94729 DIFFUSING CAPACITY: CPT

## 2024-09-12 PROCEDURE — 94726 PLETHYSMOGRAPHY LUNG VOLUMES: CPT

## 2024-09-12 PROCEDURE — 94060 EVALUATION OF WHEEZING: CPT

## 2024-09-12 RX ORDER — ALBUTEROL SULFATE 0.83 MG/ML
2.5 SOLUTION RESPIRATORY (INHALATION) ONCE AS NEEDED
Status: COMPLETED | OUTPATIENT
Start: 2024-09-12 | End: 2024-09-12

## 2024-09-12 RX ADMIN — ALBUTEROL SULFATE 2.5 MG: 2.5 SOLUTION RESPIRATORY (INHALATION) at 07:30

## 2024-09-12 NOTE — TELEPHONE ENCOUNTER
Patient's provider called and left a VM stating she would like to speak with  or his NP or PA-C in regards to this patient. She also stated she will try to reach back out to  also.    Office number is [phone].

## 2024-09-12 NOTE — TELEPHONE ENCOUNTER
Dr. Mo spoke to me and he reviewed patient's chart.  He notes severe pulmonary hypertension on the recent echocardiogram ordered by Dr. Francisco who is his pulmonologist.  Patient had additional testing today: Complete PFT - pre and postbronchodilator--awaiting results.  Likely the pulmonary hypertension is source of edema and unclear etiology of this at this time.  We cannot see Dr. Francisco's records as he is an independent office.  I called Dr. Francisco's office and left message asking him to call me back on my cell phone.  I called patient and gave him update.  Legs seem to be stable at this time.  Does report some dizziness.  I asked him to start monitoring his weight daily and keeping a log.  I will follow-up with Dr. Nolan SANTOS.

## 2024-09-13 ENCOUNTER — TELEPHONE (OUTPATIENT)
Dept: GASTROENTEROLOGY | Facility: CLINIC | Age: 79
End: 2024-09-13
Payer: MEDICARE

## 2024-09-13 NOTE — TELEPHONE ENCOUNTER
Please send referral to nephrology-any physician in Dr. Ortega's office is okay.    Please call for last office note from Dr. Francisco, pulmonology.  He follows up with him on Tuesday and it would be helpful to have that office note with plan in [facility] for myself and Dr. Mo.

## 2024-09-13 NOTE — TELEPHONE ENCOUNTER
I spoke to Dr. Mo regarding this message.  I have documented our conversation on a separate notation.  No need for Dr. Mo to call me back again.

## 2024-09-20 ENCOUNTER — TRANSCRIBE ORDERS (OUTPATIENT)
Dept: ADMINISTRATIVE | Facility: HOSPITAL | Age: 79
End: 2024-09-20
Payer: MEDICARE

## 2024-09-20 DIAGNOSIS — R59.9 ENLARGED LYMPH NODES: Primary | ICD-10-CM

## 2024-09-20 DIAGNOSIS — R91.1 LUNG NODULE: ICD-10-CM

## 2024-09-25 ENCOUNTER — OFFICE VISIT (OUTPATIENT)
Dept: GASTROENTEROLOGY | Facility: CLINIC | Age: 79
End: 2024-09-25
Payer: MEDICARE

## 2024-09-25 VITALS
TEMPERATURE: 97.8 F | WEIGHT: 210.3 LBS | SYSTOLIC BLOOD PRESSURE: 153 MMHG | BODY MASS INDEX: 27.87 KG/M2 | DIASTOLIC BLOOD PRESSURE: 88 MMHG | OXYGEN SATURATION: 94 % | HEIGHT: 73 IN | HEART RATE: 92 BPM

## 2024-09-25 DIAGNOSIS — N18.9 CHRONIC KIDNEY DISEASE, UNSPECIFIED CKD STAGE: ICD-10-CM

## 2024-09-25 DIAGNOSIS — Z79.899 HIGH RISK MEDICATION USE: ICD-10-CM

## 2024-09-25 DIAGNOSIS — D50.9 IRON DEFICIENCY ANEMIA, UNSPECIFIED IRON DEFICIENCY ANEMIA TYPE: ICD-10-CM

## 2024-09-25 DIAGNOSIS — K70.31 ALCOHOLIC CIRRHOSIS OF LIVER WITH ASCITES: Primary | ICD-10-CM

## 2024-09-25 DIAGNOSIS — R60.0 BILATERAL LOWER EXTREMITY EDEMA: ICD-10-CM

## 2024-09-26 ENCOUNTER — TELEPHONE (OUTPATIENT)
Dept: GASTROENTEROLOGY | Facility: CLINIC | Age: 79
End: 2024-09-26
Payer: MEDICARE

## 2024-09-26 LAB
APPEARANCE UR: CLEAR
BACTERIA #/AREA URNS HPF: NORMAL /[HPF]
BASOPHILS # BLD AUTO: 0.03 10*3/MM3 (ref 0–0.2)
BASOPHILS NFR BLD AUTO: 0.4 % (ref 0–1.5)
BILIRUB UR QL STRIP: NEGATIVE
BUN SERPL-MCNC: 26 MG/DL (ref 8–23)
BUN/CREAT SERPL: 19.7 (ref 7–25)
CALCIUM SERPL-MCNC: 9.5 MG/DL (ref 8.6–10.5)
CASTS URNS QL MICRO: NORMAL /LPF
CHLORIDE SERPL-SCNC: 101 MMOL/L (ref 98–107)
CO2 SERPL-SCNC: 26.4 MMOL/L (ref 22–29)
COLOR UR: YELLOW
CREAT SERPL-MCNC: 1.32 MG/DL (ref 0.76–1.27)
EGFRCR SERPLBLD CKD-EPI 2021: 55.9 ML/MIN/1.73
EOSINOPHIL # BLD AUTO: 0.02 10*3/MM3 (ref 0–0.4)
EOSINOPHIL NFR BLD AUTO: 0.2 % (ref 0.3–6.2)
EPI CELLS #/AREA URNS HPF: NORMAL /HPF (ref 0–10)
ERYTHROCYTE [DISTWIDTH] IN BLOOD BY AUTOMATED COUNT: 22.2 % (ref 12.3–15.4)
GLUCOSE SERPL-MCNC: 212 MG/DL (ref 65–99)
GLUCOSE UR QL STRIP: ABNORMAL
HCT VFR BLD AUTO: 30.6 % (ref 37.5–51)
HGB BLD-MCNC: 9.5 G/DL (ref 13–17.7)
HGB UR QL STRIP: NEGATIVE
IMM GRANULOCYTES # BLD AUTO: 0.16 10*3/MM3 (ref 0–0.05)
IMM GRANULOCYTES NFR BLD AUTO: 1.9 % (ref 0–0.5)
KETONES UR QL STRIP: NEGATIVE
LEUKOCYTE ESTERASE UR QL STRIP: NEGATIVE
LYMPHOCYTES # BLD AUTO: 0.73 10*3/MM3 (ref 0.7–3.1)
LYMPHOCYTES NFR BLD AUTO: 8.9 % (ref 19.6–45.3)
MCH RBC QN AUTO: 29.1 PG (ref 26.6–33)
MCHC RBC AUTO-ENTMCNC: 31 G/DL (ref 31.5–35.7)
MCV RBC AUTO: 93.9 FL (ref 79–97)
MICRO URNS: ABNORMAL
MONOCYTES # BLD AUTO: 0.44 10*3/MM3 (ref 0.1–0.9)
MONOCYTES NFR BLD AUTO: 5.3 % (ref 5–12)
NEUTROPHILS # BLD AUTO: 6.86 10*3/MM3 (ref 1.7–7)
NEUTROPHILS NFR BLD AUTO: 83.3 % (ref 42.7–76)
NITRITE UR QL STRIP: NEGATIVE
NRBC BLD AUTO-RTO: 0 /100 WBC (ref 0–0.2)
PH UR STRIP: 6.5 [PH] (ref 5–7.5)
PLATELET # BLD AUTO: 134 10*3/MM3 (ref 140–450)
POTASSIUM SERPL-SCNC: 5.8 MMOL/L (ref 3.5–5.2)
PROT UR QL STRIP: ABNORMAL
RBC # BLD AUTO: 3.26 10*6/MM3 (ref 4.14–5.8)
RBC #/AREA URNS HPF: NORMAL /HPF (ref 0–2)
SODIUM SERPL-SCNC: 136 MMOL/L (ref 136–145)
SP GR UR STRIP: 1.01 (ref 1–1.03)
URINALYSIS REFLEX: ABNORMAL
UROBILINOGEN UR STRIP-MCNC: 0.2 MG/DL (ref 0.2–1)
WBC # BLD AUTO: 8.24 10*3/MM3 (ref 3.4–10.8)
WBC #/AREA URNS HPF: NORMAL /HPF (ref 0–5)

## 2024-09-27 ENCOUNTER — HOSPITAL ENCOUNTER (OUTPATIENT)
Dept: PET IMAGING | Facility: HOSPITAL | Age: 79
Discharge: HOME | End: 2024-09-27
Payer: MEDICARE

## 2024-09-27 ENCOUNTER — TELEPHONE (OUTPATIENT)
Dept: GASTROENTEROLOGY | Facility: CLINIC | Age: 79
End: 2024-09-27
Payer: MEDICARE

## 2024-09-27 DIAGNOSIS — R59.9 ENLARGED LYMPH NODES: ICD-10-CM

## 2024-09-27 DIAGNOSIS — R91.1 LUNG NODULE: ICD-10-CM

## 2024-09-27 LAB — GLUCOSE BLDC GLUCOMTR-MCNC: 140 MG/DL (ref 70–130)

## 2024-09-27 PROCEDURE — 78815 PET IMAGE W/CT SKULL-THIGH: CPT

## 2024-09-27 PROCEDURE — 82948 REAGENT STRIP/BLOOD GLUCOSE: CPT

## 2024-09-27 PROCEDURE — 0 FLUDEOXYGLUCOSE F18 SOLUTION: Performed by: INTERNAL MEDICINE

## 2024-09-27 PROCEDURE — A9552 F18 FDG: HCPCS | Performed by: INTERNAL MEDICINE

## 2024-09-27 RX ADMIN — FLUDEOXYGLUCOSE F 18 1 DOSE: 200 INJECTION, SOLUTION INTRAVENOUS at 09:37

## 2024-10-02 RX ORDER — METOPROLOL SUCCINATE 50 MG/1
50 TABLET, EXTENDED RELEASE ORAL EVERY EVENING
Qty: 90 TABLET | Refills: 0 | OUTPATIENT
Start: 2024-10-02

## 2024-10-02 NOTE — TELEPHONE ENCOUNTER
LOV                  5/7/2024                    NOV                  due for ov  LAST REFILL     7/8/2024  PROTOCOL       Met

## 2024-10-08 ENCOUNTER — TELEPHONE (OUTPATIENT)
Dept: SPORTS MEDICINE | Facility: CLINIC | Age: 79
End: 2024-10-08
Payer: MEDICARE

## 2024-10-08 NOTE — TELEPHONE ENCOUNTER
"Caller: Juan James    Relationship to patient: Self    Best call back number: [phone] (home)     Patient is needing: PATIENT IS WANTING TO GET AN INJECTION ONT HE LOWER BACK, RIGHT SIDE WITH DR CARDENAS.   HE RECEIVED AN INJ INT HE LOWER BACK, LEFT SIDE ON 8/22/24.   PLEASE ADVISE PATIENT -- HE IS OPEN TO OTHER INJECTIONS IF DR CARDENAS RECOMMENDS IT -- THE INJ HE GOT IN AUGUST \"HELPED A LITTLE, NOT MUCH\"        "

## 2024-10-09 NOTE — TELEPHONE ENCOUNTER
Called patient. Verbalized to patient that he had an appointment on 11-22-24. With Dr. Harvey.  Patients last injections was performed on 8-22-24. Patient would not be due for repeat injections until his next vist on 11-22-24.   Patient understood.

## 2024-10-11 ENCOUNTER — TELEPHONE (OUTPATIENT)
Dept: NEUROSURGERY | Facility: CLINIC | Age: 79
End: 2024-10-11

## 2024-10-11 ENCOUNTER — TELEPHONE (OUTPATIENT)
Dept: SPORTS MEDICINE | Facility: CLINIC | Age: 79
End: 2024-10-11

## 2024-10-11 NOTE — TELEPHONE ENCOUNTER
PT IS CALLING IN STATING THAT HE NEEDS A FOLLOW UP APPT WITH DR GARCIA OR ANYONE ELSE THAT CAN SEE HIM. PT HAD A RECENT PET SCAN DONE. HE STATES THAT HE IS HAVING LEG PAIN AND LOWER EXTREMITY WEAKNESS. DR GARCIA FIRST AVAILABLE 12/10. PT STATES HE CAN'T WAIT THAT LONG IF HE DOES HE WONT BE ABLE TO WALK.     PT HAD PET SCAN DONE 9/27/2024 AT     PLEASE LET PT KNOW IF HE CAN SEE SOMEONE ELSE     THANK YOU

## 2024-10-11 NOTE — TELEPHONE ENCOUNTER
Caller: BOBBY HUNG    Relationship to patient: SELF    Best call back number: [phone]    Chief complaint:  PATIENT ASKED TO GIVE DR. CARDENAS THIS MESSAGE. PLEASE SEE TE ON 10/8/24. PATIENT SAYS HE CANNOT WAIT UNTIL 11/22/24 FOR INJECTION. PAIN IS ON THE RIGHT SIDE THIS TIME. PATIENT SAYS DR. CARDENAS HAS GIVEN HIM INJECTIONS SOONER IN THE PAST AND SAID TO CALL DR. CARDENAS IF HE EVER HAD A PROBLEM.    Type of visit: F/U

## 2024-10-11 NOTE — TELEPHONE ENCOUNTER
Called and spoke with patient, relayed Dr. Guo. Message patient verbalized understanding and has agreed to wait for Dr. Harvey's response since Dr. Harvey's schedule is full next week.     Dr. Harvey please advise,

## 2024-10-13 DIAGNOSIS — M51.369 DDD (DEGENERATIVE DISC DISEASE), LUMBAR: ICD-10-CM

## 2024-10-13 DIAGNOSIS — M79.604 LEG PAIN, BILATERAL: ICD-10-CM

## 2024-10-13 DIAGNOSIS — M48.062 NEUROGENIC CLAUDICATION DUE TO LUMBAR SPINAL STENOSIS: ICD-10-CM

## 2024-10-13 DIAGNOSIS — M79.605 LEG PAIN, BILATERAL: ICD-10-CM

## 2024-10-14 DIAGNOSIS — M48.062 NEUROGENIC CLAUDICATION DUE TO LUMBAR SPINAL STENOSIS: Primary | ICD-10-CM

## 2024-10-14 RX ORDER — PREGABALIN 75 MG/1
75 CAPSULE ORAL NIGHTLY
Qty: 30 CAPSULE | Refills: 0 | Status: SHIPPED | OUTPATIENT
Start: 2024-10-14

## 2024-10-14 RX ORDER — GABAPENTIN 100 MG/1
CAPSULE ORAL
Qty: 90 CAPSULE | Refills: 0 | OUTPATIENT
Start: 2024-10-14

## 2024-10-14 NOTE — TELEPHONE ENCOUNTER
Patient returned call, he is requesting a refill on his nerve pain medication. Remembers being changed to Lyrica, which is no longer under current med list.    Please advise,

## 2024-10-14 NOTE — TELEPHONE ENCOUNTER
Left patient a Vm requesting a call back,    RELAY: medication was discontinued back in April due to side effects and was replaced by Lyrica. We need to clarify which medication is patient needing a refill on.

## 2024-10-17 ENCOUNTER — OFFICE VISIT (OUTPATIENT)
Dept: GASTROENTEROLOGY | Facility: CLINIC | Age: 79
End: 2024-10-17
Payer: MEDICARE

## 2024-10-17 VITALS
BODY MASS INDEX: 27.83 KG/M2 | SYSTOLIC BLOOD PRESSURE: 149 MMHG | HEIGHT: 73 IN | HEART RATE: 88 BPM | DIASTOLIC BLOOD PRESSURE: 84 MMHG | WEIGHT: 210 LBS | TEMPERATURE: 98.1 F

## 2024-10-17 DIAGNOSIS — K70.31 ALCOHOLIC CIRRHOSIS OF LIVER WITH ASCITES: Primary | ICD-10-CM

## 2024-10-17 DIAGNOSIS — D50.9 IRON DEFICIENCY ANEMIA, UNSPECIFIED IRON DEFICIENCY ANEMIA TYPE: ICD-10-CM

## 2024-10-17 DIAGNOSIS — R60.0 BILATERAL LOWER EXTREMITY EDEMA: ICD-10-CM

## 2024-10-17 DIAGNOSIS — D64.9 ANEMIA, UNSPECIFIED TYPE: ICD-10-CM

## 2024-10-17 DIAGNOSIS — N18.9 CHRONIC KIDNEY DISEASE, UNSPECIFIED CKD STAGE: ICD-10-CM

## 2024-10-17 LAB
ALBUMIN SERPL-MCNC: 3.7 G/DL (ref 3.5–5.2)
ALBUMIN/GLOB SERPL: 1.2 G/DL
ALP SERPL-CCNC: 129 U/L (ref 39–117)
ALT SERPL-CCNC: 29 U/L (ref 1–41)
AST SERPL-CCNC: 24 U/L (ref 1–40)
BASOPHILS # BLD AUTO: 0.05 10*3/MM3 (ref 0–0.2)
BASOPHILS NFR BLD AUTO: 0.5 % (ref 0–1.5)
BILIRUB SERPL-MCNC: 0.2 MG/DL (ref 0–1.2)
BUN SERPL-MCNC: 35 MG/DL (ref 8–23)
BUN/CREAT SERPL: 26.7 (ref 7–25)
CALCIUM SERPL-MCNC: 9.2 MG/DL (ref 8.6–10.5)
CHLORIDE SERPL-SCNC: 100 MMOL/L (ref 98–107)
CO2 SERPL-SCNC: 27 MMOL/L (ref 22–29)
CREAT SERPL-MCNC: 1.31 MG/DL (ref 0.76–1.27)
EGFRCR SERPLBLD CKD-EPI 2021: 56.4 ML/MIN/1.73
EOSINOPHIL # BLD AUTO: 0.04 10*3/MM3 (ref 0–0.4)
EOSINOPHIL NFR BLD AUTO: 0.4 % (ref 0.3–6.2)
ERYTHROCYTE [DISTWIDTH] IN BLOOD BY AUTOMATED COUNT: 20.7 % (ref 12.3–15.4)
GLOBULIN SER CALC-MCNC: 3 GM/DL
GLUCOSE SERPL-MCNC: 319 MG/DL (ref 65–99)
HCT VFR BLD AUTO: 34.9 % (ref 37.5–51)
HGB BLD-MCNC: 11.1 G/DL (ref 13–17.7)
IMM GRANULOCYTES # BLD AUTO: 0.25 10*3/MM3 (ref 0–0.05)
IMM GRANULOCYTES NFR BLD AUTO: 2.5 % (ref 0–0.5)
INR PPP: 1.39 (ref 0.9–1.1)
LYMPHOCYTES # BLD AUTO: 0.64 10*3/MM3 (ref 0.7–3.1)
LYMPHOCYTES NFR BLD AUTO: 6.4 % (ref 19.6–45.3)
MCH RBC QN AUTO: 30.5 PG (ref 26.6–33)
MCHC RBC AUTO-ENTMCNC: 31.8 G/DL (ref 31.5–35.7)
MCV RBC AUTO: 95.9 FL (ref 79–97)
MONOCYTES # BLD AUTO: 0.4 10*3/MM3 (ref 0.1–0.9)
MONOCYTES NFR BLD AUTO: 4 % (ref 5–12)
NEUTROPHILS # BLD AUTO: 8.62 10*3/MM3 (ref 1.7–7)
NEUTROPHILS NFR BLD AUTO: 86.2 % (ref 42.7–76)
NRBC BLD AUTO-RTO: 0 /100 WBC (ref 0–0.2)
PLATELET # BLD AUTO: 161 10*3/MM3 (ref 140–450)
POTASSIUM SERPL-SCNC: 5.4 MMOL/L (ref 3.5–5.2)
PROT SERPL-MCNC: 6.7 G/DL (ref 6–8.5)
PROTHROMBIN TIME: 17.3 SECONDS (ref 11.7–14.2)
RBC # BLD AUTO: 3.64 10*6/MM3 (ref 4.14–5.8)
SODIUM SERPL-SCNC: 136 MMOL/L (ref 136–145)
WBC # BLD AUTO: 10 10*3/MM3 (ref 3.4–10.8)

## 2024-10-17 NOTE — TELEPHONE ENCOUNTER
Called patient advised Dr. Harvey's response. Patient states if anything opens up would like to be seen as soon as possible. Says pain is so bad cannot walk unassisted, is using a walker currently. Placed patient on in-office wait list.

## 2024-10-17 NOTE — PROGRESS NOTES
"Chief Complaint  Cirrhosis    Subjective          History of Present Illness    Juan James is a  78 y.o. male presents for follow-up on edema, cirrhosis, and leg cramps.  He is a patient of Dr. Méndez last seen by me on 9/25/2024.    He is taking spironolactone 100mg daily (stopped but can't remember why--last dose 1 week ago), Furosemide 40mg twice daily. Leg swelling is better but has noticed worsening pedal edema over the last week.  SOA is still present but severity seems to wax and wane.    He came off his statin at direction of neurologist. His leg cramping is better during the day, mostly at night at this point. He does have appt with \"back doctor\" for right leg pain shooting from his back.  He also sees sports medicine provider for sacroilitis and receives injections-can get next one until November. He continues to reduce prednsione for his MG at the direction of his neurologist.     He reports follow-up with pulmonologist, Dr. rFancisco who stated that his PET scan results were basically stable and that he was to continue his inhalers and follow-up in 3 months.  He has known history of emphysema, quit smoking 15 years ago.    9/27/2024 PET scan showed 3.5 cm focus of consolidation in the dependent left lower lobe with paraseptal emphysema-unchanged from November 2023.  New superior right upper lobe subpleural 4.8 cm focus of mildly hypermetabolic consolidation-likely infectious/inflammatory.  Review for PET scan for other findings.  No suspicious uptake    9/5/2024 CT liver protocol showed cirrhosis, no lesions or ascites.  Small varices around the spleen.  Additional lung findings as well.    From previous office note:  2/17/2021 colonoscopy with Dr. Méndez showed 2 polyps, diverticulosis. Pathology with hyperplastic polyp.  History of piecemeal TVA in 2019 colonoscopy.  Recall 3 years.     History of A-fib on Pradaxa and follows with Dr. Mo, cardiology.  History of daily alcohol use 6 drinks per " "day for 25 years.  Now only occasional.    Objective   Vital Signs:   /84   Pulse 88   Temp 98.1 °F (36.7 °C)   Ht 185.4 cm (73\")   Wt 95.3 kg (210 lb)   BMI 27.71 kg/m²       Physical Exam  Vitals reviewed.   Constitutional:       General: He is awake. He is not in acute distress.     Appearance: Normal appearance. He is well-developed and well-groomed.   HENT:      Head: Normocephalic.   Pulmonary:      Effort: Pulmonary effort is normal. No respiratory distress.   Skin:     Coloration: Skin is not pale.   Neurological:      Mental Status: He is alert and oriented to person, place, and time.      Gait: Gait is intact.   Psychiatric:         Mood and Affect: Mood and affect normal.         Speech: Speech normal.         Behavior: Behavior is cooperative.         Judgment: Judgment normal.          Result Review :             Assessment and Plan    Diagnoses and all orders for this visit:    1. Alcoholic cirrhosis of liver with ascites (Primary)  -     AFP Tumor Marker  -     Protime-INR  -     Comprehensive Metabolic Panel  -     CBC & Differential    2. Iron deficiency anemia, unspecified iron deficiency anemia type  -     AFP Tumor Marker  -     Protime-INR  -     Comprehensive Metabolic Panel  -     CBC & Differential    3. Chronic kidney disease, unspecified CKD stage  -     AFP Tumor Marker  -     Protime-INR  -     Comprehensive Metabolic Panel  -     CBC & Differential    4. Anemia, unspecified type  -     AFP Tumor Marker  -     Protime-INR  -     Comprehensive Metabolic Panel  -     CBC & Differential    5. Bilateral lower extremity edema  -     AFP Tumor Marker  -     Protime-INR  -     Comprehensive Metabolic Panel  -     CBC & Differential    Will check above labs today, some routine for his cirrhosis but will also check electrolytes, creatinine, and anemia given abnormalities previously.  He is currently on furosemide 40 mg twice daily.  Accidentally stop spironolactone 1 week ago and now " has feet swelling.  Will likely need to restart but will direct him on this tomorrow once we get the labs are back.     He does have evaluation with nephrology Nov 5th--Dr. Ortega's office for the CKD which has complicated managing his diuretics.    Pulmonologist advised him that every thing is stable and continue infhalers, scan in 3 months.  He continues to have shortness of air although that seems to be better on examination today although still with some audible SOA.  I still have concerns for pulmonary hypertension given lung V/P scan and his SOA along with the persistent edema.  He has no ascites.      He will be due for repeat HCC screening in March.    He will eventually need EGD for esophageal variceal surveillance and colonoscopy for polyp surveillance however given my continued concerns for his pulmonary status, will hold off at present.    Follow Up   Return in about 7 weeks (around 12/5/2024) for 1015.    Dragon dictation used throughout this note.     I spent 40 minutes caring for Mr. James on this date of service. This time includes time spent by me in the following activities: preparing for the visit, reviewing tests, obtaining and/or reviewing a separately obtained history, performing a medically appropriate examination and/or evaluation , counseling and educating the patient/family/caregiver, ordering medications, tests, or procedures, documenting information in the medical record, independently interpreting results and communicating that information with the patient/family/caregiver and care coordination.      Asia Hampton PA-C

## 2024-10-17 NOTE — TELEPHONE ENCOUNTER
Called patient to offer 10/22 appointment, patient not able to make it. Also states will be out of town (he hopes) week of 10/28.

## 2024-10-18 LAB — AFP-TM SERPL-MCNC: 2.7 NG/ML (ref 0–8.4)

## 2024-10-21 ENCOUNTER — TELEPHONE (OUTPATIENT)
Dept: CARDIOLOGY | Facility: CLINIC | Age: 79
End: 2024-10-21
Payer: MEDICARE

## 2024-10-21 DIAGNOSIS — E78.2 MIXED HYPERLIPIDEMIA: ICD-10-CM

## 2024-10-21 NOTE — PROGRESS NOTES
I called and gave patient results.  Continues to have high potassium despite not being on spironolactone at the time of this lab draw.  He has restarted spironolactone since his feet and legs were swelling worse.  I recommended he maintain a low potassium diet and I will mail him out some diet information.  He has consult with a kidney specialist in 2 weeks so we will recheck labs then.

## 2024-10-21 NOTE — H&P (VIEW-ONLY)
Subjective   Patient ID: Juan James is a 78 y.o. male is here today for follow-up with R leg pain    Today patient states that he has low back pain that radiates R leg pain    History of Present Illness    This patient was last here almost a year ago in December of last year.  He was having pain in both of his legs at that time.  Currently he is having pain in his back with pain in his right leg.  This is getting so bad he really cannot even walk very far.    The following portions of the patient's history were reviewed and updated as appropriate: allergies, current medications, past family history, past medical history, past social history, past surgical history, and problem list.    Review of Systems   Genitourinary:  Negative for difficulty urinating and dysuria.   Musculoskeletal:  Positive for back pain, gait problem and myalgias.   Neurological:  Positive for weakness and numbness.       I reviewed the review of systems listed by the patient and discussed by my MA    Objective     There were no vitals filed for this visit.  There is no height or weight on file to calculate BMI.    Tobacco Use: Medium Risk (10/17/2024)    Patient History     Smoking Tobacco Use: Former     Smokeless Tobacco Use: Former     Passive Exposure: Not on file          Physical Exam  Eyes:      General: Lids are normal.      Extraocular Movements: Extraocular movements intact.      Pupils: Pupils are equal, round, and reactive to light.   Neurological:      Mental Status: He is alert.      Motor: Motor strength is normal.     Coordination: Coordination is intact.      Deep Tendon Reflexes:      Reflex Scores:       Tricep reflexes are 2+ on the right side and 2+ on the left side.       Bicep reflexes are 2+ on the right side and 2+ on the left side.       Brachioradialis reflexes are 2+ on the right side and 2+ on the left side.       Patellar reflexes are 2+ on the right side and 2+ on the left side.       Achilles reflexes are  2+ on the right side and 2+ on the left side.      Neurological Exam  Mental Status  Alert. Oriented to person, place and time.    Cranial Nerves  CN II: Visual acuity is normal. Visual fields full to confrontation.  CN III, IV, VI: Extraocular movements intact bilaterally. Normal lids and orbits bilaterally. Pupils equal round and reactive to light bilaterally.  CN V: Facial sensation is normal.  CN VII: Full and symmetric facial movement.  CN IX, X: Palate elevates symmetrically. Normal gag reflex.  CN XI: Shoulder shrug strength is normal.  CN XII: Tongue midline without atrophy or fasciculations.    Motor   Strength is 5/5 throughout all four extremities.    Sensory  Sensation is intact to light touch, pinprick, vibration and proprioception in all four extremities.    Reflexes                                            Right                      Left  Brachioradialis                    2+                         2+  Biceps                                 2+                         2+  Triceps                                2+                         2+  Finger flex                           2+                         2+  Hamstring                            2+                         2+  Patellar                                2+                         2+  Achilles                                2+                         2+    Coordination    Finger-to-nose, rapid alternating movements and heel-to-shin normal bilaterally without dysmetria.    Gait  Normal casual, toe, heel and tandem gait.          Assessment & Plan   Independent Review of Radiographic Studies:      I personally reviewed the images from the following studies.    I reviewed an MRI which was done in December of last year.  This really did not show any significant compression of the nerves at any level.  He had had previous surgery at L3-4 and L4-5.    Medical Decision Making:        Told the patient I see little option but to proceed with a  lumbar myelogram.  I told the patient what a myelogram involves.  I explained that there is a 50% chance of developing a bad headache and nausea as a result of the test.  I explained that there is also a very small chance of infection, seizures, and bleeding.  I explained how we would treat a post myelogram headache including bedrest, caffeinated fluids, steroids, and blood patch.  The patient does ask to proceed.  I did tell him he to have to come off his blood thinners for at least a week prior to surgery.  He will need to clear that with his PCP.    Diagnoses and all orders for this visit:    1. Degeneration of intervertebral disc of lumbar region with discogenic back pain and lower extremity pain (Primary)  -     Obtain Informed Consent; Standing  -     IR Myelogram Lumbar Spine; Future  -     CT Lumbar Spine With Intrathecal Contrast; Future  -     XR Spine Lumbar Complete With Flex & Ext; Future  -     No Lab Testing Needed; Standing  -     dexAMETHasone (DECADRON) 4 MG tablet; Take 2 tablets by mouth Take As Directed. Take both tablets by mouth 2 hours before myelogram  Dispense: 2 tablet; Refill: 0      Return for After radiology test.

## 2024-10-22 ENCOUNTER — OFFICE VISIT (OUTPATIENT)
Dept: NEUROSURGERY | Facility: CLINIC | Age: 79
End: 2024-10-22
Payer: MEDICARE

## 2024-10-22 DIAGNOSIS — M51.362 DEGENERATION OF INTERVERTEBRAL DISC OF LUMBAR REGION WITH DISCOGENIC BACK PAIN AND LOWER EXTREMITY PAIN: Primary | ICD-10-CM

## 2024-10-22 PROCEDURE — 99214 OFFICE O/P EST MOD 30 MIN: CPT | Performed by: NEUROLOGICAL SURGERY

## 2024-10-22 RX ORDER — DEXAMETHASONE 4 MG/1
8 TABLET ORAL TAKE AS DIRECTED
Qty: 2 TABLET | Refills: 0 | Status: SHIPPED | OUTPATIENT
Start: 2024-10-22

## 2024-10-22 RX ORDER — ROSUVASTATIN CALCIUM 40 MG/1
40 TABLET, COATED ORAL DAILY
Qty: 90 TABLET | Refills: 0 | OUTPATIENT
Start: 2024-10-22

## 2024-10-23 ENCOUNTER — OFFICE VISIT (OUTPATIENT)
Dept: SPORTS MEDICINE | Facility: CLINIC | Age: 79
End: 2024-10-23
Payer: MEDICARE

## 2024-10-23 VITALS
HEIGHT: 73 IN | TEMPERATURE: 97.7 F | BODY MASS INDEX: 27.83 KG/M2 | SYSTOLIC BLOOD PRESSURE: 140 MMHG | DIASTOLIC BLOOD PRESSURE: 62 MMHG | WEIGHT: 210 LBS

## 2024-10-23 DIAGNOSIS — M48.062 NEUROGENIC CLAUDICATION DUE TO LUMBAR SPINAL STENOSIS: ICD-10-CM

## 2024-10-23 PROCEDURE — 3078F DIAST BP <80 MM HG: CPT | Performed by: FAMILY MEDICINE

## 2024-10-23 PROCEDURE — 99214 OFFICE O/P EST MOD 30 MIN: CPT | Performed by: FAMILY MEDICINE

## 2024-10-23 PROCEDURE — 3077F SYST BP >= 140 MM HG: CPT | Performed by: FAMILY MEDICINE

## 2024-10-23 RX ORDER — PREGABALIN 75 MG/1
75 CAPSULE ORAL 2 TIMES DAILY
Qty: 60 CAPSULE | Refills: 0 | Status: SHIPPED | OUTPATIENT
Start: 2024-10-23

## 2024-10-23 RX ORDER — PREDNISONE 20 MG/1
TABLET ORAL
Qty: 18 TABLET | Refills: 0 | Status: SHIPPED | OUTPATIENT
Start: 2024-10-23 | End: 2024-10-24

## 2024-10-23 NOTE — PROGRESS NOTES
"Juan is a 78 y.o. year old male     Chief Complaint   Patient presents with    Back Pain     LUMBAR SPINE- patient is here to follow up on his lower back pain that is radiating down the right leg.        History of Present Illness  HPI   Here to follow-up on ongoing low back pain.  Since his last visit with me his pain has worsened in the midline back across to both sides with some intermittent shooting pain rating down the lateral aspect of the right leg from the hip to the knee.  This is worse with standing, better with sitting and laying down.  Denies any weakness.  He saw neurosurgery for this complaint yesterday and has a CT myelogram pending in the near future.    Review of Systems    /62 (BP Location: Left arm, Patient Position: Sitting, Cuff Size: Adult)   Temp 97.7 °F (36.5 °C) (Temporal)   Ht 185.4 cm (73\")   Wt 95.3 kg (210 lb)   BMI 27.71 kg/m²      Physical Exam    Vital signs reviewed.   General: No acute distress.      MSK Exam:  Ortho Exam  Guarded posture.  Gait is stable with use of a cane on the right side.  There is normal sensation bilateral lower extremities.  Procedures     Diagnoses and all orders for this visit:    Neurogenic claudication due to lumbar spinal stenosis  -     predniSONE (DELTASONE) 20 MG tablet; Take 3 tabs daily for 3 days, then 2 daily for 3 days, then 1 daily for 3 days  -     pregabalin (Lyrica) 75 MG capsule; Take 1 capsule by mouth 2 (Two) Times a Day.    Discussed considerations.  Will try to settle down his acute pain with the steroid burst and discussed adjusting the use of Lyrica for his neurogenic pain.  He can try twice a day but may get relief from just taking 1 Lyrica every morning since he is able to rest relatively comfortably.  Discussed possible sedating side effects.  He will continue follow-up with neurosurgery as well.      EMR Dragon/Transcription disclaimer:    Much of this encounter note is an electronic transcription/translation of spoken " language to printed text.  The electronic translation of spoken language may permit erroneous, or at times, nonsensical words or phrases to be inadvertently transcribed.  Although I have reviewed the note for such errors some may still exist.

## 2024-10-24 ENCOUNTER — OFFICE VISIT (OUTPATIENT)
Dept: CARDIOLOGY | Facility: CLINIC | Age: 79
End: 2024-10-24
Payer: MEDICARE

## 2024-10-24 VITALS
DIASTOLIC BLOOD PRESSURE: 70 MMHG | HEIGHT: 73 IN | OXYGEN SATURATION: 99 % | WEIGHT: 216.4 LBS | BODY MASS INDEX: 28.68 KG/M2 | HEART RATE: 75 BPM | SYSTOLIC BLOOD PRESSURE: 142 MMHG

## 2024-10-24 DIAGNOSIS — Z01.810 ENCOUNTER FOR PRE-OPERATIVE CARDIOVASCULAR CLEARANCE: ICD-10-CM

## 2024-10-24 DIAGNOSIS — I27.20 PULMONARY HYPERTENSION: ICD-10-CM

## 2024-10-24 DIAGNOSIS — I25.10 CORONARY ARTERY DISEASE INVOLVING NATIVE CORONARY ARTERY OF NATIVE HEART WITHOUT ANGINA PECTORIS: ICD-10-CM

## 2024-10-24 DIAGNOSIS — R94.31 ABNORMAL EKG: ICD-10-CM

## 2024-10-24 DIAGNOSIS — I35.1 NONRHEUMATIC AORTIC VALVE INSUFFICIENCY: ICD-10-CM

## 2024-10-24 DIAGNOSIS — I36.1 NONRHEUMATIC TRICUSPID VALVE REGURGITATION: ICD-10-CM

## 2024-10-24 DIAGNOSIS — I77.810 AORTIC ROOT DILATION: ICD-10-CM

## 2024-10-24 DIAGNOSIS — I48.21 PERMANENT ATRIAL FIBRILLATION: Primary | ICD-10-CM

## 2024-10-24 DIAGNOSIS — I34.0 NONRHEUMATIC MITRAL VALVE REGURGITATION: ICD-10-CM

## 2024-10-24 DIAGNOSIS — I77.810 ASCENDING AORTA DILATION: ICD-10-CM

## 2024-10-24 PROBLEM — F32.1 CURRENT MODERATE EPISODE OF MAJOR DEPRESSIVE DISORDER WITHOUT PRIOR EPISODE: Status: RESOLVED | Noted: 2023-06-30 | Resolved: 2024-10-24

## 2024-10-24 PROBLEM — J43.8 OTHER EMPHYSEMA: Status: RESOLVED | Noted: 2023-11-02 | Resolved: 2024-10-24

## 2024-10-24 PROBLEM — N18.31 STAGE 3A CHRONIC KIDNEY DISEASE: Status: RESOLVED | Noted: 2022-08-15 | Resolved: 2024-10-24
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
## 2024-10-28 RX ORDER — SPIRONOLACTONE 100 MG/1
100 TABLET, FILM COATED ORAL DAILY
Qty: 30 TABLET | Refills: 1 | Status: SHIPPED | OUTPATIENT
Start: 2024-10-28

## 2024-11-06 ENCOUNTER — TELEPHONE (OUTPATIENT)
Dept: NEUROSURGERY | Facility: CLINIC | Age: 79
End: 2024-11-06
Payer: MEDICARE

## 2024-11-06 NOTE — TELEPHONE ENCOUNTER
Patient states he will need to reschedule myelogram for tomorrow. He said he does not have transportation.

## 2024-11-06 NOTE — PROGRESS NOTES
11/12/24 0004   Pre-Procedure Phone Call   Procedure Time Verified Yes   Arrival Time 0600   Procedure Location Verified Yes   Medical History Reviewed No   NPO Status Reinforced Yes   Ride and Caregiver Arranged Yes   Patient Knows to Bring Current Medications   (No changes in medications since last reviewed. Decadron ordered.)   Bring Outside Films Requested   (Dr Washington patient)

## 2024-11-12 ENCOUNTER — HOSPITAL ENCOUNTER (OUTPATIENT)
Dept: GENERAL RADIOLOGY | Facility: HOSPITAL | Age: 79
Discharge: HOME | End: 2024-11-12
Payer: MEDICARE

## 2024-11-12 ENCOUNTER — HOSPITAL ENCOUNTER (OUTPATIENT)
Dept: CT IMAGING | Facility: HOSPITAL | Age: 79
Discharge: HOME | End: 2024-11-12
Payer: MEDICARE

## 2024-11-12 VITALS
BODY MASS INDEX: 27.83 KG/M2 | DIASTOLIC BLOOD PRESSURE: 69 MMHG | TEMPERATURE: 97.1 F | OXYGEN SATURATION: 98 % | HEART RATE: 76 BPM | SYSTOLIC BLOOD PRESSURE: 126 MMHG | RESPIRATION RATE: 18 BRPM | HEIGHT: 73 IN | WEIGHT: 210 LBS

## 2024-11-12 DIAGNOSIS — M51.362 DEGENERATION OF INTERVERTEBRAL DISC OF LUMBAR REGION WITH DISCOGENIC BACK PAIN AND LOWER EXTREMITY PAIN: ICD-10-CM

## 2024-11-12 PROCEDURE — 25010000002 LIDOCAINE 1 % SOLUTION: Performed by: NEUROLOGICAL SURGERY

## 2024-11-12 PROCEDURE — 72114 X-RAY EXAM L-S SPINE BENDING: CPT

## 2024-11-12 PROCEDURE — 25510000001 IOPAMIDOL 41 % SOLUTION: Performed by: NEUROLOGICAL SURGERY

## 2024-11-12 PROCEDURE — 62304 MYELOGRAPHY LUMBAR INJECTION: CPT

## 2024-11-12 PROCEDURE — 72240 MYELOGRAPHY NECK SPINE: CPT

## 2024-11-12 PROCEDURE — 72132 CT LUMBAR SPINE W/DYE: CPT

## 2024-11-12 RX ORDER — HYDROCODONE BITARTRATE AND ACETAMINOPHEN 5; 325 MG/1; MG/1
1 TABLET ORAL EVERY 4 HOURS PRN
Status: DISCONTINUED | OUTPATIENT
Start: 2024-11-12 | End: 2024-11-13 | Stop reason: HOSPADM

## 2024-11-12 RX ORDER — LIDOCAINE HYDROCHLORIDE 10 MG/ML
10 INJECTION, SOLUTION INFILTRATION; PERINEURAL ONCE
Status: COMPLETED | OUTPATIENT
Start: 2024-11-12 | End: 2024-11-12

## 2024-11-12 RX ORDER — DAPAGLIFLOZIN 5 MG/1
5 TABLET, FILM COATED ORAL EVERY MORNING
COMMUNITY

## 2024-11-12 RX ORDER — IOPAMIDOL 408 MG/ML
20 INJECTION, SOLUTION INTRATHECAL
Status: COMPLETED | OUTPATIENT
Start: 2024-11-12 | End: 2024-11-12

## 2024-11-12 RX ORDER — ACETAMINOPHEN 325 MG/1
650 TABLET ORAL EVERY 4 HOURS PRN
Status: DISCONTINUED | OUTPATIENT
Start: 2024-11-12 | End: 2024-11-13 | Stop reason: HOSPADM

## 2024-11-12 RX ADMIN — LIDOCAINE HYDROCHLORIDE 6 ML: 10 INJECTION, SOLUTION INFILTRATION; PERINEURAL at 07:01

## 2024-11-12 RX ADMIN — IOPAMIDOL 20 ML: 408 INJECTION, SOLUTION INTRATHECAL at 07:31

## 2024-11-12 NOTE — DISCHARGE INSTRUCTIONS
EDUCATION /DISCHARGE INSTRUCTIONS:    A myelogram is a special radiology procedure of the spinal cord, spinal nerves and other related structures.  You will be awake during the examination.  An area of your lower back will be cleansed with an antiseptic solution.  The physician will inject a numbing medication in your lower back.  While your back is numb, a needle will be placed in the lower back area.  A small amount of spinal fluid may be withdrawn and sent to the lab if ordered by your physician. While the needle is in the back, an injection of a contrast material (xray dye) will be given through the needle.  The contrast material will allow the physician to see the spinal cord and spinal nerves.  Once injected, the needle will be removed and a band aid will be placed over the injection site.  The table will be tilted during the process to allow the contrast material to flow to particular areas in the spine.  Following the injection and xrays, you will be taken to the CT scanner where more pictures will be taken. After the procedure is finished, the contrast material will be absorbed by your body and eliminated through your kidneys.  The radiologist will study and interpret your myelogram and send the results to your physician.  Procedure risks of a myelogram include, but are not limited to:  *  Bleeding   *  Seizure  *  Infection   *  Headache, possibly severe requiring a blood patch  *  Contrast reaction  *  Nerve or cord injury  *  Paralysis and death  Benefits of the procedure:  *  Best examination for delineating pathology related to spinal cord compression from a disc and/or nerve root compression  Alternatives to the procedure:  MRI - a non invasive procedure requiring intravenous contrast injection.  Cannot be done on patients with certain pacemakers or metal in the body.  MRI risks include possible reaction to the contrast material, movement of metal located in the body.Benefit to MRI:  Non-invasive and  usually painless procedure.  THIS EDUCATION INFORMATION WAS REVIEWED PRIOR TO PROCEDURE AND CONSENT.     24 hour rest period ends _____Wednesday 11/13/24_______________.  Important information following your myelogram:  * ACTIVITY:   *  You may sit up in the car to go home.  *  When you get home, remain on bed rest (flat on your back or on your side) for 24 hours. You may place a rolled up towel under your neck for support  * You may get up to the bathroom and sit up to eat and drink then lie back down  * Drink additional fluids for 24 hours after the myelogram.   * Continue to drink additional fluids for the next 2-3 days. Water and caffeinated beverages are encouraged.  * Remain less active for the next two to three days.  * Do not drive for 24 hours following a myelogram.  * You may remove the bandage and shower in the morning.  *Resume taking blood thinner or aspirin in 48 hours.    CALL YOUR PHYSICIAN FOR THE FOLLOWING:  * Pain at the injection site  * Redness, swelling, bruising or drainage at the injection site.  * A fever by mouth of 101.0 or any new symptoms  Headaches are a common side effect after a myelogram.  If you get a headache, you should stay flat in bed and drink plenty of fluids. If the headache persists and does not go away with rest/medication,   CALL Dr. Washington at [phone]

## 2024-11-12 NOTE — POST-PROCEDURE NOTE
Preop diagnosis:  Lumbar radiculopathy  Postop diagnosis:  same  LP at L45  15 mL of 200M Isovue  Complications: none  EBL: 0 mL  Specimens: none

## 2024-11-13 NOTE — PROGRESS NOTES
11/13/24 0928   Post Procedure Follow Up   Procedural pain/discomfort/disposition? No Pain/Discomfort;Activity as per D/C Instructions     pt call back on Myelogram on 11/12/24. Pt stated he felt good. No headaches, no pain.

## 2024-11-18 NOTE — PROGRESS NOTES
Subjective   Patient ID: Juan James is a 78 y.o. male is here today for follow-up with a new a Lumbar Myelogram done on 11/12/2024.    Today patient's symptoms are unchanged    History of Present Illness    This patient continues with pain in his back and down his right leg.  He really cannot walk very far.    The following portions of the patient's history were reviewed and updated as appropriate: allergies, current medications, past family history, past medical history, past social history, past surgical history, and problem list.      Objective     There were no vitals filed for this visit.  There is no height or weight on file to calculate BMI.    Tobacco Use: Medium Risk (11/19/2024)    Patient History     Smoking Tobacco Use: Former     Smokeless Tobacco Use: Former     Passive Exposure: Past          Physical Exam    Neurological:      Mental Status: He is alert and oriented to person, place, and time.       Neurological Exam    Mental Status  Alert. Oriented to person, place, and time.            Assessment & Plan   Independent Review of Radiographic Studies:      I personally reviewed the images from the following studies.    I reviewed his plain films, myelogram, and CT scan done on 12 November myself.  The plain films show the previous fusion at L3-4 and L4-5.  There appears to be a solid fusion at L4-5.  It is harder to tell at L3-4.  On the myelogram itself there is pretty good nerve root filling of both the operated levels.  There is some narrowing at L2-3 secondary to a disc bulge.  L5-S1 looks pretty good for the most part as well.  On the post myelographic CT scan there is some ossification of the posterior longitudinal ligament which is compressing the thecal sac but not the spinal cord at the level of T10.  There is some spondylitic change in the lower thoracic spine causing some distortion of the thecal sac but no compression of the neural elements.  Everything looks pretty good all the way  down to L2.  At L2-3 there is fairly significant lateral recess stenosis with a synovial cyst on the right side.  The fusion at L3-4 and L4-5 both look okay.  The canal and neuroforamina are widely patent.  L5-S1 really looks okay for the most part.    Medical Decision Making:      I told the patient we could consider surgery at L2-3 on the right side with minimally invasive techniques.  He tells me that he could not have a colonoscopy because they were afraid to put him to sleep for a period therefore he will need to get medical clearance before we can consider any type of surgery.  He has also been having a lot of spasms in his hands and so I think we need to be sure he does not have any cord compression in the cervical spine which has never been looked at near as I can tell.      Diagnoses and all orders for this visit:    1. Lumbar radiculopathy (Primary)    2. DDD (degenerative disc disease), cervical  -     MRI Cervical Spine Without Contrast; Future      Return for After radiology test.

## 2024-11-19 ENCOUNTER — OFFICE VISIT (OUTPATIENT)
Dept: NEUROSURGERY | Facility: CLINIC | Age: 79
End: 2024-11-19
Payer: MEDICARE

## 2024-11-19 ENCOUNTER — TELEPHONE (OUTPATIENT)
Dept: CARDIOLOGY | Facility: CLINIC | Age: 79
End: 2024-11-19
Payer: MEDICARE

## 2024-11-19 DIAGNOSIS — M50.30 DDD (DEGENERATIVE DISC DISEASE), CERVICAL: ICD-10-CM

## 2024-11-19 DIAGNOSIS — M54.16 LUMBAR RADICULOPATHY: Primary | ICD-10-CM

## 2024-11-19 PROCEDURE — 1160F RVW MEDS BY RX/DR IN RCRD: CPT | Performed by: NEUROLOGICAL SURGERY

## 2024-11-19 PROCEDURE — 99214 OFFICE O/P EST MOD 30 MIN: CPT | Performed by: NEUROLOGICAL SURGERY

## 2024-11-19 PROCEDURE — 1159F MED LIST DOCD IN RCRD: CPT | Performed by: NEUROLOGICAL SURGERY

## 2024-11-19 NOTE — TELEPHONE ENCOUNTER
Patient called needing cardiac clearance for Dr. Washington to perform spine surgery. He needs an ok for them to give him anesthesia. Last seen by Oly on 10/24/24.

## 2024-11-19 NOTE — TELEPHONE ENCOUNTER
Please call patient and see what blood thinner he is currently taking and I can give recommendations on how to hold it.  Thank you

## 2024-11-20 NOTE — TELEPHONE ENCOUNTER
Thank you.  Please let him know that he needs to hold his apixaban 48 hours for his procedure.  When he is holding the apixaban, he is at potential risk of stroke and that is nonmodifiable.  If he were to have any strokelike symptoms, he needs to present to the ED immediately.  Thank you.

## 2024-11-20 NOTE — TELEPHONE ENCOUNTER
Reviewed recommendations with Juan James and the patient verbalized understanding of the recommendations.    Chart review shows clearance letter has been forwarded to Dr. Washington in [facility].  No further action needed at this time.    Thank you,  Olimpia CORDERO RN  Triage Nurse [facility] [place]  11/20/24  12:37 EST

## 2024-11-20 NOTE — TELEPHONE ENCOUNTER
I called and spoke with patient.  He has 2 more pradaxa pills left and then he will switch to eliquis.    Juanis Cedillo RN  [facility] Triage  11/20/24 09:00 EST

## 2024-11-22 ENCOUNTER — OFFICE VISIT (OUTPATIENT)
Dept: SPORTS MEDICINE | Facility: CLINIC | Age: 79
End: 2024-11-22
Payer: MEDICARE

## 2024-11-22 VITALS
WEIGHT: 210 LBS | BODY MASS INDEX: 27.83 KG/M2 | OXYGEN SATURATION: 98 % | HEIGHT: 73 IN | HEART RATE: 52 BPM | TEMPERATURE: 95.6 F | DIASTOLIC BLOOD PRESSURE: 72 MMHG | SYSTOLIC BLOOD PRESSURE: 146 MMHG

## 2024-11-22 DIAGNOSIS — G89.29 CHRONIC SACROILIAC PAIN: Primary | ICD-10-CM

## 2024-11-22 DIAGNOSIS — M53.3 CHRONIC SACROILIAC PAIN: Primary | ICD-10-CM

## 2024-11-22 PROCEDURE — 20550 NJX 1 TENDON SHEATH/LIGAMENT: CPT | Performed by: FAMILY MEDICINE

## 2024-11-22 PROCEDURE — 3077F SYST BP >= 140 MM HG: CPT | Performed by: FAMILY MEDICINE

## 2024-11-22 PROCEDURE — 76942 ECHO GUIDE FOR BIOPSY: CPT | Performed by: FAMILY MEDICINE

## 2024-11-22 PROCEDURE — 3078F DIAST BP <80 MM HG: CPT | Performed by: FAMILY MEDICINE

## 2024-11-22 RX ORDER — TRIAMCINOLONE ACETONIDE 40 MG/ML
80 INJECTION, SUSPENSION INTRA-ARTICULAR; INTRAMUSCULAR ONCE
Status: COMPLETED | OUTPATIENT
Start: 2024-11-22 | End: 2024-11-22

## 2024-11-22 RX ADMIN — TRIAMCINOLONE ACETONIDE 80 MG: 40 INJECTION, SUSPENSION INTRA-ARTICULAR; INTRAMUSCULAR at 11:15

## 2024-11-22 NOTE — PROGRESS NOTES
"Juan is a 78 y.o. year old male     Chief Complaint   Patient presents with    Back Pain     LUMBAR SPINE- Patient is here to follow up on his lower back pain, interested in repeat steroid injection.        History of Present Illness  HPI         Review of Systems    /72 (BP Location: Right arm, Patient Position: Sitting, Cuff Size: Adult)   Pulse 52   Temp 95.6 °F (35.3 °C) (Temporal)   Ht 185.4 cm (73\")   Wt 95.3 kg (210 lb)   SpO2 98%   BMI 27.71 kg/m²      Physical Exam    Vital signs reviewed.   General: No acute distress.      MSK Exam:  Ortho Exam    Procedures     Ultrasound-Guided Sacroiliac Joint Injection Procedure Note    Bilateral sacroiliac joint injection was discussed with the patient in detail, including indication, risks, benefits, and alternatives. Verbal consent was given for the procedure. Injection was performed by physician.  Injection site was identified by ultrasound examination, then cleaned with Betadine and alcohol swabs. Prior to needle insertion, ethyl chloride spray was used for surface anesthesia. Sterile technique was used. Ultrasound guidance was indicated for injection accuracy.  A 22-gauge, 3.5\" spinal needle was guided to the joint under continuous direct ultrasound visualization. Injectate was seen flowing underneath the superficial sacroiliac ligaments and passed without difficulty. The needle was removed and a simple bandage was applied. The procedure was tolerated well without difficulty.    Injection mixture:  1% lidocaine without epinephrine: 2 mL  40 mg/mL triamcinolone acetonide: 1 mL     Diagnoses and all orders for this visit:    Chronic sacroiliac pain  -     triamcinolone acetonide (KENALOG-40) injection 80 mg          EMR Dragon/Transcription disclaimer:    Much of this encounter note is an electronic transcription/translation of spoken language to printed text.  The electronic translation of spoken language may permit erroneous, or at times, " nonsensical words or phrases to be inadvertently transcribed.  Although I have reviewed the note for such errors some may still exist.

## 2024-11-23 DIAGNOSIS — R60.0 BILATERAL LOWER EXTREMITY EDEMA: ICD-10-CM

## 2024-11-23 RX ORDER — FUROSEMIDE 40 MG/1
40 TABLET ORAL 3 TIMES DAILY
Qty: 270 TABLET | Refills: 0 | OUTPATIENT
Start: 2024-11-23

## 2024-11-29 DIAGNOSIS — M48.062 NEUROGENIC CLAUDICATION DUE TO LUMBAR SPINAL STENOSIS: ICD-10-CM

## 2024-12-02 RX ORDER — PREGABALIN 75 MG/1
75 CAPSULE ORAL 2 TIMES DAILY
Qty: 60 CAPSULE | Refills: 1 | Status: SHIPPED | OUTPATIENT
Start: 2024-12-02

## 2024-12-05 ENCOUNTER — OFFICE VISIT (OUTPATIENT)
Dept: GASTROENTEROLOGY | Facility: CLINIC | Age: 79
End: 2024-12-05
Payer: MEDICARE

## 2024-12-05 VITALS
WEIGHT: 215.7 LBS | HEART RATE: 101 BPM | SYSTOLIC BLOOD PRESSURE: 174 MMHG | TEMPERATURE: 97.5 F | BODY MASS INDEX: 28.59 KG/M2 | DIASTOLIC BLOOD PRESSURE: 83 MMHG | HEIGHT: 73 IN

## 2024-12-05 DIAGNOSIS — R60.0 BILATERAL LOWER EXTREMITY EDEMA: ICD-10-CM

## 2024-12-05 DIAGNOSIS — K70.31 ALCOHOLIC CIRRHOSIS OF LIVER WITH ASCITES: Primary | ICD-10-CM

## 2024-12-05 DIAGNOSIS — D50.9 IRON DEFICIENCY ANEMIA, UNSPECIFIED IRON DEFICIENCY ANEMIA TYPE: ICD-10-CM

## 2024-12-05 DIAGNOSIS — I27.20 PULMONARY HYPERTENSION: ICD-10-CM

## 2024-12-05 DIAGNOSIS — J41.8 MIXED SIMPLE AND MUCOPURULENT CHRONIC BRONCHITIS: ICD-10-CM

## 2024-12-05 NOTE — PROGRESS NOTES
Chief Complaint  Cirrhosis    Subjective          History of Present Illness    Juan James is a  78 y.o. male presents for follow-up on edema, cirrhosis, and leg cramps. He is a patient of Dr. Méndez last seen by me on 10/17/2024.    He is taking spironolactone 100mg daily, Furosemide 40mg twice daily (he thinks he might have stopped this but cannot remember-will check when he gets home).  Struggles with mildly high potassium likely due to combination of medication and diet.  Advised to maintain a low potassium diet-11/14/2024 labs showed normal potassium at 4.2.  Today he reports swelling in feet but better than previous.     He saw cardiology on 10/20/2024, note reviewed.  He has history of A-fib on the Eliquis.  They did state he was acceptable risk from cardiac standpoint to undergo a myelogram under general anesthesia for his spine issues.    He did show drop in his hemoglobin on September lab work to 7.8.  10/17/2024 CBC showed hemoglobin improved to 11.1, normocytic normochromic.  10/17/24 AFP tumor marker normal at 2.7.  MELD 3.0 score 13    He also saw Dr. Arroyo, nephrology, for CKD stage IIIa.  They advised him to continue Lasix 40 mg twice daily and Aldactone 100 mg for now.  11/14/2024 labs showed creatinine 1.8, BUN 39, EGFR 39; normal sodium, potassium, albumin. Started on Farxiga.     From 10/17/2024 office note:  9/27/2024 PET scan showed 3.5 cm focus of consolidation in the dependent left lower lobe with paraseptal emphysema-unchanged from November 2023.  New superior right upper lobe subpleural 4.8 cm focus of mildly hypermetabolic consolidation-likely infectious/inflammatory.  Review for PET scan for other findings.  No suspicious uptake     9/5/2024 CT liver protocol showed cirrhosis, no lesions or ascites.  Small varices around the spleen.  Additional lung findings as well.     2/17/2021 colonoscopy with Dr. Méndez showed 2 polyps, diverticulosis. Pathology with hyperplastic polyp.  " History of piecemeal TVA in 2019 colonoscopy.  Recall 3 years.      History of A-fib on Pradaxa and follows with Dr. Mo, cardiology.  History of daily alcohol use 6 drinks per day for 25 years.  Now only occasional.    Objective   Vital Signs:   /83   Pulse 101   Temp 97.5 °F (36.4 °C)   Ht 185.4 cm (73\")   Wt 97.8 kg (215 lb 11.2 oz)   BMI 28.46 kg/m²       Physical Exam  Vitals reviewed.   Constitutional:       General: He is awake. He is not in acute distress.     Appearance: Normal appearance. He is well-developed and well-groomed.   HENT:      Head: Normocephalic.   Pulmonary:      Effort: Pulmonary effort is normal. No respiratory distress.   Skin:     Coloration: Skin is not pale.   Neurological:      Mental Status: He is alert and oriented to person, place, and time.      Gait: Gait is intact.   Psychiatric:         Mood and Affect: Mood and affect normal.         Speech: Speech normal.         Behavior: Behavior is cooperative.         Judgment: Judgment normal.          Result Review :             Assessment and Plan    Diagnoses and all orders for this visit:    1. Alcoholic cirrhosis of liver with ascites (Primary)    2. Iron deficiency anemia, unspecified iron deficiency anemia type    3. Bilateral lower extremity edema    4. Mixed simple and mucopurulent chronic bronchitis  -     Ambulatory Referral to Pulmonology    5. Pulmonary hypertension  -     Ambulatory Referral to Pulmonology    He will be due for repeat HCC screening in March.    Continue current doses of diuretics.  If not on furosemide, recommend he restart this at 40 mg once daily dosing.  If he does still have some swelling in his feet.  Continue low-sodium, low potassium diet.     He will eventually need EGD for esophageal variceal surveillance and colonoscopy for polyp surveillance.  Held off due to his pulmonary status.  Will refer to new pulmonologist for second opinion.  He does have COPD and pulmonary hypertension.  " Will refer to [place] pul[facility]ology at request of patient.  Cardiology did release him for general anesthesia for spinal procedure.  Will look at scheduling bidirectional endoscopy after next visit.    Follow Up   Return in about 2 months (around 2/10/2025) for at 1130.    Dragon dictation used throughout this note.     I spent 35 minutes caring for Mr. James on this date of service. This time includes time spent by me in the following activities: preparing for the visit, reviewing tests, obtaining and/or reviewing a separately obtained history, performing a medically appropriate examination and/or evaluation , counseling and educating the patient/family/caregiver, ordering medications, tests, or procedures, documenting information in the medical record, independently interpreting results and communicating that information with the patient/family/caregiver and care coordination.      Asia Hampton PA-C

## 2024-12-06 ENCOUNTER — HOSPITAL ENCOUNTER (OUTPATIENT)
Facility: HOSPITAL | Age: 79
Discharge: HOME | End: 2024-12-06
Payer: MEDICARE

## 2024-12-06 DIAGNOSIS — M79.5 FOREIGN BODY (FB) IN SOFT TISSUE: ICD-10-CM

## 2024-12-06 DIAGNOSIS — M50.30 DDD (DEGENERATIVE DISC DISEASE), CERVICAL: ICD-10-CM

## 2024-12-06 PROCEDURE — 72141 MRI NECK SPINE W/O DYE: CPT

## 2024-12-09 ENCOUNTER — TELEPHONE (OUTPATIENT)
Dept: NEUROSURGERY | Facility: CLINIC | Age: 79
End: 2024-12-09
Payer: MEDICARE

## 2024-12-09 NOTE — TELEPHONE ENCOUNTER
S/w patient and offered appt with Dr. Washington on 12/17/2024, patient accepted appt     ----- Message from Bill Washington sent at 12/9/2024  6:47 AM EST -----  This patient needs an in person follow-up appointment to go over the results

## 2024-12-11 NOTE — PROGRESS NOTES
Subjective   Patient ID: Juan James is a 79 y.o. male is here today for follow-up with a new MRI C-spine done on 12/06/2024.    History of Present Illness    This patient was last here about a month ago.  At that time he was having pain in his back and down his right leg.  He also was having some spasms in his hands and some neck pain.  He needed clearance for a colonoscopy and so we sent him for an MRI to be sure he did not have any cord compression.  Since he was here last he has developed some pain in his left leg and overall his legs feel wobbly and unable to support him.    The following portions of the patient's history were reviewed and updated as appropriate: allergies, current medications, past family history, past medical history, past social history, past surgical history, and problem list.      Objective     Vitals:    12/17/24 0820   BP: 134/76   Pulse: 90   SpO2: 94%   PainSc:   5     There is no height or weight on file to calculate BMI.    Tobacco Use: Medium Risk (12/17/2024)    Patient History     Smoking Tobacco Use: Former     Smokeless Tobacco Use: Former     Passive Exposure: Past          Physical Exam    Neurological:      Mental Status: He is alert and oriented to person, place, and time.       Neurological Exam    Mental Status  Alert. Oriented to person, place, and time.            Assessment & Plan   Independent Review of Radiographic Studies:      I personally reviewed the images from the following studies.    I reviewed his MRI of the cervical spine done on 6 December of this year.  This shows a widely patent canal and neuroforamina.  C2-3 shows a little disc bulging on the left side causing a little foraminal stenosis but not severe.  C3-4 is a fused level and looks fine.  C4-5 is also fused and looks okay.  C5-6 is not fused and shows a little foraminal stenosis on the right but not severe and C6-7 is fairly open.  There is a little disc herniation on the left at that level and  in the midline but there is no cord compression.  C7 and T1 generally looks okay.    I reviewed his plain films, myelogram, and CT scan done in November again myself.  Most of his lumbar spine and lower thoracic spine look okay except for L2-3 where there is a synovial cyst on the right side causing lateral recess stenosis but not a lot of pressure on the left.  I do not see anything here that would correlate with both legs being affected.    Medical Decision Making:      I told the patient before we talk about any surgery on his legs we need to get an EMG to be sure he does not have a peripheral neuropathy.  I will see him back when that has been completed.  I told him that the MRI of his cervical spine does not show anything dangerous.    Diagnoses and all orders for this visit:    1. Hereditary and idiopathic peripheral neuropathy (Primary)  -     EMG & Nerve Conduction Test; Future      Return for After EMG.

## 2024-12-17 ENCOUNTER — OFFICE VISIT (OUTPATIENT)
Dept: NEUROSURGERY | Facility: CLINIC | Age: 79
End: 2024-12-17
Payer: MEDICARE

## 2024-12-17 VITALS — SYSTOLIC BLOOD PRESSURE: 134 MMHG | DIASTOLIC BLOOD PRESSURE: 76 MMHG | OXYGEN SATURATION: 94 % | HEART RATE: 90 BPM

## 2024-12-17 DIAGNOSIS — G60.9 HEREDITARY AND IDIOPATHIC PERIPHERAL NEUROPATHY: Primary | ICD-10-CM

## 2024-12-17 PROCEDURE — 1160F RVW MEDS BY RX/DR IN RCRD: CPT | Performed by: NEUROLOGICAL SURGERY

## 2024-12-17 PROCEDURE — 99214 OFFICE O/P EST MOD 30 MIN: CPT | Performed by: NEUROLOGICAL SURGERY

## 2024-12-17 PROCEDURE — 1159F MED LIST DOCD IN RCRD: CPT | Performed by: NEUROLOGICAL SURGERY

## 2024-12-17 PROCEDURE — 3075F SYST BP GE 130 - 139MM HG: CPT | Performed by: NEUROLOGICAL SURGERY

## 2024-12-17 PROCEDURE — 3078F DIAST BP <80 MM HG: CPT | Performed by: NEUROLOGICAL SURGERY

## 2024-12-21 DIAGNOSIS — E78.2 MIXED HYPERLIPIDEMIA: ICD-10-CM

## 2024-12-22 RX ORDER — ROSUVASTATIN CALCIUM 40 MG/1
40 TABLET, COATED ORAL DAILY
Qty: 90 TABLET | Refills: 0 | OUTPATIENT
Start: 2024-12-22

## 2025-01-08 RX ORDER — SPIRONOLACTONE 100 MG/1
100 TABLET, FILM COATED ORAL DAILY
Qty: 90 TABLET | Refills: 0 | Status: SHIPPED | OUTPATIENT
Start: 2025-01-08

## 2025-01-13 ENCOUNTER — OFFICE VISIT (OUTPATIENT)
Age: 80
End: 2025-01-13
Payer: MEDICARE

## 2025-01-13 ENCOUNTER — HOSPITAL ENCOUNTER (OUTPATIENT)
Facility: HOSPITAL | Age: 80
Discharge: HOME | End: 2025-01-13
Admitting: NURSE PRACTITIONER
Payer: MEDICARE

## 2025-01-13 VITALS
BODY MASS INDEX: 29.77 KG/M2 | WEIGHT: 224.6 LBS | OXYGEN SATURATION: 99 % | TEMPERATURE: 96.8 F | HEART RATE: 59 BPM | RESPIRATION RATE: 16 BRPM | SYSTOLIC BLOOD PRESSURE: 135 MMHG | DIASTOLIC BLOOD PRESSURE: 87 MMHG | HEIGHT: 73 IN

## 2025-01-13 DIAGNOSIS — R79.89 ELEVATED SERUM CREATININE: ICD-10-CM

## 2025-01-13 DIAGNOSIS — I71.43 INFRARENAL ABDOMINAL AORTIC ANEURYSM (AAA) WITHOUT RUPTURE: ICD-10-CM

## 2025-01-13 DIAGNOSIS — I97.89 TYPE II ENDOLEAK OF AORTIC GRAFT: ICD-10-CM

## 2025-01-13 DIAGNOSIS — I71.43 INFRARENAL ABDOMINAL AORTIC ANEURYSM (AAA) WITHOUT RUPTURE: Primary | ICD-10-CM

## 2025-01-13 DIAGNOSIS — I77.810 DILATED AORTIC ROOT: ICD-10-CM

## 2025-01-13 PROBLEM — I71.40 AAA (ABDOMINAL AORTIC ANEURYSM) WITHOUT RUPTURE: Status: RESOLVED | Noted: 2021-12-17 | Resolved: 2025-01-13

## 2025-01-13 LAB
ABDOMINAL AORTA LEFT DIST ANASTOMOSIS PSV: 248 CM/S
ABDOMINAL AORTA LEFT LIMB GRAFT PSV: 56 CM/S
ABDOMINAL AORTA RIGHT DIST ANASTOMOSIS PSV: 229 CM/S
ABDOMINAL MID AORTA AP: 2.2 CM
ABDOMINAL MID AORTA TRANS: 2.3 CM
ABDOMINAL MID AORTA VEL: 39 CM/S
BH CV VAS ABDOMINAL AO MID GRAFT BODY PSV: 39 CM/S
BH CV VAS ABDOMINAL AO RESIDUAL LUMEN AP MEASURE: 5.5 CM
BH CV VAS ABDOMINAL AO RESIDUAL LUMEN TRANS MEASURE: 5.5 CM
BH CV VAS ABDOMINAL AORTA DISTAL RIGHT LIMB GRAFT PSV: 72 CM/S

## 2025-01-13 PROCEDURE — 3079F DIAST BP 80-89 MM HG: CPT | Performed by: NURSE PRACTITIONER

## 2025-01-13 PROCEDURE — 99214 OFFICE O/P EST MOD 30 MIN: CPT | Performed by: NURSE PRACTITIONER

## 2025-01-13 PROCEDURE — 1159F MED LIST DOCD IN RCRD: CPT | Performed by: NURSE PRACTITIONER

## 2025-01-13 PROCEDURE — 1160F RVW MEDS BY RX/DR IN RCRD: CPT | Performed by: NURSE PRACTITIONER

## 2025-01-13 PROCEDURE — 3075F SYST BP GE 130 - 139MM HG: CPT | Performed by: NURSE PRACTITIONER

## 2025-01-13 PROCEDURE — 93978 VASCULAR STUDY: CPT | Performed by: SURGERY

## 2025-01-13 PROCEDURE — 93978 VASCULAR STUDY: CPT

## 2025-01-13 NOTE — PROGRESS NOTES
Chief Complaint  Infrarenal abdominal aortic aneurysm    Subjective        Juan James presents to [facility] V[facility]ULAR [facility]  HPI   Juan James is a 79 y.o. male that has been followed in our office by Dr. Patricia for an abdominal aortic aneurysm.  In 2021, he had an aortic stent graft.  He returns today in follow-up along with an aortic duplex. He   reports he  has been doing well without hospitalizations or surgeries. He  denies any worsening abdominal pain, back pain, or pain that radiates to his groin. He denies any claudication symptoms, rest pain, or tissue loss.  He also has a thoracic dilation.  He has serial CTs for this.  His most recent one was in July 2024.    Review of Systems   Constitutional:  Negative for fever.   Eyes:  Negative for visual disturbance.   Cardiovascular:  Negative for leg swelling.   Gastrointestinal:  Negative for abdominal pain.   Musculoskeletal:  Negative for back pain.   Skin:  Negative for color change, pallor and wound.   Neurological:  Negative for dizziness, facial asymmetry, speech difficulty and weakness.        Juan James  reports that he quit smoking about 15 years ago. His smoking use included cigarettes, pipe, and cigars. He started smoking about 60 years ago. He has a 90 pack-year smoking history. He has been exposed to tobacco smoke. He has quit using smokeless tobacco..        Objective   Vital Signs:  Vitals:    01/13/25 0826   BP: 135/87   Pulse: 59   Resp: 16   Temp: 96.8 °F (36 °C)   SpO2: 99%        Body mass index is 29.64 kg/m².           Physical Exam  Vitals reviewed.   Constitutional:       Appearance: Normal appearance.   HENT:      Head: Normocephalic.   Cardiovascular:      Rate and Rhythm: Normal rate and regular rhythm.      Pulses:           Dorsalis pedis pulses are 3+ on the right side and 3+ on the left side.        Posterior tibial pulses are 3+ on the right side and 3+ on the left side.   Pulmonary:       Effort: Pulmonary effort is normal.   Skin:     General: Skin is warm.   Neurological:      General: No focal deficit present.      Mental Status: He is alert and oriented to person, place, and time.   Psychiatric:         Mood and Affect: Mood normal.          Result Review :      Previous aortic duplex:  Duplex Aorta IVC Iliac Graft Complete CAR (07/15/2024 07:57)     Aortic duplex done today:Duplex Aorta IVC Iliac Graft Complete CAR (01/13/2025 08:13)        CT Abdomen With & Without Contrast (09/05/2024 08:11)     CT Chest With Contrast Diagnostic (07/31/2024 08:59)     COMPREHENSIVE METABOLIC PANEL (01/08/2025 09:02)          Assessment and Plan     Diagnoses and all orders for this visit:    1. Infrarenal abdominal aortic aneurysm (AAA) without rupture (Primary)  -     CT IV Fluids Pre Post; Future  -     CT Angiogram Abdomen Pelvis; Future    2. Dilated aortic root    3. Type II endoleak of aortic graft    4. Elevated serum creatinine          Patient presents today for follow up of his abdominal aortic aneurysm.  Today, his aortic duplex suggest a possible endoleak near the origin of the limbs.  He had a recent CT of his chest in July 2024.  This demonstrates an ascending aortic dilation at 4.4 cm and a descending aorta dilation at 3.7 cm.  This is unchanged from previous imaging.  We discussed adequate blood pressure control.  He is on a statin for cholesterol control.  We discussed his endoleak.  I recommended that he proceed with a CT angiogram of the abdomen and pelvis.  He will have this done at his earliest convenience and return to the office to discuss results with Dr. Patricia.  He does have a history of renal insufficiency therefore we will give him a 500 cc bolus of IV fluids prior to the scan.             Follow Up     Return in about 1 month (around 2/13/2025) for see BGT after CTA .  Patient was given instructions and counseling regarding his condition or for health maintenance advice. Please see  specific information pulled into the AVS if appropriate.     Mana Mcleod APRN

## 2025-01-28 ENCOUNTER — OFFICE VISIT (OUTPATIENT)
Dept: CARDIOLOGY | Facility: CLINIC | Age: 80
End: 2025-01-28
Payer: MEDICARE

## 2025-01-28 ENCOUNTER — TELEPHONE (OUTPATIENT)
Dept: CARDIOLOGY | Facility: CLINIC | Age: 80
End: 2025-01-28

## 2025-01-28 VITALS
HEIGHT: 73 IN | HEART RATE: 54 BPM | OXYGEN SATURATION: 99 % | BODY MASS INDEX: 29.82 KG/M2 | DIASTOLIC BLOOD PRESSURE: 80 MMHG | SYSTOLIC BLOOD PRESSURE: 130 MMHG | WEIGHT: 225 LBS

## 2025-01-28 DIAGNOSIS — I48.21 PERMANENT ATRIAL FIBRILLATION: Primary | ICD-10-CM

## 2025-01-28 DIAGNOSIS — I34.0 NONRHEUMATIC MITRAL VALVE REGURGITATION: ICD-10-CM

## 2025-01-28 DIAGNOSIS — I77.810 AORTIC ROOT DILATION: ICD-10-CM

## 2025-01-28 DIAGNOSIS — E78.2 MIXED HYPERLIPIDEMIA: ICD-10-CM

## 2025-01-28 DIAGNOSIS — I35.1 NONRHEUMATIC AORTIC VALVE INSUFFICIENCY: ICD-10-CM

## 2025-01-28 DIAGNOSIS — I25.10 CORONARY ARTERY DISEASE INVOLVING NATIVE CORONARY ARTERY OF NATIVE HEART WITHOUT ANGINA PECTORIS: ICD-10-CM

## 2025-01-28 DIAGNOSIS — I77.810 ASCENDING AORTA DILATION: ICD-10-CM

## 2025-01-28 DIAGNOSIS — I27.20 PULMONARY HYPERTENSION: ICD-10-CM

## 2025-01-28 DIAGNOSIS — I36.1 NONRHEUMATIC TRICUSPID VALVE REGURGITATION: ICD-10-CM

## 2025-01-28 DIAGNOSIS — I10 ESSENTIAL HYPERTENSION: ICD-10-CM

## 2025-01-28 DIAGNOSIS — R58 BLEEDING: ICD-10-CM

## 2025-01-28 PROCEDURE — 3079F DIAST BP 80-89 MM HG: CPT | Performed by: NURSE PRACTITIONER

## 2025-01-28 PROCEDURE — 1159F MED LIST DOCD IN RCRD: CPT | Performed by: NURSE PRACTITIONER

## 2025-01-28 PROCEDURE — 3075F SYST BP GE 130 - 139MM HG: CPT | Performed by: NURSE PRACTITIONER

## 2025-01-28 PROCEDURE — 1160F RVW MEDS BY RX/DR IN RCRD: CPT | Performed by: NURSE PRACTITIONER

## 2025-01-28 PROCEDURE — 93000 ELECTROCARDIOGRAM COMPLETE: CPT | Performed by: NURSE PRACTITIONER

## 2025-01-28 PROCEDURE — 99214 OFFICE O/P EST MOD 30 MIN: CPT | Performed by: NURSE PRACTITIONER

## 2025-01-28 RX ORDER — COLCHICINE 0.6 MG/1
1.2 TABLET ORAL
COMMUNITY
Start: 2024-12-17 | End: 2025-12-17

## 2025-01-28 RX ORDER — GABAPENTIN 300 MG/1
CAPSULE ORAL
COMMUNITY
Start: 2025-01-27

## 2025-01-28 RX ORDER — EZETIMIBE 10 MG/1
10 TABLET ORAL DAILY
COMMUNITY
Start: 2025-01-13 | End: 2026-01-13

## 2025-01-28 NOTE — TELEPHONE ENCOUNTER
Reviewed recommendations with patient, verbalized understanding, will call with any further questions or complaints.  Pt knows to expect call to schedule CTA and echo (aware that echo should be in August).    Meggan Aguayo RN  Triage Nurse  01/28/25 10:36 EST

## 2025-01-28 NOTE — TELEPHONE ENCOUNTER
Please call patient.  Let him know that I have decided he needs to have a repeat CT scan of the chest to remeasure the aorta and hospital scheduling will call to arrange.  He will also need a repeat echocardiogram of his heart in August 2025 and we will call to arrange.  Thank you
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
## 2025-01-29 ENCOUNTER — OFFICE VISIT (OUTPATIENT)
Dept: NEUROSURGERY | Facility: CLINIC | Age: 80
End: 2025-01-29
Payer: MEDICARE

## 2025-01-29 VITALS
BODY MASS INDEX: 29.82 KG/M2 | DIASTOLIC BLOOD PRESSURE: 60 MMHG | HEART RATE: 68 BPM | HEIGHT: 73 IN | WEIGHT: 225 LBS | OXYGEN SATURATION: 97 % | SYSTOLIC BLOOD PRESSURE: 115 MMHG

## 2025-01-29 DIAGNOSIS — G60.9 HEREDITARY AND IDIOPATHIC PERIPHERAL NEUROPATHY: Primary | ICD-10-CM

## 2025-01-29 PROCEDURE — 3074F SYST BP LT 130 MM HG: CPT | Performed by: NEUROLOGICAL SURGERY

## 2025-01-29 PROCEDURE — 99213 OFFICE O/P EST LOW 20 MIN: CPT | Performed by: NEUROLOGICAL SURGERY

## 2025-01-29 PROCEDURE — 1159F MED LIST DOCD IN RCRD: CPT | Performed by: NEUROLOGICAL SURGERY

## 2025-01-29 PROCEDURE — 1160F RVW MEDS BY RX/DR IN RCRD: CPT | Performed by: NEUROLOGICAL SURGERY

## 2025-01-29 PROCEDURE — 3078F DIAST BP <80 MM HG: CPT | Performed by: NEUROLOGICAL SURGERY

## 2025-01-29 NOTE — PROGRESS NOTES
"Subjective   Patient ID: Juan James is a 79 y.o. male is here today for follow-up with a new EMG/NCS done on 1/15/2025 with Dr. Gold.    Today patient states that his symptoms are unchanged     History of Present Illness  History of Present Illness  The patient is a 79-year-old male who presents with back pain. He was last seen in mid-December of last year for back pain radiating down his right leg, hand spasms, and neck pain.    He reports experiencing pain and numbness in both legs, which he describes as atypical for a standard back injury. He expresses concern about potential bleeding complications associated with the treatment. He also mentions that his mobility is severely compromised due to the pain, making walking extremely difficult. He was subsequently referred to Dr. Watkins, who provided the final diagnosis and initiated medication management for the peripheral neuropathy. He had to discontinue another medication due to its concurrent action on the nerves.     Supplemental Information  He has an upcoming appointment with Dr. Rosado, a neurologist, within the next month for myasthenia gravis.    The following portions of the patient's history were reviewed and updated as appropriate: allergies, current medications, past family history, past medical history, past social history, past surgical history, and problem list.      Objective     Vitals:    01/29/25 1310   BP: 115/60   Pulse: 68   SpO2: 97%   Weight: 102 kg (225 lb)   Height: 185.4 cm (72.99\")     Body mass index is 29.69 kg/m².    Tobacco Use: Medium Risk (1/29/2025)    Patient History     Smoking Tobacco Use: Former     Smokeless Tobacco Use: Former     Passive Exposure: Past          Physical Exam    Neurological:      Mental Status: He is alert and oriented to person, place, and time.       Neurological Exam    Mental Status  Alert. Oriented to person, place, and time.      Assessment & Plan   Independent Review of Radiographic Studies:    "   I personally reviewed the images from the following studies.    Results  Imaging  MRI of the cervical spine done on 12/06/2024 shows C3-4, C4-5 are both fused and look okay. There is a little foraminal stenosis at C5-6 and C6-7 and C7-T1 are okay. Plain film myelogram and CT scan done in November showed a synovial cyst causing lateral recess stenosis only on the right at L2-3.    Testing  EMG done on 01/13/2025 shows a sensory polyneuropathy involving both lower extremities.    Diagnoses and all orders for this visit:    1. Hereditary and idiopathic peripheral neuropathy (Primary)  -     Ambulatory Referral to Neurology      Assessment & Plan  1. Peripheral neuropathy.  The EMG conducted on 01/13/2024, confirmed a diagnosis of sensory polyneuropathy involving both lower extremities. He was previously placed on medication for this condition. He reports that he had to discontinue another medication due to overlapping treatments for nerve issues. A referral to a neurologist will be made for further evaluation and management of the peripheral neuropathy. A copy of the current note will be provided for his reference during the consultation with the neurologist.    Return if symptoms worsen or fail to improve.    Patient or patient representative verbalized consent for the use of Ambient Listening during the visit with  Bill Washington MD for chart documentation. 1/29/2025  13:37 EST

## 2025-02-03 ENCOUNTER — HOSPITAL ENCOUNTER (OUTPATIENT)
Dept: RADIOLOGY | Facility: HOSPITAL | Age: 80
Discharge: HOME | End: 2025-02-03
Payer: MEDICARE

## 2025-02-03 ENCOUNTER — HOSPITAL ENCOUNTER (OUTPATIENT)
Dept: CT IMAGING | Facility: HOSPITAL | Age: 80
Discharge: HOME | End: 2025-02-03
Payer: MEDICARE

## 2025-02-03 VITALS
HEART RATE: 88 BPM | RESPIRATION RATE: 14 BRPM | OXYGEN SATURATION: 95 % | DIASTOLIC BLOOD PRESSURE: 90 MMHG | SYSTOLIC BLOOD PRESSURE: 153 MMHG

## 2025-02-03 DIAGNOSIS — I71.43 INFRARENAL ABDOMINAL AORTIC ANEURYSM (AAA) WITHOUT RUPTURE: ICD-10-CM

## 2025-02-03 LAB — CREAT BLDA-MCNC: 1.8 MG/DL (ref 0.6–1.3)

## 2025-02-03 PROCEDURE — 82565 ASSAY OF CREATININE: CPT

## 2025-02-03 PROCEDURE — 96360 HYDRATION IV INFUSION INIT: CPT

## 2025-02-03 PROCEDURE — 25510000001 IOPAMIDOL PER 1 ML: Performed by: NURSE PRACTITIONER

## 2025-02-03 PROCEDURE — 25810000003 SODIUM CHLORIDE 0.9 % SOLUTION: Performed by: NURSE PRACTITIONER

## 2025-02-03 PROCEDURE — 74174 CTA ABD&PLVS W/CONTRAST: CPT

## 2025-02-03 RX ORDER — IOPAMIDOL 755 MG/ML
100 INJECTION, SOLUTION INTRAVASCULAR
Status: COMPLETED | OUTPATIENT
Start: 2025-02-03 | End: 2025-02-03

## 2025-02-03 RX ADMIN — IOPAMIDOL 95 ML: 755 INJECTION, SOLUTION INTRAVENOUS at 09:33

## 2025-02-03 RX ADMIN — SODIUM CHLORIDE 500 ML: 9 INJECTION, SOLUTION INTRAVENOUS at 08:17

## 2025-02-09 NOTE — PROGRESS NOTES
"Chief Complaint  Infrarenal abdominal aortic aneurysm (AAA) without rupture    Subjective          HPI: Juan James presents to [facility] who is here today to discuss CT angiogram status post EVAR that was concerning for possible endoleak.  He also has a history of chronic kidney disease and was given IV fluids prior to the scan.  History of Present Illness  The patient presents for discussion of CT angiogram results.    He reports a general state of well-being, with no recent hospitalizations or influenza episodes. He has discontinued the use of anticoagulants, specifically Eliquis, approximately 1 week ago due to widespread bleeding. This decision was made following a consultation with his cardiologist, who deemed it appropriate. His medical history includes atrial fibrillation, which is not associated with any valvular abnormalities. He has transitioned to a regimen of baby aspirin. He also reports significant blood loss, necessitating a brief hospital stay.    MEDICATIONS  Discontinued: Eliquis  Current: baby aspirin    Review of Systems     History Review Reviewed Comments   Past Medical History:  []     Past Surgical History: [x]  Status post EVAR 12/2021   Family History: []     Social History: []       Objective   Vital Signs:  /85   Pulse 86   Ht 185.4 cm (72.99\")   Wt 102 kg (225 lb)   BMI 29.69 kg/m²   Estimated body mass index is 29.69 kg/m² as calculated from the following:    Height as of this encounter: 185.4 cm (72.99\").    Weight as of this encounter: 102 kg (225 lb).              Physical Exam  Physical Exam    Scattered ecchymosis noted.  Abdomen is protuberant.  Abdominal aorta is nonpalpable.      Result Review :    Common labs          10/17/2024    10:53 11/14/2024    10:03 2/3/2025    08:18   Common Labs   Glucose 319      BUN 35      Creatinine 1.31   1.80    Sodium 136      Potassium 5.4      Chloride 100      Calcium 9.2      Total Protein " 6.7      Albumin 3.7      Total Bilirubin 0.2      Alkaline Phosphatase 129      AST (SGOT) 24      ALT (SGPT) 29      WBC 10.00      Hemoglobin 11.1      Hematocrit 34.9      Platelets 161      Uric Acid  5.8           Details          This result is from an external source.             Results  Imaging  CT angiogram shows a small amount of dye in the aneurysm sac, indicating an endoleak, likely from a lumbar artery in the back, classified as a type II endoleak.     Most notable findings include: Creatinine 1.3 in October and 1.8 in February.    Images reviewed:  CT Angiogram Abdomen Pelvis (02/03/2025 09:33) there is a potential area of contrast in the aneurysm sac that is not present on the noncontrasted images although subtle and somewhat obscured by spinal hardware.  Aneurysm sac size has not significantly changed.      Juan James  reports that he quit smoking about 16 years ago. His smoking use included cigarettes, pipe, and cigars. He started smoking about 61 years ago. He has a 90 pack-year smoking history. He has been exposed to tobacco smoke. He has quit using smokeless tobacco..        Patient or patient representative verbalized consent for the use of Ambient Listening during the visit with  Jean Patricia MD for chart documentation. 2/10/2025  08:28 EST        Assessment and Plan     Assessment & Plan  Infrarenal abdominal aortic aneurysm (AAA) without rupture    Orders:    Duplex Aorta IVC Iliac Graft Complete CAR; Future    History of AAA (abdominal aortic aneurysm) repair            Assessment & Plan  1. Type II Endoleak.  The ultrasound revealed a stable abdominal aortic aneurysm sac with no growth, but there was a small amount of blood flow into it. The subsequent CT angiogram showed a small amount of dye in the aneurysm sac, likely due to an endoleak from a lumbar artery. The presence of spinal hardware created some shadows, making it difficult to pinpoint the exact source. Given the  stability of the aneurysm sac, the endoleak is most likely a type II but cannot completely exclude a type III.  A repeat ultrasound will be scheduled in 6 months to monitor the aneurysm sac for any changes. If the aneurysm sac starts to grow, it may indicate a risk of rupture, necessitating further intervention.  Should he have any growth of his aneurysm sac, I would go straight to diagnostic angiogram and possible intervention based on today's study especially in light of his chronic kidney disease.    2. Atrial Fibrillation.  The patient has a history of atrial fibrillation and was previously on Eliquis. He reported discontinuing Eliquis about a week ago due to excessive bleeding and is currently taking a baby aspirin. His cardiology nurse practitioner, Oly Woodward, recommended continuing blood thinners due to a 13.6% risk of stroke yet he remains steadfast in his decision not to take.  He feels like this risk is low and I informed him that it is actually quite high    Follow-up  The patient will follow up in 6 months.    PROCEDURE  The patient had a stent graft placed for an abdominal aortic aneurysm.       Follow Up     Return in about 6 months (around 8/10/2025).  Patient was given instructions and counseling regarding his condition or for health maintenance advice. Please see specific information pulled into the AVS if appropriate.

## 2025-02-10 ENCOUNTER — OFFICE VISIT (OUTPATIENT)
Dept: GASTROENTEROLOGY | Facility: CLINIC | Age: 80
End: 2025-02-10
Payer: MEDICARE

## 2025-02-10 ENCOUNTER — OFFICE VISIT (OUTPATIENT)
Age: 80
End: 2025-02-10
Payer: MEDICARE

## 2025-02-10 VITALS
HEIGHT: 73 IN | WEIGHT: 225 LBS | SYSTOLIC BLOOD PRESSURE: 152 MMHG | BODY MASS INDEX: 29.82 KG/M2 | DIASTOLIC BLOOD PRESSURE: 85 MMHG | HEART RATE: 86 BPM

## 2025-02-10 VITALS
SYSTOLIC BLOOD PRESSURE: 134 MMHG | BODY MASS INDEX: 29 KG/M2 | HEART RATE: 63 BPM | WEIGHT: 218.8 LBS | HEIGHT: 73 IN | DIASTOLIC BLOOD PRESSURE: 81 MMHG | TEMPERATURE: 96.4 F

## 2025-02-10 DIAGNOSIS — K70.31 ALCOHOLIC CIRRHOSIS OF LIVER WITH ASCITES: Primary | ICD-10-CM

## 2025-02-10 DIAGNOSIS — Z98.890 HISTORY OF AAA (ABDOMINAL AORTIC ANEURYSM) REPAIR: ICD-10-CM

## 2025-02-10 DIAGNOSIS — I71.43 INFRARENAL ABDOMINAL AORTIC ANEURYSM (AAA) WITHOUT RUPTURE: Primary | ICD-10-CM

## 2025-02-10 DIAGNOSIS — R60.0 BILATERAL LOWER EXTREMITY EDEMA: ICD-10-CM

## 2025-02-10 DIAGNOSIS — D50.9 IRON DEFICIENCY ANEMIA, UNSPECIFIED IRON DEFICIENCY ANEMIA TYPE: ICD-10-CM

## 2025-02-10 DIAGNOSIS — N18.9 CHRONIC KIDNEY DISEASE, UNSPECIFIED CKD STAGE: ICD-10-CM

## 2025-02-10 LAB
BUN SERPL-MCNC: 34 MG/DL (ref 8–23)
BUN/CREAT SERPL: 19.1 (ref 7–25)
CALCIUM SERPL-MCNC: 9.5 MG/DL (ref 8.6–10.5)
CHLORIDE SERPL-SCNC: 94 MMOL/L (ref 98–107)
CO2 SERPL-SCNC: 29.1 MMOL/L (ref 22–29)
CREAT SERPL-MCNC: 1.78 MG/DL (ref 0.76–1.27)
EGFRCR SERPLBLD CKD-EPI 2021: 38.8 ML/MIN/1.73
ERYTHROCYTE [DISTWIDTH] IN BLOOD BY AUTOMATED COUNT: 13.1 % (ref 12.3–15.4)
GLUCOSE SERPL-MCNC: 317 MG/DL (ref 65–99)
HCT VFR BLD AUTO: 39.6 % (ref 37.5–51)
HGB BLD-MCNC: 13 G/DL (ref 13–17.7)
MCH RBC QN AUTO: 34.4 PG (ref 26.6–33)
MCHC RBC AUTO-ENTMCNC: 32.8 G/DL (ref 31.5–35.7)
MCV RBC AUTO: 104.8 FL (ref 79–97)
PLATELET # BLD AUTO: 195 10*3/MM3 (ref 140–450)
POTASSIUM SERPL-SCNC: 5.7 MMOL/L (ref 3.5–5.2)
RBC # BLD AUTO: 3.78 10*6/MM3 (ref 4.14–5.8)
SODIUM SERPL-SCNC: 134 MMOL/L (ref 136–145)
WBC # BLD AUTO: 9.6 10*3/MM3 (ref 3.4–10.8)

## 2025-02-10 PROCEDURE — 1159F MED LIST DOCD IN RCRD: CPT | Performed by: PHYSICIAN ASSISTANT

## 2025-02-10 PROCEDURE — 99214 OFFICE O/P EST MOD 30 MIN: CPT | Performed by: PHYSICIAN ASSISTANT

## 2025-02-10 PROCEDURE — 1160F RVW MEDS BY RX/DR IN RCRD: CPT | Performed by: PHYSICIAN ASSISTANT

## 2025-02-10 PROCEDURE — 3079F DIAST BP 80-89 MM HG: CPT | Performed by: PHYSICIAN ASSISTANT

## 2025-02-10 PROCEDURE — 3075F SYST BP GE 130 - 139MM HG: CPT | Performed by: PHYSICIAN ASSISTANT

## 2025-02-10 RX ORDER — PREDNISONE 10 MG/1
10 TABLET ORAL DAILY
COMMUNITY

## 2025-02-10 RX ORDER — TORSEMIDE 100 MG/1
1 TABLET ORAL DAILY
COMMUNITY
Start: 2025-02-06 | End: 2026-02-06

## 2025-02-10 NOTE — PROGRESS NOTES
Chief Complaint  Cirrhosis    Subjective          History of Present Illness    Juan James is a  79 y.o. male presents for follow-up on edema, cirrhosis, and leg cramps. He is a patient of Dr. Méndez last seen by me on 12/5/2024.    Cirrhosis: He is taking spironolactone 100 mg daily, torsemide 100 mg daily--started torsemide last week, changed by nephrology.  Decrease in pedal edema since starting, no change in weight.  History of hyperkalemia.  1/8/2025 CMP with normal LFTs, albumin, potassium 4.8, sodium 135.    History of anemia -September 2024 which improved in October.  He has history of A-fib on Eliquis-stopped 1 week ago due to easy bleeding from skin, he is now on baby aspirin only.  He does have increased risk of stroke which was discussed by cardiology but he states he would rather take that stroke risk than bleed everywhere.  Follows with Dr. Lee.      He follows with Dr. Jean Patricia, vascular surgery, for AAA.  Following for possible endoleak-type II versus type III and plan for repeat ultrasound in 6 months.     He also saw Dr. Arroyo, nephrology, for CKD stage IIIb.  2/3/2025 creatinine 1.8.    10/17/24 AFP tumor marker normal at 2.7. MELD 3.0 score 13     From 10/17/2024 office note:  9/27/2024 PET scan showed 3.5 cm focus of consolidation in the dependent left lower lobe with paraseptal emphysema-unchanged from November 2023.  New superior right upper lobe subpleural 4.8 cm focus of mildly hypermetabolic consolidation-likely infectious/inflammatory.  Review for PET scan for other findings.  No suspicious uptake     9/5/2024 CT liver protocol showed cirrhosis, no lesions or ascites.  Small varices around the spleen.  Additional lung findings as well.     2/17/2021 colonoscopy with Dr. Méndez showed 2 polyps, diverticulosis. Pathology with hyperplastic polyp.  History of piecemeal TVA in 2019 colonoscopy.  Recall 3 years.      History of A-fib on Pradaxa and follows with Dr. Mo,  "cardiology.  History of daily alcohol use 6 drinks per day for 25 years.  Now only occasional.       Objective   Vital Signs:   /81   Pulse 63   Temp 96.4 °F (35.8 °C)   Ht 185.4 cm (73\")   Wt 99.2 kg (218 lb 12.8 oz)   BMI 28.87 kg/m²       Physical Exam  Vitals reviewed.   Constitutional:       General: He is awake. He is not in acute distress.     Appearance: Normal appearance. He is well-developed and well-groomed.   HENT:      Head: Normocephalic.   Pulmonary:      Effort: Pulmonary effort is normal. No respiratory distress.   Skin:     Coloration: Skin is not pale.   Neurological:      Mental Status: He is alert and oriented to person, place, and time.      Gait: Gait is intact.   Psychiatric:         Mood and Affect: Mood and affect normal.         Speech: Speech normal.         Behavior: Behavior is cooperative.         Judgment: Judgment normal.          Result Review :             Assessment and Plan    Diagnoses and all orders for this visit:    1. Alcoholic cirrhosis of liver with ascites (Primary)  -     US Liver  -     Basic Metabolic Panel  -     CBC (No Diff)    2. Iron deficiency anemia, unspecified iron deficiency anemia type    3. Bilateral lower extremity edema    4. Chronic kidney disease, unspecified CKD stage    Ideally would recommend high protein diet, but would like him to check with nephrologist on this before starting.  He has to call him on 2/12 with his weight after changing to torsemide.  He has not lost any weight since starting this although his ankles are less swollen.    He will be due for repeat HCC screening in March.  CT performed last time as well performed ultrasound this time.  Labs in April.    He will eventually need EGD for esophageal variceal surveillance and colonoscopy for polyp surveillance.  Held off due to his pulmonary status initially which has seemed to stabilize.  He does have COPD and pulmonary hypertension. Cardiology did release him for general " anesthesia for spinal procedure previously.  He also is following with vascular surgery for AAA with possible endoleak.  They are following with ultrasound in 6 months.  I feel risks of endoscopy outweigh benefits at this point so we will continue to hold off.    Follow Up   Return in about 3 months (around 5/10/2025).    Dragon dictation used throughout this note.     I spent 30 minutes caring for Mr. James on this date of service. This time includes time spent by me in the following activities: preparing for the visit, reviewing tests, obtaining and/or reviewing a separately obtained history, performing a medically appropriate examination and/or evaluation , counseling and educating the patient/family/caregiver, ordering medications, tests, or procedures, documenting information in the medical record, independently interpreting results and communicating that information with the patient/family/caregiver and care coordination.      Asia Hampton PA-C

## 2025-02-11 ENCOUNTER — TELEPHONE (OUTPATIENT)
Dept: GASTROENTEROLOGY | Facility: CLINIC | Age: 80
End: 2025-02-11
Payer: MEDICARE

## 2025-02-11 DIAGNOSIS — R89.9 ABNORMAL LABORATORY TEST RESULT: Primary | ICD-10-CM

## 2025-02-11 RX ORDER — SPIRONOLACTONE 50 MG/1
75 TABLET, FILM COATED ORAL DAILY
Qty: 45 TABLET | Refills: 1 | Status: SHIPPED | OUTPATIENT
Start: 2025-02-11

## 2025-02-11 NOTE — TELEPHONE ENCOUNTER
Patient called, no answer. Left VM requesting call back.   Order for BMP placed and awaiting provider signature.

## 2025-02-11 NOTE — TELEPHONE ENCOUNTER
----- Message from Asia Hampton sent at 2/11/2025  8:35 AM EST -----  Please let Mr. James know that his potassium is a little high. Confirm he is not taking extra potassium (he should not be). Assuming he is not taking potassium: Recommend decreasing Spironolactone to 75mg daily. They only come in 50mg and 100mg so I'll send in 50mg and he can take 1.5 tabs. Recheck BMP next week. Then send these results to Nephrology--Dr. Arroyo, with note stating I reduced his spironolactone to 75mg daily ([id number]). Thank you.

## 2025-02-11 NOTE — PROGRESS NOTES
Please let Mr. James know that his potassium is a little high. Confirm he is not taking extra potassium (he should not be). Assuming he is not taking potassium: Recommend decreasing Spironolactone to 75mg daily. They only come in 50mg and 100mg so I'll send in 50mg and he can take 1.5 tabs. Recheck BMP next week. Then send these results to Nephrology--Dr. Arroyo, with note stating I reduced his spironolactone to 75mg daily ([id number]). Thank you.

## 2025-02-12 NOTE — TELEPHONE ENCOUNTER
Called pt and advised of Asia Hampton PA-C's note.  Pt verbalized understanding.    Pt states he is not taking any oral K+ and is watching his diet and avoiding foods high in K+.    Pt will go to [facility] in [place] next week and have labs drawn.     Update sent to RENEE Betancourt.

## 2025-02-24 ENCOUNTER — OFFICE VISIT (OUTPATIENT)
Dept: SPORTS MEDICINE | Facility: CLINIC | Age: 80
End: 2025-02-24
Payer: MEDICARE

## 2025-02-24 VITALS
SYSTOLIC BLOOD PRESSURE: 132 MMHG | TEMPERATURE: 97.5 F | DIASTOLIC BLOOD PRESSURE: 90 MMHG | WEIGHT: 218 LBS | HEIGHT: 73 IN | OXYGEN SATURATION: 97 % | BODY MASS INDEX: 28.89 KG/M2 | HEART RATE: 87 BPM

## 2025-02-24 DIAGNOSIS — M53.3 CHRONIC SACROILIAC PAIN: Primary | ICD-10-CM

## 2025-02-24 DIAGNOSIS — M79.651 RIGHT THIGH PAIN: ICD-10-CM

## 2025-02-24 DIAGNOSIS — G89.29 CHRONIC SACROILIAC PAIN: Primary | ICD-10-CM

## 2025-02-24 PROCEDURE — 3080F DIAST BP >= 90 MM HG: CPT | Performed by: FAMILY MEDICINE

## 2025-02-24 PROCEDURE — 99213 OFFICE O/P EST LOW 20 MIN: CPT | Performed by: FAMILY MEDICINE

## 2025-02-24 PROCEDURE — 3075F SYST BP GE 130 - 139MM HG: CPT | Performed by: FAMILY MEDICINE

## 2025-02-24 RX ORDER — LOSARTAN POTASSIUM 25 MG/1
1 TABLET ORAL DAILY
COMMUNITY
Start: 2025-02-11 | End: 2026-02-11

## 2025-02-24 RX ORDER — SPIRONOLACTONE 100 MG/1
1 TABLET, FILM COATED ORAL DAILY
COMMUNITY
Start: 2025-02-10 | End: 2025-03-10

## 2025-02-25 ENCOUNTER — HOSPITAL ENCOUNTER (OUTPATIENT)
Facility: HOSPITAL | Age: 80
Discharge: HOME | End: 2025-02-25
Admitting: PHYSICIAN ASSISTANT
Payer: MEDICARE

## 2025-02-25 PROCEDURE — 76705 ECHO EXAM OF ABDOMEN: CPT

## 2025-02-26 ENCOUNTER — HOSPITAL ENCOUNTER (OUTPATIENT)
Facility: HOSPITAL | Age: 80
Discharge: HOME | End: 2025-02-26
Admitting: NURSE PRACTITIONER
Payer: MEDICARE

## 2025-02-26 PROCEDURE — 71275 CT ANGIOGRAPHY CHEST: CPT

## 2025-02-26 PROCEDURE — 25510000001 IOPAMIDOL PER 1 ML: Performed by: NURSE PRACTITIONER

## 2025-02-26 RX ORDER — IOPAMIDOL 755 MG/ML
100 INJECTION, SOLUTION INTRAVASCULAR
Status: COMPLETED | OUTPATIENT
Start: 2025-02-26 | End: 2025-02-26

## 2025-02-26 RX ADMIN — IOPAMIDOL 95 ML: 755 INJECTION, SOLUTION INTRAVENOUS at 08:47

## 2025-03-04 RX ORDER — TRIAMCINOLONE ACETONIDE 40 MG/ML
80 INJECTION, SUSPENSION INTRA-ARTICULAR; INTRAMUSCULAR ONCE
Status: SHIPPED | OUTPATIENT
Start: 2025-03-04

## 2025-03-04 NOTE — PROGRESS NOTES
"Juan is a 79 y.o. year old male     Chief Complaint   Patient presents with    Back Pain     LUMBAR SPINE- Patient is here to follow up on his lower back pain, interested in repeat steroid injection.        History of Present Illness  History of Present Illness  The patient is here for follow-up on chronic bilateral sacroiliac joint pain.    He received an injection in 11/2024, which provided a couple of months of relief, but the pain has been worsening since. He describes specific aching or throbbing pain in the right thigh.    He saw neurology and was given a higher dose of gabapentin for neuropathy.        I have reviewed the patient's medical, family, and social history in detail and updated the computerized patient record.    Review of Systems    /90 (BP Location: Right arm, Patient Position: Sitting, Cuff Size: Adult)   Pulse 87   Temp 97.5 °F (36.4 °C) (Temporal)   Ht 185.4 cm (73\")   Wt 98.9 kg (218 lb)   SpO2 97%   BMI 28.76 kg/m²      Physical Exam    Vital signs reviewed.   General: No acute distress.      Physical Exam  Direct tenderness to palpation in the distal third of the femur.    Results  Results  X-ray of the femur, AP and lateral views, show a normal femur. Mild osteoarthritis of the hip with osteophyte formation and joint space narrowing. Extensive vascular calcification noted.    Procedures   Ultrasound-Guided Sacroiliac Joint Injection Procedure Note    Bilateral sacroiliac joint injection was discussed with the patient in detail, including indication, risks, benefits, and alternatives. Verbal consent was given for the procedure. Injection was performed by physician.  Injection site was identified by ultrasound examination, then cleaned with Betadine and alcohol swabs. Prior to needle insertion, ethyl chloride spray was used for surface anesthesia. Sterile technique was used. Ultrasound guidance was indicated for injection accuracy.  A 22-gauge, 3.5\" spinal needle was guided to " the joint under continuous direct ultrasound visualization. Injectate was seen flowing underneath the superficial sacroiliac ligaments and passed without difficulty. The needle was removed and a simple bandage was applied. The procedure was tolerated well without difficulty.    Injection mixture:  1% lidocaine without epinephrine: 2 mL  40 mg/mL triamcinolone acetonide: 1 mL     Diagnoses and all orders for this visit:    Chronic sacroiliac pain    Right thigh pain  -     XR Femur 2 View Right    Other orders  -     losartan (COZAAR) 25 MG tablet; Take 1 tablet by mouth Daily.  -     spironolactone (ALDACTONE) 100 MG tablet; Take 1 tablet by mouth Daily. Taken along with 100 mg. Total of 150 mg.      Assessment & Plan  1. Chronic bilateral sacroiliac joint pain.  Administered repeat injection to bilateral sacroiliac joints, tolerated well. Thigh pain may be referred from lumbar and sacroiliac pathology, related to neuropathy, or possibly vascular. If no significant improvement with injections and increased gabapentin dose, further evaluation will be considered.    2. Neuropathy.  Given a higher dose of gabapentin by neurology.    PROCEDURE  Administered repeat injection to bilateral sacroiliac joints, tolerated well.    Patient or patient representative verbalized consent for the use of Ambient Listening during the visit with  Stephan Harvey MD for chart documentation. 3/4/2025  15:30 EST    *Dictated after leaving exam room.     Stephan Harvey MD   15:29 EST   03/04/25

## 2025-03-06 ENCOUNTER — TELEPHONE (OUTPATIENT)
Dept: CARDIOLOGY | Facility: CLINIC | Age: 80
End: 2025-03-06
Payer: MEDICARE

## 2025-03-06 DIAGNOSIS — I77.810 DILATED AORTIC ROOT: Primary | ICD-10-CM

## 2025-03-06 NOTE — TELEPHONE ENCOUNTER
I reviewed the CTA results with patient.  Dr. Mo and I have recommended referral to CV surgery for aortic root dilation.  Patient verbalizes understanding.    His CT scan showed abnormal lungs, airspace and emphysema.  He is recommended to have a repeat CT scan in 3 months.  I will defer to his pulmonologist.  I have sent the CT report to his pulmonologist and he will see him in the next month.    He is aware that he has cirrhosis of the liver.

## 2025-03-10 RX ORDER — SPIRONOLACTONE 100 MG/1
100 TABLET, FILM COATED ORAL DAILY
Qty: 90 TABLET | Refills: 0 | Status: SHIPPED | OUTPATIENT
Start: 2025-03-10 | End: 2025-03-12 | Stop reason: DRUGHIGH

## 2025-03-11 NOTE — OUTREACH NOTE
General Surgery Week 2 Survey      Responses   [facility] patient discharged from?  [place]   Does the patient have one of the following disease processes/diagnoses(primary or secondary)?  General Surgery   Week 2 attempt successful?  Yes   Call start time  0747   Rescheduled  Revoked   Revoke  Decline to participate [Has a nurse and therapist does not feel calls are necessary. Instructed on Nurse Call Center.,]   Discharge diagnosis  Lumbar 3 4 and lumbar 4 5 laminectomy and fusion with instrumentation           India Clemente RN   Tetracycline Pregnancy And Lactation Text: This medication is Pregnancy Category D and not consider safe during pregnancy. It is also excreted in breast milk. Tazorac Counseling:  Patient advised that medication is irritating and drying.  Patient may need to apply sparingly and wash off after an hour before eventually leaving it on overnight.  The patient verbalized understanding of the proper use and possible adverse effects of tazorac.  All of the patient's questions and concerns were addressed. Bactrim Pregnancy And Lactation Text: This medication is Pregnancy Category D and is known to cause fetal risk.  It is also excreted in breast milk. Isotretinoin Counseling: Patient should get monthly blood tests, not donate blood, not drive at night if vision affected, not share medication, and not undergo elective surgery for 6 months after tx completed. Side effects reviewed, pt to contact office should one occur. Topical Clindamycin Counseling: Patient counseled that this medication may cause skin irritation or allergic reactions.  In the event of skin irritation, the patient was advised to reduce the amount of the drug applied or use it less frequently.   The patient verbalized understanding of the proper use and possible adverse effects of clindamycin.  All of the patient's questions and concerns were addressed. Birth Control Pills Pregnancy And Lactation Text: This medication should be avoided if pregnant and for the first 30 days post-partum. High Dose Vitamin A Counseling: Side effects reviewed, pt to contact office should one occur. Spironolactone Pregnancy And Lactation Text: This medication can cause feminization of the male fetus and should be avoided during pregnancy. The active metabolite is also found in breast milk. Azithromycin Pregnancy And Lactation Text: This medication is considered safe during pregnancy and is also secreted in breast milk. Use Enhanced Medication Counseling?: No Benzoyl Peroxide Counseling: Patient counseled that medicine may cause skin irritation and bleach clothing.  In the event of skin irritation, the patient was advised to reduce the amount of the drug applied or use it less frequently.   The patient verbalized understanding of the proper use and possible adverse effects of benzoyl peroxide.  All of the patient's questions and concerns were addressed. Topical Sulfur Applications Pregnancy And Lactation Text: This medication is Pregnancy Category C and has an unknown safety profile during pregnancy. It is unknown if this topical medication is excreted in breast milk. Doxycycline Counseling:  Patient counseled regarding possible photosensitivity and increased risk for sunburn.  Patient instructed to avoid sunlight, if possible.  When exposed to sunlight, patients should wear protective clothing, sunglasses, and sunscreen.  The patient was instructed to call the office immediately if the following severe adverse effects occur:  hearing changes, easy bruising/bleeding, severe headache, or vision changes.  The patient verbalized understanding of the proper use and possible adverse effects of doxycycline.  All of the patient's questions and concerns were addressed. Topical Retinoid counseling:  Patient advised to apply a pea-sized amount only at bedtime and wait 30 minutes after washing their face before applying.  If too drying, patient may add a non-comedogenic moisturizer. The patient verbalized understanding of the proper use and possible adverse effects of retinoids.  All of the patient's questions and concerns were addressed. Winlevi Pregnancy And Lactation Text: This medication is considered safe during pregnancy and breastfeeding. Erythromycin Counseling:  I discussed with the patient the risks of erythromycin including but not limited to GI upset, allergic reaction, drug rash, diarrhea, increase in liver enzymes, and yeast infections. Dapsone Counseling: I discussed with the patient the risks of dapsone including but not limited to hemolytic anemia, agranulocytosis, rashes, methemoglobinemia, kidney failure, peripheral neuropathy, headaches, GI upset, and liver toxicity.  Patients who start dapsone require monitoring including baseline LFTs and weekly CBCs for the first month, then every month thereafter.  The patient verbalized understanding of the proper use and possible adverse effects of dapsone.  All of the patient's questions and concerns were addressed. High Dose Vitamin A Pregnancy And Lactation Text: High dose vitamin A therapy is contraindicated during pregnancy and breast feeding. Azelaic Acid Counseling: Patient counseled that medicine may cause skin irritation and to avoid applying near the eyes.  In the event of skin irritation, the patient was advised to reduce the amount of the drug applied or use it less frequently.   The patient verbalized understanding of the proper use and possible adverse effects of azelaic acid.  All of the patient's questions and concerns were addressed. Aklief counseling:  Patient advised to apply a pea-sized amount only at bedtime and wait 30 minutes after washing their face before applying.  If too drying, patient may add a non-comedogenic moisturizer.  The most commonly reported side effects including irritation, redness, scaling, dryness, stinging, burning, itching, and increased risk of sunburn.  The patient verbalized understanding of the proper use and possible adverse effects of retinoids.  All of the patient's questions and concerns were addressed. Detail Level: Detailed Tazorac Pregnancy And Lactation Text: This medication is not safe during pregnancy. It is unknown if this medication is excreted in breast milk. Birth Control Pills Counseling: Birth Control Pill Counseling: I discussed with the patient the potential side effects of OCPs including but not limited to increased risk of stroke, heart attack, thrombophlebitis, deep venous thrombosis, hepatic adenomas, breast changes, GI upset, headaches, and depression.  The patient verbalized understanding of the proper use and possible adverse effects of OCPs. All of the patient's questions and concerns were addressed. Topical Clindamycin Pregnancy And Lactation Text: This medication is Pregnancy Category B and is considered safe during pregnancy. It is unknown if it is excreted in breast milk. Erythromycin Pregnancy And Lactation Text: This medication is Pregnancy Category B and is considered safe during pregnancy. It is also excreted in breast milk. Spironolactone Counseling: Patient advised regarding risks of diarrhea, abdominal pain, hyperkalemia, birth defects (for female patients), liver toxicity and renal toxicity. The patient may need blood work to monitor liver and kidney function and potassium levels while on therapy. The patient verbalized understanding of the proper use and possible adverse effects of spironolactone.  All of the patient's questions and concerns were addressed. Benzoyl Peroxide Pregnancy And Lactation Text: This medication is Pregnancy Category C. It is unknown if benzoyl peroxide is excreted in breast milk. Isotretinoin Pregnancy And Lactation Text: This medication is Pregnancy Category X and is considered extremely dangerous during pregnancy. It is unknown if it is excreted in breast milk. Tetracycline Counseling: Patient counseled regarding possible photosensitivity and increased risk for sunburn.  Patient instructed to avoid sunlight, if possible.  When exposed to sunlight, patients should wear protective clothing, sunglasses, and sunscreen.  The patient was instructed to call the office immediately if the following severe adverse effects occur:  hearing changes, easy bruising/bleeding, severe headache, or vision changes.  The patient verbalized understanding of the proper use and possible adverse effects of tetracycline.  All of the patient's questions and concerns were addressed. Patient understands to avoid pregnancy while on therapy due to potential birth defects. Topical Retinoid Pregnancy And Lactation Text: This medication is Pregnancy Category C. It is unknown if this medication is excreted in breast milk. Bactrim Counseling:  I discussed with the patient the risks of sulfa antibiotics including but not limited to GI upset, allergic reaction, drug rash, diarrhea, dizziness, photosensitivity, and yeast infections.  Rarely, more serious reactions can occur including but not limited to aplastic anemia, agranulocytosis, methemoglobinemia, blood dyscrasias, liver or kidney failure, lung infiltrates or desquamative/blistering drug rashes. Dapsone Pregnancy And Lactation Text: This medication is Pregnancy Category C and is not considered safe during pregnancy or breast feeding. Winlevi Counseling:  I discussed with the patient the risks of topical clascoterone including but not limited to erythema, scaling, itching, and stinging. Patient voiced their understanding. Azithromycin Counseling:  I discussed with the patient the risks of azithromycin including but not limited to GI upset, allergic reaction, drug rash, diarrhea, and yeast infections. Doxycycline Pregnancy And Lactation Text: This medication is Pregnancy Category D and not consider safe during pregnancy. It is also excreted in breast milk but is considered safe for shorter treatment courses. Sarecycline Counseling: Patient advised regarding possible photosensitivity and discoloration of the teeth, skin, lips, tongue and gums.  Patient instructed to avoid sunlight, if possible.  When exposed to sunlight, patients should wear protective clothing, sunglasses, and sunscreen.  The patient was instructed to call the office immediately if the following severe adverse effects occur:  hearing changes, easy bruising/bleeding, severe headache, or vision changes.  The patient verbalized understanding of the proper use and possible adverse effects of sarecycline.  All of the patient's questions and concerns were addressed. Minocycline Counseling: Patient advised regarding possible photosensitivity and discoloration of the teeth, skin, lips, tongue and gums.  Patient instructed to avoid sunlight, if possible.  When exposed to sunlight, patients should wear protective clothing, sunglasses, and sunscreen.  The patient was instructed to call the office immediately if the following severe adverse effects occur:  hearing changes, easy bruising/bleeding, severe headache, or vision changes.  The patient verbalized understanding of the proper use and possible adverse effects of minocycline.  All of the patient's questions and concerns were addressed. Aklief Pregnancy And Lactation Text: It is unknown if this medication is safe to use during pregnancy.  It is unknown if this medication is excreted in breast milk.  Breastfeeding women should use the topical cream on the smallest area of the skin for the shortest time needed while breastfeeding.  Do not apply to nipple and areola. Azelaic Acid Pregnancy And Lactation Text: This medication is considered safe during pregnancy and breast feeding. Topical Sulfur Applications Counseling: Topical Sulfur Counseling: Patient counseled that this medication may cause skin irritation or allergic reactions.  In the event of skin irritation, the patient was advised to reduce the amount of the drug applied or use it less frequently.   The patient verbalized understanding of the proper use and possible adverse effects of topical sulfur application.  All of the patient's questions and concerns were addressed.

## 2025-03-12 ENCOUNTER — PATIENT MESSAGE (OUTPATIENT)
Dept: GASTROENTEROLOGY | Facility: CLINIC | Age: 80
End: 2025-03-12
Payer: MEDICARE

## 2025-03-12 RX ORDER — SPIRONOLACTONE 50 MG/1
75 TABLET, FILM COATED ORAL DAILY
Qty: 135 TABLET | Refills: 1 | Status: SHIPPED | OUTPATIENT
Start: 2025-03-12

## 2025-03-16 RX ORDER — TAMSULOSIN HYDROCHLORIDE 0.4 MG/1
1 CAPSULE ORAL EVERY EVENING
Qty: 90 CAPSULE | Refills: 0 | OUTPATIENT
Start: 2025-03-16

## 2025-03-28 RX ORDER — TAMSULOSIN HYDROCHLORIDE 0.4 MG/1
1 CAPSULE ORAL EVERY EVENING
Qty: 90 CAPSULE | Refills: 3 | Status: SHIPPED | OUTPATIENT
Start: 2025-03-28

## 2025-03-28 NOTE — TELEPHONE ENCOUNTER
LOV                  5/7/2024                    NOV                  Visit date not found-needs ov  LAST REFILL     2/7/2024  PROTOCOL       Met

## 2025-04-01 ENCOUNTER — TELEPHONE (OUTPATIENT)
Dept: CARDIAC SURGERY | Facility: CLINIC | Age: 80
End: 2025-04-01
Payer: MEDICARE

## 2025-04-03 ENCOUNTER — OFFICE VISIT (OUTPATIENT)
Dept: CARDIAC SURGERY | Facility: CLINIC | Age: 80
End: 2025-04-03
Payer: MEDICARE

## 2025-04-03 VITALS
HEART RATE: 57 BPM | HEIGHT: 73 IN | OXYGEN SATURATION: 98 % | WEIGHT: 220 LBS | RESPIRATION RATE: 18 BRPM | BODY MASS INDEX: 29.16 KG/M2 | SYSTOLIC BLOOD PRESSURE: 106 MMHG | DIASTOLIC BLOOD PRESSURE: 56 MMHG

## 2025-04-03 DIAGNOSIS — I77.810 DILATED AORTIC ROOT: ICD-10-CM

## 2025-04-03 DIAGNOSIS — I48.21 PERMANENT ATRIAL FIBRILLATION: ICD-10-CM

## 2025-04-03 DIAGNOSIS — K70.31 ALCOHOLIC CIRRHOSIS OF LIVER WITH ASCITES: ICD-10-CM

## 2025-04-03 DIAGNOSIS — I25.10 CORONARY ARTERY DISEASE INVOLVING NATIVE CORONARY ARTERY OF NATIVE HEART WITHOUT ANGINA PECTORIS: Primary | ICD-10-CM

## 2025-04-03 DIAGNOSIS — I27.20 PULMONARY HYPERTENSION: ICD-10-CM

## 2025-04-03 DIAGNOSIS — Q25.43 ANEURYSM OF AORTIC ROOT: ICD-10-CM

## 2025-04-03 DIAGNOSIS — I10 ESSENTIAL HYPERTENSION: ICD-10-CM

## 2025-04-03 PROCEDURE — 3078F DIAST BP <80 MM HG: CPT

## 2025-04-03 PROCEDURE — 1160F RVW MEDS BY RX/DR IN RCRD: CPT

## 2025-04-03 PROCEDURE — 99204 OFFICE O/P NEW MOD 45 MIN: CPT

## 2025-04-03 PROCEDURE — 3074F SYST BP LT 130 MM HG: CPT

## 2025-04-03 PROCEDURE — 1159F MED LIST DOCD IN RCRD: CPT
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
## 2025-04-14 DIAGNOSIS — Q25.43 ANEURYSM OF AORTIC ROOT: Primary | ICD-10-CM

## 2025-04-18 ENCOUNTER — APPOINTMENT (OUTPATIENT)
Dept: GENERAL RADIOLOGY | Facility: HOSPITAL | Age: 80
DRG: 323 | End: 2025-04-18
Payer: MEDICARE

## 2025-04-18 ENCOUNTER — APPOINTMENT (OUTPATIENT)
Dept: CT IMAGING | Facility: HOSPITAL | Age: 80
DRG: 323 | End: 2025-04-18
Payer: MEDICARE

## 2025-04-18 ENCOUNTER — HOSPITAL ENCOUNTER (INPATIENT)
Facility: HOSPITAL | Age: 80
LOS: 4 days | Discharge: HOME | DRG: 323 | End: 2025-04-24
Attending: STUDENT IN AN ORGANIZED HEALTH CARE EDUCATION/TRAINING PROGRAM | Admitting: STUDENT IN AN ORGANIZED HEALTH CARE EDUCATION/TRAINING PROGRAM
Payer: MEDICARE

## 2025-04-18 ENCOUNTER — TELEPHONE (OUTPATIENT)
Dept: GASTROENTEROLOGY | Facility: CLINIC | Age: 80
End: 2025-04-18

## 2025-04-18 DIAGNOSIS — I51.7 CARDIOMEGALY: ICD-10-CM

## 2025-04-18 DIAGNOSIS — R55 SYNCOPE, UNSPECIFIED SYNCOPE TYPE: ICD-10-CM

## 2025-04-18 DIAGNOSIS — R55 SYNCOPE AND COLLAPSE: ICD-10-CM

## 2025-04-18 DIAGNOSIS — R79.89 ELEVATED TROPONIN: ICD-10-CM

## 2025-04-18 DIAGNOSIS — I21.4 NSTEMI, INITIAL EPISODE OF CARE: Primary | ICD-10-CM

## 2025-04-18 LAB
ALBUMIN SERPL-MCNC: 4 G/DL (ref 3.5–5.2)
ALBUMIN/GLOB SERPL: 1.3 G/DL
ALP SERPL-CCNC: 113 U/L (ref 39–117)
ALT SERPL W P-5'-P-CCNC: 44 U/L (ref 1–41)
ANION GAP SERPL CALCULATED.3IONS-SCNC: 16 MMOL/L (ref 5–15)
AST SERPL-CCNC: 28 U/L (ref 1–40)
BASOPHILS # BLD AUTO: 0.05 10*3/MM3 (ref 0–0.2)
BASOPHILS NFR BLD AUTO: 0.5 % (ref 0–1.5)
BILIRUB SERPL-MCNC: 0.6 MG/DL (ref 0–1.2)
BUN SERPL-MCNC: 79 MG/DL (ref 8–23)
BUN/CREAT SERPL: 32.5 (ref 7–25)
CALCIUM SPEC-SCNC: 9.4 MG/DL (ref 8.6–10.5)
CHLORIDE SERPL-SCNC: 96 MMOL/L (ref 98–107)
CK SERPL-CCNC: 66 U/L (ref 20–200)
CO2 SERPL-SCNC: 21 MMOL/L (ref 22–29)
CREAT SERPL-MCNC: 2.43 MG/DL (ref 0.76–1.27)
DEPRECATED RDW RBC AUTO: 59.2 FL (ref 37–54)
EGFRCR SERPLBLD CKD-EPI 2021: 26.7 ML/MIN/1.73
EOSINOPHIL # BLD AUTO: 0 10*3/MM3 (ref 0–0.4)
EOSINOPHIL NFR BLD AUTO: 0 % (ref 0.3–6.2)
ERYTHROCYTE [DISTWIDTH] IN BLOOD BY AUTOMATED COUNT: 15.8 % (ref 12.3–15.4)
GEN 5 1HR TROPONIN T REFLEX: 406 NG/L
GLOBULIN UR ELPH-MCNC: 3.1 GM/DL
GLUCOSE SERPL-MCNC: 148 MG/DL (ref 65–99)
HCT VFR BLD AUTO: 31.1 % (ref 37.5–51)
HGB BLD-MCNC: 10.5 G/DL (ref 13–17.7)
HOLD SPECIMEN: NORMAL
HOLD SPECIMEN: NORMAL
IMM GRANULOCYTES # BLD AUTO: 0.25 10*3/MM3 (ref 0–0.05)
IMM GRANULOCYTES NFR BLD AUTO: 2.5 % (ref 0–0.5)
LYMPHOCYTES # BLD AUTO: 1.3 10*3/MM3 (ref 0.7–3.1)
LYMPHOCYTES NFR BLD AUTO: 13.1 % (ref 19.6–45.3)
MAGNESIUM SERPL-MCNC: 2.5 MG/DL (ref 1.6–2.4)
MCH RBC QN AUTO: 34.8 PG (ref 26.6–33)
MCHC RBC AUTO-ENTMCNC: 33.8 G/DL (ref 31.5–35.7)
MCV RBC AUTO: 103 FL (ref 79–97)
MONOCYTES # BLD AUTO: 0.96 10*3/MM3 (ref 0.1–0.9)
MONOCYTES NFR BLD AUTO: 9.7 % (ref 5–12)
NEUTROPHILS NFR BLD AUTO: 7.34 10*3/MM3 (ref 1.7–7)
NEUTROPHILS NFR BLD AUTO: 74.2 % (ref 42.7–76)
NRBC BLD AUTO-RTO: 0.2 /100 WBC (ref 0–0.2)
NT-PROBNP SERPL-MCNC: 4581 PG/ML (ref 0–1800)
PLATELET # BLD AUTO: 123 10*3/MM3 (ref 140–450)
PMV BLD AUTO: 8.6 FL (ref 6–12)
POTASSIUM SERPL-SCNC: 5.9 MMOL/L (ref 3.5–5.2)
PROT SERPL-MCNC: 7.1 G/DL (ref 6–8.5)
RBC # BLD AUTO: 3.02 10*6/MM3 (ref 4.14–5.8)
SODIUM SERPL-SCNC: 133 MMOL/L (ref 136–145)
TROPONIN T % DELTA: 4
TROPONIN T NUMERIC DELTA: 15 NG/L
TROPONIN T SERPL HS-MCNC: 391 NG/L
WBC NRBC COR # BLD AUTO: 9.9 10*3/MM3 (ref 3.4–10.8)
WHOLE BLOOD HOLD COAG: NORMAL
WHOLE BLOOD HOLD SPECIMEN: NORMAL

## 2025-04-18 PROCEDURE — 93010 ELECTROCARDIOGRAM REPORT: CPT | Performed by: INTERNAL MEDICINE

## 2025-04-18 PROCEDURE — 71275 CT ANGIOGRAPHY CHEST: CPT

## 2025-04-18 PROCEDURE — 85025 COMPLETE CBC W/AUTO DIFF WBC: CPT

## 2025-04-18 PROCEDURE — 71045 X-RAY EXAM CHEST 1 VIEW: CPT

## 2025-04-18 PROCEDURE — 83880 ASSAY OF NATRIURETIC PEPTIDE: CPT | Performed by: STUDENT IN AN ORGANIZED HEALTH CARE EDUCATION/TRAINING PROGRAM

## 2025-04-18 PROCEDURE — 25810000003 SODIUM CHLORIDE 0.9 % SOLUTION: Performed by: STUDENT IN AN ORGANIZED HEALTH CARE EDUCATION/TRAINING PROGRAM

## 2025-04-18 PROCEDURE — 93005 ELECTROCARDIOGRAM TRACING: CPT

## 2025-04-18 PROCEDURE — 80053 COMPREHEN METABOLIC PANEL: CPT

## 2025-04-18 PROCEDURE — 82550 ASSAY OF CK (CPK): CPT | Performed by: STUDENT IN AN ORGANIZED HEALTH CARE EDUCATION/TRAINING PROGRAM

## 2025-04-18 PROCEDURE — 93005 ELECTROCARDIOGRAM TRACING: CPT | Performed by: STUDENT IN AN ORGANIZED HEALTH CARE EDUCATION/TRAINING PROGRAM

## 2025-04-18 PROCEDURE — 99285 EMERGENCY DEPT VISIT HI MDM: CPT

## 2025-04-18 PROCEDURE — 83735 ASSAY OF MAGNESIUM: CPT

## 2025-04-18 PROCEDURE — 25510000001 IOPAMIDOL PER 1 ML: Performed by: STUDENT IN AN ORGANIZED HEALTH CARE EDUCATION/TRAINING PROGRAM

## 2025-04-18 PROCEDURE — 84484 ASSAY OF TROPONIN QUANT: CPT

## 2025-04-18 PROCEDURE — 84484 ASSAY OF TROPONIN QUANT: CPT | Performed by: STUDENT IN AN ORGANIZED HEALTH CARE EDUCATION/TRAINING PROGRAM

## 2025-04-18 PROCEDURE — 36415 COLL VENOUS BLD VENIPUNCTURE: CPT

## 2025-04-18 RX ORDER — SODIUM CHLORIDE 0.9 % (FLUSH) 0.9 %
10 SYRINGE (ML) INJECTION AS NEEDED
Status: DISCONTINUED | OUTPATIENT
Start: 2025-04-18 | End: 2025-04-24 | Stop reason: HOSPADM

## 2025-04-18 RX ORDER — IOPAMIDOL 755 MG/ML
100 INJECTION, SOLUTION INTRAVASCULAR
Status: COMPLETED | OUTPATIENT
Start: 2025-04-18 | End: 2025-04-18

## 2025-04-18 RX ADMIN — SODIUM CHLORIDE 1000 ML: 9 INJECTION, SOLUTION INTRAVENOUS at 19:32

## 2025-04-18 RX ADMIN — IOPAMIDOL 95 ML: 755 INJECTION, SOLUTION INTRAVENOUS at 21:16

## 2025-04-18 NOTE — TELEPHONE ENCOUNTER
I called patient he reports severe weakness, pain in legs, can't walk more than 10 feet. Doesn't feel safe to stand for long period or walk. Syncope episode 2 days ago. States he felt bad for several days.  He is currently at home.  He did go to the [facility] and they recommended he go to the ED after EKG showed some St depression in lateral leads and T wave inversion in inferior and lateral leads.  He was help in the I could direct admit him.  I discussed that I could try but I feel he needs more immediate testing and attention which is better done at the ED.  He is going need labs, repeat EKG, etc. all done stat which would be hard to get done inpatient.  After some discussion, he agrees to go to the ED.  He states he cannot go immediately as he has to make sure his cats and house are taken care of.  I offered to call 911 but he declined.  I will call and check on him later.
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
## 2025-04-18 NOTE — Clinical Note
Hemostasis started on the right radial artery. R-Band was used in achieving hemostasis. Radial compression device applied to vessel. Hemostasis achieved successfully. Closure device additional comment: Tr band applied with 14 cc air.

## 2025-04-18 NOTE — Clinical Note
4 cycles for 10 seconds duration;   1 pulse/sec with 40 total pulses delivered;   Maximum Pressure achieved was 8 vipul and   maximum inflation time of 10 seconds.   Total number of inflations: 4.

## 2025-04-18 NOTE — Clinical Note
First balloon inflation max pressure = 12 vipul. First balloon inflation duration = 10 seconds. Second inflation of balloon - Max pressure = 18 vipul. 2nd Inflation of balloon - Duration = 10 seconds. 2nd inflation was done at 08:26 EDT. Third inflation of balloon - Max pressure = 18 vipul. 3rd Inflation of balloon - Duration = 18 seconds. 3rd inflation was done at 08:26 EDT.

## 2025-04-18 NOTE — TELEPHONE ENCOUNTER
Hub staff attempted to follow warm transfer process and was unsuccessful     Caller: Juan James    Relationship to patient: Self    Best call back number: [phone]     Patient is needing: PATIENT IS ASKING TO SPEAK WITH GRAYSON RUIZ PA-C IN REGARDS TO A HOSPITAL ADMISSION.  HE SAID ANOTHER PROVIDER WANTS TO ADMIT HIM ALSO BUT HE WOULD RATHER TALK WITH GRAYSON.  PLEASE CALL BACK TO ADVISE.

## 2025-04-18 NOTE — Clinical Note
First balloon inflation max pressure = 16 vipul. First balloon inflation duration = 8 seconds. Second inflation of balloon - Max pressure = 18 vipul. 2nd Inflation of balloon - Duration = 8 seconds. 2nd inflation was done at 08:44 EDT. Third inflation of balloon - Max pressure = 18 vipul. 3rd Inflation of balloon - Duration = 8 seconds. 3rd inflation was done at 08:44 EDT.

## 2025-04-19 ENCOUNTER — APPOINTMENT (OUTPATIENT)
Dept: CARDIOLOGY | Facility: HOSPITAL | Age: 80
DRG: 323 | End: 2025-04-19
Payer: MEDICARE

## 2025-04-19 LAB
ALBUMIN SERPL-MCNC: 3.7 G/DL (ref 3.5–5.2)
ALBUMIN/GLOB SERPL: 1.3 G/DL
ALP SERPL-CCNC: 99 U/L (ref 39–117)
ALT SERPL W P-5'-P-CCNC: 40 U/L (ref 1–41)
ANION GAP SERPL CALCULATED.3IONS-SCNC: 13 MMOL/L (ref 5–15)
AORTIC DIMENSIONLESS INDEX: 0.62 (DI)
ASCENDING AORTA: 4.2 CM
AST SERPL-CCNC: 34 U/L (ref 1–40)
AV MEAN PRESS GRAD SYS DOP V1V2: 5.6 MMHG
AV VMAX SYS DOP: 155.1 CM/SEC
BH CV ECHO MEAS - ACS: 2.43 CM
BH CV ECHO MEAS - AO MAX PG: 9.6 MMHG
BH CV ECHO MEAS - AO ROOT AREA (BSA CORRECTED): 1.9 CM2
BH CV ECHO MEAS - AO ROOT DIAM: 4.3 CM
BH CV ECHO MEAS - AO V2 VTI: 33 CM
BH CV ECHO MEAS - AVA(I,D): 2.2 CM2
BH CV ECHO MEAS - EDV(CUBED): 139.4 ML
BH CV ECHO MEAS - EDV(MOD-SP2): 127 ML
BH CV ECHO MEAS - EDV(MOD-SP4): 121 ML
BH CV ECHO MEAS - EF(MOD-SP2): 73.2 %
BH CV ECHO MEAS - EF(MOD-SP4): 67.8 %
BH CV ECHO MEAS - ESV(CUBED): 29.2 ML
BH CV ECHO MEAS - ESV(MOD-SP2): 34 ML
BH CV ECHO MEAS - ESV(MOD-SP4): 39 ML
BH CV ECHO MEAS - FS: 40.6 %
BH CV ECHO MEAS - IVS/LVPW: 1.14 CM
BH CV ECHO MEAS - IVSD: 1 CM
BH CV ECHO MEAS - LAT PEAK E' VEL: 7.2 CM/SEC
BH CV ECHO MEAS - LV DIASTOLIC VOL/BSA (35-75): 54 CM2
BH CV ECHO MEAS - LV MASS(C)D: 177.3 GRAMS
BH CV ECHO MEAS - LV MAX PG: 4.7 MMHG
BH CV ECHO MEAS - LV MEAN PG: 1.72 MMHG
BH CV ECHO MEAS - LV SYSTOLIC VOL/BSA (12-30): 17.4 CM2
BH CV ECHO MEAS - LV V1 MAX: 108.8 CM/SEC
BH CV ECHO MEAS - LV V1 VTI: 20.5 CM
BH CV ECHO MEAS - LVIDD: 5.2 CM
BH CV ECHO MEAS - LVIDS: 3.1 CM
BH CV ECHO MEAS - LVOT AREA: 3.5 CM2
BH CV ECHO MEAS - LVOT DIAM: 2.13 CM
BH CV ECHO MEAS - LVPWD: 0.87 CM
BH CV ECHO MEAS - MED PEAK E' VEL: 6.6 CM/SEC
BH CV ECHO MEAS - MV A DUR: 0.12 SEC
BH CV ECHO MEAS - MV A MAX VEL: 30.8 CM/SEC
BH CV ECHO MEAS - MV DEC SLOPE: 632.8 CM/SEC2
BH CV ECHO MEAS - MV DEC TIME: 0.19 SEC
BH CV ECHO MEAS - MV E MAX VEL: 128 CM/SEC
BH CV ECHO MEAS - MV E/A: 4.1
BH CV ECHO MEAS - MV MAX PG: 9.6 MMHG
BH CV ECHO MEAS - MV MEAN PG: 2.3 MMHG
BH CV ECHO MEAS - MV P1/2T: 74.5 MSEC
BH CV ECHO MEAS - MV V2 VTI: 44.8 CM
BH CV ECHO MEAS - MVA(P1/2T): 3 CM2
BH CV ECHO MEAS - MVA(VTI): 1.62 CM2
BH CV ECHO MEAS - PA ACC TIME: 0.07 SEC
BH CV ECHO MEAS - PA V2 MAX: 112.7 CM/SEC
BH CV ECHO MEAS - RAP SYSTOLE: 15 MMHG
BH CV ECHO MEAS - RV MAX PG: 3.6 MMHG
BH CV ECHO MEAS - RV V1 MAX: 95.5 CM/SEC
BH CV ECHO MEAS - RV V1 VTI: 15.1 CM
BH CV ECHO MEAS - RVSP: 70 MMHG
BH CV ECHO MEAS - SV(LVOT): 72.7 ML
BH CV ECHO MEAS - SV(MOD-SP2): 93 ML
BH CV ECHO MEAS - SV(MOD-SP4): 82 ML
BH CV ECHO MEAS - SVI(LVOT): 32.5 ML/M2
BH CV ECHO MEAS - SVI(MOD-SP2): 41.5 ML/M2
BH CV ECHO MEAS - SVI(MOD-SP4): 36.6 ML/M2
BH CV ECHO MEAS - TAPSE (>1.6): 1.77 CM
BH CV ECHO MEAS - TR MAX PG: 55 MMHG
BH CV ECHO MEAS - TR MAX VEL: 370.9 CM/SEC
BH CV ECHO MEASUREMENTS AVERAGE E/E' RATIO: 18.55
BH CV XLRA - RV BASE: 4.3 CM
BH CV XLRA - RV LENGTH: 10.5 CM
BH CV XLRA - RV MID: 3.7 CM
BH CV XLRA - TDI S': 13.4 CM/SEC
BILIRUB SERPL-MCNC: 0.6 MG/DL (ref 0–1.2)
BUN SERPL-MCNC: 77 MG/DL (ref 8–23)
BUN/CREAT SERPL: 35.5 (ref 7–25)
CALCIUM SPEC-SCNC: 8.9 MG/DL (ref 8.6–10.5)
CHLORIDE SERPL-SCNC: 101 MMOL/L (ref 98–107)
CO2 SERPL-SCNC: 21 MMOL/L (ref 22–29)
CREAT SERPL-MCNC: 2.17 MG/DL (ref 0.76–1.27)
DEPRECATED RDW RBC AUTO: 60.3 FL (ref 37–54)
EGFRCR SERPLBLD CKD-EPI 2021: 30.6 ML/MIN/1.73
ERYTHROCYTE [DISTWIDTH] IN BLOOD BY AUTOMATED COUNT: 16.1 % (ref 12.3–15.4)
GLOBULIN UR ELPH-MCNC: 2.9 GM/DL
GLUCOSE BLDC GLUCOMTR-MCNC: 133 MG/DL (ref 70–130)
GLUCOSE BLDC GLUCOMTR-MCNC: 133 MG/DL (ref 70–130)
GLUCOSE BLDC GLUCOMTR-MCNC: 160 MG/DL (ref 70–130)
GLUCOSE BLDC GLUCOMTR-MCNC: 254 MG/DL (ref 70–130)
GLUCOSE SERPL-MCNC: 112 MG/DL (ref 65–99)
HCT VFR BLD AUTO: 30.9 % (ref 37.5–51)
HGB BLD-MCNC: 10.3 G/DL (ref 13–17.7)
LEFT ATRIUM VOLUME INDEX: 49.6 ML/M2
LV EF BIPLANE MOD: 70 %
MCH RBC QN AUTO: 34.6 PG (ref 26.6–33)
MCHC RBC AUTO-ENTMCNC: 33.3 G/DL (ref 31.5–35.7)
MCV RBC AUTO: 103.7 FL (ref 79–97)
PLATELET # BLD AUTO: 112 10*3/MM3 (ref 140–450)
PMV BLD AUTO: 9.2 FL (ref 6–12)
POTASSIUM SERPL-SCNC: 5.2 MMOL/L (ref 3.5–5.2)
PROT SERPL-MCNC: 6.6 G/DL (ref 6–8.5)
RBC # BLD AUTO: 2.98 10*6/MM3 (ref 4.14–5.8)
SINUS: 4.3 CM
SODIUM SERPL-SCNC: 135 MMOL/L (ref 136–145)
STJ: 3.7 CM
TROPONIN T SERPL HS-MCNC: 323 NG/L
WBC NRBC COR # BLD AUTO: 8.67 10*3/MM3 (ref 3.4–10.8)

## 2025-04-19 PROCEDURE — 84484 ASSAY OF TROPONIN QUANT: CPT | Performed by: NURSE PRACTITIONER

## 2025-04-19 PROCEDURE — 85027 COMPLETE CBC AUTOMATED: CPT | Performed by: NURSE PRACTITIONER

## 2025-04-19 PROCEDURE — 63710000001 INSULIN LISPRO (HUMAN) PER 5 UNITS: Performed by: NURSE PRACTITIONER

## 2025-04-19 PROCEDURE — 82948 REAGENT STRIP/BLOOD GLUCOSE: CPT

## 2025-04-19 PROCEDURE — 80053 COMPREHEN METABOLIC PANEL: CPT | Performed by: NURSE PRACTITIONER

## 2025-04-19 PROCEDURE — G0378 HOSPITAL OBSERVATION PER HR: HCPCS

## 2025-04-19 PROCEDURE — 25510000001 PERFLUTREN 6.52 MG/ML SUSPENSION 2 ML VIAL: Performed by: STUDENT IN AN ORGANIZED HEALTH CARE EDUCATION/TRAINING PROGRAM

## 2025-04-19 PROCEDURE — 63710000001 PREDNISONE PER 1 MG: Performed by: INTERNAL MEDICINE

## 2025-04-19 PROCEDURE — 99214 OFFICE O/P EST MOD 30 MIN: CPT | Performed by: INTERNAL MEDICINE

## 2025-04-19 PROCEDURE — 93306 TTE W/DOPPLER COMPLETE: CPT

## 2025-04-19 PROCEDURE — 93306 TTE W/DOPPLER COMPLETE: CPT | Performed by: INTERNAL MEDICINE

## 2025-04-19 RX ORDER — BISACODYL 10 MG
10 SUPPOSITORY, RECTAL RECTAL DAILY PRN
Status: DISCONTINUED | OUTPATIENT
Start: 2025-04-19 | End: 2025-04-24 | Stop reason: HOSPADM

## 2025-04-19 RX ORDER — IBUPROFEN 600 MG/1
1 TABLET ORAL
Status: DISCONTINUED | OUTPATIENT
Start: 2025-04-19 | End: 2025-04-24 | Stop reason: HOSPADM

## 2025-04-19 RX ORDER — AMOXICILLIN 250 MG
2 CAPSULE ORAL 2 TIMES DAILY PRN
Status: DISCONTINUED | OUTPATIENT
Start: 2025-04-19 | End: 2025-04-24 | Stop reason: HOSPADM

## 2025-04-19 RX ORDER — ACETAMINOPHEN 325 MG/1
650 TABLET ORAL EVERY 4 HOURS PRN
Status: DISCONTINUED | OUTPATIENT
Start: 2025-04-19 | End: 2025-04-24 | Stop reason: HOSPADM

## 2025-04-19 RX ORDER — DEXTROSE MONOHYDRATE 25 G/50ML
25 INJECTION, SOLUTION INTRAVENOUS
Status: DISCONTINUED | OUTPATIENT
Start: 2025-04-19 | End: 2025-04-24 | Stop reason: HOSPADM

## 2025-04-19 RX ORDER — ALLOPURINOL 300 MG/1
300 TABLET ORAL NIGHTLY
Status: DISCONTINUED | OUTPATIENT
Start: 2025-04-19 | End: 2025-04-24 | Stop reason: HOSPADM

## 2025-04-19 RX ORDER — FERROUS SULFATE 325(65) MG
325 TABLET ORAL
Status: DISCONTINUED | OUTPATIENT
Start: 2025-04-19 | End: 2025-04-24 | Stop reason: HOSPADM

## 2025-04-19 RX ORDER — PREDNISONE 20 MG/1
10 TABLET ORAL DAILY
Status: DISCONTINUED | OUTPATIENT
Start: 2025-04-19 | End: 2025-04-19

## 2025-04-19 RX ORDER — INSULIN LISPRO 100 [IU]/ML
2-7 INJECTION, SOLUTION INTRAVENOUS; SUBCUTANEOUS
Status: DISCONTINUED | OUTPATIENT
Start: 2025-04-19 | End: 2025-04-24 | Stop reason: HOSPADM

## 2025-04-19 RX ORDER — SODIUM CHLORIDE 0.9 % (FLUSH) 0.9 %
10 SYRINGE (ML) INJECTION EVERY 12 HOURS SCHEDULED
Status: DISCONTINUED | OUTPATIENT
Start: 2025-04-19 | End: 2025-04-24 | Stop reason: HOSPADM

## 2025-04-19 RX ORDER — TRAZODONE HYDROCHLORIDE 50 MG/1
100 TABLET ORAL NIGHTLY
Status: DISCONTINUED | OUTPATIENT
Start: 2025-04-19 | End: 2025-04-20

## 2025-04-19 RX ORDER — LOSARTAN POTASSIUM 25 MG/1
25 TABLET ORAL DAILY
Status: DISCONTINUED | OUTPATIENT
Start: 2025-04-19 | End: 2025-04-24 | Stop reason: HOSPADM

## 2025-04-19 RX ORDER — METOPROLOL SUCCINATE 50 MG/1
50 TABLET, EXTENDED RELEASE ORAL EVERY EVENING
Status: DISCONTINUED | OUTPATIENT
Start: 2025-04-19 | End: 2025-04-24 | Stop reason: HOSPADM

## 2025-04-19 RX ORDER — SODIUM CHLORIDE 9 MG/ML
40 INJECTION, SOLUTION INTRAVENOUS AS NEEDED
Status: DISCONTINUED | OUTPATIENT
Start: 2025-04-19 | End: 2025-04-24 | Stop reason: HOSPADM

## 2025-04-19 RX ORDER — GABAPENTIN 300 MG/1
300 CAPSULE ORAL ONCE
Status: COMPLETED | OUTPATIENT
Start: 2025-04-19 | End: 2025-04-19

## 2025-04-19 RX ORDER — NITROGLYCERIN 0.4 MG/1
0.4 TABLET SUBLINGUAL
Status: DISCONTINUED | OUTPATIENT
Start: 2025-04-19 | End: 2025-04-24 | Stop reason: HOSPADM

## 2025-04-19 RX ORDER — FOLIC ACID 0.4 MG
400 TABLET ORAL DAILY
Status: DISCONTINUED | OUTPATIENT
Start: 2025-04-19 | End: 2025-04-22

## 2025-04-19 RX ORDER — TAMSULOSIN HYDROCHLORIDE 0.4 MG/1
0.4 CAPSULE ORAL EVERY EVENING
Status: DISCONTINUED | OUTPATIENT
Start: 2025-04-19 | End: 2025-04-24 | Stop reason: HOSPADM

## 2025-04-19 RX ORDER — ACETAMINOPHEN 160 MG/5ML
650 SOLUTION ORAL EVERY 4 HOURS PRN
Status: DISCONTINUED | OUTPATIENT
Start: 2025-04-19 | End: 2025-04-24 | Stop reason: HOSPADM

## 2025-04-19 RX ORDER — PYRIDOSTIGMINE BROMIDE 60 MG/1
60 TABLET ORAL 3 TIMES DAILY
Status: DISCONTINUED | OUTPATIENT
Start: 2025-04-19 | End: 2025-04-24 | Stop reason: HOSPADM

## 2025-04-19 RX ORDER — ROPINIROLE 1 MG/1
1 TABLET, FILM COATED ORAL NIGHTLY
Status: DISCONTINUED | OUTPATIENT
Start: 2025-04-19 | End: 2025-04-24 | Stop reason: HOSPADM

## 2025-04-19 RX ORDER — ASPIRIN 81 MG/1
81 TABLET ORAL DAILY
Status: DISCONTINUED | OUTPATIENT
Start: 2025-04-19 | End: 2025-04-24 | Stop reason: HOSPADM

## 2025-04-19 RX ORDER — BISACODYL 5 MG/1
5 TABLET, DELAYED RELEASE ORAL DAILY PRN
Status: DISCONTINUED | OUTPATIENT
Start: 2025-04-19 | End: 2025-04-24 | Stop reason: HOSPADM

## 2025-04-19 RX ORDER — SODIUM CHLORIDE 0.9 % (FLUSH) 0.9 %
10 SYRINGE (ML) INJECTION AS NEEDED
Status: DISCONTINUED | OUTPATIENT
Start: 2025-04-19 | End: 2025-04-24 | Stop reason: HOSPADM

## 2025-04-19 RX ORDER — NICOTINE POLACRILEX 4 MG
15 LOZENGE BUCCAL
Status: DISCONTINUED | OUTPATIENT
Start: 2025-04-19 | End: 2025-04-24 | Stop reason: HOSPADM

## 2025-04-19 RX ORDER — PREDNISONE 20 MG/1
10 TABLET ORAL 2 TIMES DAILY WITH MEALS
Status: DISCONTINUED | OUTPATIENT
Start: 2025-04-19 | End: 2025-04-24 | Stop reason: HOSPADM

## 2025-04-19 RX ORDER — POLYETHYLENE GLYCOL 3350 17 G/17G
17 POWDER, FOR SOLUTION ORAL DAILY PRN
Status: DISCONTINUED | OUTPATIENT
Start: 2025-04-19 | End: 2025-04-24 | Stop reason: HOSPADM

## 2025-04-19 RX ORDER — FAMOTIDINE 20 MG/1
20 TABLET, FILM COATED ORAL 2 TIMES DAILY PRN
Status: DISCONTINUED | OUTPATIENT
Start: 2025-04-19 | End: 2025-04-24 | Stop reason: HOSPADM

## 2025-04-19 RX ORDER — ONDANSETRON 2 MG/ML
4 INJECTION INTRAMUSCULAR; INTRAVENOUS EVERY 6 HOURS PRN
Status: DISCONTINUED | OUTPATIENT
Start: 2025-04-19 | End: 2025-04-24 | Stop reason: HOSPADM

## 2025-04-19 RX ADMIN — Medication 10 ML: at 23:15

## 2025-04-19 RX ADMIN — ASPIRIN 81 MG: 81 TABLET, COATED ORAL at 09:15

## 2025-04-19 RX ADMIN — Medication 10 ML: at 09:16

## 2025-04-19 RX ADMIN — FERROUS SULFATE TAB 325 MG (65 MG ELEMENTAL FE) 325 MG: 325 (65 FE) TAB at 09:15

## 2025-04-19 RX ADMIN — TRAZODONE HYDROCHLORIDE 100 MG: 50 TABLET ORAL at 21:54

## 2025-04-19 RX ADMIN — LOSARTAN POTASSIUM 25 MG: 25 TABLET, FILM COATED ORAL at 16:39

## 2025-04-19 RX ADMIN — ALLOPURINOL 300 MG: 300 TABLET ORAL at 23:16

## 2025-04-19 RX ADMIN — INSULIN LISPRO 2 UNITS: 100 INJECTION, SOLUTION INTRAVENOUS; SUBCUTANEOUS at 07:58

## 2025-04-19 RX ADMIN — METOPROLOL SUCCINATE 50 MG: 25 TABLET, EXTENDED RELEASE ORAL at 16:38

## 2025-04-19 RX ADMIN — INSULIN LISPRO 3 UNITS: 100 INJECTION, SOLUTION INTRAVENOUS; SUBCUTANEOUS at 18:07

## 2025-04-19 RX ADMIN — ROPINIROLE 1 MG: 1 TABLET, FILM COATED ORAL at 21:54

## 2025-04-19 RX ADMIN — PERFLUTREN 2 ML: 6.52 INJECTION, SUSPENSION INTRAVENOUS at 10:47

## 2025-04-19 RX ADMIN — MUPIROCIN 1 APPLICATION: 20 OINTMENT TOPICAL at 06:11

## 2025-04-19 RX ADMIN — TAMSULOSIN HYDROCHLORIDE 0.4 MG: 0.4 CAPSULE ORAL at 16:40

## 2025-04-19 RX ADMIN — MUPIROCIN 1 APPLICATION: 20 OINTMENT TOPICAL at 21:54

## 2025-04-19 RX ADMIN — PREDNISONE 10 MG: 20 TABLET ORAL at 21:54

## 2025-04-19 RX ADMIN — GABAPENTIN 300 MG: 300 CAPSULE ORAL at 21:54

## 2025-04-19 RX ADMIN — PYRIDOSTIGMINE BROMIDE 60 MG: 60 TABLET ORAL at 23:15

## 2025-04-19 RX ADMIN — PYRIDOSTIGMINE BROMIDE 60 MG: 60 TABLET ORAL at 17:24

## 2025-04-19 RX ADMIN — Medication 400 MCG: at 10:16

## 2025-04-19 RX ADMIN — Medication 10 ML: at 06:20

## 2025-04-19 NOTE — ED PROVIDER NOTES
EMERGENCY DEPARTMENT ENCOUNTER    Room number:  34/34  Date Seen:  4/19/2025  PCP:  Padmini Benson APRN    Laboratory Results:  Recent Results (from the past 24 hours)   Comprehensive Metabolic Panel    Collection Time: 04/18/25  6:13 PM    Specimen: Arm, Right; Blood   Result Value Ref Range    Glucose 148 (H) 65 - 99 mg/dL    BUN 79 (H) 8 - 23 mg/dL    Creatinine 2.43 (H) 0.76 - 1.27 mg/dL    Sodium 133 (L) 136 - 145 mmol/L    Potassium 5.9 (H) 3.5 - 5.2 mmol/L    Chloride 96 (L) 98 - 107 mmol/L    CO2 21.0 (L) 22.0 - 29.0 mmol/L    Calcium 9.4 8.6 - 10.5 mg/dL    Total Protein 7.1 6.0 - 8.5 g/dL    Albumin 4.0 3.5 - 5.2 g/dL    ALT (SGPT) 44 (H) 1 - 41 U/L    AST (SGOT) 28 1 - 40 U/L    Alkaline Phosphatase 113 39 - 117 U/L    Total Bilirubin 0.6 0.0 - 1.2 mg/dL    Globulin 3.1 gm/dL    A/G Ratio 1.3 g/dL    BUN/Creatinine Ratio 32.5 (H) 7.0 - 25.0    Anion Gap 16.0 (H) 5.0 - 15.0 mmol/L    eGFR 26.7 (L) >60.0 mL/min/1.73   Magnesium    Collection Time: 04/18/25  6:13 PM    Specimen: Arm, Right; Blood   Result Value Ref Range    Magnesium 2.5 (H) 1.6 - 2.4 mg/dL   High Sensitivity Troponin T    Collection Time: 04/18/25  6:13 PM    Specimen: Arm, Right; Blood   Result Value Ref Range    HS Troponin T 391 (C) <22 ng/L   Green Top (Gel)    Collection Time: 04/18/25  6:13 PM   Result Value Ref Range    Extra Tube Hold for add-ons.    Lavender Top    Collection Time: 04/18/25  6:13 PM   Result Value Ref Range    Extra Tube hold for add-on    Gold Top - SST    Collection Time: 04/18/25  6:13 PM   Result Value Ref Range    Extra Tube Hold for add-ons.    Light Blue Top    Collection Time: 04/18/25  6:13 PM   Result Value Ref Range    Extra Tube Hold for add-ons.    CBC Auto Differential    Collection Time: 04/18/25  6:13 PM    Specimen: Arm, Right; Blood   Result Value Ref Range    WBC 9.90 3.40 - 10.80 10*3/mm3    RBC 3.02 (L) 4.14 - 5.80 10*6/mm3    Hemoglobin 10.5 (L) 13.0 - 17.7 g/dL    Hematocrit 31.1 (L) 37.5 -  51.0 %    .0 (H) 79.0 - 97.0 fL    MCH 34.8 (H) 26.6 - 33.0 pg    MCHC 33.8 31.5 - 35.7 g/dL    RDW 15.8 (H) 12.3 - 15.4 %    RDW-SD 59.2 (H) 37.0 - 54.0 fl    MPV 8.6 6.0 - 12.0 fL    Platelets 123 (L) 140 - 450 10*3/mm3    Neutrophil % 74.2 42.7 - 76.0 %    Lymphocyte % 13.1 (L) 19.6 - 45.3 %    Monocyte % 9.7 5.0 - 12.0 %    Eosinophil % 0.0 (L) 0.3 - 6.2 %    Basophil % 0.5 0.0 - 1.5 %    Immature Grans % 2.5 (H) 0.0 - 0.5 %    Neutrophils, Absolute 7.34 (H) 1.70 - 7.00 10*3/mm3    Lymphocytes, Absolute 1.30 0.70 - 3.10 10*3/mm3    Monocytes, Absolute 0.96 (H) 0.10 - 0.90 10*3/mm3    Eosinophils, Absolute 0.00 0.00 - 0.40 10*3/mm3    Basophils, Absolute 0.05 0.00 - 0.20 10*3/mm3    Immature Grans, Absolute 0.25 (H) 0.00 - 0.05 10*3/mm3    nRBC 0.2 0.0 - 0.2 /100 WBC   ECG 12 Lead Syncope    Collection Time: 04/18/25  6:13 PM   Result Value Ref Range    QT Interval 440 ms    QTC Interval 425 ms   High Sensitivity Troponin T 1Hr    Collection Time: 04/18/25  7:23 PM    Specimen: Blood   Result Value Ref Range    HS Troponin T 406 (C) <22 ng/L    Troponin T Numeric Delta 15 ng/L    Troponin T % Delta 4 Abnormal if >/= 20%   BNP    Collection Time: 04/18/25  7:23 PM    Specimen: Blood   Result Value Ref Range    proBNP 4,581.0 (H) 0.0 - 1,800.0 pg/mL   CK    Collection Time: 04/18/25  7:23 PM    Specimen: Blood   Result Value Ref Range    Creatine Kinase 66 20 - 200 U/L     I reviewed the above results.    Radiology:  CT Angiogram Chest Pulmonary Embolism   Final Result       Pulmonary arteries are well-opacified, and there is no evidence of   pulmonary embolism.       No new pulmonary parenchymal abnormality is seen.       Indeterminate left lower lobe pleural-based parenchymal opacity   unchanged since the previous study. Etiology and significance uncertain,   and as before, continued close interval follow-up and or pulmonary   outpatient consultation and follow-up recommended.               This report was  finalized on 4/18/2025 10:50 PM by Dr. Harshad Arce M.D on Workstation: [id number]          XR Chest 1 View   Final Result   No focal pulmonary consolidation. Cardiomegaly. Follow-up as   clinical indications persist.       This report was finalized on 4/18/2025 8:50 PM by Dr. Branden Bond M.D on Workstation: [id number]            I reviewed the above results    Medications ordered in ED:  Medications   sodium chloride 0.9 % flush 10 mL (has no administration in time range)   sodium chloride 0.9 % bolus 1,000 mL (0 mL Intravenous Stopped 4/18/25 2244)   iopamidol (ISOVUE-370) 76 % injection 100 mL (95 mL Intravenous Given by Other 4/18/25 2116)       ED Course as of 04/19/25 0040   Fri Apr 18, 2025 1941 Discussed with Dr. Lechuga of cardiology who reviewed patient's labs, EKG and history.  He does not think that troponin elevation is secondary to acute coronary syndrome.  Advised patient medication for anticoagulation at this time.  Patient will require further evaluation on the inpatient basis. [MW]   2008 EKG interpreted by me demonstrates sinus rhythm, rate of 48, appears prolongation, nonspecific ST changes in inferior and lateral leads which were present on previous EKG. [MW]   2036 I assumed care of this patient from Dr. Rucker.  Pending CT and admission.   [CC]   2208 CT Angiogram Chest Pulmonary Embolism  An interpretation of the CTA is no large central pulmonary embolism.  Left lower lobe infiltrate.  Cardiomegaly [CC]   2254 I rechecked the patient.  I discussed the patient's labs, radiology findings (including all incidental findings), diagnosis, and plan for admission. The patient understands and agrees with the plan. [CC]   2357 Spoke with Asia, LORI with A.  Reviewed history, exam, results, treatments.  She agrees admit the patient to Dr. Love.   [CC]      ED Course User Index  [CC] Rachel Martinez PA-C  [MW] John Rucker MD       Diagnosis:  Final diagnoses:   Syncope,  unspecified syncope type   Elevated troponin   Cardiomegaly             Provider attestation:  I personally reviewed the past medical history, past surgical history, social history, family history, current medications, and allergies as they appear in the chart.    The patient was seen and examined by myself and Dr. Rucker, who agree with plan.      Rachel Martinez PAHudsonC  04/19/25 0040

## 2025-04-19 NOTE — H&P
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 Wave Axis=57  deg   - ABNORMAL ECG -   Atrial fibrillation   Anteroseptal infarct, age indeterminate   Date and Time of Study:2025-04-18 18:13:02      Telemetry Scan   Final Result           Assessment/Plan     Active Hospital Problems    Diagnosis  POA    **Syncope [R55]  Yes    Infrarenal abdominal aortic aneurysm (AAA) without rupture [I71.43]  Yes    Alcoholic cirrhosis of liver with ascites [K70.31]  Yes    Pulmonary hypertension [I27.20]  Yes    Type 2 diabetes mellitus in remission [E11.9]  Yes    Chronic kidney disease, stage 3a [N18.31]  Yes    Benign prostatic hyperplasia [N40.0]  Yes    Essential hypertension [I10]  Yes    Permanent atrial fibrillation [I48.21]  Yes    SHANNAN (acute kidney injury) [N17.9]  Yes    BPH (benign prostatic hyperplasia) [N40.0]  Yes    GERD (gastroesophageal reflux disease) [K21.9]  Yes      Resolved Hospital Problems   No resolved problems to display.     This is a 79-year-old pleasant male presents with syncopal episode with no warning symptoms and found to have elevated troponin without complaints of chest pain.  He is elderly and chronically ill but does not appear sick or weak or toxic.  Echocardiogram showed normal LV function.  Plans are for stress test in the morning with  cardiology.  He will likely be discharged with a Zio patch.    Resume aspirin.    Resume metoprolol.    Resume Flomax.    Check A1c in the morning.  Cover glucose with lispro SSI.    Resume Mestinon and prednisone.    I discussed the patient's findings and my recommendations with patient.    VTE Prophylaxis - SCDs.  Code Status - Full code.       AARON Jackson  [facility]  04/19/25  16:02 EDT

## 2025-04-19 NOTE — PLAN OF CARE
Problem: Adult Inpatient Plan of Care  Goal: Absence of Hospital-Acquired Illness or Injury  Intervention: Identify and Manage Fall Risk  Recent Flowsheet Documentation  Taken 4/19/2025 1800 by Atif Bruce RN  Safety Promotion/Fall Prevention:   activity supervised   fall prevention program maintained   nonskid shoes/slippers when out of bed   room organization consistent   safety round/check completed   clutter free environment maintained  Taken 4/19/2025 1600 by Atif Bruce RN  Safety Promotion/Fall Prevention:   activity supervised   fall prevention program maintained   nonskid shoes/slippers when out of bed   room organization consistent   safety round/check completed   clutter free environment maintained  Taken 4/19/2025 1400 by Atif Bruce RN  Safety Promotion/Fall Prevention:   activity supervised   fall prevention program maintained   nonskid shoes/slippers when out of bed   room organization consistent   safety round/check completed   clutter free environment maintained  Taken 4/19/2025 1200 by Atif Bruce RN  Safety Promotion/Fall Prevention:   activity supervised   fall prevention program maintained   nonskid shoes/slippers when out of bed   room organization consistent   safety round/check completed   clutter free environment maintained  Taken 4/19/2025 1000 by Atif Bruce RN  Safety Promotion/Fall Prevention:   activity supervised   fall prevention program maintained   nonskid shoes/slippers when out of bed   room organization consistent   safety round/check completed   clutter free environment maintained  Taken 4/19/2025 0800 by Atif Bruce RN  Safety Promotion/Fall Prevention:   activity supervised   fall prevention program maintained   nonskid shoes/slippers when out of bed   room organization consistent   safety round/check completed   clutter free environment maintained  Intervention: Prevent Skin Injury  Recent Flowsheet Documentation  Taken  4/19/2025 1800 by Atif Bruce RN  Body Position: position changed independently  Taken 4/19/2025 1600 by Atif Bruce RN  Body Position: position changed independently  Taken 4/19/2025 1400 by Atif Bruce RN  Body Position: position changed independently  Taken 4/19/2025 1200 by Atif Bruce RN  Body Position: position changed independently  Taken 4/19/2025 1000 by Atif Bruce RN  Body Position: position changed independently  Taken 4/19/2025 0800 by Atif Bruce RN  Body Position: position changed independently  Intervention: Prevent and Manage VTE (Venous Thromboembolism) Risk  Recent Flowsheet Documentation  Taken 4/19/2025 1400 by Atif Bruce RN  VTE Prevention/Management: SCDs (sequential compression devices) off  Taken 4/19/2025 0800 by Atif Bruce RN  VTE Prevention/Management: SCDs (sequential compression devices) off  Intervention: Prevent Infection  Recent Flowsheet Documentation  Taken 4/19/2025 1800 by Atif Bruce RN  Infection Prevention: single patient room provided  Taken 4/19/2025 1600 by Atif Bruce RN  Infection Prevention: single patient room provided  Taken 4/19/2025 1400 by Atif Bruce RN  Infection Prevention: single patient room provided  Taken 4/19/2025 1200 by Atif Bruce RN  Infection Prevention: single patient room provided  Taken 4/19/2025 1000 by Atif Bruce RN  Infection Prevention: single patient room provided  Taken 4/19/2025 0800 by Atif Bruce RN  Infection Prevention: single patient room provided  Goal: Optimal Comfort and Wellbeing  Intervention: Provide Person-Centered Care  Recent Flowsheet Documentation  Taken 4/19/2025 1400 by Atif Bruce RN  Trust Relationship/Rapport:   care explained   choices provided   emotional support provided   empathic listening provided   questions answered   questions encouraged   reassurance provided   thoughts/feelings  acknowledged  Taken 4/19/2025 0800 by Atif Bruce RN  Trust Relationship/Rapport:   care explained   choices provided   questions answered   questions encouraged   reassurance provided   thoughts/feelings acknowledged   Goal Outcome Evaluation: Patient was calm throughout this shift. Echo and bladder scan done. His home meds commenced. His care is on-going.

## 2025-04-19 NOTE — ED PROVIDER NOTES
EMERGENCY DEPARTMENT ENCOUNTER  Room Number:  34/34  PCP: Padmini Benson APRN  Independent Historians: Patient      HPI:  Chief Complaint: had concerns including Syncope and Shortness of Breath.       Context: Juan James is a 79 y.o. male with a medical history of coronary artery disease, HLD, PVD, myasthenia gravis, RLS, diabetes, pulmonary hypertension who presents to the ED c/o acute syncope.  Patient states he had a syncopal event 2 days ago.  Patient evaluated at an outside facility and told to come to the ER for further evaluation.  Patient states he has shortness of breath with it has been going on for years and is not had any significant changes.  Patient denies chest pain, back pain, nausea, vomiting.  Patient has multiple skin tears following his syncopal event.      Review of prior external notes (non-ED) -and- Review of prior external test results outside of this encounter: Office visit with cardiothoracic surgery from 4/3/2025 reviewed and notable for visit secondary to aortic root aneurysm.  At that time it was noted that the aneurysm was 4.8 cm with a plan to follow-up in 1 year.  Patient educated on supportive care measures.    Prescription drug monitoring program review:         PAST MEDICAL HISTORY  Active Ambulatory Problems     Diagnosis Date Noted    Cervical radiculitis 09/20/2016    Hyperlipidemia 07/29/2019    Prediabetes 07/29/2019    Cervical spondylosis with radiculopathy 11/02/2012    Peptic ulceration     OA (osteoarthritis)     Hearing loss     GERD (gastroesophageal reflux disease)     DDD (degenerative disc disease), lumbar     DDD (degenerative disc disease), cervical     Coronary artery disease involving native coronary artery of native heart without angina pectoris     Colon polyps     Cervical spondylosis 11/01/2012    Cervical disc disorder     Cataract     BPH (benign prostatic hyperplasia)     Permanent atrial fibrillation 01/04/2021    Arthritis     PVD (peripheral  vascular disease) with claudication 04/04/2020    Idiopathic chronic gout of multiple sites without tophus 10/13/2020    Spondylolisthesis of lumbar region 10/20/2020    Essential hypertension 01/04/2021    Gout 11/27/2018    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    Benign prostatic hyperplasia 03/28/2022    DDD (degenerative disc disease), cervical 11/01/2012    Lumbar radiculopathy 11/02/2012    Encounter for Medicare annual wellness exam 05/09/2023    Other microscopic hematuria 05/11/2023    Edema of both legs 06/30/2023    Bleeding nose 06/30/2023    Abnormal CT scan 11/02/2023    Pleural thickening 11/02/2023    Arthralgia of multiple joints 11/06/2023    Kidney stones 11/25/2023    Sigmoid diverticulosis 11/25/2023    Bilateral flank pain 02/06/2024    Myasthenia gravis 03/11/2024    RLS (restless legs syndrome) 04/16/2024    Type 2 diabetes mellitus in remission 05/13/2024    Pulmonary hypertension 06/10/2024    Dilated aortic root 07/15/2024    Other cirrhosis of liver 08/04/2024    Alcoholic cirrhosis of liver with ascites 09/04/2024    Iron deficiency anemia 09/04/2024    Hereditary and idiopathic peripheral neuropathy 12/17/2024    Infrarenal abdominal aortic aneurysm (AAA) without rupture 01/13/2025    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    Elevated serum creatinine 01/13/2025     Resolved Ambulatory Problems     Diagnosis Date Noted    Cervical disc disorder with radiculopathy of mid-cervical region 02/18/2016    Follow-up examination, following other surgery 04/11/2016    Lumbar radiculopathy 05/10/2016    Essential hypertension 07/29/2019    Coronary artery disease involving native heart 07/29/2019    Chronic bronchitis 07/29/2019    Edema 07/29/2019    Rectal pain 07/29/2019    Ocular myasthenia gravis 11/02/2012    History of colon polyps 08/28/2019    Tinnitus     Shortness of breath     Pulmonary embolism     Neck pain     Lower back pain     Hypertension     History of blood transfusion     COPD  (chronic obstructive pulmonary disease)     CHF (congestive heart failure)     Abdominal aortic aneurysm 01/04/2021    Medicare annual wellness visit, subsequent 11/26/2019    Heart block AV complete 04/04/2020    Acute renal failure (ARF) 04/04/2020    Hospital discharge follow-up 04/17/2020    Olecranon bursitis of left elbow 11/17/2020    Encounter for screening for malignant neoplasm of colon 01/22/2021    Long term current use of anticoagulant 01/04/2021    Stented coronary artery 01/04/2021    History of blood in urine 03/18/2021    Diabetes mellitus 08/19/2021    Pain of right hand 11/15/2021    AAA (abdominal aortic aneurysm) without rupture 12/17/2021    Cigarette smoker 05/16/2022    Aneurysm of aorta 05/19/2016    Disorder of prostate 06/08/2016    Congestive heart failure 07/22/2014    Chronic obstructive pulmonary disease 03/28/2022    Atherosclerotic heart disease of native coronary artery without angina pectoris 07/22/2014    Type 2 diabetes mellitus without complication 04/23/2020    Hyperlipidemia 08/12/2013    Stage 3a chronic kidney disease 08/15/2022    Current moderate episode of major depressive disorder without prior episode 06/30/2023    Other emphysema 11/02/2023     Past Medical History:   Diagnosis Date    AAA (abdominal aortic aneurysm) 01/20/2020    ADHD (attention deficit hyperactivity disorder) 07/1965    Asthma 2024    Atherosclerosis of native arteries of extremities with intermittent claudication, bilateral legs 01/20/2020    Atrial fibrillation     Bleeding disorder     Cancer     Cholelithiasis 2019    Clotting disorder     Coronary artery disease     Dry skin     Emphysema lung     Emphysema of lung 08/05/24    Emphysema/COPD     Essential hypertriglyceridemia     Fatty liver 07/31/24    Gastro-esophageal reflux disease without esophagitis     Headache     Heart murmur 2009    History of gastrointestinal hemorrhage     History of gout     History of myasthenia gravis      Hoarseness, persistent     Injury of back [id number]    Injury of neck [id number]    Kidney stone 2020    Liver cancer     Liver disease 07/31/2024    Myasthenia gravis without (acute) exacerbation     Pulmonary nodule     Renal insufficiency     Shoulder injury 2019    Skin abnormalities     Sleep apnea     Status post angioplasty with stent     Unsteady gait when walking     Urinary tract infection 2023    Venous insufficiency (chronic) (peripheral) 01/20/2020    Wrist sprain 2023         PAST SURGICAL HISTORY  Past Surgical History:   Procedure Laterality Date    ANTERIOR CERVICAL DISCECTOMY W/ FUSION N/A 03/25/2016    Procedure: C3- C5 CERVICAL DISCECTOMY ANTERIOR FUSION WITH INSTRUMENTATION;  Surgeon: Bill Washington MD;  Location: [facility] MAIN OR;  Service:     ARTERIOGRAM AORTIC N/A 12/17/2021    Procedure: ENDOVASCULAR ANEURYSM REPAIR GORE;  Surgeon: Jean Patricia MD;  Location: [facility] HYBRID OR 18/19;  Service: Vascular;  Laterality: N/A;    ARTHRODESIS N/A 07/1983    LUMBAR    ARTHRODESIS N/A 1987    LUMBAR    ARTHRODESIS      Spinal Arthrodesis-lower spine-7/1983, 1987--upper spine-1988, 7/1990-Abstracted from [facility]    BACK SURGERY      Lower Back Surgery    CARDIAC CATHETERIZATION  07/2009    CARDIAC CATHETERIZATION  03/18/2003    STENT TO RCA AND PCA, DR. PRIYA LUGO    CATARACT EXTRACTION Bilateral     LENS IMPLANT    CERVICAL ARTHRODESIS N/A 07/1990    CERVICAL ARTHRODESIS N/A 1988    CHOLECYSTECTOMY      COLONOSCOPY N/A 10/03/2019    4 MM HYPERPLASTIC POLYP IN ILEOCECAL VALVE, 4 MM SESSILE SERRATED ADENOMA POLYP IN DESCENDING, 5 TUBULOVILLOUS ADENOMA POLYPS IN RECTUM, 3 TUBULAR ADENOMA POLYPS IN DISTAL RECTUM, 26 MM TUBULAR ADENOMA POLYP IN RECTUM, PIECEMEAL POLYPECTOMY, AREA TATTOOED, RESCOPE IN 6 MONTHS, DR. TAYA CRUZ AT [facility]    COLONOSCOPY N/A 02/17/2021    Procedure: COLONOSCOPY to cecum with cold biopsy polypectomy;  Surgeon: Yousuf Méndez MD;  Location: [facility] ENDOSCOPY;   Service: Gastroenterology;  Laterality: N/A;  pre: hx of polyps  post: polyp    CORONARY ANGIOPLASTY WITH STENT PLACEMENT  2009    and 2009-PT STATES HAS 3 STENTS    CORONARY STENT PLACEMENT      ENDOSCOPY N/A 2012    NON BLEEDING EROSIVE GASTROPATHY, 1 DUODENAL ULCER WITH CLEAN BASE, DR. BRANDY WREN AT [facility]    ENDOSCOPY AND COLONOSCOPY N/A 2007    EGD WNL, 8 ADENOMATOUS POLYPS IN RECTO-SIGMOID, RESCOPE IN 3 YRS, DR. CRISTINA RODRIGUEZ AT [facility]    FOOT SURGERY Left     LEFT BIG TOE-JOINT REPLACED    HAND SURGERY Left     THUMB-JOINT REPLACEMENT    HAND SURGERY Right     JOINT REPLACEMENT RIGHT INDEX FINGER    JOINT REPLACEMENT      two hand and one foot joint    KNEE ARTHROSCOPY Left     KNEE SURGERY      LAPAROSCOPIC CHOLECYSTECTOMY      LUMBAR DISCECTOMY FUSION INSTRUMENTATION N/A 2020    Procedure: Lumbar 3 4 and lumbar 4 5 laminectomy and fusion with instrumentation;  Surgeon: Bill Washington MD;  Location: [facility];  Service: Neurosurgery;  Laterality: N/A;    NECK SURGERY      TRIGGER POINT INJECTION      UPPER GASTROINTESTINAL ENDOSCOPY      VASECTOMY Bilateral          FAMILY HISTORY  Family History   Adopted: Yes   Problem Relation Age of Onset    Heart attack Mother 66    Alcohol abuse Mother         Adopted    Heart disease Mother 66    No Known Problems Father     Heart disease Other         FH in females before the age of 65    Malig Hyperthermia Neg Hx          SOCIAL HISTORY  Social History     Socioeconomic History    Marital status:    Tobacco Use    Smoking status: Former     Current packs/day: 0.00     Average packs/day: 2.0 packs/day for 45.0 years (90.0 ttl pk-yrs)     Types: Cigarettes, Pipe, Cigars     Start date: 2/10/1964     Quit date: 2/10/2009     Years since quittin.1     Passive exposure: Past    Smokeless tobacco: Former    Tobacco comments:     enjoyed smoking   Vaping Use    Vaping status: Never Used   Substance and Sexual Activity    Alcohol  use: Not Currently     Comment: QUIT HEAVY DRINKING JAN 2020/ OCCAS NOW    Drug use: Never    Sexual activity: Not Currently     Partners: Female     Birth control/protection: Vasectomy         ALLERGIES  Rivaroxaban, Ranolazine er, Apixaban, Indomethacin, and Lisinopril      REVIEW OF SYSTEMS  Review of Systems  Included in HPI  All systems reviewed and negative except for those discussed in HPI.      PHYSICAL EXAM    I have reviewed the triage vital signs and nursing notes.    ED Triage Vitals [04/18/25 1806]   Temp Heart Rate Resp BP SpO2   98.6 °F (37 °C) 72 22 120/72 90 %      Temp src Heart Rate Source Patient Position BP Location FiO2 (%)   Tympanic Monitor Sitting Right arm --       Physical Exam  GENERAL: alert, no acute distress  SKIN: Warm, dry.  Multiple scattered skin tears  HENT: Normocephalic, atraumatic  EYES: no scleral icterus  CV: regular rhythm, regular rate  RESPIRATORY: normal effort, lungs clear  ABDOMEN: soft, nontender, nondistended  MUSCULOSKELETAL: no deformity  NEURO: alert, moves all extremities, follows commands            LAB RESULTS  Recent Results (from the past 24 hours)   Comprehensive Metabolic Panel    Collection Time: 04/18/25  6:13 PM    Specimen: Arm, Right; Blood   Result Value Ref Range    Glucose 148 (H) 65 - 99 mg/dL    BUN 79 (H) 8 - 23 mg/dL    Creatinine 2.43 (H) 0.76 - 1.27 mg/dL    Sodium 133 (L) 136 - 145 mmol/L    Potassium 5.9 (H) 3.5 - 5.2 mmol/L    Chloride 96 (L) 98 - 107 mmol/L    CO2 21.0 (L) 22.0 - 29.0 mmol/L    Calcium 9.4 8.6 - 10.5 mg/dL    Total Protein 7.1 6.0 - 8.5 g/dL    Albumin 4.0 3.5 - 5.2 g/dL    ALT (SGPT) 44 (H) 1 - 41 U/L    AST (SGOT) 28 1 - 40 U/L    Alkaline Phosphatase 113 39 - 117 U/L    Total Bilirubin 0.6 0.0 - 1.2 mg/dL    Globulin 3.1 gm/dL    A/G Ratio 1.3 g/dL    BUN/Creatinine Ratio 32.5 (H) 7.0 - 25.0    Anion Gap 16.0 (H) 5.0 - 15.0 mmol/L    eGFR 26.7 (L) >60.0 mL/min/1.73   Magnesium    Collection Time: 04/18/25  6:13 PM     Specimen: Arm, Right; Blood   Result Value Ref Range    Magnesium 2.5 (H) 1.6 - 2.4 mg/dL   High Sensitivity Troponin T    Collection Time: 04/18/25  6:13 PM    Specimen: Arm, Right; Blood   Result Value Ref Range    HS Troponin T 391 (C) <22 ng/L   Green Top (Gel)    Collection Time: 04/18/25  6:13 PM   Result Value Ref Range    Extra Tube Hold for add-ons.    Lavender Top    Collection Time: 04/18/25  6:13 PM   Result Value Ref Range    Extra Tube hold for add-on    Gold Top - SST    Collection Time: 04/18/25  6:13 PM   Result Value Ref Range    Extra Tube Hold for add-ons.    Light Blue Top    Collection Time: 04/18/25  6:13 PM   Result Value Ref Range    Extra Tube Hold for add-ons.    CBC Auto Differential    Collection Time: 04/18/25  6:13 PM    Specimen: Arm, Right; Blood   Result Value Ref Range    WBC 9.90 3.40 - 10.80 10*3/mm3    RBC 3.02 (L) 4.14 - 5.80 10*6/mm3    Hemoglobin 10.5 (L) 13.0 - 17.7 g/dL    Hematocrit 31.1 (L) 37.5 - 51.0 %    .0 (H) 79.0 - 97.0 fL    MCH 34.8 (H) 26.6 - 33.0 pg    MCHC 33.8 31.5 - 35.7 g/dL    RDW 15.8 (H) 12.3 - 15.4 %    RDW-SD 59.2 (H) 37.0 - 54.0 fl    MPV 8.6 6.0 - 12.0 fL    Platelets 123 (L) 140 - 450 10*3/mm3    Neutrophil % 74.2 42.7 - 76.0 %    Lymphocyte % 13.1 (L) 19.6 - 45.3 %    Monocyte % 9.7 5.0 - 12.0 %    Eosinophil % 0.0 (L) 0.3 - 6.2 %    Basophil % 0.5 0.0 - 1.5 %    Immature Grans % 2.5 (H) 0.0 - 0.5 %    Neutrophils, Absolute 7.34 (H) 1.70 - 7.00 10*3/mm3    Lymphocytes, Absolute 1.30 0.70 - 3.10 10*3/mm3    Monocytes, Absolute 0.96 (H) 0.10 - 0.90 10*3/mm3    Eosinophils, Absolute 0.00 0.00 - 0.40 10*3/mm3    Basophils, Absolute 0.05 0.00 - 0.20 10*3/mm3    Immature Grans, Absolute 0.25 (H) 0.00 - 0.05 10*3/mm3    nRBC 0.2 0.0 - 0.2 /100 WBC   ECG 12 Lead Syncope    Collection Time: 04/18/25  6:13 PM   Result Value Ref Range    QT Interval 440 ms    QTC Interval 425 ms   High Sensitivity Troponin T 1Hr    Collection Time: 04/18/25  7:23 PM     Specimen: Blood   Result Value Ref Range    HS Troponin T 406 (C) <22 ng/L    Troponin T Numeric Delta 15 ng/L    Troponin T % Delta 4 Abnormal if >/= 20%   BNP    Collection Time: 04/18/25  7:23 PM    Specimen: Blood   Result Value Ref Range    proBNP 4,581.0 (H) 0.0 - 1,800.0 pg/mL         RADIOLOGY  No Radiology Exams Resulted Within Past 24 Hours      MEDICATIONS GIVEN IN ER  Medications   sodium chloride 0.9 % flush 10 mL (has no administration in time range)   sodium chloride 0.9 % bolus 1,000 mL (1,000 mL Intravenous New Bag 4/18/25 1932)         ORDERS PLACED DURING THIS VISIT:  Orders Placed This Encounter   Procedures    XR Chest 1 View    CT Angiogram Chest Pulmonary Embolism    Anthon Draw    Comprehensive Metabolic Panel    Magnesium    High Sensitivity Troponin T    CBC Auto Differential    High Sensitivity Troponin T 1Hr    BNP    CK    NPO Diet NPO Type: Strict NPO    Undress & Gown    Continuous Pulse Oximetry    Vital Signs    Cardiology (on-call MD unless specified)    Oxygen Therapy- Nasal Cannula; Titrate 1-6 LPM Per SpO2; 90 - 95%    ECG 12 Lead Syncope    ECG 12 Lead Chest Pain    Insert Peripheral IV    CBC & Differential    Green Top (Gel)    Lavender Top    Gold Top - SST    Light Blue Top         OUTPATIENT MEDICATION MANAGEMENT:  Current Facility-Administered Medications Ordered in Epic   Medication Dose Route Frequency Provider Last Rate Last Admin    sodium chloride 0.9 % flush 10 mL  10 mL Intravenous PRN John Rucker MD        triamcinolone acetonide (KENALOG-40) injection 80 mg  80 mg Intra-articular Once Stephan Harvey MD         Current Outpatient Medications Ordered in Epic   Medication Sig Dispense Refill    allopurinol (ZYLOPRIM) 300 MG tablet Take 1 tablet by mouth Every Night. 90 tablet 3    aspirin 81 MG EC tablet Take 1 tablet by mouth Daily.  11    COLLAGEN-VITAMIN C PO Take 2,500 mg by mouth.      Cyanocobalamin (VITAMIN B-12) 5000 MCG tablet dispersible Place 1  tablet on the tongue Daily. (Patient taking differently: Place 1 tablet on the tongue Daily.)      cyclobenzaprine (FLEXERIL) 10 MG tablet Take 1 tablet by mouth At Night As Needed for Muscle Spasms. 30 tablet 0    Farxiga 5 MG tablet tablet Take 1 tablet by mouth Every Morning.      Ferrous Fumarate 325 (106 Fe) MG tablet Take  by mouth.      folic acid (FOLVITE) 400 MCG tablet Take 1 tablet by mouth Daily.      gabapentin (NEURONTIN) 300 MG capsule       losartan (COZAAR) 25 MG tablet Take 1 tablet by mouth Daily.      metoprolol succinate XL (TOPROL-XL) 50 MG 24 hr tablet TAKE 1 TABLET BY MOUTH ONCE DAILY IN THE EVENING 90 tablet 0    multivitamin with minerals tablet tablet Take 1 tablet by mouth Daily.      predniSONE (DELTASONE) 10 MG tablet Take 1 tablet by mouth Daily.      pyridostigmine (MESTINON) 60 MG tablet Take 1 tablet by mouth 3 (Three) Times a Day. 1/2 of a pill 3 times a day for 1 week. After that 1 tab 3x's daily      rOPINIRole (REQUIP) 1 MG tablet TAKE 1 TABLET BY MOUTH NIGHTLY TAKE 1 HOUR PRIOR BEFORE BEDTIME 90 tablet 0    spironolactone (Aldactone) 50 MG tablet Take 1.5 tablets by mouth Daily. 135 tablet 1    tamsulosin (FLOMAX) 0.4 MG capsule 24 hr capsule Take 1 capsule by mouth Every Evening. 90 capsule 3    torsemide (DEMADEX) 100 MG tablet Take 1 tablet by mouth Daily.      traZODone (DESYREL) 100 MG tablet TAKE 1 TABLET BY MOUTH ONCE DAILY AT NIGHT 90 tablet 2         PROCEDURES  Procedures            PROGRESS, DATA ANALYSIS, CONSULTS, AND MEDICAL DECISION MAKING  All labs have been independently interpreted by me.  All radiology studies have been reviewed by me. All EKG's have been independently viewed and interpreted by me.  Discussion below represents my analysis of pertinent findings related to patient's condition, differential diagnosis, treatment plan and final disposition.    Differential diagnosis includes but is not limited to syncope, ACS, PE.    Clinical Scores:                                        ED Course as of 04/19/25 1902 Fri Apr 18, 2025 1941 Discussed with Dr. Lechuga of cardiology who reviewed patient's labs, EKG and history.  He does not think that troponin elevation is secondary to acute coronary syndrome.  Advised patient medication for anticoagulation at this time.  Patient will require further evaluation on the inpatient basis. [MW]   2008 EKG interpreted by me demonstrates sinus rhythm, rate of 48, appears prolongation, nonspecific ST changes in inferior and lateral leads which were present on previous EKG. [MW]   2036 I assumed care of this patient from Dr. Rucker.  Pending CT and admission.   [CC]   2208 CT Angiogram Chest Pulmonary Embolism  An interpretation of the CTA is no large central pulmonary embolism.  Left lower lobe infiltrate.  Cardiomegaly [CC]   2254 I rechecked the patient.  I discussed the patient's labs, radiology findings (including all incidental findings), diagnosis, and plan for admission. The patient understands and agrees with the plan. [CC]   2353 Spoke with LORI Betancourt with [facility].  Reviewed history, exam, results, treatments.  She agrees admit the patient to Dr. Love.   [CC]      ED Course User Index  [CC] Rachel Martinez PA-C  [MW] John Rucker MD             AS OF 20:12 EDT VITALS:    BP - 120/80  HR - 53  TEMP - 98.6 °F (37 °C) (Tympanic)  O2 SATS - 96%    COMPLEXITY OF CARE  The patient requires admission.      DIAGNOSIS  Final diagnoses:   Syncope, unspecified syncope type   Elevated troponin   Cardiomegaly         DISPOSITION  ED Disposition       ED Disposition   Intended Admit    Condition   --    Comment   --                Please note that portions of this document were completed with a voice recognition program.    Note Disclaimer: At [facility], we believe that sharing information builds trust and better relationships. You are receiving this note because you recently visited [facility]. It is possible you will  see health information before a provider has talked with you about it. This kind of information can be easy to misunderstand. To help you fully understand what it means for your health, we urge you to discuss this note with your provider.         John Rucker MD  04/19/25 3307

## 2025-04-19 NOTE — ED NOTES
Nursing report ED to floor  Juan James  79 y.o.  male    HPI : 79 y.o. male with a medical history of coronary artery disease, HLD, PVD, myasthenia gravis, RLS, diabetes, pulmonary hypertension who presents to the ED c/o acute syncope.  Patient states he had a syncopal event 2 days ago.  Patient evaluated at an outside facility and told to come to the ER for further evaluation.  Patient states he has shortness of breath with it has been going on for years and is not had any significant changes.  Patient denies chest pain, back pain, nausea, vomiting.  Patient has multiple skin tears following his syncopal event.     HPI  Stated Reason for Visit: multiple syncopal episodes and soa    Chief Complaint  Chief Complaint   Patient presents with    Syncope    Shortness of Breath       Admitting doctor:   Vamsi Love MD    Admitting diagnosis:   The primary encounter diagnosis was Syncope, unspecified syncope type. Diagnoses of Elevated troponin and Cardiomegaly were also pertinent to this visit.    Code status:   Current Code Status       Date Active Code Status Order ID Comments User Context       Prior            Allergies:   Rivaroxaban, Ranolazine er, Apixaban, Indomethacin, and Lisinopril    Isolation:   No active isolations    Intake and Output    Intake/Output Summary (Last 24 hours) at 4/19/2025 0008  Last data filed at 4/18/2025 2244  Gross per 24 hour   Intake 1000 ml   Output --   Net 1000 ml       Weight:       04/18/25  1932   Weight: 99.8 kg (220 lb 0.3 oz)       Most recent vitals:   Vitals:    04/18/25 2131 04/18/25 2201 04/18/25 2227 04/18/25 2229   BP: 130/83 (!) 124/108  122/71   BP Location:       Patient Position:       Pulse: 61 53 54 52   Resp:  18     Temp:       TempSrc:       SpO2: 91% 93% 92% 94%   Weight:       Height:           Active LDAs/IV Access:   Lines, Drains & Airways       Active LDAs       Name Placement date Placement time Site Days    Peripheral IV 04/18/25 1922 18 G  Anterior;Right;Upper Arm 04/18/25 1922  Arm  less than 1                    Labs (abnormal labs have a star):   Labs Reviewed   COMPREHENSIVE METABOLIC PANEL - Abnormal; Notable for the following components:       Result Value    Glucose 148 (*)     BUN 79 (*)     Creatinine 2.43 (*)     Sodium 133 (*)     Potassium 5.9 (*)     Chloride 96 (*)     CO2 21.0 (*)     ALT (SGPT) 44 (*)     BUN/Creatinine Ratio 32.5 (*)     Anion Gap 16.0 (*)     eGFR 26.7 (*)     All other components within normal limits    Narrative:     GFR Categories in Chronic Kidney Disease (CKD)      GFR Category          GFR (mL/min/1.73)    Interpretation  G1                     90 or greater         Normal or high (1)  G2                      60-89                Mild decrease (1)  G3a                   45-59                Mild to moderate decrease  G3b                   30-44                Moderate to severe decrease  G4                    15-29                Severe decrease  G5                    14 or less           Kidney failure          (1)In the absence of evidence of kidney disease, neither GFR category G1 or G2 fulfill the criteria for CKD.    eGFR calculation 2021 CKD-EPI creatinine equation, which does not include race as a factor   MAGNESIUM - Abnormal; Notable for the following components:    Magnesium 2.5 (*)     All other components within normal limits   TROPONIN - Abnormal; Notable for the following components:    HS Troponin T 391 (*)     All other components within normal limits    Narrative:     High Sensitive Troponin T Reference Range:  <14.0 ng/L- Negative Female for AMI  <22.0 ng/L- Negative Male for AMI  >=14 - Abnormal Female indicating possible myocardial injury.  >=22 - Abnormal Male indicating possible myocardial injury.   Clinicians would have to utilize clinical acumen, EKG, Troponin, and serial changes to determine if it is an Acute Myocardial Infarction or myocardial injury due to an underlying chronic  condition.        CBC WITH AUTO DIFFERENTIAL - Abnormal; Notable for the following components:    RBC 3.02 (*)     Hemoglobin 10.5 (*)     Hematocrit 31.1 (*)     .0 (*)     MCH 34.8 (*)     RDW 15.8 (*)     RDW-SD 59.2 (*)     Platelets 123 (*)     Lymphocyte % 13.1 (*)     Eosinophil % 0.0 (*)     Immature Grans % 2.5 (*)     Neutrophils, Absolute 7.34 (*)     Monocytes, Absolute 0.96 (*)     Immature Grans, Absolute 0.25 (*)     All other components within normal limits   HIGH SENSITIVITIY TROPONIN T 1HR - Abnormal; Notable for the following components:    HS Troponin T 406 (*)     All other components within normal limits    Narrative:     High Sensitive Troponin T Reference Range:  <14.0 ng/L- Negative Female for AMI  <22.0 ng/L- Negative Male for AMI  >=14 - Abnormal Female indicating possible myocardial injury.  >=22 - Abnormal Male indicating possible myocardial injury.   Clinicians would have to utilize clinical acumen, EKG, Troponin, and serial changes to determine if it is an Acute Myocardial Infarction or myocardial injury due to an underlying chronic condition.        BNP (IN-HOUSE) - Abnormal; Notable for the following components:    proBNP 4,581.0 (*)     All other components within normal limits    Narrative:     This assay is used as an aid in the diagnosis of individuals suspected of having heart failure. It can be used as an aid in the diagnosis of acute decompensated heart failure (ADHF) in patients presenting with signs and symptoms of ADHF to the emergency department (ED). In addition, NT-proBNP of <300 pg/mL indicates ADHF is not likely.    Age Range Result Interpretation  NT-proBNP Concentration (pg/mL:      <50             Positive            >450                   Gray                 300-450                    Negative             <300    50-75           Positive            >900                  Gray                300-900                  Negative            <300      >75              Positive            >1800                  Gray                300-1800                  Negative            <300   CK - Normal   RAINBOW DRAW    Narrative:     The following orders were created for panel order [place] Draw.  Procedure                               Abnormality         Status                     ---------                               -----------         ------                     Green Top (Gel)[[id number]]                                  Final result               Lavender Top[[id number]]                                     Final result               Gold Top - SST[[id number]]                                   Final result               Light Blue Top[[id number]]                                   Final result                 Please view results for these tests on the individual orders.   CBC AND DIFFERENTIAL    Narrative:     The following orders were created for panel order CBC & Differential.  Procedure                               Abnormality         Status                     ---------                               -----------         ------                     CBC Auto Differential[[id number]]        Abnormal            Final result                 Please view results for these tests on the individual orders.   GREEN TOP   LAVENDER TOP   GOLD TOP - SST   LIGHT BLUE TOP       EKG:   ECG 12 Lead Syncope   Preliminary Result   HEART RATE=56  bpm   RR Sznkbfhl=1316  ms   WV Interval=  ms   P Horizontal Axis=  deg   P Front Axis=  deg   QRSD Interval=80  ms   QT Hyszhvaq=580  ms   ZJoZ=993  ms   QRS Axis=23  deg   T Wave Axis=57  deg   - ABNORMAL ECG -   Atrial fibrillation   Anteroseptal infarct, age indeterminate   Date and Time of Study:2025-04-18 18:13:02      ECG 12 Lead Chest Pain    (Results Pending)       Meds given in ED:   Medications   sodium chloride 0.9 % flush 10 mL (has no administration in time range)   sodium chloride 0.9 % bolus 1,000 mL (0 mL Intravenous Stopped 4/18/25 4447)    iopamidol (ISOVUE-370) 76 % injection 100 mL (95 mL Intravenous Given by Other 25)       Imaging results:  CT Angiogram Chest Pulmonary Embolism  Result Date: 2025   Pulmonary arteries are well-opacified, and there is no evidence of pulmonary embolism.  No new pulmonary parenchymal abnormality is seen.  Indeterminate left lower lobe pleural-based parenchymal opacity unchanged since the previous study. Etiology and significance uncertain, and as before, continued close interval follow-up and or pulmonary outpatient consultation and follow-up recommended.    This report was finalized on 2025 10:50 PM by Dr. Harshad Arce M.D on Workstation: [id number]      XR Chest 1 View  Result Date: 2025  No focal pulmonary consolidation. Cardiomegaly. Follow-up as clinical indications persist.  This report was finalized on 2025 8:50 PM by Dr. Branden Bond M.D on Workstation: [id number]        Ambulatory status:   - stand by assist    Social issues:   Social History     Socioeconomic History    Marital status:    Tobacco Use    Smoking status: Former     Current packs/day: 0.00     Average packs/day: 2.0 packs/day for 45.0 years (90.0 ttl pk-yrs)     Types: Cigarettes, Pipe, Cigars     Start date: 2/10/1964     Quit date: 2/10/2009     Years since quittin.1     Passive exposure: Past    Smokeless tobacco: Former    Tobacco comments:     enjoyed smoking   Vaping Use    Vaping status: Never Used   Substance and Sexual Activity    Alcohol use: Not Currently     Comment: QUIT HEAVY DRINKING 2020/ OCCAS NOW    Drug use: Never    Sexual activity: Not Currently     Partners: Female     Birth control/protection: Vasectomy       Peripheral Neurovascular  Peripheral Neurovascular (Adult)  Peripheral Neurovascular WDL: WDL    Neuro Cognitive  Neuro Cognitive (Adult)  Cognitive/Neuro/Behavioral WDL: WDL    Learning  Learning Assessment  Learning Readiness and Ability: no barriers  identified    Respiratory  Respiratory WDL  Respiratory WDL: .WDL except, rhythm/pattern  Rhythm/Pattern, Respiratory: shortness of breath    Abdominal Pain  Safety Interventions  Safety Precautions/Falls Reduction: assistive device/personal items within reach, fall reduction program maintained, nonskid shoes/slippers when out of bed    Pain Assessments  Pain (Adult)  (0-10) Pain Rating: Rest: 8  (0-10) Pain Rating: Activity: 8  Pain Location: other (see comments) (legs)    NIH Stroke Scale       Yifan Gannon RN  04/19/25 00:08 EDT

## 2025-04-19 NOTE — PLAN OF CARE
Problem: Adult Inpatient Plan of Care  Goal: Plan of Care Review  Outcome: Progressing  Goal: Patient-Specific Goal (Individualized)  Outcome: Progressing  Goal: Absence of Hospital-Acquired Illness or Injury  Outcome: Progressing  Intervention: Identify and Manage Fall Risk  Description: Perform standard risk assessment on admission using a validated tool or comprehensive approach appropriate to the patient; reassess fall risk frequently, with change in status or transfer to another level of care.Communicate risk to interprofessional healthcare team; ensure fall risk visible cue.Determine need for increased observation, equipment and environmental modification, as well as use of supportive, nonskid footwear.Adjust safety measures to individual needs and identified risk factors.Reinforce the importance of active participation with fall risk prevention, safety, and physical activity with the patient and family.Perform regular intentional rounding to assess need for position change, pain assessment and personal needs, including assistance with toileting.  Recent Flowsheet Documentation  Taken 4/19/2025 0400 by Phoenix Beckett, RN  Safety Promotion/Fall Prevention:   activity supervised   assistive device/personal items within reach   clutter free environment maintained   fall prevention program maintained   nonskid shoes/slippers when out of bed   room organization consistent   safety round/check completed  Taken 4/19/2025 0200 by Phoenix Beckett, RN  Safety Promotion/Fall Prevention:   activity supervised   assistive device/personal items within reach   clutter free environment maintained   fall prevention program maintained   nonskid shoes/slippers when out of bed   room organization consistent   safety round/check completed  Taken 4/19/2025 0115 by Phoenix Beckett, RN  Safety Promotion/Fall Prevention:   activity supervised   assistive device/personal items within reach   clutter free environment maintained    fall prevention program maintained   nonskid shoes/slippers when out of bed   room organization consistent   safety round/check completed  Intervention: Prevent Skin Injury  Description: Perform a screening for skin injury risk, such as pressure or moisture-associated skin damage on admission and at regular intervals throughout hospital stay.Keep all areas of skin (especially folds) clean and dry.Maintain adequate skin hydration.Relieve and redistribute pressure and protect bony prominences and skin at risk for injury; implement measures based on patient-specific risk factors.Match turning and repositioning schedule to clinical condition.Encourage weight shift frequently; assist with reposition if unable to complete independently.Float heels off bed; avoid pressure on the Achilles tendon.Keep skin free from extended contact with medical devices.Optimize nutrition and hydration.Encourage functional activity and mobility, as early as tolerated.Use aids (e.g., slide boards, mechanical lift) during transfer.  Recent Flowsheet Documentation  Taken 4/19/2025 0400 by Phoenix Beckett RN  Body Position: position changed independently  Taken 4/19/2025 0200 by Phoenix Beckett RN  Body Position: position changed independently  Taken 4/19/2025 0115 by Phoenix Beckett RN  Body Position: position changed independently  Intervention: Prevent and Manage VTE (Venous Thromboembolism) Risk  Description: Assess for VTE (venous thromboembolism) risk.Promote early mobilization; encourage both active and passive leg exercises, if unable to ambulate.Initiate and maintain compression or other therapy, as indicated, based on identified risk in accordance with organizational protocol and provider order.Recognize the patient's individual risk for bleeding before initiating pharmacologic thromboprophylaxis.  Recent Flowsheet Documentation  Taken 4/19/2025 0115 by Phoenix Beckett RN  VTE Prevention/Management: SCDs (sequential compression  devices) off  Intervention: Prevent Infection  Description: Maintain skin and mucous membrane integrity; promote hand, oral and pulmonary hygiene.Optimize fluid balance, nutrition, sleep and glycemic control to maximize infection resistance.Identify potential sources of infection early to prevent or mitigate progression of infection (e.g., wound, lines, devices).Evaluate ongoing need for invasive devices; remove promptly when no longer indicated.Review vaccination status.  Recent Flowsheet Documentation  Taken 4/19/2025 0400 by Phoenix Beckett RN  Infection Prevention:   environmental surveillance performed   hand hygiene promoted   rest/sleep promoted   single patient room provided  Taken 4/19/2025 0200 by Phoenix Beckett RN  Infection Prevention:   environmental surveillance performed   hand hygiene promoted   rest/sleep promoted   single patient room provided  Taken 4/19/2025 0115 by Phoenix Beckett RN  Infection Prevention:   environmental surveillance performed   hand hygiene promoted   rest/sleep promoted   single patient room provided  Goal: Optimal Comfort and Wellbeing  Outcome: Progressing  Intervention: Provide Person-Centered Care  Description: Use a family-focused approach to care; encourage support system presence and participation.Develop trust and rapport by proactively providing information, encouraging questions, addressing concerns and offering reassurance.Acknowledge emotional response to hospitalization.Recognize and utilize personal coping strategies and strengths; develop goals via shared decision-making.Honor spiritual and cultural preferences.  Recent Flowsheet Documentation  Taken 4/19/2025 0115 by Phoenix Beckett RN  Trust Relationship/Rapport:   care explained   questions encouraged   questions answered   thoughts/feelings acknowledged  Goal: Readiness for Transition of Care  Outcome: Progressing  Intervention: Mutually Develop Transition Plan  Description: Identify available  resources for support (e.g., family, friends, community).Identify and address barriers to ongoing treatment and home management (e.g., environmental, financial).Provide opportunities to practice self-management skills.Assess and monitor emotional readiness for transition.Establish or reconnect linkage with outpatient providers or community-based services.  Recent Flowsheet Documentation  Taken 4/19/2025 0122 by Phoenix Beckett, RN  Transportation Anticipated: car, drives self  Patient/Family Anticipated Services at Transition: none  Patient/Family Anticipates Transition to: home  Taken 4/19/2025 0121 by Phoenix Beckett, RN  Equipment Currently Used at Home: cane, quad tip    Goal Outcome Evaluation:   Pt admitted to CCU under [facility]. Pt a&O x4, VSS on room air. Pt able to stand and walk to the recliner standby assist. Denies any dizziness. No complaints of chest pain or distress overnight. Troponin trending down but still elevated. [facility] made aware. Pending INP cardiology consult this AM. Plan of care ongoing.
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
## 2025-04-20 PROBLEM — E11.65 TYPE 2 DIABETES MELLITUS WITH HYPERGLYCEMIA, WITHOUT LONG-TERM CURRENT USE OF INSULIN: Status: ACTIVE | Noted: 2024-05-13

## 2025-04-20 LAB
GLUCOSE BLDC GLUCOMTR-MCNC: 180 MG/DL (ref 70–130)
GLUCOSE BLDC GLUCOMTR-MCNC: 191 MG/DL (ref 70–130)
GLUCOSE BLDC GLUCOMTR-MCNC: 194 MG/DL (ref 70–130)
GLUCOSE BLDC GLUCOMTR-MCNC: 214 MG/DL (ref 70–130)
GLUCOSE BLDC GLUCOMTR-MCNC: 233 MG/DL (ref 70–130)
HBA1C MFR BLD: 7.6 % (ref 4.8–5.6)

## 2025-04-20 PROCEDURE — 63710000001 INSULIN LISPRO (HUMAN) PER 5 UNITS: Performed by: NURSE PRACTITIONER

## 2025-04-20 PROCEDURE — 83036 HEMOGLOBIN GLYCOSYLATED A1C: CPT | Performed by: NURSE PRACTITIONER

## 2025-04-20 PROCEDURE — 99232 SBSQ HOSP IP/OBS MODERATE 35: CPT | Performed by: INTERNAL MEDICINE

## 2025-04-20 PROCEDURE — 82948 REAGENT STRIP/BLOOD GLUCOSE: CPT

## 2025-04-20 PROCEDURE — 93005 ELECTROCARDIOGRAM TRACING: CPT | Performed by: INTERNAL MEDICINE

## 2025-04-20 PROCEDURE — 63710000001 PREDNISONE PER 1 MG: Performed by: INTERNAL MEDICINE

## 2025-04-20 PROCEDURE — 93010 ELECTROCARDIOGRAM REPORT: CPT | Performed by: INTERNAL MEDICINE

## 2025-04-20 PROCEDURE — 25810000003 SODIUM CHLORIDE 0.9 % SOLUTION: Performed by: NURSE PRACTITIONER

## 2025-04-20 PROCEDURE — 25810000003 LACTATED RINGERS PER 1000 ML: Performed by: INTERNAL MEDICINE

## 2025-04-20 RX ORDER — SODIUM CHLORIDE, SODIUM LACTATE, POTASSIUM CHLORIDE, CALCIUM CHLORIDE 600; 310; 30; 20 MG/100ML; MG/100ML; MG/100ML; MG/100ML
125 INJECTION, SOLUTION INTRAVENOUS CONTINUOUS
Status: ACTIVE | OUTPATIENT
Start: 2025-04-20 | End: 2025-04-20

## 2025-04-20 RX ADMIN — ASPIRIN 81 MG: 81 TABLET, COATED ORAL at 08:14

## 2025-04-20 RX ADMIN — ROPINIROLE 1 MG: 1 TABLET, FILM COATED ORAL at 22:36

## 2025-04-20 RX ADMIN — PYRIDOSTIGMINE BROMIDE 60 MG: 60 TABLET ORAL at 16:40

## 2025-04-20 RX ADMIN — SODIUM CHLORIDE, POTASSIUM CHLORIDE, SODIUM LACTATE AND CALCIUM CHLORIDE 125 ML/HR: 600; 310; 30; 20 INJECTION, SOLUTION INTRAVENOUS at 10:58

## 2025-04-20 RX ADMIN — Medication 5 MG: at 22:37

## 2025-04-20 RX ADMIN — FERROUS SULFATE TAB 325 MG (65 MG ELEMENTAL FE) 325 MG: 325 (65 FE) TAB at 08:14

## 2025-04-20 RX ADMIN — ALLOPURINOL 300 MG: 300 TABLET ORAL at 22:36

## 2025-04-20 RX ADMIN — METOPROLOL SUCCINATE 50 MG: 25 TABLET, EXTENDED RELEASE ORAL at 16:40

## 2025-04-20 RX ADMIN — INSULIN LISPRO 2 UNITS: 100 INJECTION, SOLUTION INTRAVENOUS; SUBCUTANEOUS at 16:41

## 2025-04-20 RX ADMIN — SODIUM CHLORIDE 500 ML: 9 INJECTION, SOLUTION INTRAVENOUS at 01:03

## 2025-04-20 RX ADMIN — Medication 10 ML: at 08:15

## 2025-04-20 RX ADMIN — Medication 400 MCG: at 08:13

## 2025-04-20 RX ADMIN — TAMSULOSIN HYDROCHLORIDE 0.4 MG: 0.4 CAPSULE ORAL at 16:41

## 2025-04-20 RX ADMIN — PYRIDOSTIGMINE BROMIDE 60 MG: 60 TABLET ORAL at 08:12

## 2025-04-20 RX ADMIN — INSULIN LISPRO 3 UNITS: 100 INJECTION, SOLUTION INTRAVENOUS; SUBCUTANEOUS at 11:45

## 2025-04-20 RX ADMIN — INSULIN LISPRO 3 UNITS: 100 INJECTION, SOLUTION INTRAVENOUS; SUBCUTANEOUS at 22:34

## 2025-04-20 RX ADMIN — PREDNISONE 10 MG: 20 TABLET ORAL at 08:12

## 2025-04-20 RX ADMIN — PYRIDOSTIGMINE BROMIDE 60 MG: 60 TABLET ORAL at 22:36

## 2025-04-20 RX ADMIN — PREDNISONE 10 MG: 20 TABLET ORAL at 17:37

## 2025-04-20 RX ADMIN — INSULIN LISPRO 2 UNITS: 100 INJECTION, SOLUTION INTRAVENOUS; SUBCUTANEOUS at 08:11

## 2025-04-20 RX ADMIN — Medication 10 ML: at 22:38

## 2025-04-20 RX ADMIN — MUPIROCIN 1 APPLICATION: 20 OINTMENT TOPICAL at 08:14

## 2025-04-20 NOTE — PLAN OF CARE
Problem: Adult Inpatient Plan of Care  Goal: Absence of Hospital-Acquired Illness or Injury  Intervention: Identify and Manage Fall Risk  Recent Flowsheet Documentation  Taken 4/20/2025 1800 by Atif Bruce RN  Safety Promotion/Fall Prevention:   activity supervised   fall prevention program maintained   nonskid shoes/slippers when out of bed   room organization consistent   safety round/check completed   clutter free environment maintained  Taken 4/20/2025 1600 by Atif Bruce RN  Safety Promotion/Fall Prevention:   activity supervised   fall prevention program maintained   nonskid shoes/slippers when out of bed   room organization consistent   safety round/check completed   clutter free environment maintained  Taken 4/20/2025 1400 by Atif Bruce RN  Safety Promotion/Fall Prevention:   activity supervised   fall prevention program maintained   nonskid shoes/slippers when out of bed   room organization consistent   safety round/check completed   clutter free environment maintained  Taken 4/20/2025 1200 by Atif Bruce RN  Safety Promotion/Fall Prevention:   activity supervised   fall prevention program maintained   nonskid shoes/slippers when out of bed   room organization consistent   safety round/check completed   clutter free environment maintained  Taken 4/20/2025 1000 by Atif Bruce RN  Safety Promotion/Fall Prevention:   activity supervised   fall prevention program maintained   nonskid shoes/slippers when out of bed   room organization consistent   safety round/check completed   clutter free environment maintained  Taken 4/20/2025 0800 by Atif Bruce RN  Safety Promotion/Fall Prevention:   activity supervised   fall prevention program maintained   nonskid shoes/slippers when out of bed   room organization consistent   safety round/check completed   clutter free environment maintained  Intervention: Prevent Skin Injury  Recent Flowsheet Documentation  Taken  4/20/2025 1800 by Atif Bruce RN  Body Position: position changed independently  Taken 4/20/2025 1600 by Atif Bruce RN  Body Position: position changed independently  Taken 4/20/2025 1400 by Atif Bruce RN  Body Position: position changed independently  Skin Protection: transparent dressing maintained  Taken 4/20/2025 1200 by Atif Bruce RN  Body Position: position changed independently  Taken 4/20/2025 1000 by Atif Bruce RN  Body Position: position changed independently  Taken 4/20/2025 0800 by Atif Bruce RN  Body Position: position changed independently  Skin Protection: transparent dressing maintained  Intervention: Prevent and Manage VTE (Venous Thromboembolism) Risk  Recent Flowsheet Documentation  Taken 4/20/2025 1400 by Atif Bruce RN  VTE Prevention/Management: SCDs (sequential compression devices) off  Taken 4/20/2025 0800 by Atif Bruce RN  VTE Prevention/Management: SCDs (sequential compression devices) off  Intervention: Prevent Infection  Recent Flowsheet Documentation  Taken 4/20/2025 1800 by Atif Bruce RN  Infection Prevention: single patient room provided  Taken 4/20/2025 1600 by Atif Bruce RN  Infection Prevention: single patient room provided  Taken 4/20/2025 1400 by Atif Bruce RN  Infection Prevention: single patient room provided  Taken 4/20/2025 1200 by Atif Bruce RN  Infection Prevention: single patient room provided  Taken 4/20/2025 1000 by Atif Bruce RN  Infection Prevention: single patient room provided  Taken 4/20/2025 0800 by Atif Bruce RN  Infection Prevention: single patient room provided  Goal: Optimal Comfort and Wellbeing  Intervention: Provide Person-Centered Care  Recent Flowsheet Documentation  Taken 4/20/2025 1400 by Atif Bruce RN  Trust Relationship/Rapport:   care explained   choices provided   emotional support provided   empathic listening  provided   questions answered   questions encouraged   reassurance provided   thoughts/feelings acknowledged  Taken 4/20/2025 0800 by Atif Bruce RN  Trust Relationship/Rapport:   choices provided   care explained   emotional support provided   empathic listening provided   questions answered   questions encouraged   reassurance provided   thoughts/feelings acknowledged     Problem: Fall Injury Risk  Goal: Absence of Fall and Fall-Related Injury  Intervention: Identify and Manage Contributors  Recent Flowsheet Documentation  Taken 4/20/2025 1800 by Atif Bruce RN  Medication Review/Management: medications reviewed  Self-Care Promotion: independence encouraged  Taken 4/20/2025 1600 by Atif Bruce RN  Medication Review/Management: medications reviewed  Self-Care Promotion: independence encouraged  Taken 4/20/2025 1400 by Atif Bruce RN  Medication Review/Management: medications reviewed  Self-Care Promotion: independence encouraged  Taken 4/20/2025 1200 by Atif Bruce RN  Medication Review/Management: medications reviewed  Self-Care Promotion: independence encouraged  Taken 4/20/2025 1000 by Atif Bruce RN  Medication Review/Management: medications reviewed  Self-Care Promotion: independence encouraged  Taken 4/20/2025 0800 by Atif Bruce RN  Medication Review/Management: medications reviewed  Self-Care Promotion: independence encouraged  Intervention: Promote Injury-Free Environment  Recent Flowsheet Documentation  Taken 4/20/2025 1800 by Atif Bruce RN  Safety Promotion/Fall Prevention:   activity supervised   fall prevention program maintained   nonskid shoes/slippers when out of bed   room organization consistent   safety round/check completed   clutter free environment maintained  Taken 4/20/2025 1600 by Atif Bruce RN  Safety Promotion/Fall Prevention:   activity supervised   fall prevention program maintained   nonskid shoes/slippers when  out of bed   room organization consistent   safety round/check completed   clutter free environment maintained  Taken 4/20/2025 1400 by Atif Bruce RN  Safety Promotion/Fall Prevention:   activity supervised   fall prevention program maintained   nonskid shoes/slippers when out of bed   room organization consistent   safety round/check completed   clutter free environment maintained  Taken 4/20/2025 1200 by Atif Bruce RN  Safety Promotion/Fall Prevention:   activity supervised   fall prevention program maintained   nonskid shoes/slippers when out of bed   room organization consistent   safety round/check completed   clutter free environment maintained  Taken 4/20/2025 1000 by Atif Bruce RN  Safety Promotion/Fall Prevention:   activity supervised   fall prevention program maintained   nonskid shoes/slippers when out of bed   room organization consistent   safety round/check completed   clutter free environment maintained  Taken 4/20/2025 0800 by Atif Bruce RN  Safety Promotion/Fall Prevention:   activity supervised   fall prevention program maintained   nonskid shoes/slippers when out of bed   room organization consistent   safety round/check completed   clutter free environment maintained     Problem: Adult Inpatient Plan of Care  Goal: Absence of Hospital-Acquired Illness or Injury  Intervention: Identify and Manage Fall Risk  Recent Flowsheet Documentation  Taken 4/20/2025 1800 by Atif Bruce RN  Safety Promotion/Fall Prevention:   activity supervised   fall prevention program maintained   nonskid shoes/slippers when out of bed   room organization consistent   safety round/check completed   clutter free environment maintained  Taken 4/20/2025 1600 by Atif Bruce RN  Safety Promotion/Fall Prevention:   activity supervised   fall prevention program maintained   nonskid shoes/slippers when out of bed   room organization consistent   safety round/check completed    clutter free environment maintained  Taken 4/20/2025 1400 by Atif Bruce RN  Safety Promotion/Fall Prevention:   activity supervised   fall prevention program maintained   nonskid shoes/slippers when out of bed   room organization consistent   safety round/check completed   clutter free environment maintained  Taken 4/20/2025 1200 by Atif Bruce RN  Safety Promotion/Fall Prevention:   activity supervised   fall prevention program maintained   nonskid shoes/slippers when out of bed   room organization consistent   safety round/check completed   clutter free environment maintained  Taken 4/20/2025 1000 by Atif Bruce RN  Safety Promotion/Fall Prevention:   activity supervised   fall prevention program maintained   nonskid shoes/slippers when out of bed   room organization consistent   safety round/check completed   clutter free environment maintained  Taken 4/20/2025 0800 by Atif Bruce RN  Safety Promotion/Fall Prevention:   activity supervised   fall prevention program maintained   nonskid shoes/slippers when out of bed   room organization consistent   safety round/check completed   clutter free environment maintained  Intervention: Prevent Skin Injury  Recent Flowsheet Documentation  Taken 4/20/2025 1800 by Atif Bruce RN  Body Position: position changed independently  Taken 4/20/2025 1600 by Atif Bruce RN  Body Position: position changed independently  Taken 4/20/2025 1400 by Atif Bruce RN  Body Position: position changed independently  Skin Protection: transparent dressing maintained  Taken 4/20/2025 1200 by Atif Bruce RN  Body Position: position changed independently  Taken 4/20/2025 1000 by Atif Bruce RN  Body Position: position changed independently  Taken 4/20/2025 0800 by Atif Bruce RN  Body Position: position changed independently  Skin Protection: transparent dressing maintained  Intervention: Prevent and Manage VTE  (Venous Thromboembolism) Risk  Recent Flowsheet Documentation  Taken 4/20/2025 1400 by Atif Bruce RN  VTE Prevention/Management: SCDs (sequential compression devices) off  Taken 4/20/2025 0800 by Atif Bruce RN  VTE Prevention/Management: SCDs (sequential compression devices) off  Intervention: Prevent Infection  Recent Flowsheet Documentation  Taken 4/20/2025 1800 by Atif Bruce RN  Infection Prevention: single patient room provided  Taken 4/20/2025 1600 by Atif Bruce RN  Infection Prevention: single patient room provided  Taken 4/20/2025 1400 by Atif Bruce RN  Infection Prevention: single patient room provided  Taken 4/20/2025 1200 by Atif Bruce RN  Infection Prevention: single patient room provided  Taken 4/20/2025 1000 by Atif Bruce RN  Infection Prevention: single patient room provided  Taken 4/20/2025 0800 by Atif Bruce RN  Infection Prevention: single patient room provided  Goal: Optimal Comfort and Wellbeing  Intervention: Provide Person-Centered Care  Recent Flowsheet Documentation  Taken 4/20/2025 1400 by Atif Bruce RN  Trust Relationship/Rapport:   care explained   choices provided   emotional support provided   empathic listening provided   questions answered   questions encouraged   reassurance provided   thoughts/feelings acknowledged  Taken 4/20/2025 0800 by Atif Bruce RN  Trust Relationship/Rapport:   choices provided   care explained   emotional support provided   empathic listening provided   questions answered   questions encouraged   reassurance provided   thoughts/feelings acknowledged     Problem: Fall Injury Risk  Goal: Absence of Fall and Fall-Related Injury  Intervention: Identify and Manage Contributors  Recent Flowsheet Documentation  Taken 4/20/2025 1800 by Atif Bruce RN  Medication Review/Management: medications reviewed  Self-Care Promotion: independence encouraged  Taken 4/20/2025 1600 by  Atif Bruce RN  Medication Review/Management: medications reviewed  Self-Care Promotion: independence encouraged  Taken 4/20/2025 1400 by Atif Bruce RN  Medication Review/Management: medications reviewed  Self-Care Promotion: independence encouraged  Taken 4/20/2025 1200 by Atif Bruce RN  Medication Review/Management: medications reviewed  Self-Care Promotion: independence encouraged  Taken 4/20/2025 1000 by Atif Bruce RN  Medication Review/Management: medications reviewed  Self-Care Promotion: independence encouraged  Taken 4/20/2025 0800 by Atif Bruce RN  Medication Review/Management: medications reviewed  Self-Care Promotion: independence encouraged  Intervention: Promote Injury-Free Environment  Recent Flowsheet Documentation  Taken 4/20/2025 1800 by Atif Bruce RN  Safety Promotion/Fall Prevention:   activity supervised   fall prevention program maintained   nonskid shoes/slippers when out of bed   room organization consistent   safety round/check completed   clutter free environment maintained  Taken 4/20/2025 1600 by Atif Bruce RN  Safety Promotion/Fall Prevention:   activity supervised   fall prevention program maintained   nonskid shoes/slippers when out of bed   room organization consistent   safety round/check completed   clutter free environment maintained  Taken 4/20/2025 1400 by Atif Bruce RN  Safety Promotion/Fall Prevention:   activity supervised   fall prevention program maintained   nonskid shoes/slippers when out of bed   room organization consistent   safety round/check completed   clutter free environment maintained  Taken 4/20/2025 1200 by Atif Bruce RN  Safety Promotion/Fall Prevention:   activity supervised   fall prevention program maintained   nonskid shoes/slippers when out of bed   room organization consistent   safety round/check completed   clutter free environment maintained  Taken 4/20/2025 1000 by  Atif Bruce, RN  Safety Promotion/Fall Prevention:   activity supervised   fall prevention program maintained   nonskid shoes/slippers when out of bed   room organization consistent   safety round/check completed   clutter free environment maintained  Taken 4/20/2025 0800 by Atif Bruce, RN  Safety Promotion/Fall Prevention:   activity supervised   fall prevention program maintained   nonskid shoes/slippers when out of bed   room organization consistent   safety round/check completed   clutter free environment maintained   Goal Outcome Evaluation:

## 2025-04-20 NOTE — PLAN OF CARE
Goal Outcome Evaluation:  Plan of Care Reviewed With: patient        Progress: no change  Outcome Evaluation: VSS; A&Ox4. Pt remains on room air while awake; requires 2LNC support during sleep; sat 96-97. Pt SBP . Pt hypotensive during the night; 86/42 manual; 500cc bolus given per order. Pt w/o c/o of pain during the night. Pt Trazodone discontinued per order. Pt remains Afib controlled rates 29-62. Plan of care is ongoing.

## 2025-04-20 NOTE — PROGRESS NOTES
Name: Juan James ADMIT: 2025   : 1945  PCP: Padmini Benson APRN    MRN: [id number] LOS: 0 days   AGE/SEX: 79 y.o. male  ROOM: [facility]     Subjective   Subjective   No acute events. Patient denies new complaints. No dizziness/light-headedness or syncope. No family at bedside.    Objective   Objective   Vital Signs  Temp:  [97.9 °F (36.6 °C)-98.6 °F (37 °C)] 97.9 °F (36.6 °C)  Heart Rate:  [47-71] 47  Resp:  [16-25] 16  BP: ()/() 105/56  SpO2:  [87 %-97 %] 97 %  on  Flow (L/min) (Oxygen Therapy):  [2] 2;   Device (Oxygen Therapy): nasal cannula  Body mass index is 29.03 kg/m².  Physical Exam  Vitals and nursing note reviewed.   Constitutional:       General: He is not in acute distress.     Appearance: He is not toxic-appearing or diaphoretic.   HENT:      Head: Normocephalic.      Nose: Nose normal.      Mouth/Throat:      Mouth: Mucous membranes are moist.      Pharynx: Oropharynx is clear.   Eyes:      Conjunctiva/sclera: Conjunctivae normal.      Pupils: Pupils are equal, round, and reactive to light.   Cardiovascular:      Rate and Rhythm: Normal rate. Rhythm irregular.      Pulses: Normal pulses.   Pulmonary:      Effort: Pulmonary effort is normal.      Breath sounds: Normal breath sounds.   Abdominal:      General: Bowel sounds are normal. There is no distension.      Palpations: Abdomen is soft.      Tenderness: There is no abdominal tenderness.   Musculoskeletal:         General: No swelling or tenderness.      Cervical back: Neck supple.   Skin:     General: Skin is warm and dry.      Capillary Refill: Capillary refill takes less than 2 seconds.      Comments: Abrasions on knees and RUE do not appear infected   Neurological:      General: No focal deficit present.      Mental Status: He is alert and oriented to person, place, and time.   Psychiatric:         Mood and Affect: Mood normal.         Behavior: Behavior normal.       Results Review     I reviewed the patient's  new clinical results.  Results from last 7 days   Lab Units 04/19/25  0436 04/18/25  1813   WBC 10*3/mm3 8.67 9.90   HEMOGLOBIN g/dL 10.3* 10.5*   PLATELETS 10*3/mm3 112* 123*     Results from last 7 days   Lab Units 04/19/25  0436 04/18/25  1813   SODIUM mmol/L 135* 133*   POTASSIUM mmol/L 5.2 5.9*   CHLORIDE mmol/L 101 96*   CO2 mmol/L 21.0* 21.0*   BUN mg/dL 77* 79*   CREATININE mg/dL 2.17* 2.43*   GLUCOSE mg/dL 112* 148*   EGFR mL/min/1.73 30.6* 26.7*     Results from last 7 days   Lab Units 04/19/25  0436 04/18/25  1813   ALBUMIN g/dL 3.7 4.0   BILIRUBIN mg/dL 0.6 0.6   ALK PHOS U/L 99 113   AST (SGOT) U/L 34 28   ALT (SGPT) U/L 40 44*     Results from last 7 days   Lab Units 04/19/25  0436 04/18/25  1813   CALCIUM mg/dL 8.9 9.4   ALBUMIN g/dL 3.7 4.0   MAGNESIUM mg/dL  --  2.5*       Hemoglobin A1C   Date/Time Value Ref Range Status   04/20/2025 0557 7.60 (H) 4.80 - 5.60 % Final     Glucose   Date/Time Value Ref Range Status   04/20/2025 0721 180 (H) 70 - 130 mg/dL Final   04/20/2025 0621 191 (H) 70 - 130 mg/dL Final   04/19/2025 2014 133 (H) 70 - 130 mg/dL Final   04/19/2025 1742 254 (H) 70 - 130 mg/dL Final   04/19/2025 1117 133 (H) 70 - 130 mg/dL Final   04/19/2025 0547 160 (H) 70 - 130 mg/dL Final       CT Angiogram Chest Pulmonary Embolism  Result Date: 4/18/2025   Pulmonary arteries are well-opacified, and there is no evidence of pulmonary embolism.  No new pulmonary parenchymal abnormality is seen.  Indeterminate left lower lobe pleural-based parenchymal opacity unchanged since the previous study. Etiology and significance uncertain, and as before, continued close interval follow-up and or pulmonary outpatient consultation and follow-up recommended.    This report was finalized on 4/18/2025 10:50 PM by Dr. Harshad Arce M.D on Workstation: [id number]      XR Chest 1 View  Result Date: 4/18/2025  No focal pulmonary consolidation. Cardiomegaly. Follow-up as clinical indications persist.  This report  was finalized on 4/18/2025 8:50 PM by Dr. Branden Bond M.D on Workstation: [id number]        I have personally reviewed all medications:  Scheduled Medications  allopurinol, 300 mg, Oral, Nightly  aspirin, 81 mg, Oral, Daily  ferrous sulfate, 325 mg, Oral, Daily With Breakfast  folic acid, 400 mcg, Oral, Daily  insulin lispro, 2-7 Units, Subcutaneous, 4x Daily AC & at Bedtime  [Held by provider] losartan, 25 mg, Oral, Daily  metoprolol succinate XL, 50 mg, Oral, Q PM  mupirocin, 1 Application, Each Nare, BID  predniSONE, 10 mg, Oral, BID With Meals  pyridostigmine, 60 mg, Oral, TID  rOPINIRole, 1 mg, Oral, Nightly  sodium chloride, 10 mL, Intravenous, Q12H  tamsulosin, 0.4 mg, Oral, Q PM    Infusions  lactated ringers, 125 mL/hr    Diet  Diet: Cardiac, Diabetic; Healthy Heart (2-3 Na+); Consistent Carbohydrate; Fluid Consistency: Thin (IDDSI 0)    I have personally reviewed:  [x]  Laboratory   []  Microbiology   []  Radiology   [x]  EKG/Telemetry  [x]  Cardiology/Vascular   []  Pathology    [x]  Records    Assessment/Plan     Active Hospital Problems    Diagnosis  POA    **Syncope [R55]  Yes    Infrarenal abdominal aortic aneurysm (AAA) without rupture [I71.43]  Yes    Alcoholic cirrhosis of liver with ascites [K70.31]  Yes    Pulmonary hypertension [I27.20]  Yes    Type 2 diabetes mellitus with hyperglycemia, without long-term current use of insulin [E11.65]  Yes    Chronic kidney disease, stage 3a [N18.31]  Yes    Benign prostatic hyperplasia [N40.0]  Yes    Essential hypertension [I10]  Yes    Permanent atrial fibrillation [I48.21]  Yes    SHANNAN (acute kidney injury) [N17.9]  Yes    BPH (benign prostatic hyperplasia) [N40.0]  Yes    GERD (gastroesophageal reflux disease) [K21.9]  Yes      Resolved Hospital Problems   No resolved problems to display.   Syncope  - no significant issues on telemetry, echocardiogram showed normal LV function  - troponin was elevated but has been stable  - stress test today per  cardiology, appreciate recommendations    SHANNAN on CKD Stage 3a  - baseline serum creatinine is around 1.5-1.6  - shannan likely due to volume depletion and low BP, losartan, spironolactone, and torsemide held  - he received IV fluids, will restart a course of these  - no evidence of urine retention    Type 2 DM  - has not been on pharmacotherapy at home in some time  - BG is acceptable, A1C is 7.10%  - continue coverage with ssi/hypoglycemia protocol    HTN/Permanent Afib  - HR and BP acceptable, was hypotensive overnight  - continue metoprolol  - losartan held as above    Cirrhosis  - appears well-compensated  - spironolactone and torsemide held due to shannan    SCDs for DVT prophylaxis.  Full code.  Discussed with patient and nursing staff.  Anticipate discharge home in 1-2 days.  Expected Discharge Date: 4/21/2025; Expected Discharge Time:       John Schmidt MD  [facility]  04/20/25  10:22 EDT
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
## 2025-04-21 LAB
ANION GAP SERPL CALCULATED.3IONS-SCNC: 15.8 MMOL/L (ref 5–15)
BUN SERPL-MCNC: 83 MG/DL (ref 8–23)
BUN/CREAT SERPL: 36.6 (ref 7–25)
CALCIUM SPEC-SCNC: 8.8 MG/DL (ref 8.6–10.5)
CHLORIDE SERPL-SCNC: 96 MMOL/L (ref 98–107)
CO2 SERPL-SCNC: 22.2 MMOL/L (ref 22–29)
CREAT SERPL-MCNC: 2.27 MG/DL (ref 0.76–1.27)
CREAT UR-MCNC: 71.2 MG/DL
EGFRCR SERPLBLD CKD-EPI 2021: 29 ML/MIN/1.73
GLUCOSE BLDC GLUCOMTR-MCNC: 152 MG/DL (ref 70–130)
GLUCOSE BLDC GLUCOMTR-MCNC: 202 MG/DL (ref 70–130)
GLUCOSE BLDC GLUCOMTR-MCNC: 209 MG/DL (ref 70–130)
GLUCOSE BLDC GLUCOMTR-MCNC: 212 MG/DL (ref 70–130)
GLUCOSE SERPL-MCNC: 190 MG/DL (ref 65–99)
POTASSIUM SERPL-SCNC: 4.9 MMOL/L (ref 3.5–5.2)
PROT ?TM UR-MCNC: 61.3 MG/DL
PROT/CREAT UR: 861 MG/G CREA (ref 0–200)
QT INTERVAL: 440 MS
QT INTERVAL: 481 MS
QTC INTERVAL: 425 MS
QTC INTERVAL: 437 MS
SODIUM SERPL-SCNC: 134 MMOL/L (ref 136–145)

## 2025-04-21 PROCEDURE — 82948 REAGENT STRIP/BLOOD GLUCOSE: CPT

## 2025-04-21 PROCEDURE — 80048 BASIC METABOLIC PNL TOTAL CA: CPT | Performed by: INTERNAL MEDICINE

## 2025-04-21 PROCEDURE — 63710000001 PREDNISONE PER 1 MG: Performed by: INTERNAL MEDICINE

## 2025-04-21 PROCEDURE — 84156 ASSAY OF PROTEIN URINE: CPT | Performed by: INTERNAL MEDICINE

## 2025-04-21 PROCEDURE — 63710000001 INSULIN LISPRO (HUMAN) PER 5 UNITS: Performed by: NURSE PRACTITIONER

## 2025-04-21 PROCEDURE — 99232 SBSQ HOSP IP/OBS MODERATE 35: CPT | Performed by: INTERNAL MEDICINE

## 2025-04-21 PROCEDURE — 82570 ASSAY OF URINE CREATININE: CPT | Performed by: INTERNAL MEDICINE

## 2025-04-21 RX ORDER — PETROLATUM 420 MG/G
1 OINTMENT TOPICAL EVERY 12 HOURS SCHEDULED
Status: DISCONTINUED | OUTPATIENT
Start: 2025-04-21 | End: 2025-04-24 | Stop reason: HOSPADM

## 2025-04-21 RX ADMIN — PETROLATUM 1 APPLICATION: 420 OINTMENT TOPICAL at 21:42

## 2025-04-21 RX ADMIN — PYRIDOSTIGMINE BROMIDE 60 MG: 60 TABLET ORAL at 21:37

## 2025-04-21 RX ADMIN — PYRIDOSTIGMINE BROMIDE 60 MG: 60 TABLET ORAL at 17:16

## 2025-04-21 RX ADMIN — ROPINIROLE 1 MG: 1 TABLET, FILM COATED ORAL at 21:37

## 2025-04-21 RX ADMIN — PREDNISONE 10 MG: 20 TABLET ORAL at 17:17

## 2025-04-21 RX ADMIN — INSULIN LISPRO 3 UNITS: 100 INJECTION, SOLUTION INTRAVENOUS; SUBCUTANEOUS at 17:17

## 2025-04-21 RX ADMIN — INSULIN LISPRO 3 UNITS: 100 INJECTION, SOLUTION INTRAVENOUS; SUBCUTANEOUS at 08:28

## 2025-04-21 RX ADMIN — FERROUS SULFATE TAB 325 MG (65 MG ELEMENTAL FE) 325 MG: 325 (65 FE) TAB at 08:28

## 2025-04-21 RX ADMIN — ASPIRIN 81 MG: 81 TABLET, COATED ORAL at 08:28

## 2025-04-21 RX ADMIN — PREDNISONE 10 MG: 20 TABLET ORAL at 08:28

## 2025-04-21 RX ADMIN — ALLOPURINOL 300 MG: 300 TABLET ORAL at 21:37

## 2025-04-21 RX ADMIN — PYRIDOSTIGMINE BROMIDE 60 MG: 60 TABLET ORAL at 08:28

## 2025-04-21 RX ADMIN — INSULIN LISPRO 3 UNITS: 100 INJECTION, SOLUTION INTRAVENOUS; SUBCUTANEOUS at 21:42

## 2025-04-21 RX ADMIN — MUPIROCIN 1 APPLICATION: 20 OINTMENT TOPICAL at 21:36

## 2025-04-21 RX ADMIN — Medication 10 ML: at 21:38

## 2025-04-21 RX ADMIN — INSULIN LISPRO 2 UNITS: 100 INJECTION, SOLUTION INTRAVENOUS; SUBCUTANEOUS at 11:49

## 2025-04-21 RX ADMIN — TAMSULOSIN HYDROCHLORIDE 0.4 MG: 0.4 CAPSULE ORAL at 17:17

## 2025-04-21 RX ADMIN — MUPIROCIN 1 APPLICATION: 20 OINTMENT TOPICAL at 08:28

## 2025-04-21 RX ADMIN — Medication 400 MCG: at 08:28

## 2025-04-21 RX ADMIN — Medication 10 ML: at 08:28

## 2025-04-21 NOTE — PROGRESS NOTES
"   LOS: 1 day   Patient Care Team:  Padmini Benson APRN as PCP - General (Nurse Practitioner)  Bill Washington MD as Surgeon (Neurosurgery)  Chris Mo III, MD as Cardiologist (Cardiology)  Lorenzo Rosado MD (Neurology)  Jean Patricia MD as Surgeon (Vascular Surgery)  Hermes Francisco MD as Consulting Physician (Pulmonary Disease)  Juan Arroyo MD as Consulting Physician (Nephrology)  Paris Adame, MIRI as Ambulatory  ([facility])    Chief Complaint: syncope       Interval History: No complaints, wants to go home. No CP, LH.      Objective   Vital Signs  Temp:  [97.3 °F (36.3 °C)-98.3 °F (36.8 °C)] 97.5 °F (36.4 °C)  Heart Rate:  [43-56] 49  Resp:  [18-20] 18  BP: ()/(49-79) 125/67    Intake/Output Summary (Last 24 hours) at 4/21/2025 1944  Last data filed at 4/21/2025 0651  Gross per 24 hour   Intake 960 ml   Output 500 ml   Net 460 ml       Last Weight and Admission Weight        04/19/25  1050   Weight: 99.8 kg (220 lb)     Flowsheet Rows      Flowsheet Row First Filed Value   Admission Height 185.4 cm (73\") Documented at 04/18/2025 1932   Admission Weight 99.8 kg (220 lb 0.3 oz) Documented at 04/18/2025 1932                    Physical Exam  Vitals reviewed.   Constitutional:       Appearance: He is well-developed. He is obese.   HENT:      Head: Normocephalic.      Nose: Nose normal.   Eyes:      Conjunctiva/sclera: Conjunctivae normal.      Pupils: Pupils are equal, round, and reactive to light.   Neck:      Vascular: No JVD.   Cardiovascular:      Rate and Rhythm: Normal rate. Rhythm irregular.      Heart sounds: Normal heart sounds.   Pulmonary:      Effort: Pulmonary effort is normal.      Breath sounds: Normal breath sounds.   Abdominal:      Palpations: Abdomen is soft. There is no mass.      Tenderness: There is no abdominal tenderness.   Musculoskeletal:         General: No swelling. Normal range of motion.      Cervical back: Normal range of " motion.   Skin:     General: Skin is warm and dry.   Neurological:      General: No focal deficit present.      Mental Status: He is alert.   Psychiatric:         Mood and Affect: Mood normal.         Results Review:      Results from last 7 days   Lab Units 04/21/25  0825 04/19/25  0436 04/18/25  1813   SODIUM mmol/L 134* 135* 133*   POTASSIUM mmol/L 4.9 5.2 5.9*   CHLORIDE mmol/L 96* 101 96*   CO2 mmol/L 22.2 21.0* 21.0*   BUN mg/dL 83* 77* 79*   CREATININE mg/dL 2.27* 2.17* 2.43*   GLUCOSE mg/dL 190* 112* 148*   CALCIUM mg/dL 8.8 8.9 9.4     Results from last 7 days   Lab Units 04/19/25  0436 04/18/25  1923 04/18/25  1813   CK TOTAL U/L  --  66  --    HSTROP T ng/L 323* 406* 391*     Results from last 7 days   Lab Units 04/19/25  0436 04/18/25  1813   WBC 10*3/mm3 8.67 9.90   HEMOGLOBIN g/dL 10.3* 10.5*   HEMATOCRIT % 30.9* 31.1*   PLATELETS 10*3/mm3 112* 123*             Results from last 7 days   Lab Units 04/18/25  1813   MAGNESIUM mg/dL 2.5*           I reviewed the patient's new clinical results.  I personally viewed and interpreted the patient's EKG/Telemetry data        Medication Review:   allopurinol, 300 mg, Oral, Nightly  aspirin, 81 mg, Oral, Daily  ferrous sulfate, 325 mg, Oral, Daily With Breakfast  folic acid, 400 mcg, Oral, Daily  insulin lispro, 2-7 Units, Subcutaneous, 4x Daily AC & at Bedtime  [Held by provider] losartan, 25 mg, Oral, Daily  [Held by provider] metoprolol succinate XL, 50 mg, Oral, Q PM  mupirocin, 1 Application, Each Nare, BID  Petrolatum, 1 Application, Topical, Q12H  predniSONE, 10 mg, Oral, BID With Meals  pyridostigmine, 60 mg, Oral, TID  rOPINIRole, 1 mg, Oral, Nightly  sodium chloride, 10 mL, Intravenous, Q12H  tamsulosin, 0.4 mg, Oral, Q PM             Assessment & Plan     1. Syncope -- suspect due to hypovolemia. Will place monitor at d/c. Echo with normal LVSF/wall motion.    2. SHANNAN on CKD3    3. DM    4. Permanent AF -- declines OAC.    We will see as needed, no  objection to d/c at any time.     Jorge Palma MD  04/21/25  19:44 EDT

## 2025-04-21 NOTE — PLAN OF CARE
Goal Outcome Evaluation:     Patient A&Ox4. PVR bladder scan 62. Ambulating independently with cane. Medicated per orders. Bradycardic on tele. Hopeful to discharge tomorrow.

## 2025-04-21 NOTE — NURSING NOTE
"CWOCN- consult for skin tears, heels.   Patient fell at home and has sustained skin tears. He said that they bled a lot initially. Patient reports that sometimes his legs swell and that they are swollen now. He does not wear compression. Once he elevates them, it helps the edema. Patient states the was told by his \"neuropathy doctor\" to keep betadine on his heels.     Patient with skin tears to the BLE including a couple above the knees. RN has placed silicone border dressings. These are appropriate to manage moisture from the wounds. I checked under the dressings- all the skin tears are partial thickness. There is drainage from the left lower leg wound that is serosanguinous. Otherwise all dressings are intact without drainage. Bruising noted to arms and legs.     Legs with hemosiderin staining, shiny, 1-2+ edema- L>R. Patient reports that he does not use compression- I recommend that he elevate his legs. There is no blistering or drainage related to the edema.    Heels are very dry and cracking. Patient reports that he was told to use betadine on the heels. I would recommend to moisturize at this point as the excessive dryness is going to cause cracked skin and a portal for bacteria. He states that he has soaked his feet in betadine. I would recommend more of a balance between a lotion/ointment and a light application of betadine if needed.     Patient verbalized understanding. He says he is hoping to be discharged tomorrow.   "

## 2025-04-21 NOTE — PLAN OF CARE
Goal Outcome Evaluation:  Plan of Care Reviewed With: patient        Progress: no change  Outcome Evaluation: HR and BP were low over night.  other VS stable.  orthostatic blood pressures obtained.  pt denied pain.  meds given per orders.  wound consult placed.  will continue to monitor.
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
## 2025-04-21 NOTE — PROGRESS NOTES
Name: Juan James ADMIT: 2025   : 1945  PCP: Padmini Benson APRN    MRN: [id number] LOS: 1 days   AGE/SEX: 79 y.o. male  ROOM: [facility]     Subjective   Subjective   No acute events. Patient denies new complaints. No dizziness/light-headedness or syncope. No family at bedside.    Objective   Objective   Vital Signs  Temp:  [97.3 °F (36.3 °C)-98.3 °F (36.8 °C)] 97.5 °F (36.4 °C)  Heart Rate:  [43-62] 49  Resp:  [18-20] 18  BP: ()/(49-79) 125/67  SpO2:  [93 %-100 %] 100 %  on  Flow (L/min) (Oxygen Therapy):  [2] 2;   Device (Oxygen Therapy): room air  Body mass index is 29.03 kg/m².  Physical Exam  Vitals and nursing note reviewed.   Constitutional:       General: He is not in acute distress.     Appearance: He is not toxic-appearing or diaphoretic.   HENT:      Head: Normocephalic.      Nose: Nose normal.      Mouth/Throat:      Mouth: Mucous membranes are moist.      Pharynx: Oropharynx is clear.   Eyes:      Conjunctiva/sclera: Conjunctivae normal.      Pupils: Pupils are equal, round, and reactive to light.   Cardiovascular:      Rate and Rhythm: Normal rate. Rhythm irregular.      Pulses: Normal pulses.   Pulmonary:      Effort: Pulmonary effort is normal.      Breath sounds: Normal breath sounds.   Abdominal:      General: Bowel sounds are normal. There is no distension.      Palpations: Abdomen is soft.      Tenderness: There is no abdominal tenderness.   Musculoskeletal:         General: No swelling or tenderness.      Cervical back: Neck supple.   Skin:     General: Skin is warm and dry.      Capillary Refill: Capillary refill takes less than 2 seconds.      Comments: Abrasions on knees and RUE do not appear infected   Neurological:      General: No focal deficit present.      Mental Status: He is alert and oriented to person, place, and time.   Psychiatric:         Mood and Affect: Mood normal.         Behavior: Behavior normal.       Results Review     I reviewed the patient's  new clinical results.  Results from last 7 days   Lab Units 04/19/25  0436 04/18/25  1813   WBC 10*3/mm3 8.67 9.90   HEMOGLOBIN g/dL 10.3* 10.5*   PLATELETS 10*3/mm3 112* 123*     Results from last 7 days   Lab Units 04/21/25  0825 04/19/25  0436 04/18/25  1813   SODIUM mmol/L 134* 135* 133*   POTASSIUM mmol/L 4.9 5.2 5.9*   CHLORIDE mmol/L 96* 101 96*   CO2 mmol/L 22.2 21.0* 21.0*   BUN mg/dL 83* 77* 79*   CREATININE mg/dL 2.27* 2.17* 2.43*   GLUCOSE mg/dL 190* 112* 148*   EGFR mL/min/1.73 29.0* 30.6* 26.7*     Results from last 7 days   Lab Units 04/19/25  0436 04/18/25  1813   ALBUMIN g/dL 3.7 4.0   BILIRUBIN mg/dL 0.6 0.6   ALK PHOS U/L 99 113   AST (SGOT) U/L 34 28   ALT (SGPT) U/L 40 44*     Results from last 7 days   Lab Units 04/21/25  0825 04/19/25  0436 04/18/25  1813   CALCIUM mg/dL 8.8 8.9 9.4   ALBUMIN g/dL  --  3.7 4.0   MAGNESIUM mg/dL  --   --  2.5*       Hemoglobin A1C   Date/Time Value Ref Range Status   04/20/2025 0557 7.60 (H) 4.80 - 5.60 % Final     Glucose   Date/Time Value Ref Range Status   04/21/2025 1111 152 (H) 70 - 130 mg/dL Final   04/21/2025 0818 212 (H) 70 - 130 mg/dL Final   04/20/2025 1942 233 (H) 70 - 130 mg/dL Final   04/20/2025 1625 194 (H) 70 - 130 mg/dL Final   04/20/2025 1128 214 (H) 70 - 130 mg/dL Final   04/20/2025 0721 180 (H) 70 - 130 mg/dL Final   04/20/2025 0621 191 (H) 70 - 130 mg/dL Final       No radiology results for the last day      I have personally reviewed all medications:  Scheduled Medications  allopurinol, 300 mg, Oral, Nightly  aspirin, 81 mg, Oral, Daily  ferrous sulfate, 325 mg, Oral, Daily With Breakfast  folic acid, 400 mcg, Oral, Daily  insulin lispro, 2-7 Units, Subcutaneous, 4x Daily AC & at Bedtime  [Held by provider] losartan, 25 mg, Oral, Daily  [Held by provider] metoprolol succinate XL, 50 mg, Oral, Q PM  mupirocin, 1 Application, Each Nare, BID  predniSONE, 10 mg, Oral, BID With Meals  pyridostigmine, 60 mg, Oral, TID  rOPINIRole, 1 mg, Oral,  Nightly  sodium chloride, 10 mL, Intravenous, Q12H  tamsulosin, 0.4 mg, Oral, Q PM    Infusions     Diet  Diet: Cardiac, Diabetic; Healthy Heart (2-3 Na+); Consistent Carbohydrate; Fluid Consistency: Thin (IDDSI 0)    I have personally reviewed:  [x]  Laboratory   []  Microbiology   []  Radiology   [x]  EKG/Telemetry  [x]  Cardiology/Vascular   []  Pathology    [x]  Records    Assessment/Plan     Active Hospital Problems    Diagnosis  POA    **Syncope [R55]  Yes    Infrarenal abdominal aortic aneurysm (AAA) without rupture [I71.43]  Yes    Alcoholic cirrhosis of liver with ascites [K70.31]  Yes    Pulmonary hypertension [I27.20]  Yes    Type 2 diabetes mellitus with hyperglycemia, without long-term current use of insulin [E11.65]  Yes    Chronic kidney disease, stage 3a [N18.31]  Yes    Benign prostatic hyperplasia [N40.0]  Yes    Essential hypertension [I10]  Yes    Permanent atrial fibrillation [I48.21]  Yes    SHANNAN (acute kidney injury) [N17.9]  Yes    BPH (benign prostatic hyperplasia) [N40.0]  Yes    GERD (gastroesophageal reflux disease) [K21.9]  Yes      Resolved Hospital Problems   No resolved problems to display.   Syncope  - no significant issues on telemetry, echocardiogram showed normal LV function  - troponin was elevated but has been stable  - echocardiogram with normal LV function, plan for zio patch at discharge  - appreciate cardiology recommendations    SHANNAN on CKD Stage 3a  - baseline serum creatinine is around 1.5-1.6  - shannan likely due to volume depletion and low BP, losartan, spironolactone, and torsemide held  - he received IV fluids  - no evidence of urine retention  - nephrology consulted and I d/w Dr. Arroyo-restarted on lower dose of torsemide    Type 2 DM  - has not been on pharmacotherapy at home in some time  - BG is acceptable, A1C is 7.10%  - continue coverage with ssi/hypoglycemia protocol    HTN/Permanent Afib  - HR and BP acceptable  - continue metoprolol  - losartan held as  above    Cirrhosis  - appears well-compensated    SCDs for DVT prophylaxis.  Full code.  Discussed with patient and nursing staff.  Anticipate discharge home tomorrow.  Expected Discharge Date: 4/23/2025; Expected Discharge Time:       John Schmidt MD  [facility]  04/21/25  14:39 EDT    Portions of this text have been copied and I have reviewed them. They are accurate as of 4/21/2025

## 2025-04-21 NOTE — CASE MANAGEMENT/SOCIAL WORK
Discharge Planning Assessment  [facility]     Patient Name: Juan James  MRN: [id number]  Today's Date: 4/21/2025    Admit Date: 4/18/2025    Plan: home   Discharge Needs Assessment       Row Name 04/21/25 1324       Living Environment    People in Home alone    Current Living Arrangements home    Potentially Unsafe Housing Conditions none    In the past 12 months has the electric, gas, oil, or water company threatened to shut off services in your home? No    Primary Care Provided by self    Provides Primary Care For no one    Family Caregiver if Needed none    Quality of Family Relationships unable to assess    Able to Return to Prior Arrangements yes       Resource/Environmental Concerns    Resource/Environmental Concerns none    Transportation Concerns none       Transportation Needs    In the past 12 months, has lack of transportation kept you from medical appointments or from getting medications? no    In the past 12 months, has lack of transportation kept you from meetings, work, or from getting things needed for daily living? No       Food Insecurity    Within the past 12 months, you worried that your food would run out before you got the money to buy more. Never true    Within the past 12 months, the food you bought just didn't last and you didn't have money to get more. Never true       Transition Planning    Patient/Family Anticipates Transition to home    Patient/Family Anticipated Services at Transition none    Transportation Anticipated car, drives self       Discharge Needs Assessment    Readmission Within the Last 30 Days no previous admission in last 30 days    Equipment Currently Used at Home cane, quad tip    Concerns to be Addressed discharge planning    Anticipated Changes Related to Illness none    Equipment Needed After Discharge none    Provided Post Acute Provider List? N/A    N/A Provider List Comment denies dc needs    Provided Post Acute Provider Quality & Resource List? N/A     Offered/Gave Vendor List no    Current Discharge Risk lives alone                   Discharge Plan       Row Name 04/21/25 1325       Plan    Plan home    Patient/Family in Agreement with Plan yes    Plan Comments Spoke with patient at bedside. He is IADL with a quad cane and is up ad otoniel in room. Denies dc needs at this time. [facility], PCP and pharmacy verified.                    Expected Discharge Date and Time       Expected Discharge Date Expected Discharge Time    Apr 23, 2025            Demographic Summary       Row Name 04/21/25 1322       General Information    Admission Type inpatient    Arrived From emergency department    Required Notices Provided Important Message from Medicare    Referral Source admission list    Reason for Consult discharge planning    Preferred Language English                   Functional Status       Row Name 04/21/25 1324       Functional Status    Usual Activity Tolerance moderate    Current Activity Tolerance moderate       Functional Status, IADL    Medications independent    Meal Preparation independent    Housekeeping independent    Laundry independent    Shopping independent       Mental Status    General Appearance WDL WDL                          Mansi Mijares, RN     No

## 2025-04-22 PROBLEM — F10.239 ALCOHOL DEPENDENCE WITH WITHDRAWAL: Status: ACTIVE | Noted: 2025-04-22

## 2025-04-22 LAB
ACT BLD: 279 SECONDS (ref 82–152)
ACT BLD: 285 SECONDS (ref 82–152)
ANION GAP SERPL CALCULATED.3IONS-SCNC: 11 MMOL/L (ref 5–15)
APTT PPP: 28.8 SECONDS (ref 22.7–35.4)
BASOPHILS # BLD AUTO: 0.02 10*3/MM3 (ref 0–0.2)
BASOPHILS NFR BLD AUTO: 0.3 % (ref 0–1.5)
BUN SERPL-MCNC: 75 MG/DL (ref 8–23)
BUN/CREAT SERPL: 36.6 (ref 7–25)
CALCIUM SPEC-SCNC: 9.1 MG/DL (ref 8.6–10.5)
CHLORIDE SERPL-SCNC: 103 MMOL/L (ref 98–107)
CO2 SERPL-SCNC: 23 MMOL/L (ref 22–29)
CREAT SERPL-MCNC: 2.05 MG/DL (ref 0.76–1.27)
DEPRECATED RDW RBC AUTO: 61 FL (ref 37–54)
EGFRCR SERPLBLD CKD-EPI 2021: 32.8 ML/MIN/1.73
EOSINOPHIL # BLD AUTO: 0 10*3/MM3 (ref 0–0.4)
EOSINOPHIL NFR BLD AUTO: 0 % (ref 0.3–6.2)
ERYTHROCYTE [DISTWIDTH] IN BLOOD BY AUTOMATED COUNT: 16.2 % (ref 12.3–15.4)
GLUCOSE BLDC GLUCOMTR-MCNC: 117 MG/DL (ref 70–130)
GLUCOSE BLDC GLUCOMTR-MCNC: 120 MG/DL (ref 70–130)
GLUCOSE BLDC GLUCOMTR-MCNC: 138 MG/DL (ref 70–130)
GLUCOSE BLDC GLUCOMTR-MCNC: 170 MG/DL (ref 70–130)
GLUCOSE SERPL-MCNC: 127 MG/DL (ref 65–99)
HCT VFR BLD AUTO: 30.6 % (ref 37.5–51)
HGB BLD-MCNC: 10.1 G/DL (ref 13–17.7)
IMM GRANULOCYTES # BLD AUTO: 0.12 10*3/MM3 (ref 0–0.05)
IMM GRANULOCYTES NFR BLD AUTO: 1.6 % (ref 0–0.5)
INR PPP: 1.31 (ref 0.9–1.1)
LYMPHOCYTES # BLD AUTO: 1.2 10*3/MM3 (ref 0.7–3.1)
LYMPHOCYTES NFR BLD AUTO: 15.6 % (ref 19.6–45.3)
MAGNESIUM SERPL-MCNC: 2.8 MG/DL (ref 1.6–2.4)
MAGNESIUM SERPL-MCNC: 2.9 MG/DL (ref 1.6–2.4)
MCH RBC QN AUTO: 34.1 PG (ref 26.6–33)
MCHC RBC AUTO-ENTMCNC: 33 G/DL (ref 31.5–35.7)
MCV RBC AUTO: 103.4 FL (ref 79–97)
MONOCYTES # BLD AUTO: 0.65 10*3/MM3 (ref 0.1–0.9)
MONOCYTES NFR BLD AUTO: 8.4 % (ref 5–12)
NEUTROPHILS NFR BLD AUTO: 5.72 10*3/MM3 (ref 1.7–7)
NEUTROPHILS NFR BLD AUTO: 74.1 % (ref 42.7–76)
NRBC BLD AUTO-RTO: 0 /100 WBC (ref 0–0.2)
PHOSPHATE SERPL-MCNC: 3.3 MG/DL (ref 2.5–4.5)
PHOSPHATE SERPL-MCNC: 3.7 MG/DL (ref 2.5–4.5)
PLATELET # BLD AUTO: 108 10*3/MM3 (ref 140–450)
PMV BLD AUTO: 9 FL (ref 6–12)
POTASSIUM SERPL-SCNC: 5.3 MMOL/L (ref 3.5–5.2)
PROTHROMBIN TIME: 16.3 SECONDS (ref 11.7–14.2)
QT INTERVAL: 325 MS
QT INTERVAL: 470 MS
QTC INTERVAL: 419 MS
QTC INTERVAL: 445 MS
RBC # BLD AUTO: 2.96 10*6/MM3 (ref 4.14–5.8)
SODIUM SERPL-SCNC: 137 MMOL/L (ref 136–145)
TROPONIN T SERPL HS-MCNC: 119 NG/L
TROPONIN T SERPL HS-MCNC: 120 NG/L
WBC NRBC COR # BLD AUTO: 7.71 10*3/MM3 (ref 3.4–10.8)

## 2025-04-22 PROCEDURE — 25010000002 MORPHINE PER 10 MG

## 2025-04-22 PROCEDURE — C1887 CATHETER, GUIDING: HCPCS | Performed by: INTERNAL MEDICINE

## 2025-04-22 PROCEDURE — 02F03ZZ FRAGMENTATION IN CORONARY ARTERY, ONE ARTERY, PERCUTANEOUS APPROACH: ICD-10-PCS | Performed by: INTERNAL MEDICINE

## 2025-04-22 PROCEDURE — 92978 ENDOLUMINL IVUS OCT C 1ST: CPT | Performed by: INTERNAL MEDICINE

## 2025-04-22 PROCEDURE — C1725 CATH, TRANSLUMIN NON-LASER: HCPCS | Performed by: INTERNAL MEDICINE

## 2025-04-22 PROCEDURE — 93005 ELECTROCARDIOGRAM TRACING: CPT | Performed by: INTERNAL MEDICINE

## 2025-04-22 PROCEDURE — 85025 COMPLETE CBC W/AUTO DIFF WBC: CPT | Performed by: INTERNAL MEDICINE

## 2025-04-22 PROCEDURE — 85730 THROMBOPLASTIN TIME PARTIAL: CPT | Performed by: INTERNAL MEDICINE

## 2025-04-22 PROCEDURE — 92972 PERQ TRLUML CORONRY LITHOTRP: CPT | Performed by: INTERNAL MEDICINE

## 2025-04-22 PROCEDURE — 83735 ASSAY OF MAGNESIUM: CPT | Performed by: INTERNAL MEDICINE

## 2025-04-22 PROCEDURE — 85347 COAGULATION TIME ACTIVATED: CPT

## 2025-04-22 PROCEDURE — C1874 STENT, COATED/COV W/DEL SYS: HCPCS | Performed by: INTERNAL MEDICINE

## 2025-04-22 PROCEDURE — 4A023N7 MEASUREMENT OF CARDIAC SAMPLING AND PRESSURE, LEFT HEART, PERCUTANEOUS APPROACH: ICD-10-PCS | Performed by: INTERNAL MEDICINE

## 2025-04-22 PROCEDURE — 25010000002 HEPARIN (PORCINE) PER 1000 UNITS: Performed by: INTERNAL MEDICINE

## 2025-04-22 PROCEDURE — 027034Z DILATION OF CORONARY ARTERY, ONE ARTERY WITH DRUG-ELUTING INTRALUMINAL DEVICE, PERCUTANEOUS APPROACH: ICD-10-PCS | Performed by: INTERNAL MEDICINE

## 2025-04-22 PROCEDURE — 92928 PRQ TCAT PLMT NTRAC ST 1 LES: CPT | Performed by: INTERNAL MEDICINE

## 2025-04-22 PROCEDURE — 93458 L HRT ARTERY/VENTRICLE ANGIO: CPT | Performed by: INTERNAL MEDICINE

## 2025-04-22 PROCEDURE — 25010000002 THIAMINE HCL 200 MG/2ML SOLUTION: Performed by: INTERNAL MEDICINE

## 2025-04-22 PROCEDURE — 85610 PROTHROMBIN TIME: CPT | Performed by: INTERNAL MEDICINE

## 2025-04-22 PROCEDURE — C1894 INTRO/SHEATH, NON-LASER: HCPCS | Performed by: INTERNAL MEDICINE

## 2025-04-22 PROCEDURE — 63710000001 INSULIN LISPRO (HUMAN) PER 5 UNITS: Performed by: INTERNAL MEDICINE

## 2025-04-22 PROCEDURE — C9600 PERC DRUG-EL COR STENT SING: HCPCS | Performed by: INTERNAL MEDICINE

## 2025-04-22 PROCEDURE — 82948 REAGENT STRIP/BLOOD GLUCOSE: CPT

## 2025-04-22 PROCEDURE — 63710000001 PREDNISONE PER 1 MG: Performed by: INTERNAL MEDICINE

## 2025-04-22 PROCEDURE — 25010000002 ONDANSETRON PER 1 MG: Performed by: INTERNAL MEDICINE

## 2025-04-22 PROCEDURE — B240ZZ3 ULTRASONOGRAPHY OF SINGLE CORONARY ARTERY, INTRAVASCULAR: ICD-10-PCS | Performed by: INTERNAL MEDICINE

## 2025-04-22 PROCEDURE — C1769 GUIDE WIRE: HCPCS | Performed by: INTERNAL MEDICINE

## 2025-04-22 PROCEDURE — 84100 ASSAY OF PHOSPHORUS: CPT | Performed by: INTERNAL MEDICINE

## 2025-04-22 PROCEDURE — 93010 ELECTROCARDIOGRAM REPORT: CPT | Performed by: INTERNAL MEDICINE

## 2025-04-22 PROCEDURE — 84484 ASSAY OF TROPONIN QUANT: CPT | Performed by: INTERNAL MEDICINE

## 2025-04-22 PROCEDURE — B2151ZZ FLUOROSCOPY OF LEFT HEART USING LOW OSMOLAR CONTRAST: ICD-10-PCS | Performed by: INTERNAL MEDICINE

## 2025-04-22 PROCEDURE — 25010000002 MIDAZOLAM PER 1 MG: Performed by: INTERNAL MEDICINE

## 2025-04-22 PROCEDURE — 80048 BASIC METABOLIC PNL TOTAL CA: CPT | Performed by: INTERNAL MEDICINE

## 2025-04-22 PROCEDURE — 25010000002 LIDOCAINE 2% SOLUTION: Performed by: INTERNAL MEDICINE

## 2025-04-22 PROCEDURE — 25010000002 FENTANYL CITRATE (PF) 50 MCG/ML SOLUTION: Performed by: INTERNAL MEDICINE

## 2025-04-22 PROCEDURE — C1761: HCPCS | Performed by: INTERNAL MEDICINE

## 2025-04-22 PROCEDURE — B2111ZZ FLUOROSCOPY OF MULTIPLE CORONARY ARTERIES USING LOW OSMOLAR CONTRAST: ICD-10-PCS | Performed by: INTERNAL MEDICINE

## 2025-04-22 PROCEDURE — 25510000001 IOPAMIDOL PER 1 ML: Performed by: INTERNAL MEDICINE

## 2025-04-22 PROCEDURE — 99232 SBSQ HOSP IP/OBS MODERATE 35: CPT | Performed by: INTERNAL MEDICINE

## 2025-04-22 PROCEDURE — C1753 CATH, INTRAVAS ULTRASOUND: HCPCS | Performed by: INTERNAL MEDICINE

## 2025-04-22 DEVICE — XIENCE SKYPOINT™ EVEROLIMUS ELUTING CORONARY STENT SYSTEM 4.00 MM X 23 MM / RAPID-EXCHANGE
Type: IMPLANTABLE DEVICE | Site: CORONARY | Status: FUNCTIONAL
Brand: XIENCE SKYPOINT™

## 2025-04-22 RX ORDER — DIAZEPAM 5 MG/1
10 TABLET ORAL EVERY 6 HOURS
Status: DISPENSED | OUTPATIENT
Start: 2025-04-22 | End: 2025-04-23

## 2025-04-22 RX ORDER — IOPAMIDOL 755 MG/ML
INJECTION, SOLUTION INTRAVASCULAR
Status: DISCONTINUED | OUTPATIENT
Start: 2025-04-22 | End: 2025-04-22 | Stop reason: HOSPADM

## 2025-04-22 RX ORDER — DIAZEPAM 5 MG/1
15 TABLET ORAL
Status: DISCONTINUED | OUTPATIENT
Start: 2025-04-22 | End: 2025-04-24 | Stop reason: HOSPADM

## 2025-04-22 RX ORDER — DIAZEPAM 10 MG/2ML
10 INJECTION, SOLUTION INTRAMUSCULAR; INTRAVENOUS
Status: DISCONTINUED | OUTPATIENT
Start: 2025-04-22 | End: 2025-04-24 | Stop reason: HOSPADM

## 2025-04-22 RX ORDER — SODIUM CHLORIDE 9 MG/ML
INJECTION, SOLUTION INTRAVENOUS
Status: DISCONTINUED | OUTPATIENT
Start: 2025-04-22 | End: 2025-04-22

## 2025-04-22 RX ORDER — LIDOCAINE HYDROCHLORIDE 20 MG/ML
INJECTION, SOLUTION INFILTRATION; PERINEURAL
Status: DISCONTINUED | OUTPATIENT
Start: 2025-04-22 | End: 2025-04-22 | Stop reason: HOSPADM

## 2025-04-22 RX ORDER — DIAZEPAM 5 MG/1
10 TABLET ORAL EVERY 12 HOURS
Status: DISCONTINUED | OUTPATIENT
Start: 2025-04-24 | End: 2025-04-24 | Stop reason: HOSPADM

## 2025-04-22 RX ORDER — DIAZEPAM 10 MG/2ML
15 INJECTION, SOLUTION INTRAMUSCULAR; INTRAVENOUS
Status: DISCONTINUED | OUTPATIENT
Start: 2025-04-22 | End: 2025-04-24 | Stop reason: HOSPADM

## 2025-04-22 RX ORDER — DIAZEPAM 5 MG/1
10 TABLET ORAL NIGHTLY
Status: DISCONTINUED | OUTPATIENT
Start: 2025-04-25 | End: 2025-04-24 | Stop reason: HOSPADM

## 2025-04-22 RX ORDER — FOLIC ACID 1 MG/1
1 TABLET ORAL DAILY
Status: DISCONTINUED | OUTPATIENT
Start: 2025-04-22 | End: 2025-04-24 | Stop reason: HOSPADM

## 2025-04-22 RX ORDER — DIAZEPAM 10 MG/2ML
20 INJECTION, SOLUTION INTRAMUSCULAR; INTRAVENOUS
Status: DISCONTINUED | OUTPATIENT
Start: 2025-04-22 | End: 2025-04-24 | Stop reason: HOSPADM

## 2025-04-22 RX ORDER — DIAZEPAM 5 MG/1
10 TABLET ORAL EVERY 8 HOURS
Status: DISPENSED | OUTPATIENT
Start: 2025-04-23 | End: 2025-04-24

## 2025-04-22 RX ORDER — HEPARIN SODIUM 1000 [USP'U]/ML
INJECTION, SOLUTION INTRAVENOUS; SUBCUTANEOUS
Status: DISCONTINUED | OUTPATIENT
Start: 2025-04-22 | End: 2025-04-22 | Stop reason: HOSPADM

## 2025-04-22 RX ORDER — MIDAZOLAM HYDROCHLORIDE 1 MG/ML
INJECTION, SOLUTION INTRAMUSCULAR; INTRAVENOUS
Status: DISCONTINUED | OUTPATIENT
Start: 2025-04-22 | End: 2025-04-22 | Stop reason: HOSPADM

## 2025-04-22 RX ORDER — MORPHINE SULFATE 2 MG/ML
INJECTION, SOLUTION INTRAMUSCULAR; INTRAVENOUS
Status: COMPLETED
Start: 2025-04-22 | End: 2025-04-22

## 2025-04-22 RX ORDER — ASPIRIN 325 MG
TABLET ORAL
Status: DISCONTINUED | OUTPATIENT
Start: 2025-04-22 | End: 2025-04-22 | Stop reason: HOSPADM

## 2025-04-22 RX ORDER — MORPHINE SULFATE 2 MG/ML
2 INJECTION, SOLUTION INTRAMUSCULAR; INTRAVENOUS
Status: DISCONTINUED | OUTPATIENT
Start: 2025-04-22 | End: 2025-04-24 | Stop reason: HOSPADM

## 2025-04-22 RX ORDER — THIAMINE HYDROCHLORIDE 100 MG/ML
200 INJECTION, SOLUTION INTRAMUSCULAR; INTRAVENOUS EVERY 8 HOURS SCHEDULED
Status: DISCONTINUED | OUTPATIENT
Start: 2025-04-22 | End: 2025-04-24 | Stop reason: HOSPADM

## 2025-04-22 RX ORDER — TORSEMIDE 100 MG/1
100 TABLET ORAL DAILY
Status: DISCONTINUED | OUTPATIENT
Start: 2025-04-23 | End: 2025-04-24 | Stop reason: HOSPADM

## 2025-04-22 RX ORDER — CLOPIDOGREL BISULFATE 75 MG/1
75 TABLET ORAL DAILY
Status: DISCONTINUED | OUTPATIENT
Start: 2025-04-23 | End: 2025-04-24 | Stop reason: HOSPADM

## 2025-04-22 RX ORDER — FENTANYL CITRATE 50 UG/ML
INJECTION, SOLUTION INTRAMUSCULAR; INTRAVENOUS
Status: DISCONTINUED | OUTPATIENT
Start: 2025-04-22 | End: 2025-04-22 | Stop reason: HOSPADM

## 2025-04-22 RX ORDER — CLOPIDOGREL BISULFATE 75 MG/1
TABLET ORAL
Status: DISCONTINUED | OUTPATIENT
Start: 2025-04-22 | End: 2025-04-22 | Stop reason: HOSPADM

## 2025-04-22 RX ORDER — DIAZEPAM 5 MG/1
20 TABLET ORAL
Status: DISCONTINUED | OUTPATIENT
Start: 2025-04-22 | End: 2025-04-24 | Stop reason: HOSPADM

## 2025-04-22 RX ORDER — ATORVASTATIN CALCIUM 20 MG/1
40 TABLET, FILM COATED ORAL NIGHTLY
Status: DISCONTINUED | OUTPATIENT
Start: 2025-04-22 | End: 2025-04-24 | Stop reason: HOSPADM

## 2025-04-22 RX ORDER — DIAZEPAM 5 MG/1
10 TABLET ORAL
Status: DISCONTINUED | OUTPATIENT
Start: 2025-04-22 | End: 2025-04-24 | Stop reason: HOSPADM

## 2025-04-22 RX ORDER — PANTOPRAZOLE SODIUM 40 MG/10ML
40 INJECTION, POWDER, LYOPHILIZED, FOR SOLUTION INTRAVENOUS
Status: DISCONTINUED | OUTPATIENT
Start: 2025-04-23 | End: 2025-04-24 | Stop reason: HOSPADM

## 2025-04-22 RX ORDER — VERAPAMIL HYDROCHLORIDE 2.5 MG/ML
INJECTION INTRAVENOUS
Status: DISCONTINUED | OUTPATIENT
Start: 2025-04-22 | End: 2025-04-22 | Stop reason: HOSPADM

## 2025-04-22 RX ADMIN — ROPINIROLE 1 MG: 1 TABLET, FILM COATED ORAL at 21:48

## 2025-04-22 RX ADMIN — MORPHINE SULFATE 2 MG: 2 INJECTION, SOLUTION INTRAMUSCULAR; INTRAVENOUS at 06:50

## 2025-04-22 RX ADMIN — NITROGLYCERIN 0.4 MG: 0.4 TABLET SUBLINGUAL at 06:38

## 2025-04-22 RX ADMIN — Medication 10 ML: at 21:50

## 2025-04-22 RX ADMIN — NITROGLYCERIN 0.4 MG: 0.4 TABLET SUBLINGUAL at 06:33

## 2025-04-22 RX ADMIN — DIAZEPAM 10 MG: 5 TABLET ORAL at 14:32

## 2025-04-22 RX ADMIN — PETROLATUM 1 APPLICATION: 420 OINTMENT TOPICAL at 21:13

## 2025-04-22 RX ADMIN — PETROLATUM 1 APPLICATION: 420 OINTMENT TOPICAL at 15:32

## 2025-04-22 RX ADMIN — DIAZEPAM 10 MG: 5 TABLET ORAL at 19:58

## 2025-04-22 RX ADMIN — ATORVASTATIN CALCIUM 40 MG: 20 TABLET, FILM COATED ORAL at 21:50

## 2025-04-22 RX ADMIN — ONDANSETRON 4 MG: 2 INJECTION, SOLUTION INTRAMUSCULAR; INTRAVENOUS at 19:50

## 2025-04-22 RX ADMIN — SODIUM ZIRCONIUM CYCLOSILICATE 10 G: 10 POWDER, FOR SUSPENSION ORAL at 15:30

## 2025-04-22 RX ADMIN — DIAZEPAM 10 MG: 5 TABLET ORAL at 21:48

## 2025-04-22 RX ADMIN — PYRIDOSTIGMINE BROMIDE 60 MG: 60 TABLET ORAL at 21:48

## 2025-04-22 RX ADMIN — ALLOPURINOL 300 MG: 300 TABLET ORAL at 21:48

## 2025-04-22 RX ADMIN — THIAMINE HYDROCHLORIDE 200 MG: 100 INJECTION, SOLUTION INTRAMUSCULAR; INTRAVENOUS at 14:32

## 2025-04-22 RX ADMIN — PREDNISONE 10 MG: 20 TABLET ORAL at 18:46

## 2025-04-22 RX ADMIN — Medication 10 ML: at 10:00

## 2025-04-22 RX ADMIN — TAMSULOSIN HYDROCHLORIDE 0.4 MG: 0.4 CAPSULE ORAL at 14:32

## 2025-04-22 RX ADMIN — INSULIN LISPRO 2 UNITS: 100 INJECTION, SOLUTION INTRAVENOUS; SUBCUTANEOUS at 21:47

## 2025-04-22 RX ADMIN — PYRIDOSTIGMINE BROMIDE 60 MG: 60 TABLET ORAL at 15:30

## 2025-04-22 RX ADMIN — NITROGLYCERIN 0.4 MG: 0.4 TABLET SUBLINGUAL at 06:27

## 2025-04-22 RX ADMIN — FOLIC ACID 1 MG: 1 TABLET ORAL at 14:32

## 2025-04-22 RX ADMIN — THIAMINE HYDROCHLORIDE 200 MG: 100 INJECTION, SOLUTION INTRAMUSCULAR; INTRAVENOUS at 21:47

## 2025-04-22 NOTE — NURSING NOTE
RRT:  Spoke to Juan James who states that his dad was excited to go home today, pt had noted lower GI bleeding last night (4/21), but didn't want to inform any staff members of bleeding because he wanted to go home. Informed son of patient's transfer to cath lab and plan to transfer to ICU bed.     MIRI Burton w/ cath lab team informed of recent GI bleeding and asked to pass message on to physician.

## 2025-04-22 NOTE — PROGRESS NOTES
"Juan James  1945 79 y.o.  [id number]      Patient Care Team:  Padmini Benson APRN as PCP - General (Nurse Practitioner)  Bill Washington MD as Surgeon (Neurosurgery)  Chris Mo III, MD as Cardiologist (Cardiology)  Lorenzo Rosado MD (Neurology)  Jean Patricia MD as Surgeon (Vascular Surgery)  Hermes Francisco MD as Consulting Physician (Pulmonary Disease)  Juan Arroyo MD as Consulting Physician (Nephrology)  Paris Adame, MIRI as Ambulatory  ([facility])    CC: Diabetes, history of prior stenting, A-fib, syncope, renal insufficiency, elevated troponin on admission    Interval History: Developed classic chest discomfort       Objective   Vital Signs  Temp:  [97.3 °F (36.3 °C)-98.2 °F (36.8 °C)] 97.5 °F (36.4 °C)  Heart Rate:  [] 120  Resp:  [16-20] 16  BP: (120-150)/() 129/90  No intake or output data in the 24 hours ending 04/22/25 0755  Flowsheet Rows      Flowsheet Row First Filed Value   Admission Height 185.4 cm (73\") Documented at 04/18/2025 1932   Admission Weight 99.8 kg (220 lb 0.3 oz) Documented at 04/18/2025 1932            Physical Exam:   General Appearance:    Alert,oriented, in no acute distress   Lungs:     Clear to auscultation,BS are equal    Heart:    Normal S1 and S2, iRRR without murmur, gallop or rub   HEENT:    Sclerae are clear, no JVD or adenopathy   Abdomen:     Normal bowel sounds, soft nontender, nondistended, no HSM   Extremities:   Moves all extremities well, no edema, no cyanosis, skin   Redness     Medication Review:      [Transfer Hold] allopurinol, 300 mg, Oral, Nightly  [Transfer Hold] aspirin, 81 mg, Oral, Daily  [Transfer Hold] ferrous sulfate, 325 mg, Oral, Daily With Breakfast  [Transfer Hold] folic acid, 400 mcg, Oral, Daily  [Transfer Hold] insulin lispro, 2-7 Units, Subcutaneous, 4x Daily AC & at Bedtime  [Held by provider] losartan, 25 mg, Oral, Daily  [Held by provider] metoprolol succinate XL, 50 " mg, Oral, Q PM  [Transfer Hold] mupirocin, 1 Application, Each Nare, BID  [Transfer Hold] Petrolatum, 1 Application, Topical, Q12H  [Transfer Hold] predniSONE, 10 mg, Oral, BID With Meals  [Transfer Hold] pyridostigmine, 60 mg, Oral, TID  [Transfer Hold] rOPINIRole, 1 mg, Oral, Nightly  [Transfer Hold] sodium chloride, 10 mL, Intravenous, Q12H  [Transfer Hold] tamsulosin, 0.4 mg, Oral, Q PM             I reviewed the patient's new clinical results.  I personally viewed and interpreted the patient's EKG/Telemetry data    Assessment/Plan  Active Hospital Problems    Diagnosis  POA    **Syncope [R55]  Yes    Infrarenal abdominal aortic aneurysm (AAA) without rupture [I71.43]  Yes    Alcoholic cirrhosis of liver with ascites [K70.31]  Yes    Pulmonary hypertension [I27.20]  Yes    Type 2 diabetes mellitus with hyperglycemia, without long-term current use of insulin [E11.65]  Yes    Chronic kidney disease, stage 3a [N18.31]  Yes    Benign prostatic hyperplasia [N40.0]  Yes    Essential hypertension [I10]  Yes    Permanent atrial fibrillation [I48.21]  Yes    SHANNAN (acute kidney injury) [N17.9]  Yes    BPH (benign prostatic hyperplasia) [N40.0]  Yes    GERD (gastroesophageal reflux disease) [K21.9]  Yes      Resolved Hospital Problems   No resolved problems to display.       This laura is having an acute coronary syndrome he is got severely ischemic ECG when he was in A-fib with a little bit of RVR.  On admission his troponin was quite elevated he is got multiple cardiac risk factors multiple other comorbidities including cirrhosis renal insufficiency.  He is going to have to go emergently to the Cath Lab this morning further decisions to be based on the findings at the time of cath obviously this is a potentially life-threatening situation    John Prasad MD  04/22/25  07:55 EDT

## 2025-04-22 NOTE — PROGRESS NOTES
Name: Juan James ADMIT: 2025   : 1945  PCP: Padmini Benson APRN    MRN: [id number] LOS: 2 days   AGE/SEX: 79 y.o. male  ROOM:      Subjective   Subjective   Patient developed chest pain and was taken emergently to the cath lab and had PCI. After this he became confused and tremulous.  No family at bedside.    Objective   Objective   Vital Signs  Temp:  [97.5 °F (36.4 °C)-98.2 °F (36.8 °C)] 97.9 °F (36.6 °C)  Heart Rate:  [] 101  Resp:  [12-20] 16  BP: (101-150)/() 101/79  SpO2:  [89 %-100 %] 99 %  on  Flow (L/min) (Oxygen Therapy):  [2-4] 2;   Device (Oxygen Therapy): room air  Body mass index is 29.03 kg/m².  Physical Exam  Vitals and nursing note reviewed.   Constitutional:       General: He is not in acute distress.     Appearance: He is diaphoretic. He is not toxic-appearing.   HENT:      Head: Normocephalic.      Nose: Nose normal.      Mouth/Throat:      Mouth: Mucous membranes are moist.      Pharynx: Oropharynx is clear.   Eyes:      Conjunctiva/sclera: Conjunctivae normal.      Pupils: Pupils are equal, round, and reactive to light.   Cardiovascular:      Rate and Rhythm: Normal rate. Rhythm irregular.      Pulses: Normal pulses.   Pulmonary:      Effort: Pulmonary effort is normal.      Breath sounds: Normal breath sounds.   Abdominal:      General: Bowel sounds are normal. There is no distension.      Palpations: Abdomen is soft.      Tenderness: There is no abdominal tenderness.   Musculoskeletal:         General: No swelling or tenderness.      Cervical back: Neck supple.   Skin:     General: Skin is warm and dry.      Capillary Refill: Capillary refill takes less than 2 seconds.      Comments: Abrasions on knees and RUE do not appear infected   Neurological:      General: No focal deficit present.      Mental Status: He is alert.      Motor: Tremor present.   Psychiatric:         Mood and Affect: Mood normal.         Behavior: Behavior normal.       Results  PA forms were faxed to [facility] for the approval of Dexcom G6 System (, Transmitter, and Sensors)     Ph: [phone]   Review     I reviewed the patient's new clinical results.  Results from last 7 days   Lab Units 04/22/25  0703 04/19/25  0436 04/18/25  1813   WBC 10*3/mm3 7.71 8.67 9.90   HEMOGLOBIN g/dL 10.1* 10.3* 10.5*   PLATELETS 10*3/mm3 108* 112* 123*     Results from last 7 days   Lab Units 04/22/25  0541 04/21/25  0825 04/19/25  0436 04/18/25  1813   SODIUM mmol/L 137 134* 135* 133*   POTASSIUM mmol/L 5.3* 4.9 5.2 5.9*   CHLORIDE mmol/L 103 96* 101 96*   CO2 mmol/L 23.0 22.2 21.0* 21.0*   BUN mg/dL 75* 83* 77* 79*   CREATININE mg/dL 2.05* 2.27* 2.17* 2.43*   GLUCOSE mg/dL 127* 190* 112* 148*   EGFR mL/min/1.73 32.8* 29.0* 30.6* 26.7*     Results from last 7 days   Lab Units 04/19/25  0436 04/18/25  1813   ALBUMIN g/dL 3.7 4.0   BILIRUBIN mg/dL 0.6 0.6   ALK PHOS U/L 99 113   AST (SGOT) U/L 34 28   ALT (SGPT) U/L 40 44*     Results from last 7 days   Lab Units 04/22/25  0703 04/22/25  0541 04/21/25  0825 04/19/25  0436 04/18/25  1813   CALCIUM mg/dL  --  9.1 8.8 8.9 9.4   ALBUMIN g/dL  --   --   --  3.7 4.0   MAGNESIUM mg/dL 2.8* 2.9*  --   --  2.5*   PHOSPHORUS mg/dL 3.3 3.7  --   --   --        Hemoglobin A1C   Date/Time Value Ref Range Status   04/20/2025 0557 7.60 (H) 4.80 - 5.60 % Final     Glucose   Date/Time Value Ref Range Status   04/22/2025 1642 120 70 - 130 mg/dL Final   04/22/2025 1212 117 70 - 130 mg/dL Final   04/22/2025 0628 138 (H) 70 - 130 mg/dL Final   04/21/2025 1958 202 (H) 70 - 130 mg/dL Final   04/21/2025 1713 209 (H) 70 - 130 mg/dL Final   04/21/2025 1111 152 (H) 70 - 130 mg/dL Final   04/21/2025 0818 212 (H) 70 - 130 mg/dL Final       No radiology results for the last day      I have personally reviewed all medications:  Scheduled Medications  allopurinol, 300 mg, Oral, Nightly  aspirin, 81 mg, Oral, Daily  atorvastatin, 40 mg, Oral, Nightly  [START ON 4/23/2025] clopidogrel, 75 mg, Oral, Daily  diazePAM, 10 mg, Oral, Q6H   Followed by  [START ON 4/23/2025] diazePAM, 10 mg, Oral, Q8H   Followed  by  [START ON 4/24/2025] diazePAM, 10 mg, Oral, Q12H   Followed by  [START ON 4/25/2025] diazePAM, 10 mg, Oral, Nightly  ferrous sulfate, 325 mg, Oral, Daily With Breakfast  folic acid, 1 mg, Oral, Daily  insulin lispro, 2-7 Units, Subcutaneous, 4x Daily AC & at Bedtime  losartan, 25 mg, Oral, Daily  metoprolol succinate XL, 50 mg, Oral, Q PM  Petrolatum, 1 Application, Topical, Q12H  predniSONE, 10 mg, Oral, BID With Meals  pyridostigmine, 60 mg, Oral, TID  rOPINIRole, 1 mg, Oral, Nightly  sodium chloride, 10 mL, Intravenous, Q12H  tamsulosin, 0.4 mg, Oral, Q PM  thiamine (B-1) IV, 200 mg, Intravenous, Q8H   Followed by  [START ON 4/27/2025] thiamine, 100 mg, Oral, Daily  [START ON 4/23/2025] torsemide, 100 mg, Oral, Daily    Infusions     Diet  Diet: Cardiac, Diabetic, Renal; Healthy Heart (2-3 Na+); Consistent Carbohydrate; Low Potassium; Fluid Consistency: Thin (IDDSI 0)    I have personally reviewed:  [x]  Laboratory   []  Microbiology   []  Radiology   [x]  EKG/Telemetry  [x]  Cardiology/Vascular   []  Pathology    []  Records    Assessment/Plan     Active Hospital Problems    Diagnosis  POA    **Syncope [R55]  Yes    Alcohol dependence with withdrawal [F10.239]  Yes    Infrarenal abdominal aortic aneurysm (AAA) without rupture [I71.43]  Yes    Alcoholic cirrhosis of liver with ascites [K70.31]  Yes    Pulmonary hypertension [I27.20]  Yes    Type 2 diabetes mellitus with hyperglycemia, without long-term current use of insulin [E11.65]  Yes    Chronic kidney disease, stage 3a [N18.31]  Yes    Benign prostatic hyperplasia [N40.0]  Yes    Essential hypertension [I10]  Yes    Permanent atrial fibrillation [I48.21]  Yes    SHANNAN (acute kidney injury) [N17.9]  Yes    BPH (benign prostatic hyperplasia) [N40.0]  Yes    GERD (gastroesophageal reflux disease) [K21.9]  Yes      Resolved Hospital Problems   No resolved problems to display.   Syncope/NSTEMI  - echocardiogram with normal LV function, plan for zio patch at  discharge  - troponin was elevated on admission but was stable  - on 4/22 he developed chest pain and had increasing troponin and ECG consistent with NSTEMI  - s/p LHC with MARY to LAD on 4/22/25  - continue statin, DAPT  - appreciate cardiology recommendations    SHANNAN on CKD Stage 3a  - baseline serum creatinine is around 1.5-1.6  - shannan likely due to volume depletion and low BP, losartan, spironolactone, and torsemide held  - he received IV fluids, hold off on further and resume torsemide in the AM  - no evidence of urine retention  - appreciate nephrology recommendations    Type 2 DM  - has not been on pharmacotherapy at home in some time  - BG is acceptable, A1C is 7.10%  - continue coverage with ssi/hypoglycemia protocol    HTN/Permanent Afib  - HR and BP acceptable  - continue metoprolol  - losartan held as above    Cirrhosis  - appears well-compensated    Confusion/Tremors  - highly suspicious for alcohol withdrawal given history and timing but the patient denies recent use of alcohol  - in any case we will treat with valium per CIWA protocol and start vitamin replacement    SCDs for DVT prophylaxis.  Full code.  Discussed with patient and nursing staff.  Anticipate discharge home in 1-2 days.  Expected Discharge Date: 4/23/2025; Expected Discharge Time:       John Schmidt MD  [facility]  04/22/25  17:54 EDT    Portions of this text have been copied and I have reviewed them. They are accurate as of 4/22/2025

## 2025-04-22 NOTE — CONSULTS
Met with patient to discuss the benefits of cardiac rehab. Provided phase II information along with the contact information for cardiac rehab here at [facility]. Pt to call if interested in attending.

## 2025-04-22 NOTE — PLAN OF CARE
Goal Outcome Evaluation:      Rec'd pt from C Lab post stent placement; A/O to person & place only; pt has severe tremors upon arrival, anxious and mumbling. TR Band R Radial 15 cc air soft, dry;  BP wnl,afebrile, R Air, A Fib on monitor; pt has numerous covered skin lacerations; back of head laceration photo taken and documented; cleansed with sterile water and wrapped with gauze, paged admit MD - CIWA implemented;    Pt remains up in the chair with chair alarm on; CIWA improved from first p.o. dose of valium; R Radial site soft, dry; pt eating dinner.    Cleansed head lac, washed hair, alexander care multiple times this shift.

## 2025-04-22 NOTE — PROGRESS NOTES
[facility] Progress Note      Patient Name: Juan James  : 1945  MRN: [id number]  Primary Care Physician:  Padmini Benson APRN  Date of admission: 2025    Subjective     Interval History:   F/u SHANNAN CKD3B    Review of Systems:   Developed chest pain ~ 6 AM and wastaken to cath lab urgently for ACS and underwent PCI to LAD; feels better now; c/o LLE > RLE edema but denies dyspnea; on RA    Objective     Vitals:   Temp:  [97.3 °F (36.3 °C)-98.2 °F (36.8 °C)] 97.5 °F (36.4 °C)  Heart Rate:  [] 114  Resp:  [14-20] 20  BP: (120-150)/() 123/96  Flow (L/min) (Oxygen Therapy):  [4] 4    Intake/Output Summary (Last 24 hours) at 2025 0822  Last data filed at 2025 0630  Gross per 24 hour   Intake 720 ml   Output 1275 ml   Net -555 ml       Physical Exam:    General Appearance: alert, comfortable on RA  Neck: supple, no JVD  Lungs: CTA bilat no rales  Heart: RRR, normal S1 and S2  Abdomen: soft, nontender, nondistended  Extremities: trace BLE edema with venous stasis skin changes    Scheduled Meds:     [Transfer Hold] allopurinol, 300 mg, Oral, Nightly  [Transfer Hold] aspirin, 81 mg, Oral, Daily  [Transfer Hold] ferrous sulfate, 325 mg, Oral, Daily With Breakfast  [Transfer Hold] folic acid, 400 mcg, Oral, Daily  [Transfer Hold] insulin lispro, 2-7 Units, Subcutaneous, 4x Daily AC & at Bedtime  [Held by provider] losartan, 25 mg, Oral, Daily  [Held by provider] metoprolol succinate XL, 50 mg, Oral, Q PM  [Transfer Hold] mupirocin, 1 Application, Each Nare, BID  [Transfer Hold] Petrolatum, 1 Application, Topical, Q12H  [Transfer Hold] predniSONE, 10 mg, Oral, BID With Meals  [Transfer Hold] pyridostigmine, 60 mg, Oral, TID  [Transfer Hold] rOPINIRole, 1 mg, Oral, Nightly  [Transfer Hold] sodium chloride, 10 mL, Intravenous, Q12H  [Transfer Hold] tamsulosin, 0.4 mg, Oral, Q PM      IV Meds:   sodium chloride, , Last Rate: 75 mL/hr (25  0800)        Results Reviewed:   I have personally reviewed the results from the time of this admission to 4/22/2025 08:22 EDT     Results from last 7 days   Lab Units 04/22/25  0541 04/21/25  0825 04/19/25  0436 04/18/25  1813   SODIUM mmol/L 137 134* 135* 133*   POTASSIUM mmol/L 5.3* 4.9 5.2 5.9*   CHLORIDE mmol/L 103 96* 101 96*   CO2 mmol/L 23.0 22.2 21.0* 21.0*   BUN mg/dL 75* 83* 77* 79*   CREATININE mg/dL 2.05* 2.27* 2.17* 2.43*   CALCIUM mg/dL 9.1 8.8 8.9 9.4   BILIRUBIN mg/dL  --   --  0.6 0.6   ALK PHOS U/L  --   --  99 113   ALT (SGPT) U/L  --   --  40 44*   AST (SGOT) U/L  --   --  34 28   GLUCOSE mg/dL 127* 190* 112* 148*     Estimated Creatinine Clearance: 37.5 mL/min (A) (by C-G formula based on SCr of 2.05 mg/dL (H)).  Results from last 7 days   Lab Units 04/22/25  0703 04/22/25  0541 04/18/25  1813   MAGNESIUM mg/dL 2.8* 2.9* 2.5*   PHOSPHORUS mg/dL 3.3 3.7  --          Results from last 7 days   Lab Units 04/22/25  0703 04/19/25  0436 04/18/25  1813   WBC 10*3/mm3 7.71 8.67 9.90   HEMOGLOBIN g/dL 10.1* 10.3* 10.5*   PLATELETS 10*3/mm3 108* 112* 123*     Results from last 7 days   Lab Units 04/22/25  0703   INR  1.31*       Assessment / Plan     ASSESSMENT:  Non olig SHANNAN - prerenal azotemia in assoc with vol depletion, hypotension, high dose diuretic use and impaired compensation via ARB.  Also on SGLT2 inh.  Cr down further 2.0 (peak 2.4) with BUN down to 75.  Monitor post contrast (cath today).  Won't give IVF however as vol status historically tenuous and does have some periph edema.  K generous 5.3   CKD stage 3B - BL Cr 1.8 to 2 range with nephrotic rnage proteinuria suggesting underlying GN but we've opted not to pursue kidney biopsy (poor candidate for immunosuppression).  Pr/Cr up to 3.7 gm in FEB prompting addition of ARB to SGLT2i - much better now 861 mg/g by spot ratio  Syncope - suspect related to vol depletion, hypotension.  Echo unremarkable.     Chronic BLE edema - attributed moreso  to cirrhosis in past than cardiac origin.    Hypotension (hx HTN) - resolved.  Antihypertensives on hold, note cardiology may resume beta blocker; need to hold ARB for now on account of K but will need resumption in office to target proteinuria  Hx AFIB  CAD, ACS - s/p urgent cath today with PCI to LAD   DM2, mgmt per [facility].  Fair control in general at home, A1c 7.6  Hx ETOH-cirrhosis  Myasthenia gravis on steroid    PLAN:  Lokelma 10gm PO x1  Hold off IVF post cath  Restart torsemide 100mg daily in AM  Hold ARB/aldactone for now   Plan to resume SGLT2 inh at discharge   Restrict K in diet  D/W Cardiology      Juan Arroyo MD  04/22/25  08:22 EDT    [facility]  [phone]

## 2025-04-22 NOTE — CODE DOCUMENTATION
RRT addm:   Kaylee/Alicia, RNs w/ [facility] called back and informed us that John Prasad MD taking patient down to cath lab for left heart cath w/ suspicion of ischemia on EKG and w/ lab results. MIRI Burton and I transferred pt to cath lab.

## 2025-04-22 NOTE — CODE DOCUMENTATION
Rapid called at 0655 Pt found with chest pain 10/10 nitro given x3, EKG completed.  )2 placed at 4L.  Awaiting call from Cardiology.  Called [facility] NP order for labs and morphine x1.  Given.  Pt in Afib HR 120s.    After Morphine slight improvement from 10 to an 8 on pain scale.  Pt states chest and arm pain burning/ tightness radiating to rt arm.  BP and SATs remained stable.  Labs sent,   called Cardiology a second, awaiting return call.  Called Alicia Alejandro NP x2.  Received call back at 7:45 from oSnam MALDONADO with plans to go to Cath lab.   Ultimately Pt sent to Cath lab for intervention.

## 2025-04-23 ENCOUNTER — APPOINTMENT (OUTPATIENT)
Dept: GENERAL RADIOLOGY | Facility: HOSPITAL | Age: 80
DRG: 323 | End: 2025-04-23
Payer: MEDICARE

## 2025-04-23 ENCOUNTER — TRANSCRIBE ORDERS (OUTPATIENT)
Dept: ADMINISTRATIVE | Facility: HOSPITAL | Age: 80
End: 2025-04-23
Payer: MEDICARE

## 2025-04-23 DIAGNOSIS — J18.9: Primary | ICD-10-CM

## 2025-04-23 LAB
ANION GAP SERPL CALCULATED.3IONS-SCNC: 5.5 MMOL/L (ref 5–15)
B PARAPERT DNA SPEC QL NAA+PROBE: NOT DETECTED
B PERT DNA SPEC QL NAA+PROBE: NOT DETECTED
BACTERIA UR QL AUTO: ABNORMAL /HPF
BILIRUB UR QL STRIP: NEGATIVE
BUN SERPL-MCNC: 50 MG/DL (ref 8–23)
BUN/CREAT SERPL: 33.3 (ref 7–25)
C PNEUM DNA NPH QL NAA+NON-PROBE: NOT DETECTED
CALCIUM SPEC-SCNC: 8.8 MG/DL (ref 8.6–10.5)
CHLORIDE SERPL-SCNC: 107 MMOL/L (ref 98–107)
CLARITY UR: CLEAR
CO2 SERPL-SCNC: 22.5 MMOL/L (ref 22–29)
COLOR UR: YELLOW
CREAT SERPL-MCNC: 1.5 MG/DL (ref 0.76–1.27)
DEPRECATED RDW RBC AUTO: 59.1 FL (ref 37–54)
EGFRCR SERPLBLD CKD-EPI 2021: 47.7 ML/MIN/1.73
ERYTHROCYTE [DISTWIDTH] IN BLOOD BY AUTOMATED COUNT: 15.7 % (ref 12.3–15.4)
FLUAV SUBTYP SPEC NAA+PROBE: NOT DETECTED
FLUBV RNA ISLT QL NAA+PROBE: NOT DETECTED
GEN 5 1HR TROPONIN T REFLEX: 664 NG/L
GLUCOSE BLDC GLUCOMTR-MCNC: 148 MG/DL (ref 70–130)
GLUCOSE BLDC GLUCOMTR-MCNC: 161 MG/DL (ref 70–130)
GLUCOSE BLDC GLUCOMTR-MCNC: 168 MG/DL (ref 70–130)
GLUCOSE BLDC GLUCOMTR-MCNC: 180 MG/DL (ref 70–130)
GLUCOSE SERPL-MCNC: 144 MG/DL (ref 65–99)
GLUCOSE UR STRIP-MCNC: NEGATIVE MG/DL
HADV DNA SPEC NAA+PROBE: NOT DETECTED
HCOV 229E RNA SPEC QL NAA+PROBE: NOT DETECTED
HCOV HKU1 RNA SPEC QL NAA+PROBE: NOT DETECTED
HCOV NL63 RNA SPEC QL NAA+PROBE: NOT DETECTED
HCOV OC43 RNA SPEC QL NAA+PROBE: NOT DETECTED
HCT VFR BLD AUTO: 31.9 % (ref 37.5–51)
HGB BLD-MCNC: 10.7 G/DL (ref 13–17.7)
HGB UR QL STRIP.AUTO: ABNORMAL
HMPV RNA NPH QL NAA+NON-PROBE: NOT DETECTED
HPIV1 RNA ISLT QL NAA+PROBE: NOT DETECTED
HPIV2 RNA SPEC QL NAA+PROBE: NOT DETECTED
HPIV3 RNA NPH QL NAA+PROBE: NOT DETECTED
HPIV4 P GENE NPH QL NAA+PROBE: NOT DETECTED
HYALINE CASTS UR QL AUTO: ABNORMAL /LPF
INR PPP: 1.18 (ref 0.9–1.1)
KETONES UR QL STRIP: NEGATIVE
LEUKOCYTE ESTERASE UR QL STRIP.AUTO: ABNORMAL
M PNEUMO IGG SER IA-ACNC: NOT DETECTED
MCH RBC QN AUTO: 35 PG (ref 26.6–33)
MCHC RBC AUTO-ENTMCNC: 33.5 G/DL (ref 31.5–35.7)
MCV RBC AUTO: 104.2 FL (ref 79–97)
NITRITE UR QL STRIP: NEGATIVE
PH UR STRIP.AUTO: <=5 [PH] (ref 5–8)
PLATELET # BLD AUTO: 94 10*3/MM3 (ref 140–450)
PMV BLD AUTO: 8.8 FL (ref 6–12)
POTASSIUM SERPL-SCNC: 5 MMOL/L (ref 3.5–5.2)
PROT UR QL STRIP: ABNORMAL
PROTHROMBIN TIME: 14.9 SECONDS (ref 11.7–14.2)
QT INTERVAL: 381 MS
QTC INTERVAL: 438 MS
RBC # BLD AUTO: 3.06 10*6/MM3 (ref 4.14–5.8)
RBC # UR STRIP: ABNORMAL /HPF
REF LAB TEST METHOD: ABNORMAL
RHINOVIRUS RNA SPEC NAA+PROBE: NOT DETECTED
RSV RNA NPH QL NAA+NON-PROBE: NOT DETECTED
SARS-COV-2 RNA NPH QL NAA+NON-PROBE: NOT DETECTED
SODIUM SERPL-SCNC: 135 MMOL/L (ref 136–145)
SP GR UR STRIP: 1.01 (ref 1–1.03)
SQUAMOUS #/AREA URNS HPF: ABNORMAL /HPF
TROPONIN T % DELTA: 458
TROPONIN T NUMERIC DELTA: 545 NG/L
UROBILINOGEN UR QL STRIP: ABNORMAL
WBC # UR STRIP: ABNORMAL /HPF
WBC NRBC COR # BLD AUTO: 6.6 10*3/MM3 (ref 3.4–10.8)

## 2025-04-23 PROCEDURE — 63710000001 INSULIN LISPRO (HUMAN) PER 5 UNITS: Performed by: INTERNAL MEDICINE

## 2025-04-23 PROCEDURE — 82948 REAGENT STRIP/BLOOD GLUCOSE: CPT

## 2025-04-23 PROCEDURE — 85027 COMPLETE CBC AUTOMATED: CPT | Performed by: INTERNAL MEDICINE

## 2025-04-23 PROCEDURE — 84484 ASSAY OF TROPONIN QUANT: CPT | Performed by: INTERNAL MEDICINE

## 2025-04-23 PROCEDURE — 99232 SBSQ HOSP IP/OBS MODERATE 35: CPT | Performed by: INTERNAL MEDICINE

## 2025-04-23 PROCEDURE — 85610 PROTHROMBIN TIME: CPT | Performed by: INTERNAL MEDICINE

## 2025-04-23 PROCEDURE — 25010000002 DIAZEPAM PER 5 MG: Performed by: INTERNAL MEDICINE

## 2025-04-23 PROCEDURE — 93010 ELECTROCARDIOGRAM REPORT: CPT | Performed by: INTERNAL MEDICINE

## 2025-04-23 PROCEDURE — 71045 X-RAY EXAM CHEST 1 VIEW: CPT

## 2025-04-23 PROCEDURE — 80048 BASIC METABOLIC PNL TOTAL CA: CPT | Performed by: INTERNAL MEDICINE

## 2025-04-23 PROCEDURE — 87086 URINE CULTURE/COLONY COUNT: CPT | Performed by: INTERNAL MEDICINE

## 2025-04-23 PROCEDURE — 63710000001 PREDNISONE PER 1 MG: Performed by: INTERNAL MEDICINE

## 2025-04-23 PROCEDURE — 81001 URINALYSIS AUTO W/SCOPE: CPT | Performed by: INTERNAL MEDICINE

## 2025-04-23 PROCEDURE — 25010000002 THIAMINE PER 100 MG: Performed by: INTERNAL MEDICINE

## 2025-04-23 PROCEDURE — 25010000002 THIAMINE HCL 200 MG/2ML SOLUTION: Performed by: INTERNAL MEDICINE

## 2025-04-23 PROCEDURE — 0202U NFCT DS 22 TRGT SARS-COV-2: CPT | Performed by: INTERNAL MEDICINE

## 2025-04-23 PROCEDURE — 93005 ELECTROCARDIOGRAM TRACING: CPT | Performed by: INTERNAL MEDICINE

## 2025-04-23 RX ORDER — OXYMETAZOLINE HYDROCHLORIDE 0.05 G/100ML
2 SPRAY NASAL EVERY 8 HOURS PRN
Status: DISCONTINUED | OUTPATIENT
Start: 2025-04-23 | End: 2025-04-24 | Stop reason: HOSPADM

## 2025-04-23 RX ORDER — OXYMETAZOLINE HYDROCHLORIDE 0.05 G/100ML
2 SPRAY NASAL ONCE
Status: COMPLETED | OUTPATIENT
Start: 2025-04-23 | End: 2025-04-23

## 2025-04-23 RX ADMIN — ATORVASTATIN CALCIUM 40 MG: 20 TABLET, FILM COATED ORAL at 20:46

## 2025-04-23 RX ADMIN — Medication 2 SPRAY: at 21:32

## 2025-04-23 RX ADMIN — TORSEMIDE 100 MG: 100 TABLET ORAL at 08:17

## 2025-04-23 RX ADMIN — ALLOPURINOL 300 MG: 300 TABLET ORAL at 20:46

## 2025-04-23 RX ADMIN — THIAMINE HYDROCHLORIDE 200 MG: 100 INJECTION, SOLUTION INTRAMUSCULAR; INTRAVENOUS at 16:21

## 2025-04-23 RX ADMIN — PETROLATUM 1 APPLICATION: 420 OINTMENT TOPICAL at 11:00

## 2025-04-23 RX ADMIN — PYRIDOSTIGMINE BROMIDE 60 MG: 60 TABLET ORAL at 08:18

## 2025-04-23 RX ADMIN — THIAMINE HYDROCHLORIDE 200 MG: 100 INJECTION, SOLUTION INTRAMUSCULAR; INTRAVENOUS at 05:29

## 2025-04-23 RX ADMIN — ROPINIROLE 1 MG: 1 TABLET, FILM COATED ORAL at 20:46

## 2025-04-23 RX ADMIN — Medication 10 ML: at 08:19

## 2025-04-23 RX ADMIN — PANTOPRAZOLE SODIUM 40 MG: 40 INJECTION, POWDER, FOR SOLUTION INTRAVENOUS at 05:29

## 2025-04-23 RX ADMIN — Medication 10 ML: at 22:02

## 2025-04-23 RX ADMIN — INSULIN LISPRO 2 UNITS: 100 INJECTION, SOLUTION INTRAVENOUS; SUBCUTANEOUS at 20:46

## 2025-04-23 RX ADMIN — PYRIDOSTIGMINE BROMIDE 60 MG: 60 TABLET ORAL at 20:46

## 2025-04-23 RX ADMIN — DIAZEPAM 10 MG: 5 TABLET ORAL at 16:20

## 2025-04-23 RX ADMIN — DIAZEPAM 10 MG: 5 INJECTION INTRAMUSCULAR; INTRAVENOUS at 11:37

## 2025-04-23 RX ADMIN — DIAZEPAM 10 MG: 5 TABLET ORAL at 08:17

## 2025-04-23 RX ADMIN — ASPIRIN 81 MG: 81 TABLET, COATED ORAL at 08:17

## 2025-04-23 RX ADMIN — CLOPIDOGREL BISULFATE 75 MG: 75 TABLET, FILM COATED ORAL at 08:16

## 2025-04-23 RX ADMIN — DIAZEPAM 15 MG: 5 INJECTION INTRAMUSCULAR; INTRAVENOUS at 00:00

## 2025-04-23 RX ADMIN — FERROUS SULFATE TAB 325 MG (65 MG ELEMENTAL FE) 325 MG: 325 (65 FE) TAB at 08:16

## 2025-04-23 RX ADMIN — PETROLATUM 1 APPLICATION: 420 OINTMENT TOPICAL at 23:02

## 2025-04-23 RX ADMIN — LOSARTAN POTASSIUM 25 MG: 25 TABLET, FILM COATED ORAL at 08:19

## 2025-04-23 RX ADMIN — PREDNISONE 10 MG: 20 TABLET ORAL at 08:17

## 2025-04-23 RX ADMIN — FOLIC ACID 1 MG: 1 TABLET ORAL at 08:16

## 2025-04-23 RX ADMIN — THIAMINE HYDROCHLORIDE 200 MG: 100 INJECTION, SOLUTION INTRAMUSCULAR; INTRAVENOUS at 22:02

## 2025-04-23 RX ADMIN — PYRIDOSTIGMINE BROMIDE 60 MG: 60 TABLET ORAL at 16:31

## 2025-04-23 RX ADMIN — TAMSULOSIN HYDROCHLORIDE 0.4 MG: 0.4 CAPSULE ORAL at 17:37

## 2025-04-23 RX ADMIN — INSULIN LISPRO 2 UNITS: 100 INJECTION, SOLUTION INTRAVENOUS; SUBCUTANEOUS at 17:37

## 2025-04-23 RX ADMIN — PREDNISONE 10 MG: 20 TABLET ORAL at 17:37

## 2025-04-23 RX ADMIN — DIAZEPAM 10 MG: 5 TABLET ORAL at 02:29

## 2025-04-23 NOTE — PROGRESS NOTES
[facility] Progress Note      Patient Name: Juan James  : 1945  MRN: [id number]  Primary Care Physician:  Padmini Benson APRN  Date of admission: 2025    Subjective     Interval History:   F/u SHANNAN CKD3B    Review of Systems:   I/O 1350/1875  UOP 1.775 L +2 UM  Orthostatic BP non remarkable   Had brief/transient hematuria - resolved  He's anxious and asks repeatedly to discharge      Objective     Vitals:   Temp:  [97.7 °F (36.5 °C)-98.2 °F (36.8 °C)] 98.2 °F (36.8 °C)  Heart Rate:  [] 95  Resp:  [16-18] 16  BP: (101-158)/(66-91) 140/66  Flow (L/min) (Oxygen Therapy):  [2-4] 4    Intake/Output Summary (Last 24 hours) at 2025 1402  Last data filed at 2025 1336  Gross per 24 hour   Intake 960 ml   Output 2700 ml   Net -1740 ml       Physical Exam:    General Appearance: alert, comfortable on RA  Neck: supple, no JVD  Lungs: CTA bilat no rales  Heart: RRR, normal S1 and S2  Abdomen: soft, nontender, nondistended  Extremities: trace BLE edema with venous stasis skin changes    Scheduled Meds:     allopurinol, 300 mg, Oral, Nightly  aspirin, 81 mg, Oral, Daily  atorvastatin, 40 mg, Oral, Nightly  clopidogrel, 75 mg, Oral, Daily  diazePAM, 10 mg, Oral, Q6H   Followed by  diazePAM, 10 mg, Oral, Q8H   Followed by  [START ON 2025] diazePAM, 10 mg, Oral, Q12H   Followed by  [START ON 2025] diazePAM, 10 mg, Oral, Nightly  ferrous sulfate, 325 mg, Oral, Daily With Breakfast  folic acid, 1 mg, Oral, Daily  insulin lispro, 2-7 Units, Subcutaneous, 4x Daily AC & at Bedtime  losartan, 25 mg, Oral, Daily  metoprolol succinate XL, 50 mg, Oral, Q PM  pantoprazole, 40 mg, Intravenous, Q AM  Petrolatum, 1 Application, Topical, Q12H  predniSONE, 10 mg, Oral, BID With Meals  pyridostigmine, 60 mg, Oral, TID  rOPINIRole, 1 mg, Oral, Nightly  sodium chloride, 10 mL, Intravenous, Q12H  tamsulosin, 0.4 mg, Oral, Q PM  thiamine (B-1) IV, 200 mg, Intravenous, Q8H    Followed by  [START ON 4/27/2025] thiamine, 100 mg, Oral, Daily  torsemide, 100 mg, Oral, Daily      IV Meds:          Results Reviewed:   I have personally reviewed the results from the time of this admission to 4/23/2025 14:02 EDT     Results from last 7 days   Lab Units 04/23/25  0621 04/22/25  0541 04/21/25  0825 04/19/25  0436 04/18/25  1813   SODIUM mmol/L 135* 137 134* 135* 133*   POTASSIUM mmol/L 5.0 5.3* 4.9 5.2 5.9*   CHLORIDE mmol/L 107 103 96* 101 96*   CO2 mmol/L 22.5 23.0 22.2 21.0* 21.0*   BUN mg/dL 50* 75* 83* 77* 79*   CREATININE mg/dL 1.50* 2.05* 2.27* 2.17* 2.43*   CALCIUM mg/dL 8.8 9.1 8.8 8.9 9.4   BILIRUBIN mg/dL  --   --   --  0.6 0.6   ALK PHOS U/L  --   --   --  99 113   ALT (SGPT) U/L  --   --   --  40 44*   AST (SGOT) U/L  --   --   --  34 28   GLUCOSE mg/dL 144* 127* 190* 112* 148*     Estimated Creatinine Clearance: 51.3 mL/min (A) (by C-G formula based on SCr of 1.5 mg/dL (H)).  Results from last 7 days   Lab Units 04/22/25  0703 04/22/25  0541 04/18/25  1813   MAGNESIUM mg/dL 2.8* 2.9* 2.5*   PHOSPHORUS mg/dL 3.3 3.7  --          Results from last 7 days   Lab Units 04/23/25  0621 04/22/25  0703 04/19/25  0436 04/18/25  1813   WBC 10*3/mm3 6.60 7.71 8.67 9.90   HEMOGLOBIN g/dL 10.7* 10.1* 10.3* 10.5*   PLATELETS 10*3/mm3 94* 108* 112* 123*     Results from last 7 days   Lab Units 04/22/25  0703   INR  1.31*       Assessment / Plan     ASSESSMENT:  Non olig SHANNAN - prerenal azotemia in assoc with vol depletion, hypotension, high dose diuretic use and impaired compensation via ARB.  Also on SGLT2 inh.  Cr down further 1.5 (peak 2.4) with BUN down to 50 post cath (4/22).  Mild periph edema.  K generous 5.0 (w hemolysis), received Lokelma 10g x1 yesterday   CKD stage 3B - BL Cr 1.8 to 2 range with nephrotic rnage proteinuria suggesting underlying GN but we've opted not to pursue kidney biopsy (poor candidate for immunosuppression).  Pr/Cr up to 3.7 gm in FEB prompting addition of ARB to  SGLT2i - much better now 861 mg/g by spot ratio  Syncope - suspect related to vol depletion, hypotension.  Echo unremarkable.     Chronic BLE edema - attributed moreso to cirrhosis in past than cardiac origin.    HTN - hypotensive initially, resolved, now BP labile; back on ARB & metop XL.  Continue the losartan to target proteinuria  Hx AFIB  CAD, ACS - s/p urgent cath with PCI to LAD 4/22  DM2, mgmt per [facility].  Fair control in general at home, A1c 7.6  Hx ETOH-cirrhosis  Myasthenia gravis on steroid  Hematuria - transient/resolved.  Watch for recurrence    PLAN:  Restart torsemide 100mg daily   Continue losartan  Hold aldactone  Plan to resume SGLT2 inh at discharge   PT assessment; D/W RN      Juan Arroyo MD  04/23/25  14:02 EDT    [facility]  [phone]

## 2025-04-23 NOTE — NURSING NOTE
Paged AARON Green to report pt had some red streaks in 100 mL of otherwise clear, thick emesis at 2005.  Pt has hx of ETOH abuse, is currently in CIWA protocol and history of ETOH cirrhosis of the liver.  Pt had cardiac cath today with stent placement for NSTEMI.  No active bleeding visualized, VSS at this time.  Nausea treated with Zofran IV, pt reports relief.  Hgb was 10.1 as of this AM's labs.  Need any new orders if indicated.    Received call back from AARON Rucker and reported the above.  No new orders received and will monitor existing AM lab results tomorrow for Hgb.

## 2025-04-23 NOTE — CASE MANAGEMENT/SOCIAL WORK
Continued Stay Note  [facility]     Patient Name: Juan James  MRN: [id number]  Today's Date: 4/23/2025    Admit Date: 4/18/2025    Plan: Home pending P.T. evaluation.   Discharge Plan       Row Name 04/23/25 1520       Plan    Plan Home pending P.T. evaluation.    Plan Comments Rec'd pt from Cath Lab yesterday status post stent placement; A/O to person & place only; CIWA protocol in place.  He has not ambulated much per the RN.  MD wants physical therapy to evaluate and treat (pending).  Patient lives alone and his sons are emergency contacts.  CCP following..........Shannan SINGH/ABHILASH                   Discharge Codes    No documentation.                 Expected Discharge Date and Time       Expected Discharge Date Expected Discharge Time    Apr 25, 2025               Shannan Crews RN

## 2025-04-23 NOTE — NURSING NOTE
Pt intermittently bleeding from penis after urinating urine pink tinged.  Ua  sent Small bloody area in sclera of right eye and blood tinged sputum at times.  Lha notified cxr, inr, and rvp ordered.            Pt now with active nose bleed, 2x2 placed in l. Nostril and ice pack provided. waiting call back

## 2025-04-23 NOTE — PROGRESS NOTES
" LOS: 3 days   Patient Care Team:  Padmini Benson APRN as PCP - General (Nurse Practitioner)  Bill Washington MD as Surgeon (Neurosurgery)  Chris Mo III, MD as Cardiologist (Cardiology)  Lorenzo Rosado MD (Neurology)  Jean Patricia MD as Surgeon (Vascular Surgery)  Hermes Francisco MD as Consulting Physician (Pulmonary Disease)  Juan Arroyo MD as Consulting Physician (Nephrology)  Paris Adame, MIRI as Ambulatory  ([facility])    Chief Complaint: syncope     Interval History: After I saw him the other day, he developed rapid AF and chest pain and underwent cor angio with PCI-LAD, LVEDP 22mm Hg. He had 60% CX/70% RCA lesions.    He seems a bit confused and he's tremulous. He is anxious. He has had some pink hematuria and scleral bleeding and numerous bruises.    He denies CP/SOA.    Objective   Vital Signs  Temp:  [97.5 °F (36.4 °C)-98.2 °F (36.8 °C)] 97.5 °F (36.4 °C)  Heart Rate:  [] 84  Resp:  [16-18] 18  BP: ()/(62-91) 91/62    Intake/Output Summary (Last 24 hours) at 4/23/2025 1728  Last data filed at 4/23/2025 1500  Gross per 24 hour   Intake 960 ml   Output 2900 ml   Net -1940 ml       Last Weight and Admission Weight        04/19/25  1050   Weight: 99.8 kg (220 lb)     Flowsheet Rows      Flowsheet Row First Filed Value   Admission Height 185.4 cm (73\") Documented at 04/18/2025 1932   Admission Weight 99.8 kg (220 lb 0.3 oz) Documented at 04/18/2025 1932          Physical Exam  Vitals reviewed.   Constitutional:       Appearance: He is well-developed. He is obese.   HENT:      Head: Normocephalic.      Nose: Nose normal.   Eyes:      Conjunctiva/sclera: Conjunctivae normal.      Pupils: Pupils are equal, round, and reactive to light.   Neck:      Vascular: No JVD.   Cardiovascular:      Rate and Rhythm: Normal rate. Rhythm irregular.      Heart sounds: Normal heart sounds.   Pulmonary:      Effort: Pulmonary effort is normal.      Breath sounds: " Normal breath sounds.   Abdominal:      Palpations: Abdomen is soft. There is no mass.      Tenderness: There is no abdominal tenderness.   Musculoskeletal:         General: No swelling. Normal range of motion.      Cervical back: Normal range of motion.   Skin:     General: Skin is warm and dry.   Neurological:      General: No focal deficit present.      Mental Status: He is alert.   Psychiatric:         Mood and Affect: Mood normal.       Results Review:      Results from last 7 days   Lab Units 04/23/25  0621 04/22/25  0541 04/21/25  0825   SODIUM mmol/L 135* 137 134*   POTASSIUM mmol/L 5.0 5.3* 4.9   CHLORIDE mmol/L 107 103 96*   CO2 mmol/L 22.5 23.0 22.2   BUN mg/dL 50* 75* 83*   CREATININE mg/dL 1.50* 2.05* 2.27*   GLUCOSE mg/dL 144* 127* 190*   CALCIUM mg/dL 8.8 9.1 8.8     Results from last 7 days   Lab Units 04/23/25  0621 04/22/25  0703 04/22/25  0541 04/19/25  0436 04/18/25  1923   CK TOTAL U/L  --   --   --   --  66   HSTROP T ng/L 664* 119* 120*   < > 406*    < > = values in this interval not displayed.     Results from last 7 days   Lab Units 04/23/25  0621 04/22/25  0703 04/19/25  0436   WBC 10*3/mm3 6.60 7.71 8.67   HEMOGLOBIN g/dL 10.7* 10.1* 10.3*   HEMATOCRIT % 31.9* 30.6* 30.9*   PLATELETS 10*3/mm3 94* 108* 112*     Results from last 7 days   Lab Units 04/22/25  0703   INR  1.31*   APTT seconds 28.8         Results from last 7 days   Lab Units 04/22/25  0703   MAGNESIUM mg/dL 2.8*         I reviewed the patient's new clinical results.  I personally viewed and interpreted the patient's EKG/Telemetry data        Medication Review:   allopurinol, 300 mg, Oral, Nightly  aspirin, 81 mg, Oral, Daily  atorvastatin, 40 mg, Oral, Nightly  clopidogrel, 75 mg, Oral, Daily  diazePAM, 10 mg, Oral, Q8H   Followed by  [START ON 4/24/2025] diazePAM, 10 mg, Oral, Q12H   Followed by  [START ON 4/25/2025] diazePAM, 10 mg, Oral, Nightly  ferrous sulfate, 325 mg, Oral, Daily With Breakfast  folic acid, 1 mg, Oral,  Daily  insulin lispro, 2-7 Units, Subcutaneous, 4x Daily AC & at Bedtime  losartan, 25 mg, Oral, Daily  metoprolol succinate XL, 50 mg, Oral, Q PM  pantoprazole, 40 mg, Intravenous, Q AM  Petrolatum, 1 Application, Topical, Q12H  predniSONE, 10 mg, Oral, BID With Meals  pyridostigmine, 60 mg, Oral, TID  rOPINIRole, 1 mg, Oral, Nightly  sodium chloride, 10 mL, Intravenous, Q12H  tamsulosin, 0.4 mg, Oral, Q PM  thiamine (B-1) IV, 200 mg, Intravenous, Q8H   Followed by  [START ON 4/27/2025] thiamine, 100 mg, Oral, Daily  torsemide, 100 mg, Oral, Daily    Assessment & Plan     1. Syncope -- suspect due to hypovolemia. Echo with normal LVSF/wall motion. Dr Arroyo has adjusted his diuretics.    2. NSTEMI -- s/p LAD PCI. He has moderate CX/RCA disease, will need to monitor. Continue DAPT/statin.     3. Permanent AF -- he has consistently declined OAC despite knowing risks.    4. Increased bleeding -- likely from general illness, heparin during cath, DAPT; given his poor general care as well as what appears to be petechial bleeding in places, will check vit C level.    5. SHANNAN on CKD3, improved    6. DM    From CV standpoint, he could likely go home tomorrow; however, his general condition may preclude this.    Jorge Palma MD  04/23/25  17:28 EDT

## 2025-04-23 NOTE — PLAN OF CARE
Goal Outcome Evaluation:         Pt disoriented and confused at times, alert to self, place and sometimes month/year; CIWA scores this shift between 6-12.  Pt on 4L n/c due to desat during HS, pt turns self, urinal to void, up with x1 gait belt and handheld assist.  No c/o pain.  N/v at start of shift, treated with Zofran PRN and since resolved.  Emesis had some red streaks, APRN notified; no bleeding seen this shift.          Problem: Adult Inpatient Plan of Care  Goal: Plan of Care Review  Outcome: Progressing  Goal: Optimal Comfort and Wellbeing  Outcome: Progressing  Intervention: Provide Person-Centered Care  Description: Use a family-focused approach to care; encourage support system presence and participation.Develop trust and rapport by proactively providing information, encouraging questions, addressing concerns and offering reassurance.Acknowledge emotional response to hospitalization.Recognize and utilize personal coping strategies and strengths; develop goals via shared decision-making.Honor spiritual and cultural preferences.  Recent Flowsheet Documentation  Taken 4/23/2025 0100 by Briana Ghotra RN  Trust Relationship/Rapport:   care explained   choices provided   questions answered   reassurance provided   thoughts/feelings acknowledged   questions encouraged   emotional support provided  Taken 4/22/2025 1950 by Briana Ghotra, RN  Trust Relationship/Rapport:   care explained   choices provided   emotional support provided   thoughts/feelings acknowledged   reassurance provided   questions encouraged   questions answered  Goal: Readiness for Transition of Care  Outcome: Progressing     Problem: Fall Injury Risk  Goal: Absence of Fall and Fall-Related Injury  Outcome: Progressing  Intervention: Identify and Manage Contributors  Description: Develop a fall prevention plan, considering patient-centered interventions and family/caregiver involvement; identify and address patient's facilitators and  barriers.Provide reorientation, appropriate sensory stimulation and routines with changes in mental status to decrease risk of fall.Promote use of personal vision and auditory aids.Assess assistance level required for safe and effective self-care; provide support as needed, such as toileting and mobilization. For age 65 and older, implement timed toileting with assistance.Encourage physical activity, such as performance of mobility and self-care at highest level of patient ability, multicomponent exercise program and provision of appropriate assistive devices.If fall occurs, assess the severity of injury; implement fall injury protocol. Determine the cause and revise fall injury prevention plan.Regularly review and advocate for medication adjustment to decrease fall risk; consider administration times, polypharmacy and age.Balance adequate pain management with potential for oversedation.  Recent Flowsheet Documentation  Taken 4/23/2025 0400 by Briana Ghotra RN  Medication Review/Management:   medications reviewed   high-risk medications identified  Taken 4/23/2025 0200 by Briana Ghotra RN  Medication Review/Management:   high-risk medications identified   medications reviewed  Taken 4/23/2025 0100 by Briana Ghotra RN  Medication Review/Management:   medications reviewed   high-risk medications identified  Self-Care Promotion:   BADL personal objects within reach   independence encouraged   adaptive equipment use encouraged  Taken 4/23/2025 0004 by Briana Ghotra RN  Medication Review/Management:   medications reviewed   high-risk medications identified  Taken 4/22/2025 2150 by Briana Ghotra, RN  Medication Review/Management:   high-risk medications identified   medications reviewed  Taken 4/22/2025 1950 by Briana Ghotra, RN  Medication Review/Management:   medications reviewed   high-risk medications identified  Self-Care Promotion:   independence encouraged   BADL personal objects within  reach   adaptive equipment use encouraged  Intervention: Promote Injury-Free Environment  Description: Provide a safe, barrier-free environment that encourages independent activity.Keep care area uncluttered and well-lighted.Determine need for increased observation or monitoring.Avoid use of devices that minimize mobility, such as restraints or indwelling urinary catheter.  Recent Flowsheet Documentation  Taken 4/23/2025 0400 by Briana Ghotra, RN  Safety Promotion/Fall Prevention: safety round/check completed  Taken 4/23/2025 0200 by Briana Ghotra, RN  Safety Promotion/Fall Prevention: safety round/check completed  Taken 4/23/2025 0004 by Briana Ghotra RN  Safety Promotion/Fall Prevention: safety round/check completed  Taken 4/22/2025 2150 by Briana Ghotra RN  Safety Promotion/Fall Prevention: safety round/check completed  Taken 4/22/2025 1950 by Briana Ghotra RN  Safety Promotion/Fall Prevention: safety round/check completed     Problem: Dysrhythmia  Goal: Normalized Cardiac Rhythm  Outcome: Progressing  Intervention: Monitor and Manage Cardiac Rhythm Effect  Description: Monitor electrocardiogram closely for rate and rhythm changes; evaluate response to treatment.Recognize elevated risk for VTE (venous thromboembolism); implement prophylaxis.Anticipate administration of pharmacologic therapy to prevent or manage dysrhythmia, such as an antidysrhythmic agent, electrolyte replacement or binding agent.If hypotensive, lower head of bed to enhance cerebral blood flow.Provide oxygen therapy judiciously to avoid hyperoxemia; adjust to achieve oxygenation goal.Promote comfort to prevent dysrhythmia triggered by increased sympathetic tone; consider pharmacologic and nonpharmacologic strategies (e.g., calm environment, rest, stress-reduction).Anticipate need for additional therapy, such as cardioversion, cardiac rhythm management device insertion or ablation procedure, to improve perfusion and hemodynamic  stability.Initiate emergency protocol if life-threatening rhythm disturbance occurs.Acknowledge fear and anxiety; encourage questions; provide support and information.  Recent Flowsheet Documentation  Taken 4/23/2025 0100 by Briana Ghotra RN  VTE Prevention/Management:   bilateral   SCDs (sequential compression devices) off   patient refused intervention  Taken 4/22/2025 1950 by Briana Ghotra, RN  VTE Prevention/Management:   bilateral   SCDs (sequential compression devices) off   patient refused intervention     Problem: Skin Injury Risk Increased  Goal: Skin Health and Integrity  Outcome: Progressing  Intervention: Optimize Skin Protection  Description: Perform a full pressure injury risk assessment, as indicated by screening, upon admission to care unit.Reassess skin (full inspection and injury risk, including skin temperature, consistency and color) frequently (e.g., scheduled interval, with change in condition) to provide optimal early detection and prevention.Maintain adequate tissue perfusion (e.g., encourage fluid balance; avoid crossing legs, constrictive clothing or devices) to promote tissue oxygenation.Maintain head of bed at lowest degree of elevation tolerated, considering medical condition and other restrictions. Use positioning supports to prevent sliding and friction. Consider low friction textiles.Avoid positioning onto an area that remains reddened or on bony prominences.Minimize incontinence and moisture (e.g., toileting schedule; moisture-wicking pad, diaper or incontinence collection device; skin moisture barrier).Cleanse skin promptly and gently, when soiled, utilizing a pH-balanced cleanser.Relieve and redistribute pressure (e.g., scheduled position changes, weight shifts, use of support surface, medical device repositioning, protective dressing application, use of positioning device, microclimate control, use of pressure-injury-monitorEncourage increased activity, such as sitting in a  chair at the bedside or early mobilization, when able to tolerate. Avoid prolonged sitting.  Recent Flowsheet Documentation  Taken 4/23/2025 0400 by Briana Ghotra RN  Head of Bed ([facility]) Positioning: HOB elevated  Taken 4/23/2025 0200 by Briana Ghotra RN  Head of Bed ([facility]) Positioning: HOB lowered  Taken 4/23/2025 0100 by Briana Ghotra, RN  Pressure Reduction Techniques:   frequent weight shift encouraged   weight shift assistance provided  Head of Bed ([facility]) Positioning: HOB elevated  Pressure Reduction Devices: pressure-redistributing mattress utilized  Skin Protection: transparent dressing maintained  Taken 4/23/2025 0004 by Briana Ghotra RN  Head of Bed ([facility]) Positioning: HOB elevated  Taken 4/22/2025 2150 by Briana Ghotra RN  Head of Bed ([facility]) Positioning: HOB flat  Taken 4/22/2025 1950 by Briana Ghotra, RN  Activity Management:   up in chair   activity encouraged  Pressure Reduction Techniques:   frequent weight shift encouraged   weight shift assistance provided  Head of Bed ([facility]) Positioning: HOB elevated  Pressure Reduction Devices: pressure-redistributing mattress utilized  Skin Protection: transparent dressing maintained

## 2025-04-23 NOTE — PROGRESS NOTES
Name: Juan James ADMIT: 2025   : 1945  PCP: Padmini Benson APRN    MRN: [id number] LOS: 3 days   AGE/SEX: 79 y.o. male  ROOM:      Subjective   Subjective   No acute events. Patient developed a cough with some blood-tinged sputum. He had gross hematuria as well.  No family at bedside.    Objective   Objective   Vital Signs  Temp:  [97.7 °F (36.5 °C)-98.2 °F (36.8 °C)] 98.1 °F (36.7 °C)  Heart Rate:  [] 87  Resp:  [16-18] 16  BP: (101-158)/(66-91) 115/72  SpO2:  [90 %-100 %] 99 %  on  Flow (L/min) (Oxygen Therapy):  [2-4] 4;   Device (Oxygen Therapy): room air  Body mass index is 29.03 kg/m².  Physical Exam  Vitals and nursing note reviewed.   Constitutional:       General: He is not in acute distress.     Appearance: He is not toxic-appearing or diaphoretic.   HENT:      Head: Normocephalic.      Nose: Nose normal.      Mouth/Throat:      Mouth: Mucous membranes are moist.      Pharynx: Oropharynx is clear.   Eyes:      Conjunctiva/sclera: Conjunctivae normal.      Pupils: Pupils are equal, round, and reactive to light.   Cardiovascular:      Rate and Rhythm: Normal rate. Rhythm irregular.      Pulses: Normal pulses.   Pulmonary:      Effort: Pulmonary effort is normal.      Breath sounds: Rales present.   Abdominal:      General: Bowel sounds are normal. There is no distension.      Palpations: Abdomen is soft.      Tenderness: There is no abdominal tenderness.   Musculoskeletal:         General: No swelling or tenderness.      Cervical back: Neck supple.   Skin:     General: Skin is warm and dry.      Capillary Refill: Capillary refill takes less than 2 seconds.      Comments: Abrasions on knees and RUE do not appear infected   Neurological:      General: No focal deficit present.      Mental Status: He is alert.      Motor: Tremor present.   Psychiatric:         Mood and Affect: Mood normal.         Behavior: Behavior normal.       Results Review     I reviewed the patient's  new clinical results.  Results from last 7 days   Lab Units 04/23/25  0621 04/22/25  0703 04/19/25  0436 04/18/25  1813   WBC 10*3/mm3 6.60 7.71 8.67 9.90   HEMOGLOBIN g/dL 10.7* 10.1* 10.3* 10.5*   PLATELETS 10*3/mm3 94* 108* 112* 123*     Results from last 7 days   Lab Units 04/23/25  0621 04/22/25  0541 04/21/25  0825 04/19/25 0436   SODIUM mmol/L 135* 137 134* 135*   POTASSIUM mmol/L 5.0 5.3* 4.9 5.2   CHLORIDE mmol/L 107 103 96* 101   CO2 mmol/L 22.5 23.0 22.2 21.0*   BUN mg/dL 50* 75* 83* 77*   CREATININE mg/dL 1.50* 2.05* 2.27* 2.17*   GLUCOSE mg/dL 144* 127* 190* 112*   EGFR mL/min/1.73 47.7* 32.8* 29.0* 30.6*     Results from last 7 days   Lab Units 04/19/25  0436 04/18/25  1813   ALBUMIN g/dL 3.7 4.0   BILIRUBIN mg/dL 0.6 0.6   ALK PHOS U/L 99 113   AST (SGOT) U/L 34 28   ALT (SGPT) U/L 40 44*     Results from last 7 days   Lab Units 04/23/25  0621 04/22/25  0703 04/22/25  0541 04/21/25  0825 04/19/25  0436 04/18/25  1813   CALCIUM mg/dL 8.8  --  9.1 8.8 8.9 9.4   ALBUMIN g/dL  --   --   --   --  3.7 4.0   MAGNESIUM mg/dL  --  2.8* 2.9*  --   --  2.5*   PHOSPHORUS mg/dL  --  3.3 3.7  --   --   --        Glucose   Date/Time Value Ref Range Status   04/23/2025 1117 180 (H) 70 - 130 mg/dL Final   04/23/2025 0620 148 (H) 70 - 130 mg/dL Final   04/22/2025 2053 170 (H) 70 - 130 mg/dL Final   04/22/2025 1642 120 70 - 130 mg/dL Final   04/22/2025 1212 117 70 - 130 mg/dL Final   04/22/2025 0628 138 (H) 70 - 130 mg/dL Final   04/21/2025 1958 202 (H) 70 - 130 mg/dL Final       XR Chest 1 View  Result Date: 4/23/2025  No focal pulmonary consolidation. Cardiomegaly. Follow-up as clinical indications persist.  This report was finalized on 4/23/2025 3:02 PM by Dr. Branden Bond M.D on Workstation: [id number]          I have personally reviewed all medications:  Scheduled Medications  allopurinol, 300 mg, Oral, Nightly  aspirin, 81 mg, Oral, Daily  atorvastatin, 40 mg, Oral, Nightly  clopidogrel, 75 mg, Oral,  Daily  diazePAM, 10 mg, Oral, Q8H   Followed by  [START ON 4/24/2025] diazePAM, 10 mg, Oral, Q12H   Followed by  [START ON 4/25/2025] diazePAM, 10 mg, Oral, Nightly  ferrous sulfate, 325 mg, Oral, Daily With Breakfast  folic acid, 1 mg, Oral, Daily  insulin lispro, 2-7 Units, Subcutaneous, 4x Daily AC & at Bedtime  losartan, 25 mg, Oral, Daily  metoprolol succinate XL, 50 mg, Oral, Q PM  pantoprazole, 40 mg, Intravenous, Q AM  Petrolatum, 1 Application, Topical, Q12H  predniSONE, 10 mg, Oral, BID With Meals  pyridostigmine, 60 mg, Oral, TID  rOPINIRole, 1 mg, Oral, Nightly  sodium chloride, 10 mL, Intravenous, Q12H  tamsulosin, 0.4 mg, Oral, Q PM  thiamine (B-1) IV, 200 mg, Intravenous, Q8H   Followed by  [START ON 4/27/2025] thiamine, 100 mg, Oral, Daily  torsemide, 100 mg, Oral, Daily    Infusions     Diet  Diet: Cardiac, Diabetic, Renal; Healthy Heart (2-3 Na+); Consistent Carbohydrate; Low Potassium; Fluid Consistency: Thin (IDDSI 0)    I have personally reviewed:  [x]  Laboratory   []  Microbiology   []  Radiology   [x]  EKG/Telemetry  [x]  Cardiology/Vascular   []  Pathology    []  Records    Assessment/Plan     Active Hospital Problems    Diagnosis  POA    **Syncope [R55]  Yes    Alcohol dependence with withdrawal [F10.239]  Yes    Infrarenal abdominal aortic aneurysm (AAA) without rupture [I71.43]  Yes    Alcoholic cirrhosis of liver with ascites [K70.31]  Yes    Pulmonary hypertension [I27.20]  Yes    Type 2 diabetes mellitus with hyperglycemia, without long-term current use of insulin [E11.65]  Yes    Chronic kidney disease, stage 3a [N18.31]  Yes    Benign prostatic hyperplasia [N40.0]  Yes    Essential hypertension [I10]  Yes    Permanent atrial fibrillation [I48.21]  Yes    SHANNAN (acute kidney injury) [N17.9]  Yes    BPH (benign prostatic hyperplasia) [N40.0]  Yes    GERD (gastroesophageal reflux disease) [K21.9]  Yes      Resolved Hospital Problems   No resolved problems to display.   Syncope/NSTEMI  -  echocardiogram with normal LV function, plan for zio patch at discharge  - troponin was elevated on admission but was stable  - on 4/22 he developed chest pain and had increasing troponin and ECG consistent with NSTEMI  - s/p LHC with MARY to LAD on 4/22/25  - continue statin, DAPT  - appreciate cardiology recommendations    SHANNAN on CKD Stage 3a  - baseline serum creatinine is around 1.5-1.6  - shannan likely due to volume depletion and low BP, losartan, spironolactone, and torsemide held  - he received IV fluids, hold off on further, torsemide resumed  - no evidence of urine retention  - appreciate nephrology recommendations, d/w Dr. Arroyo    Type 2 DM  - has not been on pharmacotherapy at home in some time  - BG is acceptable, A1C is 7.10%  - continue coverage with ssi/hypoglycemia protocol    HTN/Permanent Afib  - HR and BP acceptable  - continue metoprolol  - losartan held as above    Cirrhosis  - appears well-compensated  - has thrombocytopenia overall stable    Confusion/Tremors  - highly suspicious for alcohol withdrawal given history and timing but the patient denies recent use of alcohol  - in any case we will continue treatment with valium per CIWA protocol and replace vitamins    Gross Hematuria  - check UA  - consult urology    Cough  - had some reported blood-tinged sputum  - CXR noted, no PNA  - encourage pulmonary toilet  - check RVP    SCDs for DVT prophylaxis.  Full code.  Discussed with patient, nursing staff, and consulting provider.  Anticipate discharge home in 1-2 days.  Expected Discharge Date: 4/25/2025; Expected Discharge Time:       John Schmidt MD  [facility]  04/23/25  15:38 EDT    Portions of this text have been copied and I have reviewed them. They are accurate as of 4/23/2025

## 2025-04-23 NOTE — NURSING NOTE
0222: lying in bed  B/P: 141/78 MAP 98    0225: standing at bedside for 1 min  B/P: 131/67 MAP 85    0227: standing at bedside for 3 mins  B/P: 116/71 MAP 84

## 2025-04-24 ENCOUNTER — TELEPHONE (OUTPATIENT)
Dept: CARDIOLOGY | Age: 80
End: 2025-04-24
Payer: MEDICARE

## 2025-04-24 ENCOUNTER — READMISSION MANAGEMENT (OUTPATIENT)
Dept: CALL CENTER | Facility: HOSPITAL | Age: 80
End: 2025-04-24
Payer: MEDICARE

## 2025-04-24 ENCOUNTER — APPOINTMENT (OUTPATIENT)
Dept: CARDIOLOGY | Facility: HOSPITAL | Age: 80
DRG: 323 | End: 2025-04-24
Payer: MEDICARE

## 2025-04-24 VITALS
DIASTOLIC BLOOD PRESSURE: 71 MMHG | HEART RATE: 85 BPM | SYSTOLIC BLOOD PRESSURE: 118 MMHG | WEIGHT: 220 LBS | BODY MASS INDEX: 29.16 KG/M2 | HEIGHT: 73 IN | OXYGEN SATURATION: 92 % | TEMPERATURE: 98.2 F | RESPIRATION RATE: 19 BRPM

## 2025-04-24 LAB
ANION GAP SERPL CALCULATED.3IONS-SCNC: 12 MMOL/L (ref 5–15)
BUN SERPL-MCNC: 46 MG/DL (ref 8–23)
BUN/CREAT SERPL: 30.9 (ref 7–25)
CALCIUM SPEC-SCNC: 8.9 MG/DL (ref 8.6–10.5)
CHLORIDE SERPL-SCNC: 101 MMOL/L (ref 98–107)
CO2 SERPL-SCNC: 24 MMOL/L (ref 22–29)
CREAT SERPL-MCNC: 1.49 MG/DL (ref 0.76–1.27)
EGFRCR SERPLBLD CKD-EPI 2021: 48 ML/MIN/1.73
GLUCOSE BLDC GLUCOMTR-MCNC: 142 MG/DL (ref 70–130)
GLUCOSE BLDC GLUCOMTR-MCNC: 195 MG/DL (ref 70–130)
GLUCOSE SERPL-MCNC: 148 MG/DL (ref 65–99)
POTASSIUM SERPL-SCNC: 4.5 MMOL/L (ref 3.5–5.2)
SODIUM SERPL-SCNC: 137 MMOL/L (ref 136–145)

## 2025-04-24 PROCEDURE — 63710000001 INSULIN LISPRO (HUMAN) PER 5 UNITS: Performed by: INTERNAL MEDICINE

## 2025-04-24 PROCEDURE — 99232 SBSQ HOSP IP/OBS MODERATE 35: CPT | Performed by: INTERNAL MEDICINE

## 2025-04-24 PROCEDURE — 25010000002 THIAMINE PER 100 MG: Performed by: INTERNAL MEDICINE

## 2025-04-24 PROCEDURE — 25010000002 CEFAZOLIN PER 500 MG: Performed by: INTERNAL MEDICINE

## 2025-04-24 PROCEDURE — 97162 PT EVAL MOD COMPLEX 30 MIN: CPT

## 2025-04-24 PROCEDURE — 80048 BASIC METABOLIC PNL TOTAL CA: CPT | Performed by: INTERNAL MEDICINE

## 2025-04-24 PROCEDURE — 93246 EXT ECG>7D<15D RECORDING: CPT

## 2025-04-24 PROCEDURE — 82948 REAGENT STRIP/BLOOD GLUCOSE: CPT

## 2025-04-24 PROCEDURE — 82180 ASSAY OF ASCORBIC ACID: CPT | Performed by: INTERNAL MEDICINE

## 2025-04-24 PROCEDURE — 63710000001 PREDNISONE PER 1 MG: Performed by: INTERNAL MEDICINE

## 2025-04-24 RX ORDER — CLOPIDOGREL BISULFATE 75 MG/1
75 TABLET ORAL DAILY
Qty: 30 TABLET | Refills: 0 | Status: ON HOLD | OUTPATIENT
Start: 2025-04-25

## 2025-04-24 RX ORDER — LANOLIN ALCOHOL/MO/W.PET/CERES
100 CREAM (GRAM) TOPICAL DAILY
Qty: 30 TABLET | Refills: 0 | Status: ON HOLD | OUTPATIENT
Start: 2025-04-27

## 2025-04-24 RX ORDER — ATORVASTATIN CALCIUM 40 MG/1
40 TABLET, FILM COATED ORAL NIGHTLY
Qty: 90 TABLET | Refills: 0 | Status: ON HOLD | OUTPATIENT
Start: 2025-04-24

## 2025-04-24 RX ORDER — CEPHALEXIN 500 MG/1
500 CAPSULE ORAL 3 TIMES DAILY
Qty: 21 CAPSULE | Refills: 0 | Status: ON HOLD | OUTPATIENT
Start: 2025-04-24 | End: 2025-05-01

## 2025-04-24 RX ORDER — OXYMETAZOLINE HYDROCHLORIDE 0.05 G/100ML
2 SPRAY NASAL EVERY 8 HOURS
Qty: 15 ML | Refills: 0 | Status: ON HOLD | OUTPATIENT
Start: 2025-04-24 | End: 2025-05-19

## 2025-04-24 RX ADMIN — FERROUS SULFATE TAB 325 MG (65 MG ELEMENTAL FE) 325 MG: 325 (65 FE) TAB at 08:53

## 2025-04-24 RX ADMIN — PANTOPRAZOLE SODIUM 40 MG: 40 INJECTION, POWDER, FOR SOLUTION INTRAVENOUS at 05:58

## 2025-04-24 RX ADMIN — PYRIDOSTIGMINE BROMIDE 60 MG: 60 TABLET ORAL at 08:53

## 2025-04-24 RX ADMIN — INSULIN LISPRO 2 UNITS: 100 INJECTION, SOLUTION INTRAVENOUS; SUBCUTANEOUS at 06:45

## 2025-04-24 RX ADMIN — CEFAZOLIN 2000 MG: 2 INJECTION, POWDER, FOR SOLUTION INTRAMUSCULAR; INTRAVENOUS at 12:24

## 2025-04-24 RX ADMIN — FOLIC ACID 1 MG: 1 TABLET ORAL at 08:53

## 2025-04-24 RX ADMIN — THIAMINE HYDROCHLORIDE 200 MG: 100 INJECTION, SOLUTION INTRAMUSCULAR; INTRAVENOUS at 05:58

## 2025-04-24 RX ADMIN — CLOPIDOGREL BISULFATE 75 MG: 75 TABLET, FILM COATED ORAL at 08:53

## 2025-04-24 RX ADMIN — PREDNISONE 10 MG: 20 TABLET ORAL at 08:53

## 2025-04-24 RX ADMIN — LOSARTAN POTASSIUM 25 MG: 25 TABLET, FILM COATED ORAL at 08:53

## 2025-04-24 RX ADMIN — DIAZEPAM 10 MG: 5 TABLET ORAL at 05:57

## 2025-04-24 RX ADMIN — TORSEMIDE 100 MG: 100 TABLET ORAL at 08:52

## 2025-04-24 RX ADMIN — PETROLATUM 1 APPLICATION: 420 OINTMENT TOPICAL at 09:01

## 2025-04-24 RX ADMIN — Medication 10 ML: at 08:54

## 2025-04-24 RX ADMIN — ASPIRIN 81 MG: 81 TABLET, COATED ORAL at 08:52

## 2025-04-24 NOTE — PROGRESS NOTES
[facility] Progress Note      Patient Name: Juan James  : 1945  MRN: [id number]  Primary Care Physician:  Padmini Benson APRN  Date of admission: 2025    Subjective     Interval History:   F/u SHANNAN CKD3B    Review of Systems:   UOP 2875   Weight NR   Denies dyspnea  States hematuria resolved  Asks to go home    Objective     Vitals:   Temp:  [97.5 °F (36.4 °C)-98.1 °F (36.7 °C)] 98 °F (36.7 °C)  Heart Rate:  [76-96] 76  Resp:  [16-18] 18  BP: ()/(62-79) 140/76    Intake/Output Summary (Last 24 hours) at 2025 0731  Last data filed at 2025 0703  Gross per 24 hour   Intake 690 ml   Output 3175 ml   Net -2485 ml       Physical Exam:    General Appearance: chronically ill appearing WM no distress on RA  Neck: supple, no JVD  Lungs: CTA bilat no rales  Heart: RRR, normal S1 and S2  Abdomen: soft, nontender, nondistended  : no palpable bladder  Extremities: trace BLE edema with venous stasis skin changes     Scheduled Meds:     allopurinol, 300 mg, Oral, Nightly  aspirin, 81 mg, Oral, Daily  atorvastatin, 40 mg, Oral, Nightly  clopidogrel, 75 mg, Oral, Daily  diazePAM, 10 mg, Oral, Q8H   Followed by  diazePAM, 10 mg, Oral, Q12H   Followed by  [START ON 2025] diazePAM, 10 mg, Oral, Nightly  ferrous sulfate, 325 mg, Oral, Daily With Breakfast  folic acid, 1 mg, Oral, Daily  insulin lispro, 2-7 Units, Subcutaneous, 4x Daily AC & at Bedtime  losartan, 25 mg, Oral, Daily  metoprolol succinate XL, 50 mg, Oral, Q PM  pantoprazole, 40 mg, Intravenous, Q AM  Petrolatum, 1 Application, Topical, Q12H  predniSONE, 10 mg, Oral, BID With Meals  pyridostigmine, 60 mg, Oral, TID  rOPINIRole, 1 mg, Oral, Nightly  sodium chloride, 10 mL, Intravenous, Q12H  tamsulosin, 0.4 mg, Oral, Q PM  thiamine (B-1) IV, 200 mg, Intravenous, Q8H   Followed by  [START ON 2025] thiamine, 100 mg, Oral, Daily  torsemide, 100 mg, Oral, Daily      IV Meds:        Results Reviewed:    I have personally reviewed the results from the time of this admission to 4/24/2025 07:31 EDT     Results from last 7 days   Lab Units 04/24/25  0235 04/23/25  0621 04/22/25  0541 04/21/25  0825 04/19/25  0436 04/18/25  1813   SODIUM mmol/L 137 135* 137   < > 135* 133*   POTASSIUM mmol/L 4.5 5.0 5.3*   < > 5.2 5.9*   CHLORIDE mmol/L 101 107 103   < > 101 96*   CO2 mmol/L 24.0 22.5 23.0   < > 21.0* 21.0*   BUN mg/dL 46* 50* 75*   < > 77* 79*   CREATININE mg/dL 1.49* 1.50* 2.05*   < > 2.17* 2.43*   CALCIUM mg/dL 8.9 8.8 9.1   < > 8.9 9.4   BILIRUBIN mg/dL  --   --   --   --  0.6 0.6   ALK PHOS U/L  --   --   --   --  99 113   ALT (SGPT) U/L  --   --   --   --  40 44*   AST (SGOT) U/L  --   --   --   --  34 28   GLUCOSE mg/dL 148* 144* 127*   < > 112* 148*    < > = values in this interval not displayed.     Estimated Creatinine Clearance: 51.6 mL/min (A) (by C-G formula based on SCr of 1.49 mg/dL (H)).  Results from last 7 days   Lab Units 04/22/25  0703 04/22/25  0541 04/18/25  1813   MAGNESIUM mg/dL 2.8* 2.9* 2.5*   PHOSPHORUS mg/dL 3.3 3.7  --          Results from last 7 days   Lab Units 04/23/25  0621 04/22/25  0703 04/19/25  0436 04/18/25  1813   WBC 10*3/mm3 6.60 7.71 8.67 9.90   HEMOGLOBIN g/dL 10.7* 10.1* 10.3* 10.5*   PLATELETS 10*3/mm3 94* 108* 112* 123*     Results from last 7 days   Lab Units 04/23/25 2102 04/22/25  0703   INR  1.18* 1.31*       Assessment / Plan     ASSESSMENT:  Non olig SHANNAN - prerenal azotemia in assoc with vol depletion, hypotension, high dose diuretic use and impaired compensation via ARB.  Also on SGLT2 inh.  Cr down to 1.5 (peak 2.4) with BUN down to 46, stable post cath.  Mild periph edema.  Mild hyperkalemia improved  CKD stage 3B - BL Cr 1.8 to 2 range with nephrotic rnage proteinuria suggesting underlying GN but we've opted not to pursue kidney biopsy (poor candidate for immunosuppression).  Pr/Cr up to 3.7 gm in FEB prompting addition of ARB to SGLT2i - much better now 861  mg/g by spot ratio  Syncope - suspect related to vol depletion, hypotension.  Echo unremarkable.     Chronic BLE edema - attributed moreso to cirrhosis in past than cardiac origin.  Resumed torsemide home dose 100 mg yesterday, good UOP.  Holding aldactone    HTN - hypotensive initially, resolved, now BP labile; back on ARB & metop XL.  Continue the losartan to target proteinuria  Hx AFIB  CAD, ACS - s/p urgent cath with PCI to LAD 4/22  DM2, mgmt per [facility].  Fair control in general at home, A1c 7.6  Hx ETOH-cirrhosis  Myasthenia gravis on steroid  Gross hematuria - UA also noted: large blood.  UROL to see     PLAN:  Continue torsemide 100 mg daily   Hold aldactone still  Continue ARB  Can resume SGLT2 inh upon discharge  UROL to see   No objection to discharge from nephrology standpoint; follow up with me 2 weeks      Juan Arroyo MD  04/24/25  07:31 EDT    [facility]  [phone]
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
## 2025-04-24 NOTE — NURSING NOTE
CWOCN- f/u on skin tears.   Received call from RN about additional tear on the RAC and also drainage from the left arm.   Reassessed skin tears. Left posterior upper arm with additional drainage since patient took at shower (per his report). Dressing loose and blood noted on patient's bedside table. RAC with small skin tear that is not draining. Dressing has fallen off. Left knee with serous draingae, right knee no drainage. RUE posterior skin tear with serosanguins drainage. Left anterior arm with scant drainage. No s/s infection to the skin tears.     I cleansed all with NS. To the LUE posterior, left knee, and the RUE posterior, I placed opticel ag + silicone border. To all others I placed vaseline gauze + silicone border. I advised patient that these can be changed every other day. There are supplies in a bag for him. He verbalized understanding. Reviewed with RN.

## 2025-04-24 NOTE — PROGRESS NOTES
[facility] Clinical Pharmacy Services: National Cardiology Data Registry (NCDR) Medication Review    Juan James is s/p PCI with drug-eluting stent placement for NSTEMI . Pharmacy to review discharge medications to make sure appropriate medications have been prescribed.    Patient has been discharged on the following:  Aspirin EC 81 mg daily  High Intensity Statin: Atorvastatin 40 mg nightly  Beta-blocker: Metoprolol XL 50 mg nightly  P2Y12 Inhibitor: Clopidogrel 75 mg daily  LVEF=66-70%: Losartan 25 mg daily    These medications meet the requirements for NCDR discharge medication for chest pain and MI.    Daniella Velez, Pharm.D., [facility]   Clinical Pharmacist   Phone Extension #[phone]

## 2025-04-24 NOTE — PROGRESS NOTES
" LOS: 4 days   Patient Care Team:  Padmini Benson APRN as PCP - General (Nurse Practitioner)  Bill Washington MD as Surgeon (Neurosurgery)  Chris Mo III, MD as Cardiologist (Cardiology)  Lorenzo Rosado MD (Neurology)  Jean Patricia MD as Surgeon (Vascular Surgery)  Hermes Francisco MD as Consulting Physician (Pulmonary Disease)  Juan Arroyo MD as Consulting Physician (Nephrology)  Paris Adame, MIRI as Ambulatory  ([facility])    Chief Complaint: syncope     Interval History: He got a shower and looks and feels much better. No CP.     Objective   Vital Signs  Temp:  [97.5 °F (36.4 °C)-98.2 °F (36.8 °C)] 98.2 °F (36.8 °C)  Heart Rate:  [76-96] 78  Resp:  [16-18] 18  BP: ()/(62-79) 115/63    Intake/Output Summary (Last 24 hours) at 4/24/2025 1409  Last data filed at 4/24/2025 1100  Gross per 24 hour   Intake 1050 ml   Output 2050 ml   Net -1000 ml       Last Weight and Admission Weight        04/19/25  1050   Weight: 99.8 kg (220 lb)     Flowsheet Rows      Flowsheet Row First Filed Value   Admission Height 185.4 cm (73\") Documented at 04/18/2025 1932   Admission Weight 99.8 kg (220 lb 0.3 oz) Documented at 04/18/2025 1932          Physical Exam  Vitals reviewed.   Constitutional:       Appearance: He is well-developed. He is obese.   HENT:      Head: Normocephalic.      Nose: Nose normal.   Eyes:      Conjunctiva/sclera: Conjunctivae normal.   Neck:      Vascular: No JVD.   Cardiovascular:      Rate and Rhythm: Normal rate. Rhythm irregular.      Heart sounds: Normal heart sounds.   Pulmonary:      Effort: Pulmonary effort is normal.      Breath sounds: Normal breath sounds.   Abdominal:      Palpations: Abdomen is soft.      Tenderness: There is no abdominal tenderness.   Musculoskeletal:         General: No swelling. Normal range of motion.      Cervical back: Normal range of motion.   Skin:     General: Skin is warm and dry.   Neurological:      General: No " focal deficit present.      Mental Status: He is alert.   Psychiatric:         Mood and Affect: Mood normal.       Results Review:      Results from last 7 days   Lab Units 04/24/25  0235 04/23/25  0621 04/22/25  0541   SODIUM mmol/L 137 135* 137   POTASSIUM mmol/L 4.5 5.0 5.3*   CHLORIDE mmol/L 101 107 103   CO2 mmol/L 24.0 22.5 23.0   BUN mg/dL 46* 50* 75*   CREATININE mg/dL 1.49* 1.50* 2.05*   GLUCOSE mg/dL 148* 144* 127*   CALCIUM mg/dL 8.9 8.8 9.1     Results from last 7 days   Lab Units 04/23/25  0621 04/22/25  0703 04/22/25  0541 04/19/25  0436 04/18/25  1923   CK TOTAL U/L  --   --   --   --  66   HSTROP T ng/L 664* 119* 120*   < > 406*    < > = values in this interval not displayed.     Results from last 7 days   Lab Units 04/23/25  0621 04/22/25  0703 04/19/25  0436   WBC 10*3/mm3 6.60 7.71 8.67   HEMOGLOBIN g/dL 10.7* 10.1* 10.3*   HEMATOCRIT % 31.9* 30.6* 30.9*   PLATELETS 10*3/mm3 94* 108* 112*     Results from last 7 days   Lab Units 04/23/25  2102 04/22/25  0703   INR  1.18* 1.31*   APTT seconds  --  28.8         Results from last 7 days   Lab Units 04/22/25  0703   MAGNESIUM mg/dL 2.8*         I reviewed the patient's new clinical results.  I personally viewed and interpreted the patient's EKG/Telemetry data        Medication Review:   allopurinol, 300 mg, Oral, Nightly  aspirin, 81 mg, Oral, Daily  atorvastatin, 40 mg, Oral, Nightly  ceFAZolin, 2,000 mg, Intravenous, Q8H  clopidogrel, 75 mg, Oral, Daily  diazePAM, 10 mg, Oral, Q8H   Followed by  diazePAM, 10 mg, Oral, Q12H   Followed by  [START ON 4/25/2025] diazePAM, 10 mg, Oral, Nightly  ferrous sulfate, 325 mg, Oral, Daily With Breakfast  folic acid, 1 mg, Oral, Daily  insulin lispro, 2-7 Units, Subcutaneous, 4x Daily AC & at Bedtime  losartan, 25 mg, Oral, Daily  metoprolol succinate XL, 50 mg, Oral, Q PM  pantoprazole, 40 mg, Intravenous, Q AM  Petrolatum, 1 Application, Topical, Q12H  predniSONE, 10 mg, Oral, BID With Meals  pyridostigmine, 60  mg, Oral, TID  rOPINIRole, 1 mg, Oral, Nightly  sodium chloride, 10 mL, Intravenous, Q12H  tamsulosin, 0.4 mg, Oral, Q PM  thiamine (B-1) IV, 200 mg, Intravenous, Q8H   Followed by  [START ON 4/27/2025] thiamine, 100 mg, Oral, Daily  torsemide, 100 mg, Oral, Daily    Assessment & Plan     1. Syncope -- suspect due to hypovolemia. Echo with normal LVSF/wall motion. Dr Arroyo has adjusted his diuretics.    2. NSTEMI -- s/p LAD PCI. He has moderate CX/RCA disease, will need to monitor. Continue DAPT/statin.     3. Permanent AF -- he has consistently declined OAC despite knowing risks.    4. Increased bleeding -- likely from general illness, heparin during cath, DAPT; given his poor general care as well as what appears to be petechial bleeding in places, will check vit C level.    5. SHANNAN on CKD3, improved    6. DM    From CV standpoint, he can be discharged and we'll arrange follow up.     Jorge Palma MD  04/24/25  14:09 EDT

## 2025-04-24 NOTE — OUTREACH NOTE
Prep Survey      Flowsheet Row Responses   [facility] patient discharged from? [facility]   Is LACE score < 7 ? No   Eligibility Readm Mgmt   Discharge diagnosis Syncope   Does the patient have one of the following disease processes/diagnoses(primary or secondary)? Other   Prep survey completed? Yes            Malu COE - Registered Nurse

## 2025-04-24 NOTE — DISCHARGE SUMMARY
Date of Admission: 4/18/2025  Date of Discharge:  4/24/2025  Primary Care Physician: Padmini Benson APRN     Discharge Diagnosis:  Active Hospital Problems    Diagnosis  POA    **Syncope [R55]  Yes    Alcohol dependence with withdrawal [F10.239]  Yes    Infrarenal abdominal aortic aneurysm (AAA) without rupture [I71.43]  Yes    Alcoholic cirrhosis of liver with ascites [K70.31]  Yes    Pulmonary hypertension [I27.20]  Yes    Type 2 diabetes mellitus with hyperglycemia, without long-term current use of insulin [E11.65]  Yes    Chronic kidney disease, stage 3a [N18.31]  Yes    Benign prostatic hyperplasia [N40.0]  Yes    Essential hypertension [I10]  Yes    Permanent atrial fibrillation [I48.21]  Yes    SHANNAN (acute kidney injury) [N17.9]  Yes    BPH (benign prostatic hyperplasia) [N40.0]  Yes    GERD (gastroesophageal reflux disease) [K21.9]  Yes      Resolved Hospital Problems   No resolved problems to display.       Presenting Problem/History of Present Illness:  Cardiomegaly [I51.7]  Syncope [R55]  Elevated troponin [R79.89]  Syncope, unspecified syncope type [R55]     Hospital Course:  The patient is a 79 y.o. male with a history of HTN, Type 2 DM lifestyle controlled, CKD Stage 3a, alcoholic liver cirrhosis, and BPH who presented with a syncopal episode. Please see admission H&P for further details. He underwent workup including telemetry monitoring and echocardiogram which were unremarkable. During the hospital admission he developed chest pain and had troponin elevation with ECG changes consistent with NSTEMI and he had a left heart catheterization with MARY to LAD on 4/22/25. He tolerated this well. On hospital day 4 he developed confusion, agitation, diaphoresis, and tremors and clinically he appeared to be in alcohol withdrawal. He denies using alcohol recently. Nonetheless he was treated with valium and these symptoms resolved. He also developed an SHANNAN prior to his cath but this resolved with holding  "diuretics and giving IV fluids. His diuretics were resumed and well-tolerated. He did have some intermittent hematuria and his urine culture is growing coagulase-negative staph. He will complete a course of keflex at discharge. He is medically stable and will be discharged home. He should keep close PCP and cardiology follow up. I recommend a repeat UA with his PCP after his abx are completed to assess for ongoing hematuria and he can be referred to urology if needed.    Exam Today:  Blood pressure 115/63, pulse 78, temperature 98.2 °F (36.8 °C), temperature source Oral, resp. rate 18, height 185.4 cm (73\"), weight 99.8 kg (220 lb), SpO2 92%.  Vitals and nursing note reviewed.   Constitutional:       General: He is not in acute distress.     Appearance: He is not toxic-appearing or diaphoretic.   HENT:      Head: Normocephalic.      Nose: Nose normal.      Mouth/Throat:      Mouth: Mucous membranes are moist.      Pharynx: Oropharynx is clear.   Eyes:      Conjunctiva/sclera: Conjunctivae normal.      Pupils: Pupils are equal, round, and reactive to light.   Cardiovascular:      Rate and Rhythm: Normal rate. Irregular rhythm.      Pulses: Normal pulses.   Pulmonary:      Effort: Pulmonary effort is normal.      Breath sounds: CTAB, no r/w/r  Abdominal:      General: Bowel sounds are normal. There is no distension.      Palpations: Abdomen is soft.      Tenderness: There is no abdominal tenderness.   Musculoskeletal:         General: No swelling or tenderness.      Cervical back: Neck supple.   Skin:     General: Skin is warm and dry.      Capillary Refill: Capillary refill takes less than 2 seconds.      Comments: Abrasions on knees and RUE do not appear infected   Neurological:      General: No focal deficit present.      Mental Status: He is alert.      Motor: no tremor present  Psychiatric:         Mood and Affect: Mood normal.         Behavior: Behavior normal.     Procedures Performed:  Procedure(s):  Left " Heart Cath  Percutaneous Coronary Intervention  Intravascular Ultrasound  Stent MARY coronary  Intravascular Lithotripsy       Consults:   Consults       Date and Time Order Name Status Description    4/23/2025  3:38 PM Inpatient Urology Consult      4/21/2025 11:41 AM Inpatient Nephrology Consult Completed     4/19/2025  6:46 AM Inpatient Cardiology Consult      4/18/2025 10:55 PM [facility] (on-call MD unless specified) Details      4/18/2025  7:22 PM Cardiology (on-call MD unless specified)               Discharge Disposition:  Home or Self Care    Discharge Medications:     Discharge Medications        New Medications        Instructions Start Date   atorvastatin 40 MG tablet  Commonly known as: LIPITOR   40 mg, Oral, Nightly      cephalexin 500 MG capsule  Commonly known as: KEFLEX   500 mg, Oral, 3 Times Daily      clopidogrel 75 MG tablet  Commonly known as: PLAVIX   75 mg, Oral, Daily   Start Date: April 25, 2025     oxymetazoline 0.05 % nasal spray  Commonly known as: AFRIN   2 sprays, Nasal, Every 8 Hours      thiamine 100 MG tablet  Commonly known as: VITAMIN B1   100 mg, Oral, Daily   Start Date: April 27, 2025            Continue These Medications        Instructions Start Date   allopurinol 300 MG tablet  Commonly known as: ZYLOPRIM   300 mg, Oral, Nightly      aspirin 81 MG EC tablet   81 mg, Oral, Daily      COLLAGEN-VITAMIN C PO   2,500 mg      cyclobenzaprine 10 MG tablet  Commonly known as: FLEXERIL   10 mg, Oral, Nightly PRN      Farxiga 5 MG tablet tablet  Generic drug: dapagliflozin   5 mg, Every Morning      Ferrous Fumarate 325 (106 Fe) MG tablet   Take  by mouth.      folic acid 400 MCG tablet  Commonly known as: FOLVITE   400 mcg, Oral, Daily      gabapentin 300 MG capsule  Commonly known as: NEURONTIN       losartan 25 MG tablet  Commonly known as: COZAAR   1 tablet, Daily      metoprolol succinate XL 50 MG 24 hr tablet  Commonly known as: TOPROL-XL   50 mg, Oral, Every Evening      multivitamin  with minerals tablet tablet   1 tablet, Daily      predniSONE 10 MG tablet  Commonly known as: DELTASONE   10 mg, Daily      pyridostigmine 60 MG tablet  Commonly known as: MESTINON   60 mg, 3 Times Daily      rOPINIRole 1 MG tablet  Commonly known as: REQUIP   TAKE 1 TABLET BY MOUTH NIGHTLY TAKE 1 HOUR PRIOR BEFORE BEDTIME      spironolactone 50 MG tablet  Commonly known as: Aldactone   75 mg, Oral, Daily      tamsulosin 0.4 MG capsule 24 hr capsule  Commonly known as: FLOMAX   0.4 mg, Oral, Every Evening      torsemide 100 MG tablet  Commonly known as: DEMADEX   1 tablet, Daily      traZODone 100 MG tablet  Commonly known as: DESYREL   100 mg, Oral, Nightly      Vitamin B-12 5000 MCG tablet dispersible   1 tablet, Translingual, Daily               Discharge Diet:   Diet Instructions       Diet: Diabetic Diets, Cardiac Diets, Renal Diets; Healthy Heart (2-3 Na+); Regular (IDDSI 7); Thin (IDDSI 0); Consistent Carbohydrate; Low Potassium      Discharge Diet:  Diabetic Diets  Cardiac Diets  Renal Diets       Cardiac Diet: Healthy Heart (2-3 Na+)    Texture: Regular (IDDSI 7)    Fluid Consistency: Thin (IDDSI 0)    Diabetic Diet: Consistent Carbohydrate    Renal Diet: Low Potassium            Activity at Discharge:   Activity Instructions       Activity as Tolerated              Follow-up Appointments:  Future Appointments   Date Time Provider Department Center   5/27/2025  9:40 AM Stephan Harvey MD [facility] SPMD EPT [facility]   6/13/2025 11:00 AM Chris Mo III, MD [facility] CD [id number] [facility]   8/4/2025  9:15 AM [facility] LCG ECHO/VAS BACK RM BH LCG ECHO [facility]   8/18/2025  8:00 AM [facility] OP VAS RM 1  [facility] OVKR [facility]   8/18/2025  8:30 AM Jean Patricia MD [facility] VS [facility]   3/5/2026  9:30 AM [facility] BRKG CT 1 BH [facility] CT BR None   4/8/2026 11:45 AM Tre Kiran MD [facility] CTS [facility]     Additional Instructions for the Follow-ups that You Need to Schedule       Ambulatory Referral to Cardiac Rehab   As directed      Discharge Follow-up  with PCP   As directed       Currently Documented PCP:    Padmini Benson APRN    PCP Phone Number:    [phone]     Follow Up Details: 1 week        Discharge Follow-up with Specialty: cardiology; 2 Weeks   As directed      Specialty: cardiology   Follow Up: 2 Weeks        Discharge Follow-up with Specified Provider: Dr. Arroyo (Nephrology); 2 Weeks   As directed      To: Dr. Arroyo (Nephrology)   Follow Up: 2 Weeks                Test Results Pending at Discharge:  Pending Labs       Order Current Status    Vitamin C In process             John Schmidt MD  04/24/25  14:03 EDT    Time Spent on Discharge Activities: Greater than 30 minutes.

## 2025-04-24 NOTE — NURSING NOTE
4x4 rolled and placed in l. Nare pt instructed to hold pressure to help with bleeding with verbalized understanding. Passed onto oncoming shift that if bleeding remains unresolved repage LHCATHY

## 2025-04-24 NOTE — PROGRESS NOTES
Enter Query Response Below      Query Response: Chronic diastolic heart failure.     Electronically signed by Jorge Palma MD, 04/24/25, 11:38 AM EDT.               If applicable, please update the problem list.

## 2025-04-24 NOTE — DISCHARGE INSTRUCTIONS
Wound care:  Left posterior arm, right posterior arm, left knee- opticel ag + silicone border.   RAC, right knee, left lower arm- vaseline gauze + silicone border

## 2025-04-24 NOTE — PLAN OF CARE
Goal Outcome Evaluation:  Plan of Care Reviewed With: patient      Pt is 78 y/o M admitted to [facility] on 4/18/25 with syncope - had chest pain during admission and taken for cardiac cath with stent placement as well. PMH: DM, HTN, CKD, AAA. At baseline pt is from home alone, uses QTC for ambulation, limited by neuropathy but reports indep with all mobility. Pt currently demos CGA/SBA for transfers and ambulation with QTC - balance improving with duration as pt reports he just feels wobbly from not being up. Cues for safety awareness at times. Pt demos mobility below baseline and therefore would benefit from acute skilled PT to address functional mobility deficits. Anticipate d/c home with assist.            Anticipated Discharge Disposition (PT): home with assist

## 2025-04-24 NOTE — PLAN OF CARE
Goal Outcome Evaluation:  Plan of Care Reviewed With: patient        Progress: no change  Outcome Evaluation: S/P LHC, has issues with bleeding, labs drawn, orthostatic blood pressures obtained, CIWA 5, refused scheduled valium, 3LNC while sleeping, afib on the monitor, will need holter monitor at PA, VSS, continue plan of care.

## 2025-04-24 NOTE — CASE MANAGEMENT/SOCIAL WORK
Continued Stay Note  [facility]     Patient Name: Juan James  MRN: [id number]  Today's Date: 4/24/2025    Admit Date: 4/18/2025    Plan: Home;   Discharge Plan       Row Name 04/24/25 4054       Plan    Plan Home;    Plan Comments Pt ambulated 115 feet with physical therapy.  Pt will return home at d/c.  No needs identified...........Shannan SINGH/ABHILASH                   Discharge Codes    No documentation.                 Expected Discharge Date and Time       Expected Discharge Date Expected Discharge Time    Apr 24, 2025               Shannan Crews RN

## 2025-04-25 LAB — BACTERIA SPEC AEROBE CULT: ABNORMAL

## 2025-04-25 NOTE — CASE MANAGEMENT/SOCIAL WORK
Case Management Discharge Note      Final Note: Home via private vehicle    Provided Post Acute Provider List?: N/A  N/A Provider List Comment: denies dc needs  Provided Post Acute Provider Quality & Resource List?: N/A    Selected Continued Care - Discharged on 4/24/2025 Admission date: 4/18/2025 - Discharge disposition: Home or Self Care      Destination    No services have been selected for the patient.                Durable Medical Equipment    No services have been selected for the patient.                Dialysis/Infusion    No services have been selected for the patient.                Home Medical Care    No services have been selected for the patient.                Therapy    No services have been selected for the patient.                Community Resources    No services have been selected for the patient.                Community & DME    No services have been selected for the patient.                    Selected Continued Care - Episodes Includes continued care and service providers with selected services from the active episodes listed below          Transportation Services  Private: Car    Final Discharge Disposition Code: 01 - home or self-care

## 2025-04-30 ENCOUNTER — READMISSION MANAGEMENT (OUTPATIENT)
Dept: CALL CENTER | Facility: HOSPITAL | Age: 80
End: 2025-04-30
Payer: MEDICARE

## 2025-04-30 LAB — VIT C SERPL-MCNC: 0.7 MG/DL (ref 0.4–2)

## 2025-04-30 NOTE — OUTREACH NOTE
Medical Week 1 Survey      Flowsheet Row Responses   [facility] patient discharged from? [facility]   Does the patient have one of the following disease processes/diagnoses(primary or secondary)? Other   Week 1 attempt successful? Yes   Call start time 0919   Call end time 0924   Discharge diagnosis Syncope   Is patient permission given to speak with other caregiver? Yes   List who call center can speak with son Juan   Person spoke with today (if not patient) and relationship Patient and son   Meds reviewed with patient/caregiver? Yes   Is the patient having any side effects they believe may be caused by any medication additions or changes? No   Does the patient have all medications ordered at discharge? Yes   Is the patient taking all medications as directed (includes completed medication regime)? Yes   Does the patient have a primary care provider?  Yes   Does the patient have an appointment with their PCP within 7 days of discharge? Yes   Has the patient kept scheduled appointments due by today? N/A   Home health comments He is weak and can hardly walk.  Advised to contact his PCP regarding getting home health orders.   Psychosocial issues? No   Did the patient receive a copy of their discharge instructions? Yes   Nursing interventions Reviewed instructions with patient   What is the patient's perception of their health status since discharge? New symptoms unrelated to diagnosis   Is the patient/caregiver able to teach back signs and symptoms related to disease process for when to call PCP? Yes   Is the patient/caregiver able to teach back signs and symptoms related to disease process for when to call 911? Yes   Is the patient/caregiver able to teach back the hierarchy of who to call/visit for symptoms/problems? PCP, Specialist, Home health nurse, Urgent Care, ED, 911 Yes   Additional teach back comments Son has concerned with weakness.  Advised to contact his PCP regarding getting home health.  He will  make sure he has f/u with cardiology and nephrology.   Week 1 call completed? Yes   Is the patient interested in additional calls from an ambulatory ? Yes   What is the reason the patient needs support from an ACM? Care Coordination  [Pt is having difficulty walking and advised to contact his PCP regarding getting home health. Son is there but states he doesn't live around here and will be going home soon. Can you follow up on this.]   Graduated/Revoked comments Pt to be followed by ambulatory case managment   Call end time 0924            Kristen CONWAY - Licensed Nurse

## 2025-05-01 ENCOUNTER — APPOINTMENT (OUTPATIENT)
Dept: GENERAL RADIOLOGY | Facility: HOSPITAL | Age: 80
End: 2025-05-01
Payer: MEDICARE

## 2025-05-01 ENCOUNTER — HOSPITAL ENCOUNTER (INPATIENT)
Facility: HOSPITAL | Age: 80
LOS: 3 days | Discharge: NURSING, REHAB OR LONG-TERM CARE | End: 2025-05-06
Attending: EMERGENCY MEDICINE | Admitting: INTERNAL MEDICINE
Payer: MEDICARE

## 2025-05-01 DIAGNOSIS — N18.9 ACUTE ON CHRONIC RENAL INSUFFICIENCY: Primary | ICD-10-CM

## 2025-05-01 DIAGNOSIS — R53.1 GENERALIZED WEAKNESS: ICD-10-CM

## 2025-05-01 DIAGNOSIS — N28.9 ACUTE ON CHRONIC RENAL INSUFFICIENCY: Primary | ICD-10-CM

## 2025-05-01 DIAGNOSIS — R29.6 MULTIPLE FALLS: ICD-10-CM

## 2025-05-01 DIAGNOSIS — T07.XXXA ABRASIONS OF MULTIPLE SITES: ICD-10-CM

## 2025-05-01 PROBLEM — D64.9 ANEMIA: Status: ACTIVE | Noted: 2025-05-01

## 2025-05-01 LAB
ALBUMIN SERPL-MCNC: 3.7 G/DL (ref 3.5–5.2)
ALBUMIN/GLOB SERPL: 1.3 G/DL
ALP SERPL-CCNC: 118 U/L (ref 39–117)
ALT SERPL W P-5'-P-CCNC: 15 U/L (ref 1–41)
AMMONIA BLD-SCNC: 16 UMOL/L (ref 16–60)
ANION GAP SERPL CALCULATED.3IONS-SCNC: 11 MMOL/L (ref 5–15)
AST SERPL-CCNC: 20 U/L (ref 1–40)
BASOPHILS # BLD MANUAL: 0.1 10*3/MM3 (ref 0–0.2)
BASOPHILS NFR BLD MANUAL: 1 % (ref 0–1.5)
BILIRUB SERPL-MCNC: 0.5 MG/DL (ref 0–1.2)
BUN SERPL-MCNC: 73 MG/DL (ref 8–23)
BUN/CREAT SERPL: 31.6 (ref 7–25)
CALCIUM SPEC-SCNC: 8.8 MG/DL (ref 8.6–10.5)
CHLORIDE SERPL-SCNC: 97 MMOL/L (ref 98–107)
CO2 SERPL-SCNC: 26 MMOL/L (ref 22–29)
CREAT SERPL-MCNC: 2.31 MG/DL (ref 0.76–1.27)
DEPRECATED RDW RBC AUTO: 61 FL (ref 37–54)
EGFRCR SERPLBLD CKD-EPI 2021: 28.4 ML/MIN/1.73
EOSINOPHIL # BLD MANUAL: 0 10*3/MM3 (ref 0–0.4)
EOSINOPHIL NFR BLD MANUAL: 0 % (ref 0.3–6.2)
ERYTHROCYTE [DISTWIDTH] IN BLOOD BY AUTOMATED COUNT: 16.1 % (ref 12.3–15.4)
ETHANOL BLD-MCNC: <10 MG/DL (ref 0–10)
ETHANOL UR QL: <0.01 %
GLOBULIN UR ELPH-MCNC: 2.8 GM/DL
GLUCOSE BLDC GLUCOMTR-MCNC: 188 MG/DL (ref 70–130)
GLUCOSE BLDC GLUCOMTR-MCNC: 299 MG/DL (ref 70–130)
GLUCOSE SERPL-MCNC: 167 MG/DL (ref 65–99)
HCT VFR BLD AUTO: 27.6 % (ref 37.5–51)
HGB BLD-MCNC: 9.2 G/DL (ref 13–17.7)
HYPOCHROMIA BLD QL: ABNORMAL
INR PPP: 1.17 (ref 0.9–1.1)
LYMPHOCYTES # BLD MANUAL: 0.6 10*3/MM3 (ref 0.7–3.1)
LYMPHOCYTES NFR BLD MANUAL: 8 % (ref 5–12)
MAGNESIUM SERPL-MCNC: 2.5 MG/DL (ref 1.6–2.4)
MCH RBC QN AUTO: 35.1 PG (ref 26.6–33)
MCHC RBC AUTO-ENTMCNC: 33.3 G/DL (ref 31.5–35.7)
MCV RBC AUTO: 105.3 FL (ref 79–97)
MONOCYTES # BLD: 0.8 10*3/MM3 (ref 0.1–0.9)
MYELOCYTES NFR BLD MANUAL: 1 % (ref 0–0)
NEUTROPHILS # BLD AUTO: 8.35 10*3/MM3 (ref 1.7–7)
NEUTROPHILS NFR BLD MANUAL: 84 % (ref 42.7–76)
NRBC BLD AUTO-RTO: 0.2 /100 WBC (ref 0–0.2)
PLAT MORPH BLD: NORMAL
PLATELET # BLD AUTO: 150 10*3/MM3 (ref 140–450)
PMV BLD AUTO: 8.7 FL (ref 6–12)
POTASSIUM SERPL-SCNC: 4.4 MMOL/L (ref 3.5–5.2)
PROT SERPL-MCNC: 6.5 G/DL (ref 6–8.5)
PROTHROMBIN TIME: 14.8 SECONDS (ref 11.7–14.2)
QT INTERVAL: 401 MS
QTC INTERVAL: 438 MS
RBC # BLD AUTO: 2.62 10*6/MM3 (ref 4.14–5.8)
SODIUM SERPL-SCNC: 134 MMOL/L (ref 136–145)
VARIANT LYMPHS NFR BLD MANUAL: 6 % (ref 19.6–45.3)
WBC MORPH BLD: NORMAL
WBC NRBC COR # BLD AUTO: 9.94 10*3/MM3 (ref 3.4–10.8)

## 2025-05-01 PROCEDURE — 83735 ASSAY OF MAGNESIUM: CPT | Performed by: PHYSICIAN ASSISTANT

## 2025-05-01 PROCEDURE — 73590 X-RAY EXAM OF LOWER LEG: CPT

## 2025-05-01 PROCEDURE — 93005 ELECTROCARDIOGRAM TRACING: CPT | Performed by: PHYSICIAN ASSISTANT

## 2025-05-01 PROCEDURE — 82948 REAGENT STRIP/BLOOD GLUCOSE: CPT

## 2025-05-01 PROCEDURE — 99285 EMERGENCY DEPT VISIT HI MDM: CPT

## 2025-05-01 PROCEDURE — 82140 ASSAY OF AMMONIA: CPT | Performed by: INTERNAL MEDICINE

## 2025-05-01 PROCEDURE — 85014 HEMATOCRIT: CPT | Performed by: INTERNAL MEDICINE

## 2025-05-01 PROCEDURE — 85025 COMPLETE CBC W/AUTO DIFF WBC: CPT | Performed by: PHYSICIAN ASSISTANT

## 2025-05-01 PROCEDURE — 85018 HEMOGLOBIN: CPT | Performed by: INTERNAL MEDICINE

## 2025-05-01 PROCEDURE — 25810000003 SODIUM CHLORIDE 0.9 % SOLUTION: Performed by: PHYSICIAN ASSISTANT

## 2025-05-01 PROCEDURE — 85007 BL SMEAR W/DIFF WBC COUNT: CPT | Performed by: PHYSICIAN ASSISTANT

## 2025-05-01 PROCEDURE — 85610 PROTHROMBIN TIME: CPT | Performed by: PHYSICIAN ASSISTANT

## 2025-05-01 PROCEDURE — 73560 X-RAY EXAM OF KNEE 1 OR 2: CPT

## 2025-05-01 PROCEDURE — G0378 HOSPITAL OBSERVATION PER HR: HCPCS

## 2025-05-01 PROCEDURE — 93010 ELECTROCARDIOGRAM REPORT: CPT | Performed by: INTERNAL MEDICINE

## 2025-05-01 PROCEDURE — 80053 COMPREHEN METABOLIC PANEL: CPT | Performed by: PHYSICIAN ASSISTANT

## 2025-05-01 PROCEDURE — 71045 X-RAY EXAM CHEST 1 VIEW: CPT

## 2025-05-01 PROCEDURE — 73070 X-RAY EXAM OF ELBOW: CPT

## 2025-05-01 PROCEDURE — 63710000001 INSULIN LISPRO (HUMAN) PER 5 UNITS: Performed by: INTERNAL MEDICINE

## 2025-05-01 PROCEDURE — 82077 ASSAY SPEC XCP UR&BREATH IA: CPT | Performed by: PHYSICIAN ASSISTANT

## 2025-05-01 PROCEDURE — 25810000003 SODIUM CHLORIDE 0.9 % SOLUTION: Performed by: INTERNAL MEDICINE

## 2025-05-01 RX ORDER — METOPROLOL SUCCINATE 50 MG/1
50 TABLET, EXTENDED RELEASE ORAL EVERY EVENING
Status: DISCONTINUED | OUTPATIENT
Start: 2025-05-01 | End: 2025-05-03

## 2025-05-01 RX ORDER — ALLOPURINOL 300 MG/1
300 TABLET ORAL NIGHTLY
Status: DISCONTINUED | OUTPATIENT
Start: 2025-05-01 | End: 2025-05-06 | Stop reason: HOSPADM

## 2025-05-01 RX ORDER — SODIUM CHLORIDE 0.9 % (FLUSH) 0.9 %
10 SYRINGE (ML) INJECTION AS NEEDED
Status: DISCONTINUED | OUTPATIENT
Start: 2025-05-01 | End: 2025-05-06 | Stop reason: HOSPADM

## 2025-05-01 RX ORDER — MULTIVITAMIN WITH IRON
1000 TABLET ORAL DAILY
Status: DISCONTINUED | OUTPATIENT
Start: 2025-05-01 | End: 2025-05-06 | Stop reason: HOSPADM

## 2025-05-01 RX ORDER — ALBUTEROL SULFATE 90 UG/1
2 INHALANT RESPIRATORY (INHALATION) EVERY 4 HOURS PRN
COMMUNITY

## 2025-05-01 RX ORDER — CLOPIDOGREL BISULFATE 75 MG/1
75 TABLET ORAL DAILY
Status: DISCONTINUED | OUTPATIENT
Start: 2025-05-01 | End: 2025-05-06 | Stop reason: HOSPADM

## 2025-05-01 RX ORDER — INSULIN LISPRO 100 [IU]/ML
2-7 INJECTION, SOLUTION INTRAVENOUS; SUBCUTANEOUS
Status: DISCONTINUED | OUTPATIENT
Start: 2025-05-01 | End: 2025-05-06 | Stop reason: HOSPADM

## 2025-05-01 RX ORDER — PYRIDOSTIGMINE BROMIDE 60 MG/1
60 TABLET ORAL 3 TIMES DAILY
Status: DISCONTINUED | OUTPATIENT
Start: 2025-05-01 | End: 2025-05-06 | Stop reason: HOSPADM

## 2025-05-01 RX ORDER — ATORVASTATIN CALCIUM 20 MG/1
40 TABLET, FILM COATED ORAL NIGHTLY
Status: DISCONTINUED | OUTPATIENT
Start: 2025-05-01 | End: 2025-05-06 | Stop reason: HOSPADM

## 2025-05-01 RX ORDER — FERROUS SULFATE 325(65) MG
325 TABLET ORAL
Status: DISCONTINUED | OUTPATIENT
Start: 2025-05-02 | End: 2025-05-06 | Stop reason: HOSPADM

## 2025-05-01 RX ORDER — SODIUM CHLORIDE 9 MG/ML
40 INJECTION, SOLUTION INTRAVENOUS AS NEEDED
Status: DISCONTINUED | OUTPATIENT
Start: 2025-05-01 | End: 2025-05-06 | Stop reason: HOSPADM

## 2025-05-01 RX ORDER — SODIUM CHLORIDE 9 MG/ML
100 INJECTION, SOLUTION INTRAVENOUS CONTINUOUS
Status: ACTIVE | OUTPATIENT
Start: 2025-05-01 | End: 2025-05-02

## 2025-05-01 RX ORDER — ACETAMINOPHEN 160 MG/5ML
650 SOLUTION ORAL EVERY 4 HOURS PRN
Status: DISCONTINUED | OUTPATIENT
Start: 2025-05-01 | End: 2025-05-05

## 2025-05-01 RX ORDER — NICOTINE POLACRILEX 4 MG
15 LOZENGE BUCCAL
Status: DISCONTINUED | OUTPATIENT
Start: 2025-05-01 | End: 2025-05-06 | Stop reason: HOSPADM

## 2025-05-01 RX ORDER — FLUTICASONE FUROATE, UMECLIDINIUM BROMIDE AND VILANTEROL TRIFENATATE 100; 62.5; 25 UG/1; UG/1; UG/1
1 POWDER RESPIRATORY (INHALATION)
COMMUNITY

## 2025-05-01 RX ORDER — ACETAMINOPHEN 325 MG/1
650 TABLET ORAL EVERY 4 HOURS PRN
Status: DISCONTINUED | OUTPATIENT
Start: 2025-05-01 | End: 2025-05-06 | Stop reason: HOSPADM

## 2025-05-01 RX ORDER — MULTIPLE VITAMINS W/ MINERALS TAB 9MG-400MCG
1 TAB ORAL DAILY
Status: DISCONTINUED | OUTPATIENT
Start: 2025-05-01 | End: 2025-05-06 | Stop reason: HOSPADM

## 2025-05-01 RX ORDER — SODIUM CHLORIDE 0.9 % (FLUSH) 0.9 %
10 SYRINGE (ML) INJECTION EVERY 12 HOURS SCHEDULED
Status: DISCONTINUED | OUTPATIENT
Start: 2025-05-01 | End: 2025-05-06 | Stop reason: HOSPADM

## 2025-05-01 RX ORDER — IBUPROFEN 600 MG/1
1 TABLET ORAL
Status: DISCONTINUED | OUTPATIENT
Start: 2025-05-01 | End: 2025-05-06 | Stop reason: HOSPADM

## 2025-05-01 RX ORDER — DEXTROSE MONOHYDRATE 25 G/50ML
25 INJECTION, SOLUTION INTRAVENOUS
Status: DISCONTINUED | OUTPATIENT
Start: 2025-05-01 | End: 2025-05-06 | Stop reason: HOSPADM

## 2025-05-01 RX ORDER — ASPIRIN 81 MG/1
81 TABLET ORAL DAILY
Status: DISCONTINUED | OUTPATIENT
Start: 2025-05-01 | End: 2025-05-06 | Stop reason: HOSPADM

## 2025-05-01 RX ORDER — PREDNISONE 10 MG/1
10 TABLET ORAL DAILY
Status: DISCONTINUED | OUTPATIENT
Start: 2025-05-01 | End: 2025-05-06 | Stop reason: HOSPADM

## 2025-05-01 RX ORDER — TAMSULOSIN HYDROCHLORIDE 0.4 MG/1
0.4 CAPSULE ORAL EVERY EVENING
Status: DISCONTINUED | OUTPATIENT
Start: 2025-05-01 | End: 2025-05-06 | Stop reason: HOSPADM

## 2025-05-01 RX ORDER — NITROGLYCERIN 0.4 MG/1
0.4 TABLET SUBLINGUAL
Status: DISCONTINUED | OUTPATIENT
Start: 2025-05-01 | End: 2025-05-06 | Stop reason: HOSPADM

## 2025-05-01 RX ORDER — ONDANSETRON 2 MG/ML
4 INJECTION INTRAMUSCULAR; INTRAVENOUS EVERY 6 HOURS PRN
Status: DISCONTINUED | OUTPATIENT
Start: 2025-05-01 | End: 2025-05-06 | Stop reason: HOSPADM

## 2025-05-01 RX ORDER — TRAZODONE HYDROCHLORIDE 50 MG/1
50 TABLET ORAL NIGHTLY
Status: DISCONTINUED | OUTPATIENT
Start: 2025-05-01 | End: 2025-05-06 | Stop reason: HOSPADM

## 2025-05-01 RX ORDER — ONDANSETRON 4 MG/1
4 TABLET, ORALLY DISINTEGRATING ORAL EVERY 6 HOURS PRN
Status: DISCONTINUED | OUTPATIENT
Start: 2025-05-01 | End: 2025-05-06 | Stop reason: HOSPADM

## 2025-05-01 RX ORDER — FOLIC ACID 0.4 MG
400 TABLET ORAL DAILY
Status: DISCONTINUED | OUTPATIENT
Start: 2025-05-01 | End: 2025-05-06 | Stop reason: HOSPADM

## 2025-05-01 RX ADMIN — METOPROLOL SUCCINATE 50 MG: 50 TABLET, EXTENDED RELEASE ORAL at 20:13

## 2025-05-01 RX ADMIN — TAMSULOSIN HYDROCHLORIDE 0.4 MG: 0.4 CAPSULE ORAL at 20:12

## 2025-05-01 RX ADMIN — SODIUM CHLORIDE 100 ML/HR: 9 INJECTION, SOLUTION INTRAVENOUS at 17:28

## 2025-05-01 RX ADMIN — SODIUM CHLORIDE 500 ML: 9 INJECTION, SOLUTION INTRAVENOUS at 15:45

## 2025-05-01 RX ADMIN — INSULIN LISPRO 4 UNITS: 100 INJECTION, SOLUTION INTRAVENOUS; SUBCUTANEOUS at 17:28

## 2025-05-01 RX ADMIN — INSULIN LISPRO 2 UNITS: 100 INJECTION, SOLUTION INTRAVENOUS; SUBCUTANEOUS at 21:30

## 2025-05-01 RX ADMIN — Medication 10 ML: at 20:13

## 2025-05-01 RX ADMIN — PYRIDOSTIGMINE BROMIDE 60 MG: 60 TABLET ORAL at 20:12

## 2025-05-01 RX ADMIN — ALLOPURINOL 300 MG: 300 TABLET ORAL at 20:12

## 2025-05-01 RX ADMIN — ATORVASTATIN CALCIUM 40 MG: 20 TABLET, FILM COATED ORAL at 20:12

## 2025-05-01 RX ADMIN — TRAZODONE HYDROCHLORIDE 50 MG: 50 TABLET ORAL at 20:12

## 2025-05-01 NOTE — ED NOTES
Nursing report ED to floor  Juan James  79 y.o.  male    HPI :  HPI  Stated Reason for Visit: fall    Chief Complaint  Chief Complaint   Patient presents with    Fall       Admitting doctor:   Lexis Parsons MD    Admitting diagnosis:   The primary encounter diagnosis was Acute on chronic renal insufficiency. Diagnoses of Generalized weakness and Multiple falls were also pertinent to this visit.    Code status:   Current Code Status       Date Active Code Status Order ID Comments User Context       Prior            Allergies:   Rivaroxaban, Ranolazine er, Apixaban, Indomethacin, and Lisinopril    Isolation:   No active isolations    Intake and Output  No intake or output data in the 24 hours ending 05/01/25 1441    Weight:   There were no vitals filed for this visit.    Most recent vitals:   Vitals:    05/01/25 1224 05/01/25 1257 05/01/25 1342   BP:  90/53    Pulse: 69  64   Resp: 18     Temp: 97 °F (36.1 °C)     SpO2: 98%  99%       Active LDAs/IV Access:   Lines, Drains & Airways       Active LDAs       Name Placement date Placement time Site Days    Peripheral IV 05/01/25 1258 20 G Right Antecubital 05/01/25  1258  Antecubital  less than 1                    Labs (abnormal labs have a star):   Labs Reviewed   COMPREHENSIVE METABOLIC PANEL - Abnormal; Notable for the following components:       Result Value    Glucose 167 (*)     BUN 73 (*)     Creatinine 2.31 (*)     Sodium 134 (*)     Chloride 97 (*)     Alkaline Phosphatase 118 (*)     BUN/Creatinine Ratio 31.6 (*)     eGFR 28.4 (*)     All other components within normal limits    Narrative:     GFR Categories in Chronic Kidney Disease (CKD)              GFR Category          GFR (mL/min/1.73)    Interpretation  G1                    90 or greater        Normal or high (1)  G2                    60-89                Mild decrease (1)  G3a                   45-59                Mild to moderate decrease  G3b                   30-44                Moderate to  severe decrease  G4                    15-29                Severe decrease  G5                    14 or less           Kidney failure    (1)In the absence of evidence of kidney disease, neither GFR category G1 or G2 fulfill the criteria for CKD.    eGFR calculation 2021 CKD-EPI creatinine equation, which does not include race as a factor   PROTIME-INR - Abnormal; Notable for the following components:    Protime 14.8 (*)     INR 1.17 (*)     All other components within normal limits   CBC WITH AUTO DIFFERENTIAL - Abnormal; Notable for the following components:    RBC 2.62 (*)     Hemoglobin 9.2 (*)     Hematocrit 27.6 (*)     .3 (*)     MCH 35.1 (*)     RDW 16.1 (*)     RDW-SD 61.0 (*)     All other components within normal limits   MAGNESIUM - Abnormal; Notable for the following components:    Magnesium 2.5 (*)     All other components within normal limits   MANUAL DIFFERENTIAL - Abnormal; Notable for the following components:    Neutrophil % 84.0 (*)     Lymphocyte % 6.0 (*)     Eosinophil % 0.0 (*)     Myelocyte % 1.0 (*)     Neutrophils Absolute 8.35 (*)     Lymphocytes Absolute 0.60 (*)     All other components within normal limits   ETHANOL   CBC AND DIFFERENTIAL    Narrative:     The following orders were created for panel order CBC & Differential.  Procedure                               Abnormality         Status                     ---------                               -----------         ------                     CBC Auto Differential[[id number]]        Abnormal            Final result                 Please view results for these tests on the individual orders.       EKG:   ECG 12 Lead Other; weakness   Preliminary Result   HEART RATE=72  bpm   RR Hedohhtx=702  ms   AR Interval=  ms   P Horizontal Axis=  deg   P Front Axis=  deg   QRSD Interval=81  ms   QT Cmewrumb=343  ms   UCjR=358  ms   QRS Axis=17  deg   T Wave Axis=242  deg   - ABNORMAL ECG -   Atrial fibrillation   Low voltage, precordial  leads   Anteroseptal infarct, old   Nonspecific repol abnormality, diffuse leads   Date and Time of Study:2025 14:04:38          Meds given in ED:   Medications - No data to display    Imaging results:  XR Knee 1 or 2 View Right  Result Date: 2025  As described.  This report was finalized on 2025 1:50 PM by Dr. Branden Bond M.D on Workstation: [id number]      XR Tibia Fibula 2 View Right  Result Date: 2025  As described.  This report was finalized on 2025 1:50 PM by Dr. Branden Bond M.D on Workstation: [id number]      XR Chest 1 View  Result Date: 2025  As described.  This report was finalized on 2025 1:50 PM by Dr. Branden Bond M.D on Workstation: [id number]      XR ELBOW 2 VIEW LEFT  Result Date: 2025  As described.  This report was finalized on 2025 1:50 PM by Dr. Branden Bond M.D on Workstation: [id number]        Ambulatory status:   Full assist    Social issues:   Social History     Socioeconomic History    Marital status:    Tobacco Use    Smoking status: Former     Current packs/day: 0.00     Average packs/day: 2.0 packs/day for 45.0 years (90.0 ttl pk-yrs)     Types: Cigarettes, Pipe, Cigars     Start date: 2/10/1964     Quit date: 2/10/2009     Years since quittin.2     Passive exposure: Past    Smokeless tobacco: Former    Tobacco comments:     enjoyed smoking   Vaping Use    Vaping status: Never Used   Substance and Sexual Activity    Alcohol use: Not Currently     Comment: QUIT HEAVY DRINKING 2020/ OCCAS NOW    Drug use: Never    Sexual activity: Not Currently     Partners: Female     Birth control/protection: Vasectomy       Peripheral Neurovascular  Peripheral Neurovascular (Adult)  Peripheral Neurovascular WDL: WDL    Neuro Cognitive  Neuro Cognitive (Adult)  Cognitive/Neuro/Behavioral WDL: WDL    Learning  Learning Assessment  Learning Readiness and Ability: no barriers identified  Education Provided  Person Taught: patient  Teaching  Method: verbal instruction  Teaching Focus: symptom/problem overview  Education Outcome Evaluation: eager to learn    Respiratory  Respiratory  Airway WDL: WDL  Respiratory WDL  Respiratory WDL: WDL    Abdominal Pain       Pain Assessments  Pain (Adult)  (0-10) Pain Rating: Rest: 4  (0-10) Pain Rating: Activity: 4    NIH Stroke Scale       Rosemary Flowers RN  05/01/25 14:41 EDT

## 2025-05-01 NOTE — ED NOTES
Pt to [facility] via [facility] from home for trip and fall. C/o r leg pain. Unknown if he hit his head, aspirin 81mg, no LOC

## 2025-05-01 NOTE — ED PROVIDER NOTES
EMERGENCY DEPARTMENT ENCOUNTER      PCP: Padmini Benson APRN  Patient Care Team:  Padmini Benson APRN as PCP - General (Nurse Practitioner)  Bill Washington MD as Surgeon (Neurosurgery)  Chris Mo III, MD as Cardiologist (Cardiology)  Lorenzo Rosado MD (Neurology)  Jean Patricia MD as Surgeon (Vascular Surgery)  Hermes Francisco MD as Consulting Physician (Pulmonary Disease)  Juan Arroyo MD as Consulting Physician (Nephrology)  Paris Adame RN as Ambulatory  (Population Health)   Independent Historians: Patient, son at bedside    HPI:  Chief Complaint: Fall, weakness  A complete HPI/ROS/PMH/PSH/SH/FH are unobtainable due to: None    Chronic or social conditions impacting patient care (social determinants of health): None    Context: Juan James is a 79 y.o. male with history of cirrhosis, diabetes, chronic kidney disease who presents to the ED c/o acute generalized weakness and multiple falls at home.  Patient had reported fall while sitting at his table at home that occurred last night.  Son states patient may have fallen asleep and falling but he has had multiple falls and been weak since discharge.  Patient typically lives by himself but son states patient cannot be left alone at this time due to severe weakness.  Family members live out of state and are here temporarily.  Patient reports pain to his right leg, left elbow and has multiple abrasions.  He denies hitting his head or loss of consciousness from the fall.  He has multiple skin tears which were bandaged at home. Pt denies recent alcohol use.    Review of prior external notes and/or external test results outside of this encounter: Reviewed discharge summary from 4/20/2025.  Patient was admitted for syncope.  During hospitalization patient had NSTEMI and underwent left heart catheterization.  Patient appeared to have alcohol withdrawal on day 4 in the hospital.      PAST MEDICAL HISTORY  Active  Ambulatory Problems     Diagnosis Date Noted    Cervical radiculitis 09/20/2016    Hyperlipidemia 07/29/2019    Prediabetes 07/29/2019    Cervical spondylosis with radiculopathy 11/02/2012    Peptic ulceration     OA (osteoarthritis)     Hearing loss     GERD (gastroesophageal reflux disease)     DDD (degenerative disc disease), lumbar     DDD (degenerative disc disease), cervical     Coronary artery disease involving native coronary artery of native heart without angina pectoris     Colon polyps     Cervical spondylosis 11/01/2012    Cervical disc disorder     Cataract     BPH (benign prostatic hyperplasia)     Permanent atrial fibrillation 01/04/2021    Arthritis     PVD (peripheral vascular disease) with claudication 04/04/2020    SHANNAN (acute kidney injury) 04/04/2020    Idiopathic chronic gout of multiple sites without tophus 10/13/2020    Spondylolisthesis of lumbar region 10/20/2020    Essential hypertension 01/04/2021    Gout 11/27/2018    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    Benign prostatic hyperplasia 03/28/2022    DDD (degenerative disc disease), cervical 11/01/2012    Lumbar radiculopathy 11/02/2012    Chronic kidney disease, stage 3a 08/15/2022    Encounter for Medicare annual wellness exam 05/09/2023    Other microscopic hematuria 05/11/2023    Edema of both legs 06/30/2023    Bleeding nose 06/30/2023    Abnormal CT scan 11/02/2023    Pleural thickening 11/02/2023    Arthralgia of multiple joints 11/06/2023    Kidney stones 11/25/2023    Sigmoid diverticulosis 11/25/2023    Bilateral flank pain 02/06/2024    Myasthenia gravis 03/11/2024    RLS (restless legs syndrome) 04/16/2024    Type 2 diabetes mellitus with hyperglycemia, without long-term current use of insulin 05/13/2024    Pulmonary hypertension 06/10/2024    Dilated aortic root 07/15/2024    Other cirrhosis of liver 08/04/2024    Alcoholic cirrhosis of liver with ascites 09/04/2024    Iron deficiency anemia 09/04/2024    Hereditary and idiopathic  peripheral neuropathy 12/17/2024    Infrarenal abdominal aortic aneurysm (AAA) without rupture 01/13/2025    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    Elevated serum creatinine 01/13/2025    Syncope 04/18/2025    Alcohol dependence with withdrawal 04/22/2025     Resolved Ambulatory Problems     Diagnosis Date Noted    Cervical disc disorder with radiculopathy of mid-cervical region 02/18/2016    Follow-up examination, following other surgery 04/11/2016    Lumbar radiculopathy 05/10/2016    Essential hypertension 07/29/2019    Coronary artery disease involving native heart 07/29/2019    Chronic bronchitis 07/29/2019    Edema 07/29/2019    Rectal pain 07/29/2019    Ocular myasthenia gravis 11/02/2012    History of colon polyps 08/28/2019    Tinnitus     Shortness of breath     Pulmonary embolism     Neck pain     Lower back pain     Hypertension     History of blood transfusion     COPD (chronic obstructive pulmonary disease)     CHF (congestive heart failure)     Abdominal aortic aneurysm 01/04/2021    Medicare annual wellness visit, subsequent 11/26/2019    Heart block AV complete 04/04/2020    Hospital discharge follow-up 04/17/2020    Olecranon bursitis of left elbow 11/17/2020    Encounter for screening for malignant neoplasm of colon 01/22/2021    Long term current use of anticoagulant 01/04/2021    Stented coronary artery 01/04/2021    History of blood in urine 03/18/2021    Diabetes mellitus 08/19/2021    Pain of right hand 11/15/2021    AAA (abdominal aortic aneurysm) without rupture 12/17/2021    Cigarette smoker 05/16/2022    Aneurysm of aorta 05/19/2016    Disorder of prostate 06/08/2016    Congestive heart failure 07/22/2014    Chronic obstructive pulmonary disease 03/28/2022    Atherosclerotic heart disease of native coronary artery without angina pectoris 07/22/2014    Type 2 diabetes mellitus without complication 04/23/2020    Hyperlipidemia 08/12/2013    Current moderate episode of major  depressive disorder without prior episode 06/30/2023    Other emphysema 11/02/2023     Past Medical History:   Diagnosis Date    AAA (abdominal aortic aneurysm) 01/20/2020    ADHD (attention deficit hyperactivity disorder) 07/1965    Anemia 5/1/2025    Asthma 2024    Atherosclerosis of native arteries of extremities with intermittent claudication, bilateral legs 01/20/2020    Atrial fibrillation     Bleeding disorder     Cancer     Cholelithiasis 2019    Clotting disorder     Coronary artery disease     Dry skin     Emphysema lung     Emphysema of lung 08/05/24    Emphysema/COPD     Essential hypertriglyceridemia     Fatty liver 07/31/24    Gastro-esophageal reflux disease without esophagitis     Headache     Heart murmur 2009    History of gastrointestinal hemorrhage     History of gout     History of myasthenia gravis     Hoarseness, persistent     Injury of back 9023-5446    Injury of neck 0222-3557    Kidney stone 2020    Liver cancer     Liver disease 07/31/2024    Myasthenia gravis without (acute) exacerbation     Pulmonary nodule     Renal insufficiency     Shoulder injury 2019    Skin abnormalities     Sleep apnea     Status post angioplasty with stent     Unsteady gait when walking     Urinary tract infection 2023    Venous insufficiency (chronic) (peripheral) 01/20/2020    Wrist sprain 2023       The patient has started, but not completed, their COVID-19 vaccination series.    PAST SURGICAL HISTORY  Past Surgical History:   Procedure Laterality Date    ANTERIOR CERVICAL DISCECTOMY W/ FUSION N/A 03/25/2016    Procedure: C3- C5 CERVICAL DISCECTOMY ANTERIOR FUSION WITH INSTRUMENTATION;  Surgeon: Bill Washington MD;  Location: [facility] OR;  Service:     ARTERIOGRAM AORTIC N/A 12/17/2021    Procedure: ENDOVASCULAR ANEURYSM REPAIR GORE;  Surgeon: Jean Patricia MD;  Location: [facility] OR 18/19;  Service: Vascular;  Laterality: N/A;    ARTHRODESIS N/A 07/1983    LUMBAR    ARTHRODESIS N/A 1987     LUMBAR    ARTHRODESIS      Spinal Arthrodesis-lower spine-7/1983, 1987--upper spine-1988, 7/1990-Abstract[facility] from [facility]    BACK SURGERY      Lower Back Surgery    CARDIAC CATHETERIZATION  07/2009    CARDIAC CATHETERIZATION  03/18/2003    STENT TO RCA AND PCA, DR. PRIYA LUGO    CARDIAC CATHETERIZATION N/A 4/22/2025    Procedure: Left Heart Cath;  Surgeon: Yousuf Arce MD;  Location:  [facility] INVASIVE LOCATION;  Service: Cardiovascular;  Laterality: N/A;    CARDIAC CATHETERIZATION N/A 4/22/2025    Procedure: Percutaneous Coronary Intervention;  Surgeon: Yousuf Arce MD;  Location:  [facility] INVASIVE LOCATION;  Service: Cardiovascular;  Laterality: N/A;    CARDIAC CATHETERIZATION N/A 4/22/2025    Procedure: Stent MARY coronary;  Surgeon: Yousuf Arce MD;  Location:  [facility] INVASIVE LOCATION;  Service: Cardiovascular;  Laterality: N/A;    CATARACT EXTRACTION Bilateral     LENS IMPLANT    CERVICAL ARTHRODESIS N/A 07/1990    CERVICAL ARTHRODESIS N/A 1988    CHOLECYSTECTOMY      COLONOSCOPY N/A 10/03/2019    4 MM HYPERPLASTIC POLYP IN ILEOCECAL VALVE, 4 MM SESSILE SERRATED ADENOMA POLYP IN DESCENDING, 5 TUBULOVILLOUS ADENOMA POLYPS IN RECTUM, 3 TUBULAR ADENOMA POLYPS IN DISTAL RECTUM, 26 MM TUBULAR ADENOMA POLYP IN RECTUM, PIECEMEAL POLYPECTOMY, AREA TATTOOED, RESCOPE IN 6 MONTHS, DR. TAYA CRUZ AT [facility]    COLONOSCOPY N/A 02/17/2021    Procedure: COLONOSCOPY to cecum with cold biopsy polypectomy;  Surgeon: Yousuf Méndez MD;  Location: [facility] ENDOSCOPY;  Service: Gastroenterology;  Laterality: N/A;  pre: hx of polyps  post: polyp    CORONARY ANGIOPLASTY WITH STENT PLACEMENT  02/2009    and 7/2009-PT STATES HAS 3 STENTS    CORONARY STENT PLACEMENT      ENDOSCOPY N/A 06/24/2012    NON BLEEDING EROSIVE GASTROPATHY, 1 DUODENAL ULCER WITH CLEAN BASE, DR. BRANDY WREN AT [facility]    ENDOSCOPY AND COLONOSCOPY N/A 11/20/2007    EGD WNL, 8 ADENOMATOUS POLYPS IN RECTO-SIGMOID, RESCOPE IN 3 YRS,  DR. CRISTINA RODRIGUEZ AT [facility]    FOOT SURGERY Left     LEFT BIG TOE-JOINT REPLACED    HAND SURGERY Left     THUMB-JOINT REPLACEMENT    HAND SURGERY Right     JOINT REPLACEMENT RIGHT INDEX FINGER    INTRAVASCULAR ULTRASOUND N/A 2025    Procedure: Intravascular Ultrasound;  Surgeon: Yousuf Arce MD;  Location: [facility] INVASIVE LOCATION;  Service: Cardiovascular;  Laterality: N/A;    JOINT REPLACEMENT      two hand and one foot joint    KNEE ARTHROSCOPY Left     KNEE SURGERY      LAPAROSCOPIC CHOLECYSTECTOMY      LUMBAR DISCECTOMY FUSION INSTRUMENTATION N/A 2020    Procedure: Lumbar 3 4 and lumbar 4 5 laminectomy and fusion with instrumentation;  Surgeon: Bill Washington MD;  Location: [facility] MAIN OR;  Service: Neurosurgery;  Laterality: N/A;    NECK SURGERY      TRIGGER POINT INJECTION      UPPER GASTROINTESTINAL ENDOSCOPY      VASECTOMY Bilateral          FAMILY HISTORY  Family History   Adopted: Yes   Problem Relation Age of Onset    Heart attack Mother 66    Alcohol abuse Mother         Adopted    Heart disease Mother 66    No Known Problems Father     Heart disease Other         FH in females before the age of 65    Malig Hyperthermia Neg Hx          SOCIAL HISTORY  Social History     Socioeconomic History    Marital status:    Tobacco Use    Smoking status: Former     Current packs/day: 0.00     Average packs/day: 2.0 packs/day for 45.0 years (90.0 ttl pk-yrs)     Types: Cigarettes, Pipe, Cigars     Start date: 2/10/1964     Quit date: 2/10/2009     Years since quittin.2     Passive exposure: Past    Smokeless tobacco: Former    Tobacco comments:     enjoyed smoking   Vaping Use    Vaping status: Never Used   Substance and Sexual Activity    Alcohol use: Not Currently     Comment: QUIT HEAVY DRINKING 2020/ OCCAS NOW    Drug use: Never    Sexual activity: Not Currently     Partners: Female     Birth control/protection: Vasectomy         ALLERGIES  Rivaroxaban, Ranolazine er,  Apixaban, Indomethacin, and Lisinopril        REVIEW OF SYSTEMS  Review of Systems   Constitutional:  Negative for fever.   Respiratory:  Negative for shortness of breath.    Cardiovascular:  Positive for leg swelling. Negative for chest pain.   Musculoskeletal:  Positive for arthralgias (R knee, L elbow). Negative for neck pain.   Skin:  Positive for wound (multiple abrasion to lower extremities).   Neurological:  Positive for weakness.        All systems reviewed and negative except for those discussed in HPI.       PHYSICAL EXAM    I have reviewed the triage vital signs and nursing notes.    ED Triage Vitals [05/01/25 1224]   Temp Heart Rate Resp BP SpO2   97 °F (36.1 °C) 69 18 -- 98 %      Temp src Heart Rate Source Patient Position BP Location FiO2 (%)   -- -- -- -- --       Physical Exam  GENERAL: alert, no acute distress  SKIN: Warm, dry, multiple abrasions to distal lower extremities, multiple areas of bruising in various stages of healing, skin appears mildly jaundiced  HENT: Normocephalic, atraumatic  EYES: no scleral icterus  CV: regular rhythm, regular rate  RESPIRATORY: normal effort, lungs clear  ABDOMEN: soft, nontender, nondistended  MUSCULOSKELETAL: pitting edema to lower extremities L>R, generalized R knee and R lower leg ttp, bruising and ttp of the left elbow  NEURO: alert, moves all extremities, follows commands          LAB RESULTS  Recent Results (from the past 24 hours)   Comprehensive Metabolic Panel    Collection Time: 05/01/25 12:57 PM    Specimen: Blood   Result Value Ref Range    Glucose 167 (H) 65 - 99 mg/dL    BUN 73 (H) 8 - 23 mg/dL    Creatinine 2.31 (H) 0.76 - 1.27 mg/dL    Sodium 134 (L) 136 - 145 mmol/L    Potassium 4.4 3.5 - 5.2 mmol/L    Chloride 97 (L) 98 - 107 mmol/L    CO2 26.0 22.0 - 29.0 mmol/L    Calcium 8.8 8.6 - 10.5 mg/dL    Total Protein 6.5 6.0 - 8.5 g/dL    Albumin 3.7 3.5 - 5.2 g/dL    ALT (SGPT) 15 1 - 41 U/L    AST (SGOT) 20 1 - 40 U/L    Alkaline Phosphatase 118  (H) 39 - 117 U/L    Total Bilirubin 0.5 0.0 - 1.2 mg/dL    Globulin 2.8 gm/dL    A/G Ratio 1.3 g/dL    BUN/Creatinine Ratio 31.6 (H) 7.0 - 25.0    Anion Gap 11.0 5.0 - 15.0 mmol/L    eGFR 28.4 (L) >60.0 mL/min/1.73   Protime-INR    Collection Time: 05/01/25 12:57 PM    Specimen: Blood   Result Value Ref Range    Protime 14.8 (H) 11.7 - 14.2 Seconds    INR 1.17 (H) 0.90 - 1.10   CBC Auto Differential    Collection Time: 05/01/25 12:57 PM    Specimen: Blood   Result Value Ref Range    WBC 9.94 3.40 - 10.80 10*3/mm3    RBC 2.62 (L) 4.14 - 5.80 10*6/mm3    Hemoglobin 9.2 (L) 13.0 - 17.7 g/dL    Hematocrit 27.6 (L) 37.5 - 51.0 %    .3 (H) 79.0 - 97.0 fL    MCH 35.1 (H) 26.6 - 33.0 pg    MCHC 33.3 31.5 - 35.7 g/dL    RDW 16.1 (H) 12.3 - 15.4 %    RDW-SD 61.0 (H) 37.0 - 54.0 fl    MPV 8.7 6.0 - 12.0 fL    Platelets 150 140 - 450 10*3/mm3    nRBC 0.2 0.0 - 0.2 /100 WBC   Ethanol    Collection Time: 05/01/25 12:57 PM    Specimen: Blood   Result Value Ref Range    Ethanol <10 0 - 10 mg/dL    Ethanol % <0.010 %   Magnesium    Collection Time: 05/01/25 12:57 PM    Specimen: Blood   Result Value Ref Range    Magnesium 2.5 (H) 1.6 - 2.4 mg/dL   Manual Differential    Collection Time: 05/01/25 12:57 PM    Specimen: Blood   Result Value Ref Range    Neutrophil % 84.0 (H) 42.7 - 76.0 %    Lymphocyte % 6.0 (L) 19.6 - 45.3 %    Monocyte % 8.0 5.0 - 12.0 %    Eosinophil % 0.0 (L) 0.3 - 6.2 %    Basophil % 1.0 0.0 - 1.5 %    Myelocyte % 1.0 (H) 0.0 - 0.0 %    Neutrophils Absolute 8.35 (H) 1.70 - 7.00 10*3/mm3    Lymphocytes Absolute 0.60 (L) 0.70 - 3.10 10*3/mm3    Monocytes Absolute 0.80 0.10 - 0.90 10*3/mm3    Eosinophils Absolute 0.00 0.00 - 0.40 10*3/mm3    Basophils Absolute 0.10 0.00 - 0.20 10*3/mm3    Hypochromia Mod/2+ None Seen    WBC Morphology Normal Normal    Platelet Morphology Normal Normal   ECG 12 Lead Other; weakness    Collection Time: 05/01/25  2:04 PM   Result Value Ref Range    QT Interval 401 ms    QTC  Interval 438 ms       Ordered the above labs and independently reviewed and interpreted the results.        RADIOLOGY  XR Knee 1 or 2 View Right  XR Knee 1 or 2 View Right, XR Tibia Fibula 2 View Right  XR Knee 1 or 2 View Right, XR Tibia Fibula 2 View Right, XR Chest 1 View  XR Knee 1 or 2 View Right, XR Tibia Fibula 2 View Right, XR Chest 1 View, XR ELBOW 2 VIEW LEFT  Result Date: 5/1/2025  XR ELBOW 2 VW LEFT-, XR KNEE 1 OR 2 VW RIGHT-, XR CHEST 1 VW-, XR TIBIA FIBULA 2 VW RIGHT-  HISTORY: Male who is 79 years-old, trauma  TECHNIQUE: Frontal view of the chest, 2 views of the right lower leg, 2 views of the right knee, 3 views of the left elbow  COMPARISON: 4/23/2025, 11/16/2020  FINDINGS:  Chest x-ray: The heart is enlarged. Pulmonary vasculature is unremarkable. Aorta is calcified. No focal pulmonary consolidation, pleural effusion, or pneumothorax. Old granulomatous disease is apparent. No acute osseous process.  Left elbow: No acute fracture, erosion, dislocation, or elbow effusion is noted. Soft tissue swelling is apparent medially at the elbow.  Right knee and right lower leg: No acute fracture, erosion, or dislocation is identified. Arterial calcifications are present. Minimal degenerative spurring is seen at the knee. Trace knee effusion.  Follow-up/further evaluation can be obtained as indications persist.      As described.  This report was finalized on 5/1/2025 1:50 PM by Dr. Branden Bond M.D on Workstation: [id number]        I ordered the above noted radiological studies. Independently reviewed and interpreted by me.  See dictation for official radiology interpretation.      PROCEDURES    Procedures      MEDICATIONS GIVEN IN ER    Medications - No data to display      PROGRESS, DATA ANALYSIS, CONSULTS, AND MEDICAL DECISION MAKING    All labs have been independently reviewed and interpreted by me.  All radiology studies have been independently reviewed and interpreted by me and discussed with  radiologist dictating the report.   EKG's independently reviewed and interpreted by me.  Discussion below represents my analysis of pertinent findings related to patient's condition, differential diagnosis, treatment plan and final disposition.    My differential diagnosis for generalized weakness includes but is not limited to:    Neuromuscular weakness, CVA, Hemorrhagic stroke, Multiple sclerosis, Spinal cord disease:Infection (Epidural abscess), Infarction/ischemia, Trauma (Spinal Cord Syndromes), Inflammation (Transverse Myelitis), Degenerative (Spinal muscular atrophy), Tumor, Peripheral nerve disease: Guillain-Lebeau syndrome, Toxins (Ciguatera), Diabetic peripheral neuropathy, Organophosphate toxicity, Lambert-Eaton myasthenic syndrome, Rhabdomyolysis, Dermatomyositis, Polymyositis, Alcoholic myopathy, Non-neuromuscular weakness, ACS, Arrhythmia/Syncope, Severe infection/Sepsis, Hypoglycemia, Periodic paralysis (electrolyte disturbance, K, Mg, Ca), Thyrotoxic periodic paralysis, Respiratory failure, Symptomatic Anemia, Severe dehydration, Hypothyroidism, Polypharmacy, Malignancy          ED Course as of 05/01/25 1518   Thu May 01, 2025   1322 WBC: 9.94 [DC]   1322 Hemoglobin(!): 9.2 [DC]   1344 Ethanol: <10 [DC]   1344 Creatinine(!): 2.31  1.49 one week ago [DC]   1344 BUN(!): 73 [DC]   1355 XR Tibia Fibula 2 View Right  Radiology study independently interpreted by me and my findings are no acute fracture.   [DC]   1426 Discussed case with Dr. Parsons, [facility], who will admit the patient. [DC]   1427 EKG independently interpreted by me    Time 2:04 PM  A-fib 72  P waves-A-fib  QRS-normal axis, normal QRS  ST, T waves-nonspecific changes  Not significant change compared to 4/23/2025 [DB]      ED Course User Index  [DB] Ralph Morgan MD  [DC] Nataly Ramon PA             AS OF 15:18 EDT VITALS:    BP - 90/53  HR - 64  TEMP - 97 °F (36.1 °C)  O2 SATS - 93%        DIAGNOSIS  Final diagnoses:   Acute on chronic  renal insufficiency   Generalized weakness   Multiple falls   Abrasions of multiple sites         DISPOSITION  ED Disposition       ED Disposition   Decision to Admit    Condition   --    Comment   Level of Care: Telemetry [5]   Diagnosis: Acute on chronic renal insufficiency [697263]   Admitting Physician: EDYTA MOJICA [[id number]]   Attending Physician: EDYTA MOJICA [[id number]]   Is patient appropriate for Inpatient Observation Unit?: No [0]                    Note Disclaimer: At [facility], we believe that sharing information builds trust and better relationships. You are receiving this note because you recently visited [facility]. It is possible you will see health information before a provider has talked with you about it. This kind of information can be easy to misunderstand. To help you fully understand what it means for your health, we urge you to discuss this note with your provider.         Nataly Ramon PA  05/01/25 1518

## 2025-05-01 NOTE — PLAN OF CARE
Goal Outcome Evaluation:  Plan of Care Reviewed With: patient        Progress: no change  Outcome Evaluation: vss. telemetry monitor, afib. alert and oriented x4, room air. falls precautions. multiple skin tears from falls re-dressed, wound care consulted. on specialty mattress. son at bedside. iv fluids infusing.

## 2025-05-01 NOTE — ED PROVIDER NOTES
SHARED VISIT: This visit was performed by BOTH a physician and an APC. The substantive portion of the medical decision making was performed by this attesting physician who made or approved the management plan and takes responsibility for patient management. All studies in the APC note (if performed) were independently interpreted by me.      The ANNY and I have discussed this patient's history, physical exam, and treatment plan.  I have reviewed the documentation and personally had a face to face interaction with the patient. I affirm the documentation and agree with the treatment and plan.  The attached note describes my personal findings.       I provided a substantive portion of the care of the patient.  I personally performed the physical exam in its entirety, and below are my findings.        History  79-year-old male with history of cirrhosis, diabetes, renal disease presents after recurrent and multiple falls.  Family believes he is not capable to take care of himself at home.  Denies recent usage of alcohol.    Physical Exam  Vital Signs reviewed  GENERAL: Alert male who looks older than stated age.  Triage vitals notable for afebrile.  Patient is somewhat hypotensive with blood pressure 90/53.  HENT: nares patent  EYES: no scleral icterus  CV: regular rhythm, regular rate-no murmur  RESPIRATORY: normal effort-O2 sats mid 90s room air  ABDOMEN: soft, obese-mild diffuse tenderness without rebound or guarding  MUSCULOSKELETAL: No noted bony deformity of the arms legs or spine  NEURO: Strength sensation and coordination are grossly intact.  Speech and mentation are unremarkable  SKIN: Multiple areas of contusions and ecchymosis as well as multiple abrasions.      Assessment and Data Review    ED Course as of 05/03/25 1946   Thu May 01, 2025   1322 WBC: 9.94 [DC]   1322 Hemoglobin(!): 9.2 [DC]   1344 Ethanol: <10 [DC]   1344 Creatinine(!): 2.31  1.49 one week ago [DC]   1344 BUN(!): 73 [DC]   1355 XR Tibia Fibula  2 View Right  Radiology study independently interpreted by me and my findings are no acute fracture.   [DC]   9716 Discussed case with Dr. Parsons [facility], who will admit the patient. [DC]   0735 EKG independently interpreted by me    Time 2:04 PM  A-fib 72  P waves-A-fib  QRS-normal axis, normal QRS  ST, T waves-nonspecific changes  Not significant change compared to 4/23/2025 [DB]      ED Course User Index  [DB] Ralph Morgan MD  [DC] Nataly Ramon PA       I did discuss treatment and evaluation of this patient with RENEE Ramon.    I did independently interpret and evaluate ED testing which is notable for the following:    X-rays of the knee, tib-fib on the right do not show obvious bony abnormality.  Left elbow x-ray without acute fracture  Chest x-ray without obvious acute infiltrate  EKG sinus rhythm with chronic appearing changes  Patient with elevated BUN/creatinine of 73 and 2.31 suggesting worsening of chronic renal failure.    Will admit to the hospital for further evaluation and treatment.  Patient with multiple comorbidities including diabetes, cirrhosis and renal disease.  I think he is dealing with some intravascular volume depletion.  May need physical therapy and rehabilitation placement.  Does not appear stable to take care of himself at home at this time.     Ralph Morgan MD  05/03/25 1946
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
## 2025-05-02 ENCOUNTER — APPOINTMENT (OUTPATIENT)
Dept: ULTRASOUND IMAGING | Facility: HOSPITAL | Age: 80
End: 2025-05-02
Payer: MEDICARE

## 2025-05-02 LAB
ALBUMIN SERPL-MCNC: 3.8 G/DL (ref 3.5–5.2)
ALBUMIN/GLOB SERPL: 1.8 G/DL
ALP SERPL-CCNC: 97 U/L (ref 39–117)
ALT SERPL W P-5'-P-CCNC: 17 U/L (ref 1–41)
ANION GAP SERPL CALCULATED.3IONS-SCNC: 11 MMOL/L (ref 5–15)
AST SERPL-CCNC: 21 U/L (ref 1–40)
BASOPHILS # BLD AUTO: 0.04 10*3/MM3 (ref 0–0.2)
BASOPHILS NFR BLD AUTO: 0.5 % (ref 0–1.5)
BILIRUB SERPL-MCNC: 0.5 MG/DL (ref 0–1.2)
BUN SERPL-MCNC: 66 MG/DL (ref 8–23)
BUN/CREAT SERPL: 33.5 (ref 7–25)
CALCIUM SPEC-SCNC: 8.4 MG/DL (ref 8.6–10.5)
CHLORIDE SERPL-SCNC: 101 MMOL/L (ref 98–107)
CO2 SERPL-SCNC: 26 MMOL/L (ref 22–29)
CREAT SERPL-MCNC: 1.97 MG/DL (ref 0.76–1.27)
CREAT UR-MCNC: 38.3 MG/DL
DEPRECATED RDW RBC AUTO: 59.3 FL (ref 37–54)
EGFRCR SERPLBLD CKD-EPI 2021: 34.4 ML/MIN/1.73
EOSINOPHIL # BLD AUTO: 0 10*3/MM3 (ref 0–0.4)
EOSINOPHIL NFR BLD AUTO: 0 % (ref 0.3–6.2)
EOSINOPHIL SPEC QL MICRO: 0 % EOS/100 CELLS (ref 0–0)
ERYTHROCYTE [DISTWIDTH] IN BLOOD BY AUTOMATED COUNT: 15.8 % (ref 12.3–15.4)
GLOBULIN UR ELPH-MCNC: 2.1 GM/DL
GLUCOSE BLDC GLUCOMTR-MCNC: 140 MG/DL (ref 70–130)
GLUCOSE BLDC GLUCOMTR-MCNC: 158 MG/DL (ref 70–130)
GLUCOSE BLDC GLUCOMTR-MCNC: 166 MG/DL (ref 70–130)
GLUCOSE BLDC GLUCOMTR-MCNC: 231 MG/DL (ref 70–130)
GLUCOSE SERPL-MCNC: 132 MG/DL (ref 65–99)
HBA1C MFR BLD: 6.8 % (ref 4.8–5.6)
HCT VFR BLD AUTO: 26.5 % (ref 37.5–51)
HCT VFR BLD AUTO: 27 % (ref 37.5–51)
HCT VFR BLD AUTO: 27.6 % (ref 37.5–51)
HGB BLD-MCNC: 9 G/DL (ref 13–17.7)
IMM GRANULOCYTES # BLD AUTO: 0.21 10*3/MM3 (ref 0–0.05)
IMM GRANULOCYTES NFR BLD AUTO: 2.7 % (ref 0–0.5)
LYMPHOCYTES # BLD AUTO: 0.98 10*3/MM3 (ref 0.7–3.1)
LYMPHOCYTES NFR BLD AUTO: 12.7 % (ref 19.6–45.3)
MAGNESIUM SERPL-MCNC: 2.5 MG/DL (ref 1.6–2.4)
MCH RBC QN AUTO: 34.6 PG (ref 26.6–33)
MCHC RBC AUTO-ENTMCNC: 33.3 G/DL (ref 31.5–35.7)
MCV RBC AUTO: 103.8 FL (ref 79–97)
MONOCYTES # BLD AUTO: 0.84 10*3/MM3 (ref 0.1–0.9)
MONOCYTES NFR BLD AUTO: 10.9 % (ref 5–12)
NEUTROPHILS NFR BLD AUTO: 5.67 10*3/MM3 (ref 1.7–7)
NEUTROPHILS NFR BLD AUTO: 73.2 % (ref 42.7–76)
NRBC BLD AUTO-RTO: 0 /100 WBC (ref 0–0.2)
PLATELET # BLD AUTO: 130 10*3/MM3 (ref 140–450)
PMV BLD AUTO: 8.7 FL (ref 6–12)
POTASSIUM SERPL-SCNC: 4.6 MMOL/L (ref 3.5–5.2)
PROT ?TM UR-MCNC: 17.5 MG/DL
PROT SERPL-MCNC: 5.9 G/DL (ref 6–8.5)
PROT/CREAT UR: 456.9 MG/G CREA (ref 0–200)
RBC # BLD AUTO: 2.6 10*6/MM3 (ref 4.14–5.8)
SODIUM SERPL-SCNC: 138 MMOL/L (ref 136–145)
SODIUM UR-SCNC: 68 MMOL/L
WBC NRBC COR # BLD AUTO: 7.74 10*3/MM3 (ref 3.4–10.8)

## 2025-05-02 PROCEDURE — 85014 HEMATOCRIT: CPT | Performed by: INTERNAL MEDICINE

## 2025-05-02 PROCEDURE — G0378 HOSPITAL OBSERVATION PER HR: HCPCS

## 2025-05-02 PROCEDURE — 25810000003 SODIUM CHLORIDE 0.9 % SOLUTION: Performed by: INTERNAL MEDICINE

## 2025-05-02 PROCEDURE — 97166 OT EVAL MOD COMPLEX 45 MIN: CPT

## 2025-05-02 PROCEDURE — 76705 ECHO EXAM OF ABDOMEN: CPT

## 2025-05-02 PROCEDURE — 83036 HEMOGLOBIN GLYCOSYLATED A1C: CPT | Performed by: INTERNAL MEDICINE

## 2025-05-02 PROCEDURE — 83735 ASSAY OF MAGNESIUM: CPT | Performed by: HOSPITALIST

## 2025-05-02 PROCEDURE — 63710000001 PREDNISONE PER 5 MG: Performed by: INTERNAL MEDICINE

## 2025-05-02 PROCEDURE — 80053 COMPREHEN METABOLIC PANEL: CPT | Performed by: INTERNAL MEDICINE

## 2025-05-02 PROCEDURE — 84156 ASSAY OF PROTEIN URINE: CPT | Performed by: INTERNAL MEDICINE

## 2025-05-02 PROCEDURE — 87205 SMEAR GRAM STAIN: CPT | Performed by: INTERNAL MEDICINE

## 2025-05-02 PROCEDURE — 84300 ASSAY OF URINE SODIUM: CPT | Performed by: INTERNAL MEDICINE

## 2025-05-02 PROCEDURE — 82570 ASSAY OF URINE CREATININE: CPT | Performed by: INTERNAL MEDICINE

## 2025-05-02 PROCEDURE — 97162 PT EVAL MOD COMPLEX 30 MIN: CPT | Performed by: PHYSICAL THERAPIST

## 2025-05-02 PROCEDURE — 63710000001 INSULIN LISPRO (HUMAN) PER 5 UNITS: Performed by: INTERNAL MEDICINE

## 2025-05-02 PROCEDURE — 82948 REAGENT STRIP/BLOOD GLUCOSE: CPT

## 2025-05-02 PROCEDURE — 85025 COMPLETE CBC W/AUTO DIFF WBC: CPT | Performed by: INTERNAL MEDICINE

## 2025-05-02 PROCEDURE — 85018 HEMOGLOBIN: CPT | Performed by: INTERNAL MEDICINE

## 2025-05-02 PROCEDURE — 97535 SELF CARE MNGMENT TRAINING: CPT

## 2025-05-02 RX ADMIN — INSULIN LISPRO 2 UNITS: 100 INJECTION, SOLUTION INTRAVENOUS; SUBCUTANEOUS at 11:42

## 2025-05-02 RX ADMIN — CLOPIDOGREL BISULFATE 75 MG: 75 TABLET, FILM COATED ORAL at 09:05

## 2025-05-02 RX ADMIN — Medication 1000 MCG: at 09:05

## 2025-05-02 RX ADMIN — Medication 1 TABLET: at 09:05

## 2025-05-02 RX ADMIN — PYRIDOSTIGMINE BROMIDE 60 MG: 60 TABLET ORAL at 17:01

## 2025-05-02 RX ADMIN — Medication 10 ML: at 09:05

## 2025-05-02 RX ADMIN — PYRIDOSTIGMINE BROMIDE 60 MG: 60 TABLET ORAL at 20:30

## 2025-05-02 RX ADMIN — Medication 10 ML: at 20:32

## 2025-05-02 RX ADMIN — PYRIDOSTIGMINE BROMIDE 60 MG: 60 TABLET ORAL at 09:05

## 2025-05-02 RX ADMIN — TAMSULOSIN HYDROCHLORIDE 0.4 MG: 0.4 CAPSULE ORAL at 17:01

## 2025-05-02 RX ADMIN — FERROUS SULFATE TAB 325 MG (65 MG ELEMENTAL FE) 325 MG: 325 (65 FE) TAB at 09:05

## 2025-05-02 RX ADMIN — ATORVASTATIN CALCIUM 40 MG: 20 TABLET, FILM COATED ORAL at 20:30

## 2025-05-02 RX ADMIN — ASPIRIN 81 MG: 81 TABLET, COATED ORAL at 09:05

## 2025-05-02 RX ADMIN — INSULIN LISPRO 2 UNITS: 100 INJECTION, SOLUTION INTRAVENOUS; SUBCUTANEOUS at 21:06

## 2025-05-02 RX ADMIN — ALLOPURINOL 300 MG: 300 TABLET ORAL at 20:30

## 2025-05-02 RX ADMIN — Medication 100 MG: at 09:05

## 2025-05-02 RX ADMIN — Medication 400 MCG: at 09:05

## 2025-05-02 RX ADMIN — METOPROLOL SUCCINATE 50 MG: 50 TABLET, EXTENDED RELEASE ORAL at 17:01

## 2025-05-02 RX ADMIN — TRAZODONE HYDROCHLORIDE 50 MG: 50 TABLET ORAL at 20:30

## 2025-05-02 RX ADMIN — PREDNISONE 10 MG: 10 TABLET ORAL at 09:05

## 2025-05-02 RX ADMIN — INSULIN LISPRO 3 UNITS: 100 INJECTION, SOLUTION INTRAVENOUS; SUBCUTANEOUS at 17:00

## 2025-05-02 RX ADMIN — SODIUM CHLORIDE 100 ML/HR: 9 INJECTION, SOLUTION INTRAVENOUS at 06:01

## 2025-05-02 RX ADMIN — ACETAMINOPHEN 650 MG: 325 TABLET, FILM COATED ORAL at 23:09

## 2025-05-02 NOTE — PLAN OF CARE
Goal Outcome Evaluation:              Outcome Evaluation: vss, tolerating po, wound care provided by wound rn, oob to chair several times throughout shift, no c/o pain, will ctm per shift.
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
## 2025-05-02 NOTE — PLAN OF CARE
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 Pt reports he lives alone and ambualtes with a rolling walker. Pt was sitting EOB when PT arrived. He stood with Min A and ambulated 30 ft with Rwx. Gait was slow an antalgic. O2 dropped to low 80s while ambulating. Pt presents with generalized weakness, BLE pain, impaired balance and unsteady gait. He would benefit form PT to address these impairments. Recommend SNF placement.   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 Pt reports he lives alone and ambualtes with a rolling walker. Pt was sitting EOB when PT arrived. He stood with Min A and ambulated 30 ft with Rwx. Gait was slow an antalgic. O2 dropped to low 80s while ambulating. Pt presents with generalized weakness, BLE pain, impaired balance and unsteady gait. He would benefit form PT to address these impairments. Recommend SNF placement.    Anticipated Discharge Disposition (PT): skilled nursing facility
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
## 2025-05-02 NOTE — NURSING NOTE
05/02/25 1011   Wound 05/02/25 Right anterior knee Traumatic Abrasion   Placement Date: 05/02/25   Present on Original Admission: Yes  Side: Right  Orientation: anterior  Location: knee  Primary Wound Type: Traumatic  Secondary Wound Type - Traumatic: Abrasion   Dressing Appearance dried drainage;dressing loose  (kerlix, ABD)   Base pink;moist;scab  (3 abrasions each 2-3 cm in size)   Drainage Amount none   Care, Wound cleansed with;sterile normal saline   Dressing Care dressing changed  (vaseline gauze cover with 4x4 optifoam to each site, optifoams overlap)   Wound 05/02/25 Left anterior knee Traumatic Abrasion   Placement Date: 05/02/25   Present on Original Admission: Yes  Side: Left  Orientation: anterior  Location: knee  Primary Wound Type: Traumatic  Secondary Wound Type - Traumatic: Abrasion   Dressing Appearance dressing loose;dried drainage  (kerlix, ABD)   Base pink;moist   Drainage Amount none   Care, Wound cleansed with;sterile normal saline   Dressing Care dressing changed  (vaseline gauze, 4x4 optifoam)   Wound 05/02/25 Left lower leg Traumatic Skin Tear   Placement Date: 05/02/25   Present on Original Admission: Yes  Side: Left  Orientation: lower  Location: (c) leg  Primary Wound Type: Traumatic  Secondary Wound Type - Traumatic: Skin Tear   Dressing Appearance dried drainage;dressing loose  (kerlix, ABD)   Base red;moist;maroon/purple  (ecchymotis skin flap covering 50%)   Wound Length (cm) 7 cm   Wound Width (cm) 2 cm   Wound Depth (cm) 0.3 cm   Wound Surface Area (cm^2) 11 cm^2   Wound Volume (cm^3) 2.199 cm^3   Drainage Amount none   Care, Wound cleansed with;sterile normal saline   Dressing Care dressing changed  (vaseline gauze, 2 4x4 optifoams)     Wound/ostomy - consult received regarding skin tears/abrasions sustained at home from a fall. Patient has kerlix and ABD pads intact to both knees, LLE and RUE, dislodged and dried blood. Patient is on a blood thinner, surgicel noted to the  sites, all bleeding has stopped, areas soaked with NS gauze to soften and remove the surgicel and to clean areas. New dressings applied with vaseline gauze and cover with optifoams. Kerlix not used since they won't stay in place very well. Dressings can be changed every 3 days, patient has optifoams at home that he got from his last visit to the hospital after falling.

## 2025-05-02 NOTE — PLAN OF CARE
The pt was admitted to [facility] 2/2 to generalized weakness, multiple falls. 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 The pt reports living alone where he as of recently has experienced multiple falls. The pt completed a STS with Min A + walker. He engaged in ~3 min of functional endurance/mobility with slow pacing and LE weakness reported. CGA sinkside grooming and Min A for clothing management while toileting. The pt's O2 dropped into the high 80's. He sat at the EOB where it took him several minutes to recover. He endorsed pain in B/L knees where he fell this week (R>L). SNF is recommended.

## 2025-05-02 NOTE — CONSULTS
"Diabetes Education  Assessment/Teaching    Patient Name:  Juan James  YOB: 1945  MRN: [id number]  Admit Date:  5/1/2025      Assessment Date:  5/2/2025  Flowsheet Row Most Recent Value   General Information     Referral From: Other -RN- thru order set. Meet with 78 y/o at bedside on 5N to assess needs/receptivity to DM ed.    Height 185.4 cm (73\")   Height Method Stated   Weight 101 kg (222 lb 7.1 oz)   Weight Method Bed scale   Diabetes History    What type of diabetes do you have? Type 2   Current DM knowledge -- -pt told me he was dx'd w/T2 (in 2019) but no longer has DM.   Do you test your blood sugar at home? no   Education Preferences    Barriers to Learning -- acuity. desire to learn.    Assessment Topics    Taking Medication - Assessment Needs education -pt is taking Farxiga. Pt unaware this is a DM med.   Reducing Risk - Assessment Needs education   Healthy Coping - Assessment Competent   DM Goals    Contact Plan Follow-up medical care with PCP            Flowsheet Row Most Recent Value   DM Education Needs    Medication Oral DM agent at home   Reducing Risks A1C testing   Healthy Coping Appropriate   Discharge Plan Home   Teaching Method Discussion, Explanation   Patient Response Needs reinforcement        Other Comments:  Pt reports his concern at this time is r/t problem with his liver.  Electronically signed by:  Briana Wu RN, BSN, [facility]  05/02/25 13:12 EDT  "
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
## 2025-05-03 LAB
ALBUMIN SERPL-MCNC: 3.5 G/DL (ref 3.5–5.2)
ALBUMIN/GLOB SERPL: 1.3 G/DL
ALP SERPL-CCNC: 92 U/L (ref 39–117)
ALT SERPL W P-5'-P-CCNC: 17 U/L (ref 1–41)
ANION GAP SERPL CALCULATED.3IONS-SCNC: 6.5 MMOL/L (ref 5–15)
AST SERPL-CCNC: 29 U/L (ref 1–40)
BASOPHILS # BLD AUTO: 0.02 10*3/MM3 (ref 0–0.2)
BASOPHILS NFR BLD AUTO: 0.3 % (ref 0–1.5)
BILIRUB SERPL-MCNC: 0.6 MG/DL (ref 0–1.2)
BUN SERPL-MCNC: 55 MG/DL (ref 8–23)
BUN/CREAT SERPL: 33.3 (ref 7–25)
CALCIUM SPEC-SCNC: 8.6 MG/DL (ref 8.6–10.5)
CHLORIDE SERPL-SCNC: 104 MMOL/L (ref 98–107)
CO2 SERPL-SCNC: 25.5 MMOL/L (ref 22–29)
CREAT SERPL-MCNC: 1.65 MG/DL (ref 0.76–1.27)
DEPRECATED RDW RBC AUTO: 64.1 FL (ref 37–54)
EGFRCR SERPLBLD CKD-EPI 2021: 42.5 ML/MIN/1.73
EOSINOPHIL # BLD AUTO: 0 10*3/MM3 (ref 0–0.4)
EOSINOPHIL NFR BLD AUTO: 0 % (ref 0.3–6.2)
ERYTHROCYTE [DISTWIDTH] IN BLOOD BY AUTOMATED COUNT: 16.3 % (ref 12.3–15.4)
GLOBULIN UR ELPH-MCNC: 2.7 GM/DL
GLUCOSE BLDC GLUCOMTR-MCNC: 121 MG/DL (ref 70–130)
GLUCOSE BLDC GLUCOMTR-MCNC: 138 MG/DL (ref 70–130)
GLUCOSE BLDC GLUCOMTR-MCNC: 198 MG/DL (ref 70–130)
GLUCOSE BLDC GLUCOMTR-MCNC: 214 MG/DL (ref 70–130)
GLUCOSE SERPL-MCNC: 112 MG/DL (ref 65–99)
HCT VFR BLD AUTO: 25 % (ref 37.5–51)
HCT VFR BLD AUTO: 27.4 % (ref 37.5–51)
HCT VFR BLD AUTO: 28.2 % (ref 37.5–51)
HGB BLD-MCNC: 8.4 G/DL (ref 13–17.7)
HGB BLD-MCNC: 8.9 G/DL (ref 13–17.7)
HGB BLD-MCNC: 9.6 G/DL (ref 13–17.7)
IMM GRANULOCYTES # BLD AUTO: 0.15 10*3/MM3 (ref 0–0.05)
IMM GRANULOCYTES NFR BLD AUTO: 2.2 % (ref 0–0.5)
LYMPHOCYTES # BLD AUTO: 1.32 10*3/MM3 (ref 0.7–3.1)
LYMPHOCYTES NFR BLD AUTO: 19.6 % (ref 19.6–45.3)
MAGNESIUM SERPL-MCNC: 2.6 MG/DL (ref 1.6–2.4)
MCH RBC QN AUTO: 34.9 PG (ref 26.6–33)
MCHC RBC AUTO-ENTMCNC: 32.5 G/DL (ref 31.5–35.7)
MCV RBC AUTO: 107.5 FL (ref 79–97)
MONOCYTES # BLD AUTO: 0.86 10*3/MM3 (ref 0.1–0.9)
MONOCYTES NFR BLD AUTO: 12.8 % (ref 5–12)
NEUTROPHILS NFR BLD AUTO: 4.39 10*3/MM3 (ref 1.7–7)
NEUTROPHILS NFR BLD AUTO: 65.1 % (ref 42.7–76)
PHOSPHATE SERPL-MCNC: 2.8 MG/DL (ref 2.5–4.5)
PLATELET # BLD AUTO: 116 10*3/MM3 (ref 140–450)
PMV BLD AUTO: 8.6 FL (ref 6–12)
POTASSIUM SERPL-SCNC: 4.4 MMOL/L (ref 3.5–5.2)
PROT SERPL-MCNC: 6.2 G/DL (ref 6–8.5)
RBC # BLD AUTO: 2.55 10*6/MM3 (ref 4.14–5.8)
SODIUM SERPL-SCNC: 136 MMOL/L (ref 136–145)
URATE SERPL-MCNC: 6 MG/DL (ref 3.4–7)
WBC NRBC COR # BLD AUTO: 6.74 10*3/MM3 (ref 3.4–10.8)

## 2025-05-03 PROCEDURE — 83735 ASSAY OF MAGNESIUM: CPT | Performed by: INTERNAL MEDICINE

## 2025-05-03 PROCEDURE — 63710000001 INSULIN LISPRO (HUMAN) PER 5 UNITS: Performed by: INTERNAL MEDICINE

## 2025-05-03 PROCEDURE — 85014 HEMATOCRIT: CPT | Performed by: INTERNAL MEDICINE

## 2025-05-03 PROCEDURE — 84550 ASSAY OF BLOOD/URIC ACID: CPT | Performed by: INTERNAL MEDICINE

## 2025-05-03 PROCEDURE — 80053 COMPREHEN METABOLIC PANEL: CPT | Performed by: INTERNAL MEDICINE

## 2025-05-03 PROCEDURE — 85018 HEMOGLOBIN: CPT | Performed by: INTERNAL MEDICINE

## 2025-05-03 PROCEDURE — 85025 COMPLETE CBC W/AUTO DIFF WBC: CPT | Performed by: INTERNAL MEDICINE

## 2025-05-03 PROCEDURE — 82948 REAGENT STRIP/BLOOD GLUCOSE: CPT

## 2025-05-03 PROCEDURE — 63710000001 PREDNISONE PER 5 MG: Performed by: INTERNAL MEDICINE

## 2025-05-03 PROCEDURE — 84100 ASSAY OF PHOSPHORUS: CPT | Performed by: INTERNAL MEDICINE

## 2025-05-03 RX ORDER — METOPROLOL SUCCINATE 25 MG/1
25 TABLET, EXTENDED RELEASE ORAL EVERY EVENING
Status: DISCONTINUED | OUTPATIENT
Start: 2025-05-03 | End: 2025-05-06 | Stop reason: HOSPADM

## 2025-05-03 RX ADMIN — Medication 400 MCG: at 08:27

## 2025-05-03 RX ADMIN — TRAZODONE HYDROCHLORIDE 50 MG: 50 TABLET ORAL at 22:21

## 2025-05-03 RX ADMIN — INSULIN LISPRO 3 UNITS: 100 INJECTION, SOLUTION INTRAVENOUS; SUBCUTANEOUS at 12:46

## 2025-05-03 RX ADMIN — Medication 1000 MCG: at 08:27

## 2025-05-03 RX ADMIN — PREDNISONE 10 MG: 10 TABLET ORAL at 08:27

## 2025-05-03 RX ADMIN — ATORVASTATIN CALCIUM 40 MG: 20 TABLET, FILM COATED ORAL at 22:21

## 2025-05-03 RX ADMIN — Medication 100 MG: at 08:27

## 2025-05-03 RX ADMIN — Medication 1 TABLET: at 08:27

## 2025-05-03 RX ADMIN — CLOPIDOGREL BISULFATE 75 MG: 75 TABLET, FILM COATED ORAL at 08:27

## 2025-05-03 RX ADMIN — ASPIRIN 81 MG: 81 TABLET, COATED ORAL at 08:27

## 2025-05-03 RX ADMIN — INSULIN LISPRO 2 UNITS: 100 INJECTION, SOLUTION INTRAVENOUS; SUBCUTANEOUS at 17:24

## 2025-05-03 RX ADMIN — METOPROLOL SUCCINATE 25 MG: 25 TABLET, EXTENDED RELEASE ORAL at 17:24

## 2025-05-03 RX ADMIN — Medication 10 ML: at 08:28

## 2025-05-03 RX ADMIN — FERROUS SULFATE TAB 325 MG (65 MG ELEMENTAL FE) 325 MG: 325 (65 FE) TAB at 08:27

## 2025-05-03 RX ADMIN — PYRIDOSTIGMINE BROMIDE 60 MG: 60 TABLET ORAL at 08:27

## 2025-05-03 RX ADMIN — PYRIDOSTIGMINE BROMIDE 60 MG: 60 TABLET ORAL at 22:21

## 2025-05-03 RX ADMIN — PYRIDOSTIGMINE BROMIDE 60 MG: 60 TABLET ORAL at 17:24

## 2025-05-03 RX ADMIN — TAMSULOSIN HYDROCHLORIDE 0.4 MG: 0.4 CAPSULE ORAL at 17:24

## 2025-05-03 RX ADMIN — ALLOPURINOL 300 MG: 300 TABLET ORAL at 22:21
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
## 2025-05-03 NOTE — PLAN OF CARE
Problem: Adult Inpatient Plan of Care  Goal: Plan of Care Review  Outcome: Progressing  Goal: Patient-Specific Goal (Individualized)  Outcome: Progressing  Goal: Absence of Hospital-Acquired Illness or Injury  Outcome: Progressing  Intervention: Identify and Manage Fall Risk  Recent Flowsheet Documentation  Taken 5/3/2025 0415 by Kyra Pinto RN  Safety Promotion/Fall Prevention: safety round/check completed  Taken 5/3/2025 0218 by Kyra Pinto RN  Safety Promotion/Fall Prevention: safety round/check completed  Taken 5/3/2025 0014 by Kyra Pinto RN  Safety Promotion/Fall Prevention: safety round/check completed  Taken 5/2/2025 2030 by Kyra Pinto RN  Safety Promotion/Fall Prevention:   safety round/check completed   nonskid shoes/slippers when out of bed  Intervention: Prevent and Manage VTE (Venous Thromboembolism) Risk  Recent Flowsheet Documentation  Taken 5/2/2025 2030 by Kyra Pinto RN  VTE Prevention/Management: SCDs (sequential compression devices) off  Goal: Optimal Comfort and Wellbeing  Outcome: Progressing  Intervention: Monitor Pain and Promote Comfort  Recent Flowsheet Documentation  Taken 5/2/2025 2339 by Kyra Pinto RN  Pain Management Interventions: pain medication given  Intervention: Provide Person-Centered Care  Recent Flowsheet Documentation  Taken 5/2/2025 2030 by Kyra Pinto RN  Trust Relationship/Rapport:   care explained   questions answered  Goal: Readiness for Transition of Care  Outcome: Progressing     Problem: Skin Injury Risk Increased  Goal: Skin Health and Integrity  Outcome: Progressing     Problem: Fall Injury Risk  Goal: Absence of Fall and Fall-Related Injury  Outcome: Progressing  Intervention: Identify and Manage Contributors  Recent Flowsheet Documentation  Taken 5/2/2025 2030 by yKra Pinto RN  Medication Review/Management: medications reviewed  Intervention: Promote Injury-Free Environment  Recent Flowsheet Documentation  Taken  5/3/2025 0415 by Kyra Pinto RN  Safety Promotion/Fall Prevention: safety round/check completed  Taken 5/3/2025 0218 by Kyra Pinto RN  Safety Promotion/Fall Prevention: safety round/check completed  Taken 5/3/2025 0014 by Kyra Pinto RN  Safety Promotion/Fall Prevention: safety round/check completed  Taken 5/2/2025 2030 by Kyra Pinto RN  Safety Promotion/Fall Prevention:   safety round/check completed   nonskid shoes/slippers when out of bed     Goal Outcome Evaluation:  Pt is alert and oriented. Pt rested throughout the night with no acute changes over night. Pt reported a lower back pain of 6. Pain medication given per pt request. Continuous 2L nasal canula while asleep.  Safety measure maintained. Will continue plan of care.

## 2025-05-03 NOTE — PLAN OF CARE
Goal Outcome Evaluation:           Progress: improving  Outcome Evaluation: No acute changes wound care completed on soiled dressings. Sat in chair most of day. WCTM.

## 2025-05-03 NOTE — PROGRESS NOTES
[facility] Progress Note      Patient Name: Juan James  : 1945  MRN: [id number]  Primary Care Physician:  Padmini Benson APRN  Date of admission: 2025    Subjective     Interval History:   Follow up acute kidney injury on chronic kidney disease    Feeling better today after IV fluids overnight. Appetite is OK. No nausea or vomiting. No shortness of breath, chest pain or significant swelling.     Review of Systems:   As noted above    Objective     Vitals:   Temp:  [97.5 °F (36.4 °C)-98.1 °F (36.7 °C)] 98.1 °F (36.7 °C)  Heart Rate:  [66-87] 76  Resp:  [16-22] 20  BP: (102-133)/() 114/74    Intake/Output Summary (Last 24 hours) at 5/3/2025 1412  Last data filed at 5/3/2025 0709  Gross per 24 hour   Intake 2948 ml   Output 1450 ml   Net 1498 ml       Physical Exam:    Constitutional: Awake, alert, no acute distress.  Obese, chronically  HEENT: Sclera anicteric, no conjunctival injection, multiple abrasions on his upper and lower extremities  Neck: No JVD  Respiratory: Clear to auscultation bilaterally, nonlabored respiration  Cardiovascular: Irregularly irregular, no rub  Gastrointestinal: Positive bowel sounds, abdomen is soft, nontender and nondistended  : No palpable bladder  Musculoskeletal: No edema, no clubbing or cyanosis  Psychiatric: Flat affect, cooperative  Neurologic: Oriented x3, moving all extremities, normal speech and mental status  Skin: Warm and dry       Scheduled Meds:     allopurinol, 300 mg, Oral, Nightly  aspirin, 81 mg, Oral, Daily  atorvastatin, 40 mg, Oral, Nightly  clopidogrel, 75 mg, Oral, Daily  ferrous sulfate, 325 mg, Oral, Daily With Breakfast  folic acid, 400 mcg, Oral, Daily  insulin lispro, 2-7 Units, Subcutaneous, 4x Daily AC & at Bedtime  metoprolol succinate XL, 50 mg, Oral, Q PM  multivitamin with minerals, 1 tablet, Oral, Daily  predniSONE, 10 mg, Oral, Daily  pyridostigmine, 60 mg, Oral, TID  sodium chloride, 10 mL,  Intravenous, Q12H  tamsulosin, 0.4 mg, Oral, Q PM  thiamine, 100 mg, Oral, Daily  traZODone, 50 mg, Oral, Nightly  vitamin B-12, 1,000 mcg, Oral, Daily      IV Meds:        Results Reviewed:   I have personally reviewed the results from the time of this admission to 5/3/2025 14:12 EDT     Results from last 7 days   Lab Units 05/03/25  0542 05/02/25  0821 05/01/25  1257   SODIUM mmol/L 136 138 134*   POTASSIUM mmol/L 4.4 4.6 4.4   CHLORIDE mmol/L 104 101 97*   CO2 mmol/L 25.5 26.0 26.0   BUN mg/dL 55* 66* 73*   CREATININE mg/dL 1.65* 1.97* 2.31*   CALCIUM mg/dL 8.6 8.4* 8.8   BILIRUBIN mg/dL 0.6 0.5 0.5   ALK PHOS U/L 92 97 118*   ALT (SGPT) U/L 17 17 15   AST (SGOT) U/L 29 21 20   GLUCOSE mg/dL 112* 132* 167*       Estimated Creatinine Clearance: 46.5 mL/min (A) (by C-G formula based on SCr of 1.65 mg/dL (H)).    Results from last 7 days   Lab Units 05/03/25  0542 05/02/25  0821 05/01/25  1257   MAGNESIUM mg/dL 2.6* 2.5* 2.5*   PHOSPHORUS mg/dL 2.8  --   --        Results from last 7 days   Lab Units 05/03/25  0542   URIC ACID mg/dL 6.0       Results from last 7 days   Lab Units 05/03/25  0542 05/03/25  0011 05/02/25  1600 05/02/25  0821 05/01/25  2358 05/01/25  1257   WBC 10*3/mm3 6.74  --   --  7.74  --  9.94   HEMOGLOBIN g/dL 8.9* 8.4* 9.0* 9.0* 9.0* 9.2*   PLATELETS 10*3/mm3 116*  --   --  130*  --  150       Results from last 7 days   Lab Units 05/01/25  1257   INR  1.17*       Assessment / Plan     ASSESSMENT:  Mild acute kidney injury with prerenal azotemia probably component volume depletion, which is most likely the cause for his prior fall. Renal function improved with IV fluids. Serum creatinine 1.65 mg/dl today which is actually a little better than his baseline. IV fluids have been discontinued and he is tolerating PO.   Chronic kidney disease stage IIIb, secondary to diabetic and hypertensive glomerulosclerosis baseline creatinine 1.8-2.  Alcoholic cirrhosis  Hypertension with chronic kidney disease  currently blood pressure on the low side.  Atrial fibrillation with controlled rate  Coronary artery disease with PCI and stent in the past also he had urgent cath with PCI to LAD on 4/22/2025  Diabetes mellitus type 2 with renal complication managed by the primary team  History of myasthenia gravis on steroid  History of gout, stable treated with allopurinol  Anemia and thrombocytopenia hemoglobin 8.9 platelet 116,000, will monitor    PLAN:  Continue to monitor orthostatic blood pressures will consider IV fluid if needed  Lower metoprolol XL to 25 mg daily.   Surveillance labs.     I reviewed the chart and other providers notes, reviewed labs.  I discussed the case with the patient  Copied text in this note has been reviewed and is accurate as of 05/03/25.       Thank you for involving us in the care of Juan MORGAN Lambertoha.  Please feel free to call with any questions.    Salvatore Grigsby MD  05/03/25  14:12 EDT    [facility]  [phone]    Please note that portions of this note were completed with a voice recognition program.

## 2025-05-04 LAB
ALBUMIN SERPL-MCNC: 3.7 G/DL (ref 3.5–5.2)
ANION GAP SERPL CALCULATED.3IONS-SCNC: 10 MMOL/L (ref 5–15)
BASOPHILS # BLD AUTO: 0.02 10*3/MM3 (ref 0–0.2)
BASOPHILS NFR BLD AUTO: 0.3 % (ref 0–1.5)
BUN SERPL-MCNC: 39 MG/DL (ref 8–23)
BUN/CREAT SERPL: 28.7 (ref 7–25)
CALCIUM SPEC-SCNC: 8.7 MG/DL (ref 8.6–10.5)
CHLORIDE SERPL-SCNC: 103 MMOL/L (ref 98–107)
CO2 SERPL-SCNC: 26 MMOL/L (ref 22–29)
CREAT SERPL-MCNC: 1.36 MG/DL (ref 0.76–1.27)
DEPRECATED RDW RBC AUTO: 60.7 FL (ref 37–54)
EGFRCR SERPLBLD CKD-EPI 2021: 53.6 ML/MIN/1.73
EOSINOPHIL # BLD AUTO: 0 10*3/MM3 (ref 0–0.4)
EOSINOPHIL NFR BLD AUTO: 0 % (ref 0.3–6.2)
ERYTHROCYTE [DISTWIDTH] IN BLOOD BY AUTOMATED COUNT: 16 % (ref 12.3–15.4)
GLUCOSE BLDC GLUCOMTR-MCNC: 136 MG/DL (ref 70–130)
GLUCOSE BLDC GLUCOMTR-MCNC: 138 MG/DL (ref 70–130)
GLUCOSE BLDC GLUCOMTR-MCNC: 162 MG/DL (ref 70–130)
GLUCOSE BLDC GLUCOMTR-MCNC: 192 MG/DL (ref 70–130)
GLUCOSE SERPL-MCNC: 168 MG/DL (ref 65–99)
HCT VFR BLD AUTO: 26.2 % (ref 37.5–51)
HCT VFR BLD AUTO: 27.3 % (ref 37.5–51)
HCT VFR BLD AUTO: 27.3 % (ref 37.5–51)
HCT VFR BLD AUTO: 27.8 % (ref 37.5–51)
HGB BLD-MCNC: 8.5 G/DL (ref 13–17.7)
HGB BLD-MCNC: 8.9 G/DL (ref 13–17.7)
HGB BLD-MCNC: 8.9 G/DL (ref 13–17.7)
HGB BLD-MCNC: 9.3 G/DL (ref 13–17.7)
IMM GRANULOCYTES # BLD AUTO: 0.1 10*3/MM3 (ref 0–0.05)
IMM GRANULOCYTES NFR BLD AUTO: 1.6 % (ref 0–0.5)
LYMPHOCYTES # BLD AUTO: 1.11 10*3/MM3 (ref 0.7–3.1)
LYMPHOCYTES NFR BLD AUTO: 17.4 % (ref 19.6–45.3)
MAGNESIUM SERPL-MCNC: 2.7 MG/DL (ref 1.6–2.4)
MCH RBC QN AUTO: 34.2 PG (ref 26.6–33)
MCHC RBC AUTO-ENTMCNC: 32.6 G/DL (ref 31.5–35.7)
MCV RBC AUTO: 105 FL (ref 79–97)
MONOCYTES # BLD AUTO: 0.59 10*3/MM3 (ref 0.1–0.9)
MONOCYTES NFR BLD AUTO: 9.2 % (ref 5–12)
NEUTROPHILS NFR BLD AUTO: 4.56 10*3/MM3 (ref 1.7–7)
NEUTROPHILS NFR BLD AUTO: 71.5 % (ref 42.7–76)
NRBC BLD AUTO-RTO: 0.3 /100 WBC (ref 0–0.2)
PHOSPHATE SERPL-MCNC: 2.7 MG/DL (ref 2.5–4.5)
PLATELET # BLD AUTO: 120 10*3/MM3 (ref 140–450)
PMV BLD AUTO: 8.6 FL (ref 6–12)
POTASSIUM SERPL-SCNC: 3.9 MMOL/L (ref 3.5–5.2)
RBC # BLD AUTO: 2.6 10*6/MM3 (ref 4.14–5.8)
SODIUM SERPL-SCNC: 139 MMOL/L (ref 136–145)
URATE SERPL-MCNC: 5.8 MG/DL (ref 3.4–7)
WBC NRBC COR # BLD AUTO: 6.38 10*3/MM3 (ref 3.4–10.8)

## 2025-05-04 PROCEDURE — 85014 HEMATOCRIT: CPT | Performed by: INTERNAL MEDICINE

## 2025-05-04 PROCEDURE — 84550 ASSAY OF BLOOD/URIC ACID: CPT | Performed by: INTERNAL MEDICINE

## 2025-05-04 PROCEDURE — 82948 REAGENT STRIP/BLOOD GLUCOSE: CPT

## 2025-05-04 PROCEDURE — 63710000001 INSULIN LISPRO (HUMAN) PER 5 UNITS: Performed by: INTERNAL MEDICINE

## 2025-05-04 PROCEDURE — 85025 COMPLETE CBC W/AUTO DIFF WBC: CPT | Performed by: INTERNAL MEDICINE

## 2025-05-04 PROCEDURE — 85018 HEMOGLOBIN: CPT | Performed by: INTERNAL MEDICINE

## 2025-05-04 PROCEDURE — 80069 RENAL FUNCTION PANEL: CPT | Performed by: INTERNAL MEDICINE

## 2025-05-04 PROCEDURE — 63710000001 PREDNISONE PER 5 MG: Performed by: INTERNAL MEDICINE

## 2025-05-04 PROCEDURE — 83735 ASSAY OF MAGNESIUM: CPT | Performed by: INTERNAL MEDICINE

## 2025-05-04 RX ORDER — CYCLOBENZAPRINE HCL 10 MG
10 TABLET ORAL EVERY 8 HOURS SCHEDULED
Status: DISCONTINUED | OUTPATIENT
Start: 2025-05-04 | End: 2025-05-06 | Stop reason: HOSPADM

## 2025-05-04 RX ADMIN — Medication 400 MCG: at 09:46

## 2025-05-04 RX ADMIN — ASPIRIN 81 MG: 81 TABLET, COATED ORAL at 09:46

## 2025-05-04 RX ADMIN — PREDNISONE 10 MG: 10 TABLET ORAL at 09:46

## 2025-05-04 RX ADMIN — TAMSULOSIN HYDROCHLORIDE 0.4 MG: 0.4 CAPSULE ORAL at 17:15

## 2025-05-04 RX ADMIN — INSULIN LISPRO 2 UNITS: 100 INJECTION, SOLUTION INTRAVENOUS; SUBCUTANEOUS at 12:30

## 2025-05-04 RX ADMIN — ALLOPURINOL 300 MG: 300 TABLET ORAL at 21:19

## 2025-05-04 RX ADMIN — TRAZODONE HYDROCHLORIDE 50 MG: 50 TABLET ORAL at 21:19

## 2025-05-04 RX ADMIN — Medication 10 ML: at 21:22

## 2025-05-04 RX ADMIN — PYRIDOSTIGMINE BROMIDE 60 MG: 60 TABLET ORAL at 21:19

## 2025-05-04 RX ADMIN — CLOPIDOGREL BISULFATE 75 MG: 75 TABLET, FILM COATED ORAL at 09:46

## 2025-05-04 RX ADMIN — ATORVASTATIN CALCIUM 40 MG: 20 TABLET, FILM COATED ORAL at 21:19

## 2025-05-04 RX ADMIN — INSULIN LISPRO 2 UNITS: 100 INJECTION, SOLUTION INTRAVENOUS; SUBCUTANEOUS at 17:19

## 2025-05-04 RX ADMIN — FERROUS SULFATE TAB 325 MG (65 MG ELEMENTAL FE) 325 MG: 325 (65 FE) TAB at 09:46

## 2025-05-04 RX ADMIN — Medication 1 TABLET: at 09:46

## 2025-05-04 RX ADMIN — Medication 10 ML: at 09:46

## 2025-05-04 RX ADMIN — Medication 100 MG: at 09:46

## 2025-05-04 RX ADMIN — PYRIDOSTIGMINE BROMIDE 60 MG: 60 TABLET ORAL at 17:15

## 2025-05-04 RX ADMIN — Medication 1000 MCG: at 09:46

## 2025-05-04 RX ADMIN — METOPROLOL SUCCINATE 25 MG: 25 TABLET, EXTENDED RELEASE ORAL at 17:15

## 2025-05-04 RX ADMIN — PYRIDOSTIGMINE BROMIDE 60 MG: 60 TABLET ORAL at 09:45

## 2025-05-04 NOTE — PLAN OF CARE
Goal Outcome Evaluation:   Pt asleep at beginning of shift. He is alert and oriented x 4. Pt ambulating to bathroom with walker, assist x 1. When sleeping, pt needs 2L nasal cannula. Vitals WDL's.

## 2025-05-04 NOTE — PROGRESS NOTES
[facility] Progress Note      Patient Name: Juan James  : 1945  MRN: [id number]  Primary Care Physician:  Padmini Benson APRN  Date of admission: 2025    Subjective     Interval History:   Follow up acute kidney injury on chronic kidney disease    The patient is feeling reasonably well denies any chest pain or shortness of air, no orthopnea or PND, no nausea or vomiting    Review of Systems:   As noted above    Objective     Vitals:   Temp:  [97.7 °F (36.5 °C)-98.7 °F (37.1 °C)] 97.7 °F (36.5 °C)  Heart Rate:  [71-87] 87  Resp:  [18] 18  BP: (102-149)/() 102/60    Intake/Output Summary (Last 24 hours) at 2025 1434  Last data filed at 2025 0444  Gross per 24 hour   Intake 620 ml   Output 780 ml   Net -160 ml       Physical Exam:    Constitutional: Awake, alert, no acute distress.  Obese, chronically  HEENT: Sclera anicteric, no conjunctival injection, multiple abrasions on his upper and lower extremities  Neck: No JVD  Respiratory: Clear to auscultation bilaterally, nonlabored respiration  Cardiovascular: Irregularly irregular, no rub  Gastrointestinal: Positive bowel sounds, abdomen is soft, nontender and nondistended  : No palpable bladder  Musculoskeletal: No edema, no clubbing or cyanosis  Psychiatric: Flat affect, cooperative  Neurologic: Oriented x3, moving all extremities, normal speech and mental status  Skin: Warm and dry       Scheduled Meds:     allopurinol, 300 mg, Oral, Nightly  aspirin, 81 mg, Oral, Daily  atorvastatin, 40 mg, Oral, Nightly  clopidogrel, 75 mg, Oral, Daily  ferrous sulfate, 325 mg, Oral, Daily With Breakfast  folic acid, 400 mcg, Oral, Daily  insulin lispro, 2-7 Units, Subcutaneous, 4x Daily AC & at Bedtime  metoprolol succinate XL, 25 mg, Oral, Q PM  multivitamin with minerals, 1 tablet, Oral, Daily  predniSONE, 10 mg, Oral, Daily  pyridostigmine, 60 mg, Oral, TID  sodium chloride, 10 mL, Intravenous, Q12H  tamsulosin,  0.4 mg, Oral, Q PM  thiamine, 100 mg, Oral, Daily  traZODone, 50 mg, Oral, Nightly  vitamin B-12, 1,000 mcg, Oral, Daily      IV Meds:        Results Reviewed:   I have personally reviewed the results from the time of this admission to 5/4/2025 14:34 EDT     Results from last 7 days   Lab Units 05/04/25  0804 05/03/25  0542 05/02/25  0821 05/01/25  1257   SODIUM mmol/L 139 136 138 134*   POTASSIUM mmol/L 3.9 4.4 4.6 4.4   CHLORIDE mmol/L 103 104 101 97*   CO2 mmol/L 26.0 25.5 26.0 26.0   BUN mg/dL 39* 55* 66* 73*   CREATININE mg/dL 1.36* 1.65* 1.97* 2.31*   CALCIUM mg/dL 8.7 8.6 8.4* 8.8   BILIRUBIN mg/dL  --  0.6 0.5 0.5   ALK PHOS U/L  --  92 97 118*   ALT (SGPT) U/L  --  17 17 15   AST (SGOT) U/L  --  29 21 20   GLUCOSE mg/dL 168* 112* 132* 167*       Estimated Creatinine Clearance: 56.6 mL/min (A) (by C-G formula based on SCr of 1.36 mg/dL (H)).    Results from last 7 days   Lab Units 05/04/25  0804 05/03/25  0542 05/02/25  0821   MAGNESIUM mg/dL 2.7* 2.6* 2.5*   PHOSPHORUS mg/dL 2.7 2.8  --        Results from last 7 days   Lab Units 05/04/25  0804 05/03/25  0542   URIC ACID mg/dL 5.8 6.0       Results from last 7 days   Lab Units 05/04/25  0804 05/03/25  2348 05/03/25  1541 05/03/25  0542 05/03/25  0011 05/02/25  1600 05/02/25  0821 05/01/25  2358 05/01/25  1257   WBC 10*3/mm3 6.38  --   --  6.74  --   --  7.74  --  9.94   HEMOGLOBIN g/dL 8.9*  8.9* 8.5* 9.6* 8.9* 8.4*   < > 9.0*   < > 9.2*   PLATELETS 10*3/mm3 120*  --   --  116*  --   --  130*  --  150    < > = values in this interval not displayed.       Results from last 7 days   Lab Units 05/01/25  1257   INR  1.17*       Assessment / Plan     ASSESSMENT:  Mild acute kidney injury with prerenal azotemia probably component volume depletion, which is most likely the cause for his prior fall. Renal function improved with IV fluids. Serum creatinine is down to 1.36 mg/dl today which is actually a little better than his baseline.   Chronic kidney disease  stage IIIb, secondary to diabetic and hypertensive glomerulosclerosis baseline creatinine 1.8-2.  Alcoholic cirrhosis  Hypertension with chronic kidney disease currently blood pressure on the low side.  Atrial fibrillation with controlled rate  Coronary artery disease with PCI and stent in the past also he had urgent cath with PCI to LAD on 4/22/2025  Diabetes mellitus type 2 with renal complication managed by the primary team  History of myasthenia gravis on steroid  History of gout, stable treated with allopurinol  Anemia and thrombocytopenia hemoglobin 8.9 platelet 120,000, will monitor    PLAN:  Continue the same treatment  Surveillance labs.     I reviewed the chart and other providers notes, reviewed labs.  I discussed the case with the patient  Copied text in this note has been reviewed and is accurate as of 05/04/25.       Thank you for involving us in the care of Juan MORGAN Josele.  Please feel free to call with any questions.    Salvatore Grigsby MD  05/04/25  14:34 EDT    [facility]  [phone]    Please note that portions of this note were completed with a voice recognition program.

## 2025-05-04 NOTE — PLAN OF CARE
Problem: Adult Inpatient Plan of Care  Goal: Plan of Care Review  Outcome: Progressing  Flowsheets (Taken 5/4/2025 1555)  Outcome Evaluation: No acute changes. Meds given per MAR. Wound care completed. WCTM.  Goal: Patient-Specific Goal (Individualized)  Outcome: Progressing  Goal: Absence of Hospital-Acquired Illness or Injury  Outcome: Progressing  Intervention: Identify and Manage Fall Risk  Recent Flowsheet Documentation  Taken 5/4/2025 1554 by Vanessa Wong RN  Safety Promotion/Fall Prevention: safety round/check completed  Taken 5/4/2025 0810 by Vanessa Wong RN  Safety Promotion/Fall Prevention: safety round/check completed  Intervention: Prevent Skin Injury  Recent Flowsheet Documentation  Taken 5/4/2025 1554 by Vanessa Wong RN  Body Position: position changed independently  Taken 5/4/2025 0810 by Vanessa Wong RN  Body Position: position changed independently  Intervention: Prevent Infection  Recent Flowsheet Documentation  Taken 5/4/2025 1554 by Vanessa Wong RN  Infection Prevention: single patient room provided  Taken 5/4/2025 0810 by Vanessa Wong RN  Infection Prevention: single patient room provided  Goal: Optimal Comfort and Wellbeing  Outcome: Progressing  Intervention: Provide Person-Centered Care  Recent Flowsheet Documentation  Taken 5/4/2025 0810 by Vanessa Wong RN  Trust Relationship/Rapport:   choices provided   care explained  Goal: Readiness for Transition of Care  Outcome: Progressing     Problem: Skin Injury Risk Increased  Goal: Skin Health and Integrity  Outcome: Progressing  Intervention: Optimize Skin Protection  Recent Flowsheet Documentation  Taken 5/4/2025 1554 by Vanessa Wong RN  Head of Bed (HOB) Positioning: HOB elevated  Taken 5/4/2025 0810 by Vanessa Wong RN  Activity Management: up in chair  Head of Bed (HOB) Positioning: HOB elevated     Problem: Fall Injury Risk  Goal: Absence of Fall and Fall-Related Injury  Outcome:  Progressing  Intervention: Identify and Manage Contributors  Recent Flowsheet Documentation  Taken 5/4/2025 1554 by Vanessa Wong, RN  Medication Review/Management: medications reviewed  Taken 5/4/2025 0810 by Vanessa Wong, RN  Medication Review/Management: medications reviewed  Intervention: Promote Injury-Free Environment  Recent Flowsheet Documentation  Taken 5/4/2025 1554 by Vanessa Wong, RN  Safety Promotion/Fall Prevention: safety round/check completed  Taken 5/4/2025 0810 by Vanessa Wong RN  Safety Promotion/Fall Prevention: safety round/check completed   Goal Outcome Evaluation:           Progress: improving  Outcome Evaluation: No acute changes. Meds given per MAR. Wound care completed. WCTM.
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
## 2025-05-05 PROBLEM — N18.32 STAGE 3B CHRONIC KIDNEY DISEASE: Status: ACTIVE | Noted: 2022-08-15

## 2025-05-05 PROBLEM — N17.9 ACUTE KIDNEY INJURY: Status: ACTIVE | Noted: 2025-05-01

## 2025-05-05 LAB
ALBUMIN SERPL-MCNC: 3.5 G/DL (ref 3.5–5.2)
ANION GAP SERPL CALCULATED.3IONS-SCNC: 8.3 MMOL/L (ref 5–15)
BASOPHILS # BLD AUTO: 0.03 10*3/MM3 (ref 0–0.2)
BASOPHILS NFR BLD AUTO: 0.4 % (ref 0–1.5)
BUN SERPL-MCNC: 31 MG/DL (ref 8–23)
BUN/CREAT SERPL: 23.5 (ref 7–25)
CALCIUM SPEC-SCNC: 8.7 MG/DL (ref 8.6–10.5)
CHLORIDE SERPL-SCNC: 106 MMOL/L (ref 98–107)
CO2 SERPL-SCNC: 25.7 MMOL/L (ref 22–29)
CREAT SERPL-MCNC: 1.32 MG/DL (ref 0.76–1.27)
DEPRECATED RDW RBC AUTO: 58.8 FL (ref 37–54)
EGFRCR SERPLBLD CKD-EPI 2021: 55.6 ML/MIN/1.73
EOSINOPHIL # BLD AUTO: 0 10*3/MM3 (ref 0–0.4)
EOSINOPHIL NFR BLD AUTO: 0 % (ref 0.3–6.2)
ERYTHROCYTE [DISTWIDTH] IN BLOOD BY AUTOMATED COUNT: 15.7 % (ref 12.3–15.4)
GLUCOSE BLDC GLUCOMTR-MCNC: 124 MG/DL (ref 70–130)
GLUCOSE BLDC GLUCOMTR-MCNC: 150 MG/DL (ref 70–130)
GLUCOSE BLDC GLUCOMTR-MCNC: 189 MG/DL (ref 70–130)
GLUCOSE BLDC GLUCOMTR-MCNC: 197 MG/DL (ref 70–130)
GLUCOSE SERPL-MCNC: 113 MG/DL (ref 65–99)
HCT VFR BLD AUTO: 26.8 % (ref 37.5–51)
HGB BLD-MCNC: 8.9 G/DL (ref 13–17.7)
IMM GRANULOCYTES # BLD AUTO: 0.09 10*3/MM3 (ref 0–0.05)
IMM GRANULOCYTES NFR BLD AUTO: 1.2 % (ref 0–0.5)
LYMPHOCYTES # BLD AUTO: 1.65 10*3/MM3 (ref 0.7–3.1)
LYMPHOCYTES NFR BLD AUTO: 21.7 % (ref 19.6–45.3)
MAGNESIUM SERPL-MCNC: 2.7 MG/DL (ref 1.6–2.4)
MCH RBC QN AUTO: 34.2 PG (ref 26.6–33)
MCHC RBC AUTO-ENTMCNC: 33.2 G/DL (ref 31.5–35.7)
MCV RBC AUTO: 103.1 FL (ref 79–97)
MONOCYTES # BLD AUTO: 0.62 10*3/MM3 (ref 0.1–0.9)
MONOCYTES NFR BLD AUTO: 8.2 % (ref 5–12)
NEUTROPHILS NFR BLD AUTO: 5.21 10*3/MM3 (ref 1.7–7)
NEUTROPHILS NFR BLD AUTO: 68.5 % (ref 42.7–76)
NRBC BLD AUTO-RTO: 0 /100 WBC (ref 0–0.2)
PHOSPHATE SERPL-MCNC: 3 MG/DL (ref 2.5–4.5)
PLATELET # BLD AUTO: 115 10*3/MM3 (ref 140–450)
PMV BLD AUTO: 8.5 FL (ref 6–12)
POTASSIUM SERPL-SCNC: 4.9 MMOL/L (ref 3.5–5.2)
RBC # BLD AUTO: 2.6 10*6/MM3 (ref 4.14–5.8)
SODIUM SERPL-SCNC: 140 MMOL/L (ref 136–145)
URATE SERPL-MCNC: 5.1 MG/DL (ref 3.4–7)
WBC NRBC COR # BLD AUTO: 7.6 10*3/MM3 (ref 3.4–10.8)

## 2025-05-05 PROCEDURE — 97530 THERAPEUTIC ACTIVITIES: CPT

## 2025-05-05 PROCEDURE — 85025 COMPLETE CBC W/AUTO DIFF WBC: CPT | Performed by: INTERNAL MEDICINE

## 2025-05-05 PROCEDURE — 80069 RENAL FUNCTION PANEL: CPT | Performed by: INTERNAL MEDICINE

## 2025-05-05 PROCEDURE — 63710000001 INSULIN LISPRO (HUMAN) PER 5 UNITS: Performed by: INTERNAL MEDICINE

## 2025-05-05 PROCEDURE — 82948 REAGENT STRIP/BLOOD GLUCOSE: CPT

## 2025-05-05 PROCEDURE — 83735 ASSAY OF MAGNESIUM: CPT | Performed by: INTERNAL MEDICINE

## 2025-05-05 PROCEDURE — 84550 ASSAY OF BLOOD/URIC ACID: CPT | Performed by: INTERNAL MEDICINE

## 2025-05-05 PROCEDURE — 63710000001 PREDNISONE PER 5 MG: Performed by: INTERNAL MEDICINE

## 2025-05-05 RX ADMIN — CYCLOBENZAPRINE HYDROCHLORIDE 10 MG: 10 TABLET, FILM COATED ORAL at 05:25

## 2025-05-05 RX ADMIN — INSULIN LISPRO 2 UNITS: 100 INJECTION, SOLUTION INTRAVENOUS; SUBCUTANEOUS at 16:41

## 2025-05-05 RX ADMIN — Medication 1000 MCG: at 08:26

## 2025-05-05 RX ADMIN — PYRIDOSTIGMINE BROMIDE 60 MG: 60 TABLET ORAL at 15:03

## 2025-05-05 RX ADMIN — CYCLOBENZAPRINE HYDROCHLORIDE 10 MG: 10 TABLET, FILM COATED ORAL at 22:09

## 2025-05-05 RX ADMIN — PYRIDOSTIGMINE BROMIDE 60 MG: 60 TABLET ORAL at 08:26

## 2025-05-05 RX ADMIN — METOPROLOL SUCCINATE 25 MG: 25 TABLET, EXTENDED RELEASE ORAL at 16:41

## 2025-05-05 RX ADMIN — PYRIDOSTIGMINE BROMIDE 60 MG: 60 TABLET ORAL at 22:09

## 2025-05-05 RX ADMIN — CLOPIDOGREL BISULFATE 75 MG: 75 TABLET, FILM COATED ORAL at 08:25

## 2025-05-05 RX ADMIN — PREDNISONE 10 MG: 10 TABLET ORAL at 08:26

## 2025-05-05 RX ADMIN — Medication 100 MG: at 08:26

## 2025-05-05 RX ADMIN — TRAZODONE HYDROCHLORIDE 50 MG: 50 TABLET ORAL at 22:09

## 2025-05-05 RX ADMIN — INSULIN LISPRO 2 UNITS: 100 INJECTION, SOLUTION INTRAVENOUS; SUBCUTANEOUS at 11:30

## 2025-05-05 RX ADMIN — Medication 1 TABLET: at 08:25

## 2025-05-05 RX ADMIN — Medication 400 MCG: at 08:25

## 2025-05-05 RX ADMIN — ALLOPURINOL 300 MG: 300 TABLET ORAL at 22:09

## 2025-05-05 RX ADMIN — ASPIRIN 81 MG: 81 TABLET, COATED ORAL at 08:25

## 2025-05-05 RX ADMIN — TAMSULOSIN HYDROCHLORIDE 0.4 MG: 0.4 CAPSULE ORAL at 16:41

## 2025-05-05 RX ADMIN — Medication 10 ML: at 08:27

## 2025-05-05 RX ADMIN — CYCLOBENZAPRINE HYDROCHLORIDE 10 MG: 10 TABLET, FILM COATED ORAL at 15:03

## 2025-05-05 RX ADMIN — FERROUS SULFATE TAB 325 MG (65 MG ELEMENTAL FE) 325 MG: 325 (65 FE) TAB at 08:26

## 2025-05-05 RX ADMIN — ATORVASTATIN CALCIUM 40 MG: 20 TABLET, FILM COATED ORAL at 22:09

## 2025-05-05 NOTE — DISCHARGE PLACEMENT REQUEST
"LambertotabithaJuan lujan (79 y.o. Male)       Date of Birth   1945    Social Security Number       Address   [address] [place] [address]    Home Phone   [phone]    MRN   [id number]       [facility]    Marital Status                               Admission Date   5/1/2025    Admission Type   Emergency    Admitting Provider   Lexis Parsons MD    Attending Provider   Mega Kyle MD    Department, Room/Bed   [address], [address]/1       Discharge Date       Discharge Disposition       Discharge Destination                                 Attending Provider: Mega Kyle MD    Allergies: Rivaroxaban, Ranolazine Er, Apixaban, Indomethacin, Lisinopril    Isolation: None   Infection: None   Code Status: CPR    Ht: 185.4 cm (73\")   Wt: 98.9 kg (218 lb 0.6 oz)    Admission Cmt: None   Principal Problem: Acute kidney injury [N17.9]                   Active Insurance as of 5/1/2025       Primary Coverage       Payor Plan Insurance Group Employer/Plan Group    MEDICARE MEDICARE A & B        Payor Plan Address Payor Plan Phone Number Payor Plan Fax Number Effective Dates    [address] [phone]  6/1/2006 - None Entered    [facility] [id number]         Subscriber Name Subscriber Birth Date Member ID       JUAN HUNG 1945 [id number]               Secondary Coverage       Payor Plan Insurance Group Employer/Plan Group    AARP [employer] SUP AAR HE[employer] CARE OPTIONS PLAN F       Payor Plan Address Payor Plan Phone Number Payor Plan Fax Number Effective Dates    [facility] [phone]  12/1/2012 - None Entered    [address]       [facility] [address]         Subscriber Name Subscriber Birth Date Member ID       JUAN HUNG 1945 [id number]                     Emergency Contacts        (Rel.) Home Phone Work Phone Mobile Phone    Juan Hung Jr (Son) [phone] -- [phone]    Ganga Hung (Son) [phone] -- [phone] "

## 2025-05-05 NOTE — PLAN OF CARE
Goal Outcome Evaluation:   Patients vital signs stable, AOX4, no acute changes, meds given per mar.

## 2025-05-05 NOTE — PLAN OF CARE
Goal Outcome Evaluation:  Plan of Care Reviewed With: patient           Outcome Evaluation: Patient seen for PT session this PM. Patient supine in bed upon arrival. Patient sat up to EOB with SBA. Patient stood from EOB with CGA/SBA. Patient ambulated 2x in room with rwx and CGA. Gait slow with patient appearing SOA. Seated rest break between ambulation trials. No overt LOB noted. Patient performed several repetitions of STS as well as standing B LE exercises for strengthening. Patient hopeful to go to SNF prior to return home alone. Acute PT will continue to monitor.    Anticipated Discharge Disposition (PT): skilled nursing facility

## 2025-05-05 NOTE — PROGRESS NOTES
Name: Juan James ADMIT: 2025   : 1945  PCP: Padmini Benson APRN    MRN: [id number] LOS: 2 days   AGE/SEX: 79 y.o. male  ROOM: [facility]     Subjective   Subjective   No new complaints overnight.  Still very weak and agreeable to go to rehab.         Objective   Objective   Vital Signs  Temp:  [97.7 °F (36.5 °C)-98.2 °F (36.8 °C)] 98.1 °F (36.7 °C)  Heart Rate:  [71-87] 85  Resp:  [18] 18  BP: (102-127)/(60-81) 105/81  SpO2:  [95 %-100 %] 95 %  on  Flow (L/min) (Oxygen Therapy):  [2] 2;   Device (Oxygen Therapy): nasal cannula  Body mass index is 28.77 kg/m².  Physical Exam  Vitals and nursing note reviewed.   Constitutional:       General: He is not in acute distress.  Cardiovascular:      Rate and Rhythm: Normal rate. Rhythm irregularly irregular.   Pulmonary:      Effort: Pulmonary effort is normal.      Breath sounds: Normal breath sounds.   Abdominal:      General: Bowel sounds are normal.      Palpations: Abdomen is soft.      Tenderness: There is no abdominal tenderness.   Musculoskeletal:         General: No swelling.   Skin:     General: Skin is warm and dry.   Neurological:      Mental Status: He is alert. Mental status is at baseline.       Results Review     I reviewed the patient's new clinical results.  Results from last 7 days   Lab Units 25  0525  1550 25  0804 25  2348 25  1541 25  0542 25  1600 25  0821   WBC 10*3/mm3 7.60  --  6.38  --   --  6.74  --  7.74   HEMOGLOBIN g/dL 8.9* 9.3* 8.9*  8.9* 8.5*   < > 8.9*   < > 9.0*   PLATELETS 10*3/mm3 115*  --  120*  --   --  116*  --  130*    < > = values in this interval not displayed.     Results from last 7 days   Lab Units 25  0527 25  0804 25  0542 25  0821   SODIUM mmol/L 140 139 136 138   POTASSIUM mmol/L 4.9 3.9 4.4 4.6   CHLORIDE mmol/L 106 103 104 101   CO2 mmol/L 25.7 26.0 25.5 26.0   BUN mg/dL 31* 39* 55* 66*   CREATININE mg/dL 1.32* 1.36* 1.65*  1.97*   GLUCOSE mg/dL 113* 168* 112* 132*   EGFR mL/min/1.73 55.6* 53.6* 42.5* 34.4*     Results from last 7 days   Lab Units 05/05/25 0527 05/04/25  0804 05/03/25  0542 05/02/25  0821 05/01/25  1257   ALBUMIN g/dL 3.5 3.7 3.5 3.8 3.7   BILIRUBIN mg/dL  --   --  0.6 0.5 0.5   ALK PHOS U/L  --   --  92 97 118*   AST (SGOT) U/L  --   --  29 21 20   ALT (SGPT) U/L  --   --  17 17 15     Results from last 7 days   Lab Units 05/05/25 0527 05/04/25  0804 05/03/25 0542 05/02/25  0821   CALCIUM mg/dL 8.7 8.7 8.6 8.4*   ALBUMIN g/dL 3.5 3.7 3.5 3.8   MAGNESIUM mg/dL 2.7* 2.7* 2.6* 2.5*   PHOSPHORUS mg/dL 3.0 2.7 2.8  --        Hemoglobin A1C   Date/Time Value Ref Range Status   05/02/2025 0821 6.80 (H) 4.80 - 5.60 % Final     Glucose   Date/Time Value Ref Range Status   05/05/2025 0615 124 70 - 130 mg/dL Final   05/04/2025 2017 138 (H) 70 - 130 mg/dL Final   05/04/2025 1545 192 (H) 70 - 130 mg/dL Final   05/04/2025 1127 162 (H) 70 - 130 mg/dL Final   05/04/2025 0624 136 (H) 70 - 130 mg/dL Final   05/03/2025 2119 138 (H) 70 - 130 mg/dL Final   05/03/2025 1614 198 (H) 70 - 130 mg/dL Final       No radiology results for the last day    I have personally reviewed all medications:  Scheduled Medications  allopurinol, 300 mg, Oral, Nightly  aspirin, 81 mg, Oral, Daily  atorvastatin, 40 mg, Oral, Nightly  clopidogrel, 75 mg, Oral, Daily  cyclobenzaprine, 10 mg, Oral, Q8H  ferrous sulfate, 325 mg, Oral, Daily With Breakfast  folic acid, 400 mcg, Oral, Daily  insulin lispro, 2-7 Units, Subcutaneous, 4x Daily AC & at Bedtime  metoprolol succinate XL, 25 mg, Oral, Q PM  multivitamin with minerals, 1 tablet, Oral, Daily  predniSONE, 10 mg, Oral, Daily  pyridostigmine, 60 mg, Oral, TID  sodium chloride, 10 mL, Intravenous, Q12H  tamsulosin, 0.4 mg, Oral, Q PM  thiamine, 100 mg, Oral, Daily  traZODone, 50 mg, Oral, Nightly  vitamin B-12, 1,000 mcg, Oral, Daily    Infusions   Diet  Diet: Cardiac, Diabetic, Renal; Healthy Heart (2-3  Na+); Consistent Carbohydrate; Low Sodium (2-3g), Low Potassium, Low Phosphorus; Fluid Consistency: Thin (IDDSI 0)    I have personally reviewed:  [x]  Laboratory   [x]  Microbiology   [x]  Radiology   [x]  EKG/Telemetry  [x]  Cardiology/Vascular   []  Pathology    []  Records       Assessment/Plan     Active Hospital Problems    Diagnosis  POA    **Acute kidney injury [N17.9]  Yes    Recurrent falls [R29.6]  Not Applicable    Generalized weakness [R53.1]  Yes    Anemia [D64.9]  Yes    Alcohol dependence with withdrawal [F10.239]  Yes    Alcoholic cirrhosis of liver with ascites [K70.31]  Yes    Other cirrhosis of liver [K74.69]  Yes    Type 2 diabetes mellitus with hyperglycemia, without long-term current use of insulin [E11.65]  Yes    Myasthenia gravis [G70.00]  Yes    Stage 3b chronic kidney disease [N18.32]  Yes    Benign prostatic hyperplasia [N40.0]  Yes    Essential hypertension [I10]  Yes    Permanent atrial fibrillation [I48.21]  Yes    PVD (peripheral vascular disease) with claudication [I73.9]  Yes      Resolved Hospital Problems   No resolved problems to display.   Mild acute kidney injury with prerenal azotemia probably component volume depletion, which is most likely the cause for his prior fall. Renal function improved with IV fluids. Serum creatinine is down to 1.36 mg/dl today which is actually a little better than his baseline.   Chronic kidney disease stage IIIb, secondary to diabetic and hypertensive glomerulosclerosis baseline creatinine 1.8-2.  Alcoholic cirrhosis  Hypertension with chronic kidney disease currently blood pressure on the low side.  Atrial fibrillation with controlled rate  Coronary artery disease with PCI and stent in the past also he had urgent cath with PCI to LAD on 4/22/2025  Diabetes mellitus type 2 with renal complication managed by the primary team  History of myasthenia gravis on steroid  History of gout, stable treated with allopurinol  Anemia and thrombocytopenia  hemoglobin 8.9 platelet 120,000, will monitor       79 y.o. male admitted with Acute kidney injury.    AFVSS  95% on 2 L  Hemoglobin 8.9 down from 9.3  Platelets 115 down from 120 (150 on admit)  Creatinine 1.32 down from 1.36 (baseline around 1.8)  Blood sugars reasonably controlled      Discussed with nephrology.  Creatinine actually below baseline.  Stable for discharge from their perspective.    Patient still very weak and agrees he would likely benefit from rehab.  CCP to follow-up.    Discussed with patient he denies any significant alcohol consumption.  Maybe a beer a couple of times per week.  He was here a couple of weeks ago with a non-STEMI that resulted in MARY to his LAD now on DAPT.  Thought to have alcohol withdrawal during the hospitalization the patient denies.  No current symptoms.    Likely stable for discharge tomorrow if arrangements can be made.           Patient previously refused blood thinners due to excessive bleeding.  He was previously informed by his cardiologist of high stroke risk but patient refuses to take AC.      SCDs for DVT prophylaxis.  Full code.  Discussed with patient and care team on multidisciplinary rounds.  Anticipate discharge to SNU facility in 1-2 days.  Expected Discharge Date: 5/6/2025; Expected Discharge Time:       Mega Kyle MD  [facility]  05/05/25  06:58 EDT

## 2025-05-05 NOTE — PROGRESS NOTES
[facility] Progress Note      Patient Name: Juan James  : 1945  MRN: [id number]  Primary Care Physician:  Padmini Benson APRN  Date of admission: 2025    Subjective     Interval History:   Follow up acute kidney injury on chronic kidney disease    The patient is feeling reasonably well denies any chest pain or shortness of air, no orthopnea or PND, no nausea or vomiting    Review of Systems:   As noted above    Objective     Vitals:   Temp:  [97.7 °F (36.5 °C)-98.1 °F (36.7 °C)] 97.9 °F (36.6 °C)  Heart Rate:  [71-87] 72  Resp:  [18] 18  BP: (102-123)/(60-88) 123/88  Flow (L/min) (Oxygen Therapy):  [2] 2    Intake/Output Summary (Last 24 hours) at 2025 0756  Last data filed at 2025 0529  Gross per 24 hour   Intake --   Output 1125 ml   Net -1125 ml       Physical Exam:    Constitutional: Awake, alert, no acute distress.  Obese, chronically  HEENT: Sclera anicteric, no conjunctival injection, multiple abrasions on his upper and lower extremities  Neck: No JVD  Respiratory: Clear to auscultation bilaterally, nonlabored respiration  Cardiovascular: Irregularly irregular, no rub  Gastrointestinal: Soft, nontender musculoskeletal: No edema, no clubbing or cyanosis  Neurologic: Normal speech and mental status skin: Warm and dry       Scheduled Meds:     allopurinol, 300 mg, Oral, Nightly  aspirin, 81 mg, Oral, Daily  atorvastatin, 40 mg, Oral, Nightly  clopidogrel, 75 mg, Oral, Daily  cyclobenzaprine, 10 mg, Oral, Q8H  ferrous sulfate, 325 mg, Oral, Daily With Breakfast  folic acid, 400 mcg, Oral, Daily  insulin lispro, 2-7 Units, Subcutaneous, 4x Daily AC & at Bedtime  metoprolol succinate XL, 25 mg, Oral, Q PM  multivitamin with minerals, 1 tablet, Oral, Daily  predniSONE, 10 mg, Oral, Daily  pyridostigmine, 60 mg, Oral, TID  sodium chloride, 10 mL, Intravenous, Q12H  tamsulosin, 0.4 mg, Oral, Q PM  thiamine, 100 mg, Oral, Daily  traZODone, 50 mg, Oral,  Nightly  vitamin B-12, 1,000 mcg, Oral, Daily      IV Meds:        Results Reviewed:   I have personally reviewed the results from the time of this admission to 5/5/2025 07:56 EDT     Results from last 7 days   Lab Units 05/05/25 0527 05/04/25  0804 05/03/25  0542 05/02/25  0821 05/01/25  1257   SODIUM mmol/L 140 139 136 138 134*   POTASSIUM mmol/L 4.9 3.9 4.4 4.6 4.4   CHLORIDE mmol/L 106 103 104 101 97*   CO2 mmol/L 25.7 26.0 25.5 26.0 26.0   BUN mg/dL 31* 39* 55* 66* 73*   CREATININE mg/dL 1.32* 1.36* 1.65* 1.97* 2.31*   CALCIUM mg/dL 8.7 8.7 8.6 8.4* 8.8   BILIRUBIN mg/dL  --   --  0.6 0.5 0.5   ALK PHOS U/L  --   --  92 97 118*   ALT (SGPT) U/L  --   --  17 17 15   AST (SGOT) U/L  --   --  29 21 20   GLUCOSE mg/dL 113* 168* 112* 132* 167*       Estimated Creatinine Clearance: 58 mL/min (A) (by C-G formula based on SCr of 1.32 mg/dL (H)).    Results from last 7 days   Lab Units 05/05/25 0527 05/04/25  0804 05/03/25  0542   MAGNESIUM mg/dL 2.7* 2.7* 2.6*   PHOSPHORUS mg/dL 3.0 2.7 2.8       Results from last 7 days   Lab Units 05/05/25 0527 05/04/25  0804 05/03/25  0542   URIC ACID mg/dL 5.1 5.8 6.0       Results from last 7 days   Lab Units 05/05/25 0527 05/04/25  1550 05/04/25  0804 05/03/25  2348 05/03/25  1541 05/03/25  0542 05/02/25  1600 05/02/25  0821 05/01/25  2358 05/01/25  1257   WBC 10*3/mm3 7.60  --  6.38  --   --  6.74  --  7.74  --  9.94   HEMOGLOBIN g/dL 8.9* 9.3* 8.9*  8.9* 8.5* 9.6* 8.9*   < > 9.0*   < > 9.2*   PLATELETS 10*3/mm3 115*  --  120*  --   --  116*  --  130*  --  150    < > = values in this interval not displayed.       Results from last 7 days   Lab Units 05/01/25  1257   INR  1.17*       Assessment / Plan     ASSESSMENT:  Mild acute kidney injury with prerenal azotemia probably component volume depletion, which is most likely the cause for his prior fall. Renal function improved with IV fluids. Serum creatinine is down to 1.32 mg/dl today which is actually a little better than  his baseline.   Chronic kidney disease stage IIIb, secondary to diabetic and hypertensive glomerulosclerosis baseline creatinine 1.8-2.  Alcoholic cirrhosis  Hypertension with chronic kidney disease currently blood pressure on the low side.  Atrial fibrillation with controlled rate  Coronary artery disease with PCI and stent in the past also he had urgent cath with PCI to LAD on 4/22/2025  Diabetes mellitus type 2 with renal complication managed by the primary team  History of myasthenia gravis on steroid  History of gout, stable treated with allopurinol  Anemia and thrombocytopenia hemoglobin 8.9 platelet 115,000, will monitor    PLAN:  Continue the same treatment  Surveillance labs.     I reviewed the chart and other providers notes, reviewed labs.  I discussed the case with the patient, I discussed the case with Dr. Kyle  Copied text in this note has been reviewed and is accurate as of 05/05/25.       Thank you for involving us in the care of Juan James.  Please feel free to call with any questions.    Salvatore Grigsby MD  05/05/25  07:56 EDT    [facility]  [phone]    Please note that portions of this note were completed with a voice recognition program.

## 2025-05-05 NOTE — PLAN OF CARE
Goal Outcome Evaluation:      Pt pleasant, alert and oriented x 4. He ambulates to bathroom, well with standby assist. Pt is still requiring 2L nasal cannula at bedtime. Vitals WDL's.
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
## 2025-05-06 VITALS
OXYGEN SATURATION: 89 % | TEMPERATURE: 97.9 F | BODY MASS INDEX: 28.9 KG/M2 | HEIGHT: 73 IN | DIASTOLIC BLOOD PRESSURE: 75 MMHG | HEART RATE: 108 BPM | WEIGHT: 218.03 LBS | SYSTOLIC BLOOD PRESSURE: 114 MMHG | RESPIRATION RATE: 18 BRPM

## 2025-05-06 LAB
ALBUMIN SERPL-MCNC: 3.3 G/DL (ref 3.5–5.2)
ANION GAP SERPL CALCULATED.3IONS-SCNC: 9.7 MMOL/L (ref 5–15)
BASOPHILS # BLD AUTO: 0.01 10*3/MM3 (ref 0–0.2)
BASOPHILS NFR BLD AUTO: 0.1 % (ref 0–1.5)
BUN SERPL-MCNC: 25 MG/DL (ref 8–23)
BUN/CREAT SERPL: 17.2 (ref 7–25)
CALCIUM SPEC-SCNC: 8.9 MG/DL (ref 8.6–10.5)
CHLORIDE SERPL-SCNC: 106 MMOL/L (ref 98–107)
CO2 SERPL-SCNC: 24.3 MMOL/L (ref 22–29)
CREAT SERPL-MCNC: 1.45 MG/DL (ref 0.76–1.27)
DEPRECATED RDW RBC AUTO: 59.9 FL (ref 37–54)
EGFRCR SERPLBLD CKD-EPI 2021: 49.6 ML/MIN/1.73
EOSINOPHIL # BLD AUTO: 0 10*3/MM3 (ref 0–0.4)
EOSINOPHIL NFR BLD AUTO: 0 % (ref 0.3–6.2)
ERYTHROCYTE [DISTWIDTH] IN BLOOD BY AUTOMATED COUNT: 15.9 % (ref 12.3–15.4)
GLUCOSE BLDC GLUCOMTR-MCNC: 152 MG/DL (ref 70–130)
GLUCOSE BLDC GLUCOMTR-MCNC: 197 MG/DL (ref 70–130)
GLUCOSE SERPL-MCNC: 143 MG/DL (ref 65–99)
HCT VFR BLD AUTO: 27.6 % (ref 37.5–51)
HGB BLD-MCNC: 9 G/DL (ref 13–17.7)
IMM GRANULOCYTES # BLD AUTO: 0.07 10*3/MM3 (ref 0–0.05)
IMM GRANULOCYTES NFR BLD AUTO: 0.8 % (ref 0–0.5)
LYMPHOCYTES # BLD AUTO: 0.86 10*3/MM3 (ref 0.7–3.1)
LYMPHOCYTES NFR BLD AUTO: 9.6 % (ref 19.6–45.3)
MAGNESIUM SERPL-MCNC: 2.5 MG/DL (ref 1.6–2.4)
MCH RBC QN AUTO: 33.8 PG (ref 26.6–33)
MCHC RBC AUTO-ENTMCNC: 32.6 G/DL (ref 31.5–35.7)
MCV RBC AUTO: 103.8 FL (ref 79–97)
MONOCYTES # BLD AUTO: 0.77 10*3/MM3 (ref 0.1–0.9)
MONOCYTES NFR BLD AUTO: 8.6 % (ref 5–12)
NEUTROPHILS NFR BLD AUTO: 7.28 10*3/MM3 (ref 1.7–7)
NEUTROPHILS NFR BLD AUTO: 80.9 % (ref 42.7–76)
NRBC BLD AUTO-RTO: 0.2 /100 WBC (ref 0–0.2)
PHOSPHATE SERPL-MCNC: 3 MG/DL (ref 2.5–4.5)
PLATELET # BLD AUTO: 114 10*3/MM3 (ref 140–450)
PMV BLD AUTO: 8.4 FL (ref 6–12)
POTASSIUM SERPL-SCNC: 4.4 MMOL/L (ref 3.5–5.2)
RBC # BLD AUTO: 2.66 10*6/MM3 (ref 4.14–5.8)
SODIUM SERPL-SCNC: 140 MMOL/L (ref 136–145)
URATE SERPL-MCNC: 5.2 MG/DL (ref 3.4–7)
WBC NRBC COR # BLD AUTO: 8.99 10*3/MM3 (ref 3.4–10.8)

## 2025-05-06 PROCEDURE — 85025 COMPLETE CBC W/AUTO DIFF WBC: CPT | Performed by: INTERNAL MEDICINE

## 2025-05-06 PROCEDURE — 63710000001 PREDNISONE PER 5 MG: Performed by: INTERNAL MEDICINE

## 2025-05-06 PROCEDURE — 63710000001 INSULIN LISPRO (HUMAN) PER 5 UNITS: Performed by: INTERNAL MEDICINE

## 2025-05-06 PROCEDURE — 82948 REAGENT STRIP/BLOOD GLUCOSE: CPT

## 2025-05-06 PROCEDURE — 83735 ASSAY OF MAGNESIUM: CPT | Performed by: INTERNAL MEDICINE

## 2025-05-06 PROCEDURE — 84550 ASSAY OF BLOOD/URIC ACID: CPT | Performed by: INTERNAL MEDICINE

## 2025-05-06 PROCEDURE — 80069 RENAL FUNCTION PANEL: CPT | Performed by: INTERNAL MEDICINE

## 2025-05-06 RX ORDER — TRAZODONE HYDROCHLORIDE 50 MG/1
50 TABLET ORAL NIGHTLY
Start: 2025-05-06

## 2025-05-06 RX ORDER — METOPROLOL SUCCINATE 25 MG/1
25 TABLET, EXTENDED RELEASE ORAL EVERY EVENING
Start: 2025-05-06

## 2025-05-06 RX ORDER — ALBUTEROL SULFATE 90 UG/1
2 INHALANT RESPIRATORY (INHALATION) EVERY 4 HOURS PRN
Status: DISCONTINUED | OUTPATIENT
Start: 2025-05-06 | End: 2025-05-06 | Stop reason: HOSPADM

## 2025-05-06 RX ADMIN — Medication 400 MCG: at 08:41

## 2025-05-06 RX ADMIN — INSULIN LISPRO 2 UNITS: 100 INJECTION, SOLUTION INTRAVENOUS; SUBCUTANEOUS at 11:23

## 2025-05-06 RX ADMIN — PREDNISONE 10 MG: 10 TABLET ORAL at 08:41

## 2025-05-06 RX ADMIN — CYCLOBENZAPRINE HYDROCHLORIDE 10 MG: 10 TABLET, FILM COATED ORAL at 08:41

## 2025-05-06 RX ADMIN — ASPIRIN 81 MG: 81 TABLET, COATED ORAL at 08:41

## 2025-05-06 RX ADMIN — CYCLOBENZAPRINE HYDROCHLORIDE 10 MG: 10 TABLET, FILM COATED ORAL at 14:17

## 2025-05-06 RX ADMIN — Medication 1 TABLET: at 08:41

## 2025-05-06 RX ADMIN — PYRIDOSTIGMINE BROMIDE 60 MG: 60 TABLET ORAL at 08:41

## 2025-05-06 RX ADMIN — FERROUS SULFATE TAB 325 MG (65 MG ELEMENTAL FE) 325 MG: 325 (65 FE) TAB at 08:41

## 2025-05-06 RX ADMIN — Medication 1000 MCG: at 08:41

## 2025-05-06 RX ADMIN — INSULIN LISPRO 2 UNITS: 100 INJECTION, SOLUTION INTRAVENOUS; SUBCUTANEOUS at 08:40

## 2025-05-06 RX ADMIN — Medication 100 MG: at 08:41

## 2025-05-06 RX ADMIN — CLOPIDOGREL BISULFATE 75 MG: 75 TABLET, FILM COATED ORAL at 08:41

## 2025-05-06 NOTE — CASE MANAGEMENT/SOCIAL WORK
Case Management Discharge Note      Final Note: [facility] via Norbert w/laila angulo         Selected Continued Care - Discharged on 5/6/2025 Admission date: 5/1/2025 - Discharge disposition: Rehab Facility or Unit (DC - External)      Destination Coordination complete.      Service Provider Services Address Phone Fax Patient Preferred    [facility] [address] [place] [id number] [phone] --              Durable Medical Equipment    No services have been selected for the patient.                Dialysis/Infusion    No services have been selected for the patient.                Home Medical Care    No services have been selected for the patient.                Therapy    No services have been selected for the patient.                Community Resources    No services have been selected for the patient.                Community & DME    No services have been selected for the patient.                    Selected Continued Care - Episodes Includes continued care and service providers with selected services from the active episodes listed below          Transportation Services  W/C Van: Norbert Angulo    Final Discharge Disposition Code: 62 - inpatient rehab facility

## 2025-05-06 NOTE — NURSING NOTE
"RN notified that patient did not have on his pulse ox. RN found patient to be taking off his monitors/pulse ox. Patient was alert and oriented but did not know why he was doing this. Patient was somewhat anxious. RN then asked patient how many drinks he has a day and patient stated that he has \"maybe one beer a week\". Patient seemed to be displaying signs of withdrawal.  "

## 2025-05-06 NOTE — CASE MANAGEMENT/SOCIAL WORK
Discharge Planning Assessment  [facility]     Patient Name: Juan James  MRN: [id number]  Today's Date: 5/6/2025    Admit Date: 5/1/2025    Plan: Referrals pending for IRF   Discharge Needs Assessment       Row Name 05/06/25 0848       Living Environment    People in Home alone    Current Living Arrangements home    Potentially Unsafe Housing Conditions none    In the past 12 months has the electric, gas, oil, or water company threatened to shut off services in your home? No    Primary Care Provided by self    Provides Primary Care For no one    Family Caregiver if Needed child(taylor), adult    Quality of Family Relationships unable to assess       Resource/Environmental Concerns    Resource/Environmental Concerns none    Transportation Concerns none       Transportation Needs    In the past 12 months, has lack of transportation kept you from medical appointments or from getting medications? no    In the past 12 months, has lack of transportation kept you from meetings, work, or from getting things needed for daily living? No       Food Insecurity    Within the past 12 months, you worried that your food would run out before you got the money to buy more. Never true    Within the past 12 months, the food you bought just didn't last and you didn't have money to get more. Never true       Transition Planning    Patient/Family Anticipates Transition to home with help/services;inpatient rehabilitation facility;home with family    Patient/Family Anticipated Services at Transition rehabilitation services    Transportation Anticipated family or friend will provide       Discharge Needs Assessment    Readmission Within the Last 30 Days no previous admission in last 30 days    Equipment Currently Used at Home none    Concerns to be Addressed discharge planning    Do you want help finding or keeping work or a job? I do not need or want help    Do you want help with school or training? For example, starting or completing  job training or getting a high school diploma, GED or equivalent No    Anticipated Changes Related to Illness none    Equipment Needed After Discharge none                   Discharge Plan       Row Name 05/06/25 0847       Plan    Plan Referrals pending for IRF    Roadmap to Recovery Yes    Patient/Family in Agreement with Plan yes    Plan Comments Spoke with patient at bedside. Introduced self and explained CCP role. Verified face sheet and local pharmacy is [facility], patient choice to enroll in [facility]. Patient denies difficulty with medication cost/ management. Patient lives alone is a s/s home with 4 RENNY. Patient denies prior HH, SNF, and DME history. Patient reports being IADL and still driving at baseline. Patient states that he would like referrals for IRF. Reviewed options and patient agreeable to area referrals. Referrals pending.                  Continued Care and Services - Admitted Since 5/1/2025       Destination       Service Provider Request Status Services Address Phone Fax Patient Preferred    [facility] Pending - Request Sent -- [address] [id number] [phone] --    [facility] Pending - Request Sent -- [address] [id number] [phone] --    [facility] [place] [address] [address] [phone] --                  Selected Continued Care - Episodes Includes continued care and service providers with selected services from the active episodes listed below          Expected Discharge Date and Time       Expected Discharge Date Expected Discharge Time    May 6, 2025            Demographic Summary       Row Name 05/06/25 0847       General Information    Admission Type inpatient    Required Notices Provided Important Message from Medicare    Referral Source admission list    Reason  for Consult discharge planning    Preferred Language English                   Functional Status       Row Name 05/06/25 0847       Functional Status    Usual Activity Tolerance moderate    Current Activity Tolerance fair       Physical Activity    On average, how many days per week do you engage in moderate to strenuous exercise (like a brisk walk)? 0 days    On average, how many minutes do you engage in exercise at this level? 0 min    Number of minutes of exercise per week 0       Functional Status, IADL    Medications independent    Meal Preparation independent    Housekeeping independent    Laundry independent    Shopping independent    If for any reason you need help with day-to-day activities such as bathing, preparing meals, shopping, managing finances, etc., do you get the help you need? I don't need any help       Mental Status    General Appearance WDL WDL       Employment/    Employment Status retired                   Psychosocial       Row Name 05/06/25 0863       Values/Beliefs    Spiritual, Cultural Beliefs, Hoahaoism Practices, Values that Affect Care no       Behavior WDL    Behavior WDL WDL       Emotion Mood WDL    Emotion/Mood/Affect WDL WDL       Mental Health    Little interest or pleasure in doing things Not at all    Feeling down, depressed, or hopeless Not at all       Speech WDL    Speech WDL WDL       Perceptual State WDL    Perceptual State WDL WDL       Thought Process WDL    Thought Process WDL WDL       Intellectual Performance WDL    Intellectual Performance WDL WDL       Stress    Do you feel stress - tense, restless, nervous, or anxious, or unable to sleep at night because your mind is troubled all the time - these days? Only a littl       Coping/Stress    Sources of Support adult child(taylor)       Developmental Stage (Eriksson's Stages of Development)    Developmental Stage Stage 8 (65 years-death/Late Adulthood) Integrity vs. Despair                   Abuse/Neglect    No  documentation.                  Legal       Row Name 05/06/25 0848       Financial Resource Strain    How hard is it for you to pay for the very basics like food, housing, medical care, and heating? Not hard       Financial/Legal    Financial/Environmental Concerns none       Legal    Criminal Activity/Legal Involvement none                   Substance Abuse    No documentation.                  Patient Forms    No documentation.                     Vanessa Seay RN

## 2025-05-06 NOTE — PROGRESS NOTES
Name: Juan James ADMIT: 2025   : 1945  PCP: Padmini Benson APRN    MRN: [id number] LOS: 3 days   AGE/SEX: 79 y.o. male  ROOM: [facility]     Subjective   Subjective   No new complaints.       Objective   Objective   Vital Signs  Temp:  [97.9 °F (36.6 °C)-98.4 °F (36.9 °C)] 98.4 °F (36.9 °C)  Heart Rate:  [71-96] 96  Resp:  [18] 18  BP: (109-136)/(55-95) 134/65  SpO2:  [91 %-97 %] 97 %  on   ;   Device (Oxygen Therapy): room air  Body mass index is 28.77 kg/m².  Physical Exam  Vitals and nursing note reviewed.   Constitutional:       General: He is not in acute distress.  Cardiovascular:      Rate and Rhythm: Normal rate. Rhythm irregularly irregular.   Pulmonary:      Effort: Pulmonary effort is normal.      Breath sounds: Normal breath sounds.   Abdominal:      General: Bowel sounds are normal.      Palpations: Abdomen is soft.      Tenderness: There is no abdominal tenderness.   Musculoskeletal:         General: No swelling.   Skin:     General: Skin is warm and dry.   Neurological:      Mental Status: He is alert. Mental status is at baseline.       Results Review     I reviewed the patient's new clinical results.  Results from last 7 days   Lab Units 25  04525  0525  1550 25  0804 25  1541 25  0542   WBC 10*3/mm3 8.99 7.60  --  6.38  --  6.74   HEMOGLOBIN g/dL 9.0* 8.9* 9.3* 8.9*  8.9*   < > 8.9*   PLATELETS 10*3/mm3 114* 115*  --  120*  --  116*    < > = values in this interval not displayed.     Results from last 7 days   Lab Units 25  04525  0525  0804 25  0542   SODIUM mmol/L 140 140 139 136   POTASSIUM mmol/L 4.4 4.9 3.9 4.4   CHLORIDE mmol/L 106 106 103 104   CO2 mmol/L 24.3 25.7 26.0 25.5   BUN mg/dL 25* 31* 39* 55*   CREATININE mg/dL 1.45* 1.32* 1.36* 1.65*   GLUCOSE mg/dL 143* 113* 168* 112*   EGFR mL/min/1.73 49.6* 55.6* 53.6* 42.5*     Results from last 7 days   Lab Units 25  0457 25  0520  05/04/25  0804 05/03/25  0542 05/02/25  0821 05/01/25  1257   ALBUMIN g/dL 3.3* 3.5 3.7 3.5 3.8 3.7   BILIRUBIN mg/dL  --   --   --  0.6 0.5 0.5   ALK PHOS U/L  --   --   --  92 97 118*   AST (SGOT) U/L  --   --   --  29 21 20   ALT (SGPT) U/L  --   --   --  17 17 15     Results from last 7 days   Lab Units 05/06/25  0457 05/05/25  0527 05/04/25  0804 05/03/25  0542   CALCIUM mg/dL 8.9 8.7 8.7 8.6   ALBUMIN g/dL 3.3* 3.5 3.7 3.5   MAGNESIUM mg/dL 2.5* 2.7* 2.7* 2.6*   PHOSPHORUS mg/dL 3.0 3.0 2.7 2.8       Glucose   Date/Time Value Ref Range Status   05/06/2025 0630 152 (H) 70 - 130 mg/dL Final   05/05/2025 2021 189 (H) 70 - 130 mg/dL Final   05/05/2025 1539 197 (H) 70 - 130 mg/dL Final   05/05/2025 1034 150 (H) 70 - 130 mg/dL Final   05/05/2025 0615 124 70 - 130 mg/dL Final   05/04/2025 2017 138 (H) 70 - 130 mg/dL Final   05/04/2025 1545 192 (H) 70 - 130 mg/dL Final       No radiology results for the last day    I have personally reviewed all medications:  Scheduled Medications  allopurinol, 300 mg, Oral, Nightly  aspirin, 81 mg, Oral, Daily  atorvastatin, 40 mg, Oral, Nightly  clopidogrel, 75 mg, Oral, Daily  cyclobenzaprine, 10 mg, Oral, Q8H  ferrous sulfate, 325 mg, Oral, Daily With Breakfast  folic acid, 400 mcg, Oral, Daily  insulin lispro, 2-7 Units, Subcutaneous, 4x Daily AC & at Bedtime  metoprolol succinate XL, 25 mg, Oral, Q PM  multivitamin with minerals, 1 tablet, Oral, Daily  predniSONE, 10 mg, Oral, Daily  pyridostigmine, 60 mg, Oral, TID  sodium chloride, 10 mL, Intravenous, Q12H  tamsulosin, 0.4 mg, Oral, Q PM  thiamine, 100 mg, Oral, Daily  traZODone, 50 mg, Oral, Nightly  vitamin B-12, 1,000 mcg, Oral, Daily    Infusions   Diet  Diet: Cardiac, Diabetic; Healthy Heart (2-3 Na+); Consistent Carbohydrate; Fluid Consistency: Thin (IDDSI 0)    I have personally reviewed:  [x]  Laboratory   [x]  Microbiology   [x]  Radiology   [x]  EKG/Telemetry  [x]  Cardiology/Vascular   []  Pathology    []   Records       Assessment/Plan     Active Hospital Problems    Diagnosis  POA    **Acute kidney injury [N17.9]  Yes    Recurrent falls [R29.6]  Not Applicable    Generalized weakness [R53.1]  Yes    Anemia [D64.9]  Yes    Alcohol dependence with withdrawal [F10.239]  Yes    Alcoholic cirrhosis of liver with ascites [K70.31]  Yes    Other cirrhosis of liver [K74.69]  Yes    Type 2 diabetes mellitus with hyperglycemia, without long-term current use of insulin [E11.65]  Yes    Myasthenia gravis [G70.00]  Yes    Stage 3b chronic kidney disease [N18.32]  Yes    Benign prostatic hyperplasia [N40.0]  Yes    Essential hypertension [I10]  Yes    Permanent atrial fibrillation [I48.21]  Yes    PVD (peripheral vascular disease) with claudication [I73.9]  Yes      Resolved Hospital Problems   No resolved problems to display.        79 y.o. male admitted with Acute kidney injury.    AFVSS 97% on room air  Hemoglobin 9 which is stable  Platelets 114 stable  Creatinine 1.45 up from 1.32, still below baseline  BS reasonably controlled      Discussed with nephrology.  Creatinine actually below baseline.  Stable for discharge from their perspective.  Plan is to go to rehab when bed available.  Possibly as early as today.    Has had no signs of alcohol withdrawal during this hospitalization.      Patient previously refused blood thinners due to excessive bleeding.  He was previously informed by his cardiologist of high stroke risk but patient refuses to take AC.  SCDs for DVT prophylaxis.  Full code.  Discussed with patient and care team on multidisciplinary rounds.  Anticipate discharge to SNU facility once arrangements have been made.  Expected Discharge Date: 5/6/2025; Expected Discharge Time:       Mega Kyle MD  [facility]  05/06/25  09:54 EDT

## 2025-05-06 NOTE — PROGRESS NOTES
[facility] Progress Note      Patient Name: Juan James  : 1945  MRN: [id number]  Primary Care Physician:  Padmini Benson APRN  Date of admission: 2025    Subjective     Interval History:   Follow up acute kidney injury on chronic kidney disease    The patient is feeling reasonably well denies any chest pain or shortness of air, no orthopnea or PND, no nausea or vomiting    Review of Systems:   As noted above    Objective     Vitals:   Temp:  [97.9 °F (36.6 °C)-98 °F (36.7 °C)] 97.9 °F (36.6 °C)  Heart Rate:  [71-76] 73  Resp:  [18] 18  BP: (109-136)/(55-95) 136/93    Intake/Output Summary (Last 24 hours) at 2025 0759  Last data filed at 2025 0500  Gross per 24 hour   Intake 0 ml   Output 300 ml   Net -300 ml       Physical Exam:    Constitutional: Awake, alert, no acute distress.  Obese, chronically  HEENT: Sclera anicteric, no conjunctival injection, multiple abrasions on his upper and lower extremities  Neck: No JVD  Respiratory: Clear to auscultation bilaterally, nonlabored respiration  Cardiovascular: Irregularly irregular, no rub  Gastrointestinal: Soft, nontender musculoskeletal: No edema, no clubbing or cyanosis  Neurologic: Normal speech and mental status skin: Warm and dry       Scheduled Meds:     allopurinol, 300 mg, Oral, Nightly  aspirin, 81 mg, Oral, Daily  atorvastatin, 40 mg, Oral, Nightly  clopidogrel, 75 mg, Oral, Daily  cyclobenzaprine, 10 mg, Oral, Q8H  ferrous sulfate, 325 mg, Oral, Daily With Breakfast  folic acid, 400 mcg, Oral, Daily  insulin lispro, 2-7 Units, Subcutaneous, 4x Daily AC & at Bedtime  metoprolol succinate XL, 25 mg, Oral, Q PM  multivitamin with minerals, 1 tablet, Oral, Daily  predniSONE, 10 mg, Oral, Daily  pyridostigmine, 60 mg, Oral, TID  sodium chloride, 10 mL, Intravenous, Q12H  tamsulosin, 0.4 mg, Oral, Q PM  thiamine, 100 mg, Oral, Daily  traZODone, 50 mg, Oral, Nightly  vitamin B-12, 1,000 mcg, Oral,  Daily      IV Meds:        Results Reviewed:   I have personally reviewed the results from the time of this admission to 5/6/2025 07:59 EDT     Results from last 7 days   Lab Units 05/06/25 0457 05/05/25 0527 05/04/25  0804 05/03/25  0542 05/02/25  0821 05/01/25  1257   SODIUM mmol/L 140 140 139 136 138 134*   POTASSIUM mmol/L 4.4 4.9 3.9 4.4 4.6 4.4   CHLORIDE mmol/L 106 106 103 104 101 97*   CO2 mmol/L 24.3 25.7 26.0 25.5 26.0 26.0   BUN mg/dL 25* 31* 39* 55* 66* 73*   CREATININE mg/dL 1.45* 1.32* 1.36* 1.65* 1.97* 2.31*   CALCIUM mg/dL 8.9 8.7 8.7 8.6 8.4* 8.8   BILIRUBIN mg/dL  --   --   --  0.6 0.5 0.5   ALK PHOS U/L  --   --   --  92 97 118*   ALT (SGPT) U/L  --   --   --  17 17 15   AST (SGOT) U/L  --   --   --  29 21 20   GLUCOSE mg/dL 143* 113* 168* 112* 132* 167*       Estimated Creatinine Clearance: 52.8 mL/min (A) (by C-G formula based on SCr of 1.45 mg/dL (H)).    Results from last 7 days   Lab Units 05/06/25 0457 05/05/25 0527 05/04/25  0804   MAGNESIUM mg/dL 2.5* 2.7* 2.7*   PHOSPHORUS mg/dL 3.0 3.0 2.7       Results from last 7 days   Lab Units 05/06/25 0457 05/05/25 0527 05/04/25  0804 05/03/25  0542   URIC ACID mg/dL 5.2 5.1 5.8 6.0       Results from last 7 days   Lab Units 05/06/25 0457 05/05/25 0527 05/04/25  1550 05/04/25  0804 05/03/25  2348 05/03/25  1541 05/03/25  0542 05/02/25  1600 05/02/25  0821   WBC 10*3/mm3 8.99 7.60  --  6.38  --   --  6.74  --  7.74   HEMOGLOBIN g/dL 9.0* 8.9* 9.3* 8.9*  8.9* 8.5*   < > 8.9*   < > 9.0*   PLATELETS 10*3/mm3 114* 115*  --  120*  --   --  116*  --  130*    < > = values in this interval not displayed.       Results from last 7 days   Lab Units 05/01/25  1257   INR  1.17*       Assessment / Plan     ASSESSMENT:  Mild acute kidney injury with prerenal azotemia probably component volume depletion, which is most likely the cause for his prior fall. Renal function improved with IV fluids. Serum creatinine is 1.45 mg/dl, remains below baseline chronic  kidney disease stage IIIb, secondary to diabetic and hypertensive glomerulosclerosis baseline creatinine 1.8-2.  Alcoholic cirrhosis  Hypertension with chronic kidney disease currently blood pressure on the low side.  Atrial fibrillation with controlled rate  Coronary artery disease with PCI and stent in the past also he had urgent cath with PCI to LAD on 4/22/2025  Diabetes mellitus type 2 with renal complication managed by the primary team  History of myasthenia gravis on steroid  History of gout, stable treated with allopurinol  Anemia and thrombocytopenia hemoglobin 8.9 platelet 115,000, will monitor    PLAN:  Continue the same treatment  The patient could be discharged anytime from the renal standpoint and will plan follow-up in our office postdischarge.  Surveillance labs.     I reviewed the chart and other providers notes, reviewed labs.  I discussed the case with the patient.  Copied text in this note has been reviewed and is accurate as of 05/06/25.       Thank you for involving us in the care of Juan James.  Please feel free to call with any questions.    Salvatore Grigsby MD  05/06/25  07:59 EDT    [facility]  [phone]    Please note that portions of this note were completed with a voice recognition program.

## 2025-05-06 NOTE — DISCHARGE SUMMARY
Patient Name: Juan James  : 1945  MRN: [id number]    Date of Admission: 2025  Date of Discharge:  2025  Primary Care Physician: Padmini Benson APRN      Chief Complaint:   Fall      Discharge Diagnoses     Active Hospital Problems    Diagnosis  POA    **Acute kidney injury [N17.9]  Yes    Recurrent falls [R29.6]  Not Applicable    Generalized weakness [R53.1]  Yes    Anemia [D64.9]  Yes    Alcohol dependence with withdrawal [F10.239]  Yes    Alcoholic cirrhosis of liver with ascites [K70.31]  Yes    Other cirrhosis of liver [K74.69]  Yes    Type 2 diabetes mellitus with hyperglycemia, without long-term current use of insulin [E11.65]  Yes    Myasthenia gravis [G70.00]  Yes    Stage 3b chronic kidney disease [N18.32]  Yes    Benign prostatic hyperplasia [N40.0]  Yes    Essential hypertension [I10]  Yes    Permanent atrial fibrillation [I48.21]  Yes    PVD (peripheral vascular disease) with claudication [I73.9]  Yes      Resolved Hospital Problems   No resolved problems to display.        Hospital Course     Mr. James is a 79 y.o. male with a history of multiple medical problems who presented to [facility] initially complaining of recurrent falls.  Please see the admitting history and physical for further details.  He was found to have SHANNAN in the ED and was admitted to the hospital for further evaluation and treatment.  He was seen by nephrology and felt to have prerenal acidemia.  He was given IV fluids and creatinine has returned to baseline.  Recurrent falls felt secondary to dehydration and resultant low BP.  Metoprolol dose was decreased.  Symptoms are improved now but he is still very weak and will plan discharge to rehab facility.  Discussed with nephrology who is okay with him resuming preadmission diuretics and losartan.    Discussed with patient he denies any significant alcohol consumption.  Maybe a beer a couple of times per week.  He was here a couple of weeks  ago with a non-STEMI that resulted in MARY to his LAD now on DAPT.  Thought to have alcohol withdrawal during the hospitalization the patient denies.  No current symptoms.    Patient previously refused blood thinners due to excessive bleeding.  He was previously informed by his cardiologist of high stroke risk but patient refuses to take AC.    Arranges been made and plan for to discharge to acute rehab facility today.      Day of Discharge     Subjective:  See today's progress note    Physical Exam:  Temp:  [97.9 °F (36.6 °C)-98.4 °F (36.9 °C)] 97.9 °F (36.6 °C)  Heart Rate:  [] 108  Resp:  [18] 18  BP: (109-136)/(55-95) 114/75  Body mass index is 28.77 kg/m².  Physical Exam    Consultants     Consult Orders (all) (From admission, onward)       Start     Ordered    05/01/25 1700  Inpatient Nephrology Consult  Once        Specialty:  Nephrology  Provider:  Jose Bean MD    05/01/25 1659    05/01/25 1659  Inpatient Case Management  Consult  Once        Provider:  (Not yet assigned)    05/01/25 1658    05/01/25 1659  Inpatient Diabetes Educator Consult  Once        Provider:  (Not yet assigned)    05/01/25 1659            Procedures     Imaging Results (All)       Procedure Component Value Units Date/Time    US Abdomen Limited [[id number]] Collected: 05/02/25 0722     Updated: 05/02/25 0735    Narrative:      Targeted abdominal sonogram to evaluate for ascites     HISTORY: Ascites     COMPARISON: Right quadrant sonogram 2/25/2025     TECHNIQUE: Targeted grayscale images of the quadrants of the abdomen  were obtained to evaluate for ascites.       Impression:      FINDINGS AND IMPRESSION:  No sonographic findings of significant ascites are visualized on  provided images.        This report was finalized on 5/2/2025 7:32 AM by Dr. Ton Moscoso M.D  on Workstation: [id number] Knee 1 or 2 View Right [[id number]] Collected: 05/01/25 1345     Updated: 05/01/25 1353    Narrative:       XR ELBOW 2 VW LEFT-, XR KNEE 1 OR 2 VW RIGHT-, XR CHEST 1 VW-, XR TIBIA  FIBULA 2 VW RIGHT-     HISTORY: Male who is 79 years-old, trauma     TECHNIQUE: Frontal view of the chest, 2 views of the right lower leg, 2  views of the right knee, 3 views of the left elbow     COMPARISON: 4/23/2025, 11/16/2020     FINDINGS:     Chest x-ray: The heart is enlarged. Pulmonary vasculature is  unremarkable. Aorta is calcified. No focal pulmonary consolidation,  pleural effusion, or pneumothorax. Old granulomatous disease is  apparent. No acute osseous process.     Left elbow: No acute fracture, erosion, dislocation, or elbow effusion  is noted. Soft tissue swelling is apparent medially at the elbow.     Right knee and right lower leg: No acute fracture, erosion, or  dislocation is identified. Arterial calcifications are present. Minimal  degenerative spurring is seen at the knee. Trace knee effusion.     Follow-up/further evaluation can be obtained as indications persist.       Impression:      As described.     This report was finalized on 5/1/2025 1:50 PM by Dr. Branden Bond M.D on Workstation: [id number]       XR Tibia Fibula 2 View Right [[id number]] Collected: 05/01/25 1345     Updated: 05/01/25 1353    Narrative:      XR ELBOW 2 VW LEFT-, XR KNEE 1 OR 2 VW RIGHT-, XR CHEST 1 VW-, XR TIBIA  FIBULA 2 VW RIGHT-     HISTORY: Male who is 79 years-old, trauma     TECHNIQUE: Frontal view of the chest, 2 views of the right lower leg, 2  views of the right knee, 3 views of the left elbow     COMPARISON: 4/23/2025, 11/16/2020     FINDINGS:     Chest x-ray: The heart is enlarged. Pulmonary vasculature is  unremarkable. Aorta is calcified. No focal pulmonary consolidation,  pleural effusion, or pneumothorax. Old granulomatous disease is  apparent. No acute osseous process.     Left elbow: No acute fracture, erosion, dislocation, or elbow effusion  is noted. Soft tissue swelling is apparent medially at the elbow.     Right knee and  right lower leg: No acute fracture, erosion, or  dislocation is identified. Arterial calcifications are present. Minimal  degenerative spurring is seen at the knee. Trace knee effusion.     Follow-up/further evaluation can be obtained as indications persist.       Impression:      As described.     This report was finalized on 5/1/2025 1:50 PM by Dr. Branden Bond M.D on Workstation: [id number]       XR Chest 1 View [[id number]] Collected: 05/01/25 1345     Updated: 05/01/25 1353    Narrative:      XR ELBOW 2 VW LEFT-, XR KNEE 1 OR 2 VW RIGHT-, XR CHEST 1 VW-, XR TIBIA  FIBULA 2 VW RIGHT-     HISTORY: Male who is 79 years-old, trauma     TECHNIQUE: Frontal view of the chest, 2 views of the right lower leg, 2  views of the right knee, 3 views of the left elbow     COMPARISON: 4/23/2025, 11/16/2020     FINDINGS:     Chest x-ray: The heart is enlarged. Pulmonary vasculature is  unremarkable. Aorta is calcified. No focal pulmonary consolidation,  pleural effusion, or pneumothorax. Old granulomatous disease is  apparent. No acute osseous process.     Left elbow: No acute fracture, erosion, dislocation, or elbow effusion  is noted. Soft tissue swelling is apparent medially at the elbow.     Right knee and right lower leg: No acute fracture, erosion, or  dislocation is identified. Arterial calcifications are present. Minimal  degenerative spurring is seen at the knee. Trace knee effusion.     Follow-up/further evaluation can be obtained as indications persist.       Impression:      As described.     This report was finalized on 5/1/2025 1:50 PM by Dr. Branden Bond M.D on Workstation: [id number]       XR ELBOW 2 VIEW LEFT [[id number]] Collected: 05/01/25 1345     Updated: 05/01/25 1353    Narrative:      XR ELBOW 2 VW LEFT-, XR KNEE 1 OR 2 VW RIGHT-, XR CHEST 1 VW-, XR TIBIA  FIBULA 2 VW RIGHT-     HISTORY: Male who is 79 years-old, trauma     TECHNIQUE: Frontal view of the chest, 2 views of the right lower leg,  2  views of the right knee, 3 views of the left elbow     COMPARISON: 4/23/2025, 11/16/2020     FINDINGS:     Chest x-ray: The heart is enlarged. Pulmonary vasculature is  unremarkable. Aorta is calcified. No focal pulmonary consolidation,  pleural effusion, or pneumothorax. Old granulomatous disease is  apparent. No acute osseous process.     Left elbow: No acute fracture, erosion, dislocation, or elbow effusion  is noted. Soft tissue swelling is apparent medially at the elbow.     Right knee and right lower leg: No acute fracture, erosion, or  dislocation is identified. Arterial calcifications are present. Minimal  degenerative spurring is seen at the knee. Trace knee effusion.     Follow-up/further evaluation can be obtained as indications persist.       Impression:      As described.     This report was finalized on 5/1/2025 1:50 PM by Dr. Branden Bond M.D on Workstation: [email or web address]        Results for orders placed during the hospital encounter of 04/18/25    Adult Transthoracic Echo Complete W/ Cont if Necessary Per Protocol    Interpretation Summary    Left ventricular ejection fraction appears to be 66 - 70%.    Left ventricular diastolic function was indeterminate.    The left atrial cavity is moderately dilated.    Left atrial volume is moderately increased.    The right atrial cavity is mildly  dilated.    There is calcification of the aortic valve.    Estimated right ventricular systolic pressure from tricuspid regurgitation is markedly elevated (>55 mmHg).    Pertinent Labs     Results from last 7 days   Lab Units 05/06/25  0457 05/05/25  0527 05/04/25  1550 05/04/25  0804 05/03/25  1541 05/03/25  0542   WBC 10*3/mm3 8.99 7.60  --  6.38  --  6.74   HEMOGLOBIN g/dL 9.0* 8.9* 9.3* 8.9*  8.9*   < > 8.9*   PLATELETS 10*3/mm3 114* 115*  --  120*  --  116*    < > = values in this interval not displayed.     Results from last 7 days   Lab Units 05/06/25  0457 05/05/25  0527 05/04/25  0804 05/03/25  0542  "  SODIUM mmol/L 140 140 139 136   POTASSIUM mmol/L 4.4 4.9 3.9 4.4   CHLORIDE mmol/L 106 106 103 104   CO2 mmol/L 24.3 25.7 26.0 25.5   BUN mg/dL 25* 31* 39* 55*   CREATININE mg/dL 1.45* 1.32* 1.36* 1.65*   GLUCOSE mg/dL 143* 113* 168* 112*   EGFR mL/min/1.73 49.6* 55.6* 53.6* 42.5*     Results from last 7 days   Lab Units 05/06/25 0457 05/05/25 0527 05/04/25  0804 05/03/25  0542 05/02/25  0821 05/01/25  1257   ALBUMIN g/dL 3.3* 3.5 3.7 3.5 3.8 3.7   BILIRUBIN mg/dL  --   --   --  0.6 0.5 0.5   ALK PHOS U/L  --   --   --  92 97 118*   AST (SGOT) U/L  --   --   --  29 21 20   ALT (SGPT) U/L  --   --   --  17 17 15     Results from last 7 days   Lab Units 05/06/25 0457 05/05/25 0527 05/04/25  0804 05/03/25  0542   CALCIUM mg/dL 8.9 8.7 8.7 8.6   ALBUMIN g/dL 3.3* 3.5 3.7 3.5   MAGNESIUM mg/dL 2.5* 2.7* 2.7* 2.6*   PHOSPHORUS mg/dL 3.0 3.0 2.7 2.8     Results from last 7 days   Lab Units 05/01/25  1850   AMMONIA umol/L 16       Results from last 7 days   Lab Units 05/06/25 0457 05/03/25  0542 05/02/25  1031   SODIUM UR mmol/L  --   --  68   CREATININE UR mg/dL  --   --  38.3   PROTEIN TOTAL URINE mg/dL  --   --  17.5   EOSINOPHIL SMEAR % EOS/100 Cells  --   --  0.0   URIC ACID mg/dL 5.2   < >  --    PROT/CREAT RATIO UR mg/G Crea  --   --  456.9*    < > = values in this interval not displayed.         Invalid input(s): \"LDLCALC\"          Test Results Pending at Discharge     Pending Results       None              Discharge Details        Discharge Medications        New Medications        Instructions Start Date   cyanocobalamin 1000 MCG tablet  Commonly known as: VITAMIN B-12  Replaces: Vitamin B-12 5000 MCG tablet dispersible   1,000 mcg, Oral, Daily   Start Date: May 7, 2025            Changes to Medications        Instructions Start Date   metoprolol succinate XL 25 MG 24 hr tablet  Commonly known as: TOPROL-XL  What changed:   medication strength  how much to take   25 mg, Oral, Every Evening      rOPINIRole " 1 MG tablet  Commonly known as: REQUIP  What changed: See the new instructions.   TAKE 1 TABLET BY MOUTH NIGHTLY TAKE 1 HOUR PRIOR BEFORE BEDTIME      traZODone 50 MG tablet  Commonly known as: DESYREL  What changed:   medication strength  how much to take   50 mg, Oral, Nightly             Continue These Medications        Instructions Start Date   albuterol sulfate  (90 Base) MCG/ACT inhaler  Commonly known as: PROVENTIL HFA;VENTOLIN HFA;PROAIR HFA   2 puffs, Every 4 Hours PRN      allopurinol 300 MG tablet  Commonly known as: ZYLOPRIM   300 mg, Oral, Nightly      aspirin 81 MG EC tablet   81 mg, Oral, Daily      atorvastatin 40 MG tablet  Commonly known as: LIPITOR   40 mg, Oral, Nightly      clopidogrel 75 MG tablet  Commonly known as: PLAVIX   75 mg, Oral, Daily      cyclobenzaprine 10 MG tablet  Commonly known as: FLEXERIL   10 mg, Oral, Nightly PRN      Farxiga 5 MG tablet tablet  Generic drug: dapagliflozin   5 mg, Every Morning      Ferrous Fumarate 325 (106 Fe) MG tablet   Take  by mouth.      folic acid 400 MCG tablet  Commonly known as: FOLVITE   400 mcg, Oral, Daily      gabapentin 300 MG capsule  Commonly known as: NEURONTIN   Take 1 capsule by mouth 3 (Three) Times a Day.      losartan 25 MG tablet  Commonly known as: COZAAR   1 tablet, Daily      multivitamin with minerals tablet tablet   1 tablet, Daily      predniSONE 10 MG tablet  Commonly known as: DELTASONE   10 mg, Daily      pyridostigmine 60 MG tablet  Commonly known as: MESTINON   60 mg, 3 Times Daily      spironolactone 50 MG tablet  Commonly known as: Aldactone   75 mg, Oral, Daily      tamsulosin 0.4 MG capsule 24 hr capsule  Commonly known as: FLOMAX   0.4 mg, Oral, Every Evening      thiamine 100 MG tablet  Commonly known as: VITAMIN B1   100 mg, Oral, Daily      torsemide 100 MG tablet  Commonly known as: DEMADEX   1 tablet, Daily      Trelegy Ellipta 100-62.5-25 MCG/ACT inhaler  Generic drug: Fluticasone-Umeclidin-Vilant   1  "puff, Daily - RT             Stop These Medications      Afrin Original 0.05 % nasal spray  Generic drug: oxymetazoline     cephalexin 500 MG capsule  Commonly known as: KEFLEX     COLLAGEN-VITAMIN C PO     Vitamin B-12 5000 MCG tablet dispersible  Replaced by: cyanocobalamin 1000 MCG tablet              Allergies   Allergen Reactions    Rivaroxaban Other (See Comments)     \"LOST TOO MUCH BLOOD\"    Ranolazine Er Irritability and Headache     Headaches and falling asleep.  Irritability    Apixaban Headache    Indomethacin Dizziness    Lisinopril Unknown - High Severity and Unknown - Low Severity     DOES NOT REMEMBER       Discharge Disposition:  Rehab Facility or Unit (DC - External)      Discharge Diet:  Diet Order   Procedures    Diet: Cardiac, Diabetic; Healthy Heart (2-3 Na+); Consistent Carbohydrate; Fluid Consistency: Thin (IDDSI 0)       Discharge Activity:   Activity Instructions       Activity as Tolerated              CODE STATUS:    Code Status and Medical Interventions: CPR (Attempt to Resuscitate); Full Support   Ordered at: 05/01/25 1457     Code Status (Patient has no pulse and is not breathing):    CPR (Attempt to Resuscitate)     Medical Interventions (Patient has pulse or is breathing):    Full Support       Future Appointments   Date Time Provider Department Center   5/27/2025  9:40 AM Stephan Harvey MD [facility] SPMD EPT [facility]   6/13/2025 11:00 AM Chris Mo III, MD [facility] CD [id number] [facility]   8/4/2025  9:15 AM [facility] LCG ECHO/VAS BACK RM BH LCG ECHO [facility]   8/18/2025  8:00 AM [facility] OP VAS RM 1 BH [facility] OVKR [facility]   8/18/2025  8:30 AM Jean Patricia MD [facility] VS [facility]   3/5/2026  9:30 AM [facility] BRKG CT 1 BH [facility] CT BR None   4/8/2026 11:45 AM Tre Kiran MD [facility] CTS [facility]     Additional Instructions for the Follow-ups that You Need to Schedule       Discharge Follow-up with PCP   As directed       Currently Documented PCP:    Padmini Benson APRN    PCP Phone Number:    [phone]     Follow Up " Details: 1 to 2 weeks (or sooner if problems)               Follow-up Information       Padmini Benson APRN .    Specialty: Nurse Practitioner  Why: 1 to 2 weeks (or sooner if problems)  Contact information:  [address]  [place] [id number] [phone]                             Additional Instructions for the Follow-ups that You Need to Schedule       Discharge Follow-up with PCP   As directed       Currently Documented PCP:    Padmini Benson APRN    PCP Phone Number:    [phone]     Follow Up Details: 1 to 2 weeks (or sooner if problems)            Time Spent on Discharge:  Greater than 30 minutes      Mega Kyle MD  [facility]  05/06/25  11:35 EDT

## 2025-05-14 ENCOUNTER — PATIENT OUTREACH (OUTPATIENT)
Dept: CASE MANAGEMENT | Facility: OTHER | Age: 80
End: 2025-05-14
Payer: MEDICARE

## 2025-05-14 NOTE — OUTREACH NOTE
AMBULATORY CASE MANAGEMENT NOTE    Names and Relationships of Patient/Support Persons: Contact: Aleks Posey; Relationship: Other -     SNF Follow-up    Questions/Answers      Flowsheet Row Responses   Acute Facility Discharged From [facility]   Acute Discharge Date 05/06/25   Name of the Skilled Nursing Facility? [facility]   Purpose of SNF Admission PT, OT, SN   Who is the insurance provider or payor of patient stay? Medicare   Skilled Nursing Discharge Date? 05/13/25          Patient Outreach    Outgoing call to Aleks Posey for follow up. Verified that the pateint was DC on 5/13.  Outgoing call to the patient for follow up and voicemail left with ACM contact and message.  Will outreach again tomorrow for admission follow up and to offer HRCM services.     Paris COE  Ambulatory Case Management    5/14/2025, 12:55 EDT

## 2025-05-15 ENCOUNTER — TELEPHONE (OUTPATIENT)
Dept: CARDIOLOGY | Age: 80
End: 2025-05-15

## 2025-05-15 NOTE — TELEPHONE ENCOUNTER
Caller: Juan James    Relationship: Self    Best call back number:       PATIENT WAS IN THE HOSPITAL, HE HAD A HEART ATTACK.  HE IS NEEDING A HOSPITAL FOLLOW UP, NO DIRECTIONS IN DISCHARGE NOTES,

## 2025-05-16 DIAGNOSIS — G25.81 RLS (RESTLESS LEGS SYNDROME): ICD-10-CM

## 2025-05-18 RX ORDER — ROPINIROLE 1 MG/1
TABLET, FILM COATED ORAL
Qty: 90 TABLET | Refills: 0 | OUTPATIENT
Start: 2025-05-18

## 2025-05-20 LAB
CV ZIO AF AVG BPM: 84 BPM
CV ZIO AF BPM HIGH: 181 BPM
CV ZIO AF BPM LOW: 50 BPM
CV ZIO AF F EPI AVG BPM: 148 BPM
CV ZIO AF F EPI BPM HIGH: 181 BPM
CV ZIO AF F EPI BPM LOW: 73 BPM
CV ZIO AF F EPI DT: NORMAL
CV ZIO AF PERCENT: 100 %
CV ZIO AF S EPI AVG BPM: 76 BPM
CV ZIO AF S EPI BPM HIGH: 99 BPM
CV ZIO AF S EPI BPM LOW: 50 BPM
CV ZIO AF S EPI DT: NORMAL
CV ZIO BASELINE AVG BPM: 84 BPM
CV ZIO BASELINE BPM HIGH: 181 BPM
CV ZIO BASELINE BPM LOW: 50 BPM
CV ZIO DEVICE ANALYSIS TIME: NORMAL
CV ZIO ECT SVE COUNT: 0 EPISODES
CV ZIO ECT SVE CPLT COUNT: 0 EPISODES
CV ZIO ECT SVE CPLT FREQ: 0
CV ZIO ECT SVE FREQ: 0
CV ZIO ECT SVE TPLT COUNT: 0 EPISODES
CV ZIO ECT SVE TPLT FREQ: 0
CV ZIO ECT VE COUNT: NORMAL EPISODES
CV ZIO ECT VE CPLT COUNT: 396 EPISODES
CV ZIO ECT VE CPLT FREQ: NORMAL
CV ZIO ECT VE FREQ: NORMAL
CV ZIO ECT VE TPLT COUNT: 6 EPISODES
CV ZIO ECT VE TPLT FREQ: NORMAL
CV ZIO ECTOPIC SVE COUPLET RAW PERCENT: 0 %
CV ZIO ECTOPIC SVE ISOLATED PERCENT: 0 %
CV ZIO ECTOPIC SVE TRIPLET RAW PERCENT: 0 %
CV ZIO ECTOPIC VE COUPLET RAW PERCENT: 0.13 %
CV ZIO ECTOPIC VE ISOLATED PERCENT: 2.48 %
CV ZIO ECTOPIC VE TRIPLET RAW PERCENT: 0 %
CV ZIO ENROLLMENT END: NORMAL
CV ZIO ENROLLMENT START: NORMAL
CV ZIO IRREG TYPE: NORMAL
CV ZIO L BIGEMINY DUR: 6.7 SEC
CV ZIO L BIGEMINY END: NORMAL
CV ZIO L BIGEMINY START: NORMAL
CV ZIO L TRIGEMINY DUR: 8 SEC
CV ZIO L TRIGEMINY END: NORMAL
CV ZIO L TRIGEMINY START: NORMAL
CV ZIO PATIENT EVENTS DIARIES: 0
CV ZIO PATIENT EVENTS TRIGGERS: 0
CV ZIO PAUSE COUNT: 0
CV ZIO PRESCRIPTION STATUS: NORMAL
CV ZIO SVT COUNT: 0
CV ZIO TOTAL  ENROLLMENT PERIOD: NORMAL
CV ZIO VT AVG BPM: 134 BPM
CV ZIO VT BPM HIGH: 164 BPM
CV ZIO VT BPM LOW: 77 BPM
CV ZIO VT COUNT: 2
CV ZIO VT F EPI AVG BPM: 129
CV ZIO VT F EPI BEATS: 14 BEATS
CV ZIO VT F EPI BPM HIGH: 164
CV ZIO VT F EPI BPM LOW: 77
CV ZIO VT F EPI DUR: 7.1 SEC
CV ZIO VT F EPI END: NORMAL
CV ZIO VT F EPI START: NORMAL
CV ZIO VT L EPI AVG BPM: 129
CV ZIO VT L EPI BEATS: 14 BEATS
CV ZIO VT L EPI BPM HIGH: 164 BPM
CV ZIO VT L EPI BPM LOW: 77 BPM
CV ZIO VT L EPI DUR: 7.1
CV ZIO VT L EPI END: NORMAL
CV ZIO VT L EPI START: NORMAL

## 2025-05-21 ENCOUNTER — TELEPHONE (OUTPATIENT)
Dept: CASE MANAGEMENT | Facility: OTHER | Age: 80
End: 2025-05-21
Payer: MEDICARE

## 2025-05-21 NOTE — TELEPHONE ENCOUNTER
Outreach attempts were made x 3 with VM to follow up with patient post rehab and to discuss and offer case management services with the [facility] program.  Paris Babcock RN [facility]

## 2025-05-27 ENCOUNTER — OFFICE VISIT (OUTPATIENT)
Dept: SPORTS MEDICINE | Facility: CLINIC | Age: 80
End: 2025-05-27
Payer: MEDICARE

## 2025-05-27 VITALS
BODY MASS INDEX: 28.89 KG/M2 | DIASTOLIC BLOOD PRESSURE: 78 MMHG | OXYGEN SATURATION: 95 % | RESPIRATION RATE: 16 BRPM | HEART RATE: 77 BPM | HEIGHT: 73 IN | WEIGHT: 218 LBS | TEMPERATURE: 98.2 F | SYSTOLIC BLOOD PRESSURE: 136 MMHG

## 2025-05-27 DIAGNOSIS — G89.29 CHRONIC SACROILIAC PAIN: Primary | ICD-10-CM

## 2025-05-27 DIAGNOSIS — M53.3 CHRONIC SACROILIAC PAIN: Primary | ICD-10-CM

## 2025-05-27 RX ORDER — METHYLPREDNISOLONE ACETATE 80 MG/ML
160 INJECTION, SUSPENSION INTRA-ARTICULAR; INTRALESIONAL; INTRAMUSCULAR; SOFT TISSUE ONCE
Status: COMPLETED | OUTPATIENT
Start: 2025-05-27 | End: 2025-05-27

## 2025-05-27 RX ADMIN — METHYLPREDNISOLONE ACETATE 160 MG: 80 INJECTION, SUSPENSION INTRA-ARTICULAR; INTRALESIONAL; INTRAMUSCULAR; SOFT TISSUE at 09:05

## 2025-05-27 NOTE — PROGRESS NOTES
"Juan is a 79 y.o. year old male     Chief Complaint   Patient presents with    Injections     Pt would like to discuss left SI injection        History of Present Illness  HPI     Req repeat bilat SI injections. Seeing nsurg for R side meralgia paresthetica. Recovering from MI.    Review of Systems    /78 (BP Location: Left arm, Patient Position: Sitting, Cuff Size: Adult)   Pulse 77   Temp 98.2 °F (36.8 °C)   Resp 16   Ht 185.4 cm (73\")   Wt 98.9 kg (218 lb)   SpO2 95%   BMI 28.76 kg/m²      Physical Exam    Vital signs reviewed.   General: No acute distress.      [facility] Exam:  Ortho Exam    Procedures     Ultrasound-Guided Sacroiliac Joint Injection Procedure Note    Bilateral sacroiliac joint injection was discussed with the patient in detail, including indication, risks, benefits, and alternatives. Verbal consent was given for the procedure. Injection was performed by physician.  Injection site was identified by ultrasound examination, then cleaned with Betadine and alcohol swabs. Prior to needle insertion, ethyl chloride spray was used for surface anesthesia. Sterile technique was used. Ultrasound guidance was indicated for injection accuracy.  A 22-gauge, 3.5\" spinal needle was guided to the joint under continuous direct ultrasound visualization. Injectate was seen flowing underneath the superficial sacroiliac ligaments and passed without difficulty. The needle was removed and a simple bandage was applied. The procedure was tolerated well without difficulty.    Injection mixture:  1% lidocaine without epinephrine: 2 mL  80mg/mL depomedrol : 1 mL     Diagnoses and all orders for this visit:    Chronic sacroiliac pain  -     methylPREDNISolone acetate (DEPO-medrol) injection 160 mg          Please note that portions of this note were completed with a voice recognition program.     "

## 2025-05-29 ENCOUNTER — OFFICE VISIT (OUTPATIENT)
Dept: CARDIOLOGY | Age: 80
End: 2025-05-29
Payer: MEDICARE

## 2025-05-29 VITALS
HEART RATE: 78 BPM | SYSTOLIC BLOOD PRESSURE: 128 MMHG | OXYGEN SATURATION: 95 % | DIASTOLIC BLOOD PRESSURE: 80 MMHG | WEIGHT: 222.6 LBS | HEIGHT: 73 IN | BODY MASS INDEX: 29.5 KG/M2

## 2025-05-29 DIAGNOSIS — I49.3 PVCS (PREMATURE VENTRICULAR CONTRACTIONS): ICD-10-CM

## 2025-05-29 DIAGNOSIS — I77.810 AORTIC ROOT DILATION: ICD-10-CM

## 2025-05-29 DIAGNOSIS — I36.1 NONRHEUMATIC TRICUSPID VALVE REGURGITATION: ICD-10-CM

## 2025-05-29 DIAGNOSIS — I27.20 PULMONARY HYPERTENSION: ICD-10-CM

## 2025-05-29 DIAGNOSIS — I47.29 NONSUSTAINED VENTRICULAR TACHYCARDIA: ICD-10-CM

## 2025-05-29 DIAGNOSIS — I34.0 NONRHEUMATIC MITRAL VALVE REGURGITATION: ICD-10-CM

## 2025-05-29 DIAGNOSIS — I95.1 ORTHOSTASIS: ICD-10-CM

## 2025-05-29 DIAGNOSIS — I77.810 ASCENDING AORTA DILATION: ICD-10-CM

## 2025-05-29 DIAGNOSIS — I25.10 CORONARY ARTERY DISEASE INVOLVING NATIVE CORONARY ARTERY OF NATIVE HEART WITHOUT ANGINA PECTORIS: Primary | ICD-10-CM

## 2025-05-29 DIAGNOSIS — R55 SYNCOPE, UNSPECIFIED SYNCOPE TYPE: ICD-10-CM

## 2025-05-29 DIAGNOSIS — I48.21 PERMANENT ATRIAL FIBRILLATION: ICD-10-CM

## 2025-05-29 DIAGNOSIS — R29.6 FALLS: ICD-10-CM

## 2025-05-29 DIAGNOSIS — I35.8 AORTIC VALVE SCLEROSIS: ICD-10-CM

## 2025-05-29 PROCEDURE — 3074F SYST BP LT 130 MM HG: CPT | Performed by: NURSE PRACTITIONER

## 2025-05-29 PROCEDURE — 3078F DIAST BP <80 MM HG: CPT | Performed by: NURSE PRACTITIONER

## 2025-05-29 PROCEDURE — 99214 OFFICE O/P EST MOD 30 MIN: CPT | Performed by: NURSE PRACTITIONER

## 2025-05-29 PROCEDURE — 93000 ELECTROCARDIOGRAM COMPLETE: CPT | Performed by: NURSE PRACTITIONER

## 2025-05-29 PROCEDURE — 1160F RVW MEDS BY RX/DR IN RCRD: CPT | Performed by: NURSE PRACTITIONER

## 2025-05-29 PROCEDURE — 1159F MED LIST DOCD IN RCRD: CPT | Performed by: NURSE PRACTITIONER

## 2025-05-29 RX ORDER — METOPROLOL SUCCINATE 25 MG/1
25 TABLET, EXTENDED RELEASE ORAL DAILY
Qty: 90 TABLET | Refills: 3 | Status: SHIPPED | OUTPATIENT
Start: 2025-05-29

## 2025-05-29 RX ORDER — CLOPIDOGREL BISULFATE 75 MG/1
75 TABLET ORAL DAILY
Qty: 90 TABLET | Refills: 3 | Status: SHIPPED | OUTPATIENT
Start: 2025-05-29
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
## 2025-05-30 DIAGNOSIS — G25.81 RLS (RESTLESS LEGS SYNDROME): ICD-10-CM

## 2025-06-02 PROBLEM — I34.0 NONRHEUMATIC MITRAL VALVE REGURGITATION: Status: ACTIVE | Noted: 2025-06-02

## 2025-06-02 PROBLEM — I36.1 NONRHEUMATIC TRICUSPID VALVE REGURGITATION: Status: ACTIVE | Noted: 2025-06-02

## 2025-06-02 PROBLEM — I35.8 AORTIC VALVE SCLEROSIS: Status: ACTIVE | Noted: 2025-06-02

## 2025-06-02 PROBLEM — I49.3 PVCS (PREMATURE VENTRICULAR CONTRACTIONS): Status: ACTIVE | Noted: 2025-06-02

## 2025-06-02 PROBLEM — I95.1 ORTHOSTASIS: Status: ACTIVE | Noted: 2025-06-02

## 2025-06-02 PROBLEM — I47.29 NONSUSTAINED VENTRICULAR TACHYCARDIA: Status: ACTIVE | Noted: 2025-06-02

## 2025-06-02 RX ORDER — ROPINIROLE 1 MG/1
TABLET, FILM COATED ORAL
Qty: 90 TABLET | Refills: 0 | OUTPATIENT
Start: 2025-06-02

## 2025-06-02 NOTE — TELEPHONE ENCOUNTER
LOV                   5/7/20 NOV                     Last refill               Protocol

## 2025-06-09 ENCOUNTER — TRANSCRIBE ORDERS (OUTPATIENT)
Age: 80
End: 2025-06-09
Payer: MEDICARE

## 2025-06-09 DIAGNOSIS — I73.9 PERIPHERAL VASCULAR DISEASE WITH CLAUDICATION: Primary | ICD-10-CM

## 2025-06-30 ENCOUNTER — TELEPHONE (OUTPATIENT)
Dept: CARDIOLOGY | Age: 80
End: 2025-06-30
Payer: MEDICARE

## 2025-06-30 NOTE — TELEPHONE ENCOUNTER
Please obtain pulmonary note from 2025 with Dr. Nolan Solitario. Thanks.     Note to myself:   Abnormal CT scan in February   Did he follow-up with pulmonology?  Get their office note   He needs a repeat CT scan in June 2025.  See if the pulmonologist ordered this

## 2025-07-29 ENCOUNTER — TELEPHONE (OUTPATIENT)
Dept: CARDIOLOGY | Age: 80
End: 2025-07-29
Payer: MEDICARE

## 2025-07-29 ENCOUNTER — TRANSCRIBE ORDERS (OUTPATIENT)
Dept: ADMINISTRATIVE | Facility: HOSPITAL | Age: 80
End: 2025-07-29
Payer: MEDICARE

## 2025-07-29 DIAGNOSIS — I73.9 PAD (PERIPHERAL ARTERY DISEASE): Primary | ICD-10-CM

## 2025-07-29 DIAGNOSIS — Z95.828 HISTORY OF ENDOVASCULAR STENT GRAFT FOR ABDOMINAL AORTIC ANEURYSM: ICD-10-CM

## 2025-07-29 DIAGNOSIS — I73.9 CLAUDICATION: ICD-10-CM

## 2025-07-29 NOTE — TELEPHONE ENCOUNTER
He just had stent placement done in April 2025 and would be high risk for stent closure this soon after stent placement if he were to hold Plavix.  This will likely need to be discussed further with either Dr. Mo or Oly.

## 2025-07-29 NOTE — TELEPHONE ENCOUNTER
[place] with pt's pulmonologist, Dr. Caleb Germain called.  Pt had a PET CT done on 7/24/25 and he would like to do a lung bx of a mass.  Pt is on clopidogrel.  Can pt hold this prior to procedure?  It is currently not scheduled.    Her callback is [phone]  Her fax is [phone]    Do you have any recommendations for this patient?    Lynn MEDEROS RN  [facility] Triage  07/29/25  13:26 EDT

## 2025-08-04 ENCOUNTER — OFFICE VISIT (OUTPATIENT)
Dept: CARDIOLOGY | Facility: CLINIC | Age: 80
End: 2025-08-04
Payer: MEDICARE

## 2025-08-04 ENCOUNTER — HOSPITAL ENCOUNTER (OUTPATIENT)
Dept: CARDIOLOGY | Facility: HOSPITAL | Age: 80
Discharge: HOME | End: 2025-08-04
Admitting: NURSE PRACTITIONER
Payer: MEDICARE

## 2025-08-04 VITALS
BODY MASS INDEX: 29.42 KG/M2 | HEART RATE: 65 BPM | DIASTOLIC BLOOD PRESSURE: 90 MMHG | HEIGHT: 73 IN | OXYGEN SATURATION: 94 % | WEIGHT: 222 LBS | SYSTOLIC BLOOD PRESSURE: 147 MMHG

## 2025-08-04 VITALS
RESPIRATION RATE: 18 BRPM | HEIGHT: 73 IN | SYSTOLIC BLOOD PRESSURE: 126 MMHG | WEIGHT: 213 LBS | OXYGEN SATURATION: 95 % | DIASTOLIC BLOOD PRESSURE: 84 MMHG | BODY MASS INDEX: 28.23 KG/M2 | HEART RATE: 60 BPM

## 2025-08-04 DIAGNOSIS — I77.810 ASCENDING AORTA DILATION: ICD-10-CM

## 2025-08-04 DIAGNOSIS — I27.20 PULMONARY HYPERTENSION: ICD-10-CM

## 2025-08-04 DIAGNOSIS — I77.810 AORTIC ROOT DILATION: ICD-10-CM

## 2025-08-04 DIAGNOSIS — Z01.810 PREOP CARDIOVASCULAR EXAM: ICD-10-CM

## 2025-08-04 DIAGNOSIS — I25.10 CORONARY ARTERY DISEASE INVOLVING NATIVE CORONARY ARTERY OF NATIVE HEART WITHOUT ANGINA PECTORIS: Primary | ICD-10-CM

## 2025-08-04 DIAGNOSIS — I36.1 NONRHEUMATIC TRICUSPID VALVE REGURGITATION: ICD-10-CM

## 2025-08-04 DIAGNOSIS — I35.1 NONRHEUMATIC AORTIC VALVE INSUFFICIENCY: ICD-10-CM

## 2025-08-04 DIAGNOSIS — I34.0 NONRHEUMATIC MITRAL VALVE REGURGITATION: ICD-10-CM

## 2025-08-04 DIAGNOSIS — E78.2 MIXED HYPERLIPIDEMIA: ICD-10-CM

## 2025-08-04 LAB
AORTIC ARCH: 2.6 CM
AORTIC DIMENSIONLESS INDEX: 0.73 (DI)
ASCENDING AORTA: 4.1 CM
AV MEAN PRESS GRAD SYS DOP V1V2: 3 MMHG
AV VMAX SYS DOP: 107 CM/SEC
BH CV ECHO LEFT VENTRICLE GLOBAL LONGITUDINAL STRAIN: -23.6 %
BH CV ECHO MEAS - ACS: 2.49 CM
BH CV ECHO MEAS - AI P1/2T: 886.5 MSEC
BH CV ECHO MEAS - AO MAX PG: 4.6 MMHG
BH CV ECHO MEAS - AO ROOT AREA (BSA CORRECTED): 2.1 CM2
BH CV ECHO MEAS - AO ROOT DIAM: 4.7 CM
BH CV ECHO MEAS - AO V2 VTI: 20.8 CM
BH CV ECHO MEAS - AVA(I,D): 3.2 CM2
BH CV ECHO MEAS - EDV(CUBED): 63.1 ML
BH CV ECHO MEAS - EDV(MOD-SP2): 91 ML
BH CV ECHO MEAS - EDV(MOD-SP4): 82 ML
BH CV ECHO MEAS - EF(MOD-SP2): 69.2 %
BH CV ECHO MEAS - EF(MOD-SP4): 61 %
BH CV ECHO MEAS - ESV(CUBED): 24.6 ML
BH CV ECHO MEAS - ESV(MOD-SP2): 28 ML
BH CV ECHO MEAS - ESV(MOD-SP4): 32 ML
BH CV ECHO MEAS - FS: 26.9 %
BH CV ECHO MEAS - IVS/LVPW: 1.05 CM
BH CV ECHO MEAS - IVSD: 1.25 CM
BH CV ECHO MEAS - LA 3D VOL INDEX: 37
BH CV ECHO MEAS - LAT PEAK E' VEL: 11.4 CM/SEC
BH CV ECHO MEAS - LV DIASTOLIC VOL/BSA (35-75): 36.5 CM2
BH CV ECHO MEAS - LV MASS(C)D: 167.7 GRAMS
BH CV ECHO MEAS - LV MAX PG: 2.6 MMHG
BH CV ECHO MEAS - LV MEAN PG: 1 MMHG
BH CV ECHO MEAS - LV SYSTOLIC VOL/BSA (12-30): 14.2 CM2
BH CV ECHO MEAS - LV V1 MAX: 80.7 CM/SEC
BH CV ECHO MEAS - LV V1 VTI: 15.2 CM
BH CV ECHO MEAS - LVIDD: 4 CM
BH CV ECHO MEAS - LVIDS: 2.9 CM
BH CV ECHO MEAS - LVOT AREA: 4.4 CM2
BH CV ECHO MEAS - LVOT DIAM: 2.37 CM
BH CV ECHO MEAS - LVPWD: 1.19 CM
BH CV ECHO MEAS - MED PEAK E' VEL: 11 CM/SEC
BH CV ECHO MEAS - MR MAX PG: 80.3 MMHG
BH CV ECHO MEAS - MR MAX VEL: 448.2 CM/SEC
BH CV ECHO MEAS - MV DEC SLOPE: 738.5 CM/SEC2
BH CV ECHO MEAS - MV DEC TIME: 0.18 SEC
BH CV ECHO MEAS - MV E MAX VEL: 113.7 CM/SEC
BH CV ECHO MEAS - MV MAX PG: 11 MMHG
BH CV ECHO MEAS - MV MEAN PG: 5.2 MMHG
BH CV ECHO MEAS - MV P1/2T: 62.6 MSEC
BH CV ECHO MEAS - MV V2 VTI: 27.4 CM
BH CV ECHO MEAS - MVA(P1/2T): 3.5 CM2
BH CV ECHO MEAS - MVA(VTI): 2.45 CM2
BH CV ECHO MEAS - PA ACC TIME: 0.09 SEC
BH CV ECHO MEAS - PA V2 MAX: 96.2 CM/SEC
BH CV ECHO MEAS - PULM A REVS DUR: 0.09 SEC
BH CV ECHO MEAS - PULM A REVS VEL: 15.6 CM/SEC
BH CV ECHO MEAS - PULM DIAS VEL: 49.7 CM/SEC
BH CV ECHO MEAS - PULM S/D: 0.73
BH CV ECHO MEAS - PULM SYS VEL: 36.4 CM/SEC
BH CV ECHO MEAS - QP/QS: 0.85
BH CV ECHO MEAS - RAP SYSTOLE: 3 MMHG
BH CV ECHO MEAS - RV MAX PG: 1.64 MMHG
BH CV ECHO MEAS - RV V1 MAX: 63.9 CM/SEC
BH CV ECHO MEAS - RV V1 VTI: 10.2 CM
BH CV ECHO MEAS - RVOT DIAM: 2.7 CM
BH CV ECHO MEAS - RVSP: 32.4 MMHG
BH CV ECHO MEAS - SUP REN AO DIAM: 2.2 CM
BH CV ECHO MEAS - SV(LVOT): 67.1 ML
BH CV ECHO MEAS - SV(MOD-SP2): 63 ML
BH CV ECHO MEAS - SV(MOD-SP4): 50 ML
BH CV ECHO MEAS - SV(RVOT): 56.9 ML
BH CV ECHO MEAS - SVI(LVOT): 29.8 ML/M2
BH CV ECHO MEAS - SVI(MOD-SP2): 28 ML/M2
BH CV ECHO MEAS - SVI(MOD-SP4): 22.2 ML/M2
BH CV ECHO MEAS - TAPSE (>1.6): 2.9 CM
BH CV ECHO MEAS - TR MAX PG: 29.4 MMHG
BH CV ECHO MEAS - TR MAX VEL: 271.2 CM/SEC
BH CV ECHO MEASUREMENTS AVERAGE E/E' RATIO: 10.15
BH CV XLRA - RV BASE: 3.6 CM
BH CV XLRA - RV LENGTH: 8.9 CM
BH CV XLRA - RV MID: 2.9 CM
BH CV XLRA - TDI S': 15.3 CM/SEC
LEFT ATRIUM VOLUME INDEX: 30.1 ML/M2
LV EF 3D SEGMENTATION: 57 %
LV EF BIPLANE MOD: 64.9 %
SINUS: 4.1 CM
STJ: 2.7 CM

## 2025-08-04 PROCEDURE — 93306 TTE W/DOPPLER COMPLETE: CPT

## 2025-08-04 PROCEDURE — 93306 TTE W/DOPPLER COMPLETE: CPT | Performed by: INTERNAL MEDICINE

## 2025-08-04 PROCEDURE — 93356 MYOCRD STRAIN IMG SPCKL TRCK: CPT

## 2025-08-04 PROCEDURE — 99214 OFFICE O/P EST MOD 30 MIN: CPT | Performed by: INTERNAL MEDICINE

## 2025-08-04 PROCEDURE — 93356 MYOCRD STRAIN IMG SPCKL TRCK: CPT | Performed by: INTERNAL MEDICINE

## 2025-08-04 PROCEDURE — 3079F DIAST BP 80-89 MM HG: CPT | Performed by: INTERNAL MEDICINE

## 2025-08-04 PROCEDURE — 3074F SYST BP LT 130 MM HG: CPT | Performed by: INTERNAL MEDICINE

## 2025-08-13 ENCOUNTER — TRANSCRIBE ORDERS (OUTPATIENT)
Dept: ADMINISTRATIVE | Facility: HOSPITAL | Age: 80
End: 2025-08-13
Payer: MEDICARE

## 2025-08-13 DIAGNOSIS — I73.9 PERIPHERAL VASCULAR DISEASE WITH CLAUDICATION: Primary | ICD-10-CM

## 2025-08-13 DIAGNOSIS — I25.10 ATHEROSCLEROSIS OF NATIVE CORONARY ARTERY OF NATIVE HEART WITHOUT ANGINA PECTORIS: ICD-10-CM

## 2025-08-13 DIAGNOSIS — I25.10 ATHEROSCLEROSIS OF NATIVE CORONARY ARTERY OF NATIVE HEART WITHOUT ANGINA PECTORIS: Primary | ICD-10-CM

## 2025-08-13 DIAGNOSIS — Z95.828 HISTORY OF ENDOVASCULAR STENT GRAFT FOR ABDOMINAL AORTIC ANEURYSM: ICD-10-CM

## 2025-08-14 ENCOUNTER — TRANSCRIBE ORDERS (OUTPATIENT)
Dept: ADMINISTRATIVE | Facility: HOSPITAL | Age: 80
End: 2025-08-14
Payer: MEDICARE

## 2025-08-14 DIAGNOSIS — I73.9 PERIPHERAL VASCULAR DISEASE WITH CLAUDICATION: ICD-10-CM

## 2025-08-14 DIAGNOSIS — I34.0 NONRHEUMATIC MITRAL VALVE REGURGITATION: ICD-10-CM

## 2025-08-14 DIAGNOSIS — Z95.828 HISTORY OF ENDOVASCULAR STENT GRAFT FOR ABDOMINAL AORTIC ANEURYSM: ICD-10-CM

## 2025-08-15 ENCOUNTER — TRANSCRIBE ORDERS (OUTPATIENT)
Dept: ADMINISTRATIVE | Facility: HOSPITAL | Age: 80
End: 2025-08-15
Payer: MEDICARE

## 2025-08-15 DIAGNOSIS — I73.9 PERIPHERAL VASCULAR DISEASE WITH CLAUDICATION: ICD-10-CM

## 2025-08-15 DIAGNOSIS — I10 ESSENTIAL HYPERTENSION: Primary | ICD-10-CM

## 2025-08-15 DIAGNOSIS — I73.9 PERIPHERAL VASCULAR DISEASE: ICD-10-CM

## 2025-08-15 DIAGNOSIS — Z95.828 HISTORY OF ENDOVASCULAR STENT GRAFT FOR ABDOMINAL AORTIC ANEURYSM: ICD-10-CM

## 2025-08-15 DIAGNOSIS — I25.810 ATHEROSCLEROSIS OF CORONARY ARTERY BYPASS GRAFT OF NATIVE HEART WITHOUT ANGINA PECTORIS: ICD-10-CM

## 2025-08-15 DIAGNOSIS — I34.0 NONRHEUMATIC MITRAL VALVE REGURGITATION: ICD-10-CM

## 2025-08-18 ENCOUNTER — OFFICE VISIT (OUTPATIENT)
Age: 80
End: 2025-08-18
Payer: MEDICARE

## 2025-08-18 ENCOUNTER — HOSPITAL ENCOUNTER (OUTPATIENT)
Facility: HOSPITAL | Age: 80
Discharge: HOME | End: 2025-08-18
Admitting: SURGERY
Payer: MEDICARE

## 2025-08-18 VITALS
DIASTOLIC BLOOD PRESSURE: 92 MMHG | HEART RATE: 84 BPM | WEIGHT: 223.5 LBS | HEIGHT: 73 IN | SYSTOLIC BLOOD PRESSURE: 168 MMHG | BODY MASS INDEX: 29.62 KG/M2

## 2025-08-18 DIAGNOSIS — I71.43 INFRARENAL ABDOMINAL AORTIC ANEURYSM (AAA) WITHOUT RUPTURE: ICD-10-CM

## 2025-08-18 DIAGNOSIS — R79.89 ELEVATED SERUM CREATININE: ICD-10-CM

## 2025-08-18 DIAGNOSIS — Z98.890 HISTORY OF AAA (ABDOMINAL AORTIC ANEURYSM) REPAIR: Primary | ICD-10-CM

## 2025-08-18 DIAGNOSIS — I70.213 ATHEROSCLEROSIS OF NATIVE ARTERY OF BOTH LOWER EXTREMITIES WITH INTERMITTENT CLAUDICATION: ICD-10-CM

## 2025-08-18 DIAGNOSIS — I71.43 INFRARENAL ABDOMINAL AORTIC ANEURYSM, WITHOUT RUPTURE: ICD-10-CM

## 2025-08-18 DIAGNOSIS — I97.89 TYPE II ENDOLEAK OF AORTIC GRAFT: ICD-10-CM

## 2025-08-18 LAB
ABDOMINAL AORTA AORTIC ANASTOMOSIS PSV: 45 CM/S
ABDOMINAL AORTA LEFT DIST ANASTOMOSIS PSV: 270 CM/S
ABDOMINAL AORTA LEFT LIMB GRAFT PSV: 91 CM/S
ABDOMINAL AORTA RIGHT DIST ANASTOMOSIS PSV: 203 CM/S
ABDOMINAL AORTA RIGHT LIMB GRAFT PSV: 72 CM/S
ABDOMINAL DIST AORTA AP: 5.5 CM
ABDOMINAL DIST AORTA TRANS: 5.5 CM
ABDOMINAL LT COM ILIAC VEL: 69 CM/S
ABDOMINAL LT EXT ILIAC VEL: 270 CM/S
ABDOMINAL PROX AORTA AP: 2.3 CM
ABDOMINAL PROX AORTA VEL: 45 CM/S
ABDOMINAL RT COM ILIAC AP: 1.5 CM
ABDOMINAL RT COM ILIAC VEL: 72 CM/S
ABDOMINAL RT EXT ILIAC VEL: 226 CM/S
BH CV VAS ABDOMINAL AO RESIDUAL LUMEN AP MEASURE: 5.5 CM
BH CV VAS ABDOMINAL AO RESIDUAL LUMEN TRANS MEASURE: 5.5 CM
BH CV VAS ABDOMINAL AORTA DISTAL LEFT LIMB GRAFT PSV: 69 CM/S
BH CV VAS ABDOMINAL AORTA DISTAL RIGHT LIMB GRAFT PSV: 71 CM/S

## 2025-08-18 PROCEDURE — 93978 VASCULAR STUDY: CPT | Performed by: SURGERY

## 2025-08-18 PROCEDURE — 93978 VASCULAR STUDY: CPT

## 2025-08-22 ENCOUNTER — PATIENT ROUNDING (BHMG ONLY) (OUTPATIENT)
Age: 80
End: 2025-08-22
Payer: MEDICARE

## 2025-08-26 ENCOUNTER — OFFICE VISIT (OUTPATIENT)
Dept: SPORTS MEDICINE | Facility: CLINIC | Age: 80
End: 2025-08-26
Payer: MEDICARE

## 2025-08-26 VITALS
SYSTOLIC BLOOD PRESSURE: 138 MMHG | HEART RATE: 79 BPM | RESPIRATION RATE: 16 BRPM | BODY MASS INDEX: 28.63 KG/M2 | WEIGHT: 216 LBS | OXYGEN SATURATION: 93 % | DIASTOLIC BLOOD PRESSURE: 80 MMHG | HEIGHT: 73 IN

## 2025-08-26 DIAGNOSIS — M53.3 CHRONIC SACROILIAC PAIN: Primary | ICD-10-CM

## 2025-08-26 DIAGNOSIS — G89.29 CHRONIC SACROILIAC PAIN: Primary | ICD-10-CM

## 2025-08-26 PROCEDURE — 1160F RVW MEDS BY RX/DR IN RCRD: CPT | Performed by: FAMILY MEDICINE

## 2025-08-26 PROCEDURE — 3079F DIAST BP 80-89 MM HG: CPT | Performed by: FAMILY MEDICINE

## 2025-08-26 PROCEDURE — 76942 ECHO GUIDE FOR BIOPSY: CPT | Performed by: FAMILY MEDICINE

## 2025-08-26 PROCEDURE — 1159F MED LIST DOCD IN RCRD: CPT | Performed by: FAMILY MEDICINE

## 2025-08-26 PROCEDURE — 3075F SYST BP GE 130 - 139MM HG: CPT | Performed by: FAMILY MEDICINE

## 2025-08-26 PROCEDURE — 20550 NJX 1 TENDON SHEATH/LIGAMENT: CPT | Performed by: FAMILY MEDICINE

## 2025-08-26 RX ORDER — SPIRONOLACTONE 25 MG/1
75 TABLET ORAL DAILY
COMMUNITY
Start: 2025-05-06

## 2025-08-26 RX ORDER — TRIAMCINOLONE ACETONIDE 40 MG/ML
40 INJECTION, SUSPENSION INTRA-ARTICULAR; INTRAMUSCULAR
Status: COMPLETED | OUTPATIENT
Start: 2025-08-26 | End: 2025-08-26

## 2025-08-26 RX ADMIN — TRIAMCINOLONE ACETONIDE 40 MG: 40 INJECTION, SUSPENSION INTRA-ARTICULAR; INTRAMUSCULAR at 09:58

## (undated) DEVICE — CATH SZ ACCUVU SEG/20CM PIG .038IN 5F 70CM

## (undated) DEVICE — INFLATION DEVICE: Brand: ENCORE™ 26

## (undated) DEVICE — 1016 S-DRAPE IRRIG POUCH 10/BOX: Brand: STERI-DRAPE™

## (undated) DEVICE — DEV INDEFLATOR P/N 580289

## (undated) DEVICE — TOTAL TRAY, 16FR 10ML SIL FOLEY, URN: Brand: MEDLINE

## (undated) DEVICE — THE TORRENT IRRIGATION SCOPE CONNECTOR IS USED WITH THE TORRENT IRRIGATION TUBING TO PROVIDE IRRIGATION FLUIDS SUCH AS STERILE WATER DURING GASTROINTESTINAL ENDOSCOPIC PROCEDURES WHEN USED IN CONJUNCTION WITH AN IRRIGATION PUMP (OR ELECTROSURGICAL UNIT).: Brand: TORRENT

## (undated) DEVICE — RADIFOCUS GLIDEWIRE: Brand: GLIDEWIRE

## (undated) DEVICE — TUBING, SUCTION, 1/4" X 10', STRAIGHT: Brand: MEDLINE

## (undated) DEVICE — Device

## (undated) DEVICE — SENSR O2 OXIMAX FNGR A/ 18IN NONSTR

## (undated) DEVICE — STPLR SKIN VISISTAT WD 35CT

## (undated) DEVICE — CATH DIAG IMPULSE FL5 5F 100CM

## (undated) DEVICE — FEMORAL ENTRY ANGIOGRAPHY SHIELD-YELLOW: Brand: RADPAD

## (undated) DEVICE — CONN TBG Y 5 IN 1 LF STRL

## (undated) DEVICE — BALN OCCL CODA2 .035 9F 32MM 120CM

## (undated) DEVICE — LOU PACE DEFIB: Brand: MEDLINE INDUSTRIES, INC.

## (undated) DEVICE — DRP MICROSCOPE 4 BINOCULAR CV 54X150IN

## (undated) DEVICE — GLIDESHEATH SLENDER STAINLESS STEEL KIT: Brand: GLIDESHEATH SLENDER

## (undated) DEVICE — SPONGE,LAP,12"X12",XR,ST,5/PK,40PK/CS: Brand: MEDLINE

## (undated) DEVICE — ANTIBACTERIAL UNDYED BRAIDED (POLYGLACTIN 910), SYNTHETIC ABSORBABLE SUTURE: Brand: COATED VICRYL

## (undated) DEVICE — SYR LUERLOK 30CC

## (undated) DEVICE — CATH SZ ACCUVU SEG/20CM PIG 5F 100CM

## (undated) DEVICE — ST ACC MICROPUNCTURE STFF .018 ECHO/PLDM/TP 4F/10CM 21G/7CM

## (undated) DEVICE — APPL CHLORAPREP HI/LITE 26ML ORNG

## (undated) DEVICE — 3M™ STERI-STRIP™ REINFORCED ADHESIVE SKIN CLOSURES, R1547, 1/2 IN X 4 IN (12 MM X 100 MM), 6 STRIPS/ENVELOPE: Brand: 3M™ STERI-STRIP™

## (undated) DEVICE — DRAPE,FEM ANGIO,W/POUCHES, HD: Brand: MEDLINE

## (undated) DEVICE — DISPOSABLE IRRIGATION BIPOLAR CORD, M1000 TYPE: Brand: KIRWAN

## (undated) DEVICE — CANN O2 ETCO2 FITS ALL CONN CO2 SMPL A/ 7IN DISP LF

## (undated) DEVICE — PK NEURO SPINE 40

## (undated) DEVICE — NC TREK NEO™ CORONARY DILATATION CATHETER 4.00 MM X 8 MM / RAPID-EXCHANGE: Brand: NC TREK NEO™

## (undated) DEVICE — INTRO SHEATH DRYSEAL FLEX 12F4TO4.7MM 33CM

## (undated) DEVICE — CATH TEMPO 5F BER II 65CM: Brand: TEMPO

## (undated) DEVICE — PINNACLE R/O II INTRODUCER SHEATH WITH RADIOPAQUE MARKER: Brand: PINNACLE

## (undated) DEVICE — THE CARR-LOCKE INJECTION NEEDLE IS A SINGLE USE, DISPOSABLE, FLEXIBLE SHEATH INJECTION NEEDLE USED FOR THE INJECTION OF VARIOUS TYPES OF MEDIA THROUGH FLEXIBLE ENDOSCOPES.

## (undated) DEVICE — SYRINGE KIT,PACKAGED,,150FT,MK 7(ANGIO-ARTERION, 150ML SYR KIT W/QFT,MC)(60729385): Brand: MEDRAD® MARK 7 ARTERION DISPOSABLE SYRINGE 150 ML WITH QUICK FILL TUBE

## (undated) DEVICE — KT MANIFLD CARDIAC

## (undated) DEVICE — NC TREK NEO™ CORONARY DILATATION CATHETER 4.00 MM X 15 MM / RAPID-EXCHANGE: Brand: NC TREK NEO™

## (undated) DEVICE — PK ATS CUST W CARDIOTOMY RESEVOIR

## (undated) DEVICE — BALN PTCA TAKERURX DIL 2.5X15MM

## (undated) DEVICE — Device: Brand: PROWATER

## (undated) DEVICE — GLV SURG BIOGEL LTX PF 7

## (undated) DEVICE — EXTENSION SET, MALE LUER LOCK ADAPTER WITH RETRACTABLE COLLAR

## (undated) DEVICE — 3M™ IOBAN™ 2 ANTIMICROBIAL INCISE DRAPE 6648EZ: Brand: IOBAN™ 2

## (undated) DEVICE — PK AAA 40

## (undated) DEVICE — TRAP FLD MINIVAC MEGADYNE 100ML

## (undated) DEVICE — PENCL E/S ULTRAVAC TELESCP NOSE HOLSTR 10FT

## (undated) DEVICE — TREK CORONARY DILATATION CATHETER 3.50 MM X 15 MM / RAPID-EXCHANGE: Brand: TREK

## (undated) DEVICE — 3.0MM PRECISION NEURO (MATCH HEAD)

## (undated) DEVICE — CVR PROB 96IN LF STRL

## (undated) DEVICE — GUIDELINER CATHETERS ARE INTENDED TO BE USED IN CONJUNCTION WITH GUIDE CATHETERS TO ACCESS DISCRETE REGIONS OF THE CORONARY AND/OR PERIPHERAL VASCULATURE, AND TO FACILITATE PLACEMENT OF INTERVENTIONAL DEVICES.: Brand: GUIDELINER® V3 CATHETER

## (undated) DEVICE — Device: Brand: SPOT EX ENDOSCOPIC TATTOO

## (undated) DEVICE — TBG PRESS EXCITE INJ HPF1200 CLR 100IN

## (undated) DEVICE — EQUIPMENT COVER BAG TYPE 48” X 36” (122CM X 91CM): Brand: EQUIPMENT COVER BAG TYPE

## (undated) DEVICE — PTA BALLOON DILATATION CATHETER: Brand: MUSTANG™

## (undated) DEVICE — R2P DESTINATION SLENDER GUIDING SHEATH: Brand: R2P DESTINATION SLENDER

## (undated) DEVICE — CATH GUIDE LAUNCHER EBU4.0 6F 100CM

## (undated) DEVICE — TR BAND RADIAL ARTERY COMPRESSION DEVICE: Brand: TR BAND

## (undated) DEVICE — GLV SURG SENSICARE W/ALOE PF LF 7.5 STRL

## (undated) DEVICE — INTENDED FOR TISSUE SEPARATION, AND OTHER PROCEDURES THAT REQUIRE A SHARP SURGICAL BLADE TO PUNCTURE OR CUT.: Brand: BARD-PARKER ® STAINLESS STEEL BLADES

## (undated) DEVICE — COVER,TABLE,HEAVY DUTY,79"X110",STRL: Brand: MEDLINE

## (undated) DEVICE — SMOKE EVACUATION TUBING WITH 7/8 IN TO 1/4 IN REDUCER: Brand: BUFFALO FILTER

## (undated) DEVICE — ADAPT CLN BIOGUARD AIR/H2O DISP

## (undated) DEVICE — 3M™ STERI-STRIP™ REINFORCED ADHESIVE SKIN CLOSURES, R1546, 1/4 IN X 4 IN (6 MM X 100 MM), 10 STRIPS/ENVELOPE: Brand: 3M™ STERI-STRIP™

## (undated) DEVICE — SINGLE-USE BIOPSY FORCEPS: Brand: RADIAL JAW 4

## (undated) DEVICE — Device: Brand: DEFENDO AIR/WATER/SUCTION AND BIOPSY VALVE

## (undated) DEVICE — KT ORCA ORCAPOD DISP STRL

## (undated) DEVICE — CODMAN® SURGICAL PATTIES 3/4" X 3/4" (1.91CM X 1.91CM): Brand: CODMAN®

## (undated) DEVICE — RADIFOCUS OPTITORQUE ANGIOGRAPHIC CATHETER: Brand: OPTITORQUE

## (undated) DEVICE — LN SMPL CO2 SHTRM SD STREAM W/M LUER

## (undated) DEVICE — DGW .035 FC J3MM 260CM TEF: Brand: EMERALD

## (undated) DEVICE — THE SINGLE USE ETRAP – POLYP TRAP IS USED FOR SUCTION RETRIEVAL OF ENDOSCOPICALLY REMOVED POLYPS.: Brand: ETRAP

## (undated) DEVICE — PATIENT RETURN ELECTRODE, SINGLE-USE, CONTACT QUALITY MONITORING, ADULT, WITH 9FT CORD, FOR PATIENTS WEIGING OVER 33LBS. (15KG): Brand: MEGADYNE

## (undated) DEVICE — 6.0MM PRECISION ROUND

## (undated) DEVICE — BALN SFT VU BERN .035IN 5F 140CM

## (undated) DEVICE — SYR CONTRL LUERLOK 10CC

## (undated) DEVICE — NEEDLE, QUINCKE, 20GX3.5": Brand: MEDLINE

## (undated) DEVICE — CORONARY IMAGING CATHETER: Brand: OPTICROSS™ 6 HD

## (undated) DEVICE — CANN NASL CO2 TRULINK W/O2 A/

## (undated) DEVICE — CATH ANGIO TRCN NB BCN .038 5F 100CM VERT

## (undated) DEVICE — DRILL BIT 2345030M 3.0MM DRILL BIT: Brand: POWEREASE™ INSTRUMENTS

## (undated) DEVICE — MAT FLR ABSORBENT LG 4FT 10 2.5FT

## (undated) DEVICE — CATH BALN INTRAVASC/LITHO C2PLUS 4X12MM

## (undated) DEVICE — SNAR POLYP CAPTIVATOR CRSNT 27MM 240CM

## (undated) DEVICE — SOL NACL 0.9PCT 1000ML

## (undated) DEVICE — SYR LL TP 10ML STRL

## (undated) DEVICE — INTRO SHEATH DRYSEAL FLEX 18F 6.0TO6.7MM 33CM

## (undated) DEVICE — BRUSH CYTO COLONOSCOPE 7F3MM 240CM RED

## (undated) DEVICE — RADIFOCUS GLIDEWIRE ADVANTAGE GUIDEWIRE: Brand: GLIDEWIRE ADVANTAGE

## (undated) DEVICE — GOWN,NON-REINFORCED,SIRUS,SET IN SLV,XL: Brand: MEDLINE

## (undated) DEVICE — PK CATH CARD 40

## (undated) DEVICE — BALN STENTGR Q50

## (undated) DEVICE — SNAR POLYP SENSATION STDOVL 27 240 BX40